# Patient Record
Sex: MALE | Race: BLACK OR AFRICAN AMERICAN | Employment: OTHER | URBAN - METROPOLITAN AREA
[De-identification: names, ages, dates, MRNs, and addresses within clinical notes are randomized per-mention and may not be internally consistent; named-entity substitution may affect disease eponyms.]

---

## 2017-01-10 RX ORDER — LEVOTHYROXINE SODIUM 88 UG/1
TABLET ORAL
Qty: 30 TAB | Refills: 1 | Status: SHIPPED | OUTPATIENT
Start: 2017-01-10 | End: 2017-05-11 | Stop reason: SDUPTHER

## 2017-01-10 NOTE — TELEPHONE ENCOUNTER
1/10/17 Called patient to try and set up appt. , but person who answered the phone, said it is the wrong number.   dw

## 2017-01-10 NOTE — TELEPHONE ENCOUNTER
He is due in for visit with MD for med refill and his annual fasting labs with the next open appt slot. If he can come on a Monday he can see Jayme Dukes for his [address] behind his appt with Dr Alex Guzmán.  Hopefully this can be set up in the next 60 days

## 2017-01-16 ENCOUNTER — OFFICE VISIT (OUTPATIENT)
Dept: FAMILY MEDICINE CLINIC | Age: 82
End: 2017-01-16

## 2017-01-16 VITALS
DIASTOLIC BLOOD PRESSURE: 77 MMHG | OXYGEN SATURATION: 98 % | HEIGHT: 63 IN | RESPIRATION RATE: 20 BRPM | TEMPERATURE: 98 F | WEIGHT: 168 LBS | BODY MASS INDEX: 29.77 KG/M2 | HEART RATE: 62 BPM | SYSTOLIC BLOOD PRESSURE: 153 MMHG

## 2017-01-16 DIAGNOSIS — E78.5 DYSLIPIDEMIA: ICD-10-CM

## 2017-01-16 DIAGNOSIS — C61 PROSTATE CANCER (HCC): ICD-10-CM

## 2017-01-16 DIAGNOSIS — R73.03 PREDIABETES: ICD-10-CM

## 2017-01-16 DIAGNOSIS — E55.9 VITAMIN D DEFICIENCY: ICD-10-CM

## 2017-01-16 DIAGNOSIS — I10 ESSENTIAL HYPERTENSION: ICD-10-CM

## 2017-01-16 DIAGNOSIS — G89.29 OTHER CHRONIC PAIN: ICD-10-CM

## 2017-01-16 DIAGNOSIS — K62.5 BRBPR (BRIGHT RED BLOOD PER RECTUM): ICD-10-CM

## 2017-01-16 DIAGNOSIS — E03.9 HYPOTHYROIDISM, UNSPECIFIED TYPE: ICD-10-CM

## 2017-01-16 DIAGNOSIS — R74.8 ALKALINE PHOSPHATASE ELEVATION: ICD-10-CM

## 2017-01-16 DIAGNOSIS — R07.89 RIGHT-SIDED CHEST WALL PAIN: Primary | ICD-10-CM

## 2017-01-16 RX ORDER — LEVOTHYROXINE SODIUM 88 UG/1
TABLET ORAL
Qty: 30 TAB | Refills: 0 | Status: CANCELLED | OUTPATIENT
Start: 2017-01-16

## 2017-01-16 RX ORDER — TRAMADOL HYDROCHLORIDE 50 MG/1
TABLET ORAL
Qty: 200 TAB | Refills: 2 | Status: ON HOLD | OUTPATIENT
Start: 2017-01-16 | End: 2017-02-13

## 2017-01-16 NOTE — PROGRESS NOTES
States still bleeding from rectum. Sees blood with each BM. Saw Dr. Jake Rosen 3 mos ago and had bleeding then. States has not changed. No GI spec. Nurse history read and confirmed by patient. Visit Vitals    /77 (BP 1 Location: Right arm, BP Patient Position: Sitting)    Pulse 62    Temp 98 °F (36.7 °C)    Resp 20    Ht 5' 3\" (1.6 m)    Wt 168 lb (76.2 kg)    SpO2 98%    BMI 29.76 kg/m2       Patient alert and cooperative. Reviewed above. Assessment:  1. Here for follow up of now right sided chest wall pain, area non tender, no masses appreciated. 2. HTN with elevated reading. 3. Bright red blood per rectum, ongoing since radiation therapy for prostate cancer. 4. Hypothyroid, on replacement. 5. History of CAD. 6. Hyperlipids. 7. Prediabetes. 8. Vitamin D deficiency. 9. Alk phos elevation. Plan:  1. Flu and pneumonia given. 2. Return for fasting labs. 3. 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 4. Follow otherwise here prn.

## 2017-01-16 NOTE — PROGRESS NOTES
He is still having side pain. He is having right sided pain today but some days it's left sided. He lost his wife in the summer and no longer has to lift her. He failed his depression screen -thinks due to the loss of his wife and his own health issues  We have notes from Dr Eulalia Vargas at [facility] from September and this is his most recent visit  Smoker and would like to stop  He is due for Tdap/shingles vaccine-will check with insurance on coverage. He is due for a Prevnar and will do this today and his flu shot.  Due MWV and we set this up

## 2017-02-05 ENCOUNTER — APPOINTMENT (OUTPATIENT)
Dept: CT IMAGING | Age: 82
DRG: 871 | End: 2017-02-05
Attending: EMERGENCY MEDICINE
Payer: MEDICARE

## 2017-02-05 ENCOUNTER — HOSPITAL ENCOUNTER (INPATIENT)
Age: 82
LOS: 8 days | Discharge: NURSING, REHAB OR LONG-TERM CARE | DRG: 871 | End: 2017-02-13
Attending: EMERGENCY MEDICINE | Admitting: INTERNAL MEDICINE
Payer: MEDICARE

## 2017-02-05 ENCOUNTER — APPOINTMENT (OUTPATIENT)
Dept: GENERAL RADIOLOGY | Age: 82
DRG: 871 | End: 2017-02-05
Attending: EMERGENCY MEDICINE
Payer: MEDICARE

## 2017-02-05 DIAGNOSIS — R07.89 RIGHT-SIDED CHEST WALL PAIN: ICD-10-CM

## 2017-02-05 DIAGNOSIS — G89.29 OTHER CHRONIC PAIN: ICD-10-CM

## 2017-02-05 DIAGNOSIS — R55 SYNCOPE AND COLLAPSE: ICD-10-CM

## 2017-02-05 DIAGNOSIS — J18.9 PNEUMONIA OF RIGHT LUNG DUE TO INFECTIOUS ORGANISM, UNSPECIFIED PART OF LUNG: Primary | ICD-10-CM

## 2017-02-05 DIAGNOSIS — R06.02 SHORTNESS OF BREATH: ICD-10-CM

## 2017-02-05 PROBLEM — J15.9 COMMUNITY ACQUIRED BACTERIAL PNEUMONIA: Status: ACTIVE | Noted: 2017-02-05

## 2017-02-05 LAB
ALBUMIN SERPL BCP-MCNC: 2.1 G/DL (ref 3.5–5)
ALBUMIN/GLOB SERPL: 0.4 {RATIO} (ref 1.1–2.2)
ALP SERPL-CCNC: 157 U/L (ref 45–117)
ALT SERPL-CCNC: 39 U/L (ref 12–78)
ANION GAP BLD CALC-SCNC: 14 MMOL/L (ref 5–15)
ANION GAP BLD CALC-SCNC: 20 MMOL/L (ref 5–15)
APPEARANCE UR: ABNORMAL
ARTERIAL PATENCY WRIST A: YES
AST SERPL W P-5'-P-CCNC: 91 U/L (ref 15–37)
BACTERIA URNS QL MICRO: NEGATIVE /HPF
BASE DEFICIT BLDA-SCNC: 5.5 MMOL/L
BASOPHILS # BLD AUTO: 0 K/UL
BASOPHILS # BLD: 0 %
BDY SITE: ABNORMAL
BILIRUB SERPL-MCNC: 1.6 MG/DL (ref 0.2–1)
BILIRUB UR QL: NEGATIVE
BREATHS.SPONTANEOUS ON VENT: 26
BUN BLD-MCNC: 69 MG/DL (ref 9–20)
BUN SERPL-MCNC: 68 MG/DL (ref 6–20)
BUN/CREAT SERPL: 36 (ref 12–20)
CA-I BLD-MCNC: 1.1 MMOL/L (ref 1.12–1.32)
CALCIUM SERPL-MCNC: 8.9 MG/DL (ref 8.5–10.1)
CHLORIDE BLD-SCNC: 100 MMOL/L (ref 98–107)
CHLORIDE SERPL-SCNC: 101 MMOL/L (ref 97–108)
CK MB CFR SERPL CALC: 5.3 % (ref 0–2.5)
CK MB SERPL-MCNC: 4.8 NG/ML (ref 5–25)
CK SERPL-CCNC: 91 U/L (ref 39–308)
CO2 BLD-SCNC: 20 MMOL/L (ref 21–32)
CO2 SERPL-SCNC: 22 MMOL/L (ref 21–32)
COLOR UR: ABNORMAL
CREAT BLD-MCNC: 1.9 MG/DL (ref 0.6–1.3)
CREAT SERPL-MCNC: 1.89 MG/DL (ref 0.7–1.3)
EOSINOPHIL # BLD: 0.3 K/UL
EOSINOPHIL NFR BLD: 1 %
EPITH CASTS URNS QL MICRO: ABNORMAL /LPF
ERYTHROCYTE [DISTWIDTH] IN BLOOD BY AUTOMATED COUNT: 16.1 % (ref 11.5–14.5)
GAS FLOW.O2 O2 DELIVERY SYS: 2 L/MIN
GLOBULIN SER CALC-MCNC: 5.8 G/DL (ref 2–4)
GLUCOSE BLD STRIP.AUTO-MCNC: 96 MG/DL (ref 65–100)
GLUCOSE BLD-MCNC: 101 MG/DL (ref 75–110)
GLUCOSE SERPL-MCNC: 93 MG/DL (ref 65–100)
GLUCOSE UR STRIP.AUTO-MCNC: NEGATIVE MG/DL
HCO3 BLDA-SCNC: 17 MMOL/L (ref 22–26)
HCT VFR BLD AUTO: 34.5 % (ref 36.6–50.3)
HCT VFR BLD CALC: 39 % (ref 36.6–50.3)
HGB BLD-MCNC: 12.5 G/DL (ref 12.1–17)
HGB BLD-MCNC: 13.3 GM/DL (ref 12.1–17)
HGB UR QL STRIP: ABNORMAL
INR PPP: 1.2 (ref 0.9–1.1)
KETONES UR QL STRIP.AUTO: 15 MG/DL
LACTATE SERPL-SCNC: 1.5 MMOL/L (ref 0.4–2)
LEUKOCYTE ESTERASE UR QL STRIP.AUTO: NEGATIVE
LIPASE SERPL-CCNC: 517 U/L (ref 73–393)
LYMPHOCYTES # BLD AUTO: 1 %
LYMPHOCYTES # BLD: 0.3 K/UL
MAGNESIUM SERPL-MCNC: 3 MG/DL (ref 1.6–2.4)
MCH RBC QN AUTO: 27.2 PG (ref 26–34)
MCHC RBC AUTO-ENTMCNC: 36.2 G/DL (ref 30–36.5)
MCV RBC AUTO: 75.2 FL (ref 80–99)
METAMYELOCYTES NFR BLD MANUAL: 3 %
MONOCYTES # BLD: 0.8 K/UL
MONOCYTES NFR BLD AUTO: 3 %
NEUTS BAND NFR BLD MANUAL: 10 %
NEUTS SEG # BLD: 24.7 K/UL
NEUTS SEG NFR BLD AUTO: 82 %
NITRITE UR QL STRIP.AUTO: NEGATIVE
NRBC # BLD: 0.08 K/UL (ref 0–0.01)
NRBC BLD-RTO: 0.3 PER 100 WBC
PCO2 BLDA: 28 MMHG (ref 35–45)
PH BLDA: 7.42 [PH] (ref 7.35–7.45)
PH UR STRIP: 5 [PH] (ref 5–8)
PLATELET # BLD AUTO: 170 K/UL (ref 150–400)
PO2 BLDA: 60 MMHG (ref 80–100)
POTASSIUM BLD-SCNC: 3.7 MMOL/L (ref 3.5–5.1)
POTASSIUM SERPL-SCNC: 3.7 MMOL/L (ref 3.5–5.1)
PROT SERPL-MCNC: 7.9 G/DL (ref 6.4–8.2)
PROT UR STRIP-MCNC: ABNORMAL MG/DL
PROTHROMBIN TIME: 12 SEC (ref 9–11.1)
RBC # BLD AUTO: 4.59 M/UL (ref 4.1–5.7)
RBC #/AREA URNS HPF: ABNORMAL /HPF (ref 0–5)
RBC MORPH BLD: ABNORMAL
SAO2 % BLD: 92 % (ref 92–97)
SAO2% DEVICE SAO2% SENSOR NAME: ABNORMAL
SERVICE CMNT-IMP: ABNORMAL
SERVICE CMNT-IMP: NORMAL
SODIUM BLD-SCNC: 135 MMOL/L (ref 136–145)
SODIUM SERPL-SCNC: 137 MMOL/L (ref 136–145)
SP GR UR REFRACTOMETRY: 1.01 (ref 1–1.03)
SPECIMEN SITE: ABNORMAL
TROPONIN I BLD-MCNC: <0.04 NG/ML (ref 0–0.08)
TROPONIN I SERPL-MCNC: 0.08 NG/ML
UA: UC IF INDICATED,UAUC: ABNORMAL
UROBILINOGEN UR QL STRIP.AUTO: 2 EU/DL (ref 0.2–1)
WBC # BLD AUTO: 26.8 K/UL (ref 4.1–11.1)
WBC MORPH BLD: ABNORMAL
WBC NRBC COR # BLD: ABNORMAL 10*3/UL
WBC URNS QL MICRO: ABNORMAL /HPF (ref 0–4)

## 2017-02-05 PROCEDURE — 74011250636 HC RX REV CODE- 250/636: Performed by: INTERNAL MEDICINE

## 2017-02-05 PROCEDURE — 99285 EMERGENCY DEPT VISIT HI MDM: CPT

## 2017-02-05 PROCEDURE — 94640 AIRWAY INHALATION TREATMENT: CPT

## 2017-02-05 PROCEDURE — 87040 BLOOD CULTURE FOR BACTERIA: CPT | Performed by: EMERGENCY MEDICINE

## 2017-02-05 PROCEDURE — 83605 ASSAY OF LACTIC ACID: CPT | Performed by: EMERGENCY MEDICINE

## 2017-02-05 PROCEDURE — 80053 COMPREHEN METABOLIC PANEL: CPT | Performed by: EMERGENCY MEDICINE

## 2017-02-05 PROCEDURE — 36600 WITHDRAWAL OF ARTERIAL BLOOD: CPT | Performed by: EMERGENCY MEDICINE

## 2017-02-05 PROCEDURE — 96365 THER/PROPH/DIAG IV INF INIT: CPT

## 2017-02-05 PROCEDURE — 77030011943

## 2017-02-05 PROCEDURE — 94664 DEMO&/EVAL PT USE INHALER: CPT

## 2017-02-05 PROCEDURE — 93005 ELECTROCARDIOGRAM TRACING: CPT

## 2017-02-05 PROCEDURE — 65660000000 HC RM CCU STEPDOWN

## 2017-02-05 PROCEDURE — 74176 CT ABD & PELVIS W/O CONTRAST: CPT

## 2017-02-05 PROCEDURE — 36415 COLL VENOUS BLD VENIPUNCTURE: CPT | Performed by: EMERGENCY MEDICINE

## 2017-02-05 PROCEDURE — 71250 CT THORAX DX C-: CPT

## 2017-02-05 PROCEDURE — 83735 ASSAY OF MAGNESIUM: CPT | Performed by: EMERGENCY MEDICINE

## 2017-02-05 PROCEDURE — 74011000250 HC RX REV CODE- 250: Performed by: INTERNAL MEDICINE

## 2017-02-05 PROCEDURE — 74011000258 HC RX REV CODE- 258: Performed by: EMERGENCY MEDICINE

## 2017-02-05 PROCEDURE — 74011250636 HC RX REV CODE- 250/636: Performed by: EMERGENCY MEDICINE

## 2017-02-05 PROCEDURE — 81001 URINALYSIS AUTO W/SCOPE: CPT | Performed by: INTERNAL MEDICINE

## 2017-02-05 PROCEDURE — 77030029684 HC NEB SM VOL KT MONA -A

## 2017-02-05 PROCEDURE — 82803 BLOOD GASES ANY COMBINATION: CPT | Performed by: EMERGENCY MEDICINE

## 2017-02-05 PROCEDURE — 84484 ASSAY OF TROPONIN QUANT: CPT | Performed by: EMERGENCY MEDICINE

## 2017-02-05 PROCEDURE — 83690 ASSAY OF LIPASE: CPT | Performed by: EMERGENCY MEDICINE

## 2017-02-05 PROCEDURE — 77010033678 HC OXYGEN DAILY

## 2017-02-05 PROCEDURE — 51798 US URINE CAPACITY MEASURE: CPT

## 2017-02-05 PROCEDURE — 71010 XR CHEST PORT: CPT

## 2017-02-05 PROCEDURE — 80047 BASIC METABLC PNL IONIZED CA: CPT

## 2017-02-05 PROCEDURE — 82550 ASSAY OF CK (CPK): CPT | Performed by: EMERGENCY MEDICINE

## 2017-02-05 PROCEDURE — 70450 CT HEAD/BRAIN W/O DYE: CPT

## 2017-02-05 PROCEDURE — 85025 COMPLETE CBC W/AUTO DIFF WBC: CPT | Performed by: EMERGENCY MEDICINE

## 2017-02-05 PROCEDURE — 82962 GLUCOSE BLOOD TEST: CPT

## 2017-02-05 PROCEDURE — 85610 PROTHROMBIN TIME: CPT | Performed by: EMERGENCY MEDICINE

## 2017-02-05 PROCEDURE — 74011000250 HC RX REV CODE- 250: Performed by: EMERGENCY MEDICINE

## 2017-02-05 PROCEDURE — 74011250637 HC RX REV CODE- 250/637: Performed by: INTERNAL MEDICINE

## 2017-02-05 RX ORDER — HEPARIN SODIUM 5000 [USP'U]/ML
5000 INJECTION, SOLUTION INTRAVENOUS; SUBCUTANEOUS EVERY 8 HOURS
Status: DISCONTINUED | OUTPATIENT
Start: 2017-02-05 | End: 2017-02-10

## 2017-02-05 RX ORDER — ATORVASTATIN CALCIUM 40 MG/1
40 TABLET, FILM COATED ORAL DAILY
Status: DISCONTINUED | OUTPATIENT
Start: 2017-02-06 | End: 2017-02-13 | Stop reason: HOSPADM

## 2017-02-05 RX ORDER — SODIUM CHLORIDE 9 MG/ML
125 INJECTION, SOLUTION INTRAVENOUS CONTINUOUS
Status: DISCONTINUED | OUTPATIENT
Start: 2017-02-05 | End: 2017-02-06

## 2017-02-05 RX ORDER — ACETAMINOPHEN 325 MG/1
650 TABLET ORAL
Status: DISCONTINUED | OUTPATIENT
Start: 2017-02-05 | End: 2017-02-13 | Stop reason: HOSPADM

## 2017-02-05 RX ORDER — ASPIRIN 81 MG/1
81 TABLET ORAL DAILY
Status: DISCONTINUED | OUTPATIENT
Start: 2017-02-06 | End: 2017-02-13 | Stop reason: HOSPADM

## 2017-02-05 RX ORDER — LEVOFLOXACIN 5 MG/ML
750 INJECTION, SOLUTION INTRAVENOUS
Status: DISCONTINUED | OUTPATIENT
Start: 2017-02-05 | End: 2017-02-07 | Stop reason: DRUGHIGH

## 2017-02-05 RX ORDER — NICOTINE 7MG/24HR
1 PATCH, TRANSDERMAL 24 HOURS TRANSDERMAL EVERY 24 HOURS
Status: DISCONTINUED | OUTPATIENT
Start: 2017-02-05 | End: 2017-02-13 | Stop reason: HOSPADM

## 2017-02-05 RX ORDER — SODIUM CHLORIDE 9 MG/ML
1250 INJECTION, SOLUTION INTRAVENOUS ONCE
Status: COMPLETED | OUTPATIENT
Start: 2017-02-05 | End: 2017-02-05

## 2017-02-05 RX ORDER — SODIUM CHLORIDE 0.9 % (FLUSH) 0.9 %
5-10 SYRINGE (ML) INJECTION AS NEEDED
Status: DISCONTINUED | OUTPATIENT
Start: 2017-02-05 | End: 2017-02-13 | Stop reason: HOSPADM

## 2017-02-05 RX ORDER — TAMSULOSIN HYDROCHLORIDE 0.4 MG/1
0.4 CAPSULE ORAL DAILY
Status: DISCONTINUED | OUTPATIENT
Start: 2017-02-06 | End: 2017-02-13 | Stop reason: HOSPADM

## 2017-02-05 RX ORDER — ALBUTEROL SULFATE 0.83 MG/ML
2.5 SOLUTION RESPIRATORY (INHALATION)
Status: DISCONTINUED | OUTPATIENT
Start: 2017-02-05 | End: 2017-02-13 | Stop reason: HOSPADM

## 2017-02-05 RX ORDER — SODIUM CHLORIDE 9 MG/ML
1000 INJECTION, SOLUTION INTRAVENOUS ONCE
Status: DISPENSED | OUTPATIENT
Start: 2017-02-05 | End: 2017-02-06

## 2017-02-05 RX ORDER — LEVOTHYROXINE SODIUM 88 UG/1
88 TABLET ORAL
Status: DISCONTINUED | OUTPATIENT
Start: 2017-02-06 | End: 2017-02-13 | Stop reason: HOSPADM

## 2017-02-05 RX ORDER — ISOSORBIDE MONONITRATE 30 MG/1
30 TABLET, EXTENDED RELEASE ORAL DAILY
Status: DISCONTINUED | OUTPATIENT
Start: 2017-02-06 | End: 2017-02-13 | Stop reason: HOSPADM

## 2017-02-05 RX ORDER — HYDRALAZINE HYDROCHLORIDE 20 MG/ML
10 INJECTION INTRAMUSCULAR; INTRAVENOUS
Status: DISCONTINUED | OUTPATIENT
Start: 2017-02-05 | End: 2017-02-13 | Stop reason: HOSPADM

## 2017-02-05 RX ORDER — IPRATROPIUM BROMIDE AND ALBUTEROL SULFATE 2.5; .5 MG/3ML; MG/3ML
3 SOLUTION RESPIRATORY (INHALATION)
Status: DISCONTINUED | OUTPATIENT
Start: 2017-02-05 | End: 2017-02-07

## 2017-02-05 RX ORDER — LEVOFLOXACIN 5 MG/ML
750 INJECTION, SOLUTION INTRAVENOUS EVERY 24 HOURS
Status: DISCONTINUED | OUTPATIENT
Start: 2017-02-05 | End: 2017-02-05

## 2017-02-05 RX ORDER — SODIUM CHLORIDE 0.9 % (FLUSH) 0.9 %
5-10 SYRINGE (ML) INJECTION EVERY 8 HOURS
Status: DISCONTINUED | OUTPATIENT
Start: 2017-02-05 | End: 2017-02-09

## 2017-02-05 RX ORDER — AMLODIPINE BESYLATE 5 MG/1
5 TABLET ORAL DAILY
Status: DISCONTINUED | OUTPATIENT
Start: 2017-02-06 | End: 2017-02-13 | Stop reason: HOSPADM

## 2017-02-05 RX ORDER — ONDANSETRON 2 MG/ML
4 INJECTION INTRAMUSCULAR; INTRAVENOUS
Status: DISCONTINUED | OUTPATIENT
Start: 2017-02-05 | End: 2017-02-13 | Stop reason: HOSPADM

## 2017-02-05 RX ORDER — BISACODYL 5 MG
5 TABLET, DELAYED RELEASE (ENTERIC COATED) ORAL DAILY PRN
Status: DISCONTINUED | OUTPATIENT
Start: 2017-02-05 | End: 2017-02-13 | Stop reason: HOSPADM

## 2017-02-05 RX ORDER — METOPROLOL SUCCINATE 25 MG/1
25 TABLET, EXTENDED RELEASE ORAL DAILY
Status: DISCONTINUED | OUTPATIENT
Start: 2017-02-06 | End: 2017-02-13 | Stop reason: HOSPADM

## 2017-02-05 RX ADMIN — DOXYCYCLINE 100 MG: 100 INJECTION, POWDER, LYOPHILIZED, FOR SOLUTION INTRAVENOUS at 17:30

## 2017-02-05 RX ADMIN — IPRATROPIUM BROMIDE AND ALBUTEROL SULFATE 3 ML: .5; 3 SOLUTION RESPIRATORY (INHALATION) at 19:47

## 2017-02-05 RX ADMIN — CEFTRIAXONE 2 G: 2 INJECTION, POWDER, FOR SOLUTION INTRAMUSCULAR; INTRAVENOUS at 16:51

## 2017-02-05 RX ADMIN — SODIUM CHLORIDE 75 ML/HR: 900 INJECTION, SOLUTION INTRAVENOUS at 18:30

## 2017-02-05 RX ADMIN — HEPARIN SODIUM 5000 UNITS: 5000 INJECTION, SOLUTION INTRAVENOUS; SUBCUTANEOUS at 18:01

## 2017-02-05 RX ADMIN — SODIUM CHLORIDE 1250 ML: 900 INJECTION, SOLUTION INTRAVENOUS at 16:52

## 2017-02-05 RX ADMIN — GUAIFENESIN 600 MG: 600 TABLET, EXTENDED RELEASE ORAL at 20:23

## 2017-02-05 RX ADMIN — Medication 10 ML: at 19:35

## 2017-02-05 RX ADMIN — LEVOFLOXACIN 750 MG: 5 INJECTION, SOLUTION INTRAVENOUS at 20:21

## 2017-02-05 NOTE — ROUTINE PROCESS
TRANSFER - OUT REPORT:    Verbal report given to Keysha Ling on [facility].  being transferred to  3263 (Renal) for routine progression of care       Report consisted of patients Situation, Background, Assessment and   Recommendations(SBAR). Information from the following report(s) SBAR, ED Summary, Intake/Output, MAR and Recent Results was reviewed with the receiving nurse. Lines:       Opportunity for questions and clarification was provided.       Patient transported with:   O2 @ 2 liters

## 2017-02-05 NOTE — ED PROVIDER NOTES
HPI Comments:   Vince Bazzi. is a [de-identified] y.o. male with a hx of CAD, hypercholesteremia, HTN, MI, stroke, PVD, cerebral embolism with cerebral infarction, encephalocele, and AAA presenting to the ED via EMS for AMS which started yesterday. Per EMS, pt's neighbors called because they thought he seemed more confused than usual and he is typically oriented to person and place but not date or time. Pt lives alone and states he had a syncopal episode today in his kitchen but is unsure of the cause. He denies any recent illnesses but is c/o generalized weakness today. Pt denies SOB but was mouth-breathing on EMS arrival so they put him on O2 with some relief. Patient denies CP, nausea, vomiting, diarrhea, HA, dysuria, difficulty urinating, speech difficulty, one sided weakness, leg swelling, or any other symptoms or complaints. PCP: Dejon Villagran MD    There are no other complaints, changes or physical findings at this time. Written by Darya Nixon ED Scribe, as dictated by Sara Thomas MD      The history is provided by the patient and the EMS personnel. No  was used.         Past Medical History:   Diagnosis Date    AAA (abdominal aortic aneurysm) ([address]     Acute MI ([address] 12/2010     dr Nataliia Jorgensen    Acute prostatitis      again 9/12/16 f[facility]u note rec'd [facility]    Advance directive discussed with patient 04/20/2016    Aneurysm ([address] 6/2011     AAA    Borderline glaucoma (glaucoma suspect) 2/18/15     notes from [address] [place] [address] rec'd    BPH (benign prostatic hyperplasia)     Bright red blood per rectum 2/4/16     [facility] note Bibiana Hurley- w/u in progress    CAD (coronary artery disease)     Calculus of kidney     Cerebral embolism with cerebral infarction ([address]     Chronic pain      back    Dizzy spells 6/2012    DJD (degenerative joint disease) of lumbar spine     ED (erectile dysfunction)     Elevated PSA 03/20/2014     [facility] rec'd     8/24/16 f/u note    Encephalocele ([address]     Hypercholesterolemia     Hypertension     Pro[facility] cancer ([facility] 5/12/14         crystal henry/ Dr Cheryl Ramirez  6/4/15    PVD (peripheral vascular disease) [facility] claudication [facility]     S/P radiation therapy 15 months out     probable cause of the rectal bleeding    Stroke [facility]) 6/2011    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     neuro assoc notes rec'd    Thyroid disease     Vitamin D deficiency 11/2013     again 5/2015 again 1/2016       Past Surgical History:   Procedure Laterality Date    Endoscopy, colon, diagnostic      Ptca w/ stent, initial  10/16/2010          Ptca each addl vessel  10/16/2010          Hx heent  10/2012     repair right heidy dawkins    Pr cardiac surg procedure unlist  12/2010     dr Ofe Romo stents past MI    Pr left heart cath,percutaneous  10/16/2010          Hx orthopaedic Bilateral      BUNIONECTOMY    Vascular surgery procedure unlist       left leg varicose vein stripping dr Jerilyn Mcclendon    Hx carotid endarterectomy Left     Colonoscopy N/A 8/31/2016     COLONOSCOPY performed by Chato Hsu MD at [facility] ENDOSCOPY         Family History:   Problem Relation Age of Onset    Diabetes Mother     Diabetes Father     Cancer Sister      LUNG    Cancer Brother      COLON    Cancer Sister      LUNG    Anesth Problems Neg Hx        Social History     Social History    Marital status:      Spouse name: N/A    Number of children: N/A    Years of education: N/A     Occupational History    Not on file.      Social History Main Topics    Smoking status: Former Smoker     Packs/day: 0.25     Quit date: 9/10/2014    Smokeless tobacco: Never Used      Comment: SMOKES 1 PACK WEEK    Alcohol use No    Drug use: No    Sexual activity: Not Currently     Other Topics Concern    Not on file     Social History Narrative         ALLERGIES: Norco [hydrocodone-acetaminophen]; Pcn [penicillins]; and Percocet [oxycodone-acetaminophen]    Review of Systems Constitutional: Negative for chills and fever. HENT: Negative for congestion. Eyes: Negative for visual disturbance. Respiratory: Negative for chest tightness and shortness of breath. Cardiovascular: Negative for chest pain and leg swelling. Gastrointestinal: Negative for diarrhea, nausea and vomiting. Endocrine: Negative for polyuria. Genitourinary: Negative for difficulty urinating, dysuria and frequency. Musculoskeletal: Negative for myalgias. Skin: Negative for color change. Allergic/Immunologic: Negative for immunocompromised state. Neurological: Positive for syncope and weakness (generalized). Negative for speech difficulty, numbness and headaches. No one-sided weakness       Vitals:    02/05/17 1505 02/05/17 1527 02/05/17 1552 02/05/17 1615   BP: 171/71   166/81   Pulse: (!) 109  100 (!) 115   Resp: 22  26 (!) 32   Temp: 97.7 °F (36.5 °C) 98.4 °F (36.9 °C)     SpO2: 92%  94% 93%   Weight: 74.8 kg (165 lb)      Height: 5' 3\" (1.6 m)               Physical Exam     Nursing note and vitals reviewed.   General appearance: non-toxic, NAD  Eyes: PERRL, EOMI, conjunctiva normal, anicteric sclera  HEENT: oropharynx is clear and extremely dry, neck is supple  Pulmonary: tachypnea, mouth breathing, coarse breath sounds in the right base,   Cardiac: tachycardia and regular rhythm, no definite murmurs, gallops, or rubs, cardiac auscultation is attenuated by respiratory pattern, 2+ peripheral pulses, no carotid bruits, cap refill < 2 seconds  Abdomen: soft, TTP to right side of abd and RUQ, voluntary guarding, nondistended, bowel sounds present, normal percussion, no pulsatile mass  MSK: no lower extremity edema, no calf tenderness  Neuro: Slightly somnolent but arouses to voice, able to answer questions, oriented to person, place, and year minus 1, counts backwards from 20 by 1 down to 14,  weak but symmetric, able to move bilateral toes, 5/5 strength in all 4 extremities, VFF, finger to nose normal, light touch intact in all four extremities, cranial nerves II-XII intact     MDM  Number of Diagnoses or Management Options  Pneumonia of right lung due to infectious organism, unspecified part of lung:   Shortness of breath:   Syncope and collapse:   Diagnosis management comments: DDx: AAA, cholecystitis, peritonitis, pyelonephritis, ICH, PE, PNA    CXR significant for R sided PNA. Pt in [facility], un-enhanced CT's done. Doubt PE now given CXR findings. Abx & admit to hospitalist.         Amount and/or Complexity of Data Reviewed  Clinical lab tests: ordered and reviewed  Tests in the radiology section of CPT®: reviewed and ordered  Tests in the medicine section of CPT®: ordered and reviewed  Obtain history from someone other than the patient: yes (EMS)  Discuss the patient with other providers: yes (Hospitalist)  Independent visualization of images, tracings, or specimens: yes    Patient Progress  Patient progress: stable    Procedures    EKG interpretation: (Preliminary)  3:00 PM  Rhythm: sinus rhythm with frequent PVC's and fusion complexes; and regular . Rate (approx.): 98 bpm; Axis: LAD (not new); MS interval: 200 ms; QRS interval: 160 ms; ST/T wave: normal; QTc 510 ms; Other findings: RBBB (not new). Written by Alan Blackmon ED Scribe, as dictated by Sherry Amaya MD     PROGRESS NOTE:   4:02 PM  Pt's family has arrived and states he is a heavy smoker. Written by Meera Shane ED Scribe, as dictated by Sherry Amaya MD.     CONSULT NOTE:   4:10 PM  Sherry Amaya MD spoke with Dr. Sher Royal,   Specialty: Hospitalist  Discussed pt's hx, disposition, and available diagnostic and imaging results. Reviewed care plans. Consultant will evaluate pt for admission.   Written by Meera Shane ED Scribe, as dictated by Sherry Amaya MD.    LABORATORY TESTS:  Recent Results (from the past 12 hour(s))   EKG, 12 LEAD, INITIAL    Collection Time: 02/05/17  3:00 PM   Result Value Ref Range Ventricular Rate 98 BPM    Atrial Rate 98 BPM    P-R Interval 200 ms    QRS Duration 160 ms    Q-T Interval 400 ms    QTC Calculation (Bezet) 510 ms    Calculated P Axis 52 degrees    Calculated R Axis -86 degrees    Calculated T Axis 41 degrees    Diagnosis       Sinus rhythm with frequent premature ventricular complexes and fusion   complexes  Left axis deviation  Right bundle branch block  When compared with ECG of 23-APR-2016 16:58,  fusion complexes are now present  premature ventricular complexes are now present  MT interval has decreased  Vent.  rate has increased BY  50 BPM  T wave inversion no longer evident in Inferior leads  QT has lengthened     GLUCOSE, POC    Collection Time: 02/05/17  3:07 PM   Result Value Ref Range    Glucose (POC) 96 65 - 100 mg/dL    Performed by [address] [place] [address], ARTERIAL    Collection Time: 02/05/17  3:43 PM   Result Value Ref Range    pH 7.42 7.35 - 7.45      PCO2 28 (L) 35.0 - 45.0 mmHg    PO2 60 (L) 80 - 100 mmHg    O2 SAT 92 92 - 97 %    BICARBONATE 17 (L) 22 - 26 mmol/L    BASE DEFICIT 5.5 mmol/L    O2 METHOD NASAL O2      O2 FLOW RATE 2.00 L/min    SPONTANEOUS RATE 26.0      Sample source ARTERIAL      SITE RIGHT RADIAL      LAXMI'S TEST YES     POC TROPONIN-I    Collection Time: 02/05/17  3:43 PM   Result Value Ref Range    Troponin-I (POC) <0.04 0.00 - 8.98 ng/mL   METABOLIC PANEL, COMPREHENSIVE    Collection Time: 02/05/17  3:44 PM   Result Value Ref Range    Sodium 137 136 - 145 mmol/L    Potassium 3.7 3.5 - 5.1 mmol/L    Chloride 101 97 - 108 mmol/L    CO2 22 21 - 32 mmol/L    Anion gap 14 5 - 15 mmol/L    Glucose 93 65 - 100 mg/dL    BUN 68 (H) 6 - 20 MG/DL    Creatinine 1.89 (H) 0.70 - 1.30 MG/DL    BUN/Creatinine ratio 36 (H) 12 - 20      GFR est AA 42 (L) >60 ml/min/1.73m2    GFR est non-AA 34 (L) >60 ml/min/1.73m2    Calcium 8.9 8.5 - 10.1 MG/DL    Bilirubin, total 1.6 (H) 0.2 - 1.0 MG/DL    ALT (SGPT) 39 12 - 78 U/L    AST (SGOT) 91 (H) 15 - 37 U/L Alk. phosphatase 157 (H) 45 - 117 U/L    Protein, total 7.9 6.4 - 8.2 g/dL    Albumin 2.1 (L) 3.5 - 5.0 g/dL    Globulin 5.8 (H) 2.0 - 4.0 g/dL    A-G Ratio 0.4 (L) 1.1 - 2.2     CBC WITH AUTOMATED DIFF    Collection Time: 02/05/17  3:44 PM   Result Value Ref Range    WBC 26.8 (H) 4.1 - 11.1 K/uL    RBC 4.59 4. 10 - 5.70 M/uL    HGB 12.5 12.1 - 17.0 g/dL    HCT 34.5 (L) 36.6 - 50.3 %    MCV 75.2 (L) 80.0 - 99.0 FL    MCH 27.2 26.0 - 34.0 PG    MCHC 36.2 30.0 - 36.5 g/dL    RDW 16.1 (H) 11.5 - 14.5 %    PLATELET 954 826 - 339 K/uL    NEUTROPHILS 82 %    BAND NEUTROPHILS 10 %    LYMPHOCYTES 1 %    MONOCYTES 3 %    EOSINOPHILS 1 %    BASOPHILS 0 %    METAMYELOCYTES 3 %    ABS. NEUTROPHILS 24.7 K/UL    ABS. LYMPHOCYTES 0.3 K/UL    ABS. MONOCYTES 0.8 K/UL    ABS. EOSINOPHILS 0.3 K/UL    ABS.  BASOPHILS 0.0 K/UL    RBC COMMENTS TARGET CELLS  1+        WBC COMMENTS TOXIC GRANULATION     TROPONIN I    Collection Time: 02/05/17  3:44 PM   Result Value Ref Range    Troponin-I, Qt. 0.08 (H) <0.05 ng/mL   LACTIC ACID, PLASMA    Collection Time: 02/05/17  3:44 PM   Result Value Ref Range    Lactic acid 1.5 0.4 - 2.0 MMOL/L   CK W/ CKMB & INDEX    Collection Time: 02/05/17  3:44 PM   Result Value Ref Range    CK 91 39 - 308 U/L    CK - MB 4.8 (H) <3.6 NG/ML    CK-MB Index 5.3 (H) 0 - 2.5     LIPASE    Collection Time: 02/05/17  3:44 PM   Result Value Ref Range    Lipase 517 (H) 73 - 393 U/L   PROTHROMBIN TIME + INR    Collection Time: 02/05/17  3:44 PM   Result Value Ref Range    INR 1.2 (H) 0.9 - 1.1      Prothrombin time 12.0 (H) 9.0 - 11.1 sec   POC CHEM8    Collection Time: 02/05/17  3:44 PM   Result Value Ref Range    Calcium, ionized (POC) 1.10 (L) 1.12 - 1.32 MMOL/L    Sodium (POC) 135 (L) 136 - 145 MMOL/L    Potassium (POC) 3.7 3.5 - 5.1 MMOL/L    Chloride (POC) 100 98 - 107 MMOL/L    CO2 (POC) 20 (L) 21 - 32 MMOL/L    Anion gap (POC) 20 (H) 5 - 15 mmol/L    Glucose (POC) 101 75 - 110 MG/DL    BUN (POC) 69 (H) 9 - 20 MG/DL Creatinine (POC) 1.9 (H) 0.6 - 1.3 MG/DL    GFR-AA (POC) 42 (L) >60 ml/min/1.73m2    GFR, non-AA (POC) 34 (L) >60 ml/min/1.73m2    Hemoglobin (POC) 13.3 12.1 - 17.0 GM/DL    Hematocrit (POC) 39 36.6 - 50.3 %    Comment Comment Not Indicated. MAGNESIUM    Collection Time: 02/05/17  3:44 PM   Result Value Ref Range    Magnesium 3.0 (H) 1.6 - 2.4 mg/dL   NUCLEATED RBC    Collection Time: 02/05/17  3:44 PM   Result Value Ref Range    NRBC 0.3 (H) 0  WBC    ABSOLUTE NRBC 0.08 (H) 0.00 - 0.01 K/uL    WBC CORRECTED FOR NR ADJUSTED FOR NUCLEATED RBC'S     URINALYSIS W/ REFLEX CULTURE    Collection Time: 02/05/17  5:01 PM   Result Value Ref Range    Color YELLOW/STRAW      Appearance CLOUDY (A) CLEAR      Specific gravity 1.012 1.003 - 1.030      pH (UA) 5.0 5.0 - 8.0      Protein TRACE (A) NEG mg/dL    Glucose NEGATIVE  NEG mg/dL    Ketone 15 (A) NEG mg/dL    Bilirubin NEGATIVE  NEG      Blood TRACE (A) NEG      Urobilinogen 2.0 (H) 0.2 - 1.0 EU/dL    Nitrites NEGATIVE  NEG      Leukocyte Esterase NEGATIVE  NEG      WBC PENDING /hpf    RBC PENDING /hpf    Epithelial cells PENDING /lpf    Bacteria PENDING /hpf    UA:UC IF INDICATED PENDING        IMAGING RESULTS:  CT CHEST WO CONT   Final Result   INDICATION: Syncope. Tachycardia. Hypoxia. Abdominal pain.     COMPARISON: CTA chest from October 15, 2010. Lumbar spine MRI from June 25, 2014     TECHNIQUE: 5 mm axial images were obtained through the chest, abdomen, and  pelvis. Oral contrast was not administered. CT dose reduction was achieved  through use of a standardized protocol tailored for this examination and  automatic exposure control for dose modulation.     FINDINGS:     THYROID: No nodule. MEDIASTINUM: No mass or lymphadenopathy. JULI: No mass or lymphadenopathy. THORACIC AORTA: No dissection or aneurysm. MAIN PULMONARY ARTERY: Normal in caliber. TRACHEA/BRONCHI: Patent. ESOPHAGUS: No wall thickening or dilatation. HEART: Normal in size. Extensive coronary artery calcifications. PLEURA: No pleural effusion or pneumothorax. LUNGS: There is extensive right upper lobe consolidation, with underlying  moderately severe centrilobular emphysema. Airspace disease is also seen in the  perihilar right middle lobe. There is bilateral lower lobe dependent  atelectasis. LIVER: No mass or biliary dilatation. Subcentimeter cysts in the right hepatic  lobe. GALLBLADDER: Unremarkable. SPLEEN: No mass. PANCREAS: No mass or ductal dilatation. ADRENALS: Unremarkable. KIDNEYS: No mass, calculus, or hydronephrosis. 3.5 cm left renal cyst.  STOMACH: Unremarkable. SMALL BOWEL: No dilatation or wall thickening. COLON: No dilatation or wall thickening. Extensive colonic diverticulosis,  without evidence of acute diverticulitis. APPENDIX: Normal.  PERITONEUM: No ascites or pneumoperitoneum. RETROPERITONEUM: No lymphadenopathy. 3.6 x 3.4 cm distal abdominal aortic  aneurysm, with aortoiliac stent graft in place. REPRODUCTIVE ORGANS: Normal sized prostate gland. URINARY BLADDER: No mass or calculus. BONES: No destructive bone lesion. Chronic deformity at L4 is unchanged. ADDITIONAL COMMENTS: N/A     IMPRESSION  IMPRESSION:  Extensive right upper and middle lobe airspace disease, compatible with  pneumonia. Follow-up to complete resolution is recommended. Centrilobular  emphysema. Colonic diverticulosis, without evidence of acute diverticulitis. 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 Signed by      Signed Date/Time    Phone Pager     Zoe Oliver, [address] 2/05/2017 16:50 [phone]       CT ABD PELV WO CONT   Final Result   INDICATION: Syncope. Tachycardia. Hypoxia. Abdominal pain.     COMPARISON: CTA chest from October 15, 2010. Lumbar spine MRI from June 25, 2014     TECHNIQUE: 5 mm axial images were obtained through the chest, abdomen, and  pelvis. Oral contrast was not administered.  CT dose reduction was achieved  through use of a standardized protocol tailored for this examination and  automatic exposure control for dose modulation.     FINDINGS:     THYROID: No nodule. MEDIASTINUM: No mass or lymphadenopathy. JULI: No mass or lymphadenopathy. THORACIC AORTA: No dissection or aneurysm. MAIN PULMONARY ARTERY: Normal in caliber. TRACHEA/BRONCHI: Patent. ESOPHAGUS: No wall thickening or dilatation. HEART: Normal in size. Extensive coronary artery calcifications. PLEURA: No pleural effusion or pneumothorax. LUNGS: There is extensive right upper lobe consolidation, with underlying  moderately severe centrilobular emphysema. Airspace disease is also seen in the  perihilar right middle lobe. There is bilateral lower lobe dependent  atelectasis. LIVER: No mass or biliary dilatation. Subcentimeter cysts in the right hepatic  lobe. GALLBLADDER: Unremarkable. SPLEEN: No mass. PANCREAS: No mass or ductal dilatation. ADRENALS: Unremarkable. KIDNEYS: No mass, calculus, or hydronephrosis. 3.5 cm left renal cyst.  STOMACH: Unremarkable. SMALL BOWEL: No dilatation or wall thickening. COLON: No dilatation or wall thickening. Extensive colonic diverticulosis,  without evidence of acute diverticulitis. APPENDIX: Normal.  PERITONEUM: No ascites or pneumoperitoneum. RETROPERITONEUM: No lymphadenopathy. 3.6 x 3.4 cm distal abdominal aortic  aneurysm, with aortoiliac stent graft in place. REPRODUCTIVE ORGANS: Normal sized prostate gland. URINARY BLADDER: No mass or calculus. BONES: No destructive bone lesion. Chronic deformity at L4 is unchanged. ADDITIONAL COMMENTS: N/A     IMPRESSION  IMPRESSION:  Extensive right upper and middle lobe airspace disease, compatible with  pneumonia. Follow-up to complete resolution is recommended. Centrilobular  emphysema. Colonic diverticulosis, without evidence of acute diverticulitis. 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   Signed by      Signed Date/Time    Phone Pager     Miracle Pillai, [address] 2/05/2017 16:50 [phone] CT HEAD WO CONT   Final Result   EXAM: CT HEAD WO CONT     INDICATION: Decreased alertness.     COMPARISON: CT head 4/23/2016     TECHNIQUE: Axial noncontrast head CT from foramen magnum to vertex. Coronal and  sagittal reformatted images were obtained. CT dose reduction was achieved  through use of a standardized protocol tailored for this examination and  automatic exposure control for dose modulation.     FINDINGS: There is diffuse age-related parenchymal volume loss. The ventricles  and sulci are age-appropriate without hydrocephalus. There is no mass effect or  midline shift. There is no intracranial hemorrhage or extra-axial fluid  collection. Areas of low attenuation in the periventricular white matter  represent stable chronic microvascular ischemic changes. A chronic infarct in  the left frontal lobe is unchanged. There is no new abnormal parenchymal  attenuation. The gray-white matter differentiation is maintained. The basal  cisterns are patent. There is intracranial atherosclerosis.     The osseous structures are intact. The visualized paranasal sinuses are clear. There is mild opacification of the right mastoid air cells. The left mastoid air  cells are clear.     IMPRESSION  IMPRESSION:   There is no acute intracranial abnormality. Signed by      Signed Date/Time    Phone Pager     Melly Byrd 2/05/2017 16:40 [phone]       XR CHEST PORT   Final Result     INDICATION: Dyspnea.     COMPARISON: April 17, 2014     FINDINGS: PA and lateral views of the chest demonstrate a stable  cardiomediastinal silhouette. There is extensive right upper lobe consolidation. The visualized osseous structures are unremarkable.     IMPRESSION  IMPRESSION: Extensive right upper lobe consolidation, compatible with pneumonia. Follow-up to complete resolution is recommended.   Signed by      Signed Date/Time    Phone Pager     Alessandro Saldana[address] 2/05/2017 15:45 [phone]              MEDICATIONS GIVEN:  Medications   0.9% sodium chloride infusion 1,000 mL ( IntraVENous Canceled Entry 2/5/17 1534)   cefTRIAXone (ROCEPHIN) 2 g in 0.9% sodium chloride (MBP/ADV) 50 mL (0 g IntraVENous IV Completed 2/5/17 1721)   doxycycline (VIBRAMYCIN) 100 mg in 0.9% sodium chloride (MBP/ADV) 100 mL IVPB (100 mg IntraVENous New Bag 2/5/17 1730)   sodium chloride (NS) flush 5-10 mL (not administered)   sodium chloride (NS) flush 5-10 mL (not administered)   0.9% sodium chloride infusion (not administered)   acetaminophen (TYLENOL) tablet 650 mg (not administered)   ondansetron (ZOFRAN) injection 4 mg (not administered)   bisacodyl (DULCOLAX) tablet 5 mg (not administered)   nicotine (NICODERM CQ) 7 mg/24 hr patch 1 Patch (1 Patch TransDERmal Apply Patch 2/5/17 1727)   heparin (porcine) injection 5,000 Units (5,000 Units SubCUTAneous Given 2/5/17 1801)   levoFLOXacin (LEVAQUIN) 750 mg in D5W IVPB (not administered)   albuterol-ipratropium (DUO-NEB) 2.5 MG-0.5 MG/3 ML (not administered)   albuterol (PROVENTIL VENTOLIN) nebulizer solution 2.5 mg (not administered)   guaiFENesin SR (MUCINEX) tablet 600 mg (not administered)   0.9% sodium chloride infusion 1,250 mL (1,250 mL IntraVENous New Bag 2/5/17 1652)       IMPRESSION:  1. Pneumonia of right lung due to infectious organism, unspecified part of lung    2. Syncope and collapse    3. Shortness of breath        PLAN: Admit to Hospitalist    Admit Note:  4:10 PM  Patient is being admitted to the hospital by Dr. Anika Lam. The results of their tests and reasons for their admission have been discussed with the patient and/or available family. They convey their agreement and understanding for the need to be admitted and for their admission diagnosis. Written by Rebeka Miller, ED Scribe, as dictated by Joseluis Pyle MD.     Attestation:   This note is prepared by Rebeka Miller, acting as Scribe for Joseluis Pyle MD.    Joseluis Pyle MD: The scribe's documentation has been prepared under my direction and personally reviewed by me in its entirety. I confirm that the note above accurately reflects all work, treatment, procedures, and medical decision making performed by me.

## 2017-02-05 NOTE — H&P
Hospitalist Admission Note    NAME: Lars Manning. :  1935   MRN:  [id number]     Date/Time:  2017 5:28 PM    Patient PCP: Ness Bañuelos MD  ________________________________________________________________________    My assessment of this patient's clinical condition and my plan of care is as follows. Assessment / Plan:  Acute respiratory failure without hypoxia  Community acquired PNA  Sepsis, POA  -CXR/CT chest/abd pelv showed extensive R upper lobe consolidation  -BC and sputum cultured ordered  -start Levaquin, duonebs, albuterol prn, mucinex  -O2 suppl, wean as tolerated  -will need to repeat cxr in 4-6 weeks to document resolution of infiltration  -repeat lab    Acute kidney Injury  -likely from pre-renal in the setting of poor PO intake  -IVF  -monitor lab    HTN/CAD  -continue amlodipine, imdur, asa, lipitor, hyzaar    BPH  -continue flomax, finasteride    Hypothyroidism  -synthroid    Acute encephalopathy  -head ct neg for acute abnormalities  -likely due to acute illness  -avoid sedating meds    Tobacco use  -pt agreed to nicotine patch  -smoking cessation counseled for 3-10 mins      Code Status: Full  Surrogate Decision Maker: kalpesh Mulligan at [phone]    DVT Prophylaxis: heparin  GI Prophylaxis: not indicated    Baseline: independent, lives alone        Subjective:   CHIEF COMPLAINT: sob, AMS    HISTORY OF PRESENT ILLNESS:     Dennie Seton is a [de-identified] y.o.  male w pmhx significant for CAD, HLD, HTN, MI, CVA, PVD, cerebral embolism, AAA present to ED c/o AMS in the setting os shortness of breath and abd pain. Per hx, pt lives alone, found by his neighbor today to be confused, apparently had a syncopal episode today in the kitchen and was unclear of the cause. States he has had generalized weakness for a few days with association to sob and productive cough yellow in color. Pt denies any recent fevers, chills, n/v/d. No changes in bowel/urinary habits. During my encounter, pt is awake and alert, slow to answer question, but is able to answer questions appropriately. In the ED, vitals stable T 97.7, P 109, /71. Cxr showed R sided consolidation. We were asked to admit for work up and evaluation of the above problems.      Past Medical History   Diagnosis Date    AAA (abdominal aortic aneurysm) ([address]     Acute MI ([address] 12/2010     dr Jamir Hardy    Acute prostatitis      again 9/12/16 f[facility]u note rec'd [facility]    Advance directive discussed with patient 04/20/2016    Aneurysm ([address] 6/2011     AAA    Borderline glaucoma (glaucoma suspect) 2/18/15     notes from [address] [place] [address] rec'd    BPH (benign prostatic hyperplasia)     Bright red blood per rectum 2/4/16     [facility] note Isaac Harris- w/u in progress    CAD (coronary artery disease)     Calculus of kidney     Cerebral embolism with cerebral infarction ([address]     Chronic pain      back    Dizzy spells 6/2012    DJD (degenerative joint disease) of lumbar spine     ED (erectile dysfunction)     Elevated PSA 03/20/2014     [facility] note rec'd     8/24/16 f/u note    Encephalocele ([address]     Hypercholesterolemia     Hypertension     Prostate cancer ([address] 5/12/14         crystal henry/ Dr Rebeka Magaña  6/4/15    PVD (peripheral vascular disease) with claudication ([address]     S/P radiation therapy 15 months out     probable cause of the rectal bleeding    Stroke ([address] 6/2011    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     neuro assoc notes rec'd    Thyroid disease     Vitamin D deficiency 11/2013     again 5/2015 again 1/2016        Past Surgical History   Procedure Laterality Date    Endoscopy, colon, diagnostic      Ptca w/ stent, initial  10/16/2010          Ptca each addl vessel  10/16/2010          Hx heent  10/2012     repair right heidy dawkins    Pr cardiac surg procedure unlist  12/2010     dr Jamir Hardy stents past MI    Pr left heart cath,percutaneous  10/16/2010          Hx orthopaedic Bilateral      BUNIONECTOMY    Vascular surgery procedure unlist       left leg varicose vein stripping dr Ramires Milder    Hx carotid endarterectomy Left     Colonoscopy N/A 8/31/2016     COLONOSCOPY performed by Ricardo Herrera MD at [facility] ENDOSCOPY       Social History   Substance Use Topics    Smoking status: Former Smoker     Packs/day: 0.25     Quit date: 9/10/2014    Smokeless tobacco: Never Used      Comment: SMOKES 1 PACK WEEK    Alcohol use No        Family History   Problem Relation Age of Onset    Diabetes Mother     Diabetes Father     Cancer Sister      LUNG    Cancer Brother      COLON    Cancer Sister      LUNG    Anesth Problems Neg Hx      Allergies   Allergen Reactions    Norco [Hydrocodone-Acetaminophen] Other (comments)     Too sedated and felt like he was in a daze    Pcn [Penicillins] Rash    Percocet [Oxycodone-Acetaminophen] Nausea and Vomiting        Prior to Admission medications    Medication Sig Start Date End Date Taking? Authorizing Provider   traMADol (ULTRAM) 50 mg tablet TAKE 1-2 TABLETS BY MOUTH EVERY 6 HOURS AS NEEDED FOR PAIN 1/16/17   Dimitry Cotto MD   levothyroxine (SYNTHROID) 88 mcg tablet TAKE 1 TABLET BY MOUTH DAILY (BEFORE BREAKFAST). RECHECK LEVEL IN 4-6 WEEKS 1/10/17   Dimitry Cotto MD   tamsulosin (FLOMAX) 0.4 mg capsule TAKE 1 CAPSULE EVERY DAY 9/21/16   Dimitry Cotto MD   losartan-hydrochlorothiazide [facility]) 100-12.5 mg per tablet TAKE 1 TABLET BY MOUTH EVERY DAY 9/12/16   Jose Norton NP   metoprolol succinate (TOPROL-XL) 25 mg XL tablet TAKE 1 TABLET EVERY DAY 9/12/16   Jose Norton NP   CHOLECALCIFEROL, VITAMIN D3, (VITAMIN D3 PO) Take  by mouth. Historical Provider   isosorbide mononitrate ER (IMDUR) 30 mg tablet TAKE 1 TABLET BY MOUTH EVERY DAY 7/13/16   Jose Norton NP   amLODIPine (NORVASC) 5 mg tablet Take 1 Tab by mouth daily.  7/13/16   Jose Norton NP   aspirin delayed-release 81 mg tablet Take 1 Tab by mouth daily for 360 days. 7/13/16 7/8/17  Sree Boland NP   atorvastatin (LIPITOR) 40 mg tablet Take 1 Tab by mouth daily. YRN. Give patient BRAND LIPITOR. D/C Crestor 1/15/16   Sree Boland NP   finasteride (PROSCAR) 5 mg tablet daily. 1/15/15   Historical Provider   omega-3 fatty acids-vitamin e (FISH OIL) 1,000 mg cap Take 1 Cap by mouth daily. Historical Provider   ascorbic acid (VITAMIN C) 1,000 mg tablet Take  by mouth. Historical Provider       REVIEW OF SYSTEMS:     I am not able to complete the review of systems because:    The patient is intubated and sedated    The patient has altered mental status due to his acute medical problems    The patient has baseline aphasia from prior stroke(s)    The patient has baseline dementia and is not reliable historian    The patient is in acute medical distress and unable to provide information           Total of 12 systems reviewed as follows:       POSITIVE= underlined text  Negative = text not underlined  General:  fever, chills, sweats, generalized weakness, weight loss/gain,      loss of appetite   Eyes:    blurred vision, eye pain, loss of vision, double vision  ENT:    rhinorrhea, pharyngitis   Respiratory:   cough, sputum production, SOB, VERONICA, wheezing, pleuritic pain   Cardiology:   chest pain, palpitations, orthopnea, PND, edema, syncope   Gastrointestinal:  abdominal pain , N/V, diarrhea, dysphagia, constipation, bleeding   Genitourinary:  frequency, urgency, dysuria, hematuria, incontinence   Muskuloskeletal :  arthralgia, myalgia, back pain  Hematology:  easy bruising, nose or gum bleeding, lymphadenopathy   Dermatological: rash, ulceration, pruritis, color change / jaundice  Endocrine:   hot flashes or polydipsia   Neurological:  headache, dizziness, confusion, focal weakness, paresthesia,     Speech difficulties, memory loss, gait difficulty  Psychological: Feelings of anxiety, depression, agitation    Objective:   VITALS:    Visit Vitals    /71 (BP 1 Location: Right arm, BP Patient Position: At rest)    Pulse 100    Temp 98.4 °F (36.9 °C)    Resp 26    Ht 5' 3\" (1.6 m)    Wt 74.8 kg (165 lb)    SpO2 94%    BMI 29.23 kg/m2       PHYSICAL EXAM:    General:    Male in bed NAD, appears somnolent  HEENT: Atraumatic, anicteric sclerae, pink conjunctivae     Dry oral mucosa, throat clear, dentition fair  Neck:  Supple, symmetrical,  thyroid: non tender  Lungs:   Clear to auscultation bilaterally. No Wheezing or Rhonchi. No rales. Chest wall:  No tenderness  No Accessory muscle use. Heart:   Tachycardic, no  murmur   No edema  Abdomen:   Soft, non-tender. Not distended. Bowel sounds normal  Extremities: No cyanosis. No clubbing      Capillary refill normal,  Radial pulse 2+  Skin:     Not pale. Not Jaundiced  No rashes   Psych:  Good insight. Not depressed. Not anxious or agitated. Neurologic: Able to answer questions appropriately, appears to be drowsy, able to move all exts    _______________________________________________________________________  Care Plan discussed with:    Comments   Patient x    Family      RN     Care Manager                    Consultant:      _______________________________________________________________________  Expected  Disposition:   Home with Family    HH/PT/OT/RN x   SNF/LTC    ISAAC    ________________________________________________________________________  TOTAL TIME:  61 Minutes    Critical Care Provided     Minutes non procedure based      Comments    x Reviewed previous records   >50% of visit spent in counseling and coordination of care  Discussion with patient and/or family and questions answered       ________________________________________________________________________  Signed: Yusuf Quiroz MD    Procedures: see electronic medical records for all procedures/Xrays and details which were not copied into this note but were reviewed prior to creation of Plan.     LAB DATA REVIEWED:    Recent Results (from the past 24 hour(s))   EKG, 12 LEAD, INITIAL    Collection Time: 02/05/17  3:00 PM   Result Value Ref Range    Ventricular Rate 98 BPM    Atrial Rate 98 BPM    P-R Interval 200 ms    QRS Duration 160 ms    Q-T Interval 400 ms    QTC Calculation (Bezet) 510 ms    Calculated P Axis 52 degrees    Calculated R Axis -86 degrees    Calculated T Axis 41 degrees    Diagnosis       Sinus rhythm with frequent premature ventricular complexes and fusion   complexes  Left axis deviation  Right bundle branch block  When compared with ECG of 23-APR-2016 16:58,  fusion complexes are now present  premature ventricular complexes are now present  MO interval has decreased  Vent.  rate has increased BY  50 BPM  T wave inversion no longer evident in Inferior leads  QT has lengthened     GLUCOSE, POC    Collection Time: 02/05/17  3:07 PM   Result Value Ref Range    Glucose (POC) 96 65 - 100 mg/dL    Performed by [address] [place] [address], ARTERIAL    Collection Time: 02/05/17  3:43 PM   Result Value Ref Range    pH 7.42 7.35 - 7.45      PCO2 28 (L) 35.0 - 45.0 mmHg    PO2 60 (L) 80 - 100 mmHg    O2 SAT 92 92 - 97 %    BICARBONATE 17 (L) 22 - 26 mmol/L    BASE DEFICIT 5.5 mmol/L    O2 METHOD NASAL O2      O2 FLOW RATE 2.00 L/min    SPONTANEOUS RATE 26.0      Sample source ARTERIAL      SITE RIGHT RADIAL      LAXMI'S TEST YES     POC TROPONIN-I    Collection Time: 02/05/17  3:43 PM   Result Value Ref Range    Troponin-I (POC) <0.04 0.00 - 1.62 ng/mL   METABOLIC PANEL, COMPREHENSIVE    Collection Time: 02/05/17  3:44 PM   Result Value Ref Range    Sodium 137 136 - 145 mmol/L    Potassium 3.7 3.5 - 5.1 mmol/L    Chloride 101 97 - 108 mmol/L    CO2 22 21 - 32 mmol/L    Anion gap 14 5 - 15 mmol/L    Glucose 93 65 - 100 mg/dL    BUN 68 (H) 6 - 20 MG/DL    Creatinine 1.89 (H) 0.70 - 1.30 MG/DL    BUN/Creatinine ratio 36 (H) 12 - 20      GFR est AA 42 (L) >60 ml/min/1.73m2    GFR est non-AA 34 (L) >60 ml/min/1.73m2    Calcium 8.9 8.5 - 10.1 MG/DL Bilirubin, total 1.6 (H) 0.2 - 1.0 MG/DL    ALT (SGPT) 39 12 - 78 U/L    AST (SGOT) 91 (H) 15 - 37 U/L    Alk. phosphatase 157 (H) 45 - 117 U/L    Protein, total 7.9 6.4 - 8.2 g/dL    Albumin 2.1 (L) 3.5 - 5.0 g/dL    Globulin 5.8 (H) 2.0 - 4.0 g/dL    A-G Ratio 0.4 (L) 1.1 - 2.2     CBC WITH AUTOMATED DIFF    Collection Time: 02/05/17  3:44 PM   Result Value Ref Range    WBC 26.8 (H) 4.1 - 11.1 K/uL    RBC 4.59 4. 10 - 5.70 M/uL    HGB 12.5 12.1 - 17.0 g/dL    HCT 34.5 (L) 36.6 - 50.3 %    MCV 75.2 (L) 80.0 - 99.0 FL    MCH 27.2 26.0 - 34.0 PG    MCHC 36.2 30.0 - 36.5 g/dL    RDW 16.1 (H) 11.5 - 14.5 %    PLATELET 641 580 - 627 K/uL    NEUTROPHILS 82 %    BAND NEUTROPHILS 10 %    LYMPHOCYTES 1 %    MONOCYTES 3 %    EOSINOPHILS 1 %    BASOPHILS 0 %    METAMYELOCYTES 3 %    ABS. NEUTROPHILS 24.7 K/UL    ABS. LYMPHOCYTES 0.3 K/UL    ABS. MONOCYTES 0.8 K/UL    ABS. EOSINOPHILS 0.3 K/UL    ABS.  BASOPHILS 0.0 K/UL    RBC COMMENTS TARGET CELLS  1+        WBC COMMENTS TOXIC GRANULATION     TROPONIN I    Collection Time: 02/05/17  3:44 PM   Result Value Ref Range    Troponin-I, Qt. 0.08 (H) <0.05 ng/mL   LACTIC ACID, PLASMA    Collection Time: 02/05/17  3:44 PM   Result Value Ref Range    Lactic acid 1.5 0.4 - 2.0 MMOL/L   CK W/ CKMB & INDEX    Collection Time: 02/05/17  3:44 PM   Result Value Ref Range    CK 91 39 - 308 U/L    CK - MB 4.8 (H) <3.6 NG/ML    CK-MB Index 5.3 (H) 0 - 2.5     LIPASE    Collection Time: 02/05/17  3:44 PM   Result Value Ref Range    Lipase 517 (H) 73 - 393 U/L   PROTHROMBIN TIME + INR    Collection Time: 02/05/17  3:44 PM   Result Value Ref Range    INR 1.2 (H) 0.9 - 1.1      Prothrombin time 12.0 (H) 9.0 - 11.1 sec   POC CHEM8    Collection Time: 02/05/17  3:44 PM   Result Value Ref Range    Calcium, ionized (POC) 1.10 (L) 1.12 - 1.32 MMOL/L    Sodium (POC) 135 (L) 136 - 145 MMOL/L    Potassium (POC) 3.7 3.5 - 5.1 MMOL/L    Chloride (POC) 100 98 - 107 MMOL/L    CO2 (POC) 20 (L) 21 - 32 MMOL/L Anion gap (POC) 20 (H) 5 - 15 mmol/L    Glucose (POC) 101 75 - 110 MG/DL    BUN (POC) 69 (H) 9 - 20 MG/DL    Creatinine (POC) 1.9 (H) 0.6 - 1.3 MG/DL    GFR-AA (POC) 42 (L) >60 ml/min/1.73m2    GFR, non-AA (POC) 34 (L) >60 ml/min/1.73m2    Hemoglobin (POC) 13.3 12.1 - 17.0 GM/DL    Hematocrit (POC) 39 36.6 - 50.3 %    Comment Comment Not Indicated.      MAGNESIUM    Collection Time: 02/05/17  3:44 PM   Result Value Ref Range    Magnesium 3.0 (H) 1.6 - 2.4 mg/dL   NUCLEATED RBC    Collection Time: 02/05/17  3:44 PM   Result Value Ref Range    NRBC 0.3 (H) 0  WBC    ABSOLUTE NRBC 0.08 (H) 0.00 - 0.01 K/uL    WBC CORRECTED FOR NR ADJUSTED FOR NUCLEATED RBC'S

## 2017-02-05 NOTE — IP AVS SNAPSHOT
[address] [facility] 83. 
[phone] Patient: Santiago Gotti. MRN: [id number] You are allergic to the following Allergen Reactions Norco (Hydrocodone-Acetaminophen) Other (comments) Too sedated and felt like he was in a daze Pcn (Penicillins) Rash Percocet (Oxycodone-Acetaminophen) Nausea and Vomiting Recent Documentation Height Weight BMI Smoking Status 1.6 m 74.8 kg 29.23 kg/m2 Former Smoker Emergency Contacts Name Discharge Info Relation Home Work Mobile Abhilash Castro [2] [phone] About your hospitalization You were admitted on:  February 5, 2017 You last received care in the:  [facility] 3 RENAL MEDICINE You were discharged on:  February 13, 2017 Unit phone number:  [phone] Why you were hospitalized Your primary diagnosis was:  Community Acquired Bacterial Pneumonia Your diagnoses also included:  Essential Hypertension, Cad (Coronary Artery Disease), Bph (Benign Prostatic Hypertrophy), Dyslipidemia, S/P Ptca (Percutaneous Transluminal Coronary Angioplasty), Chronic Pain, Prostate Cancer (Hcc), Epigastric Abdominal Pain Providers Seen During Your Hospitalizations Provider Role Specialty Primary office phone Thaiine Romaine. Adriel Carter MD Attending Provider Emergency Medicine [phone] Tomás Davidson MD Attending Provider Hospitalist [phone] Bony Palm MD Attending Provider Internal Medicine [phone] Gary Plascencia MD Attending Provider Hospitalist [phone] Your Primary Care Physician (PCP) Primary Care Physician Office Phone Office Fax [facility] [phone] Follow-up Information Follow up With Details Comments Contact Info Sabi Olivares MD In 1 week  14 [facility] 
[address] [employer] [facility] [address] 
[phone] Marisela Puente MD  As needed [address] [facility] 202 [facility] 
[phone] Your Appointments Wednesday February 22, 2017  2:00 PM EST COMPLETE PHYSICAL with Roselia Rollins MD  
[address] 39[address] [facility] [place] [address]  
[phone] Current Discharge Medication List  
  
START taking these medications Dose & Instructions Dispensing Information Comments Morning Noon Evening Bedtime  
 albuterol-ipratropium 2.5 mg-0.5 mg/3 ml Nebu Commonly known as:  Murray Apley Your next dose is: Today, Tomorrow Other:  _________ Dose:  3 mL  
3 mL by Nebulization route every six (6) hours as needed. Quantity:  30 Nebule Refills:  0  
     
   
   
   
  
 guaiFENesin  mg SR tablet Commonly known as:  Benjamin & Benjamin Your next dose is: Today, Tomorrow Other:  _________ Dose:  600 mg Take 1 Tab by mouth every twelve (12) hours for 7 days. Quantity:  14 Tab Refills:  0  
     
   
   
   
  
 nicotine 7 mg/24 hr  
Commonly known as:  Mery Bless Your next dose is: Today, Tomorrow Other:  _________ Dose:  1 Patch 1 Patch by TransDERmal route every twenty-four (24) hours for 20 days. Quantity:  20 Patch Refills:  0  
     
   
   
   
  
 valsartan 160 mg tablet Commonly known as:  DIOVAN Your next dose is: Today, Tomorrow Other:  _________ Dose:  160 mg Take 1 Tab by mouth daily for 30 days. Quantity:  30 Tab Refills:  0 CONTINUE these medications which have CHANGED Dose & Instructions Dispensing Information Comments Morning Noon Evening Bedtime  
 metoprolol succinate 25 mg XL tablet Commonly known as:  TOPROL-XL What changed:  See the new instructions. Your next dose is: Today, Tomorrow Other:  _________ Dose:  25 mg Take 1 Tab by mouth daily for 30 days. Quantity:  30 Tab Refills:  0  
     
   
   
   
  
 traMADol 50 mg tablet Commonly known as:  ULTRAM  
What changed:   
- how much to take 
- how to take this - when to take this 
- reasons to take this Your next dose is: Today, Tomorrow Other:  _________ Dose:  50 mg Take 1 Tab by mouth every eight (8) hours as needed for Pain. Max Daily Amount: 150 mg. TAKE 1-2 TABLETS BY MOUTH EVERY 6 HOURS AS NEEDED FOR PAIN Quantity:  10 Tab Refills:  0 CONTINUE these medications which have NOT CHANGED Dose & Instructions Dispensing Information Comments Morning Noon Evening Bedtime  
 amLODIPine 5 mg tablet Commonly known as:  Gina Colon Your next dose is: Today, Tomorrow Other:  _________ Dose:  5 mg Take 1 Tab by mouth daily. Quantity:  30 Tab Refills:  12 Pt needs to see doctor for further refills  
    
   
   
   
  
 aspirin delayed-release 81 mg tablet Your next dose is: Today, Tomorrow Other:  _________ Dose:  81 mg Take 1 Tab by mouth daily for 360 days. Quantity:  60 Tab Refills:  11  
     
   
   
   
  
 atorvastatin 40 mg tablet Commonly known as:  LIPITOR Your next dose is: Today, Tomorrow Other:  _________ Dose:  40 mg Take 1 Tab by mouth daily. YRN. Give patient BRAND LIPITOR. D/C Crestor Quantity:  90 Tab Refills:  3 Branded Lipitor is no cost for patient. finasteride 5 mg tablet Commonly known as:  PROSCAR Your next dose is: Today, Tomorrow Other:  _________  
   
   
 daily. Refills:  0  
     
   
   
   
  
 FISH OIL 1,000 mg Cap Generic drug:  omega-3 fatty acids-vitamin e Your next dose is: Today, Tomorrow Other:  _________ Dose:  1 Cap Take 1 Cap by mouth daily. Refills:  0 isosorbide mononitrate ER 30 mg tablet Commonly known as:  IMDUR Your next dose is: Today, Tomorrow Other:  _________ TAKE 1 TABLET BY MOUTH EVERY DAY Quantity:  30 Tab Refills:  12  
     
   
   
   
  
 levothyroxine 88 mcg tablet Commonly known as:  SYNTHROID Your next dose is: Today, Tomorrow Other:  _________ TAKE 1 TABLET BY MOUTH DAILY (BEFORE BREAKFAST). RECHECK LEVEL IN 4-6 WEEKS Quantity:  30 Tab Refills:  1  
     
   
   
   
  
 tamsulosin 0.4 mg capsule Commonly known as:  FLOMAX Your next dose is: Today, Tomorrow Other:  _________ TAKE 1 CAPSULE EVERY DAY Quantity:  90 Cap Refills:  1 VITAMIN C 1,000 mg tablet Generic drug:  ascorbic acid (vitamin C) Your next dose is: Today, Tomorrow Other:  _________ Take  by mouth. Refills:  0  
     
   
   
   
  
 VITAMIN D3 PO Your next dose is: Today, Tomorrow Other:  _________ Take  by mouth. Refills:  0 STOP taking these medications   
 losartan-hydroCHLOROthiazide 100-12.5 mg per tablet Commonly known as:  HYZAAR Where to Get Your Medications Information on where to get these meds will be given to you by the nurse or doctor. ! Ask your nurse or doctor about these medications  
  albuterol-ipratropium 2.5 mg-0.5 mg/3 ml Nebu  
 guaiFENesin  mg SR tablet  
 metoprolol succinate 25 mg XL tablet  
 nicotine 7 mg/24 hr  
 traMADol 50 mg tablet  
 valsartan 160 mg tablet Discharge Instructions Patient Discharge Instructions Pt Name  Chrissy Curtis. Date of Birth 1935 Age  [de-identified] y.o. Medical Record Number  [id number] PCP Miguel Ross MD   
Admit date:  2/5/2017 @    [facility] Room Number  5372/93 Date of Discharge 2/13/2017 Admission Diagnoses:     Community acquired bacterial pneumonia Allergies Allergen Reactions  Norco [Hydrocodone-Acetaminophen] Other (comments) Too sedated and felt like he was in a daze  Pcn [Penicillins] Rash  Percocet [Oxycodone-Acetaminophen] Nausea and Vomiting You were admitted to [facility] for  Community acquired bacterial pneumonia YOUR OTHER MEDICAL DIAGNOSES INCLUDE (BUT NOT LIMITED TO ): 
Present on Admission:  Community acquired bacterial pneumonia  Hypothyroidism  Essential hypertension  CAD (Coronary Artery Disease)--s/p PCI--MARY ANN [place] stents x4 5/09;MARY ANN [place] stents x3 8/09  BPH (benign prostatic hypertrophy)  Dyslipidemia  Successful  PTCA (percutaneous transluminal coronary angioplasty)--90-95% instent restenosis RCA 10/16/10  Chronic back pain--DJD  Prostate cancer ([facility] [address] DIET:  Cardiac Diet Recommended activity: Activity as tolerated Follow up : Follow-up Information Follow up With Details Comments Contact Info Sadaf Chinchilla MD In 1 week  [address] 
[address] [facility] [address] [facility] [address] [employer] [place] [address] 
[phone] Vivien Vogel MD  As needed [facility] [address] [facility] 
[phone] Skilled nursing facility MD responsible for above upon discharge. · It is important that you take the medication exactly as they are prescribed. · Keep your medication in the bottles provided by the pharmacist and keep a list of the medication names, dosages, and times to be taken in your wallet. · Do not take other medications without consulting your doctor.   
 
 
ADDITIONAL INFORMATION: If you experience any of the following symptoms or have any health problem not listed below, then please call your primary care physician or return to the emergency room if you cannot get hold of your doctor: Fever, chills, nausea, vomiting, diarrhea, change in mentation, falling, bleeding, shortness of breath. I understand that if any problems occur once I am discharged, I am supposed to call my Primary care physician for further care or seek help in the Emergency Department at the nearest Healthcare facility. I have had an opportunity to discuss my clinical issues with my doctor and nursing staff. I understand and acknowledge receipt of the above instructions. Physician's or R.N.'s Signature                                                            Date/Time Patient or Representative Signature                                                 Date/Time Discharge Orders None [employer] Announcement We are excited to announce that we are making your provider's discharge notes available to you in Pix[employer]. You will see these notes when they are completed and signed by the physician that discharged you from your recent hospital stay. If you have any questions or concerns about any information you see in PixS[employer], please call the Health Information Department where you were seen or reach out to your Primary Care Provider for more information about your plan of care. Introducing [facility]! Milamkhang Brady introduces PixS[employer] patient portal. Now you can access parts of your medical record, email your doctor's office, and request medication refills online. 1. In your internet browser, go to [email or web address] 2. Click on the First Time User? Click Here link in the Sign In box. You will see the New Member Sign Up page. 3. Enter your [employer] Access Code exactly as it appears below.  You will not need to use this code after youve completed the sign-up process. If you do not sign up before the expiration date, you must request a new code. · [employer] Access Code: [email or web address] Expires: 4/16/2017  4:16 PM 
 
4. Enter the last four digits of your Social Security Number (xxxx) and Date of Birth (mm/dd/yyyy) as indicated and click Submit. You will be taken to the next sign-up page. 5. Create a [employer] ID. This will be your [employer] login ID and cannot be changed, so think of one that is secure and easy to remember. 6. Create a [employer] password. You can change your password at any time. 7. Enter your Password Reset Question and Answer. This can be used at a later time if you forget your password. 8. Enter your e-mail address. You will receive e-mail notification when new information is available in [address] 9. Click Sign Up. You can now view and download portions of your medical record. 10. Click the Download Summary menu link to download a portable copy of your medical information. If you have questions, please visit the Frequently Asked Questions section of the [employer] website. Remember, [employer] is NOT to be used for urgent needs. For medical emergencies, dial 911. Now available from your iPhone and Android! General Information Please provide this summary of care documentation to your next provider. Patient Signature:  ____________________________________________________________ Date:  ____________________________________________________________  
  
Althia Keisha Provider Signature:  ____________________________________________________________ Date:  ____________________________________________________________

## 2017-02-05 NOTE — PROGRESS NOTES
TRANSFER - IN REPORT:    Verbal report received from Mynor Wallace RN(name) on [facility].  being received from ER(unit) for routine progression of care      Report consisted of patients Situation, Background, Assessment and   Recommendations(SBAR). Information from the following report(s) SBAR, Kardex, ED Summary, Intake/Output, MAR and Recent Results was reviewed with the receiving nurse. Opportunity for questions and clarification was provided. Assessment completed upon patients arrival to unit and care assumed.

## 2017-02-05 NOTE — PROGRESS NOTES
Pharmacy Automatic Renal Dosing Protocol - Antimicrobials    Indication for Antimicrobials: CAP    Current Regimen of Each Antimicrobial (Start Day & Day of Therapy):  Levofloxacin 750 mg IV q24h (start , Day 1)  Ceftriaxone 2 gm IV q24h (start , Day 1)    Previous Antibiotics:  Doxycycline 100 mg IV x 1 (given on  in ED)    Significant cultures:    Blood cx: Pending    CAPD, Intermittent Hemodialysis or Renal Replacement Therapy: n/a  Paralysis, amputations, malnutrition: n/a  Recent Labs      17   1544   CREA  1.89*   BUN  68*   WBC  26.8*     Temp (24hrs), Av.1 °F (36.7 °C), Min:97.7 °F (36.5 °C), Max:98.4 °F (36.9 °C)    Creatinine Clearance (ml/min):  25    Impression/Plan:  Levofloxacin dose changed to 750 mg IV q48h for renal function        Pharmacy will follow daily and adjust doses of monitored medications as appropriate for renal function and/or serum levels.     Thank you,  Kimberli Kolb, PHARMD

## 2017-02-06 LAB
ANION GAP BLD CALC-SCNC: 16 MMOL/L (ref 5–15)
ATRIAL RATE: 98 BPM
BASOPHILS # BLD AUTO: 0 K/UL (ref 0–0.1)
BASOPHILS # BLD: 0 % (ref 0–1)
BUN SERPL-MCNC: 43 MG/DL (ref 6–20)
BUN/CREAT SERPL: 31 (ref 12–20)
CALCIUM SERPL-MCNC: 8.4 MG/DL (ref 8.5–10.1)
CALCULATED P AXIS, ECG09: 52 DEGREES
CALCULATED R AXIS, ECG10: -86 DEGREES
CALCULATED T AXIS, ECG11: 41 DEGREES
CHLORIDE SERPL-SCNC: 109 MMOL/L (ref 97–108)
CO2 SERPL-SCNC: 15 MMOL/L (ref 21–32)
CREAT SERPL-MCNC: 1.37 MG/DL (ref 0.7–1.3)
DIAGNOSIS, 93000: NORMAL
DIFFERENTIAL METHOD BLD: ABNORMAL
EOSINOPHIL # BLD: 0.3 K/UL (ref 0–0.4)
EOSINOPHIL NFR BLD: 1 % (ref 0–7)
ERYTHROCYTE [DISTWIDTH] IN BLOOD BY AUTOMATED COUNT: 16 % (ref 11.5–14.5)
GLUCOSE SERPL-MCNC: 76 MG/DL (ref 65–100)
HCT VFR BLD AUTO: 30.7 % (ref 36.6–50.3)
HGB BLD-MCNC: 11.2 G/DL (ref 12.1–17)
LIPASE SERPL-CCNC: 836 U/L (ref 73–393)
LYMPHOCYTES # BLD AUTO: 0 % (ref 12–49)
LYMPHOCYTES # BLD: 0 K/UL (ref 0.8–3.5)
MCH RBC QN AUTO: 27.3 PG (ref 26–34)
MCHC RBC AUTO-ENTMCNC: 36.5 G/DL (ref 30–36.5)
MCV RBC AUTO: 74.7 FL (ref 80–99)
MONOCYTES # BLD: 1.3 K/UL (ref 0–1)
MONOCYTES NFR BLD AUTO: 5 % (ref 5–13)
MYELOCYTES NFR BLD MANUAL: 2 %
NEUTS BAND NFR BLD MANUAL: 1 % (ref 0–6)
NEUTS SEG # BLD: 23.2 K/UL (ref 1.8–8)
NEUTS SEG NFR BLD AUTO: 91 % (ref 32–75)
NRBC # BLD: 0 K/UL (ref 0–0.01)
NRBC BLD-RTO: 0 PER 100 WBC
P-R INTERVAL, ECG05: 200 MS
PLATELET # BLD AUTO: 171 K/UL (ref 150–400)
POTASSIUM SERPL-SCNC: 3.2 MMOL/L (ref 3.5–5.1)
Q-T INTERVAL, ECG07: 400 MS
QRS DURATION, ECG06: 160 MS
QTC CALCULATION (BEZET), ECG08: 510 MS
RBC # BLD AUTO: 4.11 M/UL (ref 4.1–5.7)
RBC MORPH BLD: ABNORMAL
SODIUM SERPL-SCNC: 140 MMOL/L (ref 136–145)
TROPONIN I SERPL-MCNC: 0.12 NG/ML
TROPONIN I SERPL-MCNC: 0.13 NG/ML
VENTRICULAR RATE, ECG03: 98 BPM
WBC # BLD AUTO: 25.2 K/UL (ref 4.1–11.1)
WBC MORPH BLD: ABNORMAL

## 2017-02-06 PROCEDURE — 83690 ASSAY OF LIPASE: CPT | Performed by: INTERNAL MEDICINE

## 2017-02-06 PROCEDURE — 65660000000 HC RM CCU STEPDOWN

## 2017-02-06 PROCEDURE — 74011250637 HC RX REV CODE- 250/637: Performed by: INTERNAL MEDICINE

## 2017-02-06 PROCEDURE — G8987 SELF CARE CURRENT STATUS: HCPCS

## 2017-02-06 PROCEDURE — 85025 COMPLETE CBC W/AUTO DIFF WBC: CPT | Performed by: INTERNAL MEDICINE

## 2017-02-06 PROCEDURE — 74011000250 HC RX REV CODE- 250: Performed by: INTERNAL MEDICINE

## 2017-02-06 PROCEDURE — 74011250636 HC RX REV CODE- 250/636: Performed by: INTERNAL MEDICINE

## 2017-02-06 PROCEDURE — 97165 OT EVAL LOW COMPLEX 30 MIN: CPT

## 2017-02-06 PROCEDURE — 94640 AIRWAY INHALATION TREATMENT: CPT

## 2017-02-06 PROCEDURE — G8979 MOBILITY GOAL STATUS: HCPCS | Performed by: PHYSICAL THERAPIST

## 2017-02-06 PROCEDURE — 80048 BASIC METABOLIC PNL TOTAL CA: CPT | Performed by: INTERNAL MEDICINE

## 2017-02-06 PROCEDURE — 74011250636 HC RX REV CODE- 250/636: Performed by: EMERGENCY MEDICINE

## 2017-02-06 PROCEDURE — 36415 COLL VENOUS BLD VENIPUNCTURE: CPT | Performed by: INTERNAL MEDICINE

## 2017-02-06 PROCEDURE — G8978 MOBILITY CURRENT STATUS: HCPCS | Performed by: PHYSICAL THERAPIST

## 2017-02-06 PROCEDURE — 74011000250 HC RX REV CODE- 250: Performed by: EMERGENCY MEDICINE

## 2017-02-06 PROCEDURE — G8988 SELF CARE GOAL STATUS: HCPCS

## 2017-02-06 PROCEDURE — 97162 PT EVAL MOD COMPLEX 30 MIN: CPT | Performed by: PHYSICAL THERAPIST

## 2017-02-06 PROCEDURE — 51798 US URINE CAPACITY MEASURE: CPT

## 2017-02-06 PROCEDURE — 84484 ASSAY OF TROPONIN QUANT: CPT | Performed by: INTERNAL MEDICINE

## 2017-02-06 PROCEDURE — 77010033678 HC OXYGEN DAILY

## 2017-02-06 RX ORDER — METOPROLOL SUCCINATE 25 MG/1
TABLET, EXTENDED RELEASE ORAL
Qty: 30 TAB | Refills: 3 | OUTPATIENT
Start: 2017-02-06 | End: 2017-02-13

## 2017-02-06 RX ORDER — POTASSIUM CHLORIDE 750 MG/1
30 TABLET, FILM COATED, EXTENDED RELEASE ORAL
Status: COMPLETED | OUTPATIENT
Start: 2017-02-06 | End: 2017-02-06

## 2017-02-06 RX ADMIN — METOPROLOL SUCCINATE 25 MG: 25 TABLET, EXTENDED RELEASE ORAL at 09:21

## 2017-02-06 RX ADMIN — ISOSORBIDE MONONITRATE 30 MG: 30 TABLET, EXTENDED RELEASE ORAL at 09:21

## 2017-02-06 RX ADMIN — HEPARIN SODIUM 5000 UNITS: 5000 INJECTION, SOLUTION INTRAVENOUS; SUBCUTANEOUS at 17:25

## 2017-02-06 RX ADMIN — Medication 10 ML: at 06:00

## 2017-02-06 RX ADMIN — IPRATROPIUM BROMIDE AND ALBUTEROL SULFATE 3 ML: .5; 3 SOLUTION RESPIRATORY (INHALATION) at 01:44

## 2017-02-06 RX ADMIN — SODIUM BICARBONATE: 84 INJECTION, SOLUTION INTRAVENOUS at 00:00

## 2017-02-06 RX ADMIN — GUAIFENESIN 600 MG: 600 TABLET, EXTENDED RELEASE ORAL at 09:22

## 2017-02-06 RX ADMIN — SODIUM CHLORIDE 125 ML/HR: 900 INJECTION, SOLUTION INTRAVENOUS at 05:09

## 2017-02-06 RX ADMIN — ASPIRIN 81 MG: 81 TABLET, COATED ORAL at 09:22

## 2017-02-06 RX ADMIN — IPRATROPIUM BROMIDE AND ALBUTEROL SULFATE 3 ML: .5; 3 SOLUTION RESPIRATORY (INHALATION) at 13:43

## 2017-02-06 RX ADMIN — AMLODIPINE BESYLATE 5 MG: 5 TABLET ORAL at 09:22

## 2017-02-06 RX ADMIN — HEPARIN SODIUM 5000 UNITS: 5000 INJECTION, SOLUTION INTRAVENOUS; SUBCUTANEOUS at 09:23

## 2017-02-06 RX ADMIN — POTASSIUM CHLORIDE 30 MEQ: 750 TABLET, FILM COATED, EXTENDED RELEASE ORAL at 14:52

## 2017-02-06 RX ADMIN — IPRATROPIUM BROMIDE AND ALBUTEROL SULFATE 3 ML: .5; 3 SOLUTION RESPIRATORY (INHALATION) at 20:51

## 2017-02-06 RX ADMIN — TAMSULOSIN HYDROCHLORIDE 0.4 MG: 0.4 CAPSULE ORAL at 09:22

## 2017-02-06 RX ADMIN — ATORVASTATIN CALCIUM 40 MG: 40 TABLET, FILM COATED ORAL at 09:22

## 2017-02-06 RX ADMIN — LEVOTHYROXINE SODIUM 88 MCG: 88 TABLET ORAL at 09:22

## 2017-02-06 RX ADMIN — IPRATROPIUM BROMIDE AND ALBUTEROL SULFATE 3 ML: .5; 3 SOLUTION RESPIRATORY (INHALATION) at 07:30

## 2017-02-06 RX ADMIN — HYDROCHLOROTHIAZIDE: 25 TABLET ORAL at 09:34

## 2017-02-06 RX ADMIN — HEPARIN SODIUM 5000 UNITS: 5000 INJECTION, SOLUTION INTRAVENOUS; SUBCUTANEOUS at 01:33

## 2017-02-06 RX ADMIN — Medication 10 ML: at 14:52

## 2017-02-06 RX ADMIN — Medication 10 ML: at 21:12

## 2017-02-06 RX ADMIN — GUAIFENESIN 600 MG: 600 TABLET, EXTENDED RELEASE ORAL at 20:26

## 2017-02-06 NOTE — PROGRESS NOTES
Hospitalist Progress Note    NAME: Olga Becker. :  1935   MRN:  [id number]     Assessment / Plan:  Acute respiratory failure without hypoxia  Community acquired PNA  Sepsis, POA  -CXR/CT chest/abd pelv showed extensive R upper lobe consolidation  -BC done and sputum cultured ordered  - Levaquin, duonebs, albuterol prn, mucinex  -O2 suppl, wean as tolerated  -will need to repeat cxr in 4-6 weeks to document resolution of infiltration     Acute kidney Injury  -likely from pre-renal in the setting of poor PO intake  -IVF, improving    Hyperbilirubinemia and elevated lipase  -will check again today; bili may be elevated due to sepsis  -may need to decrease diet if lipase still high or higher    CT A/P without contrast: IMPRESSION:  Extensive right upper and middle lobe airspace disease, compatible with  pneumonia. Follow-up to complete resolution is recommended. Centrilobular  emphysema. Colonic diverticulosis, without evidence of acute diverticulitis. 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       HTN/CAD  Elevated troponin  -continue amlodipine, imdur, asa, lipitor, hyzaar  -suspect due to sepsis but repeat this am     BPH  -continue flomax, finasteride     Hypothyroidism  -synthroid     Acute encephalopathy  -head ct neg for acute abnormalities  -likely due to acute illness  -avoid sedating meds     Tobacco use  -pt agreed to nicotine patch       Code Status: Full  Surrogate Decision Maker: kalpesh Saucedo at [phone]     DVT Prophylaxis: heparin  Baseline: independent, lives alone      Subjective:  Pt denies pain and needs prompting to give me hx about why he is here - at first he says he does not know and then later says \"I was sick\" at home   CHIEF COMPLAINT: sob, AMS     HISTORY OF PRESENT ILLNESS:   Yumiko Holloway is a [de-identified] y.o.   male w pmhx significant for CAD, HLD, HTN, MI, CVA, PVD, cerebral embolism, AAA present to ED c/o AMS in the setting os shortness of breath and abd pain. Per hx, pt lives alone, found by his neighbor today to be confused, apparently had a syncopal episode today in the kitchen and was unclear of the cause. States he has had generalized weakness for a few days with association to sob and productive cough yellow in color. Pt denies any recent fevers, chills, n/v/d. No changes in bowel/urinary habits. During my encounter, pt is awake and alert, slow to answer question, but is able to answer questions appropriately. In the ED, vitals stable T 97.7, P 109, /71. Cxr showed R sided consolidation. Review of Systems:  Symptom Y/N Comments  Symptom Y/N Comments   Fever/Chills    Chest Pain     Poor Appetite    Edema     Cough    Abdominal Pain     Sputum    Joint Pain     SOB/VERONICA    Pruritis/Rash     Nausea/vomit    Tolerating PT/OT     Diarrhea    Tolerating Diet     Constipation    Other       Could NOT obtain due to: ams     Objective:     VITALS:   Last 24hrs VS reviewed since prior progress note.  Most recent are:  Patient Vitals for the past 24 hrs:   Temp Pulse Resp BP SpO2   02/06/17 1010 - (!) 111 - 159/86 95 %   02/06/17 1003 - (!) 109 - - 96 %   02/06/17 1000 - (!) 111 - 177/87 -   02/06/17 0957 98.3 °F (36.8 °C) (!) 103 - 172/88 95 %   02/06/17 0955 - (!) 109 - - 93 %   02/06/17 0838 98.1 °F (36.7 °C) 99 20 166/71 96 %   02/06/17 0320 97.9 °F (36.6 °C) 98 20 173/87 93 %   02/06/17 0143 - - - - 92 %   02/05/17 2349 98.1 °F (36.7 °C) (!) 106 20 174/85 93 %   02/05/17 1944 - - - - 92 %   02/05/17 1854 98.2 °F (36.8 °C) 97 21 145/71 98 %   02/05/17 1830 - 98 23 134/44 96 %   02/05/17 1615 - (!) 115 (!) 32 166/81 93 %   02/05/17 1552 - 100 26 - 94 %   02/05/17 1527 98.4 °F (36.9 °C) - - - -   02/05/17 1505 97.7 °F (36.5 °C) (!) 109 22 171/71 92 %       Intake/Output Summary (Last 24 hours) at 02/06/17 1013  Last data filed at 02/06/17 0639   Gross per 24 hour   Intake                0 ml   Output             2450 ml   Net            -2450 ml PHYSICAL EXAM:  General: WD, WN. Alert, cooperative, no acute distress    EENT:  EOMI. Anicteric sclerae. MMM  Resp:  CTA bilaterally, no wheezing or rales. No accessory muscle use  CV:  Regular  rhythm,  No edema  GI:  Soft, Non distended, Non tender.  +Bowel sounds  Neurologic:  Alert and oriented X 2, normal speech,   Psych:   fair insight. Not anxious nor agitated  Skin:    No jaundice    Reviewed most current lab test results and cultures  YES  Reviewed most current radiology test results   YES  Review and summation of old records today    NO  Reviewed patient's current orders and MAR    YES  PMH/SH reviewed - no change compared to H&P  ________________________________________________________________________  Care Plan discussed with:    Comments   Patient x    Family      RN x    Care Manager     Consultant                        Multidiciplinary team rounds were held today with , nursing, pharmacist and clinical coordinator. Patient's plan of care was discussed; medications were reviewed and discharge planning was addressed. ________________________________________________________________________  Total NON critical care TIME:      Minutes    Total CRITICAL CARE TIME Spent:   Minutes non procedure based      Comments   >50% of visit spent in counseling and coordination of care     ________________________________________________________________________  Joanne Verduzco MD     Procedures: see electronic medical records for all procedures/Xrays and details which were not copied into this note but were reviewed prior to creation of Plan. LABS:  I reviewed today's most current labs and imaging studies.   Pertinent labs include:  Recent Labs      02/06/17   0334  02/05/17   1544   WBC  25.2*  26.8*   HGB  11.2*  12.5   HCT  30.7*  34.5*   PLT  171  170     Recent Labs      02/06/17   0334  02/05/17   1544   NA  140  137   K  3.2*  3.7   CL  109*  101   CO2  15*  22   GLU  76  93   BUN 43*  68*   CREA  1.37*  1.89*   CA  8.4*  8.9   MG   --   3.0*   ALB   --   2.1*   TBILI   --   1.6*   SGOT   --   91*   ALT   --   39   INR   --   1.2*       Signed: Vaishali Farley MD

## 2017-02-06 NOTE — PROGRESS NOTES
Primary Nurse Park Mac RN and Robert Loomis RN performed a dual skin assessment on this patient No impairment noted  Cristian score is 20

## 2017-02-06 NOTE — PROGRESS NOTES
This is an [de-identified] yr old AAM who presented to the ER via EMS due to AMS. Per review patient's neighbors called EMS when they noted patient was confused when patient is normally alert and oriented to person and place. Patient has been admitted for treatment of acute respiratory failure,community acquired PNA,acute kidney injury, HTN and elevated troponin and a chetan encephalopathy. Upon interview patient was [facility] but able to verify his address and telephone number along with his son's number. Patient stated he is normally independent and does not use a mobility device. Patient stated he does drive and that his son lives in [address] [place] [address]. Explained to patient that therapist will be trying to mobilize and that he may need short term rehab, Patient consented to having this writer call his son for discharge planning.        HX of CAD, hypercholesteremia, HTN, MI, stroke, PVD, cerebral embolism with cerebral infarction, encephalocele, and AAA    Care Management Interventions  Physical Therapy Consult: Yes  Occupational Therapy Consult: Yes  Speech Therapy Consult: No  Current Support Network: Lives Alone  Plan discussed with Pt/Family/Caregiver: Yes     Ex [phone]

## 2017-02-06 NOTE — CARDIO/PULMONARY
C/P REHAB:  Chart reviewed. Admitted with acute respiratory failure, community acquired pneumonia, sepsis with altered mental status. History significant for CAD, HTN, DM, hypothyroidism, BPH, prostate cancer, DJD, stroke and PVD. LVEF 60% on echo 6/12/11. Former smoker. No documented CHF history. Cardiac teaching deferred.

## 2017-02-06 NOTE — PROGRESS NOTES
Pt having urinary frequency with urgency, pt states he keeps feeling like he has to urinate; ; paged tele-hospitalist.

## 2017-02-06 NOTE — PROGRESS NOTES
[id number] Lab called with critical CO2 of 15. Hospitalist on call paged. Dr Tess Sanders returned paged, notified of critical lab result. No new orders at this time,. Bedside shift change report given to Liliya (oncoming nurse) by Cristo (offgoing nurse). Report included the following information SBAR, Kardex, MAR, Accordion and Recent Results.

## 2017-02-06 NOTE — PROGRESS NOTES
Problem: Self Care Deficits Care Plan (Adult)  Goal: *Acute Goals and Plan of Care (Insert Text)  Occupational Therapy Goals  Initiated 2/6/2017  1. Patient will perform standing ADLs at sink with supervision/set-up within 7 day(s). 2. Patient will perform lower body dressing with supervision/set-up within 7 day(s). 3. Patient will perform bathing with supervision/set-up within 7 day(s). 4. Patient will perform toilet transfers with supervision/set-up within 7 day(s). 5. Patient will perform all aspects of toileting with supervision/set-up within 7 day(s). 6. Patient will utilize energy conservation techniques during functional activities with verbal cues within 7 day(s). OCCUPATIONAL THERAPY EVALUATION  Patient: Vince Rasmussen [de-identified] y.o. male)  Date: 2/6/2017  Primary Diagnosis: Community acquired bacterial pneumonia        Precautions:   Fall      ASSESSMENT :  Based on the objective data described below, the patient presents with flat affect, decreased balance, VERONICA/SOB, risk for falls and decreased independence in ADLs s/p admission for CAP. Pt received EOB with PT, CGA for transfers, slow functional mobility with RW. O2 remained stable via 4 L but pt with increased VERONICA with minimal activity. However, O2 <90% during session. Pt is functioning below his ADL baseline and would benefit from continued OT to address energy conservation during ADLs. At baseline, pt lives alone, uses SPC, not on home O2 and mod I with ADLs. Recommend discharge home with HHOT/PT vs short term SNF. Patient will benefit from skilled intervention to address the above impairments.   Patients rehabilitation potential is considered to be Good  Factors which may influence rehabilitation potential include:   [X]             None noted  [ ]             Mental ability/status  [ ]             Medical condition  [ ]             Home/family situation and support systems  [ ]             Safety awareness  [ ]             Pain tolerance/management  [ ]             Other:        PLAN :  Recommendations and Planned Interventions:  [X]               Self Care Training                  [X]        Therapeutic Activities  [X]               Functional Mobility Training    [ ]        Cognitive Retraining  [X]               Therapeutic Exercises           [X]        Endurance Activities  [X]               Balance Training                   [ ]        Neuromuscular Re-Education  [ ]               Visual/Perceptual Training     [ ]   Home Safety Training  [X]               Patient Education                 [X]        Family Training/Education  [ ]               Other (comment):     Frequency/Duration: Patient will be followed by occupational therapy 4 times a week to address goals. Discharge Recommendations: TBD- HHOT/PT vs short term SNF  Further Equipment Recommendations for Discharge: TBD       SUBJECTIVE:   Patient stated I am tired.       OBJECTIVE DATA SUMMARY:   HISTORY:   Past Medical History   Diagnosis Date    [facility]      Acute MI ([facility] 12/2010       dr Josiah Mcfarlane    Acute prostatitis         [facility]    Advance directive discussed with patient 04/20/2016    Aneurysm ([facility] 6/2011       AAA    Borderline glaucoma (glaucoma suspect) 2/18/15       notes from [address] [place] [address] rec'd    BPH (benign prostatic hyperplasia)      Bright red blood per rectum 2/4/16       [facility] K[facility] Cochran- w/u in progress    CAD (coronary artery disease)      Calculus of kidney      Cerebral embolism with cerebral infarction ([address]      Chronic pain         back    Dizzy spells 6/2012    DJD (degenerative joint disease) of lumbar spine      ED (erectile dysfunction)      Elevated PSA 03/20/2014       [facility]     8/24/16 f/u note    Encephalocele ([facility]      Hypercholesterolemia      Hypertension      Prostate cancer ([facility] 5/12/14           crystal henry/ Dr Corona Young  6/4/15    PVD (peripheral vascular disease) with claudication ([address]      S/P radiation therapy 15 months out       probable cause of the rectal bleeding    Stroke [facility]) 6/2011    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       neuro assoc notes rec'd    Thyroid disease      Vitamin D deficiency 11/2013       again 5/2015 again 1/2016     Past Surgical History   Procedure Laterality Date    Endoscopy, colon, diagnostic        Ptca w/ stent, initial   10/16/2010            Ptca each addl vessel   10/16/2010            Hx heent   10/2012       repair right heidy dawkins    Pr cardiac surg procedure unlist   12/2010       dr Musa Cheng stents past MI    Pr left heart cath,percutaneous   10/16/2010            Hx orthopaedic Bilateral         BUNIONECTOMY    Vascular surgery procedure unlist           left leg varicose vein stripping dr Idania Whittington Hx carotid endarterectomy Left      Colonoscopy N/A 8/31/2016       COLONOSCOPY performed by Kota Calderon MD at [facility] ENDOSCOPY        Prior Level of Function/Home Situation: lives alone, ambulates with SPC, no home O2 use. Independent in ADLs.    Expanded or extensive additional review of patient history:      Home Situation  Home Environment: Private residence  # Steps to Enter: 0  One/Two Story Residence: One story  Living Alone: Yes  Support Systems: Friends \ neighbors  Patient Expects to be Discharged to[de-identified] Private residence  Current DME Used/Available at Home: Cane, straight  Tub or Shower Type: Shower  [X]  Right hand dominant             [ ]  Left hand dominant     EXAMINATION OF PERFORMANCE DEFICITS:  Cognitive/Behavioral Status:  Neurologic State: Alert  Orientation Level: Oriented X4  Cognition: Appropriate decision making  Perception: Appears intact  Perseveration: No perseveration noted  Safety/Judgement: Awareness of environment  Skin: intact  Edema: none noted  Vision/Perceptual:    Tracking: Able to track stimulus in all quadrants w/o difficulty Range of Motion:     AROM: Generally decreased, functional                       Strength:     Strength: Generally decreased, functional              Coordination:  Coordination: Generally decreased, functional          Tone & Sensation:     Tone: Normal                          Balance:  Sitting: Intact  Standing: Impaired  Standing - Static: Good; Unsupported  Standing - Dynamic : Fair     Functional Mobility and Transfers for ADLs:  Bed Mobility:  Rolling: Supervision  Supine to Sit: Supervision  Sit to Supine: Supervision  Scooting: Supervision     Transfers:  Sit to Stand: Contact guard assistance  Stand to Sit: Contact guard assistance  Bed to Chair: Contact guard assistance  Toilet Transfer : Minimum assistance (in standing for RW management )     ADL Assessment:  Feeding: Independent     Oral Facial Hygiene/Grooming: Contact guard assistance     Bathing: Moderate assistance     Upper Body Dressing: Minimum assistance     Lower Body Dressing: Moderate assistance, unable to don and doff socks. Unable to demonstrate cross leg technique, VERONICA. Toileting: Minimum assistance                 ADL Intervention and task modifications:           Pt educated on role of OT and required verbal cues for safety and proper hand placement during ADLs/functional transfers. Cognitive Retraining  Safety/Judgement: Awareness of environment        Functional Measure:  Barthel Index:      Bathin  Bladder: 10  Bowels: 10  Groomin  Dressin  Feeding: 10  Mobility: 10  Stairs: 0  Toilet Use: 5  Transfer (Bed to Chair and Back): 10  Total: 65         Barthel and G-code impairment scale:  Percentage of impairment CH  0% CI  1-19% CJ  20-39% CK  40-59% CL  60-79% CM  80-99% CN  100%   Barthel Score 0-100 100 99-80 79-60 59-40 20-39 1-19    0   Barthel Score 0-20 20 17-19 13-16 9-12 5-8 1-4 0      The Barthel ADL Index: Guidelines  1.  The index should be used as a record of what a patient does, not as a record of what a patient could do. 2. The main aim is to establish degree of independence from any help, physical or verbal, however minor and for whatever reason. 3. The need for supervision renders the patient not independent. 4. A patient's performance should be established using the best available evidence. Asking the patient, friends/relatives and nurses are the usual sources, but direct observation and common sense are also important. However direct testing is not needed. 5. Usually the patient's performance over the preceding 24-48 hours is important, but occasionally longer periods will be relevant. 6. Middle categories imply that the patient supplies over 50 per cent of the effort. 7. Use of aids to be independent is allowed. Trudi Summers., Barthel, DOliverW. (1145). Functional evaluation: the Barthel Index. [address] [facility] (14)2. MIKAYLA Mcneil, Jack Mauro., Leonarda Brock., [place], [facility] (1999). Measuring the change indisability after inpatient rehabilitation; comparison of the responsiveness of the Barthel Index and Functional [place] Measure. Journal of Neurology, Neurosurgery, and Psychiatry, 66(4), 231-500. Ana Aleman, N.J.A, MONTY Borja.JOMAR, & Estefania Huang, M.A. (2004.) Assessment of post-stroke quality of life in cost-effectiveness studies: The usefulness of the Barthel Index and the EuroQoL-5D. Quality of Life Research, 13, 210-34            G codes: In compliance with CMSs Claims Based Outcome Reporting, the following G-code set was chosen for this patient based on their primary functional limitation being treated: The outcome measure chosen to determine the severity of the functional limitation was the Barthel INdex with a score of 65/100 which was correlated with the impairment scale.       · Self Care:               - CURRENT STATUS:    CJ - 20%-39% impaired, limited or restricted               - GOAL STATUS: CH - 0% impaired, limited or restricted               - D/C STATUS:                       ---------------To be determined---------------      Occupational Therapy Evaluation Charge Determination   History Examination Decision-Making   LOW Complexity : Brief history review  LOW Complexity : 1-3 performance deficits relating to physical, cognitive , or psychosocial skils that result in activity limitations and / or participation restrictions  LOW Complexity : No comorbidities that affect functional and no verbal or physical assistance needed to complete eval tasks       Based on the above components, the patient evaluation is determined to be of the following complexity level: LOW   Pain:  Pain Scale 1: Numeric (0 - 10)  Pain Intensity 1: 0              Activity Tolerance:   VSS via 4 L  Please refer to the flowsheet for vital signs taken during this treatment. After treatment:   [X] Patient left in no apparent distress sitting up in chair  [ ] Patient left in no apparent distress in bed  [X] Call bell left within reach  [X] Nursing notified  [ ] Caregiver present  [ ] Bed alarm activated      COMMUNICATION/EDUCATION:   The patients plan of care was discussed with: Physical Therapist and Registered Nurse. [ ] Home safety education was provided and the patient/caregiver indicated understanding. [X] Patient/family have participated as able in goal setting and plan of care. [X] Patient/family agree to work toward stated goals and plan of care. [ ] Patient understands intent and goals of therapy, but is neutral about his/her participation. [ ] Patient is unable to participate in goal setting and plan of care. This patients plan of care is appropriate for delegation to [facility].      Thank you for this referral.  Arely Serrato OT  Time Calculation: 18 mins

## 2017-02-06 NOTE — PROGRESS NOTES
Problem: Mobility Impaired (Adult and Pediatric)  Goal: *Acute Goals and Plan of Care (Insert Text)  Physical Therapy Goals  Initiated 2/6/2017  1. Patient will move from supine to sit and sit to supine , scoot up and down and roll side to side in bed with supervision/set-up within 7 day(s). 2. Patient will transfer from bed to chair and chair to bed with supervision/set-up using the least restrictive device within 7 day(s). 3. Patient will perform sit to stand with supervision/set-up within 7 day(s). 4. Patient will ambulate with supervision/set-up for 150 feet with the least restrictive device within 7 day(s). PHYSICAL THERAPY EVALUATION  Patient: Mak Keyes. [de-identified] y.o. male)  Date: 2/6/2017  Primary Diagnosis: Community acquired bacterial pneumonia        Precautions:   Fall      ASSESSMENT :  Based on the objective data described below, the patient presents with generalized weakness and impaired functional mobility, balance and endurance. Patient also noted to be mildly confused throughout tx session. Patient requiring close supervision to [facility] for bed mobility, transfers and ambulation. Gait is unsteady demonstrating short shuffled steps using RW for support. Patient displaying increased SOB and c/o significant fatigue with minimal exertion, however O2 sats remained 95-96% on 2 l/min. Patient only able to tolerate ambulation in room today. Patient reports modified independence at baseline. He lives alone and uses a cane for ambulation. Recommend SNF following discharge. Will likely need 24 hour supervision if he is to return to home following SNF rehab     Patient will benefit from skilled intervention to address the above impairments.   Patients rehabilitation potential is considered to be Fair  Factors which may influence rehabilitation potential include:   [ ]         None noted  [X]         Mental ability/status  [ ]         Medical condition  [X]         Home/family situation and support systems  [X]         Safety awareness  [ ]         Pain tolerance/management  [ ]         Other:        PLAN :  Recommendations and Planned Interventions:  [X]           Bed Mobility Training             [ ]    Neuromuscular Re-Education  [X]           Transfer Training                   [ ]    Orthotic/Prosthetic Training  [X]           Gait Training                         [ ]    Modalities  [ ]           Therapeutic Exercises           [ ]    Edema Management/Control  [ ]           Therapeutic Activities            [X]    Patient and Family Training/Education  [ ]           Other (comment):     Frequency/Duration: Patient will be followed by physical therapy  4 times a week to address goals. Discharge Recommendations: Denys Fontaine  Further Equipment Recommendations for Discharge: TBD by SNF       SUBJECTIVE:   Patient stated I'm really tired.       OBJECTIVE DATA SUMMARY:   HISTORY:    Past Medical History   Diagnosis Date    [facility]      Acute MI ([address] 12/2010       dr Alhaji Lala    Acute prostatitis         again 9[facility]12[facility]16 [facility]    Advance directive discussed with patient 04/20/2016    Aneurysm ([address] 6/2011       AAA    Borderline glaucoma (glaucoma suspect) 2/18/15       notes from [address] [place] [address] rec'd    BPH (benign prostatic hyperplasia)      Bright red blood per rectum 2/4/16       [facility] note Mitchel Blinks- w/u in progress    CAD (coronary artery disease)      Calculus of kidney      Cerebral embolism with cerebral infarction ([address]      Chronic pain         back    Dizzy spells 6/2012    DJD (degenerative joint disease) of lumbar spine      ED (erectile dysfunction)      Elevated PSA 03/20/2014       [facility]     8/24/16 f/u note    Encephalocele ([address]      Hypercholesterolemia      Hypertension      Prostate cancer ([address] 5/12/14           crystal henry/ Dr Guillermo Morales  6/4/15    PVD (peripheral vascular disease) with claudication ([address]      S/P radiation therapy 15 months out       probable cause of the rectal bleeding    Stroke [facility]) 6/2011    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       neuro assoc notes rec'd    Thyroid disease      Vitamin D deficiency 11/2013       again 5/2015 again 1/2016     Past Surgical History   Procedure Laterality Date    Endoscopy, colon, diagnostic        Ptca w/ stent, initial   10/16/2010            Ptca each addl vessel   10/16/2010            Hx heent   10/2012       repair right heidy dawkins    Pr cardiac surg procedure unlist   12/2010       dr Artem Colbert stents past MI    Pr left heart cath,percutaneous   10/16/2010            Hx orthopaedic Bilateral         BUNIONECTOMY    Vascular surgery procedure unlist           left leg varicose vein stripping dr Guanako Centeno    Hx carotid endarterectomy Left      Colonoscopy N/A 8/31/2016       COLONOSCOPY performed by San[facility] Pruett MD at [facility] ENDOSCOPY     Prior Level of Function/Home Situation: patient is a somewhat poor historian but reports that he is independence with mobility; he uses a cane for ambulation and still driving short distances        Home Situation  Home Environment: Private residence  # Steps to Enter: 0  One/Two Story Residence: One story  Living Alone: Yes  Support Systems: Friends \ neighbors  Patient Expects to be Discharged to[de-identified] Private residence  Current DME Used/Available at Home: Cane, straight  Tub or Shower Type: Shower     EXAMINATION/PRESENTATION/DECISION MAKING:   Critical Behavior:  Neurologic State: Alert  Orientation Level: Oriented X4  Cognition: Appropriate decision making  Safety/Judgement: Awareness of environment     Strength:    Strength: Generally decreased, functional                    Tone & Sensation:   Tone: Normal                              Range Of Motion:  AROM: Generally decreased, functional                       Coordination:  Coordination: Generally decreased, functional     Functional Mobility:  Bed Mobility:  Rolling: Supervision  Supine to Sit: Supervision  Sit to Supine: Supervision  Scooting: Supervision  Transfers:  Sit to Stand: Contact guard assistance  Stand to Sit: Contact guard assistance        Bed to Chair: Contact guard assistance              Balance:   Sitting: Intact  Standing: Impaired  Standing - Static: Good; Unsupported  Standing - Dynamic : Fair  Ambulation/Gait Training:  Distance (ft): 25 Feet (ft)  Assistive Device: Walker, rolling  Ambulation - Level of Assistance: Contact guard assistance        Gait Abnormalities: Shuffling gait; Decreased step clearance        Base of Support: Widened     Speed/Bria: Pace decreased (<100 feet/min); Shuffled; Slow  Step Length: Left shortened;Right shortened      Gait is shuffled with widened base of support and shortened stride length bilaterally; no LOB noted; signficantly increased SOB noted however O2 sats remained 95-96% on 2 l/min           Functional Measure:     Elder Mobility Scale      8/20            EMS and G-code impairment scale:  Percentage of impairment CH  0% CI  1-19% CJ  20-39% CK  40-59% CL  60-79% CM  80-99% CN  100%   EMS Score 0-20 20 17-19 13-16 9-12 5-8 1-4 0      Scores under 10  generally these patients are dependent in mobility maneuvers; require help with  basic ADL, such as transfers, toileting and dressing. Scores between 10  13  generally these patients are borderline in terms of safe mobility and  independence in ADL i.e. they require some help with some mobility maneuvers. Scores over 14  Generally these patients are able to perform mobility maneuvers alone and safely  and are independent in basic ADL. G codes: In compliance with CMSs Claims Based Outcome Reporting, the following G-code set was chosen for this patient based on their primary functional limitation being treated:      The outcome measure chosen to determine the severity of the functional limitation was the Elderly Mobility scale with a score of 8/20 which was correlated with the impairment scale. · Mobility - Walking and Moving Around:               - CURRENT STATUS:    CL - 60%-79% impaired, limited or restricted               - GOAL STATUS:           CI - 1%-19% impaired, limited or restricted               - D/C STATUS:                       ---------------To be determined---------------            Pain:  Pain Scale 1: Numeric (0 - 10)  Pain Intensity 1: 0              Activity Tolerance:   Increased SOB during activity but O2 sats remained 95-96% on 2 l/min  Please refer to the flowsheet for vital signs taken during this treatment. After treatment:   [X]         Patient left in no apparent distress sitting up in chair  [ ]         Patient left in no apparent distress in bed  [X]         Call bell left within reach  [X]         Nursing notified  [ ]         Caregiver present  [X]         Bed alarm activated      COMMUNICATION/EDUCATION:   The patients plan of care was discussed with: Occupational Therapist, Registered Nurse and . [X]         Fall prevention education was provided and the patient/caregiver indicated understanding. [X]         Patient/family have participated as able in goal setting and plan of care. [X]         Patient/family agree to work toward stated goals and plan of care. [ ]         Patient understands intent and goals of therapy, but is neutral about his/her participation. [ ]         Patient is unable to participate in goal setting and plan of care.      Thank you for this referral.  Juaquin Huntley, PT   Time Calculation: 28 mins

## 2017-02-06 NOTE — PROGRESS NOTES
Spoke with patient's son, Jaqueline Caballero by phone regarding the recommendation for short term rehab as a bridge between hospital and home. Mr. Rachna Krishnamurthy agreed patient could not go home and was familiar with [facility] as his Mother had been in this facility. Medicals were sent to [address] [place] [address] person's request. Mr. Rachna Krishnamurthy was at work so the conversation was brief.

## 2017-02-06 NOTE — INTERDISCIPLINARY ROUNDS
[facility] Tele[facility] Interdisciplinary rounds were held today to discuss patient plan of care and outcomes. The interdisciplinary team collaborated to facilitate discharge planning needs. The following members were present; Nurse Manager, Renée Valencia [phone], Pharmacist, Primary Nurse, , Physical Therapy,   Physician, and Case Management. PLAN:  Cont antibiotics and neb txs.  PT recommending SNF, CM to speak with family regarding dc planning

## 2017-02-06 NOTE — PROGRESS NOTES
Received return call from Dr. Nora Keene informed her of bladder scan 750; telephone orders received to straight cath one time and to place cage catheter if retention happened a second time.

## 2017-02-07 LAB
ALBUMIN SERPL BCP-MCNC: 1.8 G/DL (ref 3.5–5)
ALBUMIN/GLOB SERPL: 0.4 {RATIO} (ref 1.1–2.2)
ALP SERPL-CCNC: 131 U/L (ref 45–117)
ALT SERPL-CCNC: 35 U/L (ref 12–78)
ANION GAP BLD CALC-SCNC: 9 MMOL/L (ref 5–15)
AST SERPL W P-5'-P-CCNC: 75 U/L (ref 15–37)
BILIRUB DIRECT SERPL-MCNC: 0.4 MG/DL (ref 0–0.2)
BILIRUB SERPL-MCNC: 0.9 MG/DL (ref 0.2–1)
BUN SERPL-MCNC: 26 MG/DL (ref 6–20)
BUN/CREAT SERPL: 24 (ref 12–20)
CALCIUM SERPL-MCNC: 8.9 MG/DL (ref 8.5–10.1)
CHLORIDE SERPL-SCNC: 108 MMOL/L (ref 97–108)
CO2 SERPL-SCNC: 26 MMOL/L (ref 21–32)
CREAT SERPL-MCNC: 1.1 MG/DL (ref 0.7–1.3)
GLOBULIN SER CALC-MCNC: 5 G/DL (ref 2–4)
GLUCOSE SERPL-MCNC: 134 MG/DL (ref 65–100)
LIPASE SERPL-CCNC: 1152 U/L (ref 73–393)
POTASSIUM SERPL-SCNC: 3.3 MMOL/L (ref 3.5–5.1)
PROT SERPL-MCNC: 6.8 G/DL (ref 6.4–8.2)
SODIUM SERPL-SCNC: 143 MMOL/L (ref 136–145)

## 2017-02-07 PROCEDURE — 74011000250 HC RX REV CODE- 250: Performed by: INTERNAL MEDICINE

## 2017-02-07 PROCEDURE — 36415 COLL VENOUS BLD VENIPUNCTURE: CPT | Performed by: INTERNAL MEDICINE

## 2017-02-07 PROCEDURE — 80076 HEPATIC FUNCTION PANEL: CPT | Performed by: INTERNAL MEDICINE

## 2017-02-07 PROCEDURE — 80048 BASIC METABOLIC PNL TOTAL CA: CPT | Performed by: INTERNAL MEDICINE

## 2017-02-07 PROCEDURE — 77010033678 HC OXYGEN DAILY

## 2017-02-07 PROCEDURE — 94640 AIRWAY INHALATION TREATMENT: CPT

## 2017-02-07 PROCEDURE — 74011250636 HC RX REV CODE- 250/636: Performed by: INTERNAL MEDICINE

## 2017-02-07 PROCEDURE — 97116 GAIT TRAINING THERAPY: CPT

## 2017-02-07 PROCEDURE — 74011250637 HC RX REV CODE- 250/637: Performed by: INTERNAL MEDICINE

## 2017-02-07 PROCEDURE — 65660000000 HC RM CCU STEPDOWN

## 2017-02-07 PROCEDURE — 83690 ASSAY OF LIPASE: CPT | Performed by: INTERNAL MEDICINE

## 2017-02-07 RX ORDER — SODIUM CHLORIDE 9 MG/ML
75 INJECTION, SOLUTION INTRAVENOUS CONTINUOUS
Status: DISCONTINUED | OUTPATIENT
Start: 2017-02-07 | End: 2017-02-08

## 2017-02-07 RX ORDER — LEVOFLOXACIN 750 MG/1
750 TABLET ORAL
Status: DISCONTINUED | OUTPATIENT
Start: 2017-02-07 | End: 2017-02-07

## 2017-02-07 RX ORDER — IPRATROPIUM BROMIDE AND ALBUTEROL SULFATE 2.5; .5 MG/3ML; MG/3ML
3 SOLUTION RESPIRATORY (INHALATION)
Status: DISCONTINUED | OUTPATIENT
Start: 2017-02-08 | End: 2017-02-13 | Stop reason: HOSPADM

## 2017-02-07 RX ORDER — POTASSIUM CHLORIDE 750 MG/1
40 TABLET, FILM COATED, EXTENDED RELEASE ORAL
Status: COMPLETED | OUTPATIENT
Start: 2017-02-07 | End: 2017-02-07

## 2017-02-07 RX ORDER — LEVOFLOXACIN 5 MG/ML
750 INJECTION, SOLUTION INTRAVENOUS EVERY 24 HOURS
Status: DISCONTINUED | OUTPATIENT
Start: 2017-02-07 | End: 2017-02-09 | Stop reason: SDUPTHER

## 2017-02-07 RX ADMIN — Medication 10 ML: at 18:04

## 2017-02-07 RX ADMIN — HEPARIN SODIUM 5000 UNITS: 5000 INJECTION, SOLUTION INTRAVENOUS; SUBCUTANEOUS at 01:39

## 2017-02-07 RX ADMIN — IPRATROPIUM BROMIDE AND ALBUTEROL SULFATE 3 ML: .5; 3 SOLUTION RESPIRATORY (INHALATION) at 02:28

## 2017-02-07 RX ADMIN — ATORVASTATIN CALCIUM 40 MG: 40 TABLET, FILM COATED ORAL at 09:50

## 2017-02-07 RX ADMIN — HEPARIN SODIUM 5000 UNITS: 5000 INJECTION, SOLUTION INTRAVENOUS; SUBCUTANEOUS at 18:04

## 2017-02-07 RX ADMIN — LEVOFLOXACIN 750 MG: 5 INJECTION, SOLUTION INTRAVENOUS at 18:03

## 2017-02-07 RX ADMIN — HYDROCHLOROTHIAZIDE: 25 TABLET ORAL at 09:50

## 2017-02-07 RX ADMIN — GUAIFENESIN 600 MG: 600 TABLET, EXTENDED RELEASE ORAL at 09:50

## 2017-02-07 RX ADMIN — ACETAMINOPHEN 650 MG: 325 TABLET, FILM COATED ORAL at 18:04

## 2017-02-07 RX ADMIN — Medication 10 ML: at 21:26

## 2017-02-07 RX ADMIN — METOPROLOL SUCCINATE 25 MG: 25 TABLET, EXTENDED RELEASE ORAL at 09:51

## 2017-02-07 RX ADMIN — SODIUM CHLORIDE 75 ML/HR: 900 INJECTION, SOLUTION INTRAVENOUS at 18:13

## 2017-02-07 RX ADMIN — LEVOTHYROXINE SODIUM 88 MCG: 88 TABLET ORAL at 06:29

## 2017-02-07 RX ADMIN — HEPARIN SODIUM 5000 UNITS: 5000 INJECTION, SOLUTION INTRAVENOUS; SUBCUTANEOUS at 09:51

## 2017-02-07 RX ADMIN — ASPIRIN 81 MG: 81 TABLET, COATED ORAL at 09:51

## 2017-02-07 RX ADMIN — IPRATROPIUM BROMIDE AND ALBUTEROL SULFATE 3 ML: .5; 3 SOLUTION RESPIRATORY (INHALATION) at 21:08

## 2017-02-07 RX ADMIN — TAMSULOSIN HYDROCHLORIDE 0.4 MG: 0.4 CAPSULE ORAL at 09:50

## 2017-02-07 RX ADMIN — POTASSIUM CHLORIDE 40 MEQ: 750 TABLET, FILM COATED, EXTENDED RELEASE ORAL at 09:50

## 2017-02-07 RX ADMIN — Medication 10 ML: at 05:42

## 2017-02-07 RX ADMIN — AMLODIPINE BESYLATE 5 MG: 5 TABLET ORAL at 09:50

## 2017-02-07 RX ADMIN — GUAIFENESIN 600 MG: 600 TABLET, EXTENDED RELEASE ORAL at 20:28

## 2017-02-07 RX ADMIN — IPRATROPIUM BROMIDE AND ALBUTEROL SULFATE 3 ML: .5; 3 SOLUTION RESPIRATORY (INHALATION) at 08:44

## 2017-02-07 RX ADMIN — ISOSORBIDE MONONITRATE 30 MG: 30 TABLET, EXTENDED RELEASE ORAL at 09:50

## 2017-02-07 RX ADMIN — IPRATROPIUM BROMIDE AND ALBUTEROL SULFATE 3 ML: .5; 3 SOLUTION RESPIRATORY (INHALATION) at 15:03

## 2017-02-07 NOTE — ROUTINE PROCESS
Interdisciplinary team rounds were held 2/7/2017 with the following team members:Care Management, Nursing, Pharmacy, Physical Therapy and Physician and the patient. Plan of care discussed. See clinical pathway and/or care plan for interventions and desired outcomes.     Plan:  Change antibiotics to IV from oral   MD will add a second antibiotic to treatment plan   MD will order maintenance IV Fluids

## 2017-02-07 NOTE — PROGRESS NOTES
Bedside shift change report given to Liliya (oncoming nurse) by Azeb Reed (offgoing nurse). Report included the following information SBAR, Kardex, Intake/Output, MAR and Recent Results. Zone Phone for oncoming shift:   [phone]    Shift Summary: Pt rested quietly through night. No c/o pain. LDAs               Peripheral IV 02/05/17 Left Antecubital (Active)   Site Assessment Clean, dry, & intact 2/7/2017  3:09 AM   Phlebitis Assessment 0 2/7/2017  3:09 AM   Infiltration Assessment 0 2/7/2017  3:09 AM   Dressing Status Clean, dry, & intact 2/7/2017  3:09 AM   Dressing Type Transparent;Tape 2/7/2017  3:09 AM   Hub Color/Line Status Flushed 2/7/2017  3:09 AM   Alcohol Cap Used Yes 2/6/2017  3:00 PM                        Intake & Output   Date 02/06/17 0700 - 02/07/17 0659 02/07/17 0700 - 02/08/17 0659   Shift 6621-1482 2397-4224 24 Hour Total 7195-0753 7518-2343 24 Hour Total   I  N  T  A  K  E   P. O. 0 500 500         P. O. 0 500 500       Shift Total  (mL/kg) 0  (0) 500  (6.7) 500  (6.7)      O  U  T  P  U  T   Urine  (mL/kg/hr) 200  (0.2) 600  (0.7) 800  (0.4)         Urine Voided 200 600 800       Stool            Stool Occurrence(s)  1 x 1 x       Shift Total  (mL/kg) 200  (2.7) 600  (8) 800  (10.7)      NET -200 -100 -300      Weight (kg) 74.8 74.8 74.8 74.8 74.8 74.8      Last Bowel Movement Last Bowel Movement Date: 02/05/17   Glucose Checks [x] N/A  [] AC/HS  [] Q6  Concerns:   Nutrition Active Orders   Diet    DIET FULL LIQUID       Consults []PT  []OT  []Speech  [x]Case Management   Cardiac Monitoring []N/A [x]Yes Expires:48 hrs     \3

## 2017-02-07 NOTE — PROGRESS NOTES
Pt has been accepted to [facility] when stable. Of note, pt has no secondary insurance, is a full code, and lives alone. IM//FOC on chart. 12  MD hopes for d/c tomorrow if stable. I discussed with DIL who is in agreement with d/c to [facility] when stable.

## 2017-02-07 NOTE — PROGRESS NOTES
Pharmacy IV to PO Conversion Program    IV to PO conversion of Levofloxacin for this [de-identified] y.o. male being treated for CAP. Recent Labs      17   0204  17   0334  17   1544   CREA  1.10  1.37*  1.89*   BUN  26*  43*  68*   WBC   --   25.2*  26.8*     Temp (24hrs), Av °F (36.7 °C), Min:97.5 °F (36.4 °C), Max:98.5 °F (36.9 °C)      Criteria for IV to PO switch - Antibiotics  Received IV therapy for at least 48 hours and   YES   1. Functioning GI tract  a. Taking scheduled oral medications  b. Tolerating tube feeds at goal rate or a full liquid, soft, or regular diet  c. Patient has no documented malabsorption syndrome  d. Patient has not experienced emesis or severe diarrhea within the past 24 hours     YES   2. Clinically Stable  a. Afebrile (temperature < 100.4°F or 38°C) for at least 24 hours  b. White blood count is trending down towards normal range     YES            3.   Does not have an exclusion criteria (See list below) YES     Thanks,    Melani Lemus

## 2017-02-07 NOTE — PROGRESS NOTES
Hospitalist Progress Note    NAME: Janet Brown. :  1935   MRN:  [id number]     Assessment / Plan:  Acute respiratory failure with hypoxia  Community acquired PNA  Sepsis, POA  -CXR/CT chest/abd pelv showed extensive R upper lobe consolidation  -BC done and sputum cultured ordered  -Levaquin (one dose ), resume IV today as poor PO intake, duonebs, albuterol prn, mucinex  -O2 suppl, wean as tolerated  -will need to repeat cxr in 4-6 weeks to document resolution of infiltration     Acute kidney Injury  -adm creat 1.89, baseline 0.9-1.1. likely from pre-renal in the setting of poor PO intake  -improving, resume IVF with poor PO  -follow BMP UOP    Hyperbilirubinemia and elevated lipase  -T. Bili 1.6 -> trending down  -lipase 517 -> trending up  -clear liquid diet, resume IVF  -Some epigastric tenderness on exam  CT A/P without contrast: IMPRESSION:  Extensive right upper and middle lobe airspace disease, compatible with  pneumonia. Follow-up to complete resolution is recommended. Centrilobular  emphysema. Colonic diverticulosis, without evidence of acute diverticulitis. 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     HTN/CAD  Elevated troponin  -peak 0.13 on   -continue amlodipine, imdur, asa, lipitor, hyzaar  -suspect due to sepsis     BPH  -continue flomax, finasteride     Hypothyroidism  -synthroid     Acute encephalopathy  -head ct neg for acute abnormalities  -likely due to acute illness  -avoid sedating meds  -check ammonia and B12     Tobacco use  -nicotine patch    Hypokalemia  -replete, monitor      Dispo: SNF per PT  Code Status: Full  Surrogate Decision Maker: kalpesh Davis Glenn at [phone]  DVT Prophylaxis: heparin  Baseline: independent, lives alone      Subjective:     Patient is tired this morning. He is oriented x3 when he wakes but quickly falls back asleep. Does not have any complaints while awake.  Brother at bedside.     Review of Systems:  Symptom Y/N Comments  Symptom Y/N Comments   Fever/Chills    Chest Pain     Poor Appetite    Edema     Cough    Abdominal Pain     Sputum    Joint Pain     SOB/VERONICA    Pruritis/Rash     Nausea/vomit    Tolerating PT/OT     Diarrhea    Tolerating Diet     Constipation    Other       Could NOT obtain due to: Somnolent     Objective:     VITALS:   Last 24hrs VS reviewed since prior progress note. Most recent are:  Patient Vitals for the past 24 hrs:   Temp Pulse Resp BP SpO2   02/07/17 1132 98.1 °F (36.7 °C) 98 16 134/67 97 %   02/07/17 0844 - - - - 95 %   02/07/17 0803 97.9 °F (36.6 °C) 78 18 141/63 96 %   02/07/17 0307 97.8 °F (36.6 °C) 74 18 151/88 95 %   02/07/17 0226 - - - - 96 %   02/06/17 2245 98.2 °F (36.8 °C) 78 18 148/62 97 %   02/06/17 2049 - - - - 95 %   02/06/17 1925 98.1 °F (36.7 °C) 88 18 148/73 96 %   02/06/17 1527 97.5 °F (36.4 °C) 87 20 139/69 93 %   02/06/17 1223 98.5 °F (36.9 °C) 99 20 149/76 98 %       Intake/Output Summary (Last 24 hours) at 02/07/17 1152  Last data filed at 02/07/17 0622   Gross per 24 hour   Intake              500 ml   Output              800 ml   Net             -300 ml        PHYSICAL EXAM:  General: WD, WN. Somnolent    EENT:  EOMI. Anicteric sclerae. MMM  Resp:  CTA bilaterally, no wheezing or rales. No accessory muscle use  CV:  Regular  rhythm,  No edema  GI:  Soft, Non distended, epigastric tenderness.   +Bowel sounds  Neurologic:  Alert and oriented X 3, normal speech,   Psych:   Poor insight. Not anxious nor agitated  Skin:   No jaundice    Reviewed most current lab test results and cultures  YES  Reviewed most current radiology test results   YES  Review and summation of old records today    NO  Reviewed patient's current orders and MAR    YES  PMH/SH reviewed - no change compared to H&P  ________________________________________________________________________  Care Plan discussed with:    Comments   Patient x    Family  x brother   RN x    Care Manager     Consultant                       x Multidiciplinary team rounds were held today with , nursing, pharmacist and clinical coordinator. Patient's plan of care was discussed; medications were reviewed and discharge planning was addressed. ________________________________________________________________________  Total NON critical care TIME:   30   Minutes    Total CRITICAL CARE TIME Spent:   Minutes non procedure based      Comments   >50% of visit spent in counseling and coordination of care     ________________________________________________________________________  Sandee Grewal MD     Procedures: see electronic medical records for all procedures/Xrays and details which were not copied into this note but were reviewed prior to creation of Plan. LABS:  I reviewed today's most current labs and imaging studies.   Pertinent labs include:  Recent Labs      02/06/17   0334  02/05/17   1544   WBC  25.2*  26.8*   HGB  11.2*  12.5   HCT  30.7*  34.5*   PLT  171  170     Recent Labs      02/07/17   0204  02/06/17   0334  02/05/17   1544   NA  143  140  137   K  3.3*  3.2*  3.7   CL  108  109*  101   CO2  26  15*  22   GLU  134*  76  93   BUN  26*  43*  68*   CREA  1.10  1.37*  1.89*   CA  8.9  8.4*  8.9   MG   --    --   3.0*   ALB  1.8*   --   2.1*   TBILI  0.9   --   1.6*   SGOT  75*   --   91*   ALT  35   --   39   INR   --    --   1.2*       Signed: Sandee Grewal MD

## 2017-02-07 NOTE — PROGRESS NOTES
Problem: Mobility Impaired (Adult and Pediatric)  Goal: *Acute Goals and Plan of Care (Insert Text)  Physical Therapy Goals  Initiated 2/6/2017  1. Patient will move from supine to sit and sit to supine , scoot up and down and roll side to side in bed with supervision/set-up within 7 day(s). 2. Patient will transfer from bed to chair and chair to bed with supervision/set-up using the least restrictive device within 7 day(s). 3. Patient will perform sit to stand with supervision/set-up within 7 day(s). 4. Patient will ambulate with supervision/set-up for 150 feet with the least restrictive device within 7 day(s). PHYSICAL THERAPY TREATMENT  Patient: Parminder Gonzales [de-identified] y.o. male)  Date: 2/7/2017  Diagnosis: Community acquired bacterial pneumonia <principal problem not specified>       Precautions: Fall  Chart, physical therapy assessment, plan of care and goals were reviewed. ASSESSMENT:  Pt demonstrates poor endurance, strength and tolerance to activity. frequent cues required to wake pt up and participate. Pt required min assist for bed mobility and to stand from elevated bed. Pt ambualted 30 ft with RW slowly needing min assist. SpO2 remained 93% with activity on 4 L O2. Pt remained in chair with safety alarm applied. Recommend SNF upon discharge. Progression toward goals:  [ ]      Improving appropriately and progressing toward goals  [X]      Improving slowly and progressing toward goals  [ ]      Not making progress toward goals and plan of care will be adjusted       PLAN:  Patient continues to benefit from skilled intervention to address the above impairments. Continue treatment per established plan of care. Discharge Recommendations:  [facility] Fontaine  Further Equipment Recommendations for Discharge:  rolling walker       SUBJECTIVE:   Patient stated Solitario Abreu I'm weak.       OBJECTIVE DATA SUMMARY:   Critical Behavior:  Neurologic State: Alert  Orientation Level: Disoriented to situation, Oriented to person, Oriented to place, Oriented to time  Cognition: Follows commands  Safety/Judgement: Awareness of environment  Functional Mobility Training:  Bed Mobility:     Supine to Sit: Minimum assistance;Assist x1;Additional time     Scooting: Contact guard assistance; Additional time                    Transfers:  Sit to Stand: Minimum assistance;Assist x1  Stand to Sit: Contact guard assistance;Assist x1;Additional time              Balance:  Sitting: Intact  Standing: Impaired  Standing - Static: Fair  Standing - Dynamic : Fair  Ambulation/Gait Training:  Distance (ft): 30 Feet (ft)  Assistive Device: Gait belt;Walker, rolling  Ambulation - Level of Assistance: Minimal assistance;Assist x1  Gait Abnormalities: Decreased step clearance; Antalgic; Shuffling gait   Base of Support: Widened     Speed/Bria: Pace decreased (<100 feet/min)  Step Length: Left shortened;Right shortened                 Stairs: Therapeutic Exercises:      Pain:  Pain Scale 1: Numeric (0 - 10)  Pain Intensity 1: 0              Activity Tolerance:   SpO2 91% with activity at rest on 4 L O2 (NC not in his nose properly)  SpO2 93% with activity on 4 L O2     Please refer to the flowsheet for vital signs taken during this treatment.   After treatment:   [X] Patient left in no apparent distress sitting up in chair  [ ] Patient left in no apparent distress in bed  [X] Call bell left within reach  [X] Nursing notified  [ ] Caregiver present  [X] Bed alarm activated      COMMUNICATION/COLLABORATION:   The patients plan of care was discussed with: Registered Nurse     Manda Weaver PTA   Time Calculation: 15 mins

## 2017-02-08 ENCOUNTER — APPOINTMENT (OUTPATIENT)
Dept: ULTRASOUND IMAGING | Age: 82
DRG: 871 | End: 2017-02-08
Attending: INTERNAL MEDICINE
Payer: MEDICARE

## 2017-02-08 LAB
ALBUMIN SERPL BCP-MCNC: 1.7 G/DL (ref 3.5–5)
ALBUMIN/GLOB SERPL: 0.4 {RATIO} (ref 1.1–2.2)
ALP SERPL-CCNC: 115 U/L (ref 45–117)
ALT SERPL-CCNC: 28 U/L (ref 12–78)
AMMONIA PLAS-SCNC: 24 UMOL/L
ANION GAP BLD CALC-SCNC: 9 MMOL/L (ref 5–15)
AST SERPL W P-5'-P-CCNC: 64 U/L (ref 15–37)
BASOPHILS # BLD AUTO: 0 K/UL (ref 0–0.1)
BASOPHILS # BLD: 0 % (ref 0–1)
BILIRUB SERPL-MCNC: 0.6 MG/DL (ref 0.2–1)
BUN SERPL-MCNC: 19 MG/DL (ref 6–20)
BUN/CREAT SERPL: 19 (ref 12–20)
CALCIUM SERPL-MCNC: 8.1 MG/DL (ref 8.5–10.1)
CHLORIDE SERPL-SCNC: 109 MMOL/L (ref 97–108)
CO2 SERPL-SCNC: 26 MMOL/L (ref 21–32)
CREAT SERPL-MCNC: 0.98 MG/DL (ref 0.7–1.3)
DIFFERENTIAL METHOD BLD: ABNORMAL
EOSINOPHIL # BLD: 0 K/UL (ref 0–0.4)
EOSINOPHIL NFR BLD: 0 % (ref 0–7)
ERYTHROCYTE [DISTWIDTH] IN BLOOD BY AUTOMATED COUNT: 16.4 % (ref 11.5–14.5)
GLOBULIN SER CALC-MCNC: 4.8 G/DL (ref 2–4)
GLUCOSE SERPL-MCNC: 105 MG/DL (ref 65–100)
HCT VFR BLD AUTO: 30.2 % (ref 36.6–50.3)
HEMOCCULT STL QL: NEGATIVE
HGB BLD-MCNC: 10.3 G/DL (ref 12.1–17)
LIPASE SERPL-CCNC: 1073 U/L (ref 73–393)
LYMPHOCYTES # BLD AUTO: 10 % (ref 12–49)
LYMPHOCYTES # BLD: 1.4 K/UL (ref 0.8–3.5)
MCH RBC QN AUTO: 26.6 PG (ref 26–34)
MCHC RBC AUTO-ENTMCNC: 34.1 G/DL (ref 30–36.5)
MCV RBC AUTO: 78 FL (ref 80–99)
MONOCYTES # BLD: 0.7 K/UL (ref 0–1)
MONOCYTES NFR BLD AUTO: 5 % (ref 5–13)
MYELOCYTES NFR BLD MANUAL: 1 %
NEUTS BAND NFR BLD MANUAL: 4 % (ref 0–6)
NEUTS SEG # BLD: 11.4 K/UL (ref 1.8–8)
NEUTS SEG NFR BLD AUTO: 80 % (ref 32–75)
NRBC # BLD: 0 K/UL (ref 0–0.01)
NRBC BLD-RTO: 0 PER 100 WBC
PLATELET # BLD AUTO: 161 K/UL (ref 150–400)
POTASSIUM SERPL-SCNC: 3.4 MMOL/L (ref 3.5–5.1)
PROT SERPL-MCNC: 6.5 G/DL (ref 6.4–8.2)
RBC # BLD AUTO: 3.87 M/UL (ref 4.1–5.7)
RBC MORPH BLD: ABNORMAL
SODIUM SERPL-SCNC: 144 MMOL/L (ref 136–145)
VIT B12 SERPL-MCNC: >2000 PG/ML (ref 211–911)
WBC # BLD AUTO: 13.6 K/UL (ref 4.1–11.1)
WBC MORPH BLD: ABNORMAL

## 2017-02-08 PROCEDURE — 74011250637 HC RX REV CODE- 250/637: Performed by: INTERNAL MEDICINE

## 2017-02-08 PROCEDURE — 76705 ECHO EXAM OF ABDOMEN: CPT

## 2017-02-08 PROCEDURE — 82607 VITAMIN B-12: CPT | Performed by: INTERNAL MEDICINE

## 2017-02-08 PROCEDURE — 36415 COLL VENOUS BLD VENIPUNCTURE: CPT | Performed by: INTERNAL MEDICINE

## 2017-02-08 PROCEDURE — 74011250636 HC RX REV CODE- 250/636: Performed by: INTERNAL MEDICINE

## 2017-02-08 PROCEDURE — 77010033678 HC OXYGEN DAILY

## 2017-02-08 PROCEDURE — 97116 GAIT TRAINING THERAPY: CPT | Performed by: PHYSICAL THERAPIST

## 2017-02-08 PROCEDURE — 82140 ASSAY OF AMMONIA: CPT | Performed by: INTERNAL MEDICINE

## 2017-02-08 PROCEDURE — 82272 OCCULT BLD FECES 1-3 TESTS: CPT | Performed by: INTERNAL MEDICINE

## 2017-02-08 PROCEDURE — 74011000250 HC RX REV CODE- 250: Performed by: INTERNAL MEDICINE

## 2017-02-08 PROCEDURE — 65660000000 HC RM CCU STEPDOWN

## 2017-02-08 PROCEDURE — 74011000258 HC RX REV CODE- 258: Performed by: INTERNAL MEDICINE

## 2017-02-08 PROCEDURE — 83690 ASSAY OF LIPASE: CPT | Performed by: INTERNAL MEDICINE

## 2017-02-08 PROCEDURE — 80053 COMPREHEN METABOLIC PANEL: CPT | Performed by: INTERNAL MEDICINE

## 2017-02-08 PROCEDURE — 85025 COMPLETE CBC W/AUTO DIFF WBC: CPT | Performed by: INTERNAL MEDICINE

## 2017-02-08 PROCEDURE — 94640 AIRWAY INHALATION TREATMENT: CPT

## 2017-02-08 RX ORDER — SODIUM CHLORIDE 450 MG/100ML
75 INJECTION, SOLUTION INTRAVENOUS CONTINUOUS
Status: DISCONTINUED | OUTPATIENT
Start: 2017-02-08 | End: 2017-02-09

## 2017-02-08 RX ORDER — VALSARTAN 160 MG/1
160 TABLET ORAL DAILY
Status: DISCONTINUED | OUTPATIENT
Start: 2017-02-09 | End: 2017-02-13 | Stop reason: HOSPADM

## 2017-02-08 RX ADMIN — ASPIRIN 81 MG: 81 TABLET, COATED ORAL at 09:43

## 2017-02-08 RX ADMIN — HYDROCHLOROTHIAZIDE: 25 TABLET ORAL at 09:43

## 2017-02-08 RX ADMIN — ISOSORBIDE MONONITRATE 30 MG: 30 TABLET, EXTENDED RELEASE ORAL at 09:44

## 2017-02-08 RX ADMIN — TAMSULOSIN HYDROCHLORIDE 0.4 MG: 0.4 CAPSULE ORAL at 09:43

## 2017-02-08 RX ADMIN — AMLODIPINE BESYLATE 5 MG: 5 TABLET ORAL at 09:44

## 2017-02-08 RX ADMIN — Medication 10 ML: at 16:21

## 2017-02-08 RX ADMIN — IPRATROPIUM BROMIDE AND ALBUTEROL SULFATE 3 ML: .5; 3 SOLUTION RESPIRATORY (INHALATION) at 20:42

## 2017-02-08 RX ADMIN — METOPROLOL SUCCINATE 25 MG: 25 TABLET, EXTENDED RELEASE ORAL at 09:44

## 2017-02-08 RX ADMIN — GUAIFENESIN 600 MG: 600 TABLET, EXTENDED RELEASE ORAL at 22:09

## 2017-02-08 RX ADMIN — LEVOFLOXACIN 750 MG: 5 INJECTION, SOLUTION INTRAVENOUS at 16:21

## 2017-02-08 RX ADMIN — ATORVASTATIN CALCIUM 40 MG: 40 TABLET, FILM COATED ORAL at 09:44

## 2017-02-08 RX ADMIN — SODIUM CHLORIDE 75 ML/HR: 450 INJECTION, SOLUTION INTRAVENOUS at 20:42

## 2017-02-08 RX ADMIN — IPRATROPIUM BROMIDE AND ALBUTEROL SULFATE 3 ML: .5; 3 SOLUTION RESPIRATORY (INHALATION) at 13:57

## 2017-02-08 RX ADMIN — HEPARIN SODIUM 5000 UNITS: 5000 INJECTION, SOLUTION INTRAVENOUS; SUBCUTANEOUS at 09:48

## 2017-02-08 RX ADMIN — Medication 10 ML: at 22:00

## 2017-02-08 RX ADMIN — LEVOTHYROXINE SODIUM 88 MCG: 88 TABLET ORAL at 07:00

## 2017-02-08 RX ADMIN — HEPARIN SODIUM 5000 UNITS: 5000 INJECTION, SOLUTION INTRAVENOUS; SUBCUTANEOUS at 02:02

## 2017-02-08 RX ADMIN — SODIUM CHLORIDE 75 ML/HR: 900 INJECTION, SOLUTION INTRAVENOUS at 09:42

## 2017-02-08 RX ADMIN — GUAIFENESIN 600 MG: 600 TABLET, EXTENDED RELEASE ORAL at 09:44

## 2017-02-08 RX ADMIN — Medication 10 ML: at 05:29

## 2017-02-08 RX ADMIN — HEPARIN SODIUM 5000 UNITS: 5000 INJECTION, SOLUTION INTRAVENOUS; SUBCUTANEOUS at 18:52

## 2017-02-08 RX ADMIN — IPRATROPIUM BROMIDE AND ALBUTEROL SULFATE 3 ML: .5; 3 SOLUTION RESPIRATORY (INHALATION) at 07:55

## 2017-02-08 NOTE — PROGRESS NOTES
Problem: Mobility Impaired (Adult and Pediatric)  Goal: *Acute Goals and Plan of Care (Insert Text)  Physical Therapy Goals  Initiated 2/6/2017  1. Patient will move from supine to sit and sit to supine , scoot up and down and roll side to side in bed with supervision/set-up within 7 day(s). 2. Patient will transfer from bed to chair and chair to bed with supervision/set-up using the least restrictive device within 7 day(s). 3. Patient will perform sit to stand with supervision/set-up within 7 day(s). 4. Patient will ambulate with supervision/set-up for 150 feet with the least restrictive device within 7 day(s). PHYSICAL THERAPY TREATMENT  Patient: Shade Lamar. [de-identified] y.o. male)  Date: 2/8/2017  Diagnosis: Community acquired bacterial pneumonia <principal problem not specified>       Precautions: Fall      ASSESSMENT:  Patient has been lethargic throughout the day; waited until afternoon for PT treatment so that he had a chance to rest; however, he continues to be lethargic. He becomes more alert as session progresses. He is able to tolerate ambulation 65 feet with CGA x 1 and assist x 1 other for IV/portable O2. He will benefit from SNF Rehab to maximize functional activity tolerance and independence before returning to his home environment. Progression toward goals:  [ ]    Improving appropriately and progressing toward goals  [X]    Improving slowly and progressing toward goals  [ ]    Not making progress toward goals and plan of care will be adjusted       PLAN:  Patient continues to benefit from skilled intervention to address the above impairments. Continue treatment per established plan of care. Discharge Recommendations:  Denys Fontaine  Further Equipment Recommendations for Discharge: To be determined by SNF Rehab       SUBJECTIVE:   Patient stated Uh-huh.   He answers most questions with this.       OBJECTIVE DATA SUMMARY:   Critical Behavior:  Neurologic State: Alert, Appropriate for age  Orientation Level: Disoriented to time  Cognition: Appropriate decision making  Safety/Judgement: Awareness of environment  Functional Mobility Training:  Bed Mobility:     Supine to Sit: Stand-by asssistance              Transfers:  Sit to Stand: Minimum assistance;Assist x1  Stand to Sit: Minimum assistance;Assist x1        Bed to Chair: Minimum assistance;Assist x2 (assist x 1 other for IV/portable O2)                    Balance:  Sitting: Intact  Standing: Impaired  Standing - Static: Constant support;Good  Standing - Dynamic : Fair  Ambulation/Gait Training:  Distance (ft): 65 Feet (ft)  Assistive Device: Gait belt;Walker, rolling  Ambulation - Level of Assistance: Contact guard assistance;Assist x1 (assist x 1 other for IV/O2)        Gait Abnormalities: Decreased step clearance        Base of Support: Narrowed     Speed/Bria: Pace decreased (<100 feet/min)  Step Length: Right shortened;Left shortened                                   Neuro Re-Education:  Patient tolerates static stand with both UE support on the walker for up to 1 minute prior to gait training. Pain:  Pain Scale 1: Numeric (0 - 10)  Pain Intensity 1: 0  Pain Location 1: Abdomen        Pain Intervention(s) 1: Medication (see MAR)  Activity Tolerance:   Low. All mobility on 3L O2, no shortness of breath. Please refer to the flowsheet for vital signs taken during this treatment.   After treatment:   [X]    Patient left in no apparent distress sitting up in chair  [ ]    Patient left in no apparent distress in bed  [X]    Call bell left within reach  [X]    Nursing notified  [ ]    Caregiver present  [X]    Bed alarm activated      COMMUNICATION/COLLABORATION:   The patients plan of care was discussed with: Registered Nurse     Zuly Rudd, PT   Time Calculation: 12 mins

## 2017-02-08 NOTE — PROGRESS NOTES
Bedside shift change report given to Sandoval Fishman (oncoming nurse) by Marcia Sanchez (offgoing nurse). Report included the following information SBAR, Kardex, Intake/Output, MAR and Recent Results. Zone Phone for oncoming shift:   [phone]    Shift Summary: Pt rested quietly through night. Some issues with abd pain late in AM, relieved by medication. LDAs               Peripheral IV 02/05/17 Left Antecubital (Active)   Site Assessment Clean, dry, & intact 2/8/2017  3:08 AM   Phlebitis Assessment 0 2/8/2017  3:08 AM   Infiltration Assessment 0 2/8/2017  3:08 AM   Dressing Status Clean, dry, & intact 2/8/2017  3:08 AM   Dressing Type Transparent;Tape 2/8/2017  3:08 AM   Hub Color/Line Status Flushed;Capped 2/8/2017  3:08 AM   Alcohol Cap Used Yes 2/7/2017  3:00 PM       Peripheral IV 02/07/17 Right (Active)   Site Assessment Clean, dry, & intact 2/8/2017  3:08 AM   Phlebitis Assessment 0 2/8/2017  3:08 AM   Infiltration Assessment 0 2/8/2017  3:08 AM   Dressing Status Clean, dry, & intact 2/8/2017  3:08 AM   Dressing Type Transparent;Tape 2/8/2017  3:08 AM   Hub Color/Line Status Pink; Infusing 2/8/2017  3:08 AM                        Intake & Output   Date 02/07/17 0700 - 02/08/17 0659 02/08/17 0700 - 02/09/17 0659   Shift [id number] 1900-0659 24 Hour Total 0700-1859 1900-0659 24 Hour Total   I  N  T  A  K  E   P. O.          P. O.        I.V.  (mL/kg/hr)  900 900         I.V.  900 900       Shift Total  (mL/kg) 520  (6.9) 1650  (22) 2170  (29)      O  U  T  P  U  T   Urine  (mL/kg/hr) 240  (0.3) 300 540         Urine Voided 240 300 540         Urine Occurrence(s) 600 x  600 x       Stool            Stool Occurrence(s)  1 x 1 x       Shift Total  (mL/kg) 240  (3.2) 300  (4) 540  (7.2)       1350 1630      Weight (kg) 74.8 74.8 74.8 74.8 74.8 74.8      Last Bowel Movement Last Bowel Movement Date: 02/08/17   Glucose Checks [x] N/A  [] AC/HS  [] Q6  Concerns:   Nutrition Active Orders   Diet DIET CLEAR LIQUID       Consults [x]PT  [x]OT  []Speech  [x]Case Management   Cardiac Monitoring []N/A [x]Yes Expires:48 hrs

## 2017-02-09 LAB
ANION GAP BLD CALC-SCNC: 9 MMOL/L (ref 5–15)
BUN SERPL-MCNC: 14 MG/DL (ref 6–20)
BUN/CREAT SERPL: 16 (ref 12–20)
CALCIUM SERPL-MCNC: 8.3 MG/DL (ref 8.5–10.1)
CHLORIDE SERPL-SCNC: 107 MMOL/L (ref 97–108)
CHOLEST SERPL-MCNC: 76 MG/DL
CO2 SERPL-SCNC: 25 MMOL/L (ref 21–32)
CREAT SERPL-MCNC: 0.88 MG/DL (ref 0.7–1.3)
ERYTHROCYTE [DISTWIDTH] IN BLOOD BY AUTOMATED COUNT: 16.7 % (ref 11.5–14.5)
GLUCOSE SERPL-MCNC: 104 MG/DL (ref 65–100)
HCT VFR BLD AUTO: 30 % (ref 36.6–50.3)
HDLC SERPL-MCNC: 15 MG/DL
HDLC SERPL: 5.1 {RATIO} (ref 0–5)
HGB BLD-MCNC: 10.5 G/DL (ref 12.1–17)
LDLC SERPL CALC-MCNC: 41.6 MG/DL (ref 0–100)
LIPID PROFILE,FLP: ABNORMAL
MCH RBC QN AUTO: 27.3 PG (ref 26–34)
MCHC RBC AUTO-ENTMCNC: 35 G/DL (ref 30–36.5)
MCV RBC AUTO: 77.9 FL (ref 80–99)
PLATELET # BLD AUTO: 182 K/UL (ref 150–400)
POTASSIUM SERPL-SCNC: 3.7 MMOL/L (ref 3.5–5.1)
RBC # BLD AUTO: 3.85 M/UL (ref 4.1–5.7)
SODIUM SERPL-SCNC: 141 MMOL/L (ref 136–145)
TRIGL SERPL-MCNC: 97 MG/DL (ref ?–150)
VLDLC SERPL CALC-MCNC: 19.4 MG/DL
WBC # BLD AUTO: 13 K/UL (ref 4.1–11.1)

## 2017-02-09 PROCEDURE — 74011250637 HC RX REV CODE- 250/637: Performed by: INTERNAL MEDICINE

## 2017-02-09 PROCEDURE — 80061 LIPID PANEL: CPT | Performed by: INTERNAL MEDICINE

## 2017-02-09 PROCEDURE — 36415 COLL VENOUS BLD VENIPUNCTURE: CPT | Performed by: INTERNAL MEDICINE

## 2017-02-09 PROCEDURE — 74011250636 HC RX REV CODE- 250/636: Performed by: INTERNAL MEDICINE

## 2017-02-09 PROCEDURE — 80048 BASIC METABOLIC PNL TOTAL CA: CPT | Performed by: INTERNAL MEDICINE

## 2017-02-09 PROCEDURE — 94761 N-INVAS EAR/PLS OXIMETRY MLT: CPT

## 2017-02-09 PROCEDURE — 74011000258 HC RX REV CODE- 258: Performed by: INTERNAL MEDICINE

## 2017-02-09 PROCEDURE — 77010033678 HC OXYGEN DAILY

## 2017-02-09 PROCEDURE — 97116 GAIT TRAINING THERAPY: CPT | Performed by: PHYSICAL THERAPIST

## 2017-02-09 PROCEDURE — 74011000250 HC RX REV CODE- 250: Performed by: INTERNAL MEDICINE

## 2017-02-09 PROCEDURE — 65660000000 HC RM CCU STEPDOWN

## 2017-02-09 PROCEDURE — 94640 AIRWAY INHALATION TREATMENT: CPT

## 2017-02-09 PROCEDURE — 85027 COMPLETE CBC AUTOMATED: CPT | Performed by: INTERNAL MEDICINE

## 2017-02-09 RX ORDER — LEVOFLOXACIN 750 MG/1
750 TABLET ORAL
Status: COMPLETED | OUTPATIENT
Start: 2017-02-09 | End: 2017-02-12

## 2017-02-09 RX ADMIN — ASPIRIN 81 MG: 81 TABLET, COATED ORAL at 08:08

## 2017-02-09 RX ADMIN — SODIUM CHLORIDE 75 ML/HR: 450 INJECTION, SOLUTION INTRAVENOUS at 14:03

## 2017-02-09 RX ADMIN — IPRATROPIUM BROMIDE AND ALBUTEROL SULFATE 3 ML: .5; 3 SOLUTION RESPIRATORY (INHALATION) at 07:44

## 2017-02-09 RX ADMIN — HEPARIN SODIUM 5000 UNITS: 5000 INJECTION, SOLUTION INTRAVENOUS; SUBCUTANEOUS at 08:11

## 2017-02-09 RX ADMIN — IPRATROPIUM BROMIDE AND ALBUTEROL SULFATE 3 ML: .5; 3 SOLUTION RESPIRATORY (INHALATION) at 13:40

## 2017-02-09 RX ADMIN — ISOSORBIDE MONONITRATE 30 MG: 30 TABLET, EXTENDED RELEASE ORAL at 08:09

## 2017-02-09 RX ADMIN — GUAIFENESIN 600 MG: 600 TABLET, EXTENDED RELEASE ORAL at 21:00

## 2017-02-09 RX ADMIN — TAMSULOSIN HYDROCHLORIDE 0.4 MG: 0.4 CAPSULE ORAL at 08:09

## 2017-02-09 RX ADMIN — ATORVASTATIN CALCIUM 40 MG: 40 TABLET, FILM COATED ORAL at 08:08

## 2017-02-09 RX ADMIN — VALSARTAN 160 MG: 160 TABLET ORAL at 08:08

## 2017-02-09 RX ADMIN — METOPROLOL SUCCINATE 25 MG: 25 TABLET, EXTENDED RELEASE ORAL at 08:09

## 2017-02-09 RX ADMIN — HEPARIN SODIUM 5000 UNITS: 5000 INJECTION, SOLUTION INTRAVENOUS; SUBCUTANEOUS at 04:20

## 2017-02-09 RX ADMIN — LEVOTHYROXINE SODIUM 88 MCG: 88 TABLET ORAL at 07:31

## 2017-02-09 RX ADMIN — AMLODIPINE BESYLATE 5 MG: 5 TABLET ORAL at 08:09

## 2017-02-09 RX ADMIN — LEVOFLOXACIN 750 MG: 750 TABLET, FILM COATED ORAL at 16:53

## 2017-02-09 RX ADMIN — HEPARIN SODIUM 5000 UNITS: 5000 INJECTION, SOLUTION INTRAVENOUS; SUBCUTANEOUS at 16:53

## 2017-02-09 RX ADMIN — IPRATROPIUM BROMIDE AND ALBUTEROL SULFATE 3 ML: .5; 3 SOLUTION RESPIRATORY (INHALATION) at 21:22

## 2017-02-09 RX ADMIN — GUAIFENESIN 600 MG: 600 TABLET, EXTENDED RELEASE ORAL at 08:09

## 2017-02-09 NOTE — PROGRESS NOTES
Problem: Mobility Impaired (Adult and Pediatric)  Goal: *Acute Goals and Plan of Care (Insert Text)  Physical Therapy Goals  Initiated 2/6/2017  1. Patient will move from supine to sit and sit to supine , scoot up and down and roll side to side in bed with supervision/set-up within 7 day(s). 2. Patient will transfer from bed to chair and chair to bed with supervision/set-up using the least restrictive device within 7 day(s). 3. Patient will perform sit to stand with supervision/set-up within 7 day(s). 4. Patient will ambulate with supervision/set-up for 150 feet with the least restrictive device within 7 day(s). PHYSICAL THERAPY TREATMENT  Patient: Ghislaine Sanz [de-identified] y.o. male)  Date: 2/9/2017  Diagnosis: Community acquired bacterial pneumonia <principal problem not specified>       Precautions: Fall      ASSESSMENT: Patient sleeping upon arrival but alerts with verbal stimulation and remained alert throughout tx session. Patient minimally conversive but responding appropriately to questions. Patient requiring only supervision for bed mobility and CGA for transfers and ambulation. Gait is slightly unsteady using RW for support but no overt LOB noted. Patient demonstrating slow steady progress in therapy. Continue to recommend SNF following discharge to regain functional independence prior to returning to home. Patient will likely benefit from 24 hour supervision once home. Progression toward goals:  [ ]    Improving appropriately and progressing toward goals  [X]    Improving slowly and progressing toward goals  [ ]    Not making progress toward goals and plan of care will be adjusted       PLAN:  Patient continues to benefit from skilled intervention to address the above impairments. Continue treatment per established plan of care.   Discharge Recommendations:  [facility] Font[facility]  Further Equipment Recommendations for Discharge:  TBD by SNF       SUBJECTIVE:   Patient stated I'm feeling okay.      OBJECTIVE DATA SUMMARY:   Critical Behavior:  Neurologic State: Alert, Other (Comment) (slow to respond)  Orientation Level: Disoriented to situation, Disoriented to time, Oriented to person, Oriented to place  Cognition: Appropriate decision making, Appropriate for age attention/concentration, Appropriate safety awareness, Follows commands  Safety/Judgement: Awareness of environment  Functional Mobility Training:  Bed Mobility:  Rolling: Supervision  Supine to Sit: Supervision     Scooting: Supervision        Transfers:  Sit to Stand: Contact guard assistance  Stand to Sit: Contact guard assistance        Bed to Chair: Contact guard assistance                    Balance:  Sitting: Intact  Standing: Impaired  Standing - Static: Good;Constant support  Standing - Dynamic : Fair (using RW for support)  Ambulation/Gait Training:  Distance (ft): 100 Feet (ft)  Assistive Device: Walker, rolling  Ambulation - Level of Assistance: Contact guard assistance        Gait Abnormalities: Decreased step clearance        Base of Support: Narrowed     Speed/Bria: Pace decreased (<100 feet/min)  Step Length: Left shortened;Right shortened      Gait is slow and slightly unsteady but no overt LOB noted        Pain:  Pain Scale 1: Visual  Pain Intensity 1: 0              Activity Tolerance:   Pulse Oximetry Assessment     96% at rest on 2LPM  92% while ambulating on 2LPM  Please refer to the flowsheet for vital signs taken during this treatment.   After treatment:   [X]    Patient left in no apparent distress sitting up in chair  [ ]    Patient left in no apparent distress in bed  [ ]    Call bell left within reach  [X]    Nursing notified  [ ]    Caregiver present  [X]    Bed alarm activated      COMMUNICATION/COLLABORATION:   The patients plan of care was discussed with: Registered Nurse and Rehabilitation Attendant     Glenda Dixon PT   Time Calculation: 14 mins

## 2017-02-09 NOTE — PROGRESS NOTES
Hospitalist Progress Note    NAME: Arminda Wesley. :  1935   MRN:  [id number]     Assessment / Plan:  Acute respiratory failure with hypoxia  Community acquired PNA  Sepsis, POA  -CXR/CT chest/abd pelv showed extensive R upper lobe consolidation. ? Aspiration in setting of encephalopathy at home. -WBC peak 26.8 on . Trending down. -BC  NGTD,  No sputum cx collected. -Levaquin (one dose ), resumed . Day 4 abx. Change to PO today  -Continue duonebs, albuterol prn, mucinex  -O2 suppl, wean as tolerated  -will need to repeat cxr in 4-6 weeks to document resolution of infiltration     Pancreatitis, epigastic pain/elevated lipase. Normal imaging. Hyperbilirubinemia-resolved. -epigastric tenderness on exam  -Unclear etiology.  -Discontinue HCTZ. TG wnl.  -CT A/P without contrast on admission without evidence of pancreatitis or ductal dilatation.  -abd US  \"The liver is normal. The common bile duct is normal measuring 7 mm in diameter. The gallbladder is normal. The right kidney measures 10.9 cm in length. There is no hydronephrosis or visible ascites. Pancreatic head is normal in appearance. \"  -T. Bili 1.6 -> down wnl  -lipase 517 on adm -> trended to peak 1152 on 17. Coming down.  -Advance to full liquid diet. DC IVF this evening. Acute kidney Injury- resolved  -adm creat 1.89, baseline 0.9-1.1. likely from pre-renal in the setting of poor PO intake  -Creatinine trending down to wnl with IVF. -stop IVF   -follow BMP UOP     HTN/CAD  Elevated troponin  -peak 0.13 on   -continue amlodipine, imdur, asa, lipitor, valsartan.  HCTZ stopped with pancreatitis.     BPH  -continue flomax, finasteride     Hypothyroidism  -synthroid     Acute encephalopathy-improving  -head ct neg for acute abnormalities  -likely due to acute illness  -ammonia and B12 wnl  -avoid sedating meds     Tobacco use  -nicotine patch    Hypokalemia  -replete, monitor    Dispo: SNF per PT  Code Status: Full  Surrogate Decision Maker: son Rick Mulligan at [phone]  DVT Prophylaxis: heparin  Baseline: independent, lives alone      Subjective:     Patient tired but more awake today. Reports blood in stool, however fobt negative. Pain more over right ribs today rather than epigastrum.      Review of Systems:  Symptom Y/N Comments  Symptom Y/N Comments   Fever/Chills n   Chest Pain n    Poor Appetite y   Edema     Cough n   Abdominal Pain y    Sputum n   Joint Pain     SOB/VERONICA y   Pruritis/Rash     Nausea/vomit n   Tolerating PT/OT     Diarrhea n   Tolerating Diet     Constipation    Other       Could NOT obtain due to:      Objective:     VITALS:   Last 24hrs VS reviewed since prior progress note. Most recent are:  Patient Vitals for the past 24 hrs:   Temp Pulse Resp BP SpO2   02/09/17 1506 98.6 °F (37 °C) 75 22 123/54 96 %   02/09/17 1339 - - - - 97 %   02/09/17 1137 97.9 °F (36.6 °C) 80 20 130/62 95 %   02/09/17 0743 98.3 °F (36.8 °C) 82 18 135/72 98 %   02/09/17 0742 - - - - 98 %   02/09/17 0309 98.5 °F (36.9 °C) 82 16 141/68 92 %   02/08/17 2301 98 °F (36.7 °C) 80 18 118/67 95 %   02/08/17 2044 - - - - 95 %   02/08/17 2000 98 °F (36.7 °C) 84 18 125/70 93 %       Intake/Output Summary (Last 24 hours) at 02/09/17 1813  Last data filed at 02/09/17 1600   Gross per 24 hour   Intake           1447.5 ml   Output             1130 ml   Net            317.5 ml        PHYSICAL EXAM:  General: WD, WN. Tired. EENT:  EOMI. Anicteric sclerae. MMM  Resp:  CTA bilaterally, no wheezing or rales. No accessory muscle use  CV:  Regular  rhythm,  No edema  GI:  Soft, Non distended, improving epigastric tenderness.   +Bowel sounds  Neurologic:  Alert and oriented X 3, normal speech,   Psych:   Poor insight. Not anxious nor agitated  Skin:   No jaundice    Reviewed most current lab test results and cultures  YES  Reviewed most current radiology test results   YES  Review and summation of old records today    NO  Reviewed patient's current orders and MAR    YES  PMH/SH reviewed - no change compared to H&P  ________________________________________________________________________  Care Plan discussed with:    Comments   Patient x    Family      RN x    Care Manager     Consultant                       x Multidiciplinary team rounds were held today with , nursing, pharmacist and clinical coordinator. Patient's plan of care was discussed; medications were reviewed and discharge planning was addressed. ________________________________________________________________________  Total NON critical care TIME:   30   Minutes    Total CRITICAL CARE TIME Spent:   Minutes non procedure based      Comments   >50% of visit spent in counseling and coordination of care     ________________________________________________________________________  Earle Major MD     Procedures: see electronic medical records for all procedures/Xrays and details which were not copied into this note but were reviewed prior to creation of Plan. LABS:  I reviewed today's most current labs and imaging studies.   Pertinent labs include:  Recent Labs      02/09/17 0247 02/08/17   0203   WBC  13.0*  13.6*   HGB  10.5*  10.3*   HCT  30.0*  30.2*   PLT  182  161     Recent Labs      02/09/17 0247 02/08/17   0203  02/07/17   0204   NA  141  144  143   K  3.7  3.4*  3.3*   CL  107  109*  108   CO2  25  26  26   GLU  104*  105*  134*   BUN  14  19  26*   CREA  0.88  0.98  1.10   CA  8.3*  8.1*  8.9   ALB   --   1.7*  1.8*   TBILI   --   0.6  0.9   SGOT   --   64*  75*   ALT   --   28  35       Signed: Earle Major MD

## 2017-02-09 NOTE — PROGRESS NOTES
Bedside and Verbal shift change report given to Marci (oncoming nurse) by Aysha Varela (offgoing nurse). Report included the following information SBAR, Kardex, Intake/Output, MAR and Recent Results.

## 2017-02-09 NOTE — ROUTINE PROCESS
Interdisciplinary team rounds were held 2/9/2017 with the following team members:Care Management, Nursing, Pharmacy, Physical Therapy and Physician and the patient. Plan of care discussed. See clinical pathway and/or care plan for interventions and desired outcomes.     Plan:  Continue current treatment plan   Possibly d/c patient IV fluids

## 2017-02-09 NOTE — PROGRESS NOTES
Hospitalist Progress Note    NAME: Pipo Laguerre :  1935   MRN:  [id number]     Assessment / Plan:  Acute respiratory failure with hypoxia  Community acquired PNA  Sepsis, POA  -CXR/CT chest/abd pelv showed extensive R upper lobe consolidation  -WBC peak 26.8 on . Trending down. -BC  NGTD,  No sputum cx collected. -Levaquin (one dose ), resumed . Day 3 abx.  -Continue duonebs, albuterol prn, mucinex  -O2 suppl, wean as tolerated  -will need to repeat cxr in 4-6 weeks to document resolution of infiltration     Acute kidney Injury  -adm creat 1.89, baseline 0.9-1.1. likely from pre-renal in the setting of poor PO intake  -Creatinine trending down to wnl with IVF. -Continue IVF overnight with poor PO.  -follow BMP UOP    Pancreatitis, epigastic pain/elevated lipase. Normal imaging. Hyperbilirubinemia-resolved. -epigastric tenderness on exam  -Unclear etiology.  -Discontinue HCTZ. Check TG.  -CT A/P without contrast on admission without evidence of pancreatitis or ductal dilatation.  -abd US  \"The liver is normal. The common bile duct is normal measuring 7 mm in diameter. The gallbladder is normal. The right kidney measures 10.9 cm in length. There is no hydronephrosis or visible ascites. Pancreatic head is normal in appearance. \"  -T. Bili 1.6 -> down wnl  -lipase 517 on adm -> trended to peak 1152 on 17.  Coming down.  -clear liquid diet, continue IVF.     HTN/CAD  Elevated troponin  -peak 0.13 on   -continue amlodipine, imdur, asa, lipitor, hyzaar  -suspect due to sepsis     BPH  -continue flomax, finasteride     Hypothyroidism  -synthroid     Acute encephalopathy  -head ct neg for acute abnormalities  -likely due to acute illness  -ammonia and B12 wnl  -avoid sedating meds     Tobacco use  -nicotine patch    Hypokalemia  -replete, monitor    Dispo: SNF per PT  Code Status: Full  Surrogate Decision Maker: kalpesh Caro at [phone]  DVT Prophylaxis: heparin  Baseline: independent, lives alone      Subjective:     Patient remains somnolent but easier to wake this morning. Oriented x 3 but quickly falls back asleep. Reports abdominal pain. He is unable to characterize the pain or its associations.     Review of Systems:  Symptom Y/N Comments  Symptom Y/N Comments   Fever/Chills n   Chest Pain n    Poor Appetite y   Edema     Cough n   Abdominal Pain y    Sputum n   Joint Pain     SOB/VERONICA y   Pruritis/Rash     Nausea/vomit n   Tolerating PT/OT     Diarrhea n   Tolerating Diet     Constipation    Other       Could NOT obtain due to:      Objective:     VITALS:   Last 24hrs VS reviewed since prior progress note. Most recent are:  Patient Vitals for the past 24 hrs:   Temp Pulse Resp BP SpO2   02/08/17 1536 97.9 °F (36.6 °C) 86 18 128/62 93 %   02/08/17 1357 - - - - 99 %   02/08/17 1141 97.8 °F (36.6 °C) 84 18 126/57 96 %   02/08/17 0754 - - - - 92 %   02/08/17 0741 98.3 °F (36.8 °C) 81 20 159/76 94 %   02/08/17 0344 97.4 °F (36.3 °C) - 20 132/68 96 %   02/08/17 0100 - - - - 95 %   02/07/17 2358 97.4 °F (36.3 °C) 77 20 142/72 95 %   02/07/17 2108 - - - - 97 %   02/07/17 1945 97 °F (36.1 °C) 81 20 136/61 96 %       Intake/Output Summary (Last 24 hours) at 02/08/17 1933  Last data filed at 02/08/17 1900   Gross per 24 hour   Intake             1650 ml   Output             1300 ml   Net              350 ml        PHYSICAL EXAM:  General: WD, WN. Somnolent    EENT:  EOMI. Anicteric sclerae. MMM  Resp:  CTA bilaterally, no wheezing or rales. No accessory muscle use  CV:  Regular  rhythm,  No edema  GI:  Soft, Non distended, epigastric tenderness.   +Bowel sounds  Neurologic:  Alert and oriented X 3, normal speech,   Psych:   Poor insight. Not anxious nor agitated  Skin:   No jaundice    Reviewed most current lab test results and cultures  YES  Reviewed most current radiology test results   YES  Review and summation of old records today    NO  Reviewed patient's current orders and MAR YES  PMH/SH reviewed - no change compared to H&P  ________________________________________________________________________  Care Plan discussed with:    Comments   Patient x    Family      RN x    Care Manager     Consultant                        Multidiciplinary team rounds were held today with , nursing, pharmacist and clinical coordinator. Patient's plan of care was discussed; medications were reviewed and discharge planning was addressed. ________________________________________________________________________  Total NON critical care TIME:   30   Minutes    Total CRITICAL CARE TIME Spent:   Minutes non procedure based      Comments   >50% of visit spent in counseling and coordination of care     ________________________________________________________________________  Marlena Bernal MD     Procedures: see electronic medical records for all procedures/Xrays and details which were not copied into this note but were reviewed prior to creation of Plan. LABS:  I reviewed today's most current labs and imaging studies.   Pertinent labs include:  Recent Labs      02/08/17 0203 02/06/17 0334   WBC  13.6*  25.2*   HGB  10.3*  11.2*   HCT  30.2*  30.7*   PLT  161  171     Recent Labs      02/08/17 0203 02/07/17 0204 02/06/17 0334   NA  144  143  140   K  3.4*  3.3*  3.2*   CL  109*  108  109*   CO2  26  26  15*   GLU  105*  134*  76   BUN  19  26*  43*   CREA  0.98  1.10  1.37*   CA  8.1*  8.9  8.4*   ALB  1.7*  1.8*   --    TBILI  0.6  0.9   --    SGOT  64*  75*   --    ALT  28  35   --        Signed: Marlena Bernal MD

## 2017-02-09 NOTE — PROGRESS NOTES
Pharmacy IV to PO Conversion Program    IV to PO conversion of Levofloxacin for this [de-identified] y.o. male being treated for CAP. Recent Labs      17   0247  17   0203  17   0204   CREA  0.88  0.98  1.10   BUN  14  19  26*   WBC  13.0*  13.6*   --      Temp (24hrs), Av.2 °F (36.8 °C), Min:97.9 °F (36.6 °C), Max:98.6 °F (37 °C)      Criteria for IV to PO switch - Antibiotics  Received IV therapy for at least 48 hours and   Yes   1. Functioning GI tract  a. Taking scheduled oral medications  b. Tolerating tube feeds at goal rate or a full liquid, soft, or regular diet  c. Patient has no documented malabsorption syndrome  d. Patient has not experienced emesis or severe diarrhea within the past 24 hours     Yes   2. Clinically Stable  a. Afebrile (temperature < 100.4°F or 38°C) for at least 24 hours  b. White blood count is trending down towards normal range     Yes            3.   Does not have an exclusion criteria Yes     Thanks,    [facility]

## 2017-02-09 NOTE — PROGRESS NOTES
[facility] can accept today. [facility] can transport ~4p if stable. IM//FOC on chart    1130  No d/c today per MD.  [place] [facility]//[facility] are aware. Will follow tomorrow.

## 2017-02-09 NOTE — PROGRESS NOTES
Bedside shift change report given to Alida Mantilla (oncoming nurse) by Aspen Jones (offgoing nurse). Report included the following information SBAR.

## 2017-02-10 ENCOUNTER — APPOINTMENT (OUTPATIENT)
Dept: MRI IMAGING | Age: 82
DRG: 871 | End: 2017-02-10
Attending: INTERNAL MEDICINE
Payer: MEDICARE

## 2017-02-10 LAB
ALBUMIN SERPL BCP-MCNC: 1.8 G/DL (ref 3.5–5)
ALBUMIN/GLOB SERPL: 0.4 {RATIO} (ref 1.1–2.2)
ALP SERPL-CCNC: 106 U/L (ref 45–117)
ALT SERPL-CCNC: 21 U/L (ref 12–78)
ANION GAP BLD CALC-SCNC: 8 MMOL/L (ref 5–15)
AST SERPL W P-5'-P-CCNC: 41 U/L (ref 15–37)
BACTERIA SPEC CULT: NORMAL
BILIRUB DIRECT SERPL-MCNC: 0.2 MG/DL (ref 0–0.2)
BILIRUB SERPL-MCNC: 0.6 MG/DL (ref 0.2–1)
BUN SERPL-MCNC: 12 MG/DL (ref 6–20)
BUN/CREAT SERPL: 13 (ref 12–20)
CALCIUM SERPL-MCNC: 8.6 MG/DL (ref 8.5–10.1)
CHLORIDE SERPL-SCNC: 105 MMOL/L (ref 97–108)
CO2 SERPL-SCNC: 25 MMOL/L (ref 21–32)
CREAT SERPL-MCNC: 0.9 MG/DL (ref 0.7–1.3)
ERYTHROCYTE [DISTWIDTH] IN BLOOD BY AUTOMATED COUNT: 16.9 % (ref 11.5–14.5)
GLOBULIN SER CALC-MCNC: 4.7 G/DL (ref 2–4)
GLUCOSE SERPL-MCNC: 106 MG/DL (ref 65–100)
HCT VFR BLD AUTO: 30.1 % (ref 36.6–50.3)
HGB BLD-MCNC: 10.5 G/DL (ref 12.1–17)
LIPASE SERPL-CCNC: 1296 U/L (ref 73–393)
MCH RBC QN AUTO: 27.3 PG (ref 26–34)
MCHC RBC AUTO-ENTMCNC: 34.9 G/DL (ref 30–36.5)
MCV RBC AUTO: 78.4 FL (ref 80–99)
PLATELET # BLD AUTO: 217 K/UL (ref 150–400)
POTASSIUM SERPL-SCNC: 3.8 MMOL/L (ref 3.5–5.1)
PROT SERPL-MCNC: 6.5 G/DL (ref 6.4–8.2)
RBC # BLD AUTO: 3.84 M/UL (ref 4.1–5.7)
SERVICE CMNT-IMP: NORMAL
SODIUM SERPL-SCNC: 138 MMOL/L (ref 136–145)
WBC # BLD AUTO: 10.5 K/UL (ref 4.1–11.1)

## 2017-02-10 PROCEDURE — 74011250637 HC RX REV CODE- 250/637: Performed by: INTERNAL MEDICINE

## 2017-02-10 PROCEDURE — 86038 ANTINUCLEAR ANTIBODIES: CPT | Performed by: INTERNAL MEDICINE

## 2017-02-10 PROCEDURE — 82787 IGG 1 2 3 OR 4 EACH: CPT | Performed by: INTERNAL MEDICINE

## 2017-02-10 PROCEDURE — 65660000000 HC RM CCU STEPDOWN

## 2017-02-10 PROCEDURE — 77010033678 HC OXYGEN DAILY

## 2017-02-10 PROCEDURE — 74011000250 HC RX REV CODE- 250: Performed by: INTERNAL MEDICINE

## 2017-02-10 PROCEDURE — 80076 HEPATIC FUNCTION PANEL: CPT | Performed by: INTERNAL MEDICINE

## 2017-02-10 PROCEDURE — A9585 GADOBUTROL INJECTION: HCPCS | Performed by: INTERNAL MEDICINE

## 2017-02-10 PROCEDURE — 36415 COLL VENOUS BLD VENIPUNCTURE: CPT | Performed by: INTERNAL MEDICINE

## 2017-02-10 PROCEDURE — 74011250636 HC RX REV CODE- 250/636: Performed by: INTERNAL MEDICINE

## 2017-02-10 PROCEDURE — 85027 COMPLETE CBC AUTOMATED: CPT | Performed by: INTERNAL MEDICINE

## 2017-02-10 PROCEDURE — 94640 AIRWAY INHALATION TREATMENT: CPT

## 2017-02-10 PROCEDURE — 83690 ASSAY OF LIPASE: CPT | Performed by: INTERNAL MEDICINE

## 2017-02-10 PROCEDURE — 80048 BASIC METABOLIC PNL TOTAL CA: CPT | Performed by: INTERNAL MEDICINE

## 2017-02-10 PROCEDURE — 74183 MRI ABD W/O CNTR FLWD CNTR: CPT

## 2017-02-10 RX ORDER — SODIUM CHLORIDE, SODIUM LACTATE, POTASSIUM CHLORIDE, CALCIUM CHLORIDE 600; 310; 30; 20 MG/100ML; MG/100ML; MG/100ML; MG/100ML
75 INJECTION, SOLUTION INTRAVENOUS CONTINUOUS
Status: DISCONTINUED | OUTPATIENT
Start: 2017-02-10 | End: 2017-02-12

## 2017-02-10 RX ORDER — ENOXAPARIN SODIUM 100 MG/ML
40 INJECTION SUBCUTANEOUS EVERY 24 HOURS
Status: DISCONTINUED | OUTPATIENT
Start: 2017-02-10 | End: 2017-02-13 | Stop reason: HOSPADM

## 2017-02-10 RX ADMIN — TAMSULOSIN HYDROCHLORIDE 0.4 MG: 0.4 CAPSULE ORAL at 08:01

## 2017-02-10 RX ADMIN — SODIUM CHLORIDE, SODIUM LACTATE, POTASSIUM CHLORIDE, AND CALCIUM CHLORIDE 75 ML/HR: 600; 310; 30; 20 INJECTION, SOLUTION INTRAVENOUS at 12:12

## 2017-02-10 RX ADMIN — HEPARIN SODIUM 5000 UNITS: 5000 INJECTION, SOLUTION INTRAVENOUS; SUBCUTANEOUS at 01:55

## 2017-02-10 RX ADMIN — IPRATROPIUM BROMIDE AND ALBUTEROL SULFATE 3 ML: .5; 3 SOLUTION RESPIRATORY (INHALATION) at 19:56

## 2017-02-10 RX ADMIN — ISOSORBIDE MONONITRATE 30 MG: 30 TABLET, EXTENDED RELEASE ORAL at 08:01

## 2017-02-10 RX ADMIN — IPRATROPIUM BROMIDE AND ALBUTEROL SULFATE 3 ML: .5; 3 SOLUTION RESPIRATORY (INHALATION) at 16:05

## 2017-02-10 RX ADMIN — METOPROLOL SUCCINATE 25 MG: 25 TABLET, EXTENDED RELEASE ORAL at 08:03

## 2017-02-10 RX ADMIN — GUAIFENESIN 600 MG: 600 TABLET, EXTENDED RELEASE ORAL at 20:12

## 2017-02-10 RX ADMIN — ASPIRIN 81 MG: 81 TABLET, COATED ORAL at 08:01

## 2017-02-10 RX ADMIN — LEVOFLOXACIN 750 MG: 750 TABLET, FILM COATED ORAL at 16:59

## 2017-02-10 RX ADMIN — AMLODIPINE BESYLATE 5 MG: 5 TABLET ORAL at 08:01

## 2017-02-10 RX ADMIN — GUAIFENESIN 600 MG: 600 TABLET, EXTENDED RELEASE ORAL at 08:01

## 2017-02-10 RX ADMIN — ATORVASTATIN CALCIUM 40 MG: 40 TABLET, FILM COATED ORAL at 08:01

## 2017-02-10 RX ADMIN — HEPARIN SODIUM 5000 UNITS: 5000 INJECTION, SOLUTION INTRAVENOUS; SUBCUTANEOUS at 10:12

## 2017-02-10 RX ADMIN — IPRATROPIUM BROMIDE AND ALBUTEROL SULFATE 3 ML: .5; 3 SOLUTION RESPIRATORY (INHALATION) at 07:55

## 2017-02-10 RX ADMIN — LEVOTHYROXINE SODIUM 88 MCG: 88 TABLET ORAL at 06:22

## 2017-02-10 RX ADMIN — ENOXAPARIN SODIUM 40 MG: 40 INJECTION SUBCUTANEOUS at 17:01

## 2017-02-10 RX ADMIN — VALSARTAN 160 MG: 160 TABLET ORAL at 08:01

## 2017-02-10 RX ADMIN — GADOBUTROL 7 ML: 604.72 INJECTION INTRAVENOUS at 15:11

## 2017-02-10 NOTE — ROUTINE PROCESS
Interdisciplinary team rounds were held 2/10/2017 with the following team members:Care Management, Nursing, Pharmacy and Physician and the patient. Plan of care discussed. See clinical pathway and/or care plan for interventions and desired outcomes.     Plan:  MRI today-Awaiting results

## 2017-02-10 NOTE — PROGRESS NOTES
OT attempted to see pt and was off the floor for MRI. Will continue to see pt and follow POC, and will defer for today.

## 2017-02-10 NOTE — PROGRESS NOTES
Spiritual Care Assessment/Progress Notes    Dipika Lockhart [id number]  [id number]    1935  [de-identified] y.o.  male    Patient Telephone Number: [phone] (home)   Episcopalian Affiliation: [facility] [place] [facility]   Language: English   Extended Emergency Contact Information  Primary Emergency Contact: Lori Evans Phone: [phone]  Mobile Phone: [phone]  Relation: Child   Patient Active Problem List    Diagnosis Date Noted    Community acquired bacterial pneumonia 02/05/2017    Myocardial infarction ([address] 03/08/2016    Calculus of kidney 03/08/2016    Osteoarthritis 03/08/2016    Alkaline phosphatase elevation 01/14/2016    Angina, class III ([address] 02/12/2015    Elevated BP 02/12/2015    Chest pain 02/10/2015    Prostate cancer ([address] 05/23/2014    Lipoma of back 02/05/2014    Nodule, subcutaneous 11/04/2013    Vitamin D deficiency 11/04/2013    Prediabetes 06/19/2013    Tinea cruris 06/19/2013    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    Cerebral embolism with cerebral infarction ([address] 06/13/2011    Carotid stenosis, left 06/13/2011    PVD (peripheral vascular disease) with claudication ([address] 05/02/2011    BPH (benign prostatic hypertrophy) 10/17/2010    Dyslipidemia 10/17/2010    Successful  PTCA (percutaneous transluminal coronary angioplasty)--90-95% instent restenosis RCA 10/16/10 10/17/2010    Successful PTCA with MARY ANN Xience stent Ramus intermedius 10/16/10 10/17/2010    Chronic back pain--DJD 06/29/2010    ED (erectile dysfunction) 06/21/2010    Essential hypertension 03/19/2010    CAD (Coronary Artery Disease)--s/p PCI--MARY ANN [place] stents x4 5/09;MARY ANN Taxus stents x3 8/09 03/19/2010    Hypothyroidism 11/11/2009        Date: 2/10/2017       Level of Episcopalian/Spiritual Activity:  []         Involved in delaney tradition/spiritual practice    []         Not involved in delaney tradition/spiritual practice  [x]         Spiritually oriented    []         Claims no spiritual orientation    []         seeking spiritual identity  []         Feels alienated from Yazidi practice/tradition  []         Feels angry about Yazidi practice/tradition  [x]         Spirituality/Yazidi tradition could be a resource for coping at this time. []         Not able to assess due to medical condition    Services Provided Today:  []         crisis intervention    []         reading Scriptures  [x]         spiritual assessment    [x]         prayer  [x]         empathic listening/emotional support  []         rites and rituals (cite in comments)  []         life review     []         Yazidi support  []         theological development    []         advocacy  []         ethical dialog     []         blessing  []         bereavement support    []         support to family  []         anticipatory grief support   []         help with AMD  []         spiritual guidance    []         meditation      Spiritual Care Needs  []         Emotional Support  []         Spiritual/Congregational Care  []         Loss/Adjustment  []         Advocacy/Referral                /Ethics  [x]         No needs expressed at               this time  []         Other: (note in               comments)  [address]  []         Follow up visits with               pt/family  []         Provide materials  []         Schedule sacraments  []         Contact Community               Clergy  [x]         Follow up as needed  []         Other: (note in               comments)     Comments:  Initial visit on Renal unit for spiritual assessment. Patient was out of the room for a test.  A close family friend was in patient's room waiting for his return. Offered listening presence as she shared about the years of friendship she shared with patient's wife while attending the same [employer] and living in the same neighborhood. When patient's wife became ill, he cared for her at home until she .   Friend shared patient has never gotten over loss of his wife. She shared her deep admiration she has for patient. She stays in touch with patient and she seems to be a spiritual support to him. Left Pastoral Care card with her to give patient. Chaplains available as needed/requested.   ASHVIN West, [facility], [address] [place] [address] [address]     Paging Service  [phone])

## 2017-02-10 NOTE — PROGRESS NOTES
Hospitalist Progress Note    NAME: Santiago Gotti. :  1935   MRN:  [id number]     Assessment / Plan:  Acute respiratory failure with hypoxia  Community acquired PNA  Sepsis, POA  -CXR/CT chest/abd pelv showed extensive R upper lobe consolidation. ? Aspiration in setting of encephalopathy at home. -WBC peak 26.8 on . Trending down wnl today. -BC  NGTD,  No sputum cx collected. -Levaquin (one dose ), resumed . Day 5 abx. Now on PO.  -Continue duonebs, albuterol prn, mucinex  -O2 suppl, wean as tolerated, on 2L NC  -will need to repeat cxr in 4-6 weeks to document resolution of infiltration     Pancreatitis, epigastic pain/elevated lipase. Normal imaging. Hyperbilirubinemia-resolved. -epigastric tenderness on exam  -Unclear etiology.  -Discontinue HCTZ. TG wnl.  -CT A/P without contrast on admission without evidence of pancreatitis or ductal dilatation.  -abd US  \"The liver is normal. The common bile duct is normal measuring 7 mm in diameter. The gallbladder is normal. The right kidney measures 10.9 cm in length. There is no hydronephrosis or visible ascites. Pancreatic head is normal in appearance. \"  -T. Bili 1.6 -> down wnl  -lipase 517 on adm -> trended to peak 1152 on 17. Was improving with IVF which was DC on , but now lipase back up to 1296.  -Tolerating full liquid diet. -GI has been consulted for further recs. Acute kidney Injury- resolved  -adm creat 1.89, baseline 0.9-1.1. likely from pre-renal in the setting of poor PO intake  -Creatinine trending down to wnl with IVF. -stop IVF   -follow BMP UOP     HTN/CAD  Elevated troponin  -peak 0.13 on   -continue amlodipine, imdur, asa, lipitor, valsartan.  HCTZ stopped with pancreatitis.     BPH  -continue flomax, finasteride     Hypothyroidism  -synthroid     Acute encephalopathy-improving  -head ct neg for acute abnormalities  -likely due to acute illness  -ammonia and B12 wnl  -avoid sedating meds     Tobacco use  -nicotine patch    Hypokalemia  -replete, monitor    Dispo: SNF per PT  Code Status: Full  Surrogate Decision Maker: kalpesh Olivares at [phone]  DVT Prophylaxis: heparin  Baseline: independent, lives alone      Subjective:     Patient more awake today. Sitting in chair. Still having SOB on 2L. Epigastric pain improving now seems more along right ribs. No BM today but reports blood in stool yesterday. FOBT was negative.      Review of Systems:  Symptom Y/N Comments  Symptom Y/N Comments   Fever/Chills n   Chest Pain n    Poor Appetite y   Edema     Cough n   Abdominal Pain y    Sputum n   Joint Pain     SOB/VERONICA y   Pruritis/Rash     Nausea/vomit n   Tolerating PT/OT     Diarrhea n   Tolerating Diet     Constipation    Other       Could NOT obtain due to:      Objective:     VITALS:   Last 24hrs VS reviewed since prior progress note. Most recent are:  Patient Vitals for the past 24 hrs:   Temp Pulse Resp BP SpO2   02/10/17 0755 - - - - 95 %   02/10/17 0751 97.9 °F (36.6 °C) 80 22 140/56 96 %   02/10/17 0401 98.5 °F (36.9 °C) 80 24 138/76 99 %   02/10/17 0027 98.4 °F (36.9 °C) 78 24 146/57 99 %   02/09/17 2123 - - - - 95 %   02/09/17 1900 98.5 °F (36.9 °C) 76 18 134/57 100 %   02/09/17 1506 98.6 °F (37 °C) 75 22 123/54 96 %   02/09/17 1339 - - - - 97 %   02/09/17 1137 97.9 °F (36.6 °C) 80 20 130/62 95 %       Intake/Output Summary (Last 24 hours) at 02/10/17 1047  Last data filed at 02/10/17 0815   Gross per 24 hour   Intake           2947.5 ml   Output             1505 ml   Net           1442.5 ml        PHYSICAL EXAM:  General: WD, WN. Tired. Sitting up on 2L NC.  EENT:  EOMI. Anicteric sclerae. MMM  Resp:  CTA bilaterally, no wheezing or rales. No accessory muscle use  CV:  Regular  rhythm,  No edema  GI:  Soft, Non distended, improving epigastric tenderness.   +Bowel sounds  Neurologic:  Alert and oriented X 3, easily confused, normal speech   Psych:   Poor insight. Not anxious nor agitated  Skin:   No jaundice    Reviewed most current lab test results and cultures  YES  Reviewed most current radiology test results   YES  Review and summation of old records today    NO  Reviewed patient's current orders and MAR    YES  PMH/SH reviewed - no change compared to H&P  ________________________________________________________________________  Care Plan discussed with:    Comments   Patient x    Family      RN x    Care Manager     Consultant                        Multidiciplinary team rounds were held today with , nursing, pharmacist and clinical coordinator. Patient's plan of care was discussed; medications were reviewed and discharge planning was addressed. ________________________________________________________________________  Total NON critical care TIME:   30   Minutes    Total CRITICAL CARE TIME Spent:   Minutes non procedure based      Comments   >50% of visit spent in counseling and coordination of care     ________________________________________________________________________  Marko Patricia MD     Procedures: see electronic medical records for all procedures/Xrays and details which were not copied into this note but were reviewed prior to creation of Plan. LABS:  I reviewed today's most current labs and imaging studies.   Pertinent labs include:  Recent Labs      02/10/17   0157  02/09/17   0247  02/08/17   0203   WBC  10.5  13.0*  13.6*   HGB  10.5*  10.5*  10.3*   HCT  30.1*  30.0*  30.2*   PLT  217  182  161     Recent Labs      02/10/17   0157  02/09/17   0247  02/08/17   0203   NA  138  141  144   K  3.8  3.7  3.4*   CL  105  107  109*   CO2  25  25  26   GLU  106*  104*  105*   BUN  12  14  19   CREA  0.90  0.88  0.98   CA  8.6  8.3*  8.1*   ALB   --    --   1.7*   TBILI   --    --   0.6   SGOT   --    --   64*   ALT   --    --   28       Signed: Marko Patricia MD

## 2017-02-10 NOTE — PROGRESS NOTES
Physical Therapy  Attempting to see patient for PT this pm. Patient currently off floor for MRI. Will continue to follow.   Jayson Renteria, PT

## 2017-02-10 NOTE — PROGRESS NOTES
Bedside shift change report given to Charlee Murrell (oncoming nurse) by Mary Johnson (offgoing nurse). Report included the following information SBAR, Kardex, Intake/Output, MAR and Recent Results. Zone Phone for oncoming shift:   1938    Shift Summary: Pt rested quietly through night. No c/o pain. LDAs               Peripheral IV 02/10/17 Left Forearm (Active)   Site Assessment Clean, dry, & intact 2/10/2017  2:26 AM   Phlebitis Assessment 0 2/10/2017  2:26 AM   Infiltration Assessment 0 2/10/2017  2:26 AM   Dressing Status New 2/10/2017  2:26 AM   Dressing Type Transparent;Tape 2/10/2017  2:26 AM   Hub Color/Line Status Blue; Infusing 2/10/2017  2:26 AM   Action Taken Blood drawn 2/10/2017  2:26 AM                        Intake & Output   Date 02/09/17 0700 - 02/10/17 0659 02/10/17 0700 - 02/11/17 0659   Shift 2839-1028 5120-5817 24 Hour Total 6070-9627 5333-2754 24 Hour Total   I  N  T  A  K  E   P. O.  750 750         P. O.  750 750       I.V.  (mL/kg/hr) 1447.5  (1.6) 750 2197.5         I.V.  750 750         Volume (0.45% sodium chloride infusion) 1447.5  1447.5       Shift Total  (mL/kg) 1447.5  (19.3) 1500  (20) 2947.5  (39.4)      O  U  T  P  U  T   Urine  (mL/kg/hr) 480  (0.5) 1025 1505         Urine Voided 480 1025 1505       Shift Total  (mL/kg) 480  (6.4) 1025  (13.7) 1505  (20.1)      .5 475 1442.5      Weight (kg) 74.8 74.8 74.8 74.8 74.8 74.8      Last Bowel Movement Last Bowel Movement Date: 02/09/17   Glucose Checks [x] N/A  [] AC/HS  [] Q6  Concerns:   Nutrition Active Orders   Diet    DIET FULL LIQUID       Consults [x]PT  [x]OT  []Speech  [x]Case Management   Cardiac Monitoring []N/A [x]Yes Expires:48 hrs

## 2017-02-10 NOTE — CONSULTS
Gastroenterology Consult Note  NAME: Ghislaine Nick. : 1935 MRN: [id number]   ATTG: [unfilled] PCP: Giancarlo Wellington MD  Date/Time:  2/10/2017 10:34 AM  Subjective:   REASON FOR CONSULT:      Knvg Petersen is a [de-identified] y.o.  male who I was asked to see for possible pancreatitis and pain in the abdomen. He was admitted on 17 with shortness of breath and abd pain and was found to have a PNA. CT abdomen with no   Contrast showed a normal pancreas. US showed a normal CBD. His lipase has been trending upwards with now  A peak of 1200. There was a mild bump in his T bili but this has normalized. AST is minimally high. Rest of LFTs are   Normal.    I saw him last as an OP for a colonoscopy done last year: Moderate rectal radiation telangieactasias are noted. Using a front firing APC  at rectal setting the telangiectasia were ablated. Fernandez colonic severe diverticulosis is noted  Colonic prep is poor with poor views(stto/prep mix). Large internal hemorrhoids noted. 7 mm ascending colon polyp removed with a cold snare.             Past Medical History   Diagnosis Date    AAA (abdominal aortic aneurysm) ([address]     Acute MI ([address] 2010     dr Alhaji Lala    Acute prostatitis      again 16 f/u note rec'd [facility]    Advance directive discussed with patient 2016    Aneurysm ([address] 2011     AAA    Borderline glaucoma (glaucoma suspect) 2/18/15     notes from [address] [place] [address] rec'd    BPH (benign prostatic hyperplasia)     Bright red blood per rectum 16     [facility] note Mitchel Blinks- w/u in progress    CAD (coronary artery disease)     Calculus of kidney     Cerebral embolism with cerebral infarction ([address]     Chronic pain      back    Dizzy spells 2012    DJD (degenerative joint disease) of lumbar spine     ED (erectile dysfunction)     Elevated PSA 2014     [facility] rec'd     16 f/u note    Encephalocele ([address]     Hypercholesterolemia     Hypertension     Prostate cancer ([facility] 5/12/14         crystal henry/ Dr Joanne Elkins  6/4/15    PVD (peripheral vascular disease) with claudication (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     neuro assoc notes rec'd    Thyroid disease     Vitamin D deficiency 11/2013     again 5/2015 again 1/2016      Past Surgical History   Procedure Laterality Date    Endoscopy, colon, diagnostic      Ptca w/ stent, initial  10/16/2010          Ptca each addl vessel  10/16/2010          Hx heent  10/2012     repair right drum cindy dawkins    Pr cardiac surg procedure unlist  12/2010     dr Jackie Cedeño stents past MI    Pr left heart cath,percutaneous  10/16/2010          Hx orthopaedic Bilateral      BUNIONECTOMY    Vascular surgery procedure unlist       left leg varicose vein stripping dr Heron Blackmon    Hx carotid endarterectomy Left     Colonoscopy N/A 8/31/2016     COLONOSCOPY performed by Yvonne Saldana MD at [facility] ENDOSCOPY     Social History   Substance Use Topics    Smoking status: Former Smoker     Packs/day: 0.25     Quit date: 9/10/2014    Smokeless tobacco: Never Used      Comment: SMOKES 1 PACK WEEK    Alcohol use No      Family History   Problem Relation Age of Onset    Diabetes Mother     Diabetes Father     Cancer Sister      LUNG    Cancer Brother      COLON    Cancer Sister      LUNG    Anesth Problems Neg Hx       Allergies   Allergen Reactions    Norco [Hydrocodone-Acetaminophen] Other (comments)     Too sedated and felt like he was in a daze    Pcn [Penicillins] Rash    Percocet [Oxycodone-Acetaminophen] Nausea and Vomiting      Home Medications:  Prior to Admission Medications   Prescriptions Last Dose Informant Patient Reported? Taking? CHOLECALCIFEROL, VITAMIN D3, (VITAMIN D3 PO) Unknown at Unknown time  Yes No   Sig: Take  by mouth.    amLODIPine (NORVASC) 5 mg tablet Unknown at Unknown time  No No   Sig: Take 1 Tab by mouth daily. ascorbic acid (VITAMIN C) 1,000 mg tablet Unknown at Unknown time  Yes No   Sig: Take  by mouth. aspirin delayed-release 81 mg tablet Unknown at Unknown time  No No   Sig: Take 1 Tab by mouth daily for 360 days. atorvastatin (LIPITOR) 40 mg tablet Unknown at Unknown time  No No   Sig: Take 1 Tab by mouth daily. YRN. Give patient BRAND LIPITOR. D/C Crestor   finasteride (PROSCAR) 5 mg tablet Unknown at Unknown time  Yes No   Sig: daily. isosorbide mononitrate ER (IMDUR) 30 mg tablet Unknown at Unknown time  No No   Sig: TAKE 1 TABLET BY MOUTH EVERY DAY   levothyroxine (SYNTHROID) 88 mcg tablet Unknown at Unknown time  No No   Sig: TAKE 1 TABLET BY MOUTH DAILY (BEFORE BREAKFAST). RECHECK LEVEL IN 4-6 WEEKS   losartan-hydrochlorothiazide (HYZAAR) 100-12.5 mg per tablet Unknown at Unknown time  No No   Sig: TAKE 1 TABLET BY MOUTH EVERY DAY   metoprolol succinate (TOPROL-XL) 25 mg XL tablet   No No   Sig: TAKE 1 TABLET EVERY DAY   omega-3 fatty acids-vitamin e (FISH OIL) 1,000 mg cap Unknown at Unknown time  Yes No   Sig: Take 1 Cap by mouth daily.    tamsulosin (FLOMAX) 0.4 mg capsule Unknown at Unknown time  No No   Sig: TAKE 1 CAPSULE EVERY DAY   traMADol (ULTRAM) 50 mg tablet Unknown at Unknown time  No No   Sig: TAKE 1-2 TABLETS BY MOUTH EVERY 6 HOURS AS NEEDED FOR PAIN      Facility-Administered Medications: None     Hospital medications:  Current Facility-Administered Medications   Medication Dose Route Frequency    levoFLOXacin (LEVAQUIN) tablet 750 mg  750 mg Oral ACD    valsartan (DIOVAN) tablet 160 mg  160 mg Oral DAILY    albuterol-ipratropium (DUO-NEB) 2.5 MG-0.5 MG/3 ML  3 mL Nebulization Q6HWA RT    sodium chloride (NS) flush 5-10 mL  5-10 mL IntraVENous PRN    acetaminophen (TYLENOL) tablet 650 mg  650 mg Oral Q4H PRN    ondansetron (ZOFRAN) injection 4 mg  4 mg IntraVENous Q4H PRN    bisacodyl (DULCOLAX) tablet 5 mg  5 mg Oral DAILY PRN    nicotine (NICODERM CQ) 7 mg/24 hr patch 1 Patch  1 Patch TransDERmal Q24H    heparin (porcine) injection 5,000 Units  5,000 Units SubCUTAneous Q8H    amLODIPine (NORVASC) tablet 5 mg  5 mg Oral DAILY    aspirin delayed-release tablet 81 mg  81 mg Oral DAILY    isosorbide mononitrate ER (IMDUR) tablet 30 mg  30 mg Oral DAILY    levothyroxine (SYNTHROID) tablet 88 mcg  88 mcg Oral 7am    metoprolol succinate (TOPROL-XL) XL tablet 25 mg  25 mg Oral DAILY    tamsulosin (FLOMAX) capsule 0.4 mg  0.4 mg Oral DAILY    atorvastatin (LIPITOR) tablet 40 mg  40 mg Oral DAILY    albuterol (PROVENTIL VENTOLIN) nebulizer solution 2.5 mg  2.5 mg Nebulization Q4H PRN    guaiFENesin SR (MUCINEX) tablet 600 mg  600 mg Oral Q12H    hydrALAZINE (APRESOLINE) 20 mg/mL injection 10 mg  10 mg IntraVENous Q6H PRN     REVIEW OF SYSTEMS:     []     Unable to obtain  ROS due to  []    mental status change  []    sedated   []    intubated   [x]    Total of 11 systems reviewed as follows:  Const:  negative fever, negative chills, negative weight loss  Eyes:   negative diplopia or visual changes, negative eye pain  ENT:   negative coryza, negative sore throat  Resp:   +cough, -hemoptysis,  + dyspnea  Cards:  negative for chest pain, palpitations, lower extremity edema  :  negative for frequency, dysuria and hematuria  Skin:   negative for rash and pruritus  Heme:  negative for easy bruising and gum/nose bleeding  MS:  negative for myalgias, arthralgias, back pain and muscle weakness  Neurolo:  negative for headaches, dizziness, vertigo, memory problems   Psych:  negative for feelings of anxiety, depression     Pertinent Positives include :    Objective:   VITALS:    Visit Vitals    /56 (BP 1 Location: Left arm, BP Patient Position: At rest)    Pulse 80    Temp 97.9 °F (36.6 °C)    Resp 22    Ht 5' 3\" (1.6 m)    Wt 74.8 kg (165 lb)    SpO2 95%    BMI 29.23 kg/m2     Temp (24hrs), Av.3 °F (36.8 °C), Min:97.9 °F (36.6 °C), Max:98.6 °F (37 °C)    PHYSICAL EXAM:     General: No distress   Eyes: No icterus; extraocular movements intact,   ENMT: Lips, teeth, gums, oropharynx unremarkable. Chest:  breath sounds are normal   Heart: Heart sounds normal, S1,S2  Abdomen: epihgastric-tender; bowel sounds present.  No hepatosplenomegaly   Lymphatic: No anterior cervical, or supraclavicular lymphadenopathy   Neurologic: Alert and oriented   Psyc: Affect is appropriate   Extremities: No edema       LAB DATA REVIEWED:    Recent Results (from the past 48 hour(s))   METABOLIC PANEL, BASIC    Collection Time: 02/09/17  2:47 AM   Result Value Ref Range    Sodium 141 136 - 145 mmol/L    Potassium 3.7 3.5 - 5.1 mmol/L    Chloride 107 97 - 108 mmol/L    CO2 25 21 - 32 mmol/L    Anion gap 9 5 - 15 mmol/L    Glucose 104 (H) 65 - 100 mg/dL    BUN 14 6 - 20 MG/DL    Creatinine 0.88 0.70 - 1.30 MG/DL    BUN/Creatinine ratio 16 12 - 20      GFR est AA >60 >60 ml/min/1.73m2    GFR est non-AA >60 >60 ml/min/1.73m2    Calcium 8.3 (L) 8.5 - 10.1 MG/DL   CBC W/O DIFF    Collection Time: 02/09/17  2:47 AM   Result Value Ref Range    WBC 13.0 (H) 4.1 - 11.1 K/uL    RBC 3.85 (L) 4.10 - 5.70 M/uL    HGB 10.5 (L) 12.1 - 17.0 g/dL    HCT 30.0 (L) 36.6 - 50.3 %    MCV 77.9 (L) 80.0 - 99.0 FL    MCH 27.3 26.0 - 34.0 PG    MCHC 35.0 30.0 - 36.5 g/dL    RDW 16.7 (H) 11.5 - 14.5 %    PLATELET 080 543 - 082 K/uL   LIPID PANEL    Collection Time: 02/09/17  2:47 AM   Result Value Ref Range    LIPID PROFILE          Cholesterol, total 76 <200 MG/DL    Triglyceride 97 <150 MG/DL    HDL Cholesterol 15 MG/DL    LDL, calculated 41.6 0 - 100 MG/DL    VLDL, calculated 19.4 MG/DL    CHOL/HDL Ratio 5.1 (H) 0 - 5.0     CBC W/O DIFF    Collection Time: 02/10/17  1:57 AM   Result Value Ref Range    WBC 10.5 4.1 - 11.1 K/uL    RBC 3.84 (L) 4.10 - 5.70 M/uL    HGB 10.5 (L) 12.1 - 17.0 g/dL    HCT 30.1 (L) 36.6 - 50.3 %    MCV 78.4 (L) 80.0 - 99.0 FL    MCH 27.3 26.0 - 34.0 PG    MCHC 34.9 30.0 - 36.5 g/dL    RDW 16.9 (H) 11.5 - 14.5 %    PLATELET 506 405 - 480 K/uL   METABOLIC PANEL, BASIC    Collection Time: 02/10/17  1:57 AM   Result Value Ref Range    Sodium 138 136 - 145 mmol/L    Potassium 3.8 3.5 - 5.1 mmol/L    Chloride 105 97 - 108 mmol/L    CO2 25 21 - 32 mmol/L    Anion gap 8 5 - 15 mmol/L    Glucose 106 (H) 65 - 100 mg/dL    BUN 12 6 - 20 MG/DL    Creatinine 0.90 0.70 - 1.30 MG/DL    BUN/Creatinine ratio 13 12 - 20      GFR est AA >60 >60 ml/min/1.73m2    GFR est non-AA >60 >60 ml/min/1.73m2    Calcium 8.6 8.5 - 10.1 MG/DL   LIPASE    Collection Time: 02/10/17  1:57 AM   Result Value Ref Range    Lipase 1296 (H) 73 - 393 U/L     IMAGING RESULTS:   []      I have personally reviewed the actual   []    CXR  [x]    CT  [x]     US    Recommendations/Plan:    Possible pancreatitis  Pain in abdomen   Active Problems:    Community acquired bacterial pneumonia (2/5/2017)       ___________________________________________________  RECOMMENDATIONS:      I agree that this is serological pancreatitis but radiological testing was normal(CT with no contrast). I have discussed the serious nature of pancreatitis with the pt. The discussion included potential infection, respiratory failure, renal failure, ICU stay and multi-organ failure.  Causes are multiple and varied as noted below    (In bold are the more common causes):    Gallstones, biliary sludge, ascariasis, periampullary diverticulum,  ampullary stenosis, duodenal stricture or obstruction   Ethanol, methanol,   Metabolic Hyperlipidemia (types I, IV, V),   Hypercalcemia     Drugs:   Didanosine, pentamidine, metronidazole, stibogluconate, tetracycline furosemide, thiazides, sulphasalazine, 5-ASA, L-asparaginase, azathioprine, valproic acid, sulindac, salicylates, calcium, estrogen   Viruses-mumps, coxsackie, hepatitis B, CMV, varicella-zoster, HSV, HIV Bacteria-mycoplasma, Legionella, Leptospira, salmonella Fungi-aspergillus Parasites-toxoplasma, cryptosporidium, Ascaris   Trauma Blunt or penetrating abdominal injury, iatrogenic injury during surgery or ERCP (sphincterotomy) Congenital Cholodochocele type V,   ? pancreas divisum   Vascular Ischemia, atheroembolism, vasculitis (polyarteritis nodosa, SLE)      alpha-1-antitrypsin deficiency,  Genetic CFTR and other genetic mutations. >20 % of cases are idiopathic    I will order an MRCP, repeat LFTs, get IgG 4 and TRISTIAN  checked. Agree with holding HCTZ containing meds. NPO for 24 hours or so and go slow with advancement of diet. Says he does not drink alcohol. Thank you for entrusting me with this patient's care. Please do not hesitate to contact me with any questions or if I can be of assistance with this patient or any of your other patients' GI needs. Discussed Code Status:    [x]    Full Code      []    DNR    ___________________________________________________  Care Plan discussed with:    [x]    Patient   []    Family   [x]    Nursing   []    Attending  Total Time :    minutes   ___________________________________________________  GI: JIMMY Rae MD

## 2017-02-11 LAB
ANION GAP BLD CALC-SCNC: 6 MMOL/L (ref 5–15)
BUN SERPL-MCNC: 13 MG/DL (ref 6–20)
BUN/CREAT SERPL: 14 (ref 12–20)
CALCIUM SERPL-MCNC: 8.4 MG/DL (ref 8.5–10.1)
CHLORIDE SERPL-SCNC: 107 MMOL/L (ref 97–108)
CO2 SERPL-SCNC: 25 MMOL/L (ref 21–32)
CREAT SERPL-MCNC: 0.9 MG/DL (ref 0.7–1.3)
ERYTHROCYTE [DISTWIDTH] IN BLOOD BY AUTOMATED COUNT: 17.2 % (ref 11.5–14.5)
GLUCOSE SERPL-MCNC: 100 MG/DL (ref 65–100)
HCT VFR BLD AUTO: 29 % (ref 36.6–50.3)
HGB BLD-MCNC: 10 G/DL (ref 12.1–17)
LIPASE SERPL-CCNC: 1148 U/L (ref 73–393)
MCH RBC QN AUTO: 27.2 PG (ref 26–34)
MCHC RBC AUTO-ENTMCNC: 34.5 G/DL (ref 30–36.5)
MCV RBC AUTO: 79 FL (ref 80–99)
PLATELET # BLD AUTO: 243 K/UL (ref 150–400)
POTASSIUM SERPL-SCNC: 4.2 MMOL/L (ref 3.5–5.1)
RBC # BLD AUTO: 3.67 M/UL (ref 4.1–5.7)
SODIUM SERPL-SCNC: 138 MMOL/L (ref 136–145)
WBC # BLD AUTO: 9.5 K/UL (ref 4.1–11.1)

## 2017-02-11 PROCEDURE — 74011250636 HC RX REV CODE- 250/636: Performed by: INTERNAL MEDICINE

## 2017-02-11 PROCEDURE — 85027 COMPLETE CBC AUTOMATED: CPT | Performed by: INTERNAL MEDICINE

## 2017-02-11 PROCEDURE — 65660000000 HC RM CCU STEPDOWN

## 2017-02-11 PROCEDURE — 74011000250 HC RX REV CODE- 250: Performed by: INTERNAL MEDICINE

## 2017-02-11 PROCEDURE — 94761 N-INVAS EAR/PLS OXIMETRY MLT: CPT

## 2017-02-11 PROCEDURE — 80048 BASIC METABOLIC PNL TOTAL CA: CPT | Performed by: INTERNAL MEDICINE

## 2017-02-11 PROCEDURE — 77010033678 HC OXYGEN DAILY

## 2017-02-11 PROCEDURE — 74011250637 HC RX REV CODE- 250/637: Performed by: INTERNAL MEDICINE

## 2017-02-11 PROCEDURE — 94640 AIRWAY INHALATION TREATMENT: CPT

## 2017-02-11 PROCEDURE — 36415 COLL VENOUS BLD VENIPUNCTURE: CPT | Performed by: INTERNAL MEDICINE

## 2017-02-11 PROCEDURE — 83690 ASSAY OF LIPASE: CPT | Performed by: INTERNAL MEDICINE

## 2017-02-11 RX ADMIN — ISOSORBIDE MONONITRATE 30 MG: 30 TABLET, EXTENDED RELEASE ORAL at 08:30

## 2017-02-11 RX ADMIN — SODIUM CHLORIDE, SODIUM LACTATE, POTASSIUM CHLORIDE, AND CALCIUM CHLORIDE 75 ML/HR: 600; 310; 30; 20 INJECTION, SOLUTION INTRAVENOUS at 03:09

## 2017-02-11 RX ADMIN — SODIUM CHLORIDE, SODIUM LACTATE, POTASSIUM CHLORIDE, AND CALCIUM CHLORIDE 75 ML/HR: 600; 310; 30; 20 INJECTION, SOLUTION INTRAVENOUS at 16:59

## 2017-02-11 RX ADMIN — GUAIFENESIN 600 MG: 600 TABLET, EXTENDED RELEASE ORAL at 20:43

## 2017-02-11 RX ADMIN — LEVOTHYROXINE SODIUM 88 MCG: 88 TABLET ORAL at 07:14

## 2017-02-11 RX ADMIN — IPRATROPIUM BROMIDE AND ALBUTEROL SULFATE 3 ML: .5; 3 SOLUTION RESPIRATORY (INHALATION) at 14:07

## 2017-02-11 RX ADMIN — METOPROLOL SUCCINATE 25 MG: 25 TABLET, EXTENDED RELEASE ORAL at 08:30

## 2017-02-11 RX ADMIN — AMLODIPINE BESYLATE 5 MG: 5 TABLET ORAL at 08:30

## 2017-02-11 RX ADMIN — Medication 10 ML: at 20:42

## 2017-02-11 RX ADMIN — ASPIRIN 81 MG: 81 TABLET, COATED ORAL at 08:30

## 2017-02-11 RX ADMIN — IPRATROPIUM BROMIDE AND ALBUTEROL SULFATE 3 ML: .5; 3 SOLUTION RESPIRATORY (INHALATION) at 20:06

## 2017-02-11 RX ADMIN — TAMSULOSIN HYDROCHLORIDE 0.4 MG: 0.4 CAPSULE ORAL at 08:30

## 2017-02-11 RX ADMIN — IPRATROPIUM BROMIDE AND ALBUTEROL SULFATE 3 ML: .5; 3 SOLUTION RESPIRATORY (INHALATION) at 07:59

## 2017-02-11 RX ADMIN — GUAIFENESIN 600 MG: 600 TABLET, EXTENDED RELEASE ORAL at 09:00

## 2017-02-11 RX ADMIN — ATORVASTATIN CALCIUM 40 MG: 40 TABLET, FILM COATED ORAL at 08:30

## 2017-02-11 RX ADMIN — VALSARTAN 160 MG: 160 TABLET ORAL at 08:30

## 2017-02-11 RX ADMIN — ENOXAPARIN SODIUM 40 MG: 40 INJECTION SUBCUTANEOUS at 17:00

## 2017-02-11 RX ADMIN — LEVOFLOXACIN 750 MG: 750 TABLET, FILM COATED ORAL at 15:51

## 2017-02-11 NOTE — PROGRESS NOTES
Gastroenterology Daily Progress Note (Dr. Sulema Gray for Dr. Sharifa Long)    Admit Date: 2/5/2017       Subjective:       Patient asking for food this am.   Claims pain in epigastrim is better. No n/v.  Current Facility-Administered Medications   Medication Dose Route Frequency    lactated ringers infusion  75 mL/hr IntraVENous CONTINUOUS    enoxaparin (LOVENOX) injection 40 mg  40 mg SubCUTAneous Q24H    levoFLOXacin (LEVAQUIN) tablet 750 mg  750 mg Oral ACD    valsartan (DIOVAN) tablet 160 mg  160 mg Oral DAILY    albuterol-ipratropium (DUO-NEB) 2.5 MG-0.5 MG/3 ML  3 mL Nebulization Q6HWA RT    sodium chloride (NS) flush 5-10 mL  5-10 mL IntraVENous PRN    acetaminophen (TYLENOL) tablet 650 mg  650 mg Oral Q4H PRN    ondansetron (ZOFRAN) injection 4 mg  4 mg IntraVENous Q4H PRN    bisacodyl (DULCOLAX) tablet 5 mg  5 mg Oral DAILY PRN    nicotine (NICODERM CQ) 7 mg/24 hr patch 1 Patch  1 Patch TransDERmal Q24H    amLODIPine (NORVASC) tablet 5 mg  5 mg Oral DAILY    aspirin delayed-release tablet 81 mg  81 mg Oral DAILY    isosorbide mononitrate ER (IMDUR) tablet 30 mg  30 mg Oral DAILY    levothyroxine (SYNTHROID) tablet 88 mcg  88 mcg Oral 7am    metoprolol succinate (TOPROL-XL) XL tablet 25 mg  25 mg Oral DAILY    tamsulosin (FLOMAX) capsule 0.4 mg  0.4 mg Oral DAILY    atorvastatin (LIPITOR) tablet 40 mg  40 mg Oral DAILY    albuterol (PROVENTIL VENTOLIN) nebulizer solution 2.5 mg  2.5 mg Nebulization Q4H PRN    guaiFENesin SR (MUCINEX) tablet 600 mg  600 mg Oral Q12H    hydrALAZINE (APRESOLINE) 20 mg/mL injection 10 mg  10 mg IntraVENous Q6H PRN        Objective:     Visit Vitals    /68    Pulse 84    Temp 97.8 °F (36.6 °C)    Resp 16    Ht 5' 3\" (1.6 m)    Wt 74.8 kg (165 lb)    SpO2 94%    BMI 29.23 kg/m2   Blood pressure 148/68, pulse 84, temperature 97.8 °F (36.6 °C), resp.  rate 16, height 5' 3\" (1.6 m), weight 74.8 kg (165 lb), SpO2 94 %.    02/11 0701 - 02/11 1900  In: 1423.8 [I.V.:1423.8]  Out: -     02/09 1901 - 02/11 0700  In: 1500 [P.O.:750; I.V.:750]  Out: 1625 [Urine:1625]      Intake/Output Summary (Last 24 hours) at 02/11/17 0837  Last data filed at 02/11/17 0711   Gross per 24 hour   Intake          1423.75 ml   Output              400 ml   Net          1023.75 ml     Physical Exam:     General: awake and alert BM in NAD  Chest:  CTA, No rhonchi, rales or rubs. Heart: S1, S2, RRR  GI: Soft, nontender to palpation, ND, + BS    Labs:      Ref. Range 2/8/2017 02:03  2/10/2017 01:57  2/11/2017 03:12   Lipase Latest Ref Range: 73 - 393 U/L 1073 (H)  1296 (H)  1148 (H)     Lab Results   Component Value Date/Time    Cholesterol, total 76 02/09/2017 02:47 AM    HDL Cholesterol 15 02/09/2017 02:47 AM    LDL, calculated 41.6 02/09/2017 02:47 AM    VLDL, calculated 19.4 02/09/2017 02:47 AM    Triglyceride 97 02/09/2017 02:47 AM    CHOL/HDL Ratio 5.1 02/09/2017 02:47 AM       Recent Results (from the past 24 hour(s))   HEPATIC FUNCTION PANEL    Collection Time: 02/10/17 12:33 PM   Result Value Ref Range    Protein, total 6.5 6.4 - 8.2 g/dL    Albumin 1.8 (L) 3.5 - 5.0 g/dL    Globulin 4.7 (H) 2.0 - 4.0 g/dL    A-G Ratio 0.4 (L) 1.1 - 2.2      Bilirubin, total 0.6 0.2 - 1.0 MG/DL    Bilirubin, direct 0.2 0.0 - 0.2 MG/DL    Alk.  phosphatase 106 45 - 117 U/L    AST (SGOT) 41 (H) 15 - 37 U/L    ALT (SGPT) 21 12 - 78 U/L   CBC W/O DIFF    Collection Time: 02/11/17  3:12 AM   Result Value Ref Range    WBC 9.5 4.1 - 11.1 K/uL    RBC 3.67 (L) 4.10 - 5.70 M/uL    HGB 10.0 (L) 12.1 - 17.0 g/dL    HCT 29.0 (L) 36.6 - 50.3 %    MCV 79.0 (L) 80.0 - 99.0 FL    MCH 27.2 26.0 - 34.0 PG    MCHC 34.5 30.0 - 36.5 g/dL    RDW 17.2 (H) 11.5 - 14.5 %    PLATELET 931 877 - 056 K/uL   METABOLIC PANEL, BASIC    Collection Time: 02/11/17  3:12 AM   Result Value Ref Range    Sodium 138 136 - 145 mmol/L    Potassium 4.2 3.5 - 5.1 mmol/L    Chloride 107 97 - 108 mmol/L    CO2 25 21 - 32 mmol/L    Anion gap 6 5 - 15 mmol/L    Glucose 100 65 - 100 mg/dL    BUN 13 6 - 20 MG/DL    Creatinine 0.90 0.70 - 1.30 MG/DL    BUN/Creatinine ratio 14 12 - 20      GFR est AA >60 >60 ml/min/1.73m2    GFR est non-AA >60 >60 ml/min/1.73m2    Calcium 8.4 (L) 8.5 - 10.1 MG/DL   LIPASE    Collection Time: 02/11/17  3:12 AM   Result Value Ref Range    Lipase 1148 (H) 73 - 393 U/L     Recent Labs      02/11/17   0312  02/10/17   0157  02/09/17   0247   NA  138  138  141   K  4.2  3.8  3.7   CL  107  105  107   CO2  25  25  25   BUN  13  12  14   CREA  0.90  0.90  0.88   GLU  100  106*  104*   CA  8.4*  8.6  8.3*     MRI/MRCP WITH CONTRAST:    FINDINGS: Motion artifact obscures fine detail and limits sensitivity for  detecting pathology.     Liver: Subcapsular cyst in segment 5 measures 1 cm. There is adjacent triangular  subcapsular perfusion variant. Cyst in segment 6 measures 0.8 cm and is  subcapsular. No pathologic enhancing liver mass. Normal liver size. Liver  surface is smooth.     Biliary tree: Gallbladder is within normal limits. No biliary dilatation. No  filling defect in the common bile duct, which measures 3 mm in diameter.     Pancreas: Limited evaluation. No evidence of mass or surrounding inflammation. Pancreatic duct is not dilated. No definite pancreatic divisum.     Spleen: Within normal limits.     Adrenal glands: Mild hypertrophy. No mass.     Kidneys: 3.3 cm nonenhancing simple left renal cyst. No solid renal mass or  hydronephrosis.     Vasculature: Aorta is atherosclerotic and has been treated with stent graft.     Bowel: Colonic fecal stasis is partially imaged.     Lymph nodes: No lymphadenopathy.     Miscellaneous: No ascites. Bones are within normal limits.     IMPRESSION  IMPRESSION:   1. Limited by motion artifact. 2. No MRI evidence of pancreatitis. 3. Numerous incidental findings.  No acute process on MRI    Impression:    Community acquired pna  Elevated lipase c/w chemical pancreatitis         Plan:  Patient is improved from standpoint of his upper abd pain which seems clinically to fit with acute pancreatitis but no significant inflammation on CT or MRI/MRCP.   -advance to clear liquids today  -repeat AM labs  -continue IVF and treatment for pna  -f/u IgG4       Marisela Puente MD    2/11/2017 [address] [facility], [address] [place] [address]  [phone] [id number]  [address] [place] [address] [place]: [phone]

## 2017-02-11 NOTE — PROGRESS NOTES
Nutrition Services      Nutrition Screen:  Wt Readings from Last 10 Encounters:   02/05/17 74.8 kg (165 lb)   01/16/17 76.2 kg (168 lb)   09/21/16 71.7 kg (158 lb 0.2 oz)   08/31/16 72.3 kg (159 lb 8 oz)   07/27/16 72.2 kg (159 lb 4 oz)   07/13/16 72.6 kg (160 lb)   04/23/16 70.9 kg (156 lb 4.9 oz)   04/20/16 71.2 kg (157 lb 0.5 oz)   01/14/16 72.1 kg (159 lb 0.1 oz)   10/05/15 73.9 kg (163 lb)     Body mass index is 29.23 kg/(m^2). Supplements:                        _____ ordered ______  declined. __ __  Pt is nutritionally stable at this time, will rescreen in 7 days. _x __    Pt is at nutritional risk and will be rescreened in 2-5 days. __ __  Pt is at moderate or high nutritional risk, will refer to RD for assessment.        Karma Copeland  Dietetic Technician, Registered

## 2017-02-11 NOTE — PROGRESS NOTES
Bedside and Verbal shift change report given to Olivia (oncoming nurse) by Karol Mitchell RN   (offgoing nurse). Report included the following information SBAR, Kardex and Recent Results. Zone Phone for oncoming shift:   [phone]    Shift Summary: NPO went for MRI    LDAs               Peripheral IV 02/10/17 Left Forearm (Active)   Site Assessment Clean, dry, & intact 2/10/2017  3:57 PM   Phlebitis Assessment 0 2/10/2017  3:57 PM   Infiltration Assessment 0 2/10/2017  3:57 PM   Dressing Status Clean, dry, & intact 2/10/2017  3:57 PM   Dressing Type Tape;Transparent 2/10/2017  3:57 PM   Hub Color/Line Status Blue;Capped 2/10/2017  3:57 PM   Action Taken Blood drawn 2/10/2017  2:26 AM                        Intake & Output   Date 02/09/17 1900 - 02/10/17 0659 02/10/17 0700 - 02/11/17 0659   Shift 7051-2284 24 Hour Total 7955-6101 0367-9268 24 Hour Total   I  N  T  A  K  E   P. O. 750 750         P. O. 750 750       I.V.  (mL/kg/hr) 750 2197.5         I.V. 750 750         Volume (0.45% sodium chloride infusion)  1447.5       Shift Total  (mL/kg) 1500  (20) 2947.5  (39.4)      O  U  T  P  U  T   Urine  (mL/kg/hr) 1025 1505 200  (0.2)  200      Urine Voided 1025 1505 200  200    Shift Total  (mL/kg) 1025  (13.7) 1505  (20.1) 200  (2.7)  200  (2.7)    1442.5 -200  -200   Weight (kg) 74.8 74.8 74.8 74.8 74.8      Last Bowel Movement Last Bowel Movement Date: 02/09/17   Glucose Checks [] N/A  [] AC/HS  [] Q6  Concerns:   Nutrition Active Orders   Diet    DIET NPO With Ice Chips       Consults [x]PT  [x]OT  []Speech  [x]Case Management   Cardiac Monitoring []N/A [x]Yes Expires:

## 2017-02-11 NOTE — PROGRESS NOTES
Hospitalist Progress Note    NAME: Logan Carlos. :  1935   MRN:  [id number]     Assessment / Plan:  Acute respiratory failure with hypoxia  Community acquired PNA  Sepsis, POA  -CXR/CT chest/abd pelv showed extensive R upper lobe consolidation. ? Aspiration in setting of encephalopathy at home. -WBC peak 26.8 on . Trended down, wnl  -BC  NGTD,  No sputum cx collected. -Levaquin (one dose ), resumed . Day  abx. Now on PO.  -Continue duonebs, albuterol prn, mucinex  -O2 suppl, wean as tolerated, on 2L NC currently.  -will need to repeat cxr in 4-6 weeks to document resolution of infiltration     Pancreatitis, epigastic pain/elevated lipase. Normal imaging. Hyperbilirubinemia-resolved. -epigastric tenderness on exam- Now resolved  -Unclear etiology.  -Discontinue HCTZ. TG wnl.  -CT A/P without contrast on admission without evidence of pancreatitis or ductal dilatation.  -abd US  \"The liver is normal. The common bile duct is normal measuring 7 mm in diameter. The gallbladder is normal. The right kidney measures 10.9 cm in length. There is no hydronephrosis or visible ascites. Pancreatic head is normal in appearance. \"  -T. Bili 1.6 -> down wnl  -lipase 517 on adm -> trended to peak 1296 on 2/10/17. -GI consulted, recs appreciated. -MRCP without evidence of pancreatitis  -NPO yesterday, starting back clear liquid today. Continue IVF with LR. Acute kidney Injury- resolved  -adm creat 1.89, baseline 0.9-1.1. likely from pre-renal in the setting of poor PO intake  -Creatinine trended down to wnl with IVF. -follow BMP and UOP     HTN/CAD  Elevated troponin  -peak 0.13 on   -continue amlodipine, imdur, asa, lipitor, valsartan.  HCTZ stopped with pancreatitis.     BPH  -continue flomax, finasteride     Hypothyroidism  -synthroid     Acute encephalopathy-improving  -head ct neg for acute abnormalities  -likely due to acute illness  -ammonia and B12 wnl  -avoid sedating meds     Tobacco use  -nicotine patch    Hypokalemia  -replete, monitor    Dispo: SNF per PT  Code Status: Full  Surrogate Decision Maker: son Catherine Geiger at [phone]  DVT Prophylaxis: heparin  Baseline: independent, lives alone      Subjective:     Patient sitting up today. On 2L NC. Denies abdominal pain today. Hungry. No BM in last day. More alert and oriented x 3.     Review of Systems:  Symptom Y/N Comments  Symptom Y/N Comments   Fever/Chills n   Chest Pain n    Poor Appetite n   Edema     Cough n   Abdominal Pain n    Sputum n   Joint Pain     SOB/VERONICA n   Pruritis/Rash     Nausea/vomit n   Tolerating PT/OT     Diarrhea n   Tolerating Diet     Constipation    Other       Could NOT obtain due to:      Objective:     VITALS:   Last 24hrs VS reviewed since prior progress note. Most recent are:  Patient Vitals for the past 24 hrs:   Temp Pulse Resp BP SpO2   02/11/17 1140 98.4 °F (36.9 °C) 81 18 120/69 100 %   02/11/17 0827 97.8 °F (36.6 °C) 84 16 148/68 94 %   02/11/17 0759 - - - - 96 %   02/11/17 0330 98 °F (36.7 °C) 78 18 147/75 97 %   02/10/17 2248 98.2 °F (36.8 °C) 86 18 138/60 95 %   02/10/17 1955 - - - - 96 %   02/10/17 1940 97.3 °F (36.3 °C) 78 20 136/72 96 %   02/10/17 1606 - - - - 95 %   02/10/17 1555 97.7 °F (36.5 °C) 78 20 134/75 97 %       Intake/Output Summary (Last 24 hours) at 02/11/17 1210  Last data filed at 02/11/17 0945   Gross per 24 hour   Intake          1616.25 ml   Output             1000 ml   Net           616.25 ml        PHYSICAL EXAM:  General: WD, WN. Tired. Sitting up on 2L NC.  EENT:  EOMI. Anicteric sclerae. MMM  Resp:  CTA bilaterally, no wheezing or rales.   No accessory muscle use  CV:  Regular  rhythm,  No edema  GI:  Soft, Non distended, nontender.  +Bowel sounds  Neurologic:  Alert and oriented X 3,  normal speech   Psych:   Poor insight. Not anxious nor agitated  Skin:   No jaundice    Reviewed most current lab test results and cultures  YES  Reviewed most current radiology test results   YES  Review and summation of old records today    NO  Reviewed patient's current orders and MAR    YES  PMH/SH reviewed - no change compared to H&P  ________________________________________________________________________  Care Plan discussed with:    Comments   Patient x    Family      RN x    Care Manager     Consultant                        Multidiciplinary team rounds were held today with , nursing, pharmacist and clinical coordinator. Patient's plan of care was discussed; medications were reviewed and discharge planning was addressed. ________________________________________________________________________  Total NON critical care TIME:   30   Minutes    Total CRITICAL CARE TIME Spent:   Minutes non procedure based      Comments   >50% of visit spent in counseling and coordination of care     ________________________________________________________________________  Marlena Bernal MD     Procedures: see electronic medical records for all procedures/Xrays and details which were not copied into this note but were reviewed prior to creation of Plan. LABS:  I reviewed today's most current labs and imaging studies.   Pertinent labs include:  Recent Labs      02/11/17   0312  02/10/17   0157  02/09/17   0247   WBC  9.5  10.5  13.0*   HGB  10.0*  10.5*  10.5*   HCT  29.0*  30.1*  30.0*   PLT  243  217  182     Recent Labs      02/11/17 0312  02/10/17   1233  02/10/17   0157  02/09/17   0247   NA  138   --   138  141   K  4.2   --   3.8  3.7   CL  107   --   105  107   CO2  25   --   25  25   GLU  100   --   106*  104*   BUN  13   --   12  14   CREA  0.90   --   0.90  0.88   CA  8.4*   --   8.6  8.3*   ALB   --   1.8*   --    --    TBILI   --   0.6   --    --    SGOT   --   41*   --    --    ALT   --   21   --    --        Signed: Marlena Bernal MD

## 2017-02-12 LAB
ANA SER QL: NEGATIVE
ANION GAP BLD CALC-SCNC: 11 MMOL/L (ref 5–15)
BUN SERPL-MCNC: 9 MG/DL (ref 6–20)
BUN/CREAT SERPL: 10 (ref 12–20)
CALCIUM SERPL-MCNC: 8.2 MG/DL (ref 8.5–10.1)
CHLORIDE SERPL-SCNC: 108 MMOL/L (ref 97–108)
CO2 SERPL-SCNC: 23 MMOL/L (ref 21–32)
CREAT SERPL-MCNC: 0.86 MG/DL (ref 0.7–1.3)
ERYTHROCYTE [DISTWIDTH] IN BLOOD BY AUTOMATED COUNT: 17.2 % (ref 11.5–14.5)
GLUCOSE BLD STRIP.AUTO-MCNC: 120 MG/DL (ref 65–100)
GLUCOSE SERPL-MCNC: 101 MG/DL (ref 65–100)
HCT VFR BLD AUTO: 30 % (ref 36.6–50.3)
HGB BLD-MCNC: 10.2 G/DL (ref 12.1–17)
MCH RBC QN AUTO: 26.8 PG (ref 26–34)
MCHC RBC AUTO-ENTMCNC: 34 G/DL (ref 30–36.5)
MCV RBC AUTO: 78.7 FL (ref 80–99)
PLATELET # BLD AUTO: 318 K/UL (ref 150–400)
POTASSIUM SERPL-SCNC: 4.1 MMOL/L (ref 3.5–5.1)
RBC # BLD AUTO: 3.81 M/UL (ref 4.1–5.7)
SEE BELOW:, 164879: NORMAL
SERVICE CMNT-IMP: ABNORMAL
SODIUM SERPL-SCNC: 142 MMOL/L (ref 136–145)
WBC # BLD AUTO: 8 K/UL (ref 4.1–11.1)

## 2017-02-12 PROCEDURE — 82962 GLUCOSE BLOOD TEST: CPT

## 2017-02-12 PROCEDURE — 74011250637 HC RX REV CODE- 250/637: Performed by: INTERNAL MEDICINE

## 2017-02-12 PROCEDURE — 94640 AIRWAY INHALATION TREATMENT: CPT

## 2017-02-12 PROCEDURE — 74011250636 HC RX REV CODE- 250/636: Performed by: INTERNAL MEDICINE

## 2017-02-12 PROCEDURE — 94761 N-INVAS EAR/PLS OXIMETRY MLT: CPT

## 2017-02-12 PROCEDURE — 80048 BASIC METABOLIC PNL TOTAL CA: CPT | Performed by: INTERNAL MEDICINE

## 2017-02-12 PROCEDURE — 74011000250 HC RX REV CODE- 250: Performed by: INTERNAL MEDICINE

## 2017-02-12 PROCEDURE — 36415 COLL VENOUS BLD VENIPUNCTURE: CPT | Performed by: INTERNAL MEDICINE

## 2017-02-12 PROCEDURE — 85027 COMPLETE CBC AUTOMATED: CPT | Performed by: INTERNAL MEDICINE

## 2017-02-12 PROCEDURE — 65660000000 HC RM CCU STEPDOWN

## 2017-02-12 RX ADMIN — ENOXAPARIN SODIUM 40 MG: 40 INJECTION SUBCUTANEOUS at 18:18

## 2017-02-12 RX ADMIN — AMLODIPINE BESYLATE 5 MG: 5 TABLET ORAL at 08:54

## 2017-02-12 RX ADMIN — ASPIRIN 81 MG: 81 TABLET, COATED ORAL at 08:54

## 2017-02-12 RX ADMIN — LEVOFLOXACIN 750 MG: 750 TABLET, FILM COATED ORAL at 16:43

## 2017-02-12 RX ADMIN — ATORVASTATIN CALCIUM 40 MG: 40 TABLET, FILM COATED ORAL at 08:53

## 2017-02-12 RX ADMIN — LEVOTHYROXINE SODIUM 88 MCG: 88 TABLET ORAL at 06:41

## 2017-02-12 RX ADMIN — GUAIFENESIN 600 MG: 600 TABLET, EXTENDED RELEASE ORAL at 09:00

## 2017-02-12 RX ADMIN — ISOSORBIDE MONONITRATE 30 MG: 30 TABLET, EXTENDED RELEASE ORAL at 08:54

## 2017-02-12 RX ADMIN — METOPROLOL SUCCINATE 25 MG: 25 TABLET, EXTENDED RELEASE ORAL at 08:54

## 2017-02-12 RX ADMIN — IPRATROPIUM BROMIDE AND ALBUTEROL SULFATE 3 ML: .5; 3 SOLUTION RESPIRATORY (INHALATION) at 07:54

## 2017-02-12 RX ADMIN — SODIUM CHLORIDE, SODIUM LACTATE, POTASSIUM CHLORIDE, AND CALCIUM CHLORIDE 75 ML/HR: 600; 310; 30; 20 INJECTION, SOLUTION INTRAVENOUS at 06:42

## 2017-02-12 RX ADMIN — Medication 10 ML: at 03:09

## 2017-02-12 RX ADMIN — IPRATROPIUM BROMIDE AND ALBUTEROL SULFATE 3 ML: .5; 3 SOLUTION RESPIRATORY (INHALATION) at 14:24

## 2017-02-12 RX ADMIN — Medication 10 ML: at 21:09

## 2017-02-12 RX ADMIN — IPRATROPIUM BROMIDE AND ALBUTEROL SULFATE 3 ML: .5; 3 SOLUTION RESPIRATORY (INHALATION) at 19:36

## 2017-02-12 RX ADMIN — VALSARTAN 160 MG: 160 TABLET ORAL at 08:54

## 2017-02-12 RX ADMIN — TAMSULOSIN HYDROCHLORIDE 0.4 MG: 0.4 CAPSULE ORAL at 08:54

## 2017-02-12 RX ADMIN — GUAIFENESIN 600 MG: 600 TABLET, EXTENDED RELEASE ORAL at 21:09

## 2017-02-12 NOTE — PROGRESS NOTES
Bedside and Verbal shift change report given to Juan David Fuchs (oncoming nurse) by Pablo Barthel (offgoing nurse). Report included the following information SBAR, Kardex, Intake/Output, MAR, Recent Results and Med Rec Status. Zone Phone for oncoming shift:   [phone]    Shift Summary: VS stable per baseline. No complaints overnight. LDAs               Peripheral IV 02/10/17 Left Forearm (Active)   Site Assessment Clean, dry, & intact 2/12/2017  3:10 AM   Phlebitis Assessment 0 2/12/2017  3:10 AM   Infiltration Assessment 0 2/12/2017  3:10 AM   Dressing Status Clean, dry, & intact 2/12/2017  3:10 AM   Dressing Type Transparent 2/12/2017  3:10 AM   Hub Color/Line Status Blue;Flushed 2/12/2017  3:10 AM   Action Taken Blood drawn 2/10/2017  2:26 AM       Peripheral IV 02/12/17 Left; Inferior Forearm (Active)   Site Assessment Clean, dry, & intact 2/12/2017  3:10 AM   Phlebitis Assessment 0 2/12/2017  3:10 AM   Infiltration Assessment 0 2/12/2017  3:10 AM   Dressing Status Clean, dry, & intact 2/12/2017  3:10 AM   Dressing Type Transparent 2/12/2017  3:10 AM   Hub Color/Line Status Blue;Flushed 2/12/2017  3:10 AM   Action Taken Blood drawn 2/12/2017  3:10 AM                        Intake & Output   Date 02/11/17 0700 - 02/12/17 0659 02/12/17 0700 - 02/13/17 0659   Shift 1681-3925 2348-5831 24 Hour Total 0880-2018 2347-6901 24 Hour Total   I  N  T  A  K  E   P.O.  360 360         P. O.  360 360       I.V.  (mL/kg/hr) 2021.3  (2.3)  2021. 3         Volume (lactated ringers infusion) 2021.3  2021. 3       Shift Total  (mL/kg) 2021.3  (27) 360  (4.8) 2381.3  (31.8)      O  U  T  P  U  T   Urine  (mL/kg/hr) 600  (0.7) 1150 1750         Urine Voided 600 1150 1750       Shift Total  (mL/kg) 600  (8) 1150  (15.4) 1750  (23.4)      NET 1421.3 -790 631.3      Weight (kg) 74.8 74.8 74.8 74.8 74.8 74.8      Last Bowel Movement Last Bowel Movement Date: 02/11/17   Glucose Checks [x] N/A  [] AC/HS  [] Q6  Concerns:   Nutrition Active Orders   Diet DIET CLEAR LIQUID       Consults [x]PT  [x]OT  []Speech  [x]Case Management   Cardiac Monitoring [x]N/A []Yes Expires:

## 2017-02-12 NOTE — PROGRESS NOTES
Hospitalist Progress Note    NAME: Ghislaine Nick. :  1935   MRN:  [id number]     Assessment / Plan:  Acute respiratory failure with hypoxia  Community acquired PNA  Sepsis, POA  -CXR/CT chest/abd pelv showed extensive R upper lobe consolidation. -WBC peak 26.8 on . Trended down, wnl  -BC  NGTD,  No sputum cx collected. -Levaquin (one dose ), resumed . Day  abx today.  -Continue duonebs, albuterol prn, mucinex  -Weaned off supplemental O2 today. Will need to check with ambulation. -will need to repeat cxr in 4-6 weeks to document resolution of infiltration     Chemical Pancreatitis, epigastic pain/elevated lipase. Normal imaging. Hyperbilirubinemia-resolved. -epigastric tenderness on exam- Now resolved  -Unclear etiology.  -Discontinued HCTZ. TG wnl.  -CT A/P without contrast on admission without evidence of pancreatitis or ductal dilatation.  -abd US  \"The liver is normal. The common bile duct is normal measuring 7 mm in diameter. The gallbladder is normal. The right kidney measures 10.9 cm in length. There is no hydronephrosis or visible ascites. Pancreatic head is normal in appearance. \"  -MRCP without evidence of pancreatitis  -T. Bili 1.6 -> down wnl  -lipase 517 on adm -> trended to peak 1296 on 2/10/17. -GI have seen, appreciate recs  -Advance to GI lite diet today, DC IVF. Acute kidney Injury- resolved  -adm creat 1.89, baseline 0.9-1.1. likely from pre-renal in the setting of poor PO intake  -Creatinine trended down to wnl with IVF. -follow BMP and UOP     HTN/CAD  Elevated troponin  -peak 0.13 on   -continue amlodipine, imdur, asa, lipitor, valsartan.  HCTZ stopped with pancreatitis.     BPH  -continue flomax, finasteride     Hypothyroidism  -synthroid     Acute encephalopathy-improved  -head ct neg for acute abnormalities  -likely due to acute illness  -ammonia and B12 wnl  -avoid sedating meds     Tobacco use  -nicotine patch    Hypokalemia  -replete, monitor    Dispo: SNF per PT  Code Status: Full  Surrogate Decision Maker: son Caitlin Ross at [phone]  DVT Prophylaxis: heparin  Baseline: independent, lives alone      Subjective:     Patient sitting up today off of oxygen. Looking much more alert. He says his pain as resolved. Tolerating diet. Agreeable to SNF.     Review of Systems:  Symptom Y/N Comments  Symptom Y/N Comments   Fever/Chills n   Chest Pain n    Poor Appetite n   Edema     Cough n   Abdominal Pain n    Sputum n   Joint Pain     SOB/VERONICA n   Pruritis/Rash     Nausea/vomit n   Tolerating PT/OT     Diarrhea n   Tolerating Diet     Constipation    Other       Could NOT obtain due to:      Objective:     VITALS:   Last 24hrs VS reviewed since prior progress note. Most recent are:  Patient Vitals for the past 24 hrs:   Temp Pulse Resp BP SpO2   02/12/17 1502 98.3 °F (36.8 °C) 84 16 119/58 98 %   02/12/17 1424 - - - - 92 %   02/12/17 0754 - - - - 92 %   02/12/17 0725 98.3 °F (36.8 °C) 73 16 154/71 94 %   02/12/17 0640 - 74 - - 95 %   02/11/17 2332 98.4 °F (36.9 °C) 85 18 131/59 93 %   02/11/17 2005 - - - - 94 %       Intake/Output Summary (Last 24 hours) at 02/12/17 1849  Last data filed at 02/12/17 0640   Gross per 24 hour   Intake              360 ml   Output             1150 ml   Net             -790 ml        PHYSICAL EXAM:  General: WD, WN. Alert and oriented. EENT:  EOMI. Anicteric sclerae. MMM  Resp:  CTA bilaterally, no wheezing or rales.   No accessory muscle use  CV:  Regular  rhythm,  No edema  GI:  Soft, Non distended, nontender.  +Bowel sounds  Neurologic:  Alert and oriented X 3,  normal speech   Psych:   Poor insight. Not anxious nor agitated  Skin:   No jaundice    Reviewed most current lab test results and cultures  YES  Reviewed most current radiology test results   YES  Review and summation of old records today    NO  Reviewed patient's current orders and MAR    YES  PMH/SH reviewed - no change compared to H&P  ________________________________________________________________________  Care Plan discussed with:    Comments   Patient x    Family      RN x    Care Manager     Consultant                        Multidiciplinary team rounds were held today with , nursing, pharmacist and clinical coordinator. Patient's plan of care was discussed; medications were reviewed and discharge planning was addressed. ________________________________________________________________________  Total NON critical care TIME:   25   Minutes    Total CRITICAL CARE TIME Spent:   Minutes non procedure based      Comments   >50% of visit spent in counseling and coordination of care     ________________________________________________________________________  Gigi Rust MD     Procedures: see electronic medical records for all procedures/Xrays and details which were not copied into this note but were reviewed prior to creation of Plan. LABS:  I reviewed today's most current labs and imaging studies.   Pertinent labs include:  Recent Labs      02/12/17   0303  02/11/17   0312  02/10/17   0157   WBC  8.0  9.5  10.5   HGB  10.2*  10.0*  10.5*   HCT  30.0*  29.0*  30.1*   PLT  318  243  217     Recent Labs      02/12/17 0303 02/11/17 0312  02/10/17   1233  02/10/17   0157   NA  142  138   --   138   K  4.1  4.2   --   3.8   CL  108  107   --   105   CO2  23  25   --   25   GLU  101*  100   --   106*   BUN  9  13   --   12   CREA  0.86  0.90   --   0.90   CA  8.2*  8.4*   --   8.6   ALB   --    --   1.8*   --    TBILI   --    --   0.6   --    SGOT   --    --   41*   --    ALT   --    --   21   --        Signed: Gigi Rust MD

## 2017-02-12 NOTE — PROGRESS NOTES
Gastroenterology Daily Progress Note (Dr. Jean Claude Oneill for Dr. Laila Shields)    Admit Date: 2/5/2017       Subjective:       Clear liquids going well. Denies abdominal pain. No n/v    Current Facility-Administered Medications   Medication Dose Route Frequency    lactated ringers infusion  75 mL/hr IntraVENous CONTINUOUS    enoxaparin (LOVENOX) injection 40 mg  40 mg SubCUTAneous Q24H    levoFLOXacin (LEVAQUIN) tablet 750 mg  750 mg Oral ACD    valsartan (DIOVAN) tablet 160 mg  160 mg Oral DAILY    albuterol-ipratropium (DUO-NEB) 2.5 MG-0.5 MG/3 ML  3 mL Nebulization Q6HWA RT    sodium chloride (NS) flush 5-10 mL  5-10 mL IntraVENous PRN    acetaminophen (TYLENOL) tablet 650 mg  650 mg Oral Q4H PRN    ondansetron (ZOFRAN) injection 4 mg  4 mg IntraVENous Q4H PRN    bisacodyl (DULCOLAX) tablet 5 mg  5 mg Oral DAILY PRN    nicotine (NICODERM CQ) 7 mg/24 hr patch 1 Patch  1 Patch TransDERmal Q24H    amLODIPine (NORVASC) tablet 5 mg  5 mg Oral DAILY    aspirin delayed-release tablet 81 mg  81 mg Oral DAILY    isosorbide mononitrate ER (IMDUR) tablet 30 mg  30 mg Oral DAILY    levothyroxine (SYNTHROID) tablet 88 mcg  88 mcg Oral 7am    metoprolol succinate (TOPROL-XL) XL tablet 25 mg  25 mg Oral DAILY    tamsulosin (FLOMAX) capsule 0.4 mg  0.4 mg Oral DAILY    atorvastatin (LIPITOR) tablet 40 mg  40 mg Oral DAILY    albuterol (PROVENTIL VENTOLIN) nebulizer solution 2.5 mg  2.5 mg Nebulization Q4H PRN    guaiFENesin SR (MUCINEX) tablet 600 mg  600 mg Oral Q12H    hydrALAZINE (APRESOLINE) 20 mg/mL injection 10 mg  10 mg IntraVENous Q6H PRN        Objective:     Visit Vitals    /71 (BP 1 Location: Right arm, BP Patient Position: Supine)    Pulse 73    Temp 98.3 °F (36.8 °C)    Resp 16    Ht 5' 3\" (1.6 m)    Wt 74.8 kg (165 lb)    SpO2 92%    BMI 29.23 kg/m2   Blood pressure 154/71, pulse 73, temperature 98.3 °F (36.8 °C), resp.  rate 16, height 5' 3\" (1.6 m), weight 74.8 kg (165 lb), SpO2 92 %.         02/10 1901 - 02/12 0700  In: 2381.3 [P.O.:360; I.V.:2021.3]  Out: 2150 [Urine:2150]      Intake/Output Summary (Last 24 hours) at 02/12/17 1023  Last data filed at 02/12/17 0640   Gross per 24 hour   Intake              765 ml   Output             1150 ml   Net             -385 ml     Physical Exam:     General: awake and alert BM in NAD  Chest:  CTA, No rhonchi, rales or rubs. Heart: S1, S2, RRR  GI: Soft, nontender to palpation, ND, + BS    Labs:      Ref.  Range 2/8/2017 02:03  2/10/2017 01:57  2/11/2017 03:12   Lipase Latest Ref Range: 73 - 393 U/L 1073 (H)  1296 (H)  1148 (H)     Lab Results   Component Value Date/Time    Cholesterol, total 76 02/09/2017 02:47 AM    HDL Cholesterol 15 02/09/2017 02:47 AM    LDL, calculated 41.6 02/09/2017 02:47 AM    VLDL, calculated 19.4 02/09/2017 02:47 AM    Triglyceride 97 02/09/2017 02:47 AM    CHOL/HDL Ratio 5.1 02/09/2017 02:47 AM       Recent Results (from the past 24 hour(s))   CBC W/O DIFF    Collection Time: 02/12/17  3:03 AM   Result Value Ref Range    WBC 8.0 4.1 - 11.1 K/uL    RBC 3.81 (L) 4.10 - 5.70 M/uL    HGB 10.2 (L) 12.1 - 17.0 g/dL    HCT 30.0 (L) 36.6 - 50.3 %    MCV 78.7 (L) 80.0 - 99.0 FL    MCH 26.8 26.0 - 34.0 PG    MCHC 34.0 30.0 - 36.5 g/dL    RDW 17.2 (H) 11.5 - 14.5 %    PLATELET 496 739 - 921 K/uL   METABOLIC PANEL, BASIC    Collection Time: 02/12/17  3:03 AM   Result Value Ref Range    Sodium 142 136 - 145 mmol/L    Potassium 4.1 3.5 - 5.1 mmol/L    Chloride 108 97 - 108 mmol/L    CO2 23 21 - 32 mmol/L    Anion gap 11 5 - 15 mmol/L    Glucose 101 (H) 65 - 100 mg/dL    BUN 9 6 - 20 MG/DL    Creatinine 0.86 0.70 - 1.30 MG/DL    BUN/Creatinine ratio 10 (L) 12 - 20      GFR est AA >60 >60 ml/min/1.73m2    GFR est non-AA >60 >60 ml/min/1.73m2    Calcium 8.2 (L) 8.5 - 10.1 MG/DL     Recent Labs      02/12/17   0303  02/11/17   0312  02/10/17   0157   NA  142  138  138   K  4.1  4.2  3.8   CL  108  107  105   CO2  23  25  25   BUN  9  13  12 CREA  0.86  0.90  0.90   GLU  101*  100  106*   CA  8.2*  8.4*  8.6     MRI/MRCP WITH CONTRAST:    FINDINGS: Motion artifact obscures fine detail and limits sensitivity for  detecting pathology.     Liver: Subcapsular cyst in segment 5 measures 1 cm. There is adjacent triangular  subcapsular perfusion variant. Cyst in segment 6 measures 0.8 cm and is  subcapsular. No pathologic enhancing liver mass. Normal liver size. Liver  surface is smooth.     Biliary tree: Gallbladder is within normal limits. No biliary dilatation. No  filling defect in the common bile duct, which measures 3 mm in diameter.     Pancreas: Limited evaluation. No evidence of mass or surrounding inflammation. Pancreatic duct is not dilated. No definite pancreatic divisum.     Spleen: Within normal limits.     Adrenal glands: Mild hypertrophy. No mass.     Kidneys: 3.3 cm nonenhancing simple left renal cyst. No solid renal mass or  hydronephrosis.     Vasculature: Aorta is atherosclerotic and has been treated with stent graft.     Bowel: Colonic fecal stasis is partially imaged.     Lymph nodes: No lymphadenopathy.     Miscellaneous: No ascites. Bones are within normal limits.     IMPRESSION  IMPRESSION:   1. Limited by motion artifact. 2. No MRI evidence of pancreatitis. 3. Numerous incidental findings. No acute process on MRI    Impression:    Community acquired pna  Elevated lipase c/w chemical pancreatitis         Plan:  Patient's abdominal pain has resolved and given that CT and MRI negative for acute pancreatitis changes. Would advance diet to GI lite diet and then will sign off for now. Please reconsult as needed.        Teto Barrera MD    2/12/2017

## 2017-02-13 VITALS
OXYGEN SATURATION: 93 % | WEIGHT: 165 LBS | RESPIRATION RATE: 20 BRPM | BODY MASS INDEX: 29.23 KG/M2 | DIASTOLIC BLOOD PRESSURE: 53 MMHG | HEART RATE: 83 BPM | SYSTOLIC BLOOD PRESSURE: 115 MMHG | TEMPERATURE: 98.8 F | HEIGHT: 63 IN

## 2017-02-13 PROBLEM — R10.13 EPIGASTRIC ABDOMINAL PAIN: Status: ACTIVE | Noted: 2017-02-13

## 2017-02-13 LAB
ANION GAP BLD CALC-SCNC: 11 MMOL/L (ref 5–15)
BUN SERPL-MCNC: 9 MG/DL (ref 6–20)
BUN/CREAT SERPL: 9 (ref 12–20)
CALCIUM SERPL-MCNC: 8.5 MG/DL (ref 8.5–10.1)
CHLORIDE SERPL-SCNC: 106 MMOL/L (ref 97–108)
CO2 SERPL-SCNC: 24 MMOL/L (ref 21–32)
CREAT SERPL-MCNC: 0.99 MG/DL (ref 0.7–1.3)
GLUCOSE SERPL-MCNC: 112 MG/DL (ref 65–100)
IGG4 SER-MCNC: 47 MG/DL (ref 1–291)
POTASSIUM SERPL-SCNC: 3.9 MMOL/L (ref 3.5–5.1)
SODIUM SERPL-SCNC: 141 MMOL/L (ref 136–145)

## 2017-02-13 PROCEDURE — 94640 AIRWAY INHALATION TREATMENT: CPT

## 2017-02-13 PROCEDURE — 36415 COLL VENOUS BLD VENIPUNCTURE: CPT | Performed by: INTERNAL MEDICINE

## 2017-02-13 PROCEDURE — 80048 BASIC METABOLIC PNL TOTAL CA: CPT | Performed by: INTERNAL MEDICINE

## 2017-02-13 PROCEDURE — 74011250637 HC RX REV CODE- 250/637: Performed by: INTERNAL MEDICINE

## 2017-02-13 PROCEDURE — 74011000250 HC RX REV CODE- 250: Performed by: INTERNAL MEDICINE

## 2017-02-13 PROCEDURE — 74011250636 HC RX REV CODE- 250/636: Performed by: INTERNAL MEDICINE

## 2017-02-13 RX ORDER — TRAMADOL HYDROCHLORIDE 50 MG/1
50 TABLET ORAL
Qty: 10 TAB | Refills: 0 | Status: SHIPPED | OUTPATIENT
Start: 2017-02-13 | End: 2018-01-09

## 2017-02-13 RX ORDER — METOPROLOL SUCCINATE 25 MG/1
25 TABLET, EXTENDED RELEASE ORAL DAILY
Qty: 30 TAB | Refills: 0 | Status: SHIPPED
Start: 2017-02-13 | End: 2017-03-15

## 2017-02-13 RX ORDER — IPRATROPIUM BROMIDE AND ALBUTEROL SULFATE 2.5; .5 MG/3ML; MG/3ML
3 SOLUTION RESPIRATORY (INHALATION)
Qty: 30 NEBULE | Refills: 0 | Status: SHIPPED
Start: 2017-02-13 | End: 2018-05-20

## 2017-02-13 RX ORDER — VALSARTAN 160 MG/1
160 TABLET ORAL DAILY
Qty: 30 TAB | Refills: 0 | Status: SHIPPED
Start: 2017-02-13 | End: 2017-03-15 | Stop reason: ALTCHOICE

## 2017-02-13 RX ORDER — NICOTINE 7MG/24HR
1 PATCH, TRANSDERMAL 24 HOURS TRANSDERMAL EVERY 24 HOURS
Qty: 20 PATCH | Refills: 0 | Status: SHIPPED
Start: 2017-02-13 | End: 2017-03-05

## 2017-02-13 RX ADMIN — IPRATROPIUM BROMIDE AND ALBUTEROL SULFATE 3 ML: .5; 3 SOLUTION RESPIRATORY (INHALATION) at 07:22

## 2017-02-13 RX ADMIN — ISOSORBIDE MONONITRATE 30 MG: 30 TABLET, EXTENDED RELEASE ORAL at 08:48

## 2017-02-13 RX ADMIN — METOPROLOL SUCCINATE 25 MG: 25 TABLET, EXTENDED RELEASE ORAL at 08:48

## 2017-02-13 RX ADMIN — LEVOTHYROXINE SODIUM 88 MCG: 88 TABLET ORAL at 08:48

## 2017-02-13 RX ADMIN — AMLODIPINE BESYLATE 5 MG: 5 TABLET ORAL at 08:48

## 2017-02-13 RX ADMIN — Medication 10 ML: at 02:03

## 2017-02-13 RX ADMIN — GUAIFENESIN 600 MG: 600 TABLET, EXTENDED RELEASE ORAL at 08:48

## 2017-02-13 RX ADMIN — TAMSULOSIN HYDROCHLORIDE 0.4 MG: 0.4 CAPSULE ORAL at 08:48

## 2017-02-13 RX ADMIN — VALSARTAN 160 MG: 160 TABLET ORAL at 08:48

## 2017-02-13 RX ADMIN — ENOXAPARIN SODIUM 40 MG: 40 INJECTION SUBCUTANEOUS at 18:13

## 2017-02-13 RX ADMIN — ASPIRIN 81 MG: 81 TABLET, COATED ORAL at 08:48

## 2017-02-13 RX ADMIN — Medication 10 ML: at 02:04

## 2017-02-13 RX ADMIN — IPRATROPIUM BROMIDE AND ALBUTEROL SULFATE 3 ML: .5; 3 SOLUTION RESPIRATORY (INHALATION) at 13:40

## 2017-02-13 RX ADMIN — ATORVASTATIN CALCIUM 40 MG: 40 TABLET, FILM COATED ORAL at 08:48

## 2017-02-13 NOTE — PROGRESS NOTES
Bedside and Verbal shift change report given to Marthena Nyhan (oncoming nurse) by Shae Renae (offgoing nurse). Report included the following information SBAR, Kardex, Intake/Output, MAR, Recent Results and Med Rec Status. Zone Phone for oncoming shift:   [phone]    Shift Summary: Uneventful shift. D/C planning. LDAs               Peripheral IV 02/10/17 Left Forearm (Active)   Site Assessment Clean, dry, & intact 2/13/2017  3:25 AM   Phlebitis Assessment 0 2/13/2017  3:25 AM   Infiltration Assessment 0 2/13/2017  3:25 AM   Dressing Status Clean, dry, & intact 2/13/2017  3:25 AM   Dressing Type Transparent 2/13/2017  3:25 AM   Hub Color/Line Status Blue;Flushed 2/13/2017  3:25 AM   Action Taken Blood drawn 2/10/2017  2:26 AM       Peripheral IV 02/12/17 Left Forearm (Active)   Site Assessment Clean, dry, & intact 2/13/2017  3:25 AM   Phlebitis Assessment 0 2/13/2017  3:25 AM   Infiltration Assessment 0 2/13/2017  3:25 AM   Dressing Status Clean, dry, & intact 2/13/2017  3:25 AM   Dressing Type Transparent 2/13/2017  3:25 AM   Hub Color/Line Status Blue;Flushed 2/13/2017  3:25 AM   Action Taken Blood drawn 2/12/2017  3:10 AM                        Intake & Output   Date 02/12/17 0700 - 02/13/17 0659 02/13/17 0700 - 02/14/17 0659   Shift 5064-7349 7775-8529 24 Hour Total 4803-3066 4824-7828 24 Hour Total   I  N  T  A  K  E   P.O.  240 240         P. O.  240 240       Shift Total  (mL/kg)  240  (3.2) 240  (3.2)      O  U  T  P  U  T   Urine  (mL/kg/hr)  150 150         Urine Voided  150 150       Shift Total  (mL/kg)  150  (2) 150  (2)      NET  90 90      Weight (kg) 74.8 74.8 74.8 74.8 74.8 74.8      Last Bowel Movement Last Bowel Movement Date: 02/11/17   Glucose Checks [] N/A  [] AC/HS  [] Q6  Concerns:   Nutrition Active Orders   Diet    DIET GI LITE (POST SURGICAL)       Consults [x]PT  [x]OT  []Speech  [x]Case Management   Cardiac Monitoring [x]N/A []Yes Expires:

## 2017-02-13 NOTE — DISCHARGE SUMMARY
Hospitalist Discharge Summary     Patient ID:  Kelly Pandey  [id number]  [de-identified] y.o.  1935    PCP on record: Mick Nicholas MD    Admit date: 2/5/2017  Discharge date and time: 2/13/2017      DISCHARGE DIAGNOSIS:  Acute respiratory failure with hypoxia  Community acquired PNA  Sepsis, POA  Chemical Pancreatitis, epigastic pain/elevated lipase. Normal imaging. Hyperbilirubinemia  Acute kidney Injury  HTN/CAD  Elevated troponin   BPH  Hypothyroidism  Acute encephalopathy  Tobacco use  Hypokalemia    CONSULTATIONS:  IP CONSULT TO GASTROENTEROLOGY    Excerpted HPI from H&P of Damaris Shelby MD:  Yumiko Holloway is a [de-identified] y.o.  male w pmhx significant for CAD, HLD, HTN, MI, CVA, PVD, cerebral embolism, AAA present to ED c/o AMS in the setting os shortness of breath and abd pain. Per hx, pt lives alone, found by his neighbor today to be confused, apparently had a syncopal episode today in the kitchen and was unclear of the cause. States he has had generalized weakness for a few days with association to sob and productive cough yellow in color. Pt denies any recent fevers, chills, n/v/d. No changes in bowel/urinary habits. During my encounter, pt is awake and alert, slow to answer question, but is able to answer questions appropriately. ______________________________________________________________________  DISCHARGE SUMMARY/HOSPITAL COURSE:  for full details see H&P, daily progress notes, labs, consult notes. Acute respiratory failure with hypoxia  Community acquired PNA  Sepsis, POA  -CXR/CT chest/abd pelv showed extensive R upper lobe consolidation. -WBC peak 26.8 on 2/5. Trended down, WBC 8 on discharge date  -BC 2/5 NGTD, No sputum cx collected.   -Levaquin; Completed 7 day course of ABT  -Continue duonebs, albuterol prn, mucinex  -Room air O2 Sat is greater than 92%  -will need to repeat cxr in 4-6 weeks to document resolution of infiltration      Chemical Pancreatitis, epigastic pain/elevated lipase. Normal imaging. Hyperbilirubinemia-resolved. -epigastric tenderness on exam- Now resolved  -Unclear etiology.  -Discontinued HCTZ. TG wnl.  -CT A/P without contrast on admission without evidence of pancreatitis or ductal dilatation.  -abd US 2/8 \"The liver is normal. The common bile duct is normal measuring 7 mm in diameter. The gallbladder is normal. The right kidney measures 10.9 cm in length. There is no hydronephrosis or visible ascites. Pancreatic head is normal in appearance. \"  -MRCP without evidence of pancreatitis  -T. Bili 1.6 -> down wnl  -lipase 517 on adm -> trended to peak 1296 on 2/10/17 then down to 1148 on 2/11. No further epigastric abdominal pain.  -Tolerating GI lite diet without difficulty. Acute kidney Injury- resolved  -adm creat 1.89, baseline 0.9-1.1. likely from pre-renal in the setting of poor PO intake  -Creatinine trended down to wnl with IVF. -follow BMP and UOP      HTN/CAD  Elevated troponin  -peak 0.13 on 2/6  -continue amlodipine, imdur, asa, lipitor, valsartan. HCTZ stopped with pancreatitis.      BPH  -continue flomax, finasteride      Hypothyroidism  -synthroid      Acute encephalopathy-improved  -head ct neg for acute abnormalities  -likely due to acute illness  -ammonia and B12 wnl  -avoid sedating meds      Tobacco use  -nicotine patch     Hypokalemia  -resolved. K3.9      _______________________________________________________________________  Patient seen and examined by me on discharge day. Pertinent Findings:  Gen:    Not in distress  Chest: Clear lungs  CVS:   Regular rhythm.   No edema  Abd:  Soft, not distended, not tender  Neuro:  Alert, orient x 3  _______________________________________________________________________  DISCHARGE MEDICATIONS:   Current Discharge Medication List      START taking these medications    Details   albuterol-ipratropium (DUO-NEB) 2.5 mg-0.5 mg/3 ml nebu 3 mL by Nebulization route every six (6) hours as needed. Qty: 30 Nebule, Refills: 0      guaiFENesin SR (MUCINEX) 600 mg SR tablet Take 1 Tab by mouth every twelve (12) hours for 7 days. Qty: 14 Tab, Refills: 0      nicotine (NICODERM CQ) 7 mg/24 hr 1 Patch by TransDERmal route every twenty-four (24) hours for 20 days. Qty: 20 Patch, Refills: 0      valsartan (DIOVAN) 160 mg tablet Take 1 Tab by mouth daily for 30 days. Qty: 30 Tab, Refills: 0         CONTINUE these medications which have CHANGED    Details   metoprolol succinate (TOPROL-XL) 25 mg XL tablet Take 1 Tab by mouth daily for 30 days. Qty: 30 Tab, Refills: 0      traMADol (ULTRAM) 50 mg tablet Take 1 Tab by mouth every eight (8) hours as needed for Pain. Max Daily Amount: 150 mg. TAKE 1-2 TABLETS BY MOUTH EVERY 6 HOURS AS NEEDED FOR PAIN  Qty: 10 Tab, Refills: 0    Associated Diagnoses: Other chronic pain; Right-sided chest wall pain         CONTINUE these medications which have NOT CHANGED    Details   levothyroxine (SYNTHROID) 88 mcg tablet TAKE 1 TABLET BY MOUTH DAILY (BEFORE BREAKFAST). RECHECK LEVEL IN 4-6 WEEKS  Qty: 30 Tab, Refills: 1      tamsulosin (FLOMAX) 0.4 mg capsule TAKE 1 CAPSULE EVERY DAY  Qty: 90 Cap, Refills: 1    Associated Diagnoses: Benign prostatic hyperplasia, presence of lower urinary tract symptoms unspecified, unspecified morphology      CHOLECALCIFEROL, VITAMIN D3, (VITAMIN D3 PO) Take  by mouth.      isosorbide mononitrate ER (IMDUR) 30 mg tablet TAKE 1 TABLET BY MOUTH EVERY DAY  Qty: 30 Tab, Refills: 12    Associated Diagnoses: PVD (peripheral vascular disease) with claudication ([address] Coronary artery disease due to lipid rich plaque      amLODIPine (NORVASC) 5 mg tablet Take 1 Tab by mouth daily. Qty: 30 Tab, Refills: 12    Comments: Pt needs to see doctor for further refills      aspirin delayed-release 81 mg tablet Take 1 Tab by mouth daily for 360 days. Qty: 60 Tab, Refills: 11      atorvastatin (LIPITOR) 40 mg tablet Take 1 Tab by mouth daily. YRN.  Give patient BRAND LIPITOR. D/C Crestor  Qty: 90 Tab, Refills: 3    Comments: Branded Lipitor is no cost for patient. finasteride (PROSCAR) 5 mg tablet daily. Associated Diagnoses: BPH (benign prostatic hypertrophy)      omega-3 fatty acids-vitamin e (FISH OIL) 1,000 mg cap Take 1 Cap by mouth daily. ascorbic acid (VITAMIN C) 1,000 mg tablet Take  by mouth. STOP taking these medications       losartan-hydrochlorothiazide (HYZAAR) 100-12.5 mg per tablet Comments:   Reason for Stopping:               My Recommended Diet, Activity, Wound Care, and follow-up labs are listed in the patient's Discharge Insturctions which I have personally completed and reviewed.     _______________________________________________________________________  DISPOSITION:    Home with Family:    Home with HH/PT/OT/RN:    SNF/LTC: y   ISAAC:    OTHER:        Condition at Discharge:  Stable  _______________________________________________________________________  Follow up with:   PCP : Giancarlo Wellington MD  Follow-up Information     Follow up With Details Comments [address], MD In 1 week  [address]  [address]40 [facility] 1990 [facility]      Isreal Spurling, MD  As needed [address]  [phone]                Total time in minutes spent coordinating this discharge (includes going over instructions, follow-up, prescriptions, and preparing report for sign off to her PCP) :  35 inutes    Signed:  Nate Aquino NP

## 2017-02-13 NOTE — DISCHARGE INSTRUCTIONS
Patient Discharge Instructions     Pt Name  Chrissy Curtis. Date of Birth 1935   Age  [de-identified] y.o. Medical Record Number  [id number]   PCP Miguel Ross MD    Admit date:  2/5/2017 @    [place] [address]    Room Number  7035/44   Date of Discharge 2/13/2017     Admission Diagnoses:     Community acquired bacterial pneumonia          Allergies   Allergen Reactions    Norco [Hydrocodone-Acetaminophen] Other (comments)     Too sedated and felt like he was in a daze    Pcn [Penicillins] Rash    Percocet [Oxycodone-Acetaminophen] Nausea and Vomiting        You were admitted to [address] for  Community acquired bacterial pneumonia    YOUR [place] (BUT NOT LIMITED TO ):  Present on Admission:  Yoel [address] acquired bacterial pneumonia   Hypothyroidism   Essential hypertension   CAD (Coronary Artery Disease)--s/p PCI--MARY ANN Paradox stents x4 5/09;MARY ANN Taxus stents x3 8/09   BPH (benign prostatic hypertrophy)   Dyslipidemia   Successful  PTCA (percutaneous transluminal coronary angioplasty)--90-95% instent restenosis RCA 10/16/10   Chronic back pain--DJD   Prostate cancer (HCC)      DIET:  Cardiac Diet   Recommended activity: Activity as tolerated  Follow up : Follow-up Information     Follow up With Details Comments [address], MD In 1 week  [address] [facility]  [address]      Jose Alberto Oneal MD  As needed [address] [place] [address]  [phone]             Skilled nursing facility MD responsible for above upon discharge. · It is important that you take the medication exactly as they are prescribed. · Keep your medication in the bottles provided by the pharmacist and keep a list of the medication names, dosages, and times to be taken in your wallet. · Do not take other medications without consulting your doctor. ADDITIONAL INFORMATION: If you experience any of the following symptoms or have any health problem not listed below, then please call your primary care physician or return to the emergency room if you cannot get hold of your doctor: Fever, chills, nausea, vomiting, diarrhea, change in mentation, falling, bleeding, shortness of breath. I understand that if any problems occur once I am discharged, I am supposed to call my Primary care physician for further care or seek help in the Emergency Department at the nearest Healthcare facility. I have had an opportunity to discuss my clinical issues with my doctor and nursing staff. I understand and acknowledge receipt of the above instructions.                                                                                                                                            Physician's or R.N.'s Signature                                                            Date/Time                                                                                                                                              Patient or Representative Signature                                                 Date/Time

## 2017-02-13 NOTE — PROGRESS NOTES
Bedside shift change report given to Adrian W rKisten Zambrano (oncoming nurse) by Cassandra Garcias (offgoing nurse). Report included the following information SBAR.

## 2017-02-13 NOTE — PROGRESS NOTES
I spoke with Mrs Castro(USHA) who anticipates d/c today to SNF if stable. She would transport if off oxygen. Staff please document exercise PO RA. Thanks    11a  Pt is on RA. I left  for dtr regarding transportation. Anticipate d/c to [facility] [address] ~1p. Please call report to [phone], send emar, d/c instructions, and Rx for narcotics if any    1400  USHA said she will be in to transport him after 2p. I spoke with the pt who wants to go home vs the SNF. DIL and son will speak with him. They feel strongly that he needs SNF for a couple of weeks before d/cing to the granddaughter's home. 1600  DIL//son will be here after work to speak with pt. They feel he needs SNF. I notified SNF of possible late admission.   Discussed with staff

## 2017-02-14 NOTE — PROGRESS NOTES
Pt discharged to [facility], report given to [place] with SBAR, opportunity for questions. Pt's IV's removed, discharge instructions given to pt and family with opportunity for questions. Pt transported to [facility] by family.

## 2017-02-17 ENCOUNTER — PATIENT OUTREACH (OUTPATIENT)
Dept: FAMILY MEDICINE CLINIC | Age: 82
End: 2017-02-17

## 2017-02-17 NOTE — PROGRESS NOTES
[address] Discharge [facility]I [facility] Follow-Up    [facility] on 17. Patient hospitalized @ [facility] 17-17. RRAT score: 27 High    Diagnoses:  Acute respiratory failure with hypoxia, Community acquired PNA, Sepsis, POA, Chemical Pancreatitis, epigastic pain/elevated lipase. Normal imaging, Hyperbilirubinemia, Acute kidney Injury, HTN/CAD, Elevated troponin,  BPH, Hypothyroidism, Acute encephalopathy  Tobacco use, Hypokalemia     Consult;  GI      Discharge Instructions/Plans:  1. Disposition- [facility] MD responsible upon discharge. 2. DIET:  Cardiac Diet   3. Recommended activity: Activity as tolerated  4. will need to repeat cxr in 4-6 weeks to document resolution of infiltration  5. follow BMP and UOP  6. Avoid sedating meds  7. Follow-up   Mannie Castellanos MD In 1 week   [address] [place], [place] [address]y  [address]  [phone]    Angelica Damon MD As needed   [address]  [address]  [phone]            2:29 PM-2:45 PM- outreach  (call) To [facility] [phone]. Transferred to [facility] unit. Spoke with nurse Johnathan Verma. Pt ID verified with 2 identifiers, name and . NN introduction. Reason for call stated.     SNF nurse reported \"pt doing pretty good. He wants to go home. Still weak. Doing PT. Getting around with a w/c. Med reconciliation done with nurse Johnathan Verma. Vit D3 not listed in SNF med list.      Support System:  Son, daughter-in-law    ACP - not on file. Not discussed during this call.      Barriers- none identified @ this time     Discharge Instructions- reviewed with SNF nurse. As per SNF nurse SNF provider Dr Keke Gross managing pt's care while in SNF. LOS unknown. Discharge Planner Bambi Gitelman (ext [phone]).      PCP f/u - within 1 week of SNF discharge    Call transferred to Discharge Planner's voice mail. Message left with NN contact # & return call request.    2:46-2:48 PM- Call to pt's son Manpreet Snowden (EC). Given contact # for this NN. Requested call when SNF discharge date/plan known. Per son pt's SNF LOS is \"2 weeks\".

## 2017-02-27 RX ORDER — LOSARTAN POTASSIUM AND HYDROCHLOROTHIAZIDE 12.5; 1 MG/1; MG/1
TABLET ORAL
Qty: 30 TAB | Refills: 3 | Status: SHIPPED | OUTPATIENT
Start: 2017-02-27 | End: 2017-07-06 | Stop reason: SDUPTHER

## 2017-03-15 ENCOUNTER — PATIENT OUTREACH (OUTPATIENT)
Dept: FAMILY MEDICINE CLINIC | Age: 82
End: 2017-03-15

## 2017-03-15 ENCOUNTER — OFFICE VISIT (OUTPATIENT)
Dept: FAMILY MEDICINE CLINIC | Age: 82
End: 2017-03-15

## 2017-03-15 VITALS
WEIGHT: 160.5 LBS | HEIGHT: 63 IN | HEART RATE: 61 BPM | DIASTOLIC BLOOD PRESSURE: 100 MMHG | OXYGEN SATURATION: 97 % | SYSTOLIC BLOOD PRESSURE: 180 MMHG | TEMPERATURE: 97.4 F | BODY MASS INDEX: 28.44 KG/M2 | RESPIRATION RATE: 18 BRPM

## 2017-03-15 DIAGNOSIS — H91.93 DEAFNESS, BILATERAL: ICD-10-CM
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
## 2017-03-15 DIAGNOSIS — J15.9 COMMUNITY ACQUIRED BACTERIAL PNEUMONIA: Primary | ICD-10-CM

## 2017-03-15 DIAGNOSIS — Z23 ENCOUNTER FOR IMMUNIZATION: ICD-10-CM

## 2017-03-15 DIAGNOSIS — E78.5 DYSLIPIDEMIA: ICD-10-CM

## 2017-03-15 DIAGNOSIS — I10 ESSENTIAL HYPERTENSION: ICD-10-CM
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
## 2017-03-15 RX ORDER — ATORVASTATIN CALCIUM 40 MG/1
40 TABLET, FILM COATED ORAL DAILY
Qty: 90 TAB | Refills: 3 | Status: SHIPPED | OUTPATIENT
Start: 2017-03-15 | End: 2018-05-02 | Stop reason: SDUPTHER

## 2017-03-15 RX ORDER — VALSARTAN 160 MG/1
160 TABLET ORAL DAILY
Qty: 30 TAB | Refills: 0 | Status: CANCELLED | OUTPATIENT
Start: 2017-03-15 | End: 2017-04-14

## 2017-03-15 NOTE — PROGRESS NOTES
Chief Complaint   Patient presents with    Follow-up     Pneumonia. Patient sometimes forgets to take his medicine or eat.  Leg Pain     Left started with the pneumonia. 1. Have you been to the ER, urgent care clinic since your last visit? Hospitalized since your last visit? Yes When: 2/5/17 Where: [facility] Reason for visit: Pneumonia    2. Have you seen or consulted any other health care providers outside of the [address] [place] [address] since your last visit? Include any pap smears or colon screening. No       Daughter in law with patient today. Patient needs a hearing aids.

## 2017-03-15 NOTE — MR AVS SNAPSHOT
Visit Information Date & Time Provider Department Dept. Phone Encounter #  
 3/15/2017 11:00 AM Rachele Matos MD [facility] [phone] [id number] Follow-up Instructions Return in about 2 weeks (around 3/29/2017) for  NV for BP check. Routing History Follow-up and Disposition History Upcoming Health Maintenance Date Due Pneumococcal 65+ High/Highest Risk (2 of 2 - PCV13) 1/12/2012 MEDICARE YEARLY EXAM 5/18/2016 GLAUCOMA SCREENING Q2Y 3/2/2017 ZOSTER VACCINE AGE 60> 4/16/2017* DTaP/Tdap/Td series (1 - Tdap) 4/16/2017* COLONOSCOPY 8/31/2018 *Topic was postponed. The date shown is not the original due date. Allergies as of 3/15/2017  Review Complete On: 3/15/2017 By: Rachele Matos MD  
  
 Severity Noted Reaction Type Reactions Norco [Hydrocodone-acetaminophen]  01/18/2013    Other (comments) Too sedated and felt like he was in a daze Pcn [Penicillins]  10/09/2009    Rash Percocet [Oxycodone-acetaminophen]  05/02/2011   Side Effect Nausea and Vomiting Current Immunizations  Reviewed on 3/15/2017 Name Date Influenza High Dose Vaccine PF 10/5/2015 12:38 PM, 2/12/2015, 11/4/2013 Influenza Vaccine 10/1/2016 Influenza Vaccine Split 10/15/2012, 12/1/2010 Pneumococcal Conjugate (PCV-13) 3/15/2017 Pneumococcal Polysaccharide (PPSV-23) 1/12/2011 Reviewed by James Zepeda LPN on 1/53/0768 at 54:30 PM  
 Reviewed by James Zepeda LPN on 0/89/3903 at 92:79 PM  
You Were Diagnosed With   
  
 Codes Comments Community acquired bacterial pneumonia    -  Primary ICD-10-CM: J15.9 ICD-9-CM: 482.9 Fall at home, initial encounter     ICD-10-CM: W19. Nathaniel Torres, N63.115 
ICD-9-CM: E888.9, [id number] Deafness, bilateral     ICD-10-CM: H91.93 
ICD-9-CM: 866. 9 Dyslipidemia     ICD-10-CM: E78.5 ICD-9-CM: 272.4 Essential hypertension     ICD-10-CM: I10 
ICD-9-CM: 401.9  Elevated BP     ICD-10-CM: I10 
 ICD-9-CM: 401.9 Encounter for immunization     ICD-10-CM: T06 ICD-9-CM: V03.89 Vitals BP Pulse Temp Resp Height(growth percentile) Weight(growth percentile) (!) 180/100 (BP 1 Location: Left arm, BP Patient Position: Sitting) 61 97.4 °F (36.3 °C) (Oral) 18 5' 3\" (1.6 m) 160 lb 8 oz (72.8 kg) SpO2 BMI Smoking Status 97% 28.43 kg/m2 Former Smoker Vitals History BMI and BSA Data Body Mass Index Body Surface Area  
 28.43 kg/m 2 1.8 m 2 Preferred Pharmacy Pharmacy Name Phone [id number] [address] [phone] Your Updated Medication List  
  
   
This list is accurate as of: 3/15/17 12:09 PM.  Always use your most recent med list.  
  
  
  
  
 albuterol-ipratropium 2.5 mg-0.5 mg/3 ml Nebu Commonly known as:  DUO-NEB  
3 mL by Nebulization route every six (6) hours as needed. amLODIPine 5 mg tablet Commonly known as:  Quiles Cater Take 1 Tab by mouth daily. aspirin delayed-release 81 mg tablet Take 1 Tab by mouth daily for 360 days. atorvastatin 40 mg tablet Commonly known as:  LIPITOR Take 1 Tab by mouth daily. YRN. Give patient BRAND LIPITOR. D/C Crestor  
  
 finasteride 5 mg tablet Commonly known as:  PROSCAR  
daily. FISH OIL 1,000 mg Cap Generic drug:  omega-3 fatty acids-vitamin e Take 1 Cap by mouth daily. isosorbide mononitrate ER 30 mg tablet Commonly known as:  IMDUR  
TAKE 1 TABLET BY MOUTH EVERY DAY  
  
 levothyroxine 88 mcg tablet Commonly known as:  SYNTHROID  
TAKE 1 TABLET BY MOUTH DAILY (BEFORE BREAKFAST). RECHECK LEVEL IN 4-6 WEEKS  
  
 losartan-hydroCHLOROthiazide 100-12.5 mg per tablet Commonly known as:  HYZAAR  
TAKE 1 TABLET BY MOUTH EVERY DAY  
  
 metoprolol succinate 25 mg XL tablet Commonly known as:  TOPROL-XL Take 1 Tab by mouth daily for 30 days. tamsulosin 0.4 mg capsule Commonly known as:  FLOMAX TAKE 1 CAPSULE EVERY DAY  
  
 traMADol 50 mg tablet Commonly known as:  ULTRAM  
Take 1 Tab by mouth every eight (8) hours as needed for Pain. Max Daily Amount: 150 mg. TAKE 1-2 TABLETS BY MOUTH EVERY 6 HOURS AS NEEDED FOR PAIN  
  
 VITAMIN C 1,000 mg tablet Generic drug:  ascorbic acid (vitamin C) Take  by mouth. VITAMIN D3 PO Take  by mouth. Prescriptions Sent to Pharmacy Refills  
 atorvastatin (LIPITOR) 40 mg tablet 3 Sig: Take 1 Tab by mouth daily. YRN. Give patient BRAND LIPITOR. D/C Crestor Class: Normal  
 [facility] #: [phone] Route: Oral  
  
We Performed the Following ADMIN PNEUMOCOCCAL VACCINE [ [facility]] PNEUMOCOCCAL CONJ VACCINE 13 VALENT IM [id number] CPT(R)] REFERRAL TO AUDIOLOGY [REF7 Custom] Comments:  
 Please evaluate patient for deafness. Follow-up Instructions Return in about 2 weeks (around 3/29/2017) for  NV for BP check. Referral Information Referral ID Referred By Referred To  
  
 [id number] Mary Mail Not Available Visits Status Start Date End Date 1 New Request 3/15/17 3/15/18 If your referral has a status of pending review or denied, additional information will be sent to support the outcome of this decision. Introducing \[facility] [place] [facility]\"" & HEALTH SERVICES! Idania Storey introduces [employer] patient portal. Now you can access parts of your medical record, email your doctor's office, and request medication refills online. 1. In your internet browser, go to [email or web address] 2. Click on the First Time User? Click Here link in the Sign In box. You will see the New Member Sign Up page. 3. Enter your [employer] Access Code exactly as it appears below. You will not need to use this code after youve completed the sign-up process.  If you do not sign up before the expiration date, you must request a new code. · [employer] Access Code: [email or web address] Expires: 4/16/2017  5:16 PM 
 
4. Enter the last four digits of your Social Security Number (xxxx) and Date of Birth (mm/dd/yyyy) as indicated and click Submit. You will be taken to the next sign-up page. 5. Create a PerfectPost ID. This will be your [employer] login ID and cannot be changed, so think of one that is secure and easy to remember. 6. Create a [employer] password. You can change your password at any time. 7. Enter your Password Reset Question and Answer. This can be used at a later time if you forget your password. 8. Enter your e-mail address. You will receive e-mail notification when new information is available in [address] 9. Click Sign Up. You can now view and download portions of your medical record. 10. Click the Download Summary menu link to download a portable copy of your medical information. If you have questions, please visit the Frequently Asked Questions section of the [employer] website. Remember, [employer] is NOT to be used for urgent needs. For medical emergencies, dial 911. Now available from your iPhone and Android! Please provide this summary of care documentation to your next provider. Your primary care clinician is listed as [id number] If you have any questions after today's visit, please call [phone].

## 2017-03-15 NOTE — PROGRESS NOTES
NN Note    12:15 PM-12:45 PM- met with pt & daughter-in-law Christophersabiha Moeller in NN office after provider encounter.    [facility] Transition of Care complete. -F/U appointment(s) attended as scheduled. [facility] FRANCK Episode 2/5/17-2/13/17.     Will open Complex CM Episode

## 2017-03-15 NOTE — PROGRESS NOTES
Here with daughter in law. States is using OTC ED meds. Staying with her at present. Has home nurse and PT tomorrow. PT seeing for leg pain. On losartan/HCTZ 100/12.5 for BP. Readings very high today in office. Nurse history read and confirmed by patient. Visit Vitals    BP (!) 180/100 (BP 1 Location: Left arm, BP Patient Position: Sitting)  Comment: Manual    Pulse 61    Temp 97.4 °F (36.3 °C) (Oral)    Resp 18    Ht 5' 3\" (1.6 m)    Wt 160 lb 8 oz (72.8 kg)    SpO2 97%    BMI 28.43 kg/m2       Patient alert and cooperative. Lungs clear. Assessment:  1. Recent hospitalization and post discharge referral to skilled nursing facility for community acquired pneumonia. 2. Elevated blood pressure today. 3. Some issues with patient taking his meds. 4. Had a fall at home post discharge secondary to left leg symptoms, which have been present since hospitalization. 5. Bilateral deafness. 6. Hyperlipids, on statin. Plan:  1. Prevnar given today. 2. Set up audiologist referral.  3. Return in two weeks for nurse visit, BP check. If persistent elevated readings will need to adjust BP meds. 4. Follow otherwise here prn.

## 2017-03-17 NOTE — PROGRESS NOTES
NN Complex CM Note    Summary of Chronic Conditions:  - 81 yr old AA male. Hx of CAD, HTN, Prostate CA, Dyslipidemia.   - Recent hospitalization for CAP, Sepsis. D/C'd to SNF. SNF disposition- son /daughter-in-law's home. ED visit 4/2016  - CAD- last Cardio f/u 7/27/16 with Dr Susan Mclaughlin. Stable from cardiac standpoint. Next f/u in 6 months (Jan 2017). No appt as of yet. - HTN-  BP elevated 180/110 during 3/15/17 PCP ov. Recommendation for 2 week nurse f/u for BP check. If still elevated consider BP meds adjustment. No appt scheduled as of yet. - Prostate CA- last Uro f/u with Dr Tre Haro 9/13/16. PSA 0.247. Recommendation for 6 month f/u for PSA recheck    Challenges for Self-Management:  - Cognition- questionable disorientation episodes as per daughter-in-law  - Hard of Hearing    - Medication Adherence, self management of meds  - Safety- questionable if independent living appropriate    Self Management Plan:  - Audiology evaluation/assessment  - Currently meds prepared by daughter-in-law in pill box. Daughter-in law to assess pt's ability to   self manage meds  - Pt currently staying with son & daughter-in-law. Looking [facility]. Consider transition to [facility]. ACP:  Not on file. Plan to discuss Honoring Cho[employer] ACP program.    Goals      Coordination of Care            3/15/17- facilitate with scheduling specialty appts-ID       Facilitate transitions of care (ie. palliative care, assisted living, nursing home, changes in living arrangements). 3/15/17- independent living facility vs [facility]. Safety concerns due to hearing difficulty, fall risk -ID       Medication Adherence            3/15/17- consider utilization of pharmacy with home delivery bubble package program-ID          Next NN outreach ~ 14-21 days. F/u with daughter-in-law for update on management plan/goals.

## 2017-03-22 ENCOUNTER — DOCUMENTATION ONLY (OUTPATIENT)
Dept: FAMILY MEDICINE CLINIC | Age: 82
End: 2017-03-22

## 2017-03-22 NOTE — PROGRESS NOTES
Info to the [facility] cancer registry that patient was last seen here 3/15/17  Info faxed to [phone]

## 2017-04-16 DIAGNOSIS — N40.0 BENIGN PROSTATIC HYPERPLASIA, PRESENCE OF LOWER URINARY TRACT SYMPTOMS UNSPECIFIED, UNSPECIFIED MORPHOLOGY: ICD-10-CM

## 2017-04-17 RX ORDER — TAMSULOSIN HYDROCHLORIDE 0.4 MG/1
CAPSULE ORAL
Qty: 90 CAP | Refills: 1 | Status: SHIPPED | OUTPATIENT
Start: 2017-04-17 | End: 2017-10-12 | Stop reason: SDUPTHER

## 2017-04-17 NOTE — TELEPHONE ENCOUNTER
He is due for his [address]- he is also due for a blood pressure recheck. Labs are current so only needs 30 minute appt.  He had appt on 4/13/17 he did not keep

## 2017-05-11 RX ORDER — LEVOTHYROXINE SODIUM 88 UG/1
TABLET ORAL
Qty: 30 TAB | Refills: 0 | Status: SHIPPED | OUTPATIENT
Start: 2017-05-11 | End: 2017-06-09 | Stop reason: SDUPTHER

## 2017-05-11 NOTE — TELEPHONE ENCOUNTER
He has had a lot of labs done over the last 12 months but not his thyroid recheck. Needs to schedule recheck appt with Dr Serge Ferrara and he can get his lab done on the same day. He was on for recheck in April but did not keep this appt.  He does not need to fast for this lab

## 2017-05-15 ENCOUNTER — OFFICE VISIT (OUTPATIENT)
Dept: FAMILY MEDICINE CLINIC | Age: 82
End: 2017-05-15

## 2017-05-15 VITALS
DIASTOLIC BLOOD PRESSURE: 85 MMHG | HEART RATE: 70 BPM | BODY MASS INDEX: 28.74 KG/M2 | RESPIRATION RATE: 16 BRPM | WEIGHT: 162.2 LBS | SYSTOLIC BLOOD PRESSURE: 145 MMHG | TEMPERATURE: 97.6 F | HEIGHT: 63 IN | OXYGEN SATURATION: 97 %

## 2017-05-15 DIAGNOSIS — M25.562 CHRONIC PAIN OF LEFT KNEE: ICD-10-CM

## 2017-05-15 DIAGNOSIS — I10 ESSENTIAL HYPERTENSION: ICD-10-CM

## 2017-05-15 DIAGNOSIS — R73.09 INCREASED GLUCOSE LEVEL: ICD-10-CM

## 2017-05-15 DIAGNOSIS — E55.9 VITAMIN D DEFICIENCY: ICD-10-CM

## 2017-05-15 DIAGNOSIS — G89.29 CHRONIC PAIN OF LEFT KNEE: ICD-10-CM

## 2017-05-15 DIAGNOSIS — E03.9 HYPOTHYROIDISM, UNSPECIFIED TYPE: Primary | ICD-10-CM

## 2017-05-15 DIAGNOSIS — R73.03 PREDIABETES: ICD-10-CM

## 2017-05-15 RX ORDER — METOPROLOL SUCCINATE 25 MG/1
TABLET, EXTENDED RELEASE ORAL
Refills: 2 | COMMUNITY
Start: 2017-04-09 | End: 2017-08-08 | Stop reason: SDUPTHER

## 2017-05-15 NOTE — PROGRESS NOTES
Chief Complaint   Patient presents with    Blood Pressure Check    Labs     Thyroid check    Leg Pain     Left leg gives out at times started 2 months ago. 1. Have you been to the ER, urgent care clinic since your last visit? Hospitalized since your last visit? No    2. Have you seen or consulted any other health care providers outside of the [address] [place] [address] since your last visit? Include any pap smears or colon screening. No     I have reviewed Health Maintenance with the patient and updated. Patient says he has a Living Will.

## 2017-05-15 NOTE — MR AVS SNAPSHOT
Visit Information Date & Time Provider Department Dept. Phone Encounter #  
 5/15/2017  3:00 PM Johan Cornelius MD [facility] [phone] [id number] Follow-up Instructions Return in about 4 weeks (around 6/12/2017) for MWE with [facility]. Upcoming Health Maintenance Date Due  
 MEDICARE YEARLY EXAM 5/18/2016 GLAUCOMA SCREENING Q2Y 6/14/2017* ZOSTER VACCINE AGE 60> 8/13/2017* DTaP/Tdap/Td series (1 - Tdap) 8/13/2017* INFLUENZA AGE 9 TO ADULT 8/1/2017 COLONOSCOPY 8/31/2018 *Topic was postponed. The date shown is not the original due date. Allergies as of 5/15/2017  Review Complete On: 5/15/2017 By: Meet Calles RN Severity Noted Reaction Type Reactions Norco [Hydrocodone-acetaminophen]  01/18/2013    Other (comments) Too sedated and felt like he was in a daze Pcn [Penicillins]  10/09/2009    Rash Percocet [Oxycodone-acetaminophen]  05/02/2011   Side Effect Nausea and Vomiting Current Immunizations  Reviewed on 3/15/2017 Name Date Influenza High Dose Vaccine PF 10/5/2015 12:38 PM, 2/12/2015, 11/4/2013 Influenza Vaccine 10/1/2016 Influenza Vaccine Split 10/15/2012, 12/1/2010 Pneumococcal Conjugate (PCV-13) 3/15/2017 Pneumococcal Polysaccharide (PPSV-23) 1/12/2011 Not reviewed this visit You Were Diagnosed With   
  
 Codes Comments Hypothyroidism, unspecified type    -  Primary ICD-10-CM: E03.9 ICD-9-CM: 244.9 Essential hypertension     ICD-10-CM: I10 
ICD-9-CM: 401.9 Vitamin D deficiency     ICD-10-CM: E55.9 ICD-9-CM: 268.9 Prediabetes     ICD-10-CM: R73.03 
ICD-9-CM: 790.29 Increased glucose level     ICD-10-CM: R73.09 
ICD-9-CM: 790.29 Chronic pain of left knee     ICD-10-CM: M25.562, G89.29 ICD-9-CM: 719.46, 338.29 Vitals BP Pulse Temp Resp Height(growth percentile) Weight(growth percentile) 145/85 (BP 1 Location: Right arm, BP Patient Position: Sitting) 70 97.6 °F (36.4 °C) (Oral) 16 5' 3\" (1.6 m) 162 lb 3.2 oz (73.6 kg) SpO2 BMI Smoking Status 97% 28.73 kg/m2 Former Smoker Vitals History BMI and BSA Data Body Mass Index Body Surface Area 28.73 kg/m 2 1.81 m 2 Preferred Pharmacy Pharmacy Name Phone [facility][id number] Hernandez, [address] [place] [address] [phone] Your Updated Medication List  
  
   
This list is accurate as of: 5/15/17  3:18 PM.  Always use your most recent med list.  
  
  
  
  
 albuterol-ipratropium 2.5 mg-0.5 mg/3 ml Nebu Commonly known as:  DUO-NEB  
3 mL by Nebulization route every six (6) hours as needed. amLODIPine 5 mg tablet Commonly known as:  [place] Take 1 Tab by mouth daily. aspirin delayed-release 81 mg tablet Take 1 Tab by mouth daily for 360 days. atorvastatin 40 mg tablet Commonly known as:  LIPITOR Take 1 Tab by mouth daily. YNR. Give patient BRAND LIPITOR. D/C Crestor  
  
 finasteride 5 mg tablet Commonly known as:  PROSCAR  
daily. FISH OIL 1,000 mg Cap Generic drug:  omega-3 fatty acids-vitamin e Take 1 Cap by mouth daily. isosorbide mononitrate ER 30 mg tablet Commonly known as:  IMDUR  
TAKE 1 TABLET BY MOUTH EVERY DAY  
  
 levothyroxine 88 mcg tablet Commonly known as:  SYNTHROID  
TAKE 1 TABLET BY MOUTH EVERY DAY BEFORE BREAKFAST AND RECHECK LEVEL IN 4 TO 6 WEEKS  
  
 losartan-hydroCHLOROthiazide 100-12.5 mg per tablet Commonly known as:  HYZAAR  
TAKE 1 TABLET BY MOUTH EVERY DAY  
  
 tamsulosin 0.4 mg capsule Commonly known as:  FLOMAX TAKE 1 CAPSULE BY MOUTH EVERY DAY  
  
 traMADol 50 mg tablet Commonly known as:  ULTRAM  
Take 1 Tab by mouth every eight (8) hours as needed for Pain. Max Daily Amount: 150 mg. TAKE 1-2 TABLETS BY MOUTH EVERY 6 HOURS AS NEEDED FOR PAIN  
  
 VITAMIN C 1,000 mg tablet Generic drug:  ascorbic acid (vitamin C) Take  by mouth. VITAMIN D3 PO Take  by mouth. We Performed the Following HEMOGLOBIN A1C WITH EAG [69542 CPT(R)] REFERRAL TO ORTHOPEDICS [LFL493 Custom] T4, FREE [id number] CPT(R)] TSH 3RD GENERATION [60950 CPT(R)] UA/M W/RFLX CULTURE, ROUTINE [[id number] Custom] Follow-up Instructions Return in about 4 weeks (around 6/12/2017) for MWE with Janeth Kody. Referral Information Referral ID Referred By Referred To  
  
 [id number] ИРИНА, [address] Phone: [phone] Visits Status Start Date End Date 1 New Request 5/15/17 5/15/18 If your referral has a status of pending review or denied, additional information will be sent to support the outcome of this decision. Introducing [facility] & HEALTH SERVICES! Matias Collins introduces [employer] patient portal. Now you can access parts of your medical record, email your doctor's office, and request medication refills online. 1. In your internet browser, go to [email or web address] 2. Click on the First Time User? Click Here link in the Sign In box. You will see the New Member Sign Up page. 3. Enter your [employer] Access Code exactly as it appears below. You will not need to use this code after youve completed the sign-up process. If you do not sign up before the expiration date, you must request a new code. · [employer] Access Code: [email or web address] Expires: 7/11/2017  9:29 PM 
 
4. Enter the last four digits of your Social Security Number (xxxx) and Date of Birth (mm/dd/yyyy) as indicated and click Submit. You will be taken to the next sign-up page. 5. Create a Toperat ID. This will be your [employer] login ID and cannot be changed, so think of one that is secure and easy to remember. 6. Create a [employer]at password. You can change your password at any time. 7. Enter your Password Reset Question and Answer. This can be used at a later time if you forget your password. 8. Enter your e-mail address. You will receive e-mail notification when new information is available in [address] 9. Click Sign Up. You can now view and download portions of your medical record. 10. Click the Download Summary menu link to download a portable copy of your medical information. If you have questions, please visit the Frequently Asked Questions section of the [employer] website. Remember, [employer] is NOT to be used for urgent needs. For medical emergencies, dial 911. Now available from your iPhone and Android! Please provide this summary of care documentation to your next provider. Your primary care clinician is listed as [phone] If you have any questions after today's visit, please call [phone].

## 2017-05-15 NOTE — LETTER
5/16/2017 4:43 PM 
 
Mr[address] [phone] Dear Asher Scheuermann.: 
 
Please find your most recent results below. Resulted Orders T4, FREE Result Value Ref Range T4, Free 0.99 0.82 - 1.77 ng/dL Narrative Performed at:  [address]  [id number] : Clarice Beavers MD, Phone:  [phone] TSH 3RD GENERATION Result Value Ref Range TSH 12.090 (H) 0.450 - 4.500 uIU/mL Narrative Performed at:  [address]  [id number] : Clarice Beavers MD, Phone:  [phone] UA/M W/RFLX CULTURE, ROUTINE Result Value Ref Range Specific Gravity 1.014 1.005 - 1.030  
 pH (UA) 6.0 5.0 - 7.5 Color Yellow Yellow Appearance Clear Clear Leukocyte Esterase Negative Negative Protein Trace Negative/Trace Glucose Negative Negative Ketone Negative Negative Blood Trace (A) Negative Bilirubin Negative Negative Urobilinogen 0.2 0.2 - 1.0 mg/dL Nitrites Negative Negative Microscopic Examination See additional order Comment:  
   Microscopic was indicated and was performed. URINALYSIS REFLEX Comment Comment: This specimen will not reflex to a Urine Culture. Narrative Performed at:  [address]  [id number] : Clarice Beavers MD, Phone:  [phone] HEMOGLOBIN A1C WITH EAG Result Value Ref Range Hemoglobin A1c 5.9 (H) 4.8 - 5.6 % Comment:  
            Pre-diabetes: 5.7 - 6.4 Diabetes: >6.4 Glycemic control for adults with diabetes: <7.0 Estimated average glucose 123 mg/dL Narrative Performed at:  [address]  [id number] : Clarice Beavers MD, Phone:  [phone] MICROSCOPIC EXAMINATION Result Value Ref Range  WBC 0-5 0 - 5 /hpf  
 RBC 0-2 0 - 2 /hpf Epithelial cells 0-10 0 - 10 /hpf Casts None seen None seen /lpf Mucus Present Not Estab. Bacteria None seen None seen/Few Narrative Performed at:  [address]  [id number] : Fercho Ricardo MD, Phone:  [phone] Please call me if you have any questions: [phone] Sincerely, Marcie Anderson MD

## 2017-05-15 NOTE — PROGRESS NOTES
Knee gives out daily. Has fallen to ground. Pain at night if tries to turn over. Needs routine labs checked not done elsewhere. Visit Vitals    /85 (BP 1 Location: Right arm, BP Patient Position: Sitting)    Pulse 70    Temp 97.6 °F (36.4 °C) (Oral)    Resp 16    Ht 5' 3\" (1.6 m)    Wt 162 lb 3.2 oz (73.6 kg)    SpO2 97%    BMI 28.73 kg/m2       Patient alert and cooperative. Reviewed above. Assessment:  1. Here for lab check. 2. Left leg pain, knee giving out nearly daily, has had falls. Plan:  1. Set up ortho referral for further evaluation of left leg. 2. Check labs per orders. 3. Set up wellness with nurse practitioner. 4. Follow otherwise here prn.

## 2017-05-16 LAB
APPEARANCE UR: CLEAR
BACTERIA #/AREA URNS HPF: NORMAL /[HPF]
BILIRUB UR QL STRIP: NEGATIVE
CASTS URNS QL MICRO: NORMAL /LPF
COLOR UR: YELLOW
EPI CELLS #/AREA URNS HPF: NORMAL /HPF
EST. AVERAGE GLUCOSE BLD GHB EST-MCNC: 123 MG/DL
GLUCOSE UR QL: NEGATIVE
HBA1C MFR BLD: 5.9 % (ref 4.8–5.6)
HGB UR QL STRIP: ABNORMAL
KETONES UR QL STRIP: NEGATIVE
LEUKOCYTE ESTERASE UR QL STRIP: NEGATIVE
MICRO URNS: ABNORMAL
MUCOUS THREADS URNS QL MICRO: PRESENT
NITRITE UR QL STRIP: NEGATIVE
PH UR STRIP: 6 [PH] (ref 5–7.5)
PROT UR QL STRIP: ABNORMAL
RBC #/AREA URNS HPF: NORMAL /HPF
SP GR UR: 1.01 (ref 1–1.03)
T4 FREE SERPL-MCNC: 0.99 NG/DL (ref 0.82–1.77)
TSH SERPL DL<=0.005 MIU/L-ACNC: 12.09 UIU/ML (ref 0.45–4.5)
URINALYSIS REFLEX, 377202: ABNORMAL
UROBILINOGEN UR STRIP-MCNC: 0.2 MG/DL (ref 0.2–1)
WBC #/AREA URNS HPF: NORMAL /HPF

## 2017-05-16 NOTE — PROGRESS NOTES
Copy of labs mailed to the patient. Telephone numbers in his chart do not work. [facility] to ask him to call the office and labs will be mailed to him.

## 2017-05-16 NOTE — PROGRESS NOTES
Very low thyroid. If no missed doses increase to next increment and recheck 4-6 weeks. Average BS remains in mid prediabetic range.

## 2017-06-13 RX ORDER — LEVOTHYROXINE SODIUM 88 UG/1
TABLET ORAL
Qty: 30 TAB | Refills: 0 | Status: SHIPPED | OUTPATIENT
Start: 2017-06-13 | End: 2017-08-22 | Stop reason: SDUPTHER

## 2017-06-15 ENCOUNTER — OFFICE VISIT (OUTPATIENT)
Dept: FAMILY MEDICINE CLINIC | Age: 82
End: 2017-06-15

## 2017-06-15 VITALS
OXYGEN SATURATION: 95 % | HEIGHT: 63 IN | TEMPERATURE: 98 F | BODY MASS INDEX: 29.22 KG/M2 | RESPIRATION RATE: 16 BRPM | DIASTOLIC BLOOD PRESSURE: 64 MMHG | WEIGHT: 164.9 LBS | SYSTOLIC BLOOD PRESSURE: 143 MMHG | HEART RATE: 67 BPM

## 2017-06-15 DIAGNOSIS — N52.9 ERECTILE DYSFUNCTION, UNSPECIFIED ERECTILE DYSFUNCTION TYPE: ICD-10-CM

## 2017-06-15 DIAGNOSIS — R07.89 INTERMITTENT LEFT-SIDED CHEST PAIN: Primary | ICD-10-CM

## 2017-06-15 DIAGNOSIS — E03.9 HYPOTHYROIDISM, UNSPECIFIED TYPE: ICD-10-CM

## 2017-06-15 NOTE — MR AVS SNAPSHOT
Visit Information Date & Time Provider Department Dept. Phone Encounter #  
 6/15/2017  2:45 PM Angela Ram MD [facility] [phone] [id number] Follow-up Instructions Return in about 4 weeks (around 7/13/2017) for Becki MWV with NP. Follow-up and Disposition History Upcoming Health Maintenance Date Due  
 MEDICARE YEARLY EXAM 5/18/2016 ZOSTER VACCINE AGE 60> 8/13/2017* DTaP/Tdap/Td series (1 - Tdap) 8/13/2017* GLAUCOMA SCREENING Q2Y 9/13/2017* INFLUENZA AGE 9 TO ADULT 8/1/2017 COLONOSCOPY 8/31/2018 *Topic was postponed. The date shown is not the original due date. Allergies as of 6/15/2017  Review Complete On: 6/15/2017 By: Angela Ram MD  
  
 Severity Noted Reaction Type Reactions Norco [Hydrocodone-acetaminophen]  01/18/2013    Other (comments) Too sedated and felt like he was in a daze Pcn [Penicillins]  10/09/2009    Rash Percocet [Oxycodone-acetaminophen]  05/02/2011   Side Effect Nausea and Vomiting Current Immunizations  Reviewed on 3/15/2017 Name Date Influenza High Dose Vaccine PF 10/5/2015 12:38 PM, 2/12/2015, 11/4/2013 Influenza Vaccine 10/1/2016 Influenza Vaccine Split 10/15/2012, 12/1/2010 Pneumococcal Conjugate (PCV-13) 3/15/2017 Pneumococcal Polysaccharide (PPSV-23) 1/12/2011 Not reviewed this visit You Were Diagnosed With   
  
 Codes Comments Intermittent left-sided chest pain    -  Primary ICD-10-CM: R07.89 ICD-9-CM: 786.59 Erectile dysfunction, unspecified erectile dysfunction type     ICD-10-CM: N52.9 ICD-9-CM: 607.84 Hypothyroidism, unspecified type     ICD-10-CM: E03.9 ICD-9-CM: 545. 9 Vitals BP Pulse Temp Resp Height(growth percentile) Weight(growth percentile) 143/64 (BP 1 Location: Right arm, BP Patient Position: Sitting) 67 98 °F (36.7 °C) 16 5' 3\" (1.6 m) 164 lb 14.4 oz (74.8 kg) SpO2 BMI Smoking Status 95% 29.21 kg/m2 Former Smoker Vitals History BMI and BSA Data Body Mass Index Body Surface Area  
 29.21 kg/m 2 1.82 m 2 Preferred Pharmacy Pharmacy Name Phone [facility] #[id number] Ricardo [address] [place] [address] [phone] Your Updated Medication List  
  
   
This list is accurate as of: 6/15/17  3:17 PM.  Always use your most recent med list.  
  
  
  
  
 albuterol-ipratropium 2.5 mg-0.5 mg/3 ml Nebu Commonly known as:  DUO-NEB  
3 mL by Nebulization route every six (6) hours as needed. amLODIPine 5 mg tablet Commonly known as:  [place] Take 1 Tab by mouth daily. aspirin delayed-release 81 mg tablet Take 1 Tab by mouth daily for 360 days. atorvastatin 40 mg tablet Commonly known as:  LIPITOR Take 1 Tab by mouth daily. YRN. Give patient BRAND LIPITOR. D/C Crestor  
  
 finasteride 5 mg tablet Commonly known as:  PROSCAR  
daily. FISH OIL 1,000 mg Cap Generic drug:  omega-3 fatty acids-vitamin e Take 1 Cap by mouth daily. isosorbide mononitrate ER 30 mg tablet Commonly known as:  IMDUR  
TAKE 1 TABLET BY MOUTH EVERY DAY  
  
 levothyroxine 88 mcg tablet Commonly known as:  SYNTHROID  
TAKE 1 TABLET BY MOUTH EVERY DAY BEFORE BREAKFAST AND RECHECK LEVEL IN 4 TO 6 WEEKS  
  
 losartan-hydroCHLOROthiazide 100-12.5 mg per tablet Commonly known as:  HYZAAR  
TAKE 1 TABLET BY MOUTH EVERY DAY  
  
 metoprolol succinate 25 mg XL tablet Commonly known as:  TOPROL-XL  
TAKE 1 TABLET EVERY DAY  
  
 tamsulosin 0.4 mg capsule Commonly known as:  FLOMAX TAKE 1 CAPSULE BY MOUTH EVERY DAY  
  
 traMADol 50 mg tablet Commonly known as:  ULTRAM  
Take 1 Tab by mouth every eight (8) hours as needed for Pain. Max Daily Amount: 150 mg. TAKE 1-2 TABLETS BY MOUTH EVERY 6 HOURS AS NEEDED FOR PAIN  
  
 VITAMIN C 1,000 mg tablet Generic drug:  ascorbic acid (vitamin C) Take  by mouth. VITAMIN D3 PO Take  by mouth. We Performed the Following REFERRAL TO UROLOGY [CLH034 Custom] Comments:  
 Please evaluate patient for ED txs. On nitrates. T4, FREE [id number] CPT(R)] TSH 3RD GENERATION [69925 CPT(R)] Follow-up Instructions Return in about 4 weeks (around 7/13/2017) for Becki MWV with NP. To-Do List   
 06/15/2017 Imaging:  XR RIBS LT W PA CXR MIN 3 V Referral Information Referral ID Referred By Referred To  
  
 [id number] Pam Hoyt Not Available Visits Status Start Date End Date 1 New Request 6/15/17 6/15/18 If your referral has a status of pending review or denied, additional information will be sent to support the outcome of this decision. Introducing [facility] & HEALTH SERVICES! Karely Santiago introduces [employer] patient portal. Now you can access parts of your medical record, email your doctor's office, and request medication refills online. 1. In your internet browser, go to [email or web address] 2. Click on the First Time User? Click Here link in the Sign In box. You will see the New Member Sign Up page. 3. Enter your [employer] Access Code exactly as it appears below. You will not need to use this code after youve completed the sign-up process. If you do not sign up before the expiration date, you must request a new code. · [employer] Access Code: [email or web address] Expires: 7/11/2017  9:29 PM 
 
4. Enter the last four digits of your Social Security Number (xxxx) and Date of Birth (mm/dd/yyyy) as indicated and click Submit. You will be taken to the next sign-up page. 5. Create a [employer] ID. This will be your [employer] login ID and cannot be changed, so think of one that is secure and easy to remember. 6. Create a [employer] password. You can change your password at any time. 7. Enter your Password Reset Question and Answer. This can be used at a later time if you forget your password. 8. Enter your e-mail address. You will receive e-mail notification when new information is available in [address] 9. Click Sign Up. You can now view and download portions of your medical record. 10. Click the Download Summary menu link to download a portable copy of your medical information. If you have questions, please visit the Frequently Asked Questions section of the [employer] website. Remember, [employer] is NOT to be used for urgent needs. For medical emergencies, dial 911. Now available from your iPhone and Android! Please provide this summary of care documentation to your next provider. Your primary care clinician is listed as [phone] If you have any questions after today's visit, please call [phone].

## 2017-06-15 NOTE — LETTER
6/20/2017 9:19 AM 
 
Mr. [address] [phone] Dear Asher Scheuermann.: 
 
Please find your most recent results below. Resulted Orders TSH 3RD GENERATION Result Value Ref Range TSH 2.920 0.450 - 4.500 uIU/mL Narrative Performed at:  [address]  [id number] : Clarice Beavers MD, Phone:  [phone] T4, FREE Result Value Ref Range T4, Free 1.10 0.82 - 1.77 ng/dL Narrative Performed at:  [address]  [id number] : Clarice Beavers MD, Phone:  [phone] Sincerely, Jose Segundo MD

## 2017-06-15 NOTE — PROGRESS NOTES
Chief Complaint   Patient presents with    Side Pain     Left toward the back started 3 weeks ago. No injury. Worse in the mornings usually feels better by 1 PM.     1. Have you been to the ER, urgent care clinic since your last visit? Hospitalized since your last visit? No    2. Have you seen or consulted any other health care providers outside of the [facility] since your last visit? Include any pap smears or colon screening. No  I have reviewed Health Maintenance with the patient and updated. Patient has a Living Will. The patient was counseled on the dangers of tobacco use, andPatient is a non smoker. Reviewed strategies to maximize success, including Continue not to smoke.

## 2017-06-15 NOTE — PROGRESS NOTES
Pain occurs every morning. Takes tylenol for the pain. Occas awakens from sleep. Always left side around to the back. Out of thyroid med this AM.  Needs handicap sticker for left leg bothers with walking long distance. Requests Viagra script but on chronic nitrate tx so will ref to urol for alternate tx options. Visit Vitals    /64 (BP 1 Location: Right arm, BP Patient Position: Sitting)    Pulse 67    Temp 98 °F (36.7 °C)    Resp 16    Ht 5' 3\" (1.6 m)    Wt 164 lb 14.4 oz (74.8 kg)    SpO2 95%    BMI 29.21 kg/m2       Patient alert and cooperative. Left sided and posterior ribs nontender to percussion, palpation. No symptoms with lateral bending nor rotation. Assessment:  1. Left sided intermittent chest wall pain. Plan:  1. Check left sided rib films. 2. Set up urologist referral for options for ED treatment, on chronic nitrate therapy. 3. Check T4, TSH today. 4. Will need refill of his thyroid med once labs back. 5. Given handicap sticker for inability to walk long distances because of leg symptoms. 6. Follow otherwise here prn.

## 2017-06-16 LAB
T4 FREE SERPL-MCNC: 1.1 NG/DL (ref 0.82–1.77)
TSH SERPL DL<=0.005 MIU/L-ACNC: 2.92 UIU/ML (ref 0.45–4.5)

## 2017-06-20 ENCOUNTER — HOSPITAL ENCOUNTER (OUTPATIENT)
Dept: GENERAL RADIOLOGY | Age: 82
Discharge: HOME | End: 2017-06-20
Payer: MEDICARE

## 2017-06-20 ENCOUNTER — HOSPITAL ENCOUNTER (OUTPATIENT)
Dept: GENERAL RADIOLOGY | Age: 82
Discharge: HOME | End: 2017-06-20
Attending: FAMILY MEDICINE
Payer: MEDICARE

## 2017-06-20 DIAGNOSIS — R07.89 INTERMITTENT LEFT-SIDED CHEST PAIN: ICD-10-CM

## 2017-06-20 DIAGNOSIS — J15.9 COMMUNITY ACQUIRED BACTERIAL PNEUMONIA: ICD-10-CM

## 2017-06-20 PROCEDURE — 71020 XR CHEST PA LAT: CPT

## 2017-06-20 PROCEDURE — 71101 X-RAY EXAM UNILAT RIBS/CHEST: CPT

## 2017-06-20 PROCEDURE — 71100 X-RAY EXAM RIBS UNI 2 VIEWS: CPT

## 2017-06-29 NOTE — PROGRESS NOTES
Spoke to patient's daughter in law-none of his numbers are accurate in his deidre.  She is his -he does not hear well on the phone

## 2017-06-29 NOTE — PROGRESS NOTES
Discussed with family contact for him-his phone numbers in his chart are wrong and he can't hear on the phone.

## 2017-07-06 RX ORDER — LOSARTAN POTASSIUM AND HYDROCHLOROTHIAZIDE 12.5; 1 MG/1; MG/1
TABLET ORAL
Qty: 30 TAB | Refills: 2 | Status: SHIPPED | OUTPATIENT
Start: 2017-07-06 | End: 2017-10-12 | Stop reason: SDUPTHER

## 2017-08-08 RX ORDER — METOPROLOL SUCCINATE 25 MG/1
TABLET, EXTENDED RELEASE ORAL
Qty: 30 TAB | Refills: 2 | Status: SHIPPED | OUTPATIENT
Start: 2017-08-08 | End: 2017-11-08 | Stop reason: SDUPTHER

## 2017-08-12 DIAGNOSIS — I73.9 PVD (PERIPHERAL VASCULAR DISEASE) WITH CLAUDICATION (HCC): ICD-10-CM

## 2017-08-12 DIAGNOSIS — I25.83 CORONARY ARTERY DISEASE DUE TO LIPID RICH PLAQUE: ICD-10-CM

## 2017-08-12 DIAGNOSIS — I25.10 CORONARY ARTERY DISEASE DUE TO LIPID RICH PLAQUE: ICD-10-CM

## 2017-08-14 RX ORDER — ISOSORBIDE MONONITRATE 30 MG/1
TABLET, EXTENDED RELEASE ORAL
Qty: 30 TAB | Refills: 2 | Status: SHIPPED | OUTPATIENT
Start: 2017-08-14 | End: 2017-11-08 | Stop reason: SDUPTHER

## 2017-08-14 RX ORDER — AMLODIPINE BESYLATE 5 MG/1
TABLET ORAL
Qty: 30 TAB | Refills: 2 | Status: SHIPPED | OUTPATIENT
Start: 2017-08-14 | End: 2017-11-08 | Stop reason: SDUPTHER

## 2017-08-23 ENCOUNTER — OFFICE VISIT (OUTPATIENT)
Dept: FAMILY MEDICINE CLINIC | Age: 82
End: 2017-08-23

## 2017-08-23 VITALS
SYSTOLIC BLOOD PRESSURE: 134 MMHG | TEMPERATURE: 96.7 F | WEIGHT: 171.7 LBS | HEART RATE: 83 BPM | HEIGHT: 63 IN | RESPIRATION RATE: 18 BRPM | BODY MASS INDEX: 30.42 KG/M2 | DIASTOLIC BLOOD PRESSURE: 79 MMHG | OXYGEN SATURATION: 96 %

## 2017-08-23 DIAGNOSIS — I63.40 CEREBRAL INFARCTION DUE TO EMBOLISM OF CEREBRAL ARTERY (HCC): ICD-10-CM

## 2017-08-23 DIAGNOSIS — L25.9 CONTACT DERMATITIS, UNSPECIFIED CONTACT DERMATITIS TYPE, UNSPECIFIED TRIGGER: Primary | ICD-10-CM

## 2017-08-23 DIAGNOSIS — I21.3 ST ELEVATION MYOCARDIAL INFARCTION (STEMI), UNSPECIFIED ARTERY (HCC): ICD-10-CM

## 2017-08-23 DIAGNOSIS — R06.00 DYSPNEA, UNSPECIFIED TYPE: ICD-10-CM

## 2017-08-23 DIAGNOSIS — I73.9 PVD (PERIPHERAL VASCULAR DISEASE) WITH CLAUDICATION (HCC): ICD-10-CM

## 2017-08-23 DIAGNOSIS — I20.9 ANGINA, CLASS III (HCC): ICD-10-CM

## 2017-08-23 DIAGNOSIS — C61 PROSTATE CANCER (HCC): ICD-10-CM

## 2017-08-23 PROBLEM — R10.13 EPIGASTRIC ABDOMINAL PAIN: Status: RESOLVED | Noted: 2017-02-13 | Resolved: 2017-08-23

## 2017-08-23 PROBLEM — J15.9 COMMUNITY ACQUIRED BACTERIAL PNEUMONIA: Status: RESOLVED | Noted: 2017-02-05 | Resolved: 2017-08-23

## 2017-08-23 RX ORDER — ASPIRIN 325 MG
TABLET, DELAYED RELEASE (ENTERIC COATED) ORAL
COMMUNITY
End: 2017-10-17 | Stop reason: SDUPTHER

## 2017-08-23 RX ORDER — CLOTRIMAZOLE AND BETAMETHASONE DIPROPIONATE 10; .64 MG/G; MG/G
CREAM TOPICAL
Qty: 60 G | Refills: 0 | Status: SHIPPED | OUTPATIENT
Start: 2017-08-23 | End: 2018-05-20

## 2017-08-23 RX ORDER — LEVOTHYROXINE SODIUM 88 UG/1
TABLET ORAL
Qty: 30 TAB | Refills: 0 | Status: SHIPPED | OUTPATIENT
Start: 2017-08-23 | End: 2017-10-10 | Stop reason: SDUPTHER

## 2017-08-23 RX ORDER — ASPIRIN 325 MG
325 TABLET ORAL DAILY
COMMUNITY
End: 2017-11-07

## 2017-08-23 NOTE — ASSESSMENT & PLAN NOTE
This condition is managed by Specialist.  Key Peripheral Vascular Disease Meds             aspirin (ASPIRIN) 325 mg tablet  (Taking) Take 325 mg by mouth daily. atorvastatin (LIPITOR) 40 mg tablet  (Taking) Take 1 Tab by mouth daily. YRN. Give patient BRAND LIPITOR. D/C Crestor    omega-3 fatty acids-vitamin e (FISH OIL) 1,000 mg cap  (Taking) Take 1 Cap by mouth daily. aspirin delayed-release 81 mg tablet Take 1 Tab by mouth daily for 360 days.         Lab Results   Component Value Date/Time    WBC 8.0 02/12/2017 03:03 AM    HGB 10.2 02/12/2017 03:03 AM    Hemoglobin (POC) 13.3 02/05/2017 03:44 PM    HCT 30.0 02/12/2017 03:03 AM    Hematocrit (POC) 39 02/05/2017 03:44 PM    PLATELET 590 00/69/5960 03:03 AM    Creatinine 0.99 02/13/2017 02:02 AM    Creatinine (POC) 1.9 02/05/2017 03:44 PM    BUN 9 02/13/2017 02:02 AM    BUN (POC) 69 02/05/2017 03:44 PM    INR 1.2 02/05/2017 03:44 PM    Prothrombin time 12.0 02/05/2017 03:44 PM    Cholesterol, total 76 02/09/2017 02:47 AM    HDL Cholesterol 15 02/09/2017 02:47 AM    LDL, calculated 41.6 02/09/2017 02:47 AM    Triglyceride 97 02/09/2017 02:47 AM

## 2017-08-23 NOTE — Clinical Note
Sees card first of next month. States getting SOB past \"couple\" weeks. Rash present for few weeks. Very itchy. States also itching mid chest but no rash present. Visit Vitals  /79 (BP 1 Location: Right arm, BP Patient Position: Sitting)  Pulse 83  Temp 96.7 °F (35.9 °C) (Oral)  Resp 18  Ht 5' 3\" (1.6 m)  Wt 171 lb 11.2 oz (77.9 kg)  SpO2 96%  BMI 30.42 kg/m2 Patient alert and cooperative. Erythematous, scaly areas on bilateral feet, ankles. No visible rash on chest. 
 
Assessment: 
1. Questionable contact versus fungal type rash. Plan: 1. Script for Lotrisone to use twice daily to affected area till clear. 2. Follow up in 7-10 days if not improving, sooner if worse for possible derm referral. 
3. Recheck here otherwise prn. Diagnoses and all orders for this visit: 1. Contact dermatitis, unspecified contact dermatitis type, unspecified trigger 
-     clotrimazole-betamethasone (LOTRISONE) topical cream; Apply twice daily to affected area till resolved. 2. Dyspnea, unspecified type 3. Angina, class III ([address] Assessment & Plan: This condition is managed by Specialist. 
Key CAD CHF Meds   
    
  
 aspirin (ASPIRIN) 325 mg tablet  (Taking) Take 325 mg by mouth daily. isosorbide mononitrate ER (IMDUR) 30 mg tablet  (Taking) TAKE 1 TABLET BY MOUTH EVERY DAY  
 amLODIPine (NORVASC) 5 mg tablet  (Taking) TAKE 1 TAB BY MOUTH DAILY. metoprolol succinate (TOPROL-XL) 25 mg XL tablet  (Taking) TAKE 1 TABLET EVERY DAY  
 losartan-hydroCHLOROthiazide (HYZAAR) 100-12.5 mg per tablet  (Taking) TAKE 1 TABLET BY MOUTH EVERY DAY  
 aspirin delayed-release 81 mg tablet Take 1 Tab by mouth daily for 360 days. Key Antihyperlipidemia Meds   
    
  
 atorvastatin (LIPITOR) 40 mg tablet  (Taking) Take 1 Tab by mouth daily. YRN. Give patient BRAND LIPITOR. D/C Crestor  
 omega-3 fatty acids-vitamin e (FISH OIL) 1,000 mg cap  (Taking) Take 1 Cap by mouth daily. Lab Results Component Value Date/Time Sodium 141 02/13/2017 02:02 AM  
 Sodium (POC) 135 02/05/2017 03:44 PM  
 Potassium 3.9 02/13/2017 02:02 AM  
 Potassium (POC) 3.7 02/05/2017 03:44 PM  
 Cholesterol, total 76 02/09/2017 02:47 AM  
 HDL Cholesterol 15 02/09/2017 02:47 AM  
 LDL, calculated 41.6 02/09/2017 02:47 AM  
 Triglyceride 97 02/09/2017 02:47 AM  
 INR 1.2 02/05/2017 03:44 PM  
 Prothrombin time 12.0 02/05/2017 03:44 PM  
 
 
 
4. 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 
Stable, based on history, physical exam and review of pertinent labs, studies and medications; meds reconciled; continue current treatment plan. Key Neurological Meds   
    
  
 aspirin (ASPIRIN) 325 mg tablet  (Taking) Take 325 mg by mouth daily. aspirin delayed-release 81 mg tablet Take 1 Tab by mouth daily for 360 days. Lab Results Component Value Date/Time WBC 8.0 02/12/2017 03:03 AM  
 HGB 10.2 02/12/2017 03:03 AM  
 Hemoglobin (POC) 13.3 02/05/2017 03:44 PM  
 HCT 30.0 02/12/2017 03:03 AM  
 Hematocrit (POC) 39 02/05/2017 03:44 PM  
 PLATELET 167 40/46/5350 03:03 AM  
 INR 1.2 02/05/2017 03:44 PM  
 Prothrombin time 12.0 02/05/2017 03:44 PM  
 Creatinine 0.99 02/13/2017 02:02 AM  
 Creatinine (POC) 1.9 02/05/2017 03:44 PM  
 BUN 9 02/13/2017 02:02 AM  
 BUN (POC) 69 02/05/2017 03:44 PM  
 
 
 
5. ST elevation myocardial infarction (STEMI), unspecified artery ([facility] [address] Assessment & Plan: This condition is managed by Specialist. 
Key CAD CHF Meds   
    
  
 aspirin (ASPIRIN) 325 mg tablet  (Taking) Take 325 mg by mouth daily. isosorbide mononitrate ER (IMDUR) 30 mg tablet  (Taking) TAKE 1 TABLET BY MOUTH EVERY DAY  
 amLODIPine (NORVASC) 5 mg tablet  (Taking) TAKE 1 TAB BY MOUTH DAILY.   
 metoprolol succinate (TOPROL-XL) 25 mg XL tablet  (Taking) TAKE 1 TABLET EVERY DAY  
 losartan-hydroCHLOROthiazide (HYZAAR) 100-12.5 mg per tablet  (Taking) TAKE 1 TABLET BY MOUTH EVERY DAY  
 aspirin delayed-release 81 mg tablet Take 1 Tab by mouth daily for 360 days. Key Antihyperlipidemia Meds   
    
  
 atorvastatin (LIPITOR) 40 mg tablet  (Taking) Take 1 Tab by mouth daily. YRN. Give patient BRAND LIPITOR. D/C Crestor  
 omega-3 fatty acids-vitamin e (FISH OIL) 1,000 mg cap  (Taking) Take 1 Cap by mouth daily. Lab Results Component Value Date/Time Sodium 141 02/13/2017 02:02 AM  
 Sodium (POC) 135 02/05/2017 03:44 PM  
 Potassium 3.9 02/13/2017 02:02 AM  
 Potassium (POC) 3.7 02/05/2017 03:44 PM  
 Cholesterol, total 76 02/09/2017 02:47 AM  
 HDL Cholesterol 15 02/09/2017 02:47 AM  
 LDL, calculated 41.6 02/09/2017 02:47 AM  
 Triglyceride 97 02/09/2017 02:47 AM  
 INR 1.2 02/05/2017 03:44 PM  
 Prothrombin time 12.0 02/05/2017 03:44 PM  
 
 
 
6. Prostate cancer ([facility] [address] Assessment & Plan: 
 
Key Oncology Meds Patient is on no Oncologic meds. Key Pain Meds   
    
  
 aspirin (ASPIRIN) 325 mg tablet  (Taking) Take 325 mg by mouth daily. traMADol (ULTRAM) 50 mg tablet  (Taking) Take 1 Tab by mouth every eight (8) hours as needed for Pain. Max Daily Amount: 150 mg. TAKE 1-2 TABLETS BY MOUTH EVERY 6 HOURS AS NEEDED FOR PAIN Lab Results Component Value Date/Time WBC 8.0 02/12/2017 03:03 AM  
 ABS. NEUTROPHILS 11.4 02/08/2017 02:03 AM  
 HGB 10.2 02/12/2017 03:03 AM  
 Hemoglobin (POC) 13.3 02/05/2017 03:44 PM  
 HCT 30.0 02/12/2017 03:03 AM  
 Hematocrit (POC) 39 02/05/2017 03:44 PM  
 PLATELET 058 96/55/2132 03:03 AM  
 Creatinine 0.99 02/13/2017 02:02 AM  
 Creatinine (POC) 1.9 02/05/2017 03:44 PM  
 BUN 9 02/13/2017 02:02 AM  
 BUN (POC) 69 02/05/2017 03:44 PM  
 ALT (SGPT) 21 02/10/2017 12:33 PM  
 AST (SGOT) 41 02/10/2017 12:33 PM  
 Albumin 1.8 02/10/2017 12:33 PM  
 
 
 
7. PVD (peripheral vascular disease) with claudication ([facility] [place] [address] Assessment & Plan: This condition is managed by Specialist. 
 Key Peripheral Vascular Disease Meds   
    
  
 aspirin (ASPIRIN) 325 mg tablet  (Taking) Take 325 mg by mouth daily. atorvastatin (LIPITOR) 40 mg tablet  (Taking) Take 1 Tab by mouth daily. YRN. Give patient BRAND LIPITOR. D/C Crestor  
 omega-3 fatty acids-vitamin e (FISH OIL) 1,000 mg cap  (Taking) Take 1 Cap by mouth daily. aspirin delayed-release 81 mg tablet Take 1 Tab by mouth daily for 360 days. Lab Results Component Value Date/Time  WBC 8.0 02/12/2017 03:03 AM  
 HGB 10.2 02/12/2017 03:03 AM  
 Hemoglobin (POC) 13.3 02/05/2017 03:44 PM  
 HCT 30.0 02/12/2017 03:03 AM  
 Hematocrit (POC) 39 02/05/2017 03:44 PM  
 PLATELET 103 49/11/9098 03:03 AM  
 Creatinine 0.99 02/13/2017 02:02 AM  
 Creatinine (POC) 1.9 02/05/2017 03:44 PM  
 BUN 9 02/13/2017 02:02 AM  
 BUN (POC) 69 02/05/2017 03:44 PM  
 INR 1.2 02/05/2017 03:44 PM  
 Prothrombin time 12.0 02/05/2017 03:44 PM  
 Cholesterol, total 76 02/09/2017 02:47 AM  
 HDL Cholesterol 15 02/09/2017 02:47 AM  
 LDL, calculated 41.6 02/09/2017 02:47 AM  
 Triglyceride 97 02/09/2017 02:47 AM

## 2017-08-23 NOTE — PROGRESS NOTES
Patient alert and cooperative. Erythematous, scaly areas on bilateral feet, ankles. No visible rash on chest.    Assessment:  1. Questionable contact versus fungal type rash. Plan:  1. Script for Lotrisone to use twice daily to affected area till clear. 2. Follow up in 7-10 days if not improving, sooner if worse for possible derm referral.  3. Recheck here otherwise prn.

## 2017-08-23 NOTE — ASSESSMENT & PLAN NOTE
Stable, based on history, physical exam and review of pertinent labs, studies and medications; meds reconciled; continue current treatment plan. Key Neurological Meds             aspirin (ASPIRIN) 325 mg tablet  (Taking) Take 325 mg by mouth daily. aspirin delayed-release 81 mg tablet Take 1 Tab by mouth daily for 360 days.         Lab Results   Component Value Date/Time    WBC 8.0 02/12/2017 03:03 AM    HGB 10.2 02/12/2017 03:03 AM    Hemoglobin (POC) 13.3 02/05/2017 03:44 PM    HCT 30.0 02/12/2017 03:03 AM    Hematocrit (POC) 39 02/05/2017 03:44 PM    PLATELET 425 75/02/3748 03:03 AM    INR 1.2 02/05/2017 03:44 PM    Prothrombin time 12.0 02/05/2017 03:44 PM    Creatinine 0.99 02/13/2017 02:02 AM    Creatinine (POC) 1.9 02/05/2017 03:44 PM    BUN 9 02/13/2017 02:02 AM    BUN (POC) 69 02/05/2017 03:44 PM

## 2017-08-23 NOTE — PROGRESS NOTES
Sees card first of next month. States getting SOB past \"couple\" weeks. Rash present for few weeks. Very itchy. States also itching mid chest but no rash present. Visit Vitals    /79 (BP 1 Location: Right arm, BP Patient Position: Sitting)    Pulse 83    Temp 96.7 °F (35.9 °C) (Oral)    Resp 18    Ht 5' 3\" (1.6 m)    Wt 171 lb 11.2 oz (77.9 kg)    SpO2 96%    BMI 30.42 kg/m2        Dictation on: 08/23/2017  2:55 PM by: Romario Bailey [[id number]]     Diagnoses and all orders for this visit:    1. Contact dermatitis, unspecified contact dermatitis type, unspecified trigger  -     clotrimazole-betamethasone (LOTRISONE) topical cream; Apply twice daily to affected area till resolved. 2. Dyspnea, unspecified type    3. Angina, class III ([address]  Assessment & Plan: This condition is managed by Specialist.  Key CAD CHF Meds             aspirin (ASPIRIN) 325 mg tablet  (Taking) Take 325 mg by mouth daily. isosorbide mononitrate ER (IMDUR) 30 mg tablet  (Taking) TAKE 1 TABLET BY MOUTH EVERY DAY    amLODIPine (NORVASC) 5 mg tablet  (Taking) TAKE 1 TAB BY MOUTH DAILY. metoprolol succinate (TOPROL-XL) 25 mg XL tablet  (Taking) TAKE 1 TABLET EVERY DAY    losartan-hydroCHLOROthiazide (HYZAAR) 100-12.5 mg per tablet  (Taking) TAKE 1 TABLET BY MOUTH EVERY DAY    aspirin delayed-release 81 mg tablet Take 1 Tab by mouth daily for 360 days. Key Antihyperlipidemia Meds             atorvastatin (LIPITOR) 40 mg tablet  (Taking) Take 1 Tab by mouth daily. YRN. Give patient BRAND LIPITOR. D/C Crestor    omega-3 fatty acids-vitamin e (FISH OIL) 1,000 mg cap  (Taking) Take 1 Cap by mouth daily.         Lab Results   Component Value Date/Time    Sodium 141 02/13/2017 02:02 AM    Sodium (POC) 135 02/05/2017 03:44 PM    Potassium 3.9 02/13/2017 02:02 AM    Potassium (POC) 3.7 02/05/2017 03:44 PM    Cholesterol, total 76 02/09/2017 02:47 AM    HDL Cholesterol 15 02/09/2017 02:47 AM    LDL, calculated 41.6 02/09/2017 02:47 AM    Triglyceride 97 02/09/2017 02:47 AM    INR 1.2 02/05/2017 03:44 PM    Prothrombin time 12.0 02/05/2017 03:44 PM         4. 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 Key Neurological Meds             aspirin (ASPIRIN) 325 mg tablet  (Taking) Take 325 mg by mouth daily. aspirin delayed-release 81 mg tablet Take 1 Tab by mouth daily for 360 days. Lab Results   Component Value Date/Time    WBC 8.0 02/12/2017 03:03 AM    HGB 10.2 02/12/2017 03:03 AM    Hemoglobin (POC) 13.3 02/05/2017 03:44 PM    HCT 30.0 02/12/2017 03:03 AM    Hematocrit (POC) 39 02/05/2017 03:44 PM    PLATELET 698 97/83/6939 03:03 AM    INR 1.2 02/05/2017 03:44 PM    Prothrombin time 12.0 02/05/2017 03:44 PM    Creatinine 0.99 02/13/2017 02:02 AM    Creatinine (POC) 1.9 02/05/2017 03:44 PM    BUN 9 02/13/2017 02:02 AM    BUN (POC) 69 02/05/2017 03:44 PM         5. ST elevation myocardial infarction (STEMI), unspecified artery [facility])  Assessment & Plan: This condition is managed by Specialist.  Key CAD CHF Meds             aspirin (ASPIRIN) 325 mg tablet  (Taking) Take 325 mg by mouth daily. isosorbide mononitrate ER (IMDUR) 30 mg tablet  (Taking) TAKE 1 TABLET BY MOUTH EVERY DAY    amLODIPine (NORVASC) 5 mg tablet  (Taking) TAKE 1 TAB BY MOUTH DAILY. metoprolol succinate (TOPROL-XL) 25 mg XL tablet  (Taking) TAKE 1 TABLET EVERY DAY    losartan-hydroCHLOROthiazide (HYZAAR) 100-12.5 mg per tablet  (Taking) TAKE 1 TABLET BY MOUTH EVERY DAY    aspirin delayed-release 81 mg tablet Take 1 Tab by mouth daily for 360 days. Key Antihyperlipidemia Meds             atorvastatin (LIPITOR) 40 mg tablet  (Taking) Take 1 Tab by mouth daily. YRN. Give patient BRAND LIPITOR.  D/C Crestor    omega-3 fatty acids-vitamin e (FISH OIL) 1,000 mg cap (Taking) Take 1 Cap by mouth daily. Lab Results   Component Value Date/Time    Sodium 141 02/13/2017 02:02 AM    Sodium (POC) 135 02/05/2017 03:44 PM    Potassium 3.9 02/13/2017 02:02 AM    Potassium (POC) 3.7 02/05/2017 03:44 PM    Cholesterol, total 76 02/09/2017 02:47 AM    HDL Cholesterol 15 02/09/2017 02:47 AM    LDL, calculated 41.6 02/09/2017 02:47 AM    Triglyceride 97 02/09/2017 02:47 AM    INR 1.2 02/05/2017 03:44 PM    Prothrombin time 12.0 02/05/2017 03:44 PM         6. Prostate cancer [facility])  Assessment & Plan:    Key Oncology Meds     Patient is on no Oncologic meds. Key Pain Meds             aspirin (ASPIRIN) 325 mg tablet  (Taking) Take 325 mg by mouth daily. traMADol (ULTRAM) 50 mg tablet  (Taking) Take 1 Tab by mouth every eight (8) hours as needed for Pain. Max Daily Amount: 150 mg. TAKE 1-2 TABLETS BY MOUTH EVERY 6 HOURS AS NEEDED FOR PAIN        Lab Results   Component Value Date/Time    WBC 8.0 02/12/2017 03:03 AM    ABS. NEUTROPHILS 11.4 02/08/2017 02:03 AM    HGB 10.2 02/12/2017 03:03 AM    Hemoglobin (POC) 13.3 02/05/2017 03:44 PM    HCT 30.0 02/12/2017 03:03 AM    Hematocrit (POC) 39 02/05/2017 03:44 PM    PLATELET 542 37/53/9267 03:03 AM    Creatinine 0.99 02/13/2017 02:02 AM    Creatinine (POC) 1.9 02/05/2017 03:44 PM    BUN 9 02/13/2017 02:02 AM    BUN (POC) 69 02/05/2017 03:44 PM    ALT (SGPT) 21 02/10/2017 12:33 PM    AST (SGOT) 41 02/10/2017 12:33 PM    Albumin 1.8 02/10/2017 12:33 PM         7. PVD (peripheral vascular disease) with claudication [facility])  Assessment & Plan: This condition is managed by Specialist.  Key Peripheral Vascular Disease Meds             aspirin (ASPIRIN) 325 mg tablet  (Taking) Take 325 mg by mouth daily. atorvastatin (LIPITOR) 40 mg tablet  (Taking) Take 1 Tab by mouth daily. YRN. Give patient BRAND LIPITOR. D/C Crestor    omega-3 fatty acids-vitamin e (FISH OIL) 1,000 mg cap  (Taking) Take 1 Cap by mouth daily.     aspirin delayed-release 81 mg tablet Take 1 Tab by mouth daily for 360 days.         Lab Results   Component Value Date/Time    WBC 8.0 02/12/2017 03:03 AM    HGB 10.2 02/12/2017 03:03 AM    Hemoglobin (POC) 13.3 02/05/2017 03:44 PM    HCT 30.0 02/12/2017 03:03 AM    Hematocrit (POC) 39 02/05/2017 03:44 PM    PLATELET 202 95/29/2119 03:03 AM    Creatinine 0.99 02/13/2017 02:02 AM    Creatinine (POC) 1.9 02/05/2017 03:44 PM    BUN 9 02/13/2017 02:02 AM    BUN (POC) 69 02/05/2017 03:44 PM    INR 1.2 02/05/2017 03:44 PM    Prothrombin time 12.0 02/05/2017 03:44 PM    Cholesterol, total 76 02/09/2017 02:47 AM    HDL Cholesterol 15 02/09/2017 02:47 AM    LDL, calculated 41.6 02/09/2017 02:47 AM    Triglyceride 97 02/09/2017 02:47 AM

## 2017-08-23 NOTE — ASSESSMENT & PLAN NOTE
This condition is managed by Specialist.  Key CAD CHF Meds             aspirin (ASPIRIN) 325 mg tablet  (Taking) Take 325 mg by mouth daily. isosorbide mononitrate ER (IMDUR) 30 mg tablet  (Taking) TAKE 1 TABLET BY MOUTH EVERY DAY    amLODIPine (NORVASC) 5 mg tablet  (Taking) TAKE 1 TAB BY MOUTH DAILY. metoprolol succinate (TOPROL-XL) 25 mg XL tablet  (Taking) TAKE 1 TABLET EVERY DAY    losartan-hydroCHLOROthiazide (HYZAAR) 100-12.5 mg per tablet  (Taking) TAKE 1 TABLET BY MOUTH EVERY DAY    aspirin delayed-release 81 mg tablet Take 1 Tab by mouth daily for 360 days. Key Antihyperlipidemia Meds             atorvastatin (LIPITOR) 40 mg tablet  (Taking) Take 1 Tab by mouth daily. YRN. Give patient BRAND LIPITOR. D/C Crestor    omega-3 fatty acids-vitamin e (FISH OIL) 1,000 mg cap  (Taking) Take 1 Cap by mouth daily.         Lab Results   Component Value Date/Time    Sodium 141 02/13/2017 02:02 AM    Sodium (POC) 135 02/05/2017 03:44 PM    Potassium 3.9 02/13/2017 02:02 AM    Potassium (POC) 3.7 02/05/2017 03:44 PM    Cholesterol, total 76 02/09/2017 02:47 AM    HDL Cholesterol 15 02/09/2017 02:47 AM    LDL, calculated 41.6 02/09/2017 02:47 AM    Triglyceride 97 02/09/2017 02:47 AM    INR 1.2 02/05/2017 03:44 PM    Prothrombin time 12.0 02/05/2017 03:44 PM

## 2017-08-23 NOTE — MR AVS SNAPSHOT
Visit Information Date & Time Provider Department Dept. Phone Encounter #  
 8/23/2017  2:40 PM Gómez Rdz MD [facility] [phone] [id number] Follow-up Instructions Return if symptoms worsen or fail to improve. Upcoming Health Maintenance Date Due  
 MEDICARE YEARLY EXAM 5/18/2016 GLAUCOMA SCREENING Q2Y 9/13/2017* ZOSTER VACCINE AGE 60> 11/21/2017* DTaP/Tdap/Td series (1 - Tdap) 11/21/2017* INFLUENZA AGE 9 TO ADULT 11/21/2017* COLONOSCOPY 8/31/2018 *Topic was postponed. The date shown is not the original due date. Allergies as of 8/23/2017  Review Complete On: 8/23/2017 By: Severo Aris, RN Severity Noted Reaction Type Reactions Norco [Hydrocodone-acetaminophen]  01/18/2013    Other (comments) Too sedated and felt like he was in a daze Pcn [Penicillins]  10/09/2009    Rash Percocet [Oxycodone-acetaminophen]  05/02/2011   Side Effect Nausea and Vomiting Current Immunizations  Reviewed on 3/15/2017 Name Date Influenza High Dose Vaccine PF 10/5/2015 12:38 PM, 2/12/2015, 11/4/2013 Influenza Vaccine 10/1/2016 Influenza Vaccine Split 10/15/2012, 12/1/2010 Pneumococcal Conjugate (PCV-13) 3/15/2017 Pneumococcal Polysaccharide (PPSV-23) 1/12/2011 Not reviewed this visit You Were Diagnosed With   
  
 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  
 30.42 kg/m 2 1.86 m 2 Preferred Pharmacy Pharmacy Name Phone [facility]/PHARMACY #[id number] Barahona [id number] [place] [address] [phone] Your Updated Medication List  
  
   
This list is accurate as of: 8/23/17  2:53 PM.  Always use your most recent med list.  
  
  
  
  
 albuterol-ipratropium 2.5 mg-0.5 mg/3 ml Nebu Commonly known as:  DUO-NEB  
3 mL by Nebulization route every six (6) hours as needed. amLODIPine 5 mg tablet Commonly known as:  Beth Xie TAKE 1 TAB BY MOUTH DAILY. * aspirin 325 mg tablet Commonly known as:  ASPIRIN Take 325 mg by mouth daily. * aspirin delayed-release 81 mg tablet Take 1 Tab by mouth daily for 360 days. atorvastatin 40 mg tablet Commonly known as:  LIPITOR Take 1 Tab by mouth daily. YRN. Give patient BRAND LIPITOR. D/C Crestor  
  
 clotrimazole-betamethasone topical cream  
Commonly known as:  Robet Yelena Apply twice daily to affected area till resolved. finasteride 5 mg tablet Commonly known as:  PROSCAR  
daily. FISH OIL 1,000 mg Cap Generic drug:  omega-3 fatty acids-vitamin e Take 1 Cap by mouth daily. isosorbide mononitrate ER 30 mg tablet Commonly known as:  IMDUR  
TAKE 1 TABLET BY MOUTH EVERY DAY  
  
 levothyroxine 88 mcg tablet Commonly known as:  SYNTHROID  
TAKE 1 TABLET BY MOUTH EVERY DAY BEFORE BREAKFAST AND RECHECK LEVEL IN 4 TO 6 WEEKS  
  
 losartan-hydroCHLOROthiazide 100-12.5 mg per tablet Commonly known as:  HYZAAR  
TAKE 1 TABLET BY MOUTH EVERY DAY  
  
 metoprolol succinate 25 mg XL tablet Commonly known as:  TOPROL-XL  
TAKE 1 TABLET EVERY DAY  
  
 tamsulosin 0.4 mg capsule Commonly known as:  FLOMAX TAKE 1 CAPSULE BY MOUTH EVERY DAY  
  
 traMADol 50 mg tablet Commonly known as:  Britt Boyce  
 Take 1 Tab by mouth every eight (8) hours as needed for Pain. Max Daily Amount: 150 mg. TAKE 1-2 TABLETS BY MOUTH EVERY 6 HOURS AS NEEDED FOR PAIN  
  
 VITAMIN C 1,000 mg tablet Generic drug:  ascorbic acid (vitamin C) Take  by mouth. VITAMIN D3 PO Take  by mouth. * Notice: This list has 2 medication(s) that are the same as other medications prescribed for you. Read the directions carefully, and ask your doctor or other care provider to review them with you. Prescriptions Sent to Pharmacy Refills  
 clotrimazole-betamethasone (LOTRISONE) topical cream 0 Sig: Apply twice daily to affected area till resolved. Class: Normal  
 Pharmacy: [address] [place] [address] , [address] [place] [address] #: [phone] Follow-up Instructions Return if symptoms worsen or fail to improve. Introducing [facility]! [facility] introduces [employer] patient portal. Now you can access parts of your medical record, email your doctor's office, and request medication refills online. 1. In your internet browser, go to [email or web address] 2. Click on the First Time User? Click Here link in the Sign In box. You will see the New Member Sign Up page. 3. Enter your [employer] Access Code exactly as it appears below. You will not need to use this code after youve completed the sign-up process. If you do not sign up before the expiration date, you must request a new code. · [employer] Access Code: [email or web address] Expires: 11/4/2017  4:43 PM 
 
4. Enter the last four digits of your Social Security Number (xxxx) and Date of Birth (mm/dd/yyyy) as indicated and click Submit. You will be taken to the next sign-up page. 5. Create a [employer] ID. This will be your [employer] login ID and cannot be changed, so think of one that is secure and easy to remember. 6. Create a [employer] password. You can change your password at any time. 7. Enter your Password Reset Question and Answer. This can be used at a later time if you forget your password. 8. Enter your e-mail address. You will receive e-mail notification when new information is available in [address] 9. Click Sign Up. You can now view and download portions of your medical record. 10. Click the Download Summary menu link to download a portable copy of your medical information. If you have questions, please visit the Frequently Asked Questions section of the "[employer] website. Remember, "[employer] is NOT to be used for urgent needs. For medical emergencies, dial 911. Now available from your iPhone and Android! Please provide this summary of care documentation to your next provider. Your primary care clinician is listed as [phone] If you have any questions after today's visit, please call [phone].

## 2017-08-23 NOTE — ASSESSMENT & PLAN NOTE
Key Oncology Meds     Patient is on no Oncologic meds. Key Pain Meds             aspirin (ASPIRIN) 325 mg tablet  (Taking) Take 325 mg by mouth daily. traMADol (ULTRAM) 50 mg tablet  (Taking) Take 1 Tab by mouth every eight (8) hours as needed for Pain. Max Daily Amount: 150 mg. TAKE 1-2 TABLETS BY MOUTH EVERY 6 HOURS AS NEEDED FOR PAIN        Lab Results   Component Value Date/Time    WBC 8.0 02/12/2017 03:03 AM    ABS.  NEUTROPHILS 11.4 02/08/2017 02:03 AM    HGB 10.2 02/12/2017 03:03 AM    Hemoglobin (POC) 13.3 02/05/2017 03:44 PM    HCT 30.0 02/12/2017 03:03 AM    Hematocrit (POC) 39 02/05/2017 03:44 PM    PLATELET 465 13/58/5646 03:03 AM    Creatinine 0.99 02/13/2017 02:02 AM    Creatinine (POC) 1.9 02/05/2017 03:44 PM    BUN 9 02/13/2017 02:02 AM    BUN (POC) 69 02/05/2017 03:44 PM    ALT (SGPT) 21 02/10/2017 12:33 PM    AST (SGOT) 41 02/10/2017 12:33 PM    Albumin 1.8 02/10/2017 12:33 PM

## 2017-08-23 NOTE — PROGRESS NOTES
Chief Complaint   Patient presents with    Rash     Both ankles and now on chest.  Itches and has spread. Used Alveno lotion. 1. Have you been to the ER, urgent care clinic since your last visit? Hospitalized since your last visit? No    2. Have you seen or consulted any other health care providers outside of the [address] [place] [address] since your last visit? Include any pap smears or colon screening. No       I have reviewed Health Maintenance with the patient and updated. Advance Care Planning information reviewed and given to the patient.

## 2017-08-24 ENCOUNTER — APPOINTMENT (OUTPATIENT)
Dept: GENERAL RADIOLOGY | Age: 82
End: 2017-08-24
Attending: EMERGENCY MEDICINE
Payer: MEDICARE

## 2017-08-24 ENCOUNTER — HOSPITAL ENCOUNTER (EMERGENCY)
Age: 82
Discharge: HOME | End: 2017-08-24
Attending: EMERGENCY MEDICINE
Payer: MEDICARE

## 2017-08-24 VITALS
BODY MASS INDEX: 30.59 KG/M2 | WEIGHT: 172.62 LBS | HEIGHT: 63 IN | SYSTOLIC BLOOD PRESSURE: 150 MMHG | DIASTOLIC BLOOD PRESSURE: 102 MMHG | RESPIRATION RATE: 22 BRPM | HEART RATE: 88 BPM | OXYGEN SATURATION: 98 % | TEMPERATURE: 98.3 F

## 2017-08-24 DIAGNOSIS — F17.200 SMOKER: ICD-10-CM

## 2017-08-24 DIAGNOSIS — R06.2 WHEEZING: Primary | ICD-10-CM

## 2017-08-24 LAB
ALBUMIN SERPL-MCNC: 3.4 G/DL (ref 3.5–5)
ALBUMIN/GLOB SERPL: 0.8 {RATIO} (ref 1.1–2.2)
ALP SERPL-CCNC: 132 U/L (ref 45–117)
ALT SERPL-CCNC: 23 U/L (ref 12–78)
ANION GAP SERPL CALC-SCNC: 3 MMOL/L (ref 5–15)
AST SERPL-CCNC: 27 U/L (ref 15–37)
BASOPHILS # BLD: 0 K/UL (ref 0–0.1)
BASOPHILS NFR BLD: 0 % (ref 0–1)
BILIRUB SERPL-MCNC: 0.6 MG/DL (ref 0.2–1)
BNP SERPL-MCNC: 50 PG/ML (ref 0–450)
BUN SERPL-MCNC: 21 MG/DL (ref 6–20)
BUN/CREAT SERPL: 16 (ref 12–20)
CALCIUM SERPL-MCNC: 9 MG/DL (ref 8.5–10.1)
CHLORIDE SERPL-SCNC: 108 MMOL/L (ref 97–108)
CO2 SERPL-SCNC: 28 MMOL/L (ref 21–32)
CREAT SERPL-MCNC: 1.32 MG/DL (ref 0.7–1.3)
EOSINOPHIL # BLD: 0.1 K/UL (ref 0–0.4)
EOSINOPHIL NFR BLD: 2 % (ref 0–7)
ERYTHROCYTE [DISTWIDTH] IN BLOOD BY AUTOMATED COUNT: 18.9 % (ref 11.5–14.5)
GLOBULIN SER CALC-MCNC: 4.4 G/DL (ref 2–4)
GLUCOSE SERPL-MCNC: 163 MG/DL (ref 65–100)
HCT VFR BLD AUTO: 38.9 % (ref 36.6–50.3)
HGB BLD-MCNC: 13.1 G/DL (ref 12.1–17)
LYMPHOCYTES # BLD: 0.8 K/UL (ref 0.8–3.5)
LYMPHOCYTES NFR BLD: 18 % (ref 12–49)
MCH RBC QN AUTO: 27.5 PG (ref 26–34)
MCHC RBC AUTO-ENTMCNC: 33.7 G/DL (ref 30–36.5)
MCV RBC AUTO: 81.6 FL (ref 80–99)
MONOCYTES # BLD: 0.4 K/UL (ref 0–1)
MONOCYTES NFR BLD: 9 % (ref 5–13)
NEUTS SEG # BLD: 2.9 K/UL (ref 1.8–8)
NEUTS SEG NFR BLD: 71 % (ref 32–75)
PLATELET # BLD AUTO: 169 K/UL (ref 150–400)
POTASSIUM SERPL-SCNC: 3.9 MMOL/L (ref 3.5–5.1)
PROT SERPL-MCNC: 7.8 G/DL (ref 6.4–8.2)
RBC # BLD AUTO: 4.77 M/UL (ref 4.1–5.7)
RBC MORPH BLD: ABNORMAL
SODIUM SERPL-SCNC: 139 MMOL/L (ref 136–145)
TROPONIN I SERPL-MCNC: 0.14 NG/ML
WBC # BLD AUTO: 4.2 K/UL (ref 4.1–11.1)

## 2017-08-24 PROCEDURE — 83880 ASSAY OF NATRIURETIC PEPTIDE: CPT | Performed by: EMERGENCY MEDICINE

## 2017-08-24 PROCEDURE — 74011636637 HC RX REV CODE- 636/637: Performed by: EMERGENCY MEDICINE

## 2017-08-24 PROCEDURE — 99285 EMERGENCY DEPT VISIT HI MDM: CPT

## 2017-08-24 PROCEDURE — 74011000250 HC RX REV CODE- 250: Performed by: EMERGENCY MEDICINE

## 2017-08-24 PROCEDURE — 77030029684 HC NEB SM VOL KT MONA -A

## 2017-08-24 PROCEDURE — 85025 COMPLETE CBC W/AUTO DIFF WBC: CPT | Performed by: EMERGENCY MEDICINE

## 2017-08-24 PROCEDURE — 71020 XR CHEST PA LAT: CPT

## 2017-08-24 PROCEDURE — 74011250636 HC RX REV CODE- 250/636: Performed by: EMERGENCY MEDICINE

## 2017-08-24 PROCEDURE — 94640 AIRWAY INHALATION TREATMENT: CPT

## 2017-08-24 PROCEDURE — 96360 HYDRATION IV INFUSION INIT: CPT

## 2017-08-24 PROCEDURE — 74011250637 HC RX REV CODE- 250/637: Performed by: EMERGENCY MEDICINE

## 2017-08-24 PROCEDURE — 93005 ELECTROCARDIOGRAM TRACING: CPT

## 2017-08-24 PROCEDURE — 84484 ASSAY OF TROPONIN QUANT: CPT | Performed by: EMERGENCY MEDICINE

## 2017-08-24 PROCEDURE — 80053 COMPREHEN METABOLIC PANEL: CPT | Performed by: EMERGENCY MEDICINE

## 2017-08-24 PROCEDURE — 36415 COLL VENOUS BLD VENIPUNCTURE: CPT | Performed by: EMERGENCY MEDICINE

## 2017-08-24 RX ORDER — IPRATROPIUM BROMIDE AND ALBUTEROL SULFATE 2.5; .5 MG/3ML; MG/3ML
3 SOLUTION RESPIRATORY (INHALATION)
Status: COMPLETED | OUTPATIENT
Start: 2017-08-24 | End: 2017-08-24

## 2017-08-24 RX ORDER — CYCLOBENZAPRINE HCL 10 MG
10 TABLET ORAL
Status: COMPLETED | OUTPATIENT
Start: 2017-08-24 | End: 2017-08-24

## 2017-08-24 RX ORDER — PREDNISONE 50 MG/1
50 TABLET ORAL DAILY
Qty: 3 TAB | Refills: 0 | Status: SHIPPED | OUTPATIENT
Start: 2017-08-24 | End: 2017-08-27

## 2017-08-24 RX ORDER — ALBUTEROL SULFATE 90 UG/1
2 AEROSOL, METERED RESPIRATORY (INHALATION)
Qty: 1 INHALER | Refills: 1 | Status: SHIPPED | OUTPATIENT
Start: 2017-08-24 | End: 2018-05-20

## 2017-08-24 RX ORDER — PREDNISONE 10 MG/1
40 TABLET ORAL
Status: COMPLETED | OUTPATIENT
Start: 2017-08-24 | End: 2017-08-24

## 2017-08-24 RX ADMIN — IPRATROPIUM BROMIDE AND ALBUTEROL SULFATE 3 ML: .5; 3 SOLUTION RESPIRATORY (INHALATION) at 13:13

## 2017-08-24 RX ADMIN — SODIUM CHLORIDE 500 ML: 900 INJECTION, SOLUTION INTRAVENOUS at 14:22

## 2017-08-24 RX ADMIN — CYCLOBENZAPRINE HYDROCHLORIDE 10 MG: 10 TABLET, FILM COATED ORAL at 13:13

## 2017-08-24 RX ADMIN — PREDNISONE 40 MG: 10 TABLET ORAL at 14:35

## 2017-08-24 RX ADMIN — IPRATROPIUM BROMIDE AND ALBUTEROL SULFATE 3 ML: .5; 3 SOLUTION RESPIRATORY (INHALATION) at 14:35

## 2017-08-24 NOTE — ED NOTES
Pt states he is feeling better and wonders Minus Sensor they almost done with me yet? \"  Plan of care explained to pt.   Pt transported to CT

## 2017-08-24 NOTE — ED NOTES
Patient given discharge information and all information explained. Patient verbalizes understanding. No additional needs or concerns at this time. Patient ambulatory out.

## 2017-08-24 NOTE — ED PROVIDER NOTES
HPI Comments: Nathaniel Rees., 81 y.o. male, with PMHx of CAD, HLD, HTN, thyroid disease, BPH, MI, stroke, DJD, PVD, AAA, cerebral embolism, prostate cancer, PNA presents ambulatory to [id number] [place], [place] [address]as [facility] ED with cc of SOB and right upper back pain, both present x 3 days. Pt also c/o persistent non-productive cough present x 2-3 weeks. Per chart review, patient's cardiologist is Dr Donald Qureshi. His old EKG shows an old RBBB. His ejection fraction was 60% in 2011. Pt does admit to smoking \"occasionally\". He denies any hx of asthma or COPD. He denies recent sick contact. Pt denies chest pain, N/V/D, chills, urinary sx. PCP: Wesley Odom MD    Social history significant for: + Tobacco, - EtOH, - Illicit Drug Use    There are no other complaints, changes, or physical findings at this time. The history is provided by the patient. No  was used.         Past Medical History:   Diagnosis Date    AAA (abdominal aortic aneurysm) ([address]     Acute MI ([address] 12/2010    dr Kailyn Fuller    Acute prostatitis     again 9/12/16 [facility]'d [facility]    Advance directive discussed with patient 04/20/2016    Aneurysm ([address] 6/2011    AAA    Borderline glaucoma (glaucoma suspect) 02/19/2015    notes from [address] [place] [address] rec'd    BPH (benign prostatic hyperplasia)     Bright red blood per rectum 02/04/2016    [facility] note Gus Riff- w/u in progress   8/22/17 [facility] f[facility] note    CAD (coronary artery disease)     Calculus of kidney     Cerebral embolism with cerebral infarction ([address]     Chronic pain     back    Dizzy spells 6/2012    DJD (degenerative joint disease) of lumbar spine     ED (erectile dysfunction)     Elevated PSA 03/20/2014    [facility] rec'd     8/24/16 f/u note    Encephalocele ([address]     Hypercholesterolemia     Hypertension     Pneumonia 02/05/2017    Prostate cancer ([address] 05/12/2014    crystal henry/ Dr Emma Lopez 8/22/17 f/u note    PVD (peripheral vascular disease) with claudication ([address]  S/P radiation therapy 15 months out    probable cause of the rectal bleeding    Stroke [facility]) 6/2011    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    neuro assoc notes rec'd    Thyroid disease     Vitamin D deficiency 11/2013    again 5/2015 again 1/2016       Past Surgical History:   Procedure Laterality Date    CARDIAC SURG PROCEDURE UNLIST  12/2010    dr Yu Verduzco stents past MI    COLONOSCOPY N/A 8/31/2016    COL[facility]COPY [facility]    ENDOSCOPY, COLON, DIAGNOSTIC      HX CAROTID ENDARTERECTOMY Left     HX HEENT  10/2012    repair right drum cindy shaia    HX ORTHOPAEDIC Bilateral     BUNIONECTOMY    [place] LEFT HEART CATH,PERCUTANEOUS  10/16/2010         PTCA EACH ADDL VESSEL  10/16/2010         PTCA W/ STENT, INITIAL  10/16/2010         VASCULAR SURGERY PROCEDURE UNLIST      left leg varicose vein stripping dr Tracie Negro         Family History:   Problem Relation Age of Onset    Diabetes Mother     Diabetes Father     Cancer Sister      LUNG    Cancer Brother      COLON    Cancer Sister      LUNG    Anesth Problems Neg Hx        Social History     Social History    Marital status:      Spouse name: N/A    Number of children: N/A    Years of education: N/A     Occupational History    Not on file. Social History Main Topics    Smoking status: Former Smoker     Packs/day: 0.25     Quit date: 9/10/2014    Smokeless tobacco: Never Used      Comment: SMOKES 1 PACK WEEK    Alcohol use No    Drug use: No    Sexual activity: Not Currently     Other Topics Concern    Not on file     Social History Narrative         ALLERGIES: Norco [hydrocodone-acetaminophen]; Pcn [penicillins]; and Percocet [oxycodone-acetaminophen]    Review of Systems   Constitutional: Negative for chills and fever. Respiratory: Positive for cough and shortness of breath. Cardiovascular: Negative for chest pain.    Gastrointestinal: Negative for abdominal pain, constipation, diarrhea, nausea and vomiting. Musculoskeletal: Positive for back pain. Neurological: Negative for weakness and numbness. All other systems reviewed and are negative. Patient Vitals for the past 12 hrs:   Temp Pulse Resp BP SpO2   08/24/17 1430 - 88 22 (!) 150/102 98 %   08/24/17 1415 - 89 24 124/63 96 %   08/24/17 1407 - 85 24 - 96 %   08/24/17 1405 - - - 124/68 -   08/24/17 1345 - 83 28 124/58 93 %   08/24/17 1330 - 80 18 118/69 97 %   08/24/17 1315 - 82 25 114/72 96 %   08/24/17 1300 - 85 26 126/59 97 %   08/24/17 1245 - 83 27 121/77 97 %   08/24/17 1242 - 89 22 143/73 96 %   08/24/17 1237 98.3 °F (36.8 °C) 85 26 143/73 97 %     Physical Exam   Constitutional: He is oriented to person, place, and time. He appears well-developed and well-nourished. HENT:   Head: Normocephalic and atraumatic. Eyes: Conjunctivae and EOM are normal.   Neck: Normal range of motion. Neck supple. Cardiovascular: Normal rate and regular rhythm. Pulmonary/Chest: Effort normal. No respiratory distress. He has wheezes. He exhibits no tenderness. Abdominal: Soft. He exhibits no distension. There is no tenderness. Musculoskeletal: Normal range of motion.   + Tenderness over T5/right upper back. Neurological: He is alert and oriented to person, place, and time. Skin: Skin is warm and dry. Psychiatric: He has a normal mood and affect. Nursing note and vitals reviewed. MDM  Number of Diagnoses or Management Options  Smoker:   Wheezing:   Diagnosis management comments: Patient presents with SOB. DDx: COPD/Asthma exacerbation, CHF exacerbation, ACS, PE, PNA, PTX, Anxiety. Will get labs and cxr and ekg. Most likely emphysema vs bronchitis. Given coughing, most likely muscle spasms in his back given tenderness.         Amount and/or Complexity of Data Reviewed  Clinical lab tests: ordered and reviewed  Tests in the radiology section of CPT®: ordered and reviewed  Tests in the medicine section of CPT®: ordered and reviewed  Review and summarize past medical records: yes  Independent visualization of images, tracings, or specimens: yes    Patient Progress  Patient progress: stable    ED Course       Procedures  EKG interpretation: (Preliminary) 12:36 PM  Rhythm: normal sinus rhythm with PAC's; and regular . Rate (approx.): 92; Axis: normal; MT interval: normal; QRS interval: normal ; ST/T wave: normal; Other findings: RBBB. Progress Note:  2:46 PM  Pt informed with results. Troponin 0.14. However, always around that area. Pt states he feels better with DuoNeb and can ambulate w/o difficulty. He has never had albuterol before or been diagnosed with emphysema, will give a dose of prednisone and another DuoNeb. He will f/u with his PCP. Advised if he has any CP then to return to the ED and f/u with Dr. Andrey Berman.         LABORATORY TESTS:  Recent Results (from the past 12 hour(s))   EKG, 12 LEAD, INITIAL    Collection Time: 08/24/17 12:36 PM   Result Value Ref Range    Ventricular Rate 92 BPM    Atrial Rate 92 BPM    P-R Interval 188 ms    QRS Duration 148 ms    Q-T Interval 400 ms    QTC Calculation (Bezet) 494 ms    Calculated P Axis 58 degrees    Calculated R Axis -91 degrees    Calculated T Axis 26 degrees    Diagnosis       Sinus rhythm with premature atrial complexes  Right bundle branch block  Septal infarct , age undetermined  Abnormal ECG  When compared with ECG of 05-FEB-2017 15:00,  fusion complexes are no longer present  premature ventricular complexes are no longer present  premature atrial complexes are now present  Septal infarct is now present     CBC WITH AUTOMATED DIFF    Collection Time: 08/24/17  1:14 PM   Result Value Ref Range    WBC 4.2 4.1 - 11.1 K/uL    RBC 4.77 4.10 - 5.70 M/uL    HGB 13.1 12.1 - 17.0 g/dL    HCT 38.9 36.6 - 50.3 %    MCV 81.6 80.0 - 99.0 FL    MCH 27.5 26.0 - 34.0 PG    MCHC 33.7 30.0 - 36.5 g/dL    RDW 18.9 (H) 11.5 - 14.5 %    PLATELET 075 955 - 296 K/uL NEUTROPHILS 71 32 - 75 %    LYMPHOCYTES 18 12 - 49 %    MONOCYTES 9 5 - 13 %    EOSINOPHILS 2 0 - 7 %    BASOPHILS 0 0 - 1 %    ABS. NEUTROPHILS 2.9 1.8 - 8.0 K/UL    ABS. LYMPHOCYTES 0.8 0.8 - 3.5 K/UL    ABS. MONOCYTES 0.4 0.0 - 1.0 K/UL    ABS. EOSINOPHILS 0.1 0.0 - 0.4 K/UL    ABS. BASOPHILS 0.0 0.0 - 0.1 K/UL    RBC COMMENTS ANISOCYTOSIS  1+       METABOLIC PANEL, COMPREHENSIVE    Collection Time: 08/24/17  1:14 PM   Result Value Ref Range    Sodium 139 136 - 145 mmol/L    Potassium 3.9 3.5 - 5.1 mmol/L    Chloride 108 97 - 108 mmol/L    CO2 28 21 - 32 mmol/L    Anion gap 3 (L) 5 - 15 mmol/L    Glucose 163 (H) 65 - 100 mg/dL    BUN 21 (H) 6 - 20 MG/DL    Creatinine 1.32 (H) 0.70 - 1.30 MG/DL    BUN/Creatinine ratio 16 12 - 20      GFR est AA >60 >60 ml/min/1.73m2    GFR est non-AA 52 (L) >60 ml/min/1.73m2    Calcium 9.0 8.5 - 10.1 MG/DL    Bilirubin, total 0.6 0.2 - 1.0 MG/DL    ALT (SGPT) 23 12 - 78 U/L    AST (SGOT) 27 15 - 37 U/L    Alk. phosphatase 132 (H) 45 - 117 U/L    Protein, total 7.8 6.4 - 8.2 g/dL    Albumin 3.4 (L) 3.5 - 5.0 g/dL    Globulin 4.4 (H) 2.0 - 4.0 g/dL    A-G Ratio 0.8 (L) 1.1 - 2.2     TROPONIN I    Collection Time: 08/24/17  1:14 PM   Result Value Ref Range    Troponin-I, Qt. 0.14 (H) <0.05 ng/mL   NT-PRO BNP    Collection Time: 08/24/17  1:14 PM   Result Value Ref Range    NT pro-BNP 50 0 - 450 PG/ML       IMAGING RESULTS:  XR CHEST PA LAT   Final Result   EXAM:  XR CHEST PA LAT. INDICATION: Cough with right back pain and shortness of breath. COMPARISON: 6/20/2017.     FINDINGS:   PA and lateral radiographs of the chest were obtained. The patient is on a  cardiac monitor. Lungs: Left hemidiaphragm is elevated and there is atelectasis at the left base. Pleura: There is no pleural effusion or pneumothorax. Mediastinum: The cardiac and mediastinal contours and pulmonary vascularity are  normal.  There is a coronary stent. Bones and soft tissues:  There are degenerative changes and DISH of the spine.     IMPRESSION  IMPRESSION: Left basilar atelectasis.          MEDICATIONS GIVEN:  Medications   cyclobenzaprine (FLEXERIL) tablet 10 mg (10 mg Oral Given 8/24/17 1313)   albuterol-ipratropium (DUO-NEB) 2.5 MG-0.5 MG/3 ML (3 mL Nebulization Given 8/24/17 1313)   albuterol-ipratropium (DUO-NEB) 2.5 MG-0.5 MG/3 ML (3 mL Nebulization Given 8/24/17 1313)   sodium chloride 0.9 % bolus infusion 500 mL (500 mL IntraVENous New Bag 8/24/17 1422)   albuterol-ipratropium (DUO-NEB) 2.5 MG-0.5 MG/3 ML (3 mL Nebulization Given 8/24/17 1435)   predniSONE (DELTASONE) tablet 40 mg (40 mg Oral Given 8/24/17 1435)       IMPRESSION:  1. Wheezing    2. Smoker        PLAN:  1. Current Discharge Medication List      START taking these medications    Details   predniSONE (DELTASONE) 50 mg tablet Take 1 Tab by mouth daily for 3 days. Qty: 3 Tab, Refills: 0      albuterol (PROVENTIL HFA, VENTOLIN HFA, PROAIR HFA) 90 mcg/actuation inhaler Take 2 Puffs by inhalation every four (4) hours as needed for Wheezing. Qty: 1 Inhaler, Refills: 1           2. Follow-up Information     Follow up With Details Comments [address], [place] Schedule an appointment as soon as possible for a visit About his wheezing and smoking use [address]      Terrence Chu MD Schedule an appointment as soon as possible for a visit  [address] [facility]  [address]           Return to ED if worse     Discharge Note:  2:54 PM  The pt is ready for discharge. The pt's signs, symptoms, diagnosis, and discharge instructions have been discussed and pt has conveyed their understanding. The pt is to follow up as recommended or return to ER should their symptoms worsen. Plan has been discussed and pt is in agreement.     This note is prepared by Mignon Michaels, acting as a Scribe for Carlota Urias MD.    Carlota Urias MD: The iwona's documentation has been prepared under my direction and personally reviewed by me in its entirety. I confirm that the notes above accurately reflects all work, treatment, procedures, and medical decision making performed by me.

## 2017-08-24 NOTE — ED NOTES
Patient arrives ambulatory to triage with SOB and right sided chest pain that radiates to the back. Patient reports the symptoms started yesterday.

## 2017-08-24 NOTE — DISCHARGE INSTRUCTIONS
Wheezing or Bronchoconstriction: Care Instructions  Your Care Instructions  Wheezing is a whistling noise made during breathing. It occurs when the small airways, or bronchial tubes, that lead to your lungs swell or contract (spasm) and become narrow. This narrowing is called bronchoconstriction. When your airways constrict, it is hard for air to pass through and this makes it hard for you to breathe. Wheezing and bronchoconstriction can be caused by many problems, including:  · An infection such as the flu or a cold. · Allergies such as hay fever. · Diseases such as asthma or chronic obstructive pulmonary disease. · Smoking. Treatment for your wheezing depends on what is causing the problem. Your wheezing may get better without treatment. But you may need to pay attention to things that cause your wheezing and avoid them. Or you may need medicine to help treat the wheezing and to reduce the swelling or to relieve spasms in your lungs. Follow-up care is a key part of your treatment and safety. Be sure to make and go to all appointments, and call your doctor if you are having problems. It is also a good idea to know your test results and keep a list of the medicines you take. How can you care for yourself at home? · Take your medicine exactly as prescribed. Call your doctor if you think you are having a problem with your medicine. You will get more details on the specific medicine your doctor prescribes. · If your doctor prescribed antibiotics, take them as directed. Do not stop taking them just because you feel better. You need to take the full course of antibiotics. · Breathe moist air from a humidifier, hot shower, or sink filled with hot water. This may help ease your symptoms and make it easier for you to breathe. · If you have congestion in your nose and throat, drinking plenty of fluids, especially hot fluids, may help relieve your symptoms.  If you have kidney, heart, or liver disease and have to limit fluids, talk with your doctor before you increase the amount of fluids you drink. · If you have mucus in your airways, it may help to breathe deeply and cough. · Do not smoke or allow others to smoke around you. Smoking can make your wheezing worse. If you need help quitting, talk to your doctor about stop-smoking programs and medicines. These can increase your chances of quitting for good. · Avoid things that may cause your wheezing. These may include colds, smoke, air pollution, dust, pollen, pets, cockroaches, stress, and cold air. When should you call for help? Call 911 anytime you think you may need emergency care. For example, call if:  · You have severe trouble breathing. · You passed out (lost consciousness). Call your doctor now or seek immediate medical care if:  · You cough up yellow, dark brown, or bloody mucus (sputum). · You have new or worse shortness of breath. · Your wheezing is not getting better or it gets worse after you start taking your medicine. Watch closely for changes in your health, and be sure to contact your doctor if:  · You do not get better as expected. Where can you learn more? Go to http://trish-jah.info/. Enter 454 [id number] in the search box to learn more about \"Wheezing or Bronchoconstriction: Care Instructions. \"  Current as of: March 25, 2017  Content Version: 11.3  © 3442-4603 [employer]. Care instructions adapted under license by [employer] (which disclaims liability or warranty for this information). If you have questions about a medical condition or this instruction, always ask your healthcare professional. [place] [address] any warranty or liability for your use of this information.

## 2017-08-25 LAB
ATRIAL RATE: 92 BPM
CALCULATED P AXIS, ECG09: 58 DEGREES
CALCULATED R AXIS, ECG10: -91 DEGREES
CALCULATED T AXIS, ECG11: 26 DEGREES
DIAGNOSIS, 93000: NORMAL
P-R INTERVAL, ECG05: 188 MS
Q-T INTERVAL, ECG07: 400 MS
QRS DURATION, ECG06: 148 MS
QTC CALCULATION (BEZET), ECG08: 494 MS
VENTRICULAR RATE, ECG03: 92 BPM

## 2017-10-11 ENCOUNTER — OFFICE VISIT (OUTPATIENT)
Dept: CARDIOLOGY CLINIC | Age: 82
End: 2017-10-11

## 2017-10-11 VITALS
WEIGHT: 180.1 LBS | DIASTOLIC BLOOD PRESSURE: 90 MMHG | HEART RATE: 87 BPM | OXYGEN SATURATION: 93 % | SYSTOLIC BLOOD PRESSURE: 174 MMHG | BODY MASS INDEX: 31.91 KG/M2 | HEIGHT: 63 IN | RESPIRATION RATE: 18 BRPM

## 2017-10-11 DIAGNOSIS — I10 ESSENTIAL HYPERTENSION: ICD-10-CM

## 2017-10-11 DIAGNOSIS — E78.5 DYSLIPIDEMIA: ICD-10-CM

## 2017-10-11 DIAGNOSIS — R06.02 SOBOE (SHORTNESS OF BREATH ON EXERTION): Primary | ICD-10-CM

## 2017-10-11 DIAGNOSIS — I25.83 CORONARY ARTERY DISEASE DUE TO LIPID RICH PLAQUE: ICD-10-CM

## 2017-10-11 DIAGNOSIS — I25.10 CORONARY ARTERY DISEASE DUE TO LIPID RICH PLAQUE: ICD-10-CM

## 2017-10-11 DIAGNOSIS — Z98.61 S/P PTCA (PERCUTANEOUS TRANSLUMINAL CORONARY ANGIOPLASTY): ICD-10-CM

## 2017-10-11 RX ORDER — LEVOTHYROXINE SODIUM 88 UG/1
TABLET ORAL
Qty: 30 TAB | Refills: 0 | Status: SHIPPED | OUTPATIENT
Start: 2017-10-11 | End: 2017-11-08 | Stop reason: SDUPTHER

## 2017-10-11 RX ORDER — FUROSEMIDE 20 MG/1
20 TABLET ORAL DAILY
Qty: 30 TAB | Refills: 0 | Status: SHIPPED | OUTPATIENT
Start: 2017-10-11 | End: 2017-11-08 | Stop reason: SDUPTHER

## 2017-10-11 NOTE — PROGRESS NOTES
Elizabeth Alves DNP, ANP-BC  Subjective/HPI:     Abdon Cruz is a 81 y.o. male is here for symptom based appointment secondary to progressive dyspnea on exertion. He states symptoms have been present for at least 2 months. In the last month Mr. Reid Daniels feels his dyspnea has progressed and at times feels shortness of breath at rest.  Has history of atherosclerotic heart disease with previous ISR. Has been taking all medications as directed. In reviewing flowsheet discussed with patient 10 pound weight gain and reports this too has occurred within the last month without any increase of caloric intake or significant decrease of activity. Chest x-ray August 2017    EXAM:  XR CHEST PA LAT. INDICATION: Cough with right back pain and shortness of breath. COMPARISON: 6/20/2017.     FINDINGS:   PA and lateral radiographs of the chest were obtained. The patient is on a  cardiac monitor. Lungs: Left hemidiaphragm is elevated and there is atelectasis at the left base. Pleura: There is no pleural effusion or pneumothorax. Mediastinum: The cardiac and mediastinal contours and pulmonary vascularity are  normal.  There is a coronary stent. Bones and soft tissues: There are degenerative changes and DISH of the spine.     IMPRESSION  IMPRESSION: Left basilar atelectasis. CBC stable and normal proBNP from ER workup August 2017. Patient has been using an inhaler twice a week when he feels more dyspneic with relief of his symptoms. Denies any formal history of emphysema or COPD, he is a former smoker.     PCP Provider  Lei Hicks MD  Past Medical History:   Diagnosis Date    AAA (abdominal aortic aneurysm) ([address]     Acute MI 12/2010    dr Nir Torres    Acute prostatitis     again 9/12/16 [facility]'d [facility]    Advance directive discussed with patient 04/20/2016    Aneurysm ([address] 6/2011    AAA    Borderline glaucoma (glaucoma suspect) 02/19/2015    notes from [address] [place] [address] rec'd    BPH (benign prostatic hyperplasia)     Bright red blood per rectum 02/04/2016    [facility] note Maris Vitalricia- w/u in progress   8/22/17 [facility] f/u note    CAD (coronary artery disease)     Calculus of kidney     Cerebral embolism with cerebral infarction ([address]     Chronic pain     back    Dizzy spells 6/2012    DJD (degenerative joint disease) of lumbar spine     ED (erectile dysfunction)     8/30/17 [facility]u note [facility]    Elevated PSA 03/20/2014    [facility]     8/24/16 f/u note    Encephalocele ([address]     Hypercholesterolemia     Hypertension     Pneumonia 02/05/2017    Prostate cancer ([address] 05/12/2014    crystal henry/ Dr Devaughn Ozuna 8/22/17 f/u note    PVD (peripheral vascular disease) with claudication ([address]     S/P radiation therapy 15 months out    probable cause of the rectal bleeding    Stroke ([address] 6/2011    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    neuro assoc notes rec'd    Thyroid disease     Vitamin D deficiency 11/2013    again 5/2015 again 1/2016      Past Surgical History:   Procedure Laterality Date    CARDIAC SURG PROCEDURE UNLIST  12/2010    dr Heri Howard stents past MI    COLONOSCOPY N/A 8/31/2016    COLONOSCOPY performed [facility]useppe Lawson [facility] at [facility] ENDOSCOPY    ENDOSCOPY, COLON, DIAGNOSTIC      HX CAROTID ENDARTERECTOMY Left     HX HEENT  10/2012    repair right drum cindyarianna dawkins    HX ORTHOPAEDIC Bilateral     BUNIONECTOMY    MO LEFT HEART CATH,PERCUTANEOUS  10/16/2010         PTCA EACH ADDL VESSEL  10/16/2010         PTCA W/ STENT, INITIAL  10/16/2010         VASCULAR SURGERY PROCEDURE UNLIST      left leg varicose vein stripping dr Joesph Jones     Allergies   Allergen Reactions    Norco [Hydrocodone-Acetaminophen] Other (comments)     Too sedated and felt like he was in a daze    Pcn [Penicillins] Rash    Percocet [Oxycodone-Acetaminophen] Nausea and Vomiting      Family History   Problem Relation Age of Onset    Diabetes Mother     Diabetes Father     Cancer Sister      LUNG    Cancer Brother      COLON    Cancer Sister      LUNG    Anesth Problems Neg Hx       Current Outpatient Prescriptions   Medication Sig    levothyroxine (SYNTHROID) 88 mcg tablet TAKE 1 TABLET BY MOUTH EVERY DAY BEFORE BREAKFAST AND RECHECK LEVEL IN 4 TO 6 WEEKS    furosemide (LASIX) 20 mg tablet Take 1 Tab by mouth daily.  albuterol (PROVENTIL HFA, VENTOLIN HFA, PROAIR HFA) 90 mcg/actuation inhaler Take 2 Puffs by inhalation every four (4) hours as needed for Wheezing.  aspirin (ASPIRIN) 325 mg tablet Take 325 mg by mouth daily.  clotrimazole-betamethasone (LOTRISONE) topical cream Apply twice daily to affected area till resolved.  isosorbide mononitrate ER (IMDUR) 30 mg tablet TAKE 1 TABLET BY MOUTH EVERY DAY    amLODIPine (NORVASC) 5 mg tablet TAKE 1 TAB BY MOUTH DAILY.  metoprolol succinate (TOPROL-XL) 25 mg XL tablet TAKE 1 TABLET EVERY DAY    losartan-hydroCHLOROthiazide (HYZAAR) 100-12.5 mg per tablet TAKE 1 TABLET BY MOUTH EVERY DAY    tamsulosin (FLOMAX) 0.4 mg capsule TAKE 1 CAPSULE BY MOUTH EVERY DAY    atorvastatin (LIPITOR) 40 mg tablet Take 1 Tab by mouth daily. YRN. Give patient BRAND LIPITOR. D/C Crestor    traMADol (ULTRAM) 50 mg tablet Take 1 Tab by mouth every eight (8) hours as needed for Pain. Max Daily Amount: 150 mg. TAKE 1-2 TABLETS BY MOUTH EVERY 6 HOURS AS NEEDED FOR PAIN    albuterol-ipratropium (DUO-NEB) 2.5 mg-0.5 mg/3 ml nebu 3 mL by Nebulization route every six (6) hours as needed.  CHOLECALCIFEROL, VITAMIN D3, (VITAMIN D3 PO) Take  by mouth.  finasteride (PROSCAR) 5 mg tablet daily.  omega-3 fatty acids-vitamin e (FISH OIL) 1,000 mg cap Take 1 Cap by mouth daily.  ascorbic acid (VITAMIN C) 1,000 mg tablet Take  by mouth.  aspirin delayed-release 325 mg tablet Take  by mouth every six (6) hours as needed for Pain.  aspirin delayed-release 81 mg tablet Take 1 Tab by mouth daily for 360 days.      No current facility-administered medications for this visit. Vitals:    10/11/17 1508 10/11/17 1519   BP: 174/84 174/90   Pulse: 87    Resp: 18    SpO2: 93%    Weight: 180 lb 1.6 oz (81.7 kg)    Height: 5' 3\" (1.6 m)      Social History     Social History    Marital status:      Spouse name: N/A    Number of children: N/A    Years of education: N/A     Occupational History    Not on file. Social History Main Topics    Smoking status: Former Smoker     Packs/day: 0.25     Quit date: 9/10/2014    Smokeless tobacco: Never Used      Comment: has not had cigarette in over a month    Alcohol use No    Drug use: No    Sexual activity: Not Currently     Other Topics Concern    Not on file     Social History Narrative       I have reviewed the nurses notes, vitals, problem list, allergy list, medical history, family, social history and medications. Review of Symptoms:    General: There is a 10 pound weight gain in 1 month unexpected. Pt is able to conduct ADL's however with some limitations due to new dyspnea on exertion  HEENT: Denies blurred vision, headaches, epistaxis and difficulty swallowing. Respiratory: + Shortness of breath, + VERONICA, + occasional wheezing denies stridor. Cardiovascular: Denies precordial pain, palpitations, edema + PND  Gastrointestinal: Denies poor appetite, indigestion, abdominal pain or blood in stool. He does report his pants are fitting tighter  Musculoskeletal: Denies pain or swelling from muscles or joints  Neurologic: Denies tremor, paresthesias, or sensory motor disturbance  Skin: Denies rash, itching or texture change. Physical Exam:      General: Well developed, in no acute distress, cooperative and alert  HEENT: No carotid bruits, no JVD, trach is midline. Neck Supple, PEERL, EOM intact. Heart:  Normal S1/S2 negative S3 or S4.  Regular, no murmur, gallop or rub.   Respiratory: Diminished in the right lower field, no wheezes rales or rhonchi clear remainder  Abdomen:   Soft, rounded non-tender, no masses, bowel sounds are active.   Extremities:  No edema, normal cap refill, no cyanosis, atraumatic. Noted bilateral rounded fingers c/w clubbing. Neuro: A&Ox3, speech clear, gait stable. Skin: Skin color is normal. No rashes or lesions. Non diaphoretic  Vascular: 2+ pulses symmetric in all extremities        Resting oxygen level room air 96% after 100 feet of ambulation decreased 93% with mild tachypnea, no increase in heart rate. Cardiographics    ECG: Normal sinus rhythm  Results for orders placed or performed during the hospital encounter of 17   EKG, 12 LEAD, INITIAL   Result Value Ref Range    Ventricular Rate 92 BPM    Atrial Rate 92 BPM    P-R Interval 188 ms    QRS Duration 148 ms    Q-T Interval 400 ms    QTC Calculation (Bezet) 494 ms    Calculated P Axis 58 degrees    Calculated R Axis -91 degrees    Calculated T Axis 26 degrees    Diagnosis       Sinus rhythm with premature atrial complexes  Right bundle branch block    Confirmed by Haydee Diaz ([id number]) on 2017 7:47:28 AM     Results for orders placed or performed during the hospital encounter of 10/14/10   ECHOCARDIOGRAM    Narrative    Name:      Candelaria Dennis. Admitted:   10/14/2010  MR #:      [id number]                     :        1935  Account #: [de-identified]                  Age         77  Ref MD:                                  Location:   [id number]                               CARDIAC CATHETERIZATION REPORT    STUDY DATE:  10/16/2010      STUDY NUMBER:      AGE.:  77. GENDER:  Male. RACE:  -American. HEIGHT:  5 feet 3 inches. WEIGHT:  150 pounds. BODY SURFACE AREA:  1.71 meter squared. PROCEDURES  1. Left heart catheterization. 2. Left ventriculography. 3. Coronary angiography. 4. Percutaneous transluminal coronary angioplasty of in-stent   restenosis in  the distal right coronary artery.   5. Percutaneous transluminal coronary angioplasty and stenting of  intermedius ramus. 6. Angiomax bolus and maintenance infusion. INDICATION:  Unstable angina pectoris with history remote stent   placement  including May 2010 and August 2010 with a total of 6   intracoronary stents  placed in the right coronary artery. TECHNIQUE:  Jaun (right groin). CATHETERS:  Please see hemodynamic sheet. MEDICATIONS:  Please see hemodynamic sheet. COMPLICATIONS:  None. METHOD:  After informed consent, under local anesthesia and using   sterile  technique, left heart catheterization, left ventriculography,   coronary  angiography were performed in the usual fashion. Following   cardiac  catheterization, the patient was prepared for percutaneous   coronary  transluminal angioplasty of in-stent restenosis in the right   coronary  artery to be followed by her percutaneous coronary intervention   of the  Ramus intermedius. The 5-Marshallese sheath was replaced with a 6-Marshallese sheath through   which a  6-Marshallese right 4 guiding catheter was advanced to the right   coronary  ostium, followed by advancement of a 0.014 inch long exchange   extra support  Prowater guidewire to and across the lesion into the distal   vessel. Subsequently, a 2.5 x 12 mm non-compliant Voyager RX balloon was   advanced to  within the in-stent restenosis. Two inflations up to a maximum of   17  atmospheres over a maximal duration of 35 seconds were performed. Repeat  cineangiograms revealed a suboptimal result. Therefore, the 2.5 x   12 mm  balloon was replaced with a 3 x 8 mm noncompliant Voyager RX   balloon which  was advanced to within the in-stent restenosis. Three inflations   up to a  maximum of 17 atmospheres over a maximal duration of 30 seconds   were  performed. Repeat cineangiograms revealed a satisfactory result.    The  balloon and guidewire were subsequently withdrawn and removed,   followed by  removal of the guiding catheter. Subsequently, attention was turned toward the ramus intermedius   coronary  artery. A 6-Icelandic Voda left 4 guiding catheter was advanced to   the left  coronary ostium, followed by advancement of a 0.014 inch long   exchange  extra support Prowater guidewire to and across the lesion into   the distal  vessel. Subsequently, a 2.5 x 6 mm sprinter compliant balloon was   advanced to within  the lesion. One inflation to 9 atmospheres over 16 seconds was   performed as  predilatation. Subsequently, a 2.5 x 12 mm Xience over the wire  drug-eluting stent was advanced to within the lesion and deployed   at 12  atmospheres over 20 seconds and postdilated to 14 atmospheres   over 20  seconds. Repeat cineangiograms revealed an excellent result. The   balloon  and guidewire were subsequently withdrawn and removed, followed   by removal  of the guiding catheter. The sheath was sutured in place in the   right  groin. The patient was subsequently transported to the   Interventional  Coronary Care Unit in satisfactory condition. Angiomax   maintenance infusion  was discontinued. HEMODYNAMIC DATA:    1. Mild elevation of left ventricular end-diastolic pressure to 6   mmHg  prior to and following contrast ventriculography. 2. Normal phasic and mean systemic arterial pressures at 134/74   mmHg with a  mean of 86 mmHg. 3. Absent transvalvular aortic pressure gradient. OXIMETRY DATA:  Normal systemic arterial oxygen saturation at 97%   on 2  liters of nasal oxygen. CONCLUSION:  As above. ANGIOGRAPHIC DATA:  Fluoroscopy: There is mild calcification   noted in the  right and left coronary artery systems. Intracoronary stents are  visualized throughout the right coronary artery from proximal to   distal  third. LEFT VENTRICULOGRAPHY:  Left ventriculography (30 degrees SIMON   projection): Contrast injection into the left ventricle reveals that the left   ventricle  is normal in size and configuration.  There is prominence of the   papillary  muscles and trabecular pattern suggestive of left ventricle   hypertrophy. Contractility is excellent in all wall segments except for focal   akinesis  involving the posterobasal wall segment with mild hypokinesis   involving the  diaphragmatic wall region. The estimated left ventricular   ejection fraction  is 60% to 65% (normal 65% +/-8%). There is no evidence of mitral  regurgitation or mitral valve prolapse. The mitral and aortic   valves appear  to be normal. There is mild to moderate dilatation of the   ascending aorta. CORONARY ARTERIOGRAPHY:    1. Left main stem: This medium sized vessel is normal.    2. Left anterior descending artery: This medium-sized vessel is   slightly  ectatic in its proximal third. It terminates in the distal third   of the  posterior interventricular groove. It gives rise to normal   complement of  septal perforators and diagonal arteries. The left anterior   descending  artery has mild luminal irregularities in its proximal and middle   thirds  maximal of 20% to 30%, difficult to quantitate tubular 30% to 40%   stenosis  at the junction of its proximal and middle thirds followed by   focal 20% to  30% stenosis at the junction of its middle and distal thirds. The   septal  perforators appear to be normal. The first proximal third small   diagonal  artery has mild luminal irregularities in its proximal third   maximal to  20% to 30%. The remaining diagonal arteries appear to be normal.    3. Intermediate artery: This medium-sized vessel has a focal 80%   to 85%  eccentric proximal third stenosis, followed by mild luminal   irregularities  at the junction of its proximal and middle thirds, maximal to 20%   to 30%. It gives rise to 2 distal small branches.  The superior branch is   normal.  The inferior moderately tortuous branch has a 90% to 95% focal   proximal  third stenosis followed by a 70% to 75% focal concentric stenosis   in its  distal proximal third, followed by a 95% to 99% severely   angulated middle  third stenosis. 4. Left circumflex artery: This medium-sized vessel exit as a   principal  obtuse marginal artery. It essentially trifurcates in its middle   third into  3 smaller branches. The more superior branch has a difficult to   quantitate  50% to 60% eccentric proximal third segmental stenosis. The   second small branch has a difficult to quantitate 80% to 85%   focal origin  stenosis, followed by a tubular 70% to 80% proximal third short   segmental  stenosis. The more inferior branch has a moderate segmental   stenosis in its  proximal third near its origin maximal to 60% to 65%. 5. Right coronary artery: This medium-sized dominant vessel gives   rise to a  large conus artery, the sinoatrial lucia artery, several very   small right  ventricular marginal branches, and terminates at small posterior   descending  and posterolateral arteries. The right coronary artery has   intracoronary  stents extending from its origin into its distal third   approximately 1 cm  prior to its bifurcation. There appears to be a one (1) cm skip   area at the  junction of its middle and distal thirds. The intracoronary   stents  extending from its origin into its distal middle third have mild   luminal  irregularities throughout, maximal to 20% to 30%. The \"skip\" area   between  the proximal and distal stents has 30% to 40% segmental eccentric   stenosis. The distal stents in the distal third of the right coronary   artery have  mild luminal irregularities throughout maximal to 10% to 20%. There is what  appears to be a segmental eccentric 80% to 85% focal stenosis in   the  proximal third of the distal third stent. The posterolateral   artery has  mild luminal irregularities throughout maximal to 20% to 30%. The   posterior  descending artery has a small vessel. It fills slowly distally.    It has mild  luminal irregularities maximal 10% to 20%.    The Voyager noncompliant balloons were advanced successively into   the  proximal distal third in-stent restenosis and fully expanded   during  inflations. Repeat cineangiograms revealed that the original 80%   to 85%  in-stent restenosis was reduced down to less than 10% residual   without  obvious dissection, distal vessel compromise. Blood flow remained   CESAR  grade 3 blood flow. Following successful PTCA of the in-stent restenosis in the   distal third  stent of the right coronary artery, attention was turned toward   the  intermedius ramus. The Sprinter balloon was advanced to within   the lesion  that slowly expanded during its single inflation. Following   predilatation  with the Sprinter balloon, the Xience drug-eluting stent was   advanced to  within the lesion, deployed and postdilated. Repeat   cineangiograms revealed  that the original 80% to 85% focal proximal third stenosis was   reduced down  to a less than 10% residual without obvious dissection, branch   nor distal  vessel compromise. Blood flow remained CESAR grade 3 blood flow. CONCLUSIONS:    1. Hemodynamically significant three-vessel atheromatous coronary   artery  disease involving the ramus intermedius, distal third of the left  circumflex, principal obtuse marginal artery, and distal third   stent in the  right coronary artery. 2. Successful percutaneous transluminal coronary angioplasty of   in-stent  restenosis in the distal third of the right coronary artery with   reduction  of 80% to 85% in-stent restenosis down to less than 10% residual   without  obvious dissection or distal vessel compromise. 3. Successful percutaneous transluminal coronary angioplasty and   stenting  of the ramus intermedius with reduction of the 80% to 85% focal   proximal  third stenosis down to a less than 10% residual without obvious   dissection,  branch nor distal vessel compromise.   4. Hypertrophied left ventricle with normal systolic function and   akinesis  involving a focal segment of the posterobasal wall region. 5. Mild to moderate dilatation of the ascending aorta. Preliminary    Adler LAN Toussaint M.D.    cc:   Eva Espinoza. Leigh Toussaint M.D.        ALB/wmx; D: 11/19/2010  1:02 P; T: 11/20/2010  5:20 A; DOC#   [id number]; Job#  [id number]           Cardiology Labs:  Lab Results   Component Value Date/Time    Cholesterol, total 76 02/09/2017 02:47 AM    HDL Cholesterol 15 02/09/2017 02:47 AM    LDL, calculated 41.6 02/09/2017 02:47 AM    Triglyceride 97 02/09/2017 02:47 AM    CHOL/HDL Ratio 5.1 02/09/2017 02:47 AM       Lab Results   Component Value Date/Time    Sodium 139 08/24/2017 01:14 PM    Potassium 3.9 08/24/2017 01:14 PM    Chloride 108 08/24/2017 01:14 PM    CO2 28 08/24/2017 01:14 PM    Anion gap 3 08/24/2017 01:14 PM    Glucose 163 08/24/2017 01:14 PM    BUN 21 08/24/2017 01:14 PM    Creatinine 1.32 08/24/2017 01:14 PM    BUN/Creatinine ratio 16 08/24/2017 01:14 PM    GFR est AA >60 08/24/2017 01:14 PM    GFR est non-AA 52 08/24/2017 01:14 PM    Calcium 9.0 08/24/2017 01:14 PM    Bilirubin, total 0.6 08/24/2017 01:14 PM    AST (SGOT) 27 08/24/2017 01:14 PM    Alk. phosphatase 132 08/24/2017 01:14 PM    Protein, total 7.8 08/24/2017 01:14 PM    Albumin 3.4 08/24/2017 01:14 PM    Globulin 4.4 08/24/2017 01:14 PM    A-G Ratio 0.8 08/24/2017 01:14 PM    ALT (SGPT) 23 08/24/2017 01:14 PM           Assessment:     Assessment:     Diagnoses and all orders for this visit:    1. SOBOE (shortness of breath on exertion)  -     AMB POC EKG ROUTINE W/ 12 LEADS, INTER & REP  -     CBC WITH AUTOMATED DIFF  -     METABOLIC PANEL, COMPREHENSIVE  -     NT-PRO BNP  -     2D ECHO COMPLETE ADULT (TTE) W OR WO CONTR; Future  -     LEXISCAN/CARDIOLITE, Clinic Performed; Future    2.  Coronary artery disease due to lipid rich plaque  -     CBC WITH AUTOMATED DIFF  -     METABOLIC PANEL, COMPREHENSIVE  -     NT-PRO BNP  -     2D ECHO COMPLETE ADULT (TTE) W OR WO CONTR; Future  -     LEXISCAN/CARDIOLITE, Clinic Performed; Future    3. Essential hypertension  -     CBC WITH AUTOMATED DIFF  -     METABOLIC PANEL, COMPREHENSIVE  -     NT-PRO BNP  -     2D ECHO COMPLETE ADULT (TTE) W OR WO CONTR; Future  -     LEXISCAN/CARDIOLITE, Clinic Performed; Future    4. Dyslipidemia    5. Successful  PTCA (percutaneous transluminal coronary angioplasty)--90-95% instent restenosis RCA 10/16/10    Other orders  -     furosemide (LASIX) 20 mg tablet; Take 1 Tab by mouth daily. ICD-10-CM ICD-9-CM    1. SOBOE (shortness of breath on exertion) R06.02 786.05 AMB POC EKG ROUTINE W/ 12 LEADS, INTER & REP      CBC WITH AUTOMATED DIFF      METABOLIC PANEL, COMPREHENSIVE      NT-PRO BNP      2D ECHO COMPLETE ADULT (TTE) W OR WO CONTR      STRESS TEST LEXISCAN/CARDIOLITE   2. Coronary artery disease due to lipid rich plaque I25.10 414.00 CBC WITH AUTOMATED DIFF    P75.17 201.0 METABOLIC PANEL, COMPREHENSIVE      NT-PRO BNP      2D ECHO COMPLETE ADULT (TTE) W OR WO CONTR      STRESS TEST LEXISCAN/CARDIOLITE   3. Essential hypertension I10 401.9 CBC WITH AUTOMATED DIFF      METABOLIC PANEL, COMPREHENSIVE      NT-PRO BNP      2D ECHO COMPLETE ADULT (TTE) W OR WO CONTR      STRESS TEST LEXISCAN/CARDIOLITE   4. Dyslipidemia E78.5 272.4    5.  Successful  PTCA (percutaneous transluminal coronary angioplasty)--90-95% instent restenosis RCA 10/16/10 Z98.61 V45.82      Orders Placed This Encounter    CBC WITH AUTOMATED DIFF    METABOLIC PANEL, COMPREHENSIVE    NT-PRO BNP    AMB POC EKG ROUTINE W/ 12 LEADS, INTER & REP     Order Specific Question:   Reason for Exam:     Answer:   routine    LEXISCAN/CARDIOLITE, Clinic Performed     Standing Status:   Future     Standing Expiration Date:   4/11/2018     Order Specific Question:   Reason for Exam:     Answer:   Richard[place]    2D ECHO COMPLETE ADULT (TTE) W OR WO CONTR     Standing Status:   Future     Standing Expiration Date:   4/11/2018     Order Specific Question:   Reason for Exam:     Answer:   VERONICA     Order Specific Question:   Contrast Enhancement (Bubble Study, Definity, Optison) may be used if criteria listed in established evidence-based protocol has been identified. Answer: Yes    furosemide (LASIX) 20 mg tablet     Sig: Take 1 Tab by mouth daily. Dispense:  30 Tab     Refill:  0        Plan:     Patient is a 67-year-old male with a history of multivessel CAD presenting with progressive dyspnea on exertion. From a cardiology standpoint will evaluate for ischemia with Pope Ursula as well as for heart failure with echocardiogram.  Given 10 pound weight gain with some orthopnea and paroxysmal nocturnal dyspnea congestive heart failure is differential diagnosis and will begin treatment with low-dose diuretic, sent from office to lab for renal panel and proBNP. Presently on triple antianginal therapy. Noted elevated blood pressure today, hopefully diuresing will reduce, will reevaluate at follow-up. Additional differential diagnosis would be underlying pulmonary pathology. If cardiology workup is negative patient would benefit from pulmonary function testing. Follow-up with Dr. Anup Vera when testing complete.     Elizabeth Alves NP

## 2017-10-11 NOTE — MR AVS SNAPSHOT
Visit Information Date & Time Provider Department Dept. Phone Encounter #  
 10/11/2017  3:15 PM Anthony Crandall NP [facility] [phone] [id number] Your Appointments 11/2/2017  8:00 AM  
ECHO CARDIOGRAMS 2D with ECHO, [facility] [address] [place] [address]) Appt Note: Duval Halter STRESS TEST LEXISCAN/CARDIOLITE [[id number]] (Order [id number]) 5'3\" 180  2D ECHO COMPLETE ADULT (TTE) W OR WO CONTR [[id number]] (Order [id number]) ,[address] [place] [address]  
[phone] [address] [place] [address]  
  
    
 11/2/2017  9:00 AM  
STRESS TEST with NUCLEAR, [facility] [address] [place] [address]) Appt Note: Duval Halter STRESS TEST LEXISCAN/CARDIOLITE [[id number]] (Order [id number]) 5'3\" 180  2D ECHO COMPLETE ADULT (TTE) W OR WO CONTR [[id number]] (Order [id number]) ,[address] [place] [address]  
[phone] [address] [place] [address]  
  
    
 11/8/2017 11:15 AM  
RESULTS/REVIEW with Ana Luisa Christopher MD  
[facility] [address] [place] [address]) Appt Note: Dr. Sam No ,[address] [place] [address]  
[phone] [address] [place] [address] Upcoming Health Maintenance Date Due  
 MEDICARE YEARLY EXAM 5/18/2016 GLAUCOMA SCREENING Q2Y 3/2/2017 ZOSTER VACCINE AGE 60> 11/21/2017* DTaP/Tdap/Td series (1 - Tdap) 11/21/2017* INFLUENZA AGE 9 TO ADULT 11/21/2017* COLONOSCOPY 8/31/2018 *Topic was postponed. The date shown is not the original due date. Allergies as of 10/11/2017  Review Complete On: 10/11/2017 By: Anthony Crandall NP Severity Noted Reaction Type Reactions Norco [Hydrocodone-acetaminophen]  01/18/2013    Other (comments) Too sedated and felt like he was in a daze Pcn [Penicillins]  10/09/2009    Rash Percocet [Oxycodone-acetaminophen]  05/02/2011   Side Effect Nausea and Vomiting Current Immunizations  Reviewed on 8/24/2017 Name Date Influenza High Dose Vaccine PF 10/5/2015 12:38 PM, 2/12/2015, 11/4/2013 Influenza Vaccine 10/1/2016 Influenza Vaccine Split 10/15/2012, 12/1/2010 Pneumococcal Conjugate (PCV-13) 3/15/2017 Pneumococcal Polysaccharide (PPSV-23) 1/12/2011 Not reviewed this visit You Were Diagnosed With   
  
 Codes Comments SOBOE (shortness of breath on exertion)    -  Primary ICD-10-CM: R06.02 
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 
ICD-9-CM: 401.9 Vitals BP Pulse Resp Height(growth percentile) Weight(growth percentile) SpO2  
 174/90 (BP 1 Location: Right arm, BP Patient Position: Sitting) 87 18 5' 3\" (1.6 m) 180 lb 1.6 oz (81.7 kg) 93% BMI Smoking Status 31.9 kg/m2 Former Smoker Vitals History BMI and BSA Data Body Mass Index Body Surface Area 31.9 kg/m 2 1.91 m 2 Preferred Pharmacy Pharmacy Name Phone [id number], [address] [place] [address] [phone] Your Updated Medication List  
  
   
This list is accurate as of: 10/11/17  4:13 PM.  Always use your most recent med list.  
  
  
  
  
 albuterol 90 mcg/actuation inhaler Commonly known as:  PROVENTIL HFA, VENTOLIN HFA, PROAIR HFA Take 2 Puffs by inhalation every four (4) hours as needed for Wheezing. albuterol-ipratropium 2.5 mg-0.5 mg/3 ml Nebu Commonly known as:  DUO-NEB  
3 mL by Nebulization route every six (6) hours as needed. amLODIPine 5 mg tablet Commonly known as:  Ramires Mullet TAKE 1 TAB BY MOUTH DAILY. * aspirin 325 mg tablet Commonly known as:  ASPIRIN Take 325 mg by mouth daily. * aspirin delayed-release 325 mg tablet Take  by mouth every six (6) hours as needed for Pain. * aspirin delayed-release 81 mg tablet Take 1 Tab by mouth daily for 360 days. atorvastatin 40 mg tablet Commonly known as:  LIPITOR Take 1 Tab by mouth daily. YRN. Give patient BRAND LIPITOR. D/C Crestor  
  
 clotrimazole-betamethasone topical cream  
Commonly known as:  Denisa Hu Apply twice daily to affected area till resolved. finasteride 5 mg tablet Commonly known as:  PROSCAR  
daily. FISH OIL 1,000 mg Cap Generic drug:  omega-3 fatty acids-vitamin e Take 1 Cap by mouth daily. furosemide 20 mg tablet Commonly known as:  LASIX Take 1 Tab by mouth daily. isosorbide mononitrate ER 30 mg tablet Commonly known as:  IMDUR  
TAKE 1 TABLET BY MOUTH EVERY DAY  
  
 levothyroxine 88 mcg tablet Commonly known as:  SYNTHROID  
TAKE 1 TABLET BY MOUTH EVERY DAY BEFORE BREAKFAST AND RECHECK LEVEL IN 4 TO 6 WEEKS  
  
 losartan-hydroCHLOROthiazide 100-12.5 mg per tablet Commonly known as:  HYZAAR  
TAKE 1 TABLET BY MOUTH EVERY DAY  
  
 metoprolol succinate 25 mg XL tablet Commonly known as:  TOPROL-XL  
TAKE 1 TABLET EVERY DAY  
  
 tamsulosin 0.4 mg capsule Commonly known as:  FLOMAX TAKE 1 CAPSULE BY MOUTH EVERY DAY  
  
 traMADol 50 mg tablet Commonly known as:  ULTRAM  
Take 1 Tab by mouth every eight (8) hours as needed for Pain. Max Daily Amount: 150 mg. TAKE 1-2 TABLETS BY MOUTH EVERY 6 HOURS AS NEEDED FOR PAIN  
  
 VITAMIN C 1,000 mg tablet Generic drug:  ascorbic acid (vitamin C) Take  by mouth. VITAMIN D3 PO Take  by mouth. * Notice: This list has 3 medication(s) that are the same as other medications prescribed for you. Read the directions carefully, and ask your doctor or other care provider to review them with you. Prescriptions Sent to Pharmacy Refills  
 furosemide (LASIX) 20 mg tablet 0 Sig: Take 1 Tab by mouth daily.   
 Class: Normal  
 [facility] [address] [place] [address] #: [phone] Route: Oral  
  
We Performed the Following AMB POC EKG ROUTINE W/ 12 LEADS, INTER & REP [53548 CPT(R)] CBC WITH AUTOMATED DIFF [07723 CPT(R)] METABOLIC PANEL, COMPREHENSIVE [61868 CPT(R)] NT-PRO BNP [id number] CPT(R)] To-Do List   
 10/12/2017 ECHO:  2D ECHO COMPLETE ADULT (TTE) W OR WO CONTR   
  
 10/12/2017 ECG:  STRESS TEST LEXISCAN/CARDIOLITE Introducing [facility]! Thea Raeann introduces [employer] patient portal. Now you can access parts of your medical record, email your doctor's office, and request medication refills online. 1. In your internet browser, go to [email or web address] 2. Click on the First Time User? Click Here link in the Sign In box. You will see the New Member Sign Up page. 3. Enter your [employer]ello Access Code exactly as it appears below. You will not need to use this code after youve completed the sign-up process. If you do not sign up before the expiration date, you must request a new code. · Qello Access Code: [email or web address] Expires: 11/4/2017  4:43 PM 
 
4. Enter the last four digits of your Social Security Number (xxxx) and Date of Birth (mm/dd/yyyy) as indicated and click Submit. You will be taken to the next sign-up page. 5. Create a Qello ID. This will be your Qello login ID and cannot be changed, so think of one that is secure and easy to remember. 6. Create a Qello password. You can change your password at any time. 7. Enter your Password Reset Question and Answer. This can be used at a later time if you forget your password. 8. Enter your e-mail address. You will receive e-mail notification when new information is available in [address] 9. Click Sign Up. You can now view and download portions of your medical record.  
10. Click the Download Summary menu link to download a portable copy of your medical information. If you have questions, please visit the Frequently Asked Questions section of the [employer] website. Remember, [employer] is NOT to be used for urgent needs. For medical emergencies, dial 911. Now available from your iPhone and Android! Please provide this summary of care documentation to your next provider. Your primary care clinician is listed as Cecelia Solorzano Dr. If you have any questions after today's visit, please call [phone].

## 2017-10-12 DIAGNOSIS — N40.0 BENIGN PROSTATIC HYPERPLASIA: ICD-10-CM

## 2017-10-12 LAB
ALBUMIN SERPL-MCNC: 4.5 G/DL (ref 3.5–4.7)
ALBUMIN/GLOB SERPL: 1.2 {RATIO} (ref 1.2–2.2)
ALP SERPL-CCNC: 148 IU/L (ref 39–117)
ALT SERPL-CCNC: 34 IU/L (ref 0–44)
AST SERPL-CCNC: 61 IU/L (ref 0–40)
BASOPHILS # BLD AUTO: 0 X10E3/UL (ref 0–0.2)
BASOPHILS NFR BLD AUTO: 1 %
BILIRUB SERPL-MCNC: 0.7 MG/DL (ref 0–1.2)
BUN SERPL-MCNC: 16 MG/DL (ref 8–27)
BUN/CREAT SERPL: 17 (ref 10–24)
CALCIUM SERPL-MCNC: 10.2 MG/DL (ref 8.6–10.2)
CHLORIDE SERPL-SCNC: 101 MMOL/L (ref 96–106)
CO2 SERPL-SCNC: 20 MMOL/L (ref 18–29)
CREAT SERPL-MCNC: 0.96 MG/DL (ref 0.76–1.27)
EOSINOPHIL # BLD AUTO: 0.2 X10E3/UL (ref 0–0.4)
EOSINOPHIL NFR BLD AUTO: 4 %
ERYTHROCYTE [DISTWIDTH] IN BLOOD BY AUTOMATED COUNT: 18.1 % (ref 12.3–15.4)
GLOBULIN SER CALC-MCNC: 3.9 G/DL (ref 1.5–4.5)
GLUCOSE SERPL-MCNC: 91 MG/DL (ref 65–99)
HCT VFR BLD AUTO: 42.3 % (ref 37.5–51)
HGB BLD-MCNC: 14.6 G/DL (ref 12.6–17.7)
IMM GRANULOCYTES # BLD: 0 X10E3/UL (ref 0–0.1)
IMM GRANULOCYTES NFR BLD: 0 %
LYMPHOCYTES # BLD AUTO: 1.7 X10E3/UL (ref 0.7–3.1)
LYMPHOCYTES NFR BLD AUTO: 26 %
MCH RBC QN AUTO: 29.1 PG (ref 26.6–33)
MCHC RBC AUTO-ENTMCNC: 34.5 G/DL (ref 31.5–35.7)
MCV RBC AUTO: 84 FL (ref 79–97)
MONOCYTES # BLD AUTO: 0.5 X10E3/UL (ref 0.1–0.9)
MONOCYTES NFR BLD AUTO: 8 %
NEUTROPHILS # BLD AUTO: 4 X10E3/UL (ref 1.4–7)
NEUTROPHILS NFR BLD AUTO: 61 %
NT-PROBNP SERPL-MCNC: 164 PG/ML (ref 0–486)
PLATELET # BLD AUTO: 192 X10E3/UL (ref 150–379)
POTASSIUM SERPL-SCNC: 4.6 MMOL/L (ref 3.5–5.2)
PROT SERPL-MCNC: 8.4 G/DL (ref 6–8.5)
RBC # BLD AUTO: 5.02 X10E6/UL (ref 4.14–5.8)
SODIUM SERPL-SCNC: 142 MMOL/L (ref 134–144)
WBC # BLD AUTO: 6.5 X10E3/UL (ref 3.4–10.8)

## 2017-10-12 RX ORDER — TAMSULOSIN HYDROCHLORIDE 0.4 MG/1
CAPSULE ORAL
Qty: 90 CAP | Refills: 0 | Status: SHIPPED | OUTPATIENT
Start: 2017-10-12 | End: 2018-06-06 | Stop reason: SDUPTHER

## 2017-10-12 RX ORDER — LOSARTAN POTASSIUM AND HYDROCHLOROTHIAZIDE 12.5; 1 MG/1; MG/1
TABLET ORAL
Qty: 30 TAB | Refills: 2 | Status: SHIPPED | OUTPATIENT
Start: 2017-10-12 | End: 2018-01-18 | Stop reason: SDUPTHER

## 2017-10-12 NOTE — PROGRESS NOTES
Spoke to patient using 2 patient identifiers. Per Shyam Felix, MAKAYLA, labs OK, continue low dose lasix to reduce edema, proceed with work up as planned. Pt verbalized understanding.

## 2017-10-12 NOTE — PROGRESS NOTES
Please call patient, labs OK, continue low dose lasix to reduce edema, proceed with work up as planned

## 2017-10-17 ENCOUNTER — OFFICE VISIT (OUTPATIENT)
Dept: FAMILY MEDICINE CLINIC | Age: 82
End: 2017-10-17

## 2017-10-17 VITALS
OXYGEN SATURATION: 93 % | HEIGHT: 63 IN | WEIGHT: 180 LBS | SYSTOLIC BLOOD PRESSURE: 148 MMHG | BODY MASS INDEX: 31.89 KG/M2 | HEART RATE: 68 BPM | RESPIRATION RATE: 18 BRPM | DIASTOLIC BLOOD PRESSURE: 90 MMHG | TEMPERATURE: 97 F

## 2017-10-17 DIAGNOSIS — Z00.00 MEDICARE ANNUAL WELLNESS VISIT, SUBSEQUENT: Primary | ICD-10-CM

## 2017-10-17 DIAGNOSIS — Z23 ENCOUNTER FOR IMMUNIZATION: ICD-10-CM

## 2017-10-17 DIAGNOSIS — H91.92 HEARING DEFICIT, LEFT: ICD-10-CM

## 2017-10-17 NOTE — PATIENT INSTRUCTIONS
Schedule of Personalized Health Plan    The best way to stay healthy is to live a healthy lifestyle. A healthy lifestyle includes regular exercise, eating a well-balanced diet, keeping a healthy weight and not smoking. Regular physical exams and screening tests are another important way to take care of yourself. Preventive exams provided by health care providers can find health problems early when treatment works best and can keep you from getting certain diseases or illnesses. Preventive services include exams, lab tests, screening tests, shots, and learning information to help you take care of your own health. The CDC recommends pneumonia vaccines for anyone 72 years and older. These vaccines are usually only needed once in a lifetime unless your healthcare provider decides differently. The 2 pneumonia shots available presently are PCV 13 (Prevnar 13) and PPSV23 (Pneumovax 23). Adults 72 years or older who have not previously received PCV13, should receive a dose of PCV13 first, followed 1 year later by a dose of PPSV23. All people over 65 should have a yearly flu vaccine. People over 65 are at medium to high risk for Hepatitis B. Hepatitis B is transmitted through body fluids with a common source being sexual activity or IV drug use. Three shots are needed for complete protection. The CDC recommends the herpes zoster (shingles) vaccine for all adults 61 and older, regardless if a prior episode of zoster has been reported. In addition to your physical exam, some screening tests are recommended:    Osteoporosis screening -There are no signs or symptoms of osteoporosis (weakening of bones). You might not know you have the disease until you break a bone. Thats why its so important to get a bone density test to measure your bone strength.  Bone mass measurement is taken with a Dexa scan and is recommended every two years after 72years old or if you have certain risk factors, such as personal history of vertebral fracture or chronic steroid medication use. Diabetes Mellitus screening is recommended every year. This is a blood test, called a hemoglobin A1c, which measures the average blood sugar over a 3 month period. Glaucoma is an eye disease caused by high pressure in the eye. An eye exam is recommended every year. Cardiovascular screening tests that check your cholesterol and other blood fat (lipid) levels are recommended every five years or yearly if you are on medications for cardiovascular disease. Colorectal Cancer screening tests help to find pre-cancerous polyps (growths in the colon) so they can be removed before they turn into cancer. Screening tests are recommended starting at age 48 or earlier if you have a certain risk factors, such as a family history of colon cancer. Prostate Cancer screening is currently not recommended by the [facility] Task Force unless there is a history or symptoms of prostate cancer. (Symptoms include urinary problems such as weak stream, problem starting or stopping urination, blood in urine, erectile dysfunction)    Here is a list of your current Health Maintenance items including a date when each one is due next:  Health Maintenance   Topic Date Due    GLAUCOMA SCREENING Q2Y  03/02/2017    ZOSTER VACCINE AGE 60>  11/21/2017 (Originally 1/1/1996)    DTaP/Tdap/Td series (1 - Tdap) 11/21/2017 (Originally 3/1/1956)    INFLUENZA AGE 9 TO ADULT  11/21/2017 (Originally 8/1/2017)    COLONOSCOPY  08/31/2018    MEDICARE YEARLY EXAM  10/18/2018    Pneumococcal 65+ High/Highest Risk  Completed       You DO NOT have an [address] [place] [address]. [address] on file. Please read the information given and call Stephanie Foley at [phone] to answer your questions. A referral to Dr. Nena Ramírez, an ear specialist, was ordered for you to assess your worsening hearing loss. Shingles vaccine is highly recommended.   Please go to a pharmacy with the prescription and get one.

## 2017-10-17 NOTE — MR AVS SNAPSHOT
Visit Information Date & Time Provider Department Dept. Phone Encounter #  
 10/17/2017  8:40 AM Arash Og NP [facility] [phone] [id number] Follow-up Instructions Return in about 1 year (around 10/17/2018). Your Appointments 11/2/2017  8:00 AM  
ECHO CARDIOGRAMS 2D with ECHO, [facility]) Appt Note: Cassidy Schwab STRESS TEST LEXISCAN/CARDIOLITE [[id number]] (Order [id number]) 5'3\" 180  2D ECHO COMPLETE ADULT (TTE) W OR WO CONTR [[id number]] (Order [id number]) ,[address] [place] [address]  
[phone] [address] [place] [address]  
  
    
 11/2/2017  9:00 AM  
STRESS TEST with NUCLEAR, [facility]) Appt Note: Cassidy Schwab STRESS TEST LEXISCAN/CARDIOLITE [[id number]] (Order [id number]) 5'3\" 180  2D ECHO COMPLETE ADULT (TTE) W OR WO CONTR [[id number]] (Order [id number]) ,[address] [place] [address]  
[phone] [address] [place] [address]  
  
    
 11/8/2017 11:15 AM  
RESULTS/REVIEW with Shania Beasley MD  
[facility]) Appt Note: Dr. Joelene Essex ,[address] [place] [address]  
[phone] [address] [place] [address] Upcoming Health Maintenance Date Due  
 GLAUCOMA SCREENING Q2Y 3/2/2017 ZOSTER VACCINE AGE 60> 11/21/2017* DTaP/Tdap/Td series (1 - Tdap) 11/21/2017* INFLUENZA AGE 9 TO ADULT 11/21/2017* COLONOSCOPY 8/31/2018 MEDICARE YEARLY EXAM 10/18/2018 *Topic was postponed. The date shown is not the original due date. Allergies as of 10/17/2017  Review Complete On: 10/17/2017 By: Florencio Irving LPN Severity Noted Reaction Type Reactions Norco [Hydrocodone-acetaminophen]  01/18/2013    Other (comments) Too sedated and felt like he was in a daze Pcn [Penicillins]  10/09/2009    Rash Percocet [Oxycodone-acetaminophen]  05/02/2011   Side Effect Nausea and Vomiting Current Immunizations  Reviewed on 8/24/2017 Name Date Influenza High Dose Vaccine PF  Incomplete, 10/5/2015 12:38 PM, 2/12/2015, 11/4/2013 Influenza Vaccine 10/1/2016 Influenza Vaccine Split 10/15/2012, 12/1/2010 Pneumococcal Conjugate (PCV-13) 3/15/2017 Pneumococcal Polysaccharide (PPSV-23) 1/12/2011 Not reviewed this visit You Were Diagnosed With   
  
 Codes Comments Medicare annual wellness visit, subsequent    -  Primary ICD-10-CM: Z00.00 ICD-9-CM: V70.0 Hearing deficit, left     ICD-10-CM: H91.92 
ICD-9-CM: V41.2 Encounter for immunization     ICD-10-CM: W47 ICD-9-CM: V03.89 Vitals BP Pulse Temp Resp Height(growth percentile) Weight(growth percentile) 148/90 68 97 °F (36.1 °C) (Oral) 18 5' 3\" (1.6 m) 180 lb (81.6 kg) SpO2 BMI Smoking Status 93% 31.89 kg/m2 Former Smoker Vitals History BMI and BSA Data Body Mass Index Body Surface Area  
 31.89 kg/m 2 1.9 m 2 Preferred Pharmacy Pharmacy Name Phone [id number], [address] [place] [address] [phone] Your Updated Medication List  
  
   
This list is accurate as of: 10/17/17  9:43 AM.  Always use your most recent med list.  
  
  
  
  
 albuterol 90 mcg/actuation inhaler Commonly known as:  PROVENTIL HFA, VENTOLIN HFA, PROAIR HFA Take 2 Puffs by inhalation every four (4) hours as needed for Wheezing. albuterol-ipratropium 2.5 mg-0.5 mg/3 ml Nebu Commonly known as:  DUO-NEB  
3 mL by Nebulization route every six (6) hours as needed. amLODIPine 5 mg tablet Commonly known as:  Roberto Haven TAKE 1 TAB BY MOUTH DAILY. * aspirin 325 mg tablet Commonly known as:  ASPIRIN Take 325 mg by mouth daily. * aspirin delayed-release 81 mg tablet Take 1 Tab by mouth daily for 360 days. atorvastatin 40 mg tablet Commonly known as:  LIPITOR Take 1 Tab by mouth daily. YRN. Give patient BRAND LIPITOR. D/C Crestor  
  
 clotrimazole-betamethasone topical cream  
Commonly known as:  Sofy Herndon Apply twice daily to affected area till resolved. finasteride 5 mg tablet Commonly known as:  PROSCAR  
daily. FISH OIL 1,000 mg Cap Generic drug:  omega-3 fatty acids-vitamin e Take 1 Cap by mouth daily. furosemide 20 mg tablet Commonly known as:  LASIX Take 1 Tab by mouth daily. isosorbide mononitrate ER 30 mg tablet Commonly known as:  IMDUR  
TAKE 1 TABLET BY MOUTH EVERY DAY  
  
 levothyroxine 88 mcg tablet Commonly known as:  SYNTHROID  
TAKE 1 TABLET BY MOUTH EVERY DAY BEFORE BREAKFAST AND RECHECK LEVEL IN 4 TO 6 WEEKS  
  
 losartan-hydroCHLOROthiazide 100-12.5 mg per tablet Commonly known as:  HYZAAR  
TAKE 1 TABLET BY MOUTH EVERY DAY  
  
 metoprolol succinate 25 mg XL tablet Commonly known as:  TOPROL-XL  
TAKE 1 TABLET EVERY DAY  
  
 tamsulosin 0.4 mg capsule Commonly known as:  FLOMAX TAKE 1 CAPSULE EVERY DAY  
  
 traMADol 50 mg tablet Commonly known as:  ULTRAM  
Take 1 Tab by mouth every eight (8) hours as needed for Pain. Max Daily Amount: 150 mg. TAKE 1-2 TABLETS BY MOUTH EVERY 6 HOURS AS NEEDED FOR PAIN  
  
 varicella zoster vacine live 19,400 unit/0.65 mL Susr injection Commonly known as:  ZOSTAVAX  
1 Vial by SubCUTAneous route once for 1 dose. VITAMIN C 1,000 mg tablet Generic drug:  ascorbic acid (vitamin C) Take  by mouth. VITAMIN D3 PO Take  by mouth. * Notice: This list has 2 medication(s) that are the same as other medications prescribed for you. Read the directions carefully, and ask your doctor or other care provider to review them with you. Prescriptions Printed  Refills  
 varicella zoster vacine live (ZOSTAVAX) 19,400 unit/0.65 mL susr injection 0 Si Vial by SubCUTAneous route once for 1 dose. Class: Print Route: SubCUTAneous We Performed the Following INFLUENZA VIRUS VACCINE, HIGH DOSE SEASONAL, PRESERVATIVE FREE [74804 CPT(R)] REFERRAL TO ENT-OTOLARYNGOLOGY [ETO40 Custom] Comments:  
 Please evaluate and treat for worsening hearing loss. Follow-up Instructions Return in about 1 year (around 10/17/2018). Referral Information Referral ID Referred By Referred To  
  
 [id number] Adrien Bustos MD   
   [address] [place] [address] [facility]   
   [address] [place] [address] Phone: [phone] Fax: [phone] Visits Status Start Date End Date 1 New Request 10/17/17 10/17/18 If your referral has a status of pending review or denied, additional information will be sent to support the outcome of this decision. Patient Instructions Schedule of Personalized Health Plan The best way to stay healthy is to live a healthy lifestyle. A healthy lifestyle includes regular exercise, eating a well-balanced diet, keeping a healthy weight and not smoking. Regular physical exams and screening tests are another important way to take care of yourself. Preventive exams provided by health care providers can find health problems early when treatment works best and can keep you from getting certain diseases or illnesses. Preventive services include exams, lab tests, screening tests, shots, and learning information to help you take care of your own health. The CDC recommends pneumonia vaccines for anyone 72 years and older. These vaccines are usually only needed once in a lifetime unless your healthcare provider decides differently. The 2 pneumonia shots available presently are PCV 13 (Prevnar 13) and PPSV23 (Pneumovax 23).    Adults 65 years or older who have not previously received PCV13, should receive a dose of PCV13 first, followed 1 year later by a dose of PPSV23. All people over 65 should have a yearly flu vaccine. People over 65 are at medium to high risk for Hepatitis B. Hepatitis B is transmitted through body fluids with a common source being sexual activity or IV drug use. Three shots are needed for complete protection. The CDC recommends the herpes zoster (shingles) vaccine for all adults 61 and older, regardless if a prior episode of zoster has been reported. In addition to your physical exam, some screening tests are recommended: 
 
Osteoporosis screening -There are no signs or symptoms of osteoporosis (weakening of bones). You might not know you have the disease until you break a bone. Thats why its so important to get a bone density test to measure your bone strength. Bone mass measurement is taken with a Dexa scan and is recommended every two years after 72years old or if you have certain risk factors, such as personal history of vertebral fracture or chronic steroid medication use. Diabetes Mellitus screening is recommended every year. This is a blood test, called a hemoglobin A1c, which measures the average blood sugar over a 3 month period. Glaucoma is an eye disease caused by high pressure in the eye. An eye exam is recommended every year. Cardiovascular screening tests that check your cholesterol and other blood fat (lipid) levels are recommended every five years or yearly if you are on medications for cardiovascular disease. Colorectal Cancer screening tests help to find pre-cancerous polyps (growths in the colon) so they can be removed before they turn into cancer. Screening tests are recommended starting at age 48 or earlier if you have a certain risk factors, such as a family history of colon cancer.    
 
Prostate Cancer screening is currently not recommended by the [facility] Task Force unless there is a history or symptoms of prostate cancer. (Symptoms include urinary problems such as weak stream, problem starting or stopping urination, blood in urine, erectile dysfunction) Here is a list of your current Health Maintenance items including a date when each one is due next: 
Health Maintenance Topic Date Due  GLAUCOMA SCREENING Q2Y  03/02/2017  ZOSTER VACCINE AGE 60>  11/21/2017 (Originally 1/1/1996)  DTaP/Tdap/Td series (1 - Tdap) 11/21/2017 (Originally 3/1/1956)  INFLUENZA AGE 9 TO ADULT  11/21/2017 (Originally 8/1/2017)  COLONOSCOPY  08/31/2018  MEDICARE YEARLY EXAM  10/18/2018  Pneumococcal 65+ High/Highest Risk  Completed You DO NOT have an [address] on file. Please read the information given and call Jolanta Imam at [phone] to answer your questions. A referral to Dr. Amna Cuello, an ear specialist, was ordered for you to assess your worsening hearing loss. Shingles vaccine is highly recommended. Please go to a pharmacy with the prescription and get one. Introducing \[facility]\"" & HEALTH SERVICES! Romayne Duster introduces [employer] patient portal. Now you can access parts of your medical record, email your doctor's office, and request medication refills online. 1. In your internet browser, go to [email or web address] 2. Click on the First Time User? Click Here link in the Sign In box. You will see the New Member Sign Up page. 3. Enter your [employer] Access Code exactly as it appears below. You will not need to use this code after youve completed the sign-up process. If you do not sign up before the expiration date, you must request a new code. · [employer] Access Code: [email or web address] Expires: 11/4/2017  4:43 PM 
 
4. Enter the last four digits of your Social Security Number (xxxx) and Date of Birth (mm/dd/yyyy) as indicated and click Submit. You will be taken to the next sign-up page. 5. Create a [employer] ID.  This will be your [employer] login ID and cannot be changed, so think of one that is secure and easy to remember. 6. Create a [employer] password. You can change your password at any time. 7. Enter your Password Reset Question and Answer. This can be used at a later time if you forget your password. 8. Enter your e-mail address. You will receive e-mail notification when new information is available in [address] 9. Click Sign Up. You can now view and download portions of your medical record. 10. Click the Download Summary menu link to download a portable copy of your medical information. If you have questions, please visit the Frequently Asked Questions section of the [employer] website. Remember, [employer] is NOT to be used for urgent needs. For medical emergencies, dial 911. Now available from your iPhone and Android! Please provide this summary of care documentation to your next provider. Your primary care clinician is listed as [phone] If you have any questions after today's visit, please call [phone].

## 2017-10-17 NOTE — PROGRESS NOTES
Date of visit: 10/17/2017       This is an Subsequent Medicare Annual Wellness Visit (AWV), (Performed more than 12 months after effective date of Medicare Part B enrollment and 12 months after last preventive visit)    I have reviewed the patient's medical history in detail and updated the computerized patient record. iCara Douglas is a 81 y.o. male   History obtained from: the patient.   Patient lives: independent house    History     Patient Active Problem List   Diagnosis Code    Hypothyroidism E03.9    Essential hypertension I10    CAD (Coronary Artery Disease)--s/p PCI--MARY ANN [place] stents x4 5/09;MARY ANN [place] stents x3 8/09 I25.10    ED (erectile dysfunction) N52.9    Chronic back pain--DJD G89.29    BPH (benign prostatic hypertrophy) N40.0    Dyslipidemia E78.5    Successful  PTCA (percutaneous transluminal coronary angioplasty)--90-95% instent restenosis RCA 10/16/10 Z98.61    Successful PTCA with MARY ANN Xience stent Ramus intermedius 10/16/10 Z95.9    PVD (peripheral vascular disease) with claudication ([facility]) I73.9    Cerebral embolism with cerebral infarction ([address] I63.40    Carotid stenosis, left I65.22    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    Prediabetes R73.03    Tinea cruris B35.6    Nodule, subcutaneous R22.9    Vitamin D deficiency E55.9    Lipoma of back D17.1    Prostate cancer ([address] C61    Angina, class III ([address] I20.9    Alkaline phosphatase elevation R74.8    Myocardial infarction I21.9    Calculus of kidney N20.0    Osteoarthritis M19.90    SOBOE (shortness of breath on exertion) R06.02     Past Medical History:   Diagnosis Date    AAA (abdominal aortic aneurysm) ([address]     Acute MI 12/2010    dr Musa Cheng    Acute prostatitis     again 9/12/16 f[facility] rec'd [facility]    Advance directive discussed with patient 04/20/2016    Aneurysm ([address] 6/2011    AAA    Borderline glaucoma (glaucoma suspect) 02/19/2015    notes from [address] [place] [address] rec'd    BPH (benign prostatic hyperplasia)     Bright red blood per rectum 02/04/2016    [facility] note Lorena Sprain- w/u in progress   8/22/17 [facility] f/u note    CAD (coronary artery disease)     Calculus of kidney     Cerebral embolism with cerebral infarction ([address]     Chronic pain     back    Dizzy spells 6/2012    DJD (degenerative joint disease) of lumbar spine     ED (erectile dysfunction)     8/30/17 [facility]u note [facility]    Elevated PSA 03/20/2014    [facility] rec[facility]     8/24/16 f/u note    Encephalocele ([address]     Hypercholesterolemia     Hypertension     Pneumonia 02/05/2017    Prostate cancer ([address] 05/12/2014    crystal henry/ Dr Cynthia Vyas 8/22/17 f/u note    PVD (peripheral vascular disease) with claudication ([address]     S/P radiation therapy 15 months out    probable cause of the rectal bleeding    Stroke ([address] 6/2011    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    neuro assoc notes rec'd    Thyroid disease     Vitamin D deficiency 11/2013    again 5/2015 again 1/2016      Past Surgical History:   Procedure Laterality Date    CARDIAC SURG PROCEDURE UNLIST  12/2010    dr Isabelle Bautista stents past MI    COLONOSCOPY N/A 8/31/2016    COLONOSCOPY performed [facility] at [facility] ENDOSCOPY    ENDOSCOPY, COLON, DIAGNOSTIC      HX CAROTID ENDARTERECTOMY Left     HX HEENT  10/2012    repair right drlisa dawkins    HX ORTHOPAEDIC Bilateral     BUNIONECTOMY    NE LEFT HEART CATH,PERCUTANEOUS  10/16/2010         PTCA EACH ADDL VESSEL  10/16/2010         PTCA W/ STENT, INITIAL  10/16/2010         VASCULAR SURGERY PROCEDURE UNLIST      left leg varicose vein stripping dr Scarlet Salcido     Allergies   Allergen Reactions    Norco [Hydrocodone-Acetaminophen] Other (comments)     Too sedated and felt like he was in a daze    Pcn [Penicillins] Rash    Percocet [Oxycodone-Acetaminophen] Nausea and Vomiting     Current Outpatient Prescriptions   Medication Sig Dispense Refill    tamsulosin (FLOMAX) 0.4 mg capsule TAKE 1 CAPSULE EVERY DAY 90 Cap 0    losartan-hydroCHLOROthiazide (HYZAAR) 100-12.5 mg per tablet TAKE 1 TABLET BY MOUTH EVERY DAY 30 Tab 2    levothyroxine (SYNTHROID) 88 mcg tablet TAKE 1 TABLET BY MOUTH EVERY DAY BEFORE BREAKFAST AND RECHECK LEVEL IN 4 TO 6 WEEKS 30 Tab 0    furosemide (LASIX) 20 mg tablet Take 1 Tab by mouth daily. 30 Tab 0    aspirin (ASPIRIN) 325 mg tablet Take 325 mg by mouth daily.  isosorbide mononitrate ER (IMDUR) 30 mg tablet TAKE 1 TABLET BY MOUTH EVERY DAY 30 Tab 2    amLODIPine (NORVASC) 5 mg tablet TAKE 1 TAB BY MOUTH DAILY. 30 Tab 2    metoprolol succinate (TOPROL-XL) 25 mg XL tablet TAKE 1 TABLET EVERY DAY 30 Tab 2    atorvastatin (LIPITOR) 40 mg tablet Take 1 Tab by mouth daily. YRN. Give patient BRAND LIPITOR. D/C Crestor 90 Tab 3    CHOLECALCIFEROL, VITAMIN D3, (VITAMIN D3 PO) Take  by mouth.  finasteride (PROSCAR) 5 mg tablet daily.  omega-3 fatty acids-vitamin e (FISH OIL) 1,000 mg cap Take 1 Cap by mouth daily.  ascorbic acid (VITAMIN C) 1,000 mg tablet Take  by mouth.  albuterol (PROVENTIL HFA, VENTOLIN HFA, PROAIR HFA) 90 mcg/actuation inhaler Take 2 Puffs by inhalation every four (4) hours as needed for Wheezing. 1 Inhaler 1    clotrimazole-betamethasone (LOTRISONE) topical cream Apply twice daily to affected area till resolved. 60 g 0    traMADol (ULTRAM) 50 mg tablet Take 1 Tab by mouth every eight (8) hours as needed for Pain. Max Daily Amount: 150 mg. TAKE 1-2 TABLETS BY MOUTH EVERY 6 HOURS AS NEEDED FOR PAIN 10 Tab 0    albuterol-ipratropium (DUO-NEB) 2.5 mg-0.5 mg/3 ml nebu 3 mL by Nebulization route every six (6) hours as needed. 30 Nebule 0    aspirin delayed-release 81 mg tablet Take 1 Tab by mouth daily for 360 days.  61 Tab 11     Family History   Problem Relation Age of Onset    Diabetes Mother     Diabetes Father     Cancer Sister      LUNG    Cancer Brother      COLON    Cancer Sister      LUNG    Anesth Problems Neg Hx      Social History   Substance Use Topics    Smoking status: Former Smoker     Packs/day: 0.25     Quit date: 9/10/2014    Smokeless tobacco: Never Used      Comment: has not had cigarette in over a month    Alcohol use No       Specialists/Care Team   Jose A Nina has established care with the following healthcare providers:  Patient Care Team:  Rodney Rodrigues MD as PCP - General  Rajendra Marin MD as Consulting Provider (General Surgery)  Osmin Martinez MD (Urology)  Panchito Sharif MD (Radiation Oncology)  Deanne Stringer MD (Cardiology)  Tony Milton MD as Physician (Cardiology)  Sohail Moses MD (Ophthalmology)  Ricky Gomez MD (Radiation Oncology)  Paramjit Sanchez RN as Ambulatory Care Navigator (Family Practice)  Liset Pearce NP (Nurse Practitioner)  Referral given today to Dr. Mabel Villafana for worsening hearing loss    Health Risk Assessment     Demographics   male  81 y.o. General Health Questions   -During the past 4 weeks: How would you rate your health in general? Fair  How often have you been bothered by feeling dizzy when standing up? never  How much have you been bothered by bodily pain? Mildly - left leg bothers him  Has your physical and emotional health limited your social activities with family or friends? no    Emotional Health Questions   -Do you have a history of depression, anxiety, or emotional problems? no  -Over the past 2 weeks, have you felt down, depressed or hopeless? no  -Over the past 2 weeks, have you felt little interest or pleasure in doing things? no  PHQ over the last two weeks 10/17/2017   Little interest or pleasure in doing things Not at all   Feeling down, depressed or hopeless Not at all   Total Score PHQ 2 0     Health Habits   Please describe your diet habits: overall healthy  Do you get 5 servings of fruits or vegetables daily? yes  Do you exercise regularly?  Yes - rides bike every day on bike path, does wear helmet    Do you smoke? no    Alcohol Risk Factor Screening:   Do you drink alcohol? no    Activities of Daily Living and Functional Status   Do you need help with eating, walking, dressing, bathing, toileting, the phone, transportation, shopping, preparing meals, housework, laundry, or medications:  no  -Do you need help managing money? no  -In the past four weeks, was someone available to help you if you needed and wanted help with anything? yes  -Are you confident are you that you can control and manage most of your health problems? yes  -Have you been given information to help you keep track of your medications? yes  -How often do you have trouble taking your medications as prescribed? never    Hearing Loss   Have you noticed any hearing difficulties? Yes - hearing is getting worse; requesting a referral    Fall Risk and Home Safety   Have you fallen 2 or more times in the past year? no  Does your home have rugs in the hallway, lack grab bars in the bathroom, lack handrails on the stairs or have poor lighting? no  Do you have smoke detectors and check them regularly? yes  Do you have difficulties driving a car? no  Do you always fasten your seat belt when you are in a car? yes  Fall Risk Assessment:    Fall Risk Assessment, last 12 mths 10/17/2017   Able to walk? Yes   Fall in past 12 months? No       Abuse Screen   Patient is not abused    Evaluation of Cognitive Function   Mood/affect:  happy  Orientation: Person, Place, Time and Situation  Appearance: age appropriate  Family member/caregiver input: none    Physical Examination     Vitals:    10/17/17 0843 10/17/17 0848   BP: 158/81 148/90   Pulse: 68    Resp: 18    Temp: 97 °F (36.1 °C)    TempSrc: Oral    SpO2: 93%    Weight: 180 lb (81.6 kg)    Height: 5' 3\" (1.6 m)      Body mass index is 31.89 kg/(m^2). Was the patient's timed Up & Go test unsteady or longer than 30 seconds?  no    Advice/Referrals/Counseling (as indicated)   Education and counseling provided for any problems identified above: ACP    Preventive Services       (Preventive care checklist to be included in patient instructions)  Discussed today Done Previously Not Needed    X 3/2/2015  Glaucoma screening    8/31/2016  Colorectal cancer screening (colonoscopy)     X Osteoporosis Screening (DEXA scan)     X Breast cancer Screening (Mammogram)     X Cervical cancer Screening (Pap smear)      Prostate cancer Screening    2/9/17  Cardiovascular Screening (Lipid panel)    5/15/17 (5.9%)  Diabetes screening test (Hgb A1c)     x Abdominal ultrasound for AAA     x Low-dose CT for lung cancer screening   X today  Flu vaccine     x Hepatitis B vaccine (if at risk)    1/12/11  3/15/17  Pneumococcal 23  Prevnar 13   X   Tdap vaccine   X script given  Shingles vaccine     X Screening for Hepatitis C (b [id number])     Discussion of Advance Directive     Patient was offered the opportunity to discuss advance care planning:  yes     Does patient have an Advance Directive:  no     If no, did you provide information on [facility]ctions?   yes         Soraya Chakraborty NP

## 2017-10-17 NOTE — PROGRESS NOTES
Identified pt with two pt identifiers(name and ). Reviewed record in preparation for visit and have obtained necessary documentation. Chief Complaint   Patient presents with   [address] Maintenance Due   Topic    MEDICARE YEARLY EXAM     GLAUCOMA SCREENING Q2Y    - -pt states he has eye exam 2mo ago @ '[facility] Questionnaire:  :   1) Have you been to an emergency room, urgent care clinic since your last visit? no   Hospitalized since your last visit? no             2. Have seen or consulted any other health care provider since your last visit? YES  If yes, where when, and reason for visit? Dr. Mignon Carter (cardiac)    3) Do you have an Advanced Directive/ Living Will in place? NO  If yes, do we have a copy on file NO  If no, would you like information YES  - paperwork given to pt to complete and return to office to have scanned into EMR    Patient is accompanied by self I have received verbal consent from Janet Brown to discuss any/all medical information while they are present in the room. Janet Brown is a 81 y.o. male who presents for routine immunizations. He denies any symptoms , reactions or allergies that would exclude them from being immunized today. Risks and adverse reactions were discussed and the VIS was given to them. All questions were addressed. He was observed for 15 min post injection. There were no reactions observed.     Elbert Oviedo LPN

## 2017-11-02 ENCOUNTER — CLINICAL SUPPORT (OUTPATIENT)
Dept: CARDIOLOGY CLINIC | Age: 82
End: 2017-11-02

## 2017-11-02 ENCOUNTER — TELEPHONE (OUTPATIENT)
Dept: CARDIOLOGY CLINIC | Age: 82
End: 2017-11-02

## 2017-11-02 DIAGNOSIS — I25.10 CORONARY ARTERY DISEASE DUE TO LIPID RICH PLAQUE: ICD-10-CM

## 2017-11-02 DIAGNOSIS — I10 ESSENTIAL HYPERTENSION: ICD-10-CM

## 2017-11-02 DIAGNOSIS — R06.02 SOBOE (SHORTNESS OF BREATH ON EXERTION): ICD-10-CM

## 2017-11-02 DIAGNOSIS — I25.83 CORONARY ARTERY DISEASE DUE TO LIPID RICH PLAQUE: ICD-10-CM

## 2017-11-02 DIAGNOSIS — R93.1 ABNORMAL NUCLEAR CARDIAC IMAGING TEST: Primary | ICD-10-CM

## 2017-11-02 DIAGNOSIS — I20.9 ANGINA, CLASS III (HCC): Primary | ICD-10-CM

## 2017-11-02 NOTE — PROGRESS NOTES
Spoke with patient. Verified patient with two patient identifiers. Advised abnormal EST, needs cath. Pt agrees. See other charted note.

## 2017-11-02 NOTE — PROGRESS NOTES
Please call patient Stress test is abnormal which explains his shortness of breath. Would like Dr Tom Hurtado to perform cardiac cath this Monday or Tuesday, is patient available for either day?  Let me know and Ill place orders  Thanks M

## 2017-11-02 NOTE — PROGRESS NOTES
Reviewed markedly abnormal nuclear stress test, so patient while having echocardiogram today also reporting continued dyspnea on exertion from my last encounter. We will arrange for cardiac catheterization Monday with Dr. Kaushik Alvarado.

## 2017-11-02 NOTE — TELEPHONE ENCOUNTER
Spoke with patient. Verified patient with two patient identifiers. Per Sergio Alex NP, advised EST abnormal, needs cardiac cath. Pt agrees to proceed, would prefer Mon 11-6-17 if possible. Advised arrangements will be made and he will be notified with all details. Patient verbalized understanding.

## 2017-11-02 NOTE — TELEPHONE ENCOUNTER
----- Message from Jamila Lu NP sent at 11/2/2017  2:40 PM EDT -----  Please call patient Stress test is abnormal which explains his shortness of breath. Would like Dr Nicci Garza to perform cardiac cath this Monday or Tuesday, is patient available for either day?  Let me know and Ill place orders  Thanks M

## 2017-11-04 LAB
ALBUMIN SERPL-MCNC: 4 G/DL (ref 3.5–4.7)
ALBUMIN/GLOB SERPL: 1.2 {RATIO} (ref 1.2–2.2)
ALP SERPL-CCNC: 136 IU/L (ref 39–117)
ALT SERPL-CCNC: 32 IU/L (ref 0–44)
AST SERPL-CCNC: 44 IU/L (ref 0–40)
BASOPHILS # BLD AUTO: 0 X10E3/UL (ref 0–0.2)
BASOPHILS NFR BLD AUTO: 0 %
BILIRUB SERPL-MCNC: 0.5 MG/DL (ref 0–1.2)
BUN SERPL-MCNC: 12 MG/DL (ref 8–27)
BUN/CREAT SERPL: 11 (ref 10–24)
CALCIUM SERPL-MCNC: 9.8 MG/DL (ref 8.6–10.2)
CHLORIDE SERPL-SCNC: 101 MMOL/L (ref 96–106)
CHOLEST SERPL-MCNC: 191 MG/DL (ref 100–199)
CO2 SERPL-SCNC: 23 MMOL/L (ref 18–29)
CREAT SERPL-MCNC: 1.14 MG/DL (ref 0.76–1.27)
EOSINOPHIL # BLD AUTO: 0.2 X10E3/UL (ref 0–0.4)
EOSINOPHIL NFR BLD AUTO: 4 %
ERYTHROCYTE [DISTWIDTH] IN BLOOD BY AUTOMATED COUNT: 18.1 % (ref 12.3–15.4)
GFR SERPLBLD CREATININE-BSD FMLA CKD-EPI: 60 ML/MIN/1.73
GFR SERPLBLD CREATININE-BSD FMLA CKD-EPI: 69 ML/MIN/1.73
GLOBULIN SER CALC-MCNC: 3.3 G/DL (ref 1.5–4.5)
GLUCOSE SERPL-MCNC: 174 MG/DL (ref 65–99)
HCT VFR BLD AUTO: 40.3 % (ref 37.5–51)
HDLC SERPL-MCNC: 80 MG/DL
HGB BLD-MCNC: 13.9 G/DL (ref 12.6–17.7)
IMM GRANULOCYTES # BLD: 0 X10E3/UL (ref 0–0.1)
IMM GRANULOCYTES NFR BLD: 0 %
INR PPP: 0.9 (ref 0.8–1.2)
INTERPRETATION, 910389: NORMAL
LDLC SERPL CALC-MCNC: 100 MG/DL (ref 0–99)
LYMPHOCYTES # BLD AUTO: 1.4 X10E3/UL (ref 0.7–3.1)
LYMPHOCYTES NFR BLD AUTO: 23 %
MCH RBC QN AUTO: 29.6 PG (ref 26.6–33)
MCHC RBC AUTO-ENTMCNC: 34.5 G/DL (ref 31.5–35.7)
MCV RBC AUTO: 86 FL (ref 79–97)
MONOCYTES # BLD AUTO: 0.5 X10E3/UL (ref 0.1–0.9)
MONOCYTES NFR BLD AUTO: 9 %
NEUTROPHILS # BLD AUTO: 3.8 X10E3/UL (ref 1.4–7)
NEUTROPHILS NFR BLD AUTO: 64 %
PLATELET # BLD AUTO: 183 X10E3/UL (ref 150–379)
POTASSIUM SERPL-SCNC: 4.2 MMOL/L (ref 3.5–5.2)
PROT SERPL-MCNC: 7.3 G/DL (ref 6–8.5)
PROTHROMBIN TIME: 9.9 SEC (ref 9.1–12)
RBC # BLD AUTO: 4.7 X10E6/UL (ref 4.14–5.8)
SODIUM SERPL-SCNC: 141 MMOL/L (ref 134–144)
TRIGL SERPL-MCNC: 56 MG/DL (ref 0–149)
VLDLC SERPL CALC-MCNC: 11 MG/DL (ref 5–40)
WBC # BLD AUTO: 5.9 X10E3/UL (ref 3.4–10.8)

## 2017-11-06 ENCOUNTER — HOSPITAL ENCOUNTER (OUTPATIENT)
Dept: CARDIAC CATH/INVASIVE PROCEDURES | Age: 82
Discharge: HOME | End: 2017-11-07
Attending: INTERNAL MEDICINE | Admitting: INTERNAL MEDICINE
Payer: MEDICARE

## 2017-11-06 DIAGNOSIS — I20.9 ANGINA, CLASS III (HCC): ICD-10-CM

## 2017-11-06 DIAGNOSIS — I25.10 CORONARY ARTERY DISEASE DUE TO LIPID RICH PLAQUE: ICD-10-CM

## 2017-11-06 DIAGNOSIS — I25.83 CORONARY ARTERY DISEASE DUE TO LIPID RICH PLAQUE: ICD-10-CM

## 2017-11-06 LAB
ACT BLD: 164 SECS (ref 79–138)
ACT BLD: 213 SECS (ref 79–138)
ATRIAL RATE: 86 BPM
CALCULATED P AXIS, ECG09: 70 DEGREES
CALCULATED R AXIS, ECG10: -82 DEGREES
CALCULATED T AXIS, ECG11: 7 DEGREES
DIAGNOSIS, 93000: NORMAL
P-R INTERVAL, ECG05: 222 MS
Q-T INTERVAL, ECG07: 420 MS
QRS DURATION, ECG06: 150 MS
QTC CALCULATION (BEZET), ECG08: 502 MS
VENTRICULAR RATE, ECG03: 86 BPM

## 2017-11-06 PROCEDURE — 74011250637 HC RX REV CODE- 250/637

## 2017-11-06 PROCEDURE — C1887 CATHETER, GUIDING: HCPCS

## 2017-11-06 PROCEDURE — 93458 L HRT ARTERY/VENTRICLE ANGIO: CPT

## 2017-11-06 PROCEDURE — 74011250636 HC RX REV CODE- 250/636: Performed by: INTERNAL MEDICINE

## 2017-11-06 PROCEDURE — C1894 INTRO/SHEATH, NON-LASER: HCPCS

## 2017-11-06 PROCEDURE — 77030019697 HC SYR ANGI INFL MRTM -B

## 2017-11-06 PROCEDURE — 77030030195 HC CATH ANGI DX PRF4 MRTM -A

## 2017-11-06 PROCEDURE — 77030022017 HC DRSG HEMO QCLOT ZMED -A

## 2017-11-06 PROCEDURE — 74011000250 HC RX REV CODE- 250

## 2017-11-06 PROCEDURE — 77030004549 HC CATH ANGI DX PRF MRTM -A

## 2017-11-06 PROCEDURE — 93308 TTE F-UP OR LMTD: CPT

## 2017-11-06 PROCEDURE — C1769 GUIDE WIRE: HCPCS

## 2017-11-06 PROCEDURE — 74011250637 HC RX REV CODE- 250/637: Performed by: NURSE PRACTITIONER

## 2017-11-06 PROCEDURE — 74011250636 HC RX REV CODE- 250/636: Performed by: NURSE PRACTITIONER

## 2017-11-06 PROCEDURE — 93005 ELECTROCARDIOGRAM TRACING: CPT

## 2017-11-06 PROCEDURE — 77030010221 HC SPLNT WR POS TELE -B

## 2017-11-06 PROCEDURE — 77030019569 HC BND COMPR RAD TERU -B

## 2017-11-06 PROCEDURE — C1725 CATH, TRANSLUMIN NON-LASER: HCPCS

## 2017-11-06 PROCEDURE — 77030015766

## 2017-11-06 PROCEDURE — 74011000258 HC RX REV CODE- 258: Performed by: INTERNAL MEDICINE

## 2017-11-06 PROCEDURE — 77030002996 HC SUT SLK J&J -A

## 2017-11-06 PROCEDURE — 77030028837 HC SYR ANGI PWR INJ COEU -A

## 2017-11-06 PROCEDURE — C1874 STENT, COATED/COV W/DEL SYS: HCPCS

## 2017-11-06 PROCEDURE — 77030013797 HC KT TRNSDUC PRSSR EDWD -A

## 2017-11-06 PROCEDURE — 74011636320 HC RX REV CODE- 636/320

## 2017-11-06 PROCEDURE — 74011250636 HC RX REV CODE- 250/636

## 2017-11-06 PROCEDURE — 85347 COAGULATION TIME ACTIVATED: CPT

## 2017-11-06 PROCEDURE — 77030008543 HC TBNG MON PRSS MRTM -A

## 2017-11-06 PROCEDURE — 77030012468 HC VLV BLEEDBK CNTRL ABBT -B

## 2017-11-06 PROCEDURE — 77030019698 HC SYR ANGI MDLON MRTM -A

## 2017-11-06 RX ORDER — LIDOCAINE HYDROCHLORIDE 10 MG/ML
INJECTION INFILTRATION; PERINEURAL
Status: COMPLETED
Start: 2017-11-06 | End: 2017-11-06

## 2017-11-06 RX ORDER — HEPARIN SODIUM 1000 [USP'U]/ML
INJECTION, SOLUTION INTRAVENOUS; SUBCUTANEOUS
Status: COMPLETED
Start: 2017-11-06 | End: 2017-11-06

## 2017-11-06 RX ORDER — CLOPIDOGREL 300 MG/1
600 TABLET, FILM COATED ORAL ONCE
Status: COMPLETED | OUTPATIENT
Start: 2017-11-06 | End: 2017-11-06

## 2017-11-06 RX ORDER — HEPARIN SODIUM 200 [USP'U]/100ML
INJECTION, SOLUTION INTRAVENOUS
Status: COMPLETED
Start: 2017-11-06 | End: 2017-11-06

## 2017-11-06 RX ORDER — FENTANYL CITRATE 50 UG/ML
INJECTION, SOLUTION INTRAMUSCULAR; INTRAVENOUS
Status: COMPLETED
Start: 2017-11-06 | End: 2017-11-06

## 2017-11-06 RX ORDER — MIDAZOLAM HYDROCHLORIDE 1 MG/ML
INJECTION, SOLUTION INTRAMUSCULAR; INTRAVENOUS
Status: COMPLETED
Start: 2017-11-06 | End: 2017-11-06

## 2017-11-06 RX ORDER — VERAPAMIL HYDROCHLORIDE 2.5 MG/ML
2.5 INJECTION, SOLUTION INTRAVENOUS ONCE
Status: COMPLETED | OUTPATIENT
Start: 2017-11-06 | End: 2017-11-06

## 2017-11-06 RX ORDER — ATORVASTATIN CALCIUM 40 MG/1
40 TABLET, FILM COATED ORAL DAILY
Status: DISCONTINUED | OUTPATIENT
Start: 2017-11-07 | End: 2017-11-07 | Stop reason: HOSPADM

## 2017-11-06 RX ORDER — MIDAZOLAM HYDROCHLORIDE 1 MG/ML
INJECTION, SOLUTION INTRAMUSCULAR; INTRAVENOUS
Status: DISCONTINUED
Start: 2017-11-06 | End: 2017-11-06

## 2017-11-06 RX ORDER — HEPARIN SODIUM 200 [USP'U]/100ML
500 INJECTION, SOLUTION INTRAVENOUS ONCE
Status: COMPLETED | OUTPATIENT
Start: 2017-11-06 | End: 2017-11-06

## 2017-11-06 RX ORDER — TAMSULOSIN HYDROCHLORIDE 0.4 MG/1
0.4 CAPSULE ORAL DAILY
Status: DISCONTINUED | OUTPATIENT
Start: 2017-11-07 | End: 2017-11-07 | Stop reason: HOSPADM

## 2017-11-06 RX ORDER — LIDOCAINE HYDROCHLORIDE 10 MG/ML
1-30 INJECTION, SOLUTION EPIDURAL; INFILTRATION; INTRACAUDAL; PERINEURAL
Status: DISCONTINUED | OUTPATIENT
Start: 2017-11-06 | End: 2017-11-06

## 2017-11-06 RX ORDER — METOPROLOL SUCCINATE 25 MG/1
25 TABLET, EXTENDED RELEASE ORAL DAILY
Status: DISCONTINUED | OUTPATIENT
Start: 2017-11-07 | End: 2017-11-07 | Stop reason: HOSPADM

## 2017-11-06 RX ORDER — SODIUM CHLORIDE 0.9 % (FLUSH) 0.9 %
5-10 SYRINGE (ML) INJECTION EVERY 8 HOURS
Status: DISCONTINUED | OUTPATIENT
Start: 2017-11-06 | End: 2017-11-07 | Stop reason: HOSPADM

## 2017-11-06 RX ORDER — FENTANYL CITRATE 50 UG/ML
25-50 INJECTION, SOLUTION INTRAMUSCULAR; INTRAVENOUS
Status: DISCONTINUED | OUTPATIENT
Start: 2017-11-06 | End: 2017-11-06

## 2017-11-06 RX ORDER — FINASTERIDE 5 MG/1
5 TABLET, FILM COATED ORAL DAILY
Status: DISCONTINUED | OUTPATIENT
Start: 2017-11-07 | End: 2017-11-07 | Stop reason: HOSPADM

## 2017-11-06 RX ORDER — LEVOTHYROXINE SODIUM 88 UG/1
88 TABLET ORAL
Status: DISCONTINUED | OUTPATIENT
Start: 2017-11-07 | End: 2017-11-07 | Stop reason: HOSPADM

## 2017-11-06 RX ORDER — ADENOSINE 3 MG/ML
INJECTION, SOLUTION INTRAVENOUS
Status: DISCONTINUED
Start: 2017-11-06 | End: 2017-11-06

## 2017-11-06 RX ORDER — LIDOCAINE HYDROCHLORIDE 10 MG/ML
INJECTION, SOLUTION EPIDURAL; INFILTRATION; INTRACAUDAL; PERINEURAL
Status: COMPLETED
Start: 2017-11-06 | End: 2017-11-06

## 2017-11-06 RX ORDER — AMLODIPINE BESYLATE 5 MG/1
5 TABLET ORAL DAILY
Status: DISCONTINUED | OUTPATIENT
Start: 2017-11-07 | End: 2017-11-07 | Stop reason: HOSPADM

## 2017-11-06 RX ORDER — ASPIRIN 81 MG/1
81 TABLET ORAL DAILY
Status: DISCONTINUED | OUTPATIENT
Start: 2017-11-07 | End: 2017-11-07 | Stop reason: HOSPADM

## 2017-11-06 RX ORDER — ATROPINE SULFATE 0.1 MG/ML
INJECTION INTRAVENOUS
Status: DISPENSED
Start: 2017-11-06 | End: 2017-11-07

## 2017-11-06 RX ORDER — HYDRALAZINE HYDROCHLORIDE 20 MG/ML
20 INJECTION INTRAMUSCULAR; INTRAVENOUS
Status: DISCONTINUED | OUTPATIENT
Start: 2017-11-06 | End: 2017-11-07 | Stop reason: HOSPADM

## 2017-11-06 RX ORDER — SODIUM CHLORIDE 900 MG/100ML
INJECTION INTRAVENOUS
Status: DISCONTINUED
Start: 2017-11-06 | End: 2017-11-07 | Stop reason: HOSPADM

## 2017-11-06 RX ORDER — CLOPIDOGREL 300 MG/1
TABLET, FILM COATED ORAL
Status: COMPLETED
Start: 2017-11-06 | End: 2017-11-06

## 2017-11-06 RX ORDER — SODIUM CHLORIDE 0.9 % (FLUSH) 0.9 %
5-10 SYRINGE (ML) INJECTION AS NEEDED
Status: DISCONTINUED | OUTPATIENT
Start: 2017-11-06 | End: 2017-11-07 | Stop reason: HOSPADM

## 2017-11-06 RX ORDER — VERAPAMIL HYDROCHLORIDE 2.5 MG/ML
INJECTION, SOLUTION INTRAVENOUS
Status: COMPLETED
Start: 2017-11-06 | End: 2017-11-06

## 2017-11-06 RX ORDER — BIVALIRUDIN 250 MG/5ML
INJECTION, POWDER, LYOPHILIZED, FOR SOLUTION INTRAVENOUS
Status: DISCONTINUED
Start: 2017-11-06 | End: 2017-11-06

## 2017-11-06 RX ORDER — TRAMADOL HYDROCHLORIDE 50 MG/1
50 TABLET ORAL
Status: DISCONTINUED | OUTPATIENT
Start: 2017-11-06 | End: 2017-11-07 | Stop reason: HOSPADM

## 2017-11-06 RX ORDER — FUROSEMIDE 20 MG/1
20 TABLET ORAL DAILY
Status: DISCONTINUED | OUTPATIENT
Start: 2017-11-07 | End: 2017-11-07 | Stop reason: HOSPADM

## 2017-11-06 RX ORDER — MIDAZOLAM HYDROCHLORIDE 1 MG/ML
.5-2 INJECTION, SOLUTION INTRAMUSCULAR; INTRAVENOUS
Status: DISCONTINUED | OUTPATIENT
Start: 2017-11-06 | End: 2017-11-06

## 2017-11-06 RX ORDER — IPRATROPIUM BROMIDE AND ALBUTEROL SULFATE 2.5; .5 MG/3ML; MG/3ML
3 SOLUTION RESPIRATORY (INHALATION)
Status: DISCONTINUED | OUTPATIENT
Start: 2017-11-06 | End: 2017-11-07 | Stop reason: HOSPADM

## 2017-11-06 RX ORDER — SODIUM CHLORIDE 9 MG/ML
INJECTION, SOLUTION INTRAVENOUS
Status: DISCONTINUED
Start: 2017-11-06 | End: 2017-11-06

## 2017-11-06 RX ORDER — ISOSORBIDE MONONITRATE 30 MG/1
30 TABLET, EXTENDED RELEASE ORAL DAILY
Status: DISCONTINUED | OUTPATIENT
Start: 2017-11-07 | End: 2017-11-07 | Stop reason: HOSPADM

## 2017-11-06 RX ORDER — ACETAMINOPHEN 325 MG/1
650 TABLET ORAL
Status: DISCONTINUED | OUTPATIENT
Start: 2017-11-06 | End: 2017-11-07 | Stop reason: HOSPADM

## 2017-11-06 RX ORDER — HEPARIN SODIUM 1000 [USP'U]/ML
1000-10000 INJECTION, SOLUTION INTRAVENOUS; SUBCUTANEOUS
Status: DISCONTINUED | OUTPATIENT
Start: 2017-11-06 | End: 2017-11-06

## 2017-11-06 RX ORDER — CLOPIDOGREL BISULFATE 75 MG/1
75 TABLET ORAL DAILY
Status: DISCONTINUED | OUTPATIENT
Start: 2017-11-07 | End: 2017-11-07 | Stop reason: HOSPADM

## 2017-11-06 RX ADMIN — NITROGLYCERIN 200 MCG: 5 INJECTION, SOLUTION INTRAVENOUS at 09:32

## 2017-11-06 RX ADMIN — HEPARIN SODIUM 1000 UNITS: 200 INJECTION, SOLUTION INTRAVENOUS at 09:34

## 2017-11-06 RX ADMIN — HEPARIN SODIUM 3000 UNITS: 1000 INJECTION, SOLUTION INTRAVENOUS; SUBCUTANEOUS at 10:21

## 2017-11-06 RX ADMIN — HEPARIN SODIUM 2500 UNITS: 1000 INJECTION, SOLUTION INTRAVENOUS; SUBCUTANEOUS at 09:32

## 2017-11-06 RX ADMIN — FENTANYL CITRATE 25 MCG: 50 INJECTION, SOLUTION INTRAMUSCULAR; INTRAVENOUS at 09:01

## 2017-11-06 RX ADMIN — MIDAZOLAM HYDROCHLORIDE 1 MG: 1 INJECTION, SOLUTION INTRAMUSCULAR; INTRAVENOUS at 10:57

## 2017-11-06 RX ADMIN — IOPAMIDOL 130 ML: 755 INJECTION, SOLUTION INTRAVENOUS at 11:32

## 2017-11-06 RX ADMIN — HYDRALAZINE HYDROCHLORIDE 20 MG: 20 INJECTION INTRAMUSCULAR; INTRAVENOUS at 13:25

## 2017-11-06 RX ADMIN — Medication 10 ML: at 13:25

## 2017-11-06 RX ADMIN — HEPARIN SODIUM 5000 UNITS: 1000 INJECTION, SOLUTION INTRAVENOUS; SUBCUTANEOUS at 10:52

## 2017-11-06 RX ADMIN — FENTANYL CITRATE 25 MCG: 50 INJECTION, SOLUTION INTRAMUSCULAR; INTRAVENOUS at 12:06

## 2017-11-06 RX ADMIN — IOPAMIDOL 30 ML: 755 INJECTION, SOLUTION INTRAVENOUS at 12:15

## 2017-11-06 RX ADMIN — MIDAZOLAM HYDROCHLORIDE 1 MG: 1 INJECTION, SOLUTION INTRAMUSCULAR; INTRAVENOUS at 09:01

## 2017-11-06 RX ADMIN — VERAPAMIL HYDROCHLORIDE 2.5 MG: 2.5 INJECTION INTRAVENOUS at 09:33

## 2017-11-06 RX ADMIN — FENTANYL CITRATE 25 MCG: 50 INJECTION, SOLUTION INTRAMUSCULAR; INTRAVENOUS at 11:13

## 2017-11-06 RX ADMIN — LIDOCAINE HYDROCHLORIDE 1 ML: 10 INJECTION, SOLUTION EPIDURAL; INFILTRATION; INTRACAUDAL; PERINEURAL at 09:33

## 2017-11-06 RX ADMIN — FENTANYL CITRATE 25 MCG: 50 INJECTION, SOLUTION INTRAMUSCULAR; INTRAVENOUS at 09:25

## 2017-11-06 RX ADMIN — MIDAZOLAM HYDROCHLORIDE 1 MG: 1 INJECTION, SOLUTION INTRAMUSCULAR; INTRAVENOUS at 10:35

## 2017-11-06 RX ADMIN — HEPARIN SODIUM 7500 UNITS: 1000 INJECTION, SOLUTION INTRAVENOUS; SUBCUTANEOUS at 10:03

## 2017-11-06 RX ADMIN — FENTANYL CITRATE 25 MCG: 50 INJECTION, SOLUTION INTRAMUSCULAR; INTRAVENOUS at 10:49

## 2017-11-06 RX ADMIN — ADENOSINE 140 MCG/KG/MIN: 3 INJECTION, SOLUTION INTRAVENOUS at 10:10

## 2017-11-06 RX ADMIN — IOPAMIDOL 30 ML: 755 INJECTION, SOLUTION INTRAVENOUS at 10:17

## 2017-11-06 RX ADMIN — ADENOSINE 140.06 MCG/KG/MIN: 3 INJECTION, SOLUTION INTRAVENOUS at 10:13

## 2017-11-06 RX ADMIN — VERAPAMIL HYDROCHLORIDE 2.5 MG: 2.5 INJECTION, SOLUTION INTRAVENOUS at 09:33

## 2017-11-06 RX ADMIN — MIDAZOLAM HYDROCHLORIDE 1 MG: 1 INJECTION, SOLUTION INTRAMUSCULAR; INTRAVENOUS at 11:15

## 2017-11-06 RX ADMIN — FENTANYL CITRATE 25 MCG: 50 INJECTION, SOLUTION INTRAMUSCULAR; INTRAVENOUS at 11:02

## 2017-11-06 RX ADMIN — CLOPIDOGREL 600 MG: 300 TABLET, FILM COATED ORAL at 12:06

## 2017-11-06 RX ADMIN — MIDAZOLAM HYDROCHLORIDE 1 MG: 1 INJECTION, SOLUTION INTRAMUSCULAR; INTRAVENOUS at 09:25

## 2017-11-06 RX ADMIN — CLOPIDOGREL BISULFATE 600 MG: 300 TABLET, FILM COATED ORAL at 12:06

## 2017-11-06 RX ADMIN — ACETAMINOPHEN 650 MG: 325 TABLET ORAL at 20:31

## 2017-11-06 RX ADMIN — MIDAZOLAM HYDROCHLORIDE 1 MG: 1 INJECTION, SOLUTION INTRAMUSCULAR; INTRAVENOUS at 09:45

## 2017-11-06 RX ADMIN — IOPAMIDOL 125 ML: 755 INJECTION, SOLUTION INTRAVENOUS at 10:17

## 2017-11-06 RX ADMIN — LIDOCAINE HYDROCHLORIDE 4 ML: 10 INJECTION, SOLUTION INFILTRATION; PERINEURAL at 09:44

## 2017-11-06 NOTE — IP AVS SNAPSHOT
[address] [facility] 
[phone] Patient: Leandro Escudero 
MRN: [id number] JRT:[id number] My Medications TAKE these medications as instructed Instructions Each Dose to Equal  
 Morning Noon Evening Bedtime  
 albuterol 90 mcg/actuation inhaler Commonly known as:  PROVENTIL HFA, VENTOLIN HFA, PROAIR HFA Your last dose was: Your next dose is: Take 2 Puffs by inhalation every four (4) hours as needed for Wheezing. 2 Puff  
    
   
   
   
  
 albuterol-ipratropium 2.5 mg-0.5 mg/3 ml Nebu Commonly known as:  Murray Apley Your last dose was: Your next dose is:    
   
   
 3 mL by Nebulization route every six (6) hours as needed. 3 mL  
    
   
   
   
  
 amLODIPine 5 mg tablet Commonly known as:  Faheem Presser Your last dose was: Your next dose is: TAKE 1 TAB BY MOUTH DAILY. aspirin delayed-release 81 mg tablet Your last dose was: Your next dose is: Take 1 Tab by mouth daily for 360 days. 81 mg  
    
   
   
   
  
 atorvastatin 40 mg tablet Commonly known as:  LIPITOR Your last dose was: Your next dose is: Take 1 Tab by mouth daily. YRN. Give patient BRAND LIPITOR. D/C Crestor 40 mg  
    
   
   
   
  
 clopidogrel 75 mg Tab Commonly known as:  PLAVIX Start taking on:  11/8/2017 Your last dose was: Your next dose is: Take 1 Tab by mouth daily. Indications: Thrombosis Prevention after PCI 75 mg  
    
   
   
   
  
 clotrimazole-betamethasone topical cream  
Commonly known as:  Giulia Pick Your last dose was: Your next dose is:    
   
   
 Apply twice daily to affected area till resolved. finasteride 5 mg tablet Commonly known as:  PROSCAR Your last dose was: Your next dose is: daily.  
     
   
   
   
  
 FISH OIL 1,000 mg Cap Generic drug:  omega-3 fatty acids-vitamin e Your last dose was: Your next dose is: Take 1 Cap by mouth daily. 1 Cap  
    
   
   
   
  
 furosemide 20 mg tablet Commonly known as:  LASIX Your last dose was: Your next dose is: Take 1 Tab by mouth daily. 20 mg  
    
   
   
   
  
 isosorbide mononitrate ER 30 mg tablet Commonly known as:  IMDUR Your last dose was: Your next dose is: TAKE 1 TABLET BY MOUTH EVERY DAY  
     
   
   
   
  
 levothyroxine 88 mcg tablet Commonly known as:  SYNTHROID Your last dose was: Your next dose is: TAKE 1 TABLET BY MOUTH EVERY DAY BEFORE BREAKFAST AND RECHECK LEVEL IN 4 TO 6 WEEKS  
     
   
   
   
  
 losartan-hydroCHLOROthiazide 100-12.5 mg per tablet Commonly known as:  HYZAAR Your last dose was: Your next dose is: TAKE 1 TABLET BY MOUTH EVERY DAY  
     
   
   
   
  
 metoprolol succinate 25 mg XL tablet Commonly known as:  TOPROL-XL Your last dose was: Your next dose is: TAKE 1 TABLET EVERY DAY  
     
   
   
   
  
 tamsulosin 0.4 mg capsule Commonly known as:  FLOMAX Your last dose was: Your next dose is: TAKE 1 CAPSULE EVERY DAY  
     
   
   
   
  
 traMADol 50 mg tablet Commonly known as:  ULTRAM  
   
Your last dose was: Your next dose is: Take 1 Tab by mouth every eight (8) hours as needed for Pain. Max Daily Amount: 150 mg. TAKE 1-2 TABLETS BY MOUTH EVERY 6 HOURS AS NEEDED FOR PAIN  
 50 mg  
    
   
   
   
  
 VITAMIN C 1,000 mg tablet Generic drug:  ascorbic acid (vitamin C) Your last dose was: Your next dose is: Take  by mouth. VITAMIN D3 PO Your last dose was: Your next dose is: Take  by mouth. Where to Get Your Medications These medications were sent to [id number] [place], [address] AT [address] [place] [address] [address] [facility], [address] [place] [address] Phone:  [phone]  
  aspirin delayed-release 81 mg tablet  
 clopidogrel 75 mg Tab

## 2017-11-06 NOTE — PROGRESS NOTES
Physical Therapy Screening:    [facility] based on results obtained during the nursing admission assessment. The patients chart was reviewed and the patient is appropriate for a skilled therapy evaluation if there is a decline in functional mobility from baseline. Please order a consult for physical therapy if you are in agreement and would like an evaluation to be completed. Thank you.     Georgia Dahl, PT

## 2017-11-06 NOTE — PROGRESS NOTES
Pt arrived to room, sheath in place in right groin, TR band patent @ 13, BP elevated, no complaints of pain/SOB, Dr. Nicci Garza aware of BP, PRN hydralazine ordered, no bleeding/hematoma, pt is unable to pass urine, verbal orders given to put a cage in if pt continues to not pass urine, ACT check @ 3689=131    1400-cage inserted, 800ml out  1508- cath lab aware sheath can be pulled, TR band @ 3, echo tech in there now. 1600-TR band off, VS stable, no bleeding/hematoma, sheath removal started. /  1603-sheath out, hand held pressure applied  1611-hand held pressure still applied, BP and HR dropped some. 1640-no bleeding/hematoma, VS stable.

## 2017-11-06 NOTE — H&P (VIEW-ONLY)
Titus Scheuermann DNP, ANP-BC  Subjective/HPI:     Leandro Escudero is a 81 y.o. male is here for symptom based appointment secondary to progressive dyspnea on exertion. He states symptoms have been present for at least 2 months. In the last month Mr. Agnes Pate feels his dyspnea has progressed and at times feels shortness of breath at rest.  Has history of atherosclerotic heart disease with previous ISR. Has been taking all medications as directed. In reviewing flowsheet discussed with patient 10 pound weight gain and reports this too has occurred within the last month without any increase of caloric intake or significant decrease of activity. Chest x-ray August 2017    EXAM:  XR CHEST PA LAT. INDICATION: Cough with right back pain and shortness of breath. COMPARISON: 6/20/2017.     FINDINGS:   PA and lateral radiographs of the chest were obtained. The patient is on a  cardiac monitor. Lungs: Left hemidiaphragm is elevated and there is atelectasis at the left base. Pleura: There is no pleural effusion or pneumothorax. Mediastinum: The cardiac and mediastinal contours and pulmonary vascularity are  normal.  There is a coronary stent. Bones and soft tissues: There are degenerative changes and DISH of the spine.     IMPRESSION  IMPRESSION: Left basilar atelectasis. CBC stable and normal proBNP from ER workup August 2017. Patient has been using an inhaler twice a week when he feels more dyspneic with relief of his symptoms. Denies any formal history of emphysema or COPD, he is a former smoker.     PCP Provider  Roselia Rollins MD  Past Medical History:   Diagnosis Date    AAA [facility]abdominal aortic [facility]) [facility]     Acute MI 12/2010    dr Artem Colbert    Acute prostatitis     again 9/12/16 [facility]'d [facility]    Advance directive discussed with patient 04/20/2016    Aneurysm ([facility] 6/2011    AAA    Borderline glaucoma (glaucoma suspect) 02/19/2015    notes from [address] [place] [address] rec'd    BPH (benign prostatic hyperplasia)     Bright red blood per rectum 02/04/2016    [facility] note Niesha Martinez- w/u in progress   8/22/17 [facility] f/u note    CAD (coronary artery disease)     Calculus of kidney     Cerebral embolism with cerebral infarction ([address]     Chronic pain     back    Dizzy spells 6/2012    DJD (degenerative joint disease) of lumbar spine     ED (erectile dysfunction)     8/30/17 [facility]u note [facility]    Elevated PSA 03/20/2014    [facility]     8/24/16 f/u note    Encephalocele ([address]     Hypercholesterolemia     Hypertension     Pneumonia 02/05/2017    Prostate cancer ([address] 05/12/2014    crystal henry/ Dr Marita Contreras 8/22/17 f/u note    PVD (peripheral vascular disease) with claudication ([address]     S/P radiation therapy 15 months out    probable cause of the rectal bleeding    Stroke ([address] 6/2011    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    neuro assoc notes rec'd    Thyroid disease     Vitamin D deficiency 11/2013    again 5/2015 again 1/2016      Past Surgical History:   Procedure Laterality Date    CARDIAC SURG PROCEDURE UNLIST  12/2010    dr Bush Sensing stents past MI    COLONOSCOPY N/A 8/31/2016    COLONOSCOPY performed [facility] at [facility] ENDOSCOPY    ENDOSCOPY, COLON, DIAGNOSTIC      HX CAROTID ENDARTERECTOMY Left     HX HEENT  10/2012    repair right drum cindyarianna dawkins    HX ORTHOPAEDIC Bilateral     BUNIONECTOMY    MD LEFT HEART CATH,PERCUTANEOUS  10/16/2010         PTCA EACH ADDL VESSEL  10/16/2010         PTCA W/ STENT, INITIAL  10/16/2010         VASCULAR SURGERY PROCEDURE UNLIST      left leg varicose vein stripping dr Buckley Loud     Allergies   Allergen Reactions    Norco [Hydrocodone-Acetaminophen] Other (comments)     Too sedated and felt like he was in a daze    Pcn [Penicillins] Rash    Percocet [Oxycodone-Acetaminophen] Nausea and Vomiting      Family History   Problem Relation Age of Onset    Diabetes Mother     Diabetes Father     Cancer Sister      LUNG    Cancer Brother      COLON    Cancer Sister      LUNG    Anesth Problems Neg Hx       Current Outpatient Prescriptions   Medication Sig    levothyroxine (SYNTHROID) 88 mcg tablet TAKE 1 TABLET BY MOUTH EVERY DAY BEFORE BREAKFAST AND RECHECK LEVEL IN 4 TO 6 WEEKS    furosemide (LASIX) 20 mg tablet Take 1 Tab by mouth daily.  albuterol (PROVENTIL HFA, VENTOLIN HFA, PROAIR HFA) 90 mcg/actuation inhaler Take 2 Puffs by inhalation every four (4) hours as needed for Wheezing.  aspirin (ASPIRIN) 325 mg tablet Take 325 mg by mouth daily.  clotrimazole-betamethasone (LOTRISONE) topical cream Apply twice daily to affected area till resolved.  isosorbide mononitrate ER (IMDUR) 30 mg tablet TAKE 1 TABLET BY MOUTH EVERY DAY    amLODIPine (NORVASC) 5 mg tablet TAKE 1 TAB BY MOUTH DAILY.  metoprolol succinate (TOPROL-XL) 25 mg XL tablet TAKE 1 TABLET EVERY DAY    losartan-hydroCHLOROthiazide (HYZAAR) 100-12.5 mg per tablet TAKE 1 TABLET BY MOUTH EVERY DAY    tamsulosin (FLOMAX) 0.4 mg capsule TAKE 1 CAPSULE BY MOUTH EVERY DAY    atorvastatin (LIPITOR) 40 mg tablet Take 1 Tab by mouth daily. YRN. Give patient BRAND LIPITOR. D/C Crestor    traMADol (ULTRAM) 50 mg tablet Take 1 Tab by mouth every eight (8) hours as needed for Pain. Max Daily Amount: 150 mg. TAKE 1-2 TABLETS BY MOUTH EVERY 6 HOURS AS NEEDED FOR PAIN    albuterol-ipratropium (DUO-NEB) 2.5 mg-0.5 mg/3 ml nebu 3 mL by Nebulization route every six (6) hours as needed.  CHOLECALCIFEROL, VITAMIN D3, (VITAMIN D3 PO) Take  by mouth.  finasteride (PROSCAR) 5 mg tablet daily.  omega-3 fatty acids-vitamin e (FISH OIL) 1,000 mg cap Take 1 Cap by mouth daily.  ascorbic acid (VITAMIN C) 1,000 mg tablet Take  by mouth.  aspirin delayed-release 325 mg tablet Take  by mouth every six (6) hours as needed for Pain.  aspirin delayed-release 81 mg tablet Take 1 Tab by mouth daily for 360 days.      No current facility-administered medications for this visit. Vitals:    10/11/17 1508 10/11/17 1519   BP: 174/84 174/90   Pulse: 87    Resp: 18    SpO2: 93%    Weight: 180 lb 1.6 oz (81.7 kg)    Height: 5' 3\" (1.6 m)      Social History     Social History    Marital status:      Spouse name: N/A    Number of children: N/A    Years of education: N/A     Occupational History    Not on file. Social History Main Topics    Smoking status: Former Smoker     Packs/day: 0.25     Quit date: 9/10/2014    Smokeless tobacco: Never Used      Comment: has not had cigarette in over a month    Alcohol use No    Drug use: No    Sexual activity: Not Currently     Other Topics Concern    Not on file     Social History Narrative       I have reviewed the nurses notes, vitals, problem list, allergy list, medical history, family, social history and medications. Review of Symptoms:    General: There is a 10 pound weight gain in 1 month unexpected. Pt is able to conduct ADL's however with some limitations due to new dyspnea on exertion  HEENT: Denies blurred vision, headaches, epistaxis and difficulty swallowing. Respiratory: + Shortness of breath, + VERONICA, + occasional wheezing denies stridor. Cardiovascular: Denies precordial pain, palpitations, edema + PND  Gastrointestinal: Denies poor appetite, indigestion, abdominal pain or blood in stool. He does report his pants are fitting tighter  Musculoskeletal: Denies pain or swelling from muscles or joints  Neurologic: Denies tremor, paresthesias, or sensory motor disturbance  Skin: Denies rash, itching or texture change. Physical Exam:      General: Well developed, in no acute distress, cooperative and alert  HEENT: No carotid bruits, no JVD, trach is midline. Neck Supple, PEERL, EOM intact. Heart:  Normal S1/S2 negative S3 or S4.  Regular, no murmur, gallop or rub.   Respiratory: Diminished in the right lower field, no wheezes rales or rhonchi clear remainder  Abdomen:   Soft, rounded non-tender, no masses, bowel sounds are active.   Extremities:  No edema, normal cap refill, no cyanosis, atraumatic. Noted bilateral rounded fingers c/w clubbing. Neuro: A&Ox3, speech clear, gait stable. Skin: Skin color is normal. No rashes or lesions. Non diaphoretic  Vascular: 2+ pulses symmetric in all extremities        Resting oxygen level room air 96% after 100 feet of ambulation decreased 93% with mild tachypnea, no increase in heart rate. Cardiographics    ECG: Normal sinus rhythm  Results for orders placed or performed during the hospital encounter of 17   EKG, 12 LEAD, INITIAL   Result Value Ref Range    Ventricular Rate 92 BPM    Atrial Rate 92 BPM    P-R Interval 188 ms    QRS Duration 148 ms    Q-T Interval 400 ms    QTC Calculation (Bezet) 494 ms    Calculated P Axis 58 degrees    Calculated R Axis -91 degrees    Calculated T Axis 26 degrees    Diagnosis       Sinus rhythm with premature atrial complexes  Right bundle branch block    Confirmed by Syl Mcghee ([id number]) on 2017 7:47:28 AM     Results for orders placed or performed during the hospital encounter of 10/14/10   ECHOCARDIOGRAM    Narrative    Name:      Kathia Hairston. Admitted:   10/14/2010  MR #:      [id number]                     :        1935  Account #: [de-identified]                  Age         77  Ref MD:                                  Location:   [id number]                               CARDIAC CATHETERIZATION REPORT    STUDY DATE:  10/16/2010      STUDY NUMBER:      AGE.:  77. GENDER:  Male. RACE:  -American. HEIGHT:  5 feet 3 inches. WEIGHT:  150 pounds. BODY SURFACE AREA:  1.71 meter squared. PROCEDURES  1. Left heart catheterization. 2. Left ventriculography. 3. Coronary angiography. 4. Percutaneous transluminal coronary angioplasty of in-stent   restenosis in  the distal right coronary artery.   5. Percutaneous transluminal coronary angioplasty and stenting of  intermedius ramus. 6. Angiomax bolus and maintenance infusion. INDICATION:  Unstable angina pectoris with history remote stent   placement  including May 2010 and August 2010 with a total of 6   intracoronary stents  placed in the right coronary artery. TECHNIQUE:  Jaun (right groin). CATHETERS:  Please see hemodynamic sheet. MEDICATIONS:  Please see hemodynamic sheet. COMPLICATIONS:  None. METHOD:  After informed consent, under local anesthesia and using   sterile  technique, left heart catheterization, left ventriculography,   coronary  angiography were performed in the usual fashion. Following   cardiac  catheterization, the patient was prepared for percutaneous   coronary  transluminal angioplasty of in-stent restenosis in the right   coronary  artery to be followed by her percutaneous coronary intervention   of the  Ramus intermedius. The 5-Polish sheath was replaced with a 6-Polish sheath through   which a  6-Polish right 4 guiding catheter was advanced to the right   coronary  ostium, followed by advancement of a 0.014 inch long exchange   extra support  Prowater guidewire to and across the lesion into the distal   vessel. Subsequently, a 2.5 x 12 mm non-compliant Voyager RX balloon was   advanced to  within the in-stent restenosis. Two inflations up to a maximum of   17  atmospheres over a maximal duration of 35 seconds were performed. Repeat  cineangiograms revealed a suboptimal result. Therefore, the 2.5 x   12 mm  balloon was replaced with a 3 x 8 mm noncompliant Voyager RX   balloon which  was advanced to within the in-stent restenosis. Three inflations   up to a  maximum of 17 atmospheres over a maximal duration of 30 seconds   were  performed. Repeat cineangiograms revealed a satisfactory result.    The  balloon and guidewire were subsequently withdrawn and removed,   followed by  removal of the guiding catheter. Subsequently, attention was turned toward the ramus intermedius   coronary  artery. A 6-Bulgarian Voda left 4 guiding catheter was advanced to   the left  coronary ostium, followed by advancement of a 0.014 inch long   exchange  extra support Prowater guidewire to and across the lesion into   the distal  vessel. Subsequently, a 2.5 x 6 mm sprinter compliant balloon was   advanced to within  the lesion. One inflation to 9 atmospheres over 16 seconds was   performed as  predilatation. Subsequently, a 2.5 x 12 mm Xience over the wire  drug-eluting stent was advanced to within the lesion and deployed   at 12  atmospheres over 20 seconds and postdilated to 14 atmospheres   over 20  seconds. Repeat cineangiograms revealed an excellent result. The   balloon  and guidewire were subsequently withdrawn and removed, followed   by removal  of the guiding catheter. The sheath was sutured in place in the   right  groin. The patient was subsequently transported to the   Interventional  Coronary Care Unit in satisfactory condition. Angiomax   maintenance infusion  was discontinued. HEMODYNAMIC DATA:    1. Mild elevation of left ventricular end-diastolic pressure to 6   mmHg  prior to and following contrast ventriculography. 2. Normal phasic and mean systemic arterial pressures at 134/74   mmHg with a  mean of 86 mmHg. 3. Absent transvalvular aortic pressure gradient. OXIMETRY DATA:  Normal systemic arterial oxygen saturation at 97%   on 2  liters of nasal oxygen. CONCLUSION:  As above. ANGIOGRAPHIC DATA:  Fluoroscopy: There is mild calcification   noted in the  right and left coronary artery systems. Intracoronary stents are  visualized throughout the right coronary artery from proximal to   distal  third. LEFT VENTRICULOGRAPHY:  Left ventriculography (30 degrees ISMON   projection): Contrast injection into the left ventricle reveals that the left   ventricle  is normal in size and configuration.  There is prominence of the   papillary  muscles and trabecular pattern suggestive of left ventricle   hypertrophy. Contractility is excellent in all wall segments except for focal   akinesis  involving the posterobasal wall segment with mild hypokinesis   involving the  diaphragmatic wall region. The estimated left ventricular   ejection fraction  is 60% to 65% (normal 65% +/-8%). There is no evidence of mitral  regurgitation or mitral valve prolapse. The mitral and aortic   valves appear  to be normal. There is mild to moderate dilatation of the   ascending aorta. CORONARY ARTERIOGRAPHY:    1. Left main stem: This medium sized vessel is normal.    2. Left anterior descending artery: This medium-sized vessel is   slightly  ectatic in its proximal third. It terminates in the distal third   of the  posterior interventricular groove. It gives rise to normal   complement of  septal perforators and diagonal arteries. The left anterior   descending  artery has mild luminal irregularities in its proximal and middle   thirds  maximal of 20% to 30%, difficult to quantitate tubular 30% to 40%   stenosis  at the junction of its proximal and middle thirds followed by   focal 20% to  30% stenosis at the junction of its middle and distal thirds. The   septal  perforators appear to be normal. The first proximal third small   diagonal  artery has mild luminal irregularities in its proximal third   maximal to  20% to 30%. The remaining diagonal arteries appear to be normal.    3. Intermediate artery: This medium-sized vessel has a focal 80%   to 85%  eccentric proximal third stenosis, followed by mild luminal   irregularities  at the junction of its proximal and middle thirds, maximal to 20%   to 30%. It gives rise to 2 distal small branches.  The superior branch is   normal.  The inferior moderately tortuous branch has a 90% to 95% focal   proximal  third stenosis followed by a 70% to 75% focal concentric stenosis   in its  distal proximal third, followed by a 95% to 99% severely   angulated middle  third stenosis. 4. Left circumflex artery: This medium-sized vessel exit as a   principal  obtuse marginal artery. It essentially trifurcates in its middle   third into  3 smaller branches. The more superior branch has a difficult to   quantitate  50% to 60% eccentric proximal third segmental stenosis. The   second small branch has a difficult to quantitate 80% to 85%   focal origin  stenosis, followed by a tubular 70% to 80% proximal third short   segmental  stenosis. The more inferior branch has a moderate segmental   stenosis in its  proximal third near its origin maximal to 60% to 65%. 5. Right coronary artery: This medium-sized dominant vessel gives   rise to a  large conus artery, the sinoatrial lucia artery, several very   small right  ventricular marginal branches, and terminates at small posterior   descending  and posterolateral arteries. The right coronary artery has   intracoronary  stents extending from its origin into its distal third   approximately 1 cm  prior to its bifurcation. There appears to be a one (1) cm skip   area at the  junction of its middle and distal thirds. The intracoronary   stents  extending from its origin into its distal middle third have mild   luminal  irregularities throughout, maximal to 20% to 30%. The \"skip\" area   between  the proximal and distal stents has 30% to 40% segmental eccentric   stenosis. The distal stents in the distal third of the right coronary   artery have  mild luminal irregularities throughout maximal to 10% to 20%. There is what  appears to be a segmental eccentric 80% to 85% focal stenosis in   the  proximal third of the distal third stent. The posterolateral   artery has  mild luminal irregularities throughout maximal to 20% to 30%. The   posterior  descending artery has a small vessel. It fills slowly distally.    It has mild  luminal irregularities maximal 10% to 20%.    The Voyager noncompliant balloons were advanced successively into   the  proximal distal third in-stent restenosis and fully expanded   during  inflations. Repeat cineangiograms revealed that the original 80%   to 85%  in-stent restenosis was reduced down to less than 10% residual   without  obvious dissection, distal vessel compromise. Blood flow remained   CESAR  grade 3 blood flow. Following successful PTCA of the in-stent restenosis in the   distal third  stent of the right coronary artery, attention was turned toward   the  intermedius ramus. The Sprinter balloon was advanced to within   the lesion  that slowly expanded during its single inflation. Following   predilatation  with the Sprinter balloon, the Xience drug-eluting stent was   advanced to  within the lesion, deployed and postdilated. Repeat   cineangiograms revealed  that the original 80% to 85% focal proximal third stenosis was   reduced down  to a less than 10% residual without obvious dissection, branch   nor distal  vessel compromise. Blood flow remained CESAR grade 3 blood flow. CONCLUSIONS:    1. Hemodynamically significant three-vessel atheromatous coronary   artery  disease involving the ramus intermedius, distal third of the left  circumflex, principal obtuse marginal artery, and distal third   stent in the  right coronary artery. 2. Successful percutaneous transluminal coronary angioplasty of   in-stent  restenosis in the distal third of the right coronary artery with   reduction  of 80% to 85% in-stent restenosis down to less than 10% residual   without  obvious dissection or distal vessel compromise. 3. Successful percutaneous transluminal coronary angioplasty and   stenting  of the ramus intermedius with reduction of the 80% to 85% focal   proximal  third stenosis down to a less than 10% residual without obvious   dissection,  branch nor distal vessel compromise.   4. Hypertrophied left ventricle with normal systolic function and   akinesis  involving a focal segment of the posterobasal wall region. 5. Mild to moderate dilatation of the ascending aorta. Preliminary    Adler L. Caresse Severin, M.D.    cc:   Ardyce Ester. Caresse Severin, M.D.        ALB/wmx; D: 11/19/2010  1:02 P; T: 11/20/2010  5:20 A; DOC#   [id number]; Job#  [id number]           Cardiology Labs:  Lab Results   Component Value Date/Time    Cholesterol, total 76 02/09/2017 02:47 AM    HDL Cholesterol 15 02/09/2017 02:47 AM    LDL, calculated 41.6 02/09/2017 02:47 AM    Triglyceride 97 02/09/2017 02:47 AM    CHOL/HDL Ratio 5.1 02/09/2017 02:47 AM       Lab Results   Component Value Date/Time    Sodium 139 08/24/2017 01:14 PM    Potassium 3.9 08/24/2017 01:14 PM    Chloride 108 08/24/2017 01:14 PM    CO2 28 08/24/2017 01:14 PM    Anion gap 3 08/24/2017 01:14 PM    Glucose 163 08/24/2017 01:14 PM    BUN 21 08/24/2017 01:14 PM    Creatinine 1.32 08/24/2017 01:14 PM    BUN/Creatinine ratio 16 08/24/2017 01:14 PM    GFR est AA >60 08/24/2017 01:14 PM    GFR est non-AA 52 08/24/2017 01:14 PM    Calcium 9.0 08/24/2017 01:14 PM    Bilirubin, total 0.6 08/24/2017 01:14 PM    AST (SGOT) 27 08/24/2017 01:14 PM    Alk. phosphatase 132 08/24/2017 01:14 PM    Protein, total 7.8 08/24/2017 01:14 PM    Albumin 3.4 08/24/2017 01:14 PM    Globulin 4.4 08/24/2017 01:14 PM    A-G Ratio 0.8 08/24/2017 01:14 PM    ALT (SGPT) 23 08/24/2017 01:14 PM           Assessment:     Assessment:     Diagnoses and all orders for this visit:    1. SOBOE (shortness of breath on exertion)  -     AMB POC EKG ROUTINE W/ 12 LEADS, INTER & REP  -     CBC WITH AUTOMATED DIFF  -     METABOLIC PANEL, COMPREHENSIVE  -     NT-PRO BNP  -     2D ECHO COMPLETE ADULT (TTE) W OR WO CONTR; Future  -     LEXISCAN/CARDIOLITE, Clinic Performed; Future    2.  Coronary artery disease due to lipid rich plaque  -     CBC WITH AUTOMATED DIFF  -     METABOLIC PANEL, COMPREHENSIVE  -     NT-PRO BNP  -     2D ECHO COMPLETE ADULT (TTE) W OR WO CONTR; Future  -     LEXISCAN/CARDIOLITE, Clinic Performed; Future    3. Essential hypertension  -     CBC WITH AUTOMATED DIFF  -     METABOLIC PANEL, COMPREHENSIVE  -     NT-PRO BNP  -     2D ECHO COMPLETE ADULT (TTE) W OR WO CONTR; Future  -     LEXISCAN/CARDIOLITE, Clinic Performed; Future    4. Dyslipidemia    5. Successful  PTCA (percutaneous transluminal coronary angioplasty)--90-95% instent restenosis RCA 10/16/10    Other orders  -     furosemide (LASIX) 20 mg tablet; Take 1 Tab by mouth daily. ICD-10-CM ICD-9-CM    1. SOBOE (shortness of breath on exertion) R06.02 786.05 AMB POC EKG ROUTINE W/ 12 LEADS, INTER & REP      CBC WITH AUTOMATED DIFF      METABOLIC PANEL, COMPREHENSIVE      NT-PRO BNP      2D ECHO COMPLETE ADULT (TTE) W OR WO CONTR      STRESS TEST LEXISCAN/CARDIOLITE   2. Coronary artery disease due to lipid rich plaque I25.10 414.00 CBC WITH AUTOMATED DIFF    T05.18 183.8 METABOLIC PANEL, COMPREHENSIVE      NT-PRO BNP      2D ECHO COMPLETE ADULT (TTE) W OR WO CONTR      STRESS TEST LEXISCAN/CARDIOLITE   3. Essential hypertension I10 401.9 CBC WITH AUTOMATED DIFF      METABOLIC PANEL, COMPREHENSIVE      NT-PRO BNP      2D ECHO COMPLETE ADULT (TTE) W OR WO CONTR      STRESS TEST LEXISCAN/CARDIOLITE   4. Dyslipidemia E78.5 272.4    5.  Successful  PTCA (percutaneous transluminal coronary angioplasty)--90-95% instent restenosis RCA 10/16/10 Z98.61 V45.82      Orders Placed This Encounter    CBC WITH AUTOMATED DIFF    METABOLIC PANEL, COMPREHENSIVE    NT-PRO BNP    AMB POC EKG ROUTINE W/ 12 LEADS, INTER & REP     Order Specific Question:   Reason for Exam:     Answer:   routine    LEXISCAN/CARDIOLITE, Clinic Performed     Standing Status:   Future     Standing Expiration Date:   4/11/2018     Order Specific Question:   Reason for Exam:     Answer:   [place]    2D ECHO COMPLETE ADULT (TTE) W OR WO CONTR     Standing Status:   Future     Standing Expiration Date:   4/11/2018     Order Specific Question:   Reason for Exam:     Answer:   VERONICA     Order Specific Question:   Contrast Enhancement (Bubble Study, Definity, Optison) may be used if criteria listed in established evidence-based protocol has been identified. Answer: Yes    furosemide (LASIX) 20 mg tablet     Sig: Take 1 Tab by mouth daily. Dispense:  30 Tab     Refill:  0        Plan:     Patient is a 27-year-old male with a history of multivessel CAD presenting with progressive dyspnea on exertion. From a cardiology standpoint will evaluate for ischemia with Tiajuana Boga as well as for heart failure with echocardiogram.  Given 10 pound weight gain with some orthopnea and paroxysmal nocturnal dyspnea congestive heart failure is differential diagnosis and will begin treatment with low-dose diuretic, sent from office to lab for renal panel and proBNP. Presently on triple antianginal therapy. Noted elevated blood pressure today, hopefully diuresing will reduce, will reevaluate at follow-up. Additional differential diagnosis would be underlying pulmonary pathology. If cardiology workup is negative patient would benefit from pulmonary function testing. Follow-up with Dr. Darylene Richard when testing complete.     Romy Titus NP

## 2017-11-06 NOTE — INTERVAL H&P NOTE
H&P Update:  Janet Brown was seen and examined. History and physical has been reviewed. The patient has been examined. There have been no significant clinical changes since the completion of the originally dated History and Physical.  Myoview shows new inferior ischemia. Plan cath. High risk scan.     Signed By: Sky Urena MD     November 6, 2017 9:07 AM

## 2017-11-06 NOTE — IP AVS SNAPSHOT
[address] [facility] 
[phone] Patient: Janet Brown 
MRN: [id number] PDP:8/4/[id number] About your hospitalization You were admitted on:  November 6, 2017 You last received care in the:  [facility] [place] [facility] You were discharged on:  November 7, 2017 Why you were hospitalized Your primary diagnosis was:  Not on File Your diagnoses also included:  Angina, Class Iii (Hcc), S/P Cardiac Cath Things You Need To Do (next 8 weeks) Follow up with Delmar Crane MD  
  
Phone:  [phone] Where:  [address] [facility], [address], [address] [place] [address], [facility] [id number] Follow up with   
please disregard the prior test results appointment you originally had scheduled for tomorrow (11/8). Wednesday Nov 08, 2017 RESULTS/REVIEW with Sky Urena MD at 11:15 AM  
Where:  [facility] Monday Nov 20, 2017 Go to Sky Urena MD  
3pm.  Please arrive at 2:45pm.    
  
Phone:  [phone] Where:  [address] [facility] [address] [id number] Discharge Orders None A check racquel indicates which time of day the medication should be taken. My Medications TAKE these medications as instructed Instructions Each Dose to Equal  
 Morning Noon Evening Bedtime  
 albuterol 90 mcg/actuation inhaler Commonly known as:  PROVENTIL HFA, VENTOLIN HFA, PROAIR HFA Your last dose was: Your next dose is: Take 2 Puffs by inhalation every four (4) hours as needed for Wheezing. 2 Puff  
    
   
   
   
  
 albuterol-ipratropium 2.5 mg-0.5 mg/3 ml Nebu Commonly known as:  Tara Cruz Your last dose was: Your next dose is:    
   
   
 3 mL by Nebulization route every six (6) hours as needed. 3 mL  
    
   
   
   
  
 amLODIPine 5 mg tablet Commonly known as:  Madhu Turk Your last dose was: Your next dose is: TAKE 1 TAB BY MOUTH DAILY. aspirin delayed-release 81 mg tablet Your last dose was: Your next dose is: Take 1 Tab by mouth daily for 360 days. 81 mg  
    
   
   
   
  
 atorvastatin 40 mg tablet Commonly known as:  LIPITOR Your last dose was: Your next dose is: Take 1 Tab by mouth daily. YRN. Give patient BRAND LIPITOR. D/C Crestor 40 mg  
    
   
   
   
  
 clopidogrel 75 mg Tab Commonly known as:  PLAVIX Start taking on:  11/8/2017 Your last dose was: Your next dose is: Take 1 Tab by mouth daily. Indications: Thrombosis Prevention after PCI 75 mg  
    
   
   
   
  
 clotrimazole-betamethasone topical cream  
Commonly known as:  Penne Mole Your last dose was: Your next dose is:    
   
   
 Apply twice daily to affected area till resolved. finasteride 5 mg tablet Commonly known as:  PROSCAR Your last dose was: Your next dose is:    
   
   
 daily. FISH OIL 1,000 mg Cap Generic drug:  omega-3 fatty acids-vitamin e Your last dose was: Your next dose is: Take 1 Cap by mouth daily. 1 Cap  
    
   
   
   
  
 furosemide 20 mg tablet Commonly known as:  LASIX Your last dose was: Your next dose is: Take 1 Tab by mouth daily. 20 mg  
    
   
   
   
  
 isosorbide mononitrate ER 30 mg tablet Commonly known as:  IMDUR Your last dose was: Your next dose is: TAKE 1 TABLET BY MOUTH EVERY DAY  
     
   
   
   
  
 levothyroxine 88 mcg tablet Commonly known as:  SYNTHROID Your last dose was: Your next dose is:  TAKE 1 TABLET BY MOUTH EVERY DAY BEFORE BREAKFAST AND RECHECK LEVEL IN 4 TO 6 WEEKS  
     
   
   
 losartan-hydroCHLOROthiazide 100-12.5 mg per tablet Commonly known as:  HYZAAR Your last dose was: Your next dose is: TAKE 1 TABLET BY MOUTH EVERY DAY  
     
   
   
   
  
 metoprolol succinate 25 mg XL tablet Commonly known as:  TOPROL-XL Your last dose was: Your next dose is: TAKE 1 TABLET EVERY DAY  
     
   
   
   
  
 tamsulosin 0.4 mg capsule Commonly known as:  FLOMAX Your last dose was: Your next dose is: TAKE 1 CAPSULE EVERY DAY  
     
   
   
   
  
 traMADol 50 mg tablet Commonly known as:  ULTRAM  
   
Your last dose was: Your next dose is: Take 1 Tab by mouth every eight (8) hours as needed for Pain. Max Daily Amount: 150 mg. TAKE 1-2 TABLETS BY MOUTH EVERY 6 HOURS AS NEEDED FOR PAIN  
 50 mg  
    
   
   
   
  
 VITAMIN C 1,000 mg tablet Generic drug:  ascorbic acid (vitamin C) Your last dose was: Your next dose is: Take  by mouth. VITAMIN D3 PO Your last dose was: Your next dose is: Take  by mouth. Where to Get Your Medications These medications were sent to [facility]/pharmacy #[id number], [address] [place] [address] [address] AT [place] [address] [address] [place] [address] Phone:  [phone]  
  aspirin delayed-release 81 mg tablet  
 clopidogrel 75 mg Tab Discharge Instructions [address] [place], [place] [id number] Patient ID: 
Logan Carlos 
[id number] 
78 y.o. 
1935 Admit Date: 11/6/2017 Discharge Date: 11/7/2017 Admitting Physician: Aly Abbott MD  
 
Discharge Physician: Jacinto Thompson NP/Dr. Danny Wei Admission Diagnoses:  
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 
  Angina, class III ([address] (2/12/2015) S/P cardiac cath (11/7/2017) Overview: 11/6/17: PT[facility], MARY ANN Cx, MARY ANN  dRCA Discharge Condition: Good Cardiology Procedures this Admission:  Left heart catheterization with PCI Disposition: home Reference discharge instructions provided by nursing for diet and activity. Signed: 
Dipak Barnes NP 
11/7/2017 
10:33 AM 
 
 
Radial Cardiac Catheterization/Angiography Discharge Instructions It is normal to feel tired the first couple days. Take it easy and follow the physicians instructions. CHECK THE CATHETER INSERTION SITE DAILY: 
 
Remove the wrist dressing 24 hours after the procedure. You may shower 24 hours after the procedure. Wash with soap and water and pat dry. Gentle cleaning of the site with soap and water is sufficient, cover with a dry clean dressing or bandage. Do not apply creams or powders to the area. No soaking the wrist for 3 days. Leave the puncture site open to air after 24 hours post-procedure. CALL THE PHYSICIANS:  
 
If the site becomes red, swollen or feels warm to the touch If there is bleeding or drainage or if there is unusual pain at the radial site. If there is any minor oozing, you may apply a band-aid and remove after 12 hours. If the bleeding continues, hold pressure with the middle finger against the puncture site and the thumb against the back of the wrist,call 911 to be transported to the hospital. 
DO NOT DRIVE YOURSELF, [address] [address]. ACTIVITY:  
For the first 24 hours do not manipulate the wrist. 
No lifting, pushing or pulling over 3-5 pounds with the affected wrist for 7 daysand no straining the insertion site. Do not life grocery bags or the garbage can, do not run the vacuum  or  for 7 days.  
Start with short walks as in the hospital and gradually increase as tolerated each day. It is recommended to walk 30 minutes 5-7 days per week. Follow your physicians instructions on activity. Avoid walking outside in extremes of heat or cold. Walk inside when it is cold and windy or hot and humid. Things to keep in mind: 
No driving for at least 24 hours, or as designated by your physician. Limit the number of times you go up and down the stairs Take rests and pace yourself with activity. Be careful and do not strain with bowel movements. MEDICATIONS: 
 
Take all medications as prescribed Call your physician if you have any questions Keep an updated list of your medications with you at all times and give a list to your physician and pharmacist 
 
SIGNS AND SYMPTOMS:  
Be cautious of symptoms of angina or recurrent symptoms such as chest discomfort, unusual shortness of breath or fatigue. These could be symptoms of restenosis, a new blockage or a heart attack. If your symptoms are relieved with rest it is still recommended that you notify your physician of recurrent chest pain or discomfort. For CHEST PAIN or symptoms of angina not relieved with rest:  If the discomfort is not relieved with rest, and you have been prescribed Nitroglycerin, take as directed (taken under the tongue, one at a time 5 minutes apart for a total of 3 doses). If the discomfort is not relieved after the 3rd nitroglycerin, call 911. If you have not been prescribed Nitroglycerin  and your chest discomfort is not relieved with rest, call 911. AFTER CARE:  
Follow up with your physician as instructed. Follow a heart healthy diet with proper portion control, daily stress management, daily exercise, blood pressure and cholesterol control , and smoking cessation. CARDIAC CATHETERIZATION/PCI DISCHARGE INSTRUCTIONS It is normal to feel tired the first couple days. Take it easy and follow the physicians instructions.    
 
CHECK THE CATHETER INSERTION SITE DAILY: 
 
 You may shower 24 hours after the procedure, remove the bandage during showering. Wash with soap and water and pat dry. Gentle cleaning of the site with soap and water is sufficient, cover with a dry clean dressing or bandage. Do not apply creams or powders to the area. Do not sit in a bathtub or pool of water for 7 days or until wound has completely healed. Temporary bruising and discomfort is normal and may last a few weeks. You may have a  formation of a small lump at the site which may last up to 6 weeks. CALL THE PHYSICIANS: 
 
If the site becomes red, swollen or feels warm to the touch If there is bleeding or drainage or if there is unusual pain at the groin or down the leg. If there is any bleeding, lie down, apply pressure or have someone apply pressure with a clean cloth until the bleeding stops. If the bleeding continues, call 911 to be transported to the hospital. 
DO  [place] [address] ACTIVITY: 
 
For the first 24-48 hours or as instructed by the physician: No lifting, pushing or pulling over 10 pounds and no straining the insertion site. Do not life grocery bags or the garbage can, do not run the vacuum  or  for 7 days. Start with short walks as in the hospital and gradually increase as tolerated each day. It is recommended to walk 30 minutes 5-7 days per week. Follow your physicians instructions on activity. Avoid walking outside in extremes of heat or cold. Walk inside when it is cold and windy or hot and humid. Things to keep in mind: 
No driving for at least 24 hours, or as designated by your physician. Limit the number of times you go up and down the stairs Take rests and pace yourself with activity. Be careful and do not strain with bowel movements. MEDICATIONS: 
 
Take all medications as prescribed Call your physician if you have any questions Keep an updated list of your medications with you at all times and give a list to your physician and pharmacist 
 
 
 
SIGNS AND SYMPTOMS: 
 
Be cautious of symptoms of angina or recurrent symptoms such as chest discomfort, unusual shortness of breath or fatigue. These could be symptoms of restenosis, a new blockage or a heart attack. If your symptoms are relieved with rest it is still recommended that you notify your physician of recurrent chest pain or discomfort. FOR CHEST PAIN or symptoms of angina not relieved with rest:  If the discomfort is not relieved with rest, and you have been prescribed Nitroglycerin, take as directed (taken under the tongue, one at a time 5 minutes apart for a total of 3 doses). If the discomfort is not relieved after the 3rd nitroglycerin, call 911. If you have not been prescribed Nitroglycerin  and your chest discomfort is not relieved with rest, call 911. AFTER CARE: 
 
Follow up with your physician as instructed. Follow a heart healthy diet with proper portion control, daily stress management, daily exercise, blood pressure and cholesterol control , and smoking cessation. Introducing [facility]! [employer] introduces [employer]ious patient portal. Now you can access parts of your medical record, email your doctor's office, and request medication refills online. 1. In your internet browser, go to [email or web address] 2. Click on the First Time User? Click Here link in the Sign In box. You will see the New Member Sign Up page. 3. Enter your Videolicious Access Code exactly as it appears below. You will not need to use this code after youve completed the sign-up process. If you do not sign up before the expiration date, you must request a new code. · Videolicious Access Code: [email or web address] Expires: 2/4/2018  6:17 AM 
 
4.  Enter the last four digits of your Social Security Number (xxxx) and Date of Birth (mm/dd/yyyy) as indicated and click Submit. You will be taken to the next sign-up page. 5. Create a [place] ID. This will be your [employer] [place] login ID and cannot be changed, so think of one that is secure and easy to remember. 6. Create a [place] password. You can change your password at any time. 7. Enter your Password Reset Question and Answer. This can be used at a later time if you forget your password. 8. Enter your e-mail address. You will receive e-mail notification when new information is available in [address] 9. Click Sign Up. You can now view and download portions of your medical record. 10. Click the Download Summary menu link to download a portable copy of your medical information. If you have questions, please visit the Frequently Asked Questions section of the [employer] [place] website. Remember, [employer] [place] is NOT to be used for urgent needs. For medical emergencies, dial 911. Now available from your iPhone and Android! Providers Seen During Your Hospitalization Provider Specialty Primary office phone Aly Abbott MD Cardiology [phone] Your Primary Care Physician (PCP) Primary Care Physician Office Phone Office Fax Katie Flores [phone] You are allergic to the following Allergen Reactions Norco (Hydrocodone-Acetaminophen) Other (comments) Too sedated and felt like he was in a daze Pcn (Penicillins) Rash Percocet (Oxycodone-Acetaminophen) Nausea and Vomiting Recent Documentation Height Weight BMI Smoking Status 1.626 m 75.8 kg 28.67 kg/m2 Former Smoker Emergency Contacts Name Discharge Info Relation Home Work Mobile LouisaTeddy  Other Relative [6] [phone] Abhilash Castro DISCHARGE CAREGIVER [3] Child [2] 11) [phone] [address]  Spouse [3] [phone] Patient Belongings The following personal items are in your possession at time of discharge: 
  Dental Appliances: Lowers, Uppers         Home Medications: None   Jewelry: None  Clothing: At bedside    Other Valuables: Lizzie Del Castillo Please provide this summary of care documentation to your next provider. Signatures-by signing, you are acknowledging that this After Visit Summary has been reviewed with you and you have received a copy. Patient Signature:  ____________________________________________________________ Date:  ____________________________________________________________  
  
[facility] Provider Signature:  ____________________________________________________________ Date:  ____________________________________________________________

## 2017-11-07 VITALS
OXYGEN SATURATION: 97 % | HEIGHT: 64 IN | SYSTOLIC BLOOD PRESSURE: 156 MMHG | HEART RATE: 95 BPM | DIASTOLIC BLOOD PRESSURE: 83 MMHG | RESPIRATION RATE: 12 BRPM | BODY MASS INDEX: 28.51 KG/M2 | TEMPERATURE: 98.2 F | WEIGHT: 167 LBS

## 2017-11-07 PROBLEM — Z98.890 S/P CARDIAC CATH: Status: ACTIVE | Noted: 2017-11-07

## 2017-11-07 LAB
ANION GAP SERPL CALC-SCNC: 6 MMOL/L (ref 5–15)
ATRIAL RATE: 82 BPM
BUN SERPL-MCNC: 15 MG/DL (ref 6–20)
BUN/CREAT SERPL: 14 (ref 12–20)
CALCIUM SERPL-MCNC: 9.1 MG/DL (ref 8.5–10.1)
CALCULATED P AXIS, ECG09: 87 DEGREES
CALCULATED R AXIS, ECG10: -99 DEGREES
CALCULATED T AXIS, ECG11: 88 DEGREES
CHLORIDE SERPL-SCNC: 110 MMOL/L (ref 97–108)
CHOLEST SERPL-MCNC: 147 MG/DL
CO2 SERPL-SCNC: 25 MMOL/L (ref 21–32)
CREAT SERPL-MCNC: 1.05 MG/DL (ref 0.7–1.3)
DIAGNOSIS, 93000: NORMAL
ERYTHROCYTE [DISTWIDTH] IN BLOOD BY AUTOMATED COUNT: 16.6 % (ref 11.5–14.5)
GLUCOSE SERPL-MCNC: 94 MG/DL (ref 65–100)
HCT VFR BLD AUTO: 37.3 % (ref 36.6–50.3)
HDLC SERPL-MCNC: 63 MG/DL
HDLC SERPL: 2.3 {RATIO} (ref 0–5)
HGB BLD-MCNC: 12.3 G/DL (ref 12.1–17)
LDLC SERPL CALC-MCNC: 70.2 MG/DL (ref 0–100)
LIPID PROFILE,FLP: NORMAL
MCH RBC QN AUTO: 28.2 PG (ref 26–34)
MCHC RBC AUTO-ENTMCNC: 33 G/DL (ref 30–36.5)
MCV RBC AUTO: 85.6 FL (ref 80–99)
P-R INTERVAL, ECG05: 212 MS
PLATELET # BLD AUTO: 207 K/UL (ref 150–400)
POTASSIUM SERPL-SCNC: 3.5 MMOL/L (ref 3.5–5.1)
Q-T INTERVAL, ECG07: 434 MS
QRS DURATION, ECG06: 158 MS
QTC CALCULATION (BEZET), ECG08: 507 MS
RBC # BLD AUTO: 4.36 M/UL (ref 4.1–5.7)
SODIUM SERPL-SCNC: 141 MMOL/L (ref 136–145)
TRIGL SERPL-MCNC: 69 MG/DL (ref ?–150)
VENTRICULAR RATE, ECG03: 82 BPM
VLDLC SERPL CALC-MCNC: 13.8 MG/DL
WBC # BLD AUTO: 7.1 K/UL (ref 4.1–11.1)

## 2017-11-07 PROCEDURE — 36415 COLL VENOUS BLD VENIPUNCTURE: CPT | Performed by: NURSE PRACTITIONER

## 2017-11-07 PROCEDURE — 74011250637 HC RX REV CODE- 250/637: Performed by: NURSE PRACTITIONER

## 2017-11-07 PROCEDURE — 74011250637 HC RX REV CODE- 250/637: Performed by: INTERNAL MEDICINE

## 2017-11-07 PROCEDURE — 80048 BASIC METABOLIC PNL TOTAL CA: CPT | Performed by: NURSE PRACTITIONER

## 2017-11-07 PROCEDURE — 93005 ELECTROCARDIOGRAM TRACING: CPT

## 2017-11-07 PROCEDURE — 85027 COMPLETE CBC AUTOMATED: CPT | Performed by: NURSE PRACTITIONER

## 2017-11-07 PROCEDURE — 80061 LIPID PANEL: CPT | Performed by: NURSE PRACTITIONER

## 2017-11-07 RX ORDER — ASPIRIN 81 MG/1
81 TABLET ORAL DAILY
Qty: 30 TAB | Refills: 11 | Status: SHIPPED | OUTPATIENT
Start: 2017-11-07 | End: 2018-05-20

## 2017-11-07 RX ORDER — CLOPIDOGREL BISULFATE 75 MG/1
75 TABLET ORAL DAILY
Qty: 30 TAB | Refills: 11 | Status: SHIPPED | OUTPATIENT
Start: 2017-11-08 | End: 2018-01-09

## 2017-11-07 RX ADMIN — CLOPIDOGREL BISULFATE 75 MG: 75 TABLET ORAL at 08:53

## 2017-11-07 RX ADMIN — TAMSULOSIN HYDROCHLORIDE 0.4 MG: 0.4 CAPSULE ORAL at 08:53

## 2017-11-07 RX ADMIN — VALSARTAN: 160 TABLET, FILM COATED ORAL at 08:53

## 2017-11-07 RX ADMIN — METOPROLOL SUCCINATE 25 MG: 25 TABLET, EXTENDED RELEASE ORAL at 08:53

## 2017-11-07 RX ADMIN — ASPIRIN 81 MG: 81 TABLET, COATED ORAL at 08:53

## 2017-11-07 RX ADMIN — FUROSEMIDE 20 MG: 20 TABLET ORAL at 08:53

## 2017-11-07 RX ADMIN — LEVOTHYROXINE SODIUM 88 MCG: 88 TABLET ORAL at 08:53

## 2017-11-07 RX ADMIN — ATORVASTATIN CALCIUM 40 MG: 40 TABLET, FILM COATED ORAL at 08:53

## 2017-11-07 RX ADMIN — ISOSORBIDE MONONITRATE 30 MG: 30 TABLET, EXTENDED RELEASE ORAL at 08:54

## 2017-11-07 RX ADMIN — FINASTERIDE 5 MG: 5 TABLET, FILM COATED ORAL at 08:53

## 2017-11-07 RX ADMIN — AMLODIPINE BESYLATE 5 MG: 5 TABLET ORAL at 08:53

## 2017-11-07 NOTE — PROGRESS NOTES
11/07/17 1038   Vitals   Temp 98.2 °F (36.8 °C)   Temp Source Oral   Pulse (Heart Rate) 95   Heart Rate Source Monitor   Resp Rate 12   O2 Sat (%) 97 %   Level of Consciousness Alert   /83   MAP (Monitor) 101   BP 1 Location Right arm   BP 1 Method Automatic   BP Patient Position Post activity   Cardiac Rhythm NSR     VS stable post walk, no complaints of CP, pt is dyspneic but 02 sats are okay, sites remain same.

## 2017-11-07 NOTE — CARDIO/PULMONARY
Cardiopulmonary Rehab Nursing Entry:    Chart reviewed and pt visited for cardiac teaching. PT 80 yo admitted with angina, s/p PCI/MARY ANN, recent smoking cessation. Printed material given and discussed re: heart healthy habits, the cardiac diet, medication management, what to expect following coronary angioplasty, and post cardiac catheterization instructions. Discussed post catheterization restrictions, including:no manipulating the wrist for 24 hours, no lifting for 7 days, no soaking the wrist for 3 days . Also discussed what to do if bleeding or bruising at the cath insertion site is observed. Reviewed the cardiac diet (low NA/fat/CHOL), the importance of medication compliance, monitoring for any unusual signs & symptoms and when to call the doctor. Pt plans to do a home walking routine. Smoking history was assessed. Smoking cessation counseling provided with encouragement to remain tobacco free. The pt verbalized understanding.

## 2017-11-07 NOTE — DISCHARGE INSTRUCTIONS
[address] [place] [address], [address] [place] [address] Ne, [address] [facility]  [phone]        Patient ID:  Olga Becker  [id number]  14 y.o.  1935    Admit Date: 11/6/2017    Discharge Date: 11/7/2017     Admitting Physician: Pacheco Johnson MD     Discharge Physician: Gracia Oneill NP/Dr. Bryant Sebastian     Admission Diagnoses:   Angina, class III [facility]) [I20.9]  Coronary artery disease due to lipid rich plaque [I25.10, I25.83]    Discharge Diagnoses: Active Problems:    Angina, class III ([address] (2/12/2015)      S/P cardiac cath (11/7/2017)      Overview: 11/6/17: PTCA [facility], MARY ANN Cx, MARY ANN  dRCA              Discharge Condition: Good    Cardiology Procedures this Admission:  Left heart catheterization with PCI    Disposition: home    Reference discharge instructions provided by nursing for diet and activity. Signed:  Gracia Oneill NP  11/7/2017  10:33 AM      Radial Cardiac Catheterization/Angiography Discharge Instructions    It is normal to feel tired the first couple days. Take it easy and follow the physicians instructions. CHECK THE CATHETER INSERTION SITE DAILY:    Remove the wrist dressing 24 hours after the procedure. You may shower 24 hours after the procedure. Wash with soap and water and pat dry. Gentle cleaning of the site with soap and water is sufficient, cover with a dry clean dressing or bandage. Do not apply creams or powders to the area. No soaking the wrist for 3 days. Leave the puncture site open to air after 24 hours post-procedure. CALL THE PHYSICIANS:     If the site becomes red, swollen or feels warm to the touch  If there is bleeding or drainage or if there is unusual pain at the radial site. If there is any minor oozing, you may apply a band-aid and remove after 12 hours.    If the bleeding continues, hold pressure with the middle finger against the puncture site and the thumb against the back of the wrist,call 911 to be transported to the hospital.  DO NOT DRIVE YOURSELF, OR HAVE ANYONE ELSE DRIVE YOU - CALL 103. ACTIVITY:   For the first 24 hours do not manipulate the wrist.  No lifting, pushing or pulling over 3-5 pounds with the affected wrist for 7 daysand no straining the insertion site. Do not life grocery bags or the garbage can, do not run the vacuum  or  for 7 days. Start with short walks as in the hospital and gradually increase as tolerated each day. It is recommended to walk 30 minutes 5-7 days per week. Follow your physicians instructions on activity. Avoid walking outside in extremes of heat or cold. Walk inside when it is cold and windy or hot and humid. Things to keep in mind:  No driving for at least 24 hours, or as designated by your physician. Limit the number of times you go up and down the stairs  Take rests and pace yourself with activity. Be careful and do not strain with bowel movements. MEDICATIONS:    Take all medications as prescribed  Call your physician if you have any questions  Keep an updated list of your medications with you at all times and give a list to your physician and pharmacist    SIGNS AND SYMPTOMS:   Be cautious of symptoms of angina or recurrent symptoms such as chest discomfort, unusual shortness of breath or fatigue. These could be symptoms of restenosis, a new blockage or a heart attack. If your symptoms are relieved with rest it is still recommended that you notify your physician of recurrent chest pain or discomfort. For CHEST PAIN or symptoms of angina not relieved with rest:  If the discomfort is not relieved with rest, and you have been prescribed Nitroglycerin, take as directed (taken under the tongue, one at a time 5 minutes apart for a total of 3 doses). If the discomfort is not relieved after the 3rd nitroglycerin, call 911. If you have not been prescribed Nitroglycerin  and your chest discomfort is not relieved with rest, call 911.      AFTER CARE: Follow up with your physician as instructed. Follow a heart healthy diet with proper portion control, daily stress management, daily exercise, blood pressure and cholesterol control , and smoking cessation. CARDIAC CATHETERIZATION/PCI DISCHARGE INSTRUCTIONS    It is normal to feel tired the first couple days. Take it easy and follow the physicians instructions. CHECK THE CATHETER INSERTION SITE DAILY:    You may shower 24 hours after the procedure, remove the bandage during showering. Wash with soap and water and pat dry. Gentle cleaning of the site with soap and water is sufficient, cover with a dry clean dressing or bandage. Do not apply creams or powders to the area. Do not sit in a bathtub or pool of water for 7 days or until wound has completely healed. Temporary bruising and discomfort is normal and may last a few weeks. You may have a  formation of a small lump at the site which may last up to 6 weeks. CALL THE PHYSICIANS:    If the site becomes red, swollen or feels warm to the touch  If there is bleeding or drainage or if there is unusual pain at the groin or down the leg. If there is any bleeding, lie down, apply pressure or have someone apply pressure with a clean cloth until the bleeding stops. If the bleeding continues, call 911 to be transported to the hospital.  DO NOT DRIVE YOURSELF, [facility] ACTIVITY:    For the first 24-48 hours or as instructed by the physician:  No lifting, pushing or pulling over 10 pounds and no straining the insertion site. Do not life grocery bags or the garbage can, do not run the vacuum  or  for 7 days. Start with short walks as in the hospital and gradually increase as tolerated each day. It is recommended to walk 30 minutes 5-7 days per week. Follow your physicians instructions on activity. Avoid walking outside in extremes of heat or cold.   Walk inside when it is cold and windy or hot and humid. Things to keep in mind:  No driving for at least 24 hours, or as designated by your physician. Limit the number of times you go up and down the stairs  Take rests and pace yourself with activity. Be careful and do not strain with bowel movements. MEDICATIONS:    Take all medications as prescribed  Call your physician if you have any questions  Keep an updated list of your medications with you at all times and give a list to your physician and pharmacist        SIGNS AND SYMPTOMS:    Be cautious of symptoms of angina or recurrent symptoms such as chest discomfort, unusual shortness of breath or fatigue. These could be symptoms of restenosis, a new blockage or a heart attack. If your symptoms are relieved with rest it is still recommended that you notify your physician of recurrent chest pain or discomfort. FOR CHEST PAIN or symptoms of angina not relieved with rest:  If the discomfort is not relieved with rest, and you have been prescribed Nitroglycerin, take as directed (taken under the tongue, one at a time 5 minutes apart for a total of 3 doses). If the discomfort is not relieved after the 3rd nitroglycerin, call 911. If you have not been prescribed Nitroglycerin  and your chest discomfort is not relieved with rest, call 911. AFTER CARE:    Follow up with your physician as instructed. Follow a heart healthy diet with proper portion control, daily stress management, daily exercise, blood pressure and cholesterol control , and smoking cessation.

## 2017-11-07 NOTE — PROGRESS NOTES
Bedside shift change report given to Mynor Roth (oncoming nurse) by Ana Paula Lorenzana (offgoing nurse). Report included the following information SBAR, Kardex, Procedure Summary, Intake/Output, MAR and Recent Results. 1050-dicussed discharge instructions with patient and family member, education was provided on new medications, handouts were given, all questions answered.

## 2017-11-07 NOTE — PROGRESS NOTES
Problem: Falls - Risk of  Goal: *Absence of Falls  Document Hakan Fall Risk and appropriate interventions in the flowsheet.    Outcome: Progressing Towards Goal  Fall Risk Interventions:  Mobility Interventions: Patient to call before getting OOB         Medication Interventions: Patient to call before getting OOB

## 2017-11-07 NOTE — PROGRESS NOTES
[address]  [phone]      Cardiology Progress Note      11/7/2017 10:37 AM    Admit Date: 11/6/2017    Admit Diagnosis:   Angina, class III (HCC) [I20.9]  Coronary artery disease due to lipid rich plaque [I25.10, I25.83]    Subjective:     Cristóbal Galvez is a 81 y.o. male who was admitted for elective cardiac cath. Overnight events:  -BP slightly elevated  -labs stable  -Mr. Magalys Pettit states he's feeling very well today. He denies chest pain, SOB, palpitations.      Visit Vitals    /82    Pulse 94    Temp 98.7 °F (37.1 °C)    Resp 16    Ht 5' 4\" (1.626 m)    Wt 75.8 kg (167 lb)    SpO2 97%    BMI 28.67 kg/m2       Current Facility-Administered Medications   Medication Dose Route Frequency    0.9% sodium chloride (MBP/ADV) 0.9 % infusion        albuterol-ipratropium (DUO-NEB) 2.5 MG-0.5 MG/3 ML  3 mL Nebulization Q6H PRN    amLODIPine (NORVASC) tablet 5 mg  5 mg Oral DAILY    aspirin delayed-release tablet 81 mg  81 mg Oral DAILY    atorvastatin (LIPITOR) tablet 40 mg  40 mg Oral DAILY    finasteride (PROSCAR) tablet 5 mg  5 mg Oral DAILY    furosemide (LASIX) tablet 20 mg  20 mg Oral DAILY    isosorbide mononitrate ER (IMDUR) tablet 30 mg  30 mg Oral DAILY    levothyroxine (SYNTHROID) tablet 88 mcg  88 mcg Oral ACB    metoprolol succinate (TOPROL-XL) XL tablet 25 mg  25 mg Oral DAILY    tamsulosin (FLOMAX) capsule 0.4 mg  0.4 mg Oral DAILY    traMADol (ULTRAM) tablet 50 mg  50 mg Oral Q6H PRN    sodium chloride (NS) flush 5-10 mL  5-10 mL IntraVENous Q8H    sodium chloride (NS) flush 5-10 mL  5-10 mL IntraVENous PRN    acetaminophen (TYLENOL) tablet 650 mg  650 mg Oral Q4H PRN    clopidogrel (PLAVIX) tablet 75 mg  75 mg Oral DAILY    hydrALAZINE (APRESOLINE) 20 mg/mL injection 20 mg  20 mg IntraVENous Q6H PRN    valsartan/hydroCHLOROthiazide (DIOVAN HCT) 160/12.5 mg   Oral DAILY       Objective:      Physical Exam:  General Appearance:  pleasant, AAM resting in bed in NAD  Chest:   Clear  Cardiovascular:  Regular rate and rhythm. Distant heart sounds. Abdomen:   Soft, non-tender, bowel sounds are active.   Extremities: palpable distal pulses; no edema   Skin:  Warm and dry. R radial site CDI. R groin site CDI without swelling, bleeding, or bruit. Data Review:   Recent Labs      11/07/17   0324   WBC  7.1   HGB  12.3   HCT  37.3   PLT  207     Recent Labs      11/07/17   0324   NA  141   K  3.5   CL  110*   CO2  25   GLU  94   BUN  15   CREA  1.05   CA  9.1       No results for input(s): TROIQ, CPK, CKMB in the last 72 hours. Intake/Output Summary (Last 24 hours) at 11/07/17 1037  Last data filed at 11/06/17 2200   Gross per 24 hour   Intake                0 ml   Output             2225 ml   Net            -2225 ml        Telemetry: SR with PACs  EKG: SR with 1st degree AVB; RBBB      Assessment:     Active Problems:    Angina, class III ([address] (2/12/2015)      S/P cardiac cath (11/7/2017)      Overview: 11/6/17: PTCA [facility], MARY ANN Cx, MARY ANN  dRCA              Plan:     Meet Duncan is recovering post-cath procedure. R radial and R groin site dressings are CDI without swelling or bleeding or bruit. VSS. Rhythm sinus. Luke Radford denies complaints at this time. If recovery continues to progress without complication, discharge is planned for later today. Patient's new plavix prescription sent to his preferred pharmacy. Continued on ASA, BB, statin.             Nadeen Falk, MAKAYLA  DNP, RN, AGACNP-BC

## 2017-11-08 DIAGNOSIS — I25.10 CORONARY ARTERY DISEASE DUE TO LIPID RICH PLAQUE: ICD-10-CM

## 2017-11-08 DIAGNOSIS — I73.9 PVD (PERIPHERAL VASCULAR DISEASE) WITH CLAUDICATION (HCC): ICD-10-CM

## 2017-11-08 DIAGNOSIS — I25.83 CORONARY ARTERY DISEASE DUE TO LIPID RICH PLAQUE: ICD-10-CM

## 2017-11-08 RX ORDER — METOPROLOL SUCCINATE 25 MG/1
TABLET, EXTENDED RELEASE ORAL
Qty: 30 TAB | Refills: 2 | Status: SHIPPED | OUTPATIENT
Start: 2017-11-08 | End: 2018-02-05 | Stop reason: SDUPTHER

## 2017-11-08 RX ORDER — FUROSEMIDE 20 MG/1
TABLET ORAL
Qty: 30 TAB | Refills: 0 | Status: SHIPPED | OUTPATIENT
Start: 2017-11-08 | End: 2017-12-18 | Stop reason: SDUPTHER

## 2017-11-08 RX ORDER — ISOSORBIDE MONONITRATE 30 MG/1
TABLET, EXTENDED RELEASE ORAL
Qty: 30 TAB | Refills: 2 | Status: SHIPPED | OUTPATIENT
Start: 2017-11-08 | End: 2018-02-16 | Stop reason: SDUPTHER

## 2017-11-08 RX ORDER — AMLODIPINE BESYLATE 5 MG/1
TABLET ORAL
Qty: 30 TAB | Refills: 2 | Status: SHIPPED | OUTPATIENT
Start: 2017-11-08 | End: 2018-02-16 | Stop reason: SDUPTHER

## 2017-11-21 ENCOUNTER — APPOINTMENT (OUTPATIENT)
Dept: CT IMAGING | Age: 82
DRG: 378 | End: 2017-11-21
Attending: EMERGENCY MEDICINE
Payer: MEDICARE

## 2017-11-21 ENCOUNTER — HOSPITAL ENCOUNTER (INPATIENT)
Age: 82
LOS: 2 days | Discharge: HOME | DRG: 378 | End: 2017-11-23
Attending: EMERGENCY MEDICINE | Admitting: INTERNAL MEDICINE
Payer: MEDICARE

## 2017-11-21 DIAGNOSIS — K92.1 GASTROINTESTINAL HEMORRHAGE WITH MELENA: Primary | ICD-10-CM

## 2017-11-21 PROBLEM — K62.5 RECTAL BLEEDING: Status: ACTIVE | Noted: 2017-11-21

## 2017-11-21 LAB
ALBUMIN SERPL-MCNC: 3.3 G/DL (ref 3.5–5)
ALBUMIN/GLOB SERPL: 0.7 {RATIO} (ref 1.1–2.2)
ALP SERPL-CCNC: 126 U/L (ref 45–117)
ALT SERPL-CCNC: 38 U/L (ref 12–78)
ANION GAP SERPL CALC-SCNC: 8 MMOL/L (ref 5–15)
APTT PPP: 28.2 SEC (ref 22.1–32.5)
AST SERPL-CCNC: 54 U/L (ref 15–37)
BASOPHILS # BLD: 0 K/UL (ref 0–0.1)
BASOPHILS NFR BLD: 1 % (ref 0–1)
BILIRUB SERPL-MCNC: 0.5 MG/DL (ref 0.2–1)
BUN SERPL-MCNC: 13 MG/DL (ref 6–20)
BUN/CREAT SERPL: 9 (ref 12–20)
CALCIUM SERPL-MCNC: 9.2 MG/DL (ref 8.5–10.1)
CHLORIDE SERPL-SCNC: 109 MMOL/L (ref 97–108)
CO2 SERPL-SCNC: 27 MMOL/L (ref 21–32)
CREAT SERPL-MCNC: 1.4 MG/DL (ref 0.7–1.3)
DIFFERENTIAL METHOD BLD: ABNORMAL
EOSINOPHIL # BLD: 0.2 K/UL (ref 0–0.4)
EOSINOPHIL NFR BLD: 5 % (ref 0–7)
ERYTHROCYTE [DISTWIDTH] IN BLOOD BY AUTOMATED COUNT: 16.6 % (ref 11.5–14.5)
GLOBULIN SER CALC-MCNC: 4.5 G/DL (ref 2–4)
GLUCOSE SERPL-MCNC: 98 MG/DL (ref 65–100)
HCT VFR BLD AUTO: 35.2 % (ref 36.6–50.3)
HEMOCCULT STL QL: POSITIVE
HGB BLD-MCNC: 11.8 G/DL (ref 12.1–17)
INR PPP: 1 (ref 0.9–1.1)
LYMPHOCYTES # BLD: 0.8 K/UL (ref 0.8–3.5)
LYMPHOCYTES NFR BLD: 17 % (ref 12–49)
MCH RBC QN AUTO: 28.4 PG (ref 26–34)
MCHC RBC AUTO-ENTMCNC: 33.5 G/DL (ref 30–36.5)
MCV RBC AUTO: 84.6 FL (ref 80–99)
MONOCYTES # BLD: 0.4 K/UL (ref 0–1)
MONOCYTES NFR BLD: 9 % (ref 5–13)
NEUTS SEG # BLD: 3.3 K/UL (ref 1.8–8)
NEUTS SEG NFR BLD: 68 % (ref 32–75)
PLATELET # BLD AUTO: 242 K/UL (ref 150–400)
POTASSIUM SERPL-SCNC: 4.3 MMOL/L (ref 3.5–5.1)
PROT SERPL-MCNC: 7.8 G/DL (ref 6.4–8.2)
PROTHROMBIN TIME: 10.4 SEC (ref 9–11.1)
RBC # BLD AUTO: 4.16 M/UL (ref 4.1–5.7)
RBC MORPH BLD: ABNORMAL
SODIUM SERPL-SCNC: 144 MMOL/L (ref 136–145)
THERAPEUTIC RANGE,PTTT: NORMAL SECS (ref 58–77)
WBC # BLD AUTO: 4.7 K/UL (ref 4.1–11.1)

## 2017-11-21 PROCEDURE — C9113 INJ PANTOPRAZOLE SODIUM, VIA: HCPCS | Performed by: INTERNAL MEDICINE

## 2017-11-21 PROCEDURE — 99285 EMERGENCY DEPT VISIT HI MDM: CPT

## 2017-11-21 PROCEDURE — 80053 COMPREHEN METABOLIC PANEL: CPT | Performed by: EMERGENCY MEDICINE

## 2017-11-21 PROCEDURE — 74011000250 HC RX REV CODE- 250: Performed by: INTERNAL MEDICINE

## 2017-11-21 PROCEDURE — 74011250636 HC RX REV CODE- 250/636: Performed by: INTERNAL MEDICINE

## 2017-11-21 PROCEDURE — 74011636320 HC RX REV CODE- 636/320: Performed by: EMERGENCY MEDICINE

## 2017-11-21 PROCEDURE — 82272 OCCULT BLD FECES 1-3 TESTS: CPT | Performed by: EMERGENCY MEDICINE

## 2017-11-21 PROCEDURE — 94761 N-INVAS EAR/PLS OXIMETRY MLT: CPT

## 2017-11-21 PROCEDURE — 85730 THROMBOPLASTIN TIME PARTIAL: CPT | Performed by: EMERGENCY MEDICINE

## 2017-11-21 PROCEDURE — 85025 COMPLETE CBC W/AUTO DIFF WBC: CPT | Performed by: EMERGENCY MEDICINE

## 2017-11-21 PROCEDURE — 36415 COLL VENOUS BLD VENIPUNCTURE: CPT | Performed by: EMERGENCY MEDICINE

## 2017-11-21 PROCEDURE — 85610 PROTHROMBIN TIME: CPT | Performed by: EMERGENCY MEDICINE

## 2017-11-21 PROCEDURE — 77010033678 HC OXYGEN DAILY

## 2017-11-21 PROCEDURE — 74011250636 HC RX REV CODE- 250/636: Performed by: EMERGENCY MEDICINE

## 2017-11-21 PROCEDURE — 74177 CT ABD & PELVIS W/CONTRAST: CPT

## 2017-11-21 PROCEDURE — 65660000000 HC RM CCU STEPDOWN

## 2017-11-21 RX ORDER — SODIUM CHLORIDE 9 MG/ML
50 INJECTION, SOLUTION INTRAVENOUS CONTINUOUS
Status: DISCONTINUED | OUTPATIENT
Start: 2017-11-21 | End: 2017-11-23

## 2017-11-21 RX ORDER — NALOXONE HYDROCHLORIDE 0.4 MG/ML
0.4 INJECTION, SOLUTION INTRAMUSCULAR; INTRAVENOUS; SUBCUTANEOUS AS NEEDED
Status: DISCONTINUED | OUTPATIENT
Start: 2017-11-21 | End: 2017-11-23 | Stop reason: HOSPADM

## 2017-11-21 RX ORDER — SODIUM CHLORIDE 9 MG/ML
50 INJECTION, SOLUTION INTRAVENOUS
Status: COMPLETED | OUTPATIENT
Start: 2017-11-21 | End: 2017-11-21

## 2017-11-21 RX ORDER — ONDANSETRON 2 MG/ML
4 INJECTION INTRAMUSCULAR; INTRAVENOUS
Status: DISCONTINUED | OUTPATIENT
Start: 2017-11-21 | End: 2017-11-23 | Stop reason: HOSPADM

## 2017-11-21 RX ORDER — ACETAMINOPHEN 325 MG/1
650 TABLET ORAL
Status: DISCONTINUED | OUTPATIENT
Start: 2017-11-21 | End: 2017-11-23 | Stop reason: HOSPADM

## 2017-11-21 RX ORDER — NICOTINE 7MG/24HR
1 PATCH, TRANSDERMAL 24 HOURS TRANSDERMAL EVERY 24 HOURS
Status: DISCONTINUED | OUTPATIENT
Start: 2017-11-21 | End: 2017-11-23 | Stop reason: HOSPADM

## 2017-11-21 RX ORDER — SODIUM CHLORIDE 0.9 % (FLUSH) 0.9 %
10 SYRINGE (ML) INJECTION
Status: COMPLETED | OUTPATIENT
Start: 2017-11-21 | End: 2017-11-21

## 2017-11-21 RX ADMIN — SODIUM CHLORIDE 40 MG: 9 INJECTION INTRAMUSCULAR; INTRAVENOUS; SUBCUTANEOUS at 23:09

## 2017-11-21 RX ADMIN — Medication 10 ML: at 19:11

## 2017-11-21 RX ADMIN — SODIUM CHLORIDE 50 ML/HR: 900 INJECTION, SOLUTION INTRAVENOUS at 19:11

## 2017-11-21 RX ADMIN — IOPAMIDOL 100 ML: 755 INJECTION, SOLUTION INTRAVENOUS at 19:11

## 2017-11-21 NOTE — IP AVS SNAPSHOT
[address] [facility] 
[phone] Patient: Isak Choe 
MRN: [id number] VAT:9/9/0806 My Medications TAKE these medications as instructed Instructions Each Dose to Equal  
 Morning Noon Evening Bedtime  
 albuterol 90 mcg/actuation inhaler Commonly known as:  PROVENTIL HFA, VENTOLIN HFA, PROAIR HFA Your last dose was: Your next dose is: Take 2 Puffs by inhalation every four (4) hours as needed for Wheezing. 2 Puff  
    
   
   
   
  
 albuterol-ipratropium 2.5 mg-0.5 mg/3 ml Nebu Commonly known as:  Gary Tyler Your last dose was: Your next dose is:    
   
   
 3 mL by Nebulization route every six (6) hours as needed. 3 mL  
    
   
   
   
  
 amLODIPine 5 mg tablet Commonly known as:  Enrique Daya Your last dose was: Your next dose is: TAKE 1 TAB BY MOUTH DAILY. aspirin delayed-release 81 mg tablet Your last dose was: Your next dose is: Take 1 Tab by mouth daily for 360 days. 81 mg  
    
   
   
   
  
 atorvastatin 40 mg tablet Commonly known as:  LIPITOR Your last dose was: Your next dose is: Take 1 Tab by mouth daily. YRN. Give patient BRAND LIPITOR. D/C Crestor 40 mg  
    
   
   
   
  
 clopidogrel 75 mg Tab Commonly known as:  PLAVIX Your last dose was: Your next dose is: Take 1 Tab by mouth daily. Indications: Thrombosis Prevention after PCI 75 mg  
    
   
   
   
  
 clotrimazole-betamethasone topical cream  
Commonly known as:  Harley Tigre Your last dose was: Your next dose is:    
   
   
 Apply twice daily to affected area till resolved. finasteride 5 mg tablet Commonly known as:  PROSCAR Your last dose was: Your next dose is:    
   
   
 daily. FISH OIL 1,000 mg Cap Generic drug:  omega-3 fatty acids-vitamin e Your last dose was: Your next dose is: Take 1 Cap by mouth daily. 1 Cap  
    
   
   
   
  
 furosemide 20 mg tablet Commonly known as:  LASIX Your last dose was: Your next dose is: TAKE 1 TAB BY MOUTH DAILY. isosorbide mononitrate ER 30 mg tablet Commonly known as:  IMDUR Your last dose was: Your next dose is: TAKE 1 TABLET BY MOUTH EVERY DAY  
     
   
   
   
  
 levothyroxine 88 mcg tablet Commonly known as:  SYNTHROID Your last dose was: Your next dose is: Take 1 Tab by mouth Daily (before breakfast). 88 mcg  
    
   
   
   
  
 losartan-hydroCHLOROthiazide 100-12.5 mg per tablet Commonly known as:  HYZAAR Your last dose was: Your next dose is: TAKE 1 TABLET BY MOUTH EVERY DAY  
     
   
   
   
  
 metoprolol succinate 25 mg XL tablet Commonly known as:  TOPROL-XL Your last dose was: Your next dose is: TAKE 1 TABLET EVERY DAY  
     
   
   
   
  
 nicotine 7 mg/24 hr  
Commonly known as:  Jayne Martins Your last dose was: Your next dose is:    
   
   
 1 Patch by TransDERmal route every twenty-four (24) hours for 30 days. 1 Patch  
    
   
   
   
  
 omeprazole 40 mg capsule Commonly known as:  PRILOSEC Your last dose was: Your next dose is: Take 1 Cap by mouth two (2) times a day. 40 mg  
    
   
   
   
  
 tamsulosin 0.4 mg capsule Commonly known as:  FLOMAX Your last dose was: Your next dose is: TAKE 1 CAPSULE EVERY DAY  
     
   
   
   
  
 traMADol 50 mg tablet Commonly known as:  ULTRAM  
   
Your last dose was: Your next dose is: Take 1 Tab by mouth every eight (8) hours as needed for Pain.  Max Daily Amount: 150 mg. TAKE 1-2 TABLETS BY MOUTH EVERY 6 HOURS AS NEEDED FOR PAIN  
 50 mg  
    
   
   
   
  
 VITAMIN C 1,000 mg tablet Generic drug:  ascorbic acid (vitamin C) Your last dose was: Your next dose is: Take  by mouth. VITAMIN D3 PO Your last dose was: Your next dose is: Take  by mouth. Where to Get Your Medications Information on where to get these meds will be given to you by the nurse or doctor. ! Ask your nurse or doctor about these medications  
  nicotine 7 mg/24 hr  
 omeprazole 40 mg capsule

## 2017-11-21 NOTE — IP AVS SNAPSHOT
[address] [facility]. 
[phone] Patient: Celi Paz 
MRN: [id number] About your hospitalization You were admitted on:  November 21, 2017 You last received care in the:  [facility] 2 GENERAL SURGERY You were discharged on:  November 23, 2017 Why you were hospitalized Your primary diagnosis was:  Not on File Your diagnoses also included:  Rectal Bleeding, Cad (Coronary Artery Disease) Things You Need To Do (next 8 weeks) Follow up with Sarah Cristobal MD  
  
Phone:  [phone] Where:  [address], [address], [address] [place] [address], [facility] [address] Monday Dec 18, 2017 RESULTS/REVIEW with Merline Hitch, MD at  1:45 PM  
Where:  [facility] Discharge Orders None A check racquel indicates which time of day the medication should be taken. My Medications TAKE these medications as instructed Instructions Each Dose to Equal  
 Morning Noon Evening Bedtime  
 albuterol 90 mcg/actuation inhaler Commonly known as:  PROVENTIL HFA, VENTOLIN HFA, PROAIR HFA Your last dose was: Your next dose is: Take 2 Puffs by inhalation every four (4) hours as needed for Wheezing. 2 Puff  
    
   
   
   
  
 albuterol-ipratropium 2.5 mg-0.5 mg/3 ml Nebu Commonly known as:  Leigh Ann Bib Your last dose was: Your next dose is:    
   
   
 3 mL by Nebulization route every six (6) hours as needed. 3 mL  
    
   
   
   
  
 amLODIPine 5 mg tablet Commonly known as:  Tara Melissa Your last dose was: Your next dose is: TAKE 1 TAB BY MOUTH DAILY. aspirin delayed-release 81 mg tablet Your last dose was: Your next dose is: Take 1 Tab by mouth daily for 360 days.   
 81 mg  
    
   
   
   
  
 atorvastatin 40 mg tablet Commonly known as:  LIPITOR Your last dose was: Your next dose is: Take 1 Tab by mouth daily. YRN. Give patient BRAND LIPITOR. D/C Crestor 40 mg  
    
   
   
   
  
 clopidogrel 75 mg Tab Commonly known as:  PLAVIX Your last dose was: Your next dose is: Take 1 Tab by mouth daily. Indications: Thrombosis Prevention after PCI 75 mg  
    
   
   
   
  
 clotrimazole-betamethasone topical cream  
Commonly known as:  Cara Done Your last dose was: Your next dose is:    
   
   
 Apply twice daily to affected area till resolved. finasteride 5 mg tablet Commonly known as:  PROSCAR Your last dose was: Your next dose is:    
   
   
 daily. FISH OIL 1,000 mg Cap Generic drug:  omega-3 fatty acids-vitamin e Your last dose was: Your next dose is: Take 1 Cap by mouth daily. 1 Cap  
    
   
   
   
  
 furosemide 20 mg tablet Commonly known as:  LASIX Your last dose was: Your next dose is: TAKE 1 TAB BY MOUTH DAILY. isosorbide mononitrate ER 30 mg tablet Commonly known as:  IMDUR Your last dose was: Your next dose is: TAKE 1 TABLET BY MOUTH EVERY DAY  
     
   
   
   
  
 levothyroxine 88 mcg tablet Commonly known as:  SYNTHROID Your last dose was: Your next dose is: Take 1 Tab by mouth Daily (before breakfast). 88 mcg  
    
   
   
   
  
 losartan-hydroCHLOROthiazide 100-12.5 mg per tablet Commonly known as:  HYZAAR Your last dose was: Your next dose is: TAKE 1 TABLET BY MOUTH EVERY DAY  
     
   
   
   
  
 metoprolol succinate 25 mg XL tablet Commonly known as:  TOPROL-XL Your last dose was: Your next dose is:  TAKE 1 TABLET EVERY DAY  
     
   
   
 nicotine 7 mg/24 hr  
Commonly known as:  Jayne Martins Your last dose was: Your next dose is:    
   
   
 1 Patch by TransDERmal route every twenty-four (24) hours for 30 days. 1 Patch  
    
   
   
   
  
 omeprazole 40 mg capsule Commonly known as:  PRILOSEC Your last dose was: Your next dose is: Take 1 Cap by mouth two (2) times a day. 40 mg  
    
   
   
   
  
 tamsulosin 0.4 mg capsule Commonly known as:  FLOMAX Your last dose was: Your next dose is: TAKE 1 CAPSULE EVERY DAY  
     
   
   
   
  
 traMADol 50 mg tablet Commonly known as:  ULTRAM  
   
Your last dose was: Your next dose is: Take 1 Tab by mouth every eight (8) hours as needed for Pain. Max Daily Amount: 150 mg. TAKE 1-2 TABLETS BY MOUTH EVERY 6 HOURS AS NEEDED FOR PAIN  
 50 mg  
    
   
   
   
  
 VITAMIN C 1,000 mg tablet Generic drug:  ascorbic acid (vitamin C) Your last dose was: Your next dose is: Take  by mouth. VITAMIN D3 PO Your last dose was: Your next dose is: Take  by mouth. Where to Get Your Medications Information on where to get these meds will be given to you by the nurse or doctor. ! Ask your nurse or doctor about these medications  
  nicotine 7 mg/24 hr  
 omeprazole 40 mg capsule Discharge Instructions HOSPITALIST DISCHARGE INSTRUCTIONS 
NAME: Celi Paz :  1935 MRN:  [id number] Date/Time:  2017 8:59 AM 
 
ADMIT DATE: 2017 DISCHARGE DATE: 2017 DIAGNOSIS:  GI Bleeding, CAD with recent MARY ANN, HTN, AAA, Hypothyroidism, Atelectasis, Smoker MEDICATIONS: 
  
 
         As per medication reconciliation · It is important that you take the medication exactly as they are prescribed. · Keep your medication in the bottles provided by the pharmacist and keep a list of the medication names, dosages, and times to be taken in your wallet. · Do not take other medications without consulting your doctor. Pain Management: per above medications What to do at [facility] Recommended diet:  Cardiac Diet Recommended activity: Activity as tolerated If you experience any of the following symptoms then please call your primary care physician or return to the emergency room if you cannot get hold of your doctor: 
Fever, chills, nausea, vomiting, diarrhea, change in mentation, falling, bleeding, shortness of breath. Follow Up: 
 PCP you are to call and set up an appointment to see them in 2 week. F/U GI 
F/U Cardiology Information obtained by : 
I understand that if any problems occur once I am at home I am to contact my physician. I understand and acknowledge receipt of the instructions indicated above. Physician's or R.N.'s Signature                                                                  Date/Time Patient or Representative Signature                                                          Date/Time [employer] Announcement We are excited to announce that we are making your provider's discharge notes available to you in [employer]. You will see these notes when they are completed and signed by the physician that discharged you from your recent hospital stay. If you have any questions or concerns about any information you see in [employer], please call the Health Information Department where you were seen or reach out to your Primary Care Provider for more information about your plan of care. Introducing [facility]! Royer Giles introduces [employer] patient portal. Now you can access parts of your medical record, email your doctor's office, and request medication refills online. 1. In your internet browser, go to [email or web address] 2. Click on the First Time User? Click Here link in the Sign In box. You will see the New Member Sign Up page. 3. Enter your [employer] Access Code exactly as it appears below. You will not need to use this code after youve completed the sign-up process. If you do not sign up before the expiration date, you must request a new code. · [employer] Access Code: [email or web address] Expires: 2/4/2018  6:17 AM 
 
4. Enter the last four digits of your Social Security Number (xxxx) and Date of Birth (mm/dd/yyyy) as indicated and click Submit. You will be taken to the next sign-up page. 5. Create a [employer] ID. This will be your [employer] login ID and cannot be changed, so think of one that is secure and easy to remember. 6. Create a [employer] password. You can change your password at any time. 7. Enter your Password Reset Question and Answer. This can be used at a later time if you forget your password. 8. Enter your e-mail address. You will receive e-mail notification when new information is available in [address] 9. Click Sign Up. You can now view and download portions of your medical record. 10. Click the Download Summary menu link to download a portable copy of your medical information. If you have questions, please visit the Frequently Asked Questions section of the [employer] website. Remember, [employer] is NOT to be used for urgent needs. For medical emergencies, dial 911. Now available from your iPhone and Android! Unresulted Labs-Please follow up with your PCP about these lab tests Order Current Status H PYLORI ABS, IGA AND IGG In process Providers Seen During Your Hospitalization Provider Specialty Primary office phone Larry Nolasco DO Emergency Medicine [phone] Germaine Mclain MD Hospitalist [phone] Your Primary Care Physician (PCP) Primary Care Physician Office Phone Office Fax Radha Gandhi [phone] You are allergic to the following Allergen Reactions Norco (Hydrocodone-Acetaminophen) Other (comments) Too sedated and felt like he was in a daze Pcn (Penicillins) Rash Percocet (Oxycodone-Acetaminophen) Nausea and Vomiting Recent Documentation Height Weight BMI Smoking Status 1.6 m 81.9 kg 31.98 kg/m2 Former Smoker Emergency Contacts Name Discharge Info Relation Home Work Mobile Teddy Jasso  Other Relative [6] [phone] Abhilash Castro DISCHARGE CAREGIVER [3] Child [2] [phone] Nolan Santos [5] [phone] Patient Belongings The following personal items are in your possession at time of discharge: 
  Dental Appliances: None  Visual Aid: None      Home Medications: None      Clothing: At bedside, Footwear, Pants, Shirt, Socks, Undergarments, Jacket/Coat       Personal Items Sent to Safe: none Please provide this summary of care documentation to your next provider. Signatures-by signing, you are acknowledging that this After Visit Summary has been reviewed with you and you have received a copy. Patient Signature:  ____________________________________________________________ Date:  ____________________________________________________________  
  
Carlos Muro Provider Signature:  ____________________________________________________________ Date:  ____________________________________________________________

## 2017-11-21 NOTE — ED PROVIDER NOTES
[facility]  EMERGENCY DEPARTMENT HISTORY AND PHYSICAL EXAM       Date of Service: 11/21/2017   Patient Name: Shaka Dueñas   YOB: 1935  Medical Record Number: [id number]    History of Presenting Illness     Chief Complaint   Patient presents with    Rectal Bleeding     Ambulatory with steady gait to triage. Reporting has had rectal bleeding for the past 2 weeks. Was at the drug store today, and reported a lot just \"fell onto the floor. \" Denies any dizziness or lightheadedness. Has had some fatigue      History Provided By:  patient    Additional History:   Shaka Dueñas is a 81 y.o. male with PMhx significant for HTN, AAA, CAD, MI, and  who presents ambulatory to the ED with cc of rectal bleeding x three weeks. Pt informs that the rectal bleeding has been progressively worsening over the course of the last two weeks. Pt states the bleeding initially presented with stool but has progressively worsened to present without. Pt reports he was at the drug store earlier today when \"blood and clots just fell onto the floor\". Pt reports abdominal distension and fullness as well. Pt states in addition to the aforementioned, he has been increasingly tired and fatigued throughout. Pt denies any modifying factors. Pt informs of no abnormal colonoscopies in the past. Of note, pt informs he is on ASA and Plavix. Pt specifically denies any nausea, vomiting, fevers, chills, abdominal pain, dizziness, headache, chest pain, or SOB. Social Hx: Former Tobacco, - EtOH, - Illicit Drugs    There are no other complaints, changes or physical findings at this time.     Primary Care Provider: Bowen Husain MD     Past History     Past Medical History:   Past Medical History:   Diagnosis Date    AAA [facility]abdominal aortic aneurysm) ([facility]     Acute MI 12/2010    dr Laila Dobbs    Acute prostatitis     again 9/12/16 [facility]'d [facility]    Advance directive discussed with patient 04/20/2016    Aneurysm ([address] 6/2011    AAA    Borderline glaucoma (glaucoma suspect) 02/19/2015    notes from [address] [place] [address] rec'd    BPH (benign prostatic hyperplasia)     Bright red blood per rectum 02/04/2016    [facility] note Quin Sanders- w/u in progress   8/22/17 [facility] f[facility] note    CAD (coronary artery disease)     Calculus of kidney     Cerebral embolism with cerebral infarction ([address]     Chronic pain     back    Dizzy spells 6/2012    DJD (degenerative joint disease) of lumbar spine     ED (erectile dysfunction)     8/30/17 f[facility]u note [facility]    Elevated PSA 03/20/2014    [facility] rec[facility]     8/24/16 f/u note    Encephalocele ([address]     Hypercholesterolemia     Hypertension     Pneumonia 02/05/2017    Prostate cancer ([address] 05/12/2014    crystal henry/ Dr Zurdo Clayton 8/22/17 f/u note    PVD (peripheral vascular disease) with claudication ([address]     S/P radiation therapy 15 months out    probable cause of the rectal bleeding    Stroke ([address] 6/2011    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    neuro assoc notes rec'd    Thyroid disease     Vitamin D deficiency 11/2013    again 5/2015 again 1/2016      Past Surgical History:   Past Surgical History:   Procedure Laterality Date    CARDIAC SURG PROCEDURE UNLIST  12/2010    dr You Gould stents past MI    COLONOSCOPY N/A 8/31/2016    COLONOSCOPY performed by Judge Go MD at \[facility]\"" ENDOSCOPY    ENDOSCOPY, COLON, DIAGNOSTIC      HX CAROTID ENDARTERECTOMY Left     HX HEENT  10/2012    repair right heidy dawkins    HX ORTHOPAEDIC Bilateral     BUNIONECTOMY    NE LEFT HEART CATH,PERCUTANEOUS  10/16/2010         PTCA EACH ADDL VESSEL  10/16/2010         PTCA W/ STENT, INITIAL  10/16/2010         VASCULAR SURGERY PROCEDURE UNLIST      left leg varicose vein stripping dr Adan Ryder      Family History:   Family History   Problem Relation Age of Onset    Diabetes Mother     Diabetes Father     Cancer Sister      LUNG    Cancer Brother COLON    Cancer Sister      LUNG    Anesth Problems Neg Hx       Social History:   Social History   Substance Use Topics    Smoking status: Former Smoker     Packs/day: 0.25     Quit date: 9/10/2014    Smokeless tobacco: Never Used      Comment: has not had cigarette in over a month    Alcohol use No      Allergies: Allergies   Allergen Reactions    Norco [Hydrocodone-Acetaminophen] Other (comments)     Too sedated and felt like he was in a daze    Pcn [Penicillins] Rash    Percocet [Oxycodone-Acetaminophen] Nausea and Vomiting       Review of Systems   Review of Systems   Constitutional: Positive for fatigue. Negative for chills and fever. HENT: Negative for congestion and rhinorrhea. Eyes: Negative for visual disturbance. Respiratory: Negative for cough, shortness of breath and wheezing. Cardiovascular: Negative for chest pain and palpitations. Gastrointestinal: Positive for abdominal distention. Negative for abdominal pain, constipation, diarrhea, nausea and vomiting.        + rectal bleeding   Endocrine: Negative. Genitourinary: Negative for difficulty urinating, dysuria, frequency, hematuria and urgency. Musculoskeletal: Negative. Skin: Negative for rash. Neurological: Negative for dizziness, weakness, light-headedness and headaches. Psychiatric/Behavioral: Negative for suicidal ideas. Physical Exam  Physical Exam   Constitutional: He is oriented to person, place, and time. He appears well-developed and well-nourished. No distress. HENT:   Head: Normocephalic and atraumatic. Mouth/Throat: Oropharynx is clear and moist.   Eyes: Conjunctivae and EOM are normal.   Neck: Neck supple. No JVD present. No tracheal deviation present. Cardiovascular: Normal rate, regular rhythm and intact distal pulses. Exam reveals no gallop and no friction rub. No murmur heard. Pulmonary/Chest: Effort normal and breath sounds normal. No stridor. No respiratory distress.  He has no wheezes. Abdominal: Soft. Bowel sounds are normal. He exhibits no distension and no mass. There is tenderness (generalized, worse LUQ). There is no rebound and no guarding. Musculoskeletal: Normal range of motion. He exhibits no edema or tenderness. No deformity   Neurological: He is alert and oriented to person, place, and time. He has normal strength. No focal deficits   Skin: Skin is warm, dry and intact. No rash noted. Psychiatric: He has a normal mood and affect. His behavior is normal. Judgment and thought content normal.   Nursing note and vitals reviewed. Medical Decision Making   I am the first provider for this patient. I reviewed the vital signs, available nursing notes, past medical history, past surgical history, family history and social history. Old Medical Records: Old medical records. Provider Notes:   Pt presenting with 3 weeks of rectal bleeding. Pt is hemodynamically stable at this time. Will check labs, coags, type and screen, rectal exam. DDx includes diverticulosis, anemia, gastritis, PUD, malignancy, diverticulitis. ED Course:  4:34 PM   Initial assessment performed. The patients presenting problems have been discussed, and they are in agreement with the care plan formulated and outlined with them. I have encouraged them to ask questions as they arise throughout their visit. Progress Notes:     Progress Note:   5:38 PM  Per chart review, pt recently had a cardiac catheterization completed on 11/04/2017. Written by Rebecca Smith ED Scribe, as dictated by Michael Faith DO.     CONSULT NOTE:   5:41 PM  Michael Faith DO spoke with Dr. Jerome Silver,  Specialty: GI  Discussed pt's hx, disposition, and available diagnostic and imaging results. Reviewed care plans. Consultant agrees with plans as outlined. Advises to get the pt admitted to the hospitalist for GI evaluation in the morning.   Written by Rebecca Smith ED Scribe, as dictated by Michael Faith .    Progress Note:   6:03 PM  Updated pt on the further progression of care plan alongside returned results. Written by Cande Dinero, ED Scribe, as dictated by Waldemar Lugo DO.     CONSULT NOTE:   7:40 PM  Waldemar Lugo DO spoke with Dr. Lionel Guadarrama,  Specialty: Hospitalist   Discussed pt's hx, disposition, and available diagnostic and imaging results. Reviewed care plans. Consultant agrees with plans as outlined. Admission accepted. Written by Cande Dinero, ED Scribe, as dictated by Waldemar Lugo DO. Procedures:     Procedure Note - Rectal Exam:   5:24 PM  Performed by: .Terrence Fabry  Chaperoned by: Tamara Barbosa (F)  Rectal exam performed. Brown stool was collected. Stool was collected and sent to the lab for Hemoccult testing. The procedure took 1-15 minutes, and pt tolerated well. Diagnostic Study Results   Labs -  Recent Results (from the past 12 hour(s))   CBC WITH AUTOMATED DIFF    Collection Time: 11/21/17  4:39 PM   Result Value Ref Range    WBC 4.7 4.1 - 11.1 K/uL    RBC 4.16 4.10 - 5.70 M/uL    HGB 11.8 (L) 12.1 - 17.0 g/dL    HCT 35.2 (L) 36.6 - 50.3 %    MCV 84.6 80.0 - 99.0 FL    MCH 28.4 26.0 - 34.0 PG    MCHC 33.5 30.0 - 36.5 g/dL    RDW 16.6 (H) 11.5 - 14.5 %    PLATELET 603 065 - 059 K/uL    NEUTROPHILS 68 32 - 75 %    LYMPHOCYTES 17 12 - 49 %    MONOCYTES 9 5 - 13 %    EOSINOPHILS 5 0 - 7 %    BASOPHILS 1 0 - 1 %    ABS. NEUTROPHILS 3.3 1.8 - 8.0 K/UL    ABS. LYMPHOCYTES 0.8 0.8 - 3.5 K/UL    ABS. MONOCYTES 0.4 0.0 - 1.0 K/UL    ABS. EOSINOPHILS 0.2 0.0 - 0.4 K/UL    ABS.  BASOPHILS 0.0 0.0 - 0.1 K/UL    DF SMEAR SCANNED      RBC COMMENTS ANISOCYTOSIS  1+       METABOLIC PANEL, COMPREHENSIVE    Collection Time: 11/21/17  4:39 PM   Result Value Ref Range    Sodium 144 136 - 145 mmol/L    Potassium 4.3 3.5 - 5.1 mmol/L    Chloride 109 (H) 97 - 108 mmol/L    CO2 27 21 - 32 mmol/L    Anion gap 8 5 - 15 mmol/L    Glucose 98 65 - 100 mg/dL    BUN 13 6 - 20 MG/DL    Creatinine 1.40 (H) 0.70 - 1.30 MG/DL    BUN/Creatinine ratio 9 (L) 12 - 20      GFR est AA 59 (L) >60 ml/min/1.73m2    GFR est non-AA 49 (L) >60 ml/min/1.73m2    Calcium 9.2 8.5 - 10.1 MG/DL    Bilirubin, total 0.5 0.2 - 1.0 MG/DL    ALT (SGPT) 38 12 - 78 U/L    AST (SGOT) 54 (H) 15 - 37 U/L    Alk. phosphatase 126 (H) 45 - 117 U/L    Protein, total 7.8 6.4 - 8.2 g/dL    Albumin 3.3 (L) 3.5 - 5.0 g/dL    Globulin 4.5 (H) 2.0 - 4.0 g/dL    A-G Ratio 0.7 (L) 1.1 - 2.2     OCCULT BLOOD, STOOL    Collection Time: 11/21/17  4:39 PM   Result Value Ref Range    Occult blood, stool POSITIVE (A) NEG     PTT    Collection Time: 11/21/17  4:39 PM   Result Value Ref Range    aPTT 28.2 22.1 - 32.5 sec    aPTT, therapeutic range     58.0 - 77.0 SECS   PROTHROMBIN TIME + INR    Collection Time: 11/21/17  4:39 PM   Result Value Ref Range    INR 1.0 0.9 - 1.1      Prothrombin time 10.4 9.0 - 11.1 sec     Radiologic Studies -  The following have been ordered and reviewed:  CT ABD PELV W CONT   Final Result        CT Results  (Last 48 hours)               11/21/17 1912  CT ABD PELV W CONT Final result    Impression:  IMPRESSION:   Diffuse colonic diverticulosis without definite evidence for diverticulitis. Infrarenal abdominal aortic aneurysm with aortoiliac stenting appearing   unchanged. Patchy consolidation versus atelectasis in left lower lobe. Narrative:  EXAM:  CT ABD PELV W CONT       INDICATION: LLQ pain, melena       COMPARISON: CT to 517       CONTRAST:  100 mL of Isovue-370. TECHNIQUE:    Following the uneventful intravenous administration of contrast, thin axial   images were obtained through the abdomen and pelvis. Coronal and sagittal   reconstructions were generated. Oral contrast was not, per order of the ordering   physician, administered. CT dose reduction was achieved through use of a   standardized protocol tailored for this examination and automatic exposure   control for dose modulation.        The left of oral contrast material decreases the capacity of CT to evaluate   abnormalities of bowel. FINDINGS:    LUNG BASES: Patchy consolidation versus atelectasis in the visualized left lower   lobe. INCIDENTALLY IMAGED HEART AND MEDIASTINUM: Normal size. Coronary artery   calcifications again shown, especially of right coronary artery   LIVER: Diffuse hepatic steatosis. Two less than 1 cm sized cysts versus   hemangiomas in the inferior right hepatic lobe. GALLBLADDER: Unremarkable. SPLEEN: No mass. PANCREAS: No mass or ductal dilatation. ADRENALS: Normal.   KIDNEYS: Mid pole left renal cyst again shown measuring 3.1 cm in size. STOMACH: Unremarkable. SMALL BOWEL: No dilatation or wall thickening. COLON: Diffuse colonic diverticulosis. APPENDIX: Unremarkable. PERITONEUM: No ascites or pneumoperitoneum. RETROPERITONEUM: Infrarenal abdominal aortic aneurysm with aortoiliac stenting   again shown, appearing unchanged. No evidence for endoleak or increased size of   aneurysm. No retroperitoneal mass or adenopathy   URINARY BLADDER: No mass or calculus. BONES: No destructive bone lesion. ADDITIONAL COMMENTS: Sclerotic L4 vertebral body with moderate central loss of   vertebral body height again shown. Severe left hip osteoarthrosis again   demonstrated. Vital Signs-Reviewed the patient's vital signs.    Patient Vitals for the past 12 hrs:   Temp Pulse Resp BP SpO2   11/21/17 1930 - 73 25 140/86 97 %   11/21/17 1923 - 74 - 139/80 97 %   11/21/17 1830 - 76 (!) 7 124/75 95 %   11/21/17 1815 - 79 26 128/79 95 %   11/21/17 1800 - 77 25 133/84 96 %   11/21/17 1745 - 79 29 143/81 97 %   11/21/17 1731 - 91 25 116/54 93 %   11/21/17 1715 - 86 23 116/78 96 %   11/21/17 1700 - 90 26 120/67 94 %   11/21/17 1655 - 100 (!) 35 120/81 92 %   11/21/17 1653 - 95 23 110/74 94 %   11/21/17 1648 - 91 20 106/62 96 %   11/21/17 1645 - 84 - 130/69 91 %   11/21/17 1630 - 85 - 120/78 95 %   11/21/17 1600 98.1 °F (36.7 °C) 92 18 (!) 146/96 99 %     Medications Given in the ED:  Medications   0.9% sodium chloride infusion (0 mL/hr IntraVENous IV Completed 11/21/17 1933)   iopamidol (ISOVUE-370) 76 % injection 100 mL (100 mL IntraVENous Given 11/21/17 1911)   sodium chloride (NS) flush 10 mL (10 mL IntraVENous Given 11/21/17 1911)     Pulse Oximetry Analysis - Normal 99% on RA     Cardiac Monitor:   Rate: 92  Rhythm: Normal Sinus Rhythm     Diagnosis   Clinical Impression:   1. Gastrointestinal hemorrhage with melena       Plan:  1: Admit to Hospitalist, Dr. Joni Alvarez    Disposition Note:  ADMIT NOTE:    7:42 PM  Patient is being admitted to the hospital by Dr. Joni Alvarez. The results of their tests and reasons for their admission have been discussed with the patient and/or available family. They convey agreement and understanding for the need to be admitted and for the admission diagnosis. _______________________________   Attestations: This note is prepared by Giles Bronson, acting as Scribe for Marian Tam DO. Marian Tam, DO: The scribe's documentation has been prepared under my direction and personally reviewed by me in its entirety.  I confirm that the note above accurately reflects all work, treatment, procedures, and medical decision making performed by me.  __________________________

## 2017-11-22 ENCOUNTER — ANESTHESIA (OUTPATIENT)
Dept: ENDOSCOPY | Age: 82
DRG: 378 | End: 2017-11-22
Payer: MEDICARE

## 2017-11-22 ENCOUNTER — ANESTHESIA EVENT (OUTPATIENT)
Dept: ENDOSCOPY | Age: 82
DRG: 378 | End: 2017-11-22
Payer: MEDICARE

## 2017-11-22 ENCOUNTER — PATIENT OUTREACH (OUTPATIENT)
Dept: FAMILY MEDICINE CLINIC | Age: 82
End: 2017-11-22

## 2017-11-22 LAB
ANION GAP SERPL CALC-SCNC: 7 MMOL/L (ref 5–15)
BUN SERPL-MCNC: 12 MG/DL (ref 6–20)
BUN/CREAT SERPL: 11 (ref 12–20)
CALCIUM SERPL-MCNC: 8.8 MG/DL (ref 8.5–10.1)
CHLORIDE SERPL-SCNC: 109 MMOL/L (ref 97–108)
CO2 SERPL-SCNC: 26 MMOL/L (ref 21–32)
CREAT SERPL-MCNC: 1.11 MG/DL (ref 0.7–1.3)
GLUCOSE SERPL-MCNC: 103 MG/DL (ref 65–100)
HCT VFR BLD AUTO: 34.3 % (ref 36.6–50.3)
HGB BLD-MCNC: 11.2 G/DL (ref 12.1–17)
MAGNESIUM SERPL-MCNC: 2 MG/DL (ref 1.6–2.4)
POTASSIUM SERPL-SCNC: 3.5 MMOL/L (ref 3.5–5.1)
SODIUM SERPL-SCNC: 142 MMOL/L (ref 136–145)

## 2017-11-22 PROCEDURE — 85018 HEMOGLOBIN: CPT | Performed by: INTERNAL MEDICINE

## 2017-11-22 PROCEDURE — 83735 ASSAY OF MAGNESIUM: CPT | Performed by: INTERNAL MEDICINE

## 2017-11-22 PROCEDURE — 36415 COLL VENOUS BLD VENIPUNCTURE: CPT | Performed by: INTERNAL MEDICINE

## 2017-11-22 PROCEDURE — 74011000250 HC RX REV CODE- 250

## 2017-11-22 PROCEDURE — 0DJ08ZZ INSPECTION OF UPPER INTESTINAL TRACT, VIA NATURAL OR ARTIFICIAL OPENING ENDOSCOPIC: ICD-10-PCS | Performed by: SPECIALIST

## 2017-11-22 PROCEDURE — 65270000029 HC RM PRIVATE

## 2017-11-22 PROCEDURE — 74011250636 HC RX REV CODE- 250/636: Performed by: INTERNAL MEDICINE

## 2017-11-22 PROCEDURE — 74011250637 HC RX REV CODE- 250/637: Performed by: INTERNAL MEDICINE

## 2017-11-22 PROCEDURE — 80048 BASIC METABOLIC PNL TOTAL CA: CPT | Performed by: INTERNAL MEDICINE

## 2017-11-22 PROCEDURE — 74011250636 HC RX REV CODE- 250/636

## 2017-11-22 PROCEDURE — 76040000019: Performed by: SPECIALIST

## 2017-11-22 PROCEDURE — 74011250636 HC RX REV CODE- 250/636: Performed by: SPECIALIST

## 2017-11-22 PROCEDURE — 76060000031 HC ANESTHESIA FIRST 0.5 HR: Performed by: SPECIALIST

## 2017-11-22 RX ORDER — CLOPIDOGREL BISULFATE 75 MG/1
75 TABLET ORAL DAILY
Status: DISCONTINUED | OUTPATIENT
Start: 2017-11-22 | End: 2017-11-23 | Stop reason: HOSPADM

## 2017-11-22 RX ORDER — DEXTROMETHORPHAN/PSEUDOEPHED 2.5-7.5/.8
1.2 DROPS ORAL
Status: DISCONTINUED | OUTPATIENT
Start: 2017-11-22 | End: 2017-11-22 | Stop reason: HOSPADM

## 2017-11-22 RX ORDER — LEVOTHYROXINE SODIUM 88 UG/1
88 TABLET ORAL
Status: DISCONTINUED | OUTPATIENT
Start: 2017-11-22 | End: 2017-11-23 | Stop reason: HOSPADM

## 2017-11-22 RX ORDER — SODIUM CHLORIDE 0.9 % (FLUSH) 0.9 %
5-10 SYRINGE (ML) INJECTION AS NEEDED
Status: ACTIVE | OUTPATIENT
Start: 2017-11-22 | End: 2017-11-22

## 2017-11-22 RX ORDER — PROPOFOL 10 MG/ML
INJECTION, EMULSION INTRAVENOUS AS NEEDED
Status: DISCONTINUED | OUTPATIENT
Start: 2017-11-22 | End: 2017-11-22 | Stop reason: HOSPADM

## 2017-11-22 RX ORDER — MIDAZOLAM HYDROCHLORIDE 1 MG/ML
.25-5 INJECTION, SOLUTION INTRAMUSCULAR; INTRAVENOUS
Status: DISCONTINUED | OUTPATIENT
Start: 2017-11-22 | End: 2017-11-22 | Stop reason: HOSPADM

## 2017-11-22 RX ORDER — ASPIRIN 81 MG/1
81 TABLET ORAL DAILY
Status: DISCONTINUED | OUTPATIENT
Start: 2017-11-22 | End: 2017-11-23 | Stop reason: HOSPADM

## 2017-11-22 RX ORDER — NALOXONE HYDROCHLORIDE 0.4 MG/ML
0.4 INJECTION, SOLUTION INTRAMUSCULAR; INTRAVENOUS; SUBCUTANEOUS
Status: DISCONTINUED | OUTPATIENT
Start: 2017-11-22 | End: 2017-11-22 | Stop reason: HOSPADM

## 2017-11-22 RX ORDER — MIDAZOLAM HYDROCHLORIDE 1 MG/ML
1-3 INJECTION, SOLUTION INTRAMUSCULAR; INTRAVENOUS
Status: DISCONTINUED | OUTPATIENT
Start: 2017-11-22 | End: 2017-11-22 | Stop reason: HOSPADM

## 2017-11-22 RX ORDER — METOPROLOL SUCCINATE 25 MG/1
25 TABLET, EXTENDED RELEASE ORAL DAILY
Status: DISCONTINUED | OUTPATIENT
Start: 2017-11-22 | End: 2017-11-23 | Stop reason: HOSPADM

## 2017-11-22 RX ORDER — SODIUM CHLORIDE 9 MG/ML
125 INJECTION, SOLUTION INTRAVENOUS CONTINUOUS
Status: DISPENSED | OUTPATIENT
Start: 2017-11-22 | End: 2017-11-22

## 2017-11-22 RX ORDER — FINASTERIDE 5 MG/1
5 TABLET, FILM COATED ORAL DAILY
Status: DISCONTINUED | OUTPATIENT
Start: 2017-11-22 | End: 2017-11-23 | Stop reason: HOSPADM

## 2017-11-22 RX ORDER — SODIUM CHLORIDE 0.9 % (FLUSH) 0.9 %
5-10 SYRINGE (ML) INJECTION EVERY 8 HOURS
Status: ACTIVE | OUTPATIENT
Start: 2017-11-22 | End: 2017-11-22

## 2017-11-22 RX ORDER — ATORVASTATIN CALCIUM 40 MG/1
40 TABLET, FILM COATED ORAL DAILY
Status: DISCONTINUED | OUTPATIENT
Start: 2017-11-22 | End: 2017-11-23 | Stop reason: HOSPADM

## 2017-11-22 RX ORDER — ATROPINE SULFATE 0.1 MG/ML
0.5 INJECTION INTRAVENOUS
Status: DISCONTINUED | OUTPATIENT
Start: 2017-11-22 | End: 2017-11-22 | Stop reason: HOSPADM

## 2017-11-22 RX ORDER — AMLODIPINE BESYLATE 5 MG/1
5 TABLET ORAL DAILY
Status: DISCONTINUED | OUTPATIENT
Start: 2017-11-22 | End: 2017-11-23 | Stop reason: HOSPADM

## 2017-11-22 RX ORDER — EPINEPHRINE 0.1 MG/ML
1 INJECTION INTRACARDIAC; INTRAVENOUS
Status: DISCONTINUED | OUTPATIENT
Start: 2017-11-22 | End: 2017-11-22 | Stop reason: HOSPADM

## 2017-11-22 RX ORDER — LIDOCAINE HYDROCHLORIDE 20 MG/ML
INJECTION, SOLUTION EPIDURAL; INFILTRATION; INTRACAUDAL; PERINEURAL AS NEEDED
Status: DISCONTINUED | OUTPATIENT
Start: 2017-11-22 | End: 2017-11-22 | Stop reason: HOSPADM

## 2017-11-22 RX ORDER — IPRATROPIUM BROMIDE AND ALBUTEROL SULFATE 2.5; .5 MG/3ML; MG/3ML
3 SOLUTION RESPIRATORY (INHALATION)
Status: DISCONTINUED | OUTPATIENT
Start: 2017-11-22 | End: 2017-11-23 | Stop reason: HOSPADM

## 2017-11-22 RX ORDER — FLUMAZENIL 0.1 MG/ML
0.2 INJECTION INTRAVENOUS
Status: DISCONTINUED | OUTPATIENT
Start: 2017-11-22 | End: 2017-11-22 | Stop reason: HOSPADM

## 2017-11-22 RX ORDER — TAMSULOSIN HYDROCHLORIDE 0.4 MG/1
0.4 CAPSULE ORAL DAILY
Status: DISCONTINUED | OUTPATIENT
Start: 2017-11-22 | End: 2017-11-23 | Stop reason: HOSPADM

## 2017-11-22 RX ORDER — ISOSORBIDE MONONITRATE 30 MG/1
30 TABLET, EXTENDED RELEASE ORAL DAILY
Status: DISCONTINUED | OUTPATIENT
Start: 2017-11-22 | End: 2017-11-23 | Stop reason: HOSPADM

## 2017-11-22 RX ADMIN — SODIUM CHLORIDE 50 ML/HR: 900 INJECTION, SOLUTION INTRAVENOUS at 01:37

## 2017-11-22 RX ADMIN — METOPROLOL SUCCINATE 25 MG: 25 TABLET, EXTENDED RELEASE ORAL at 08:32

## 2017-11-22 RX ADMIN — AMLODIPINE BESYLATE 5 MG: 5 TABLET ORAL at 08:32

## 2017-11-22 RX ADMIN — LIDOCAINE HYDROCHLORIDE 100 MG: 20 INJECTION, SOLUTION EPIDURAL; INFILTRATION; INTRACAUDAL; PERINEURAL at 11:28

## 2017-11-22 RX ADMIN — FINASTERIDE 5 MG: 5 TABLET, FILM COATED ORAL at 08:32

## 2017-11-22 RX ADMIN — PROPOFOL 20 MG: 10 INJECTION, EMULSION INTRAVENOUS at 11:30

## 2017-11-22 RX ADMIN — PROPOFOL 20 MG: 10 INJECTION, EMULSION INTRAVENOUS at 11:29

## 2017-11-22 RX ADMIN — PROPOFOL 50 MG: 10 INJECTION, EMULSION INTRAVENOUS at 11:28

## 2017-11-22 RX ADMIN — TAMSULOSIN HYDROCHLORIDE 0.4 MG: 0.4 CAPSULE ORAL at 08:32

## 2017-11-22 RX ADMIN — SODIUM CHLORIDE 125 ML/HR: 900 INJECTION, SOLUTION INTRAVENOUS at 10:35

## 2017-11-22 RX ADMIN — ISOSORBIDE MONONITRATE 30 MG: 30 TABLET, EXTENDED RELEASE ORAL at 08:32

## 2017-11-22 RX ADMIN — ATORVASTATIN CALCIUM 40 MG: 40 TABLET, FILM COATED ORAL at 08:32

## 2017-11-22 RX ADMIN — LEVOTHYROXINE SODIUM 88 MCG: 88 TABLET ORAL at 08:32

## 2017-11-22 NOTE — PROGRESS NOTES
N Note    - Received notification via in-basket Hosp ADT of pt's ED to [facility] Admission on 11/21/17  - Admission Dx GI Bleeding  - Will monitor EMR for D/C plan/disposition for NN post hosp [facility] f/u

## 2017-11-22 NOTE — PROGRESS NOTES
See op note  Some findings, but not likely explanation for blood loss anemia    Clear liquid diet    Sebastian Murillo MD  11:35 AM  11/22/2017

## 2017-11-22 NOTE — PERIOP NOTES
Anesthesia reports 90mg Propofol, 100mg Lidocaine and 100mL NS given during procedure. Received report from anesthesia staff on vital signs and status of patient.

## 2017-11-22 NOTE — PROCEDURES
Esophagogastroduodenoscopy    Indications:  Acute blood loss anemia  hematochezia    Medications:  See anesthesia form    Post procedure diagnosis:  Hiatal Hernia, erosion in stomach, duodenitis, schitzski\"s ring,    Description of Procedure:    Prior to the procedure its objectives, risks, consequences and alternatives were discussed with the patient who then elected to proceed. The Olympus video endoscope was inserted under direct vision into the mouth and then into the esophagus. The esophagus looked normal.    The z-line was located at 40 cm. There was a non obstructing Schatzki ring at the zline. A 3 cm hiatal hernia was seen with the diaphragm pinch at 43 cm. There were no diagnostic abnormalities of the body, fundus, and cardia of the stomach. This included direct and retroflexion examination. In the antrum there were numerous patches of erythema. At first these looked like possible GAVE. Watermelon stomach, but further inspection showed punctate exudate in the center of some--likely erosions. The first portion of the duodenum showed erythema consistent with duodenitis. The second portion of the duodenum appeared normal.        Complications: There were no apparent complications and the patient tolerated the procedure well.         Estimated Blood Loss:  none  Specimens Removed:  None--(patient on plavix)    Impressions:  Duodenitis  Erosions antrum  Hiatal herinia  Schatzki ring (acquired)      Signed By: Nohemy Pereira MD                        November 22, 2017     11:37 AM

## 2017-11-22 NOTE — ROUTINE PROCESS
General Surgery End of Shift Nursing Note    Bedside shift change report given to [address] [place] [address] 107 (oncoming nurse) by William Renae RN (offgoing nurse). Report included the following information SBAR. Shift worked:   7a-7p   Summary of shift:    EGD completed today. Issues for physician to address:   none     Number times ambulated in hallway past shift: 2    Number of times OOB to chair past shift: 2    Pain Management:  Current medication:   Patient states pain is manageable on current pain medication:     GI:    Current diet:  DIET CLEAR LIQUID    Tolerating current diet: YES  Passing flatus: yes  Last Bowel Movement: today   Appearance:       Respiratory:    Incentive Spirometer at bedside: YES  Patient instructed on use: YES    Patient Safety:    Falls Score: 1  Bed Alarm On? No  Sitter?  No    Angel Luis Chappell RN

## 2017-11-22 NOTE — ED NOTES
TRANSFER - OUT REPORT:    Verbal report given to RN on [address]  [facility]       Report consisted of patients Situation, Background, Assessment and   Recommendations(SBAR). Information from the following report(s) SBAR, Kardex, ED Summary, Procedure Summary, MAR and Recent Results was reviewed with the receiving nurse. Lines:   Peripheral IV 11/21/17 Left; Outer Antecubital (Active)   Site Assessment Clean, dry, & intact 11/21/2017  4:34 PM   Phlebitis Assessment 0 11/21/2017  4:34 PM   Infiltration Assessment 0 11/21/2017  4:34 PM   Dressing Status Clean, dry, & intact 11/21/2017  4:34 PM   Dressing Type Tape;Transparent 11/21/2017  4:34 PM   Hub Color/Line Status Pink;Flushed;Positional 11/21/2017  4:34 PM        Opportunity for questions and clarification was provided.       Patient transported with:   O2 @ 2 liters  Tech

## 2017-11-22 NOTE — PROGRESS NOTES
Gi    Patient seen and examined  Full consult to follow    On plavix and needs to stay on it    Plan:  Diagnostic egd today. I discussed with him the objectives, risks, consequences and alternatives to the procedure.          If negative will consider colonoscopy    Severe bleeding would prompl cta or tagged rbc scan

## 2017-11-22 NOTE — ROUTINE PROCESS
Primary Nurse Audra Pedro, RN and Ameena Mock  RN performed a dual skin assessment on this patient No impairment noted  Cristian score is 21

## 2017-11-22 NOTE — H&P
Pre-endoscopy H and P    The patient was seen and examined in the endoscopy suite. The airway was assessed and docuemented. The problem list, past medical history, and medications were reviewed. Patient Active Problem List   Diagnosis Code    Hypothyroidism E03.9    Essential hypertension I10    CAD (Coronary Artery Disease)--s/p PCI--MARY ANN Murray stents x4 5/09;MARY ANN Taxus stents x3 8/09 I25.10    ED (erectile dysfunction) N52.9    Chronic back pain--DJD G89.29    BPH (benign prostatic hypertrophy) N40.0    Dyslipidemia E78.5    Successful  PTCA (percutaneous transluminal coronary angioplasty)--90-95% instent restenosis RCA 10/16/10 Z98.61    Successful PTCA with MARY ANN Xience stent Ramus intermedius 10/16/10 Z95.9    PVD (peripheral vascular disease) with claudication ([facility]) I73.9    Cerebral embolism with cerebral infarction ([address] I63.40    Carotid stenosis, left I65.22    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    Prediabetes R73.03    Tinea cruris B35.6    Nodule, subcutaneous R22.9    Vitamin D deficiency E55.9    Lipoma of back D17.1    Prostate cancer ([address] C61    Angina, class III ([address] I20.9    Alkaline phosphatase elevation R74.8    Myocardial infarction I21.9    Calculus of kidney N20.0    Osteoarthritis M19.90    SOBOE (shortness of breath on exertion) R06.02    S/P cardiac cath Z98.890    Rectal bleeding K62.5     Social History     Social History    Marital status: LEGALLY      Spouse name: N/A    Number of children: N/A    Years of education: N/A     Occupational History    Not on file.      Social History Main Topics    Smoking status: Former Smoker     Packs/day: 0.25     Quit date: 9/10/2014    Smokeless tobacco: Never Used      Comment: has not had cigarette in over a month    Alcohol use No    Drug use: No    Sexual activity: Not Currently     Other Topics Concern    Not on file     Social History Narrative     Past Medical History:   Diagnosis Date    AAA (abdominal aortic aneurysm) ([address]     Acute MI 12/2010    dr Aneta Comer    Acute prostatitis     again 9/12/16 [facility] rec'd [facility]    Advance directive discussed with patient 04/20/2016    Aneurysm ([address] 6/2011    AAA    Borderline glaucoma (glaucoma suspect) 02/19/2015    notes from [address] [place] [address] rec'd    BPH (benign prostatic hyperplasia)     Bright red blood per rectum 02/04/2016    [facility] note Shanon Grider- w/u in progress   8/22/17 [facility] f/u note    CAD (coronary artery disease)     Calculus of kidney     Cerebral embolism with cerebral infarction ([address]     Chronic pain     back    Dizzy spells 6/2012    DJD (degenerative joint disease) of lumbar spine     ED (erectile dysfunction)     8/30/17 [facility]u note [facility]    Elevated PSA 03/20/2014    [facility] rec[facility]     8/24/16 f/u note    Encephalocele ([address]     Hypercholesterolemia     Hypertension     Pneumonia 02/05/2017    Prostate cancer ([address] 05/12/2014    crystal henry/ Dr Danika Reilly 8/22/17 f/u note    PVD (peripheral vascular disease) with claudication ([address]     S/P radiation therapy 15 months out    probable cause of the rectal bleeding    Stroke ([address] 6/2011    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 Taking? CHOLECALCIFEROL, VITAMIN D3, (VITAMIN D3 PO)   Yes No   Sig: Take  by mouth. albuterol (PROVENTIL HFA, VENTOLIN HFA, PROAIR HFA) 90 mcg/actuation inhaler   No No   Sig: Take 2 Puffs by inhalation every four (4) hours as needed for Wheezing. albuterol-ipratropium (DUO-NEB) 2.5 mg-0.5 mg/3 ml nebu   No No   Sig: 3 mL by Nebulization route every six (6) hours as needed. amLODIPine (NORVASC) 5 mg tablet   No No   Sig: TAKE 1 TAB BY MOUTH DAILY.    ascorbic acid (VITAMIN C) 1,000 mg tablet   Yes No   Sig: Take  by mouth. aspirin delayed-release 81 mg tablet   No No   Sig: Take 1 Tab by mouth daily for 360 days. atorvastatin (LIPITOR) 40 mg tablet   No No   Sig: Take 1 Tab by mouth daily. YRN. Give patient BRAND LIPITOR. D/C Crestor   clopidogrel (PLAVIX) 75 mg tab   No No   Sig: Take 1 Tab by mouth daily. Indications: Thrombosis Prevention after PCI   clotrimazole-betamethasone (LOTRISONE) topical cream   No No   Sig: Apply twice daily to affected area till resolved. finasteride (PROSCAR) 5 mg tablet   Yes No   Sig: daily. furosemide (LASIX) 20 mg tablet   No No   Sig: TAKE 1 TAB BY MOUTH DAILY. isosorbide mononitrate ER (IMDUR) 30 mg tablet   No No   Sig: TAKE 1 TABLET BY MOUTH EVERY DAY   levothyroxine (SYNTHROID) 88 mcg tablet   No No   Sig: Take 1 Tab by mouth Daily (before breakfast). losartan-hydroCHLOROthiazide (HYZAAR) 100-12.5 mg per tablet   No No   Sig: TAKE 1 TABLET BY MOUTH EVERY DAY   metoprolol succinate (TOPROL-XL) 25 mg XL tablet   No No   Sig: TAKE 1 TABLET EVERY DAY   omega-3 fatty acids-vitamin e (FISH OIL) 1,000 mg cap   Yes No   Sig: Take 1 Cap by mouth daily. tamsulosin (FLOMAX) 0.4 mg capsule   No No   Sig: TAKE 1 CAPSULE EVERY DAY   traMADol (ULTRAM) 50 mg tablet   No No   Sig: Take 1 Tab by mouth every eight (8) hours as needed for Pain. Max Daily Amount: 150 mg. TAKE 1-2 TABLETS BY MOUTH EVERY 6 HOURS AS NEEDED FOR PAIN      Facility-Administered Medications: None           The review of systems is:  negative for shortness of breath or chest pain      The heart, lungs, and mental status were satisfactory for the administration of anesthesia sedation and for the procedure. I discussed with the patient the objectives, risks, consequences and alternatives to the procedure.       Gonzalo Fernandes MD  11/22/2017  11:24 AM

## 2017-11-22 NOTE — PROGRESS NOTES
Hospitalist Progress Note    NAME: Steva Landau   :  1935   MRN:  [id number]       Assessment / Plan:  GI bleeding: so far HH stable, no need for transfusion, EGD shows duodenitis and antral erosions, c/w PPI, GI help appreciated. CAD, s/p recent PCI / MARY ANN 17 c/w Plavix, unfortunately cardiac stent is so recent that would be very dangerous to stop, will monitor. HTN c/w Home regimen and monitor  S/P AAA stenting  Hypothyroidism continue synthroid  Atelectasis, LLL denies cough or SOB. Ordered incentive spirometry  Smoker patch  Surrogate Decision Maker:  Daughter is NONATALEE  Baseline: . Ambulates independently  Body mass index is 31.98 kg/(m^2). Code status: Full  Prophylaxis: SCD's  Recommended Disposition:  PT, OT, RN     If patient is stable may D/c tomorrow     Subjective:     Chief Complaint / Reason for Physician Visit  \"I feel better\". Discussed with RN events overnight. Review of Systems:  Symptom Y/N Comments  Symptom Y/N Comments   Fever/Chills    Chest Pain     Poor Appetite    Edema     Cough    Abdominal Pain     Sputum    Joint Pain     SOB/VERONICA    Pruritis/Rash     Nausea/vomit    Tolerating PT/OT     Diarrhea    Tolerating Diet y    Constipation    Other       Could NOT obtain due to:      Objective:     VITALS:   Last 24hrs VS reviewed since prior progress note.  Most recent are:  Patient Vitals for the past 24 hrs:   Temp Pulse Resp BP SpO2   17 1212 - 81 19 148/73 93 %   17 1211 - 78 20 128/75 92 %   17 1206 - 83 27 137/79 95 %   17 1201 - 83 15 111/68 93 %   17 1156 97.9 °F (36.6 °C) 84 22 129/68 93 %   17 1148 - 92 20 122/69 99 %   17 1144 - 81 20 101/81 93 %   17 1139 - (!) 103 25 91/61 95 %   17 1025 98.3 °F (36.8 °C) 80 17 135/81 94 %   17 0829 98 °F (36.7 °C) 71 18 (!) 174/101 95 %   17 0039 98.2 °F (36.8 °C) 74 18 147/89 100 %   17 0001 - (!) 53 15 114/63 92 %   17 3702 - (!) 54 17 - 94 %   11/21/17 2345 - 64 23 152/68 92 %   11/21/17 2330 - 69 22 138/84 94 %   11/21/17 2300 - 75 20 (!) 179/109 96 %   11/21/17 2245 - 68 16 (!) 146/92 96 %   11/21/17 2230 - 68 19 160/85 96 %   11/21/17 2215 - 69 21 158/80 96 %   11/21/17 2200 - 74 19 (!) 173/99 98 %   11/21/17 2145 - 72 19 159/89 97 %   11/21/17 2100 - 75 16 (!) 146/93 96 %   11/21/17 2045 - 68 21 159/86 97 %   11/21/17 2030 - 73 19 (!) 176/95 97 %   11/21/17 2015 - (!) 58 16 147/80 95 %   11/21/17 2000 - 67 19 145/78 95 %   11/21/17 1945 - 73 23 153/85 97 %   11/21/17 1930 - 73 25 140/86 97 %   11/21/17 1923 - 74 - 139/80 97 %   11/21/17 1830 - 76 (!) 7 124/75 95 %   11/21/17 1815 - 79 26 128/79 95 %   11/21/17 1800 - 77 25 133/84 96 %   11/21/17 1745 - 79 29 143/81 97 %   11/21/17 1731 - 91 25 116/54 93 %   11/21/17 1715 - 86 23 116/78 96 %   11/21/17 1700 - 90 26 120/67 94 %   11/21/17 1655 - 100 (!) 35 120/81 92 %   11/21/17 1653 - 95 23 110/74 94 %   11/21/17 1648 - 91 20 106/62 96 %   11/21/17 1645 - 84 - 130/69 91 %   11/21/17 1630 - 85 - 120/78 95 %   11/21/17 1600 98.1 °F (36.7 °C) 92 18 (!) 146/96 99 %       Intake/Output Summary (Last 24 hours) at 11/22/17 1440  Last data filed at 11/22/17 1304   Gross per 24 hour   Intake            767.5 ml   Output              250 ml   Net            517.5 ml        PHYSICAL EXAM:  General: WD, WN. Alert, cooperative, no acute distress    EENT:  EOMI. Anicteric sclerae. MMM  Resp:  Coarse BS  CV:  Regular  rhythm,  No edema  GI:  Soft, Non distended, Non tender.  +Bowel sounds  Neurologic:  Alert and oriented X 3, normal speech,   Psych:   Good insight. Not anxious nor agitated  Skin:  No rashes.   No jaundice    Reviewed most current lab test results and cultures  YES  Reviewed most current radiology test results   YES  Review and summation of old records today    NO  Reviewed patient's current orders and MAR    YES  PMH/SH reviewed - no change compared to H&P  ________________________________________________________________________  Care Plan discussed with:    Comments   Patient y    Family      RN y    Care Manager     Consultant                        Multidiciplinary team rounds were held today with , nursing, pharmacist and clinical coordinator. Patient's plan of care was discussed; medications were reviewed and discharge planning was addressed. ________________________________________________________________________  Total NON critical care TIME:  35   Minutes    Total CRITICAL CARE TIME Spent:   Minutes non procedure based      Comments   >50% of visit spent in counseling and coordination of care y    ________________________________________________________________________  Jermaine Albrecht MD     Procedures: see electronic medical records for all procedures/Xrays and details which were not copied into this note but were reviewed prior to creation of Plan. LABS:  I reviewed today's most current labs and imaging studies.   Pertinent labs include:  Recent Labs      11/22/17   0402  11/21/17   1639   WBC   --   4.7   HGB  11.2*  11.8*   HCT  34.3*  35.2*   PLT   --   242     Recent Labs      11/22/17   0402  11/21/17   1639   NA  142  144   K  3.5  4.3   CL  109*  109*   CO2  26  27   GLU  103*  98   BUN  12  13   CREA  1.11  1.40*   CA  8.8  9.2   MG  2.0   --    ALB   --   3.3*   TBILI   --   0.5   SGOT   --   54*   ALT   --   38   INR   --   1.0       Signed: Jermaine Albrecht MD

## 2017-11-22 NOTE — PROGRESS NOTES
.General Surgery End of Shift Nursing Note    Bedside shift change report given to [facility] RN (oncoming nurse) by Adrián Pagan (offgoing nurse). Report included the following information SBAR, Kardex, ED Summary, Intake/Output, Accordion, Recent Results and Cardiac Rhythm SA with PAC's. Shift worked:   7p-7a   Summary of shift:    None    Issues for physician to address:   none     Number times ambulated in hallway past shift: 0    Number of times OOB to chair past shift: 3    Pain Management:  Current medication: tylenol  Patient states pain is manageable on current pain medication: YES    GI:    Current diet:  DIET NPO    Tolerating current diet: YES  Passing flatus: YES  Last Bowel Movement: yesterday   Appearance: unknown    Respiratory:    Incentive Spirometer at bedside:   Patient instructed on use: YES    Patient Safety:    Falls Score: 1  Bed Alarm On? No  Sitter?  No    Susana Bowling RN

## 2017-11-22 NOTE — PERIOP NOTES
TRANSFER - OUT REPORT:    Verbal report given to [facility] RN(name) on [address] Nash Vanegas  being transferred to [place] [address](unit) for procedure       Report consisted of patients Situation, Background, Assessment and   Recommendations(SBAR). Information from the following report(s) SBAR was reviewed with the receiving nurse. Lines:   Peripheral IV 11/21/17 Left; Outer Antecubital (Active)   Site Assessment Clean, dry, & intact 11/22/2017 12:01 PM   Phlebitis Assessment 0 11/22/2017 12:01 PM   Infiltration Assessment 0 11/22/2017 12:01 PM   Dressing Status Clean, dry, & intact 11/22/2017 12:01 PM   Dressing Type Tape;Transparent 11/22/2017 12:01 PM   Hub Color/Line Status Pink 11/22/2017 12:01 PM        Opportunity for questions and clarification was provided.       Patient transported with:

## 2017-11-22 NOTE — CONSULTS
Gastrointeerology  Consult  PCP:  Travis Pereira MD  Gastroenterologist:  Marylee Lisle, MS    Subjective:     Patient is a 81 y.o.  male who is being seen with bloody stools. Onset of symptoms was abrupt with unchanged course since that time. Patient also notes no abdominal pain. He was at [facility] when blood started pouring out of rectum. Dark red with clots. No burning, indgiestion, history of pud. Symptoms improve with none. Symptoms worsen with none. There is history of plavix and asa. There is no history of colon polyps. Past history includes no gi studies. Previous studies include colonoscopy Dr Marylee Lisle:  Findings:      · Moderate rectal radiation telangieactasias are noted. Using a front firing APC at rectal setting the telangiectasia were ablated. · Fernandez colonic severe diverticulosis is noted  · Colonic prep is poor with poor views(stto/prep mix). · Large internal hemorrhoids noted. 7 mm ascending colon polyp removed with a cold snare. He did not have his hearing aids available and the interview was limited. Dr. Isiah Fay describes bleeding intermittently over several weeks. No ulcer no ulcer symptoms no recent prior colonosocpy --last one in [place]      There is a history of prostate cancer and radiation rx.     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    Cerebral embolism with cerebral infarction ([address] 06/13/2011    Carotid stenosis, left 06/13/2011    PVD (peripheral vascular disease) with claudication ([address] 05/02/2011    BPH (benign prostatic hypertrophy) 10/17/2010    Dyslipidemia 10/17/2010    Successful  PTCA (percutaneous transluminal coronary angioplasty)--90-95% instent restenosis RCA 10/16/10 10/17/2010    Successful PTCA with MARY ANN Xience stent Ramus intermedius 10/16/10 10/17/2010    Chronic back pain--DJD 06/29/2010    ED (erectile dysfunction) 06/21/2010    Essential hypertension 03/19/2010    CAD (Coronary Artery Disease)--s/p PCI--MARY ANN Ralston stents x4 5/09;MARY ANN Taxus stents x3 8/09 03/19/2010    Hypothyroidism 11/11/2009     Past Medical History:   Diagnosis Date    AAA (abdominal aortic aneurysm) ([address]     Acute MI 12/2010    dr Shruti King    Acute prostatitis     again 9/12/16 [facility] rec'd [facility]    Advance directive discussed with patient 04/20/2016    Aneurysm ([address] 6/2011    AAA    Borderline glaucoma (glaucoma suspect) 02/19/2015    notes from [address] [place] [address] rec'd    BPH (benign prostatic hyperplasia)     Bright red blood per rectum 02/04/2016    [facility] note Em Pearson- w/u in progress   8/22/17 [facility] f[facility] note    CAD (coronary artery disease)     Calculus of kidney     Cerebral embolism with cerebral infarction ([address]     Chronic pain     back    Dizzy spells 6/2012    DJD (degenerative joint disease) of lumbar spine     ED (erectile dysfunction)     8/30/17 [facility]u note [facility]    Elevated PSA 03/20/2014    [facility] rec[facility]     8/24/16 f/u note    Encephalocele ([address]     Hypercholesterolemia     Hypertension     Pneumonia 02/05/2017    Prostate cancer ([address] 05/12/2014    crystal henry/ Dr Carola Hodgkins 8/22/17 f/u note    PVD (peripheral vascular disease) with claudication ([address]     S/P radiation therapy 15 months out    probable cause of the rectal bleeding    Stroke [facility]) 6/2011    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    neuro assoc notes rec'd    Thyroid disease     Vitamin D deficiency 11/2013    again 5/2015 again 1/2016      Past Surgical History:   Procedure Laterality Date    CARDIAC SURG PROCEDURE UNLIST  12/2010    dr Tracy Dennis stents past MI    COLONOSCOPY N/A 8/31/2016    COLONOSCOPY performed by Kelli Zuniga MD at [facility] ENDOSCOPY    ENDOSCOPY, COLON, DIAGNOSTIC      HX CAROTID ENDARTERECTOMY Left     HX HEENT  10/2012    repair right heidy dawkins    HX ORTHOPAEDIC Bilateral     BUNIONECTOMY    UT LEFT HEART CATH,PERCUTANEOUS  10/16/2010         PTCA EACH ADDL VESSEL  10/16/2010         PTCA W/ STENT, INITIAL  10/16/2010         VASCULAR SURGERY PROCEDURE UNLIST      left leg varicose vein stripping dr Lisette Herrera     Family History   Problem Relation Age of Onset    Diabetes Mother     Diabetes Father     Cancer Sister      LUNG    Cancer Brother      COLON    Cancer Sister      LUNG    Anesth Problems Neg Hx       Social History     Social History    Marital status: LEGALLY      Spouse name: N/A    Number of children: N/A    Years of education: N/A     Social History Main Topics    Smoking status: Former Smoker     Packs/day: 0.25     Quit date: 9/10/2014    Smokeless tobacco: Never Used      Comment: has not had cigarette in over a month    Alcohol use No    Drug use: No    Sexual activity: Not Currently     Other Topics Concern    Not on file     Social History Narrative       Current Facility-Administered Medications   Medication Dose Route Frequency Provider Last Rate Last Dose    albuterol-ipratropium (DUO-NEB) 2.5 MG-0.5 MG/3 ML  3 mL Nebulization Q6H PRN Liv Serrano MD        amLODIPine (NORVASC) tablet 5 mg  5 mg Oral DAILY Liv Serrano MD        aspirin delayed-release tablet 81 mg  81 mg Oral DAILY Liv Serrano MD        atorvastatin (LIPITOR) tablet 40 mg  40 mg Oral DAILY Liv Serrano MD        clopidogrel (PLAVIX) tablet 75 mg  75 mg Oral DAILY Liv Serrano MD        finasteride (PROSCAR) tablet 5 mg  5 mg Oral DAILY Janeth Zhu Arianna Resendez MD        isosorbide mononitrate ER (IMDUR) tablet 30 mg  30 mg Oral DAILY Tara Bailey MD        levothyroxine (SYNTHROID) tablet 88 mcg  88 mcg Oral ACB Tara Bailey MD        metoprolol succinate (TOPROL-XL) XL tablet 25 mg  25 mg Oral DAILY Tara Bailey MD        tamsulosin [facility]) capsule 0.4 mg  0.4 mg Oral DAILY Tara Bailey MD        0.9% sodium chloride infusion  50 mL/hr IntraVENous CONTINUOUS Tara Bailey MD 50 mL/hr at 11/22/17 0137 50 mL/hr at 11/22/17 0137    acetaminophen (TYLENOL) tablet 650 mg  650 mg Oral Q4H PRN Tara Bailey MD        naloxone [facility]) injection 0.4 mg  0.4 mg IntraVENous PRN Tara Bailey MD        ondansetron [facility]) injection 4 mg  4 mg IntraVENous Q4H PRN Tara Bailey MD        nicotine (NICODERM CQ) 7 mg/24 hr patch 1 Patch  1 Patch TransDERmal Q24H Tara Bailey MD        pantoprazole (PROTONIX) 40 mg in sodium chloride 0.9 % 10 mL injection  40 mg IntraVENous DAILY Tara Bailey MD   40 mg at 11/21/17 2309         Allergies   Allergen Reactions    Norco [Hydrocodone-Acetaminophen] Other (comments)     Too sedated and felt like he was in a daze    Pcn [Penicillins] Rash    Percocet [Oxycodone-Acetaminophen] Nausea and Vomiting        Review of Systems:  I cannot get a complete ros today due to hearing. Objective:     Patient Vitals for the past 8 hrs:   BP Temp Pulse Resp SpO2   11/22/17 0039 147/89 98.2 °F (36.8 °C) 74 18 100 %   11/22/17 0001 114/63 - (!) 53 15 92 %   11/21/17 2352 - - (!) 54 17 94 %   11/21/17 2345 152/68 - 64 23 92 %   11/21/17 2330 138/84 - 69 22 94 %   11/21/17 2300 (!) 179/109 - 75 20 96 %   11/21/17 2245 (!) 146/92 - 68 16 96 %   11/21/17 2230 160/85 - 68 19 96 %   11/21/17 2215 158/80 - 69 21 96 %   11/21/17 2200 (!) 173/99 - 74 19 98 %       Physical Exam:   Vital Signs:  See above  General:   No distress  Eyes:  No icterus; extraocular movements intact  ENMT:  Lips, teeth, gums, oropharynx unremarkable.     Chest: Clear to auscultation; no use of accessory muscles  Heart:  Regular rate and rhythm. Normal S1 and S2. No abnormal sounds  Abdomen:  Non-tender; bowel sounds present. No hepatosplenomegaly  Lymphatic:  No anterior/ posterior cervical, supraclavicular or inquinal lymphadenopathy  Neurologic:  Alert and oriented  Psyc:  Affect is appropriate  Extremities:  No edema   Lab/Data Review:  Recent Results (from the past 24 hour(s))   CBC WITH AUTOMATED DIFF    Collection Time: 11/21/17  4:39 PM   Result Value Ref Range    WBC 4.7 4.1 - 11.1 K/uL    RBC 4.16 4.10 - 5.70 M/uL    HGB 11.8 (L) 12.1 - 17.0 g/dL    HCT 35.2 (L) 36.6 - 50.3 %    MCV 84.6 80.0 - 99.0 FL    MCH 28.4 26.0 - 34.0 PG    MCHC 33.5 30.0 - 36.5 g/dL    RDW 16.6 (H) 11.5 - 14.5 %    PLATELET 577 228 - 315 K/uL    NEUTROPHILS 68 32 - 75 %    LYMPHOCYTES 17 12 - 49 %    MONOCYTES 9 5 - 13 %    EOSINOPHILS 5 0 - 7 %    BASOPHILS 1 0 - 1 %    ABS. NEUTROPHILS 3.3 1.8 - 8.0 K/UL    ABS. LYMPHOCYTES 0.8 0.8 - 3.5 K/UL    ABS. MONOCYTES 0.4 0.0 - 1.0 K/UL    ABS. EOSINOPHILS 0.2 0.0 - 0.4 K/UL    ABS. BASOPHILS 0.0 0.0 - 0.1 K/UL    DF SMEAR SCANNED      RBC COMMENTS ANISOCYTOSIS  1+       METABOLIC PANEL, COMPREHENSIVE    Collection Time: 11/21/17  4:39 PM   Result Value Ref Range    Sodium 144 136 - 145 mmol/L    Potassium 4.3 3.5 - 5.1 mmol/L    Chloride 109 (H) 97 - 108 mmol/L    CO2 27 21 - 32 mmol/L    Anion gap 8 5 - 15 mmol/L    Glucose 98 65 - 100 mg/dL    BUN 13 6 - 20 MG/DL    Creatinine 1.40 (H) 0.70 - 1.30 MG/DL    BUN/Creatinine ratio 9 (L) 12 - 20      GFR est AA 59 (L) >60 ml/min/1.73m2    GFR est non-AA 49 (L) >60 ml/min/1.73m2    Calcium 9.2 8.5 - 10.1 MG/DL    Bilirubin, total 0.5 0.2 - 1.0 MG/DL    ALT (SGPT) 38 12 - 78 U/L    AST (SGOT) 54 (H) 15 - 37 U/L    Alk.  phosphatase 126 (H) 45 - 117 U/L    Protein, total 7.8 6.4 - 8.2 g/dL    Albumin 3.3 (L) 3.5 - 5.0 g/dL    Globulin 4.5 (H) 2.0 - 4.0 g/dL    A-G Ratio 0.7 (L) 1.1 - 2.2 OCCULT BLOOD, STOOL    Collection Time: 11/21/17  4:39 PM   Result Value Ref Range    Occult blood, stool POSITIVE (A) NEG     PTT    Collection Time: 11/21/17  4:39 PM   Result Value Ref Range    aPTT 28.2 22.1 - 32.5 sec    aPTT, therapeutic range     58.0 - 77.0 SECS   PROTHROMBIN TIME + INR    Collection Time: 11/21/17  4:39 PM   Result Value Ref Range    INR 1.0 0.9 - 1.1      Prothrombin time 10.4 9.0 - 11.1 sec   HGB & HCT    Collection Time: 11/22/17  4:02 AM   Result Value Ref Range    HGB 11.2 (L) 12.1 - 17.0 g/dL    HCT 34.3 (L) 36.6 - 40.1 %   METABOLIC PANEL, BASIC    Collection Time: 11/22/17  4:02 AM   Result Value Ref Range    Sodium 142 136 - 145 mmol/L    Potassium 3.5 3.5 - 5.1 mmol/L    Chloride 109 (H) 97 - 108 mmol/L    CO2 26 21 - 32 mmol/L    Anion gap 7 5 - 15 mmol/L    Glucose 103 (H) 65 - 100 mg/dL    BUN 12 6 - 20 MG/DL    Creatinine 1.11 0.70 - 1.30 MG/DL    BUN/Creatinine ratio 11 (L) 12 - 20      GFR est AA >60 >60 ml/min/1.73m2    GFR est non-AA >60 >60 ml/min/1.73m2    Calcium 8.8 8.5 - 10.1 MG/DL   MAGNESIUM    Collection Time: 11/22/17  4:02 AM   Result Value Ref Range    Magnesium 2.0 1.6 - 2.4 mg/dL         Assessment:     Active Problems:    Rectal bleeding (11/21/2017)    I suspect this is radiation change plus plavix. Diverticular hemorrhage ispossible  Rule out upper source. The proximal colon showed a poor prep at the last colonoscopoy    Plan:   He needs his plavix  Diagnostic egd today   I discussed with him the objectives, risks, consequences and alternatives to the procedure. Consider full colonosocpy with two days of clear liquids depending on ongoing bleeing  For massive hemorrhage he could get a cta or a tagged rbc scan  Call partners will see daily.       Signed By: Madie Santiago MD     November 22, 2017

## 2017-11-22 NOTE — PERIOP NOTES
Endoscope was pre-cleaned at the bedside immediately following procedure by [facility].

## 2017-11-22 NOTE — ANESTHESIA PREPROCEDURE EVALUATION
Anesthetic History   No history of anesthetic complications            Review of Systems / Medical History  Patient summary reviewed, nursing notes reviewed and pertinent labs reviewed    Pulmonary                   Neuro/Psych       CVA  TIA    Comments: Right Sinonasal Encephalocele Cardiovascular    Hypertension: poorly controlled          CAD, cardiac stents and hyperlipidemia    Exercise tolerance: <4 METS  Comments: TTE (11/2/17):   Trivial MR and AI, mild PI, moderate pulmonary HTN, EF=55%    Chronic anticoagulation    S/P Left CEA   GI/Hepatic/Renal         Renal disease: stones      Comments: GI Bleed on plavix Endo/Other      Hypothyroidism: well controlled  Obesity and arthritis    Comments: H/O Prostate Cancer Other Findings   Comments: BPH  Erectile Dysfunction  Vitamin D Deficiency         Physical Exam    Airway  Mallampati: III  TM Distance: > 6 cm  Neck ROM: normal range of motion   Mouth opening: Normal     Cardiovascular  Regular rate and rhythm,  S1 and S2 normal,  no murmur, click, rub, or gallop             Dental    Dentition: Full upper dentures and Lower partial plate     Pulmonary  Breath sounds clear to auscultation               Abdominal  GI exam deferred       Other Findings            Anesthetic Plan    ASA: 3  Anesthesia type: general and total IV anesthesia          Induction: Intravenous  Anesthetic plan and risks discussed with: Patient

## 2017-11-22 NOTE — H&P
Hospitalist Admission Note    NAME: Celi Paz   :  1935   MRN:  [id number]     Date/Time:  2017 8:56 PM    Patient PCP: Sarah Cristobal MD  ________________________________________________________________________    My assessment of this patient's clinical condition and my plan of care is as follows. Assessment / Plan:    GI bleeding  -CT abdomen Diffuse colonic diverticulosis without definite evidence for diverticulitis. -probably diverticular bleeding as he describes clots and BUN is normal.  UGIB from asa related ulcer is possible. -start IV protonix  -NPO. IVFs. -GI consulted for endoscopy. -cannot stop DAPT due to recent MARY ANN deployment    CAD, s/p recent PCI / MARY ANN 17  HTN  -continue DAPT  -continue BB, imdur and norvac.    -holding losartan HCT due to slight elevation in Cr and risk of low BP if he continues to bleed    S/P AAA stenting    Hypothyroidism  -continue synthroid    Atelectasis, LLL  -denies cough or SOB. Ordered incentive spirometry    Smoker  -patch      Code Status:   Full  Surrogate Decision Maker:  Daughter is NOK    DVT Prophylaxis:  SCD  GI Prophylaxis: not indicated    Baseline: . Ambulates independently          Subjective:   CHIEF COMPLAINT:  Rectal bleeding     HISTORY OF PRESENT ILLNESS:     Zuly Cotter is a 81 y.o.  male with a history of CAD, HTN, CVA and hypothyroidism who presents with rectal bleeding. Patient recently underwent Cath / PCI with deployment of MARY ANN on . For the last 2 weeks, he has had intermittent rectal bleeding. This has gotten more frequent and this last few days he has been passing more blood and clots and less of stool. He denies experiencing any CP, SOB, dizziness or abdominal pain. Today he was shopping at a [employer] when he passed clots which fell onto the floor. He was evaluated in the ER. CT as above and Hg 11.8, down from 12.3 couple of weeks ago.   GI was consulted and we were asked to admit for work up and evaluation of the above problems.      Past Medical History:   Diagnosis Date    AAA (abdominal aortic aneurysm) ([address]     Acute MI 12/2010    dr Arsalan Nguyen    Acute prostatitis     again 9/12/16 [facility] note rec'd [facility]    Advance directive discussed with patient 04/20/2016    Aneurysm ([address] 6/2011    AAA    Borderline glaucoma (glaucoma suspect) 02/19/2015    notes from [address] [place] [address] rec'd    BPH (benign prostatic hyperplasia)     Bright red blood per rectum 02/04/2016    [facility]- w/u in progress   8/22/17 [facility] f/u note    CAD (coronary artery disease)     Calculus of kidney     Cerebral embolism with cerebral infarction ([address]     Chronic pain     back    Dizzy spells 6/2012    DJD (degenerative joint disease) of lumbar spine     ED (erectile dysfunction)     8/30/17 [facility]u note [facility]    Elevated PSA 03/20/2014    [facility] rec[facility]     8/24/16 f/u note    Encephalocele ([address]     Hypercholesterolemia     Hypertension     Pneumonia 02/05/2017    Prostate cancer ([address] 05/12/2014    crystal henry/ Dr Kenneth Norris 8/22/17 f/u note    PVD (peripheral vascular disease) with claudication ([address]     S/P radiation therapy 15 months out    probable cause of the rectal bleeding    Stroke ([address] 6/2011    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    neuro assoc notes rec'd    Thyroid disease     Vitamin D deficiency 11/2013    again 5/2015 again 1/2016        Past Surgical History:   Procedure Laterality Date    CARDIAC SURG PROCEDURE UNLIST  12/2010    dr Arsalan Nguyen stents past MI    COLONOSCOPY N/A 8/31/2016    COLONOSCOPY performed by Chad Millard MD at [facility] ENDOSCOPY    ENDOSCOPY, COLON, DIAGNOSTIC      HX CAROTID ENDARTERECTOMY Left     HX HEENT  10/2012    repair right heidy dawkins    HX ORTHOPAEDIC Bilateral     BUNIONECTOMY    [place] LEFT HEART CATH,PERCUTANEOUS  10/16/2010         PTCA [address]  10/16/2010  PTCA W/ STENT, INITIAL  10/16/2010         VASCULAR SURGERY PROCEDURE UNLIST      left leg varicose vein stripping dr Antonio Arteaga       Social History   Substance Use Topics    Smoking status: Former Smoker     Packs/day: 0.25     Quit date: 9/10/2014    Smokeless tobacco: Never Used      Comment: has not had cigarette in over a month    Alcohol use No        Family History   Problem Relation Age of Onset    Diabetes Mother     Diabetes Father     Cancer Sister      LUNG    Cancer Brother      COLON    Cancer Sister      LUNG    Anesth Problems Neg Hx      Allergies   Allergen Reactions    Norco [Hydrocodone-Acetaminophen] Other (comments)     Too sedated and felt like he was in a daze    Pcn [Penicillins] Rash    Percocet [Oxycodone-Acetaminophen] Nausea and Vomiting        Prior to Admission medications    Medication Sig Start Date End Date Taking? Authorizing Provider   furosemide (LASIX) 20 mg tablet TAKE 1 TAB BY MOUTH DAILY. 11/8/17   Sheryl Avelar NP   levothyroxine (SYNTHROID) 88 mcg tablet Take 1 Tab by mouth Daily (before breakfast). 11/8/17   Libia Cotto MD   isosorbide mononitrate ER (IMDUR) 30 mg tablet TAKE 1 TABLET BY MOUTH EVERY DAY 11/8/17   Sheryl Avelar NP   metoprolol succinate (TOPROL-XL) 25 mg XL tablet TAKE 1 TABLET EVERY DAY 11/8/17   Sheryl Avelar NP   amLODIPine (NORVASC) 5 mg tablet TAKE 1 TAB BY MOUTH DAILY. 11/8/17   Sheryl Avelar NP   aspirin delayed-release 81 mg tablet Take 1 Tab by mouth daily for 360 days. 11/7/17 11/2/18  Melly Jarrell NP   clopidogrel (PLAVIX) 75 mg tab Take 1 Tab by mouth daily. Indications:  Thrombosis Prevention after PCI 11/8/17   Melly Jarrell NP   tamsulosin (FLOMAX) 0.4 mg capsule TAKE 1 CAPSULE EVERY DAY 10/12/17   Libia Cotto MD   losartan-hydroCHLOROthiazide [facility]) 100-12.5 mg per tablet TAKE 1 TABLET BY MOUTH EVERY DAY 10/12/17   Sheryl Avelar NP   albuterol (PROVENTIL HFA, VENTOLIN HFA, PROAIR HFA) 90 mcg/actuation inhaler Take 2 Puffs by inhalation every four (4) hours as needed for Wheezing. 8/24/17   Bisi Garnica MD   clotrimazole-betamethasone (LOTRISONE) topical cream Apply twice daily to affected area till resolved. 8/23/17   Bowen Husain MD   atorvastatin (LIPITOR) 40 mg tablet Take 1 Tab by mouth daily. YRN. Give patient BRAND LIPITOR. D/C Crestor 3/15/17   Humphrey Cotto MD   traMADol Lisbet Kind) 50 mg tablet Take 1 Tab by mouth every eight (8) hours as needed for Pain. Max Daily Amount: 150 mg. TAKE 1-2 TABLETS BY MOUTH EVERY 6 HOURS AS NEEDED FOR PAIN 2/13/17   Nate Chen NP   albuterol-ipratropium (DUO-NEB) 2.5 mg-0.5 mg/3 ml nebu 3 mL by Nebulization route every six (6) hours as needed. 2/13/17   Nate Chen NP   CHOLECALCIFEROL, VITAMIN D3, (VITAMIN D3 PO) Take  by mouth. Historical Provider   finasteride (PROSCAR) 5 mg tablet daily. 1/15/15   Historical Provider   omega-3 fatty acids-vitamin e (FISH OIL) 1,000 mg cap Take 1 Cap by mouth daily. Historical Provider   ascorbic acid (VITAMIN C) 1,000 mg tablet Take  by mouth.     Historical Provider       REVIEW OF SYSTEMS:       Total of 12 systems reviewed as follows:       POSITIVE= underlined text  Negative = text not underlined  General:  fever, chills, sweats, generalized weakness, weight loss/gain,      loss of appetite   Eyes:    blurred vision, eye pain, loss of vision, double vision  ENT:    rhinorrhea, pharyngitis   Respiratory:   cough, sputum production, SOB, VERONICA, wheezing, pleuritic pain   Cardiology:   chest pain, palpitations, orthopnea, PND, edema, syncope   Gastrointestinal:  abdominal pain , N/V, diarrhea, dysphagia, constipation, bleeding   Genitourinary:  frequency, urgency, dysuria, hematuria, incontinence   Muskuloskeletal :  arthralgia, myalgia, back pain  Hematology:  easy bruising, nose or gum bleeding, lymphadenopathy   Dermatological: rash, ulceration, pruritis, color change / jaundice  Endocrine:   hot flashes or polydipsia Neurological:  headache, dizziness, confusion, focal weakness, paresthesia,     Speech difficulties, memory loss, gait difficulty  Psychological: Feelings of anxiety, depression, agitation    Objective:   VITALS:    Visit Vitals    /85    Pulse 73    Temp 98.1 °F (36.7 °C)    Resp 23    Ht 5' 3\" (1.6 m)    Wt 81.9 kg (180 lb 8.9 oz)    SpO2 97%    BMI 31.98 kg/m2       PHYSICAL EXAM:    General:    Alert, cooperative, no distress, appears stated age. HEENT: Atraumatic, anicteric sclerae, pink conjunctivae     No oral ulcers, mucosa moist, throat clear, dentition fair  Neck:  Supple, symmetrical,  thyroid: non tender  Lungs:   Clear to auscultation bilaterally. No Wheezing or Rhonchi. No rales. Chest wall:  No tenderness  No Accessory muscle use. Heart:   Regular  rhythm,  No  murmur   No edema  Abdomen:   Soft, non-tender. Not distended. Bowel sounds normal  Extremities: No cyanosis. No clubbing, Skin turgor normal, Capillary refill normal, Radial dial pulse 2+  Skin:     Not pale. Not Jaundiced  No rashes   Psych:  Good insight. Not depressed. Not anxious or agitated. Neurologic: EOMs intact. No facial asymmetry. No aphasia or slurred speech. Symmetrical strength, Sensation grossly intact.  Alert and oriented X 4.     _______________________________________________________________________  Care Plan discussed with:    Comments   Patient x    Family      RN     Care Manager                    Consultant:      _______________________________________________________________________  Expected  Disposition:   Home with Family x   HH/PT/OT/RN    SNF/LTC    ISAAC    ________________________________________________________________________  TOTAL TIME:  54   Minutes    Critical Care Provided     Minutes non procedure based      Comments    x Reviewed previous records   >50% of visit spent in counseling and coordination of care  Discussion with patient and/or family and questions answered Given the patient's current clinical presentation, I have a high level of concern for decompensation if discharged from the ED. Complex decision making was performed which includes reviewing the patient's available past medical records, laboratory results, and Xray films. I have also directly communicated my plan and discussed this case with the involved ED physician.     ____________________________________________________________________  Armani Cisneros MD    Procedures: see electronic medical records for all procedures/Xrays and details which were not copied into this note but were reviewed prior to creation of Plan. LAB DATA REVIEWED:    Recent Results (from the past 24 hour(s))   CBC WITH AUTOMATED DIFF    Collection Time: 11/21/17  4:39 PM   Result Value Ref Range    WBC 4.7 4.1 - 11.1 K/uL    RBC 4.16 4.10 - 5.70 M/uL    HGB 11.8 (L) 12.1 - 17.0 g/dL    HCT 35.2 (L) 36.6 - 50.3 %    MCV 84.6 80.0 - 99.0 FL    MCH 28.4 26.0 - 34.0 PG    MCHC 33.5 30.0 - 36.5 g/dL    RDW 16.6 (H) 11.5 - 14.5 %    PLATELET 015 423 - 399 K/uL    NEUTROPHILS 68 32 - 75 %    LYMPHOCYTES 17 12 - 49 %    MONOCYTES 9 5 - 13 %    EOSINOPHILS 5 0 - 7 %    BASOPHILS 1 0 - 1 %    ABS. NEUTROPHILS 3.3 1.8 - 8.0 K/UL    ABS. LYMPHOCYTES 0.8 0.8 - 3.5 K/UL    ABS. MONOCYTES 0.4 0.0 - 1.0 K/UL    ABS. EOSINOPHILS 0.2 0.0 - 0.4 K/UL    ABS.  BASOPHILS 0.0 0.0 - 0.1 K/UL    DF SMEAR SCANNED      RBC COMMENTS ANISOCYTOSIS  1+       METABOLIC PANEL, COMPREHENSIVE    Collection Time: 11/21/17  4:39 PM   Result Value Ref Range    Sodium 144 136 - 145 mmol/L    Potassium 4.3 3.5 - 5.1 mmol/L    Chloride 109 (H) 97 - 108 mmol/L    CO2 27 21 - 32 mmol/L    Anion gap 8 5 - 15 mmol/L    Glucose 98 65 - 100 mg/dL    BUN 13 6 - 20 MG/DL    Creatinine 1.40 (H) 0.70 - 1.30 MG/DL    BUN/Creatinine ratio 9 (L) 12 - 20      GFR est AA 59 (L) >60 ml/min/1.73m2    GFR est non-AA 49 (L) >60 ml/min/1.73m2    Calcium 9.2 8.5 - 10.1 MG/DL    Bilirubin, total 0.5 0.2 - 1.0 MG/DL    ALT (SGPT) 38 12 - 78 U/L    AST (SGOT) 54 (H) 15 - 37 U/L    Alk.  phosphatase 126 (H) 45 - 117 U/L    Protein, total 7.8 6.4 - 8.2 g/dL    Albumin 3.3 (L) 3.5 - 5.0 g/dL    Globulin 4.5 (H) 2.0 - 4.0 g/dL    A-G Ratio 0.7 (L) 1.1 - 2.2     OCCULT BLOOD, STOOL    Collection Time: 11/21/17  4:39 PM   Result Value Ref Range    Occult blood, stool POSITIVE (A) NEG     PTT    Collection Time: 11/21/17  4:39 PM   Result Value Ref Range    aPTT 28.2 22.1 - 32.5 sec    aPTT, therapeutic range     58.0 - 77.0 SECS   PROTHROMBIN TIME + INR    Collection Time: 11/21/17  4:39 PM   Result Value Ref Range    INR 1.0 0.9 - 1.1      Prothrombin time 10.4 9.0 - 11.1 sec

## 2017-11-22 NOTE — ROUTINE PROCESS
TRANSFER - IN REPORT:    Verbal report received from [facility] on [address]  being received from 2121 for ordered procedure      Report consisted of patients Situation, Background, Assessment and   Recommendations(SBAR). Information from the following report(s) SBAR was reviewed with the receiving nurse. Opportunity for questions and clarification was provided. Assessment completed upon patients arrival to unit and care assumed.

## 2017-11-23 VITALS
TEMPERATURE: 98.1 F | WEIGHT: 180.56 LBS | DIASTOLIC BLOOD PRESSURE: 119 MMHG | HEIGHT: 63 IN | RESPIRATION RATE: 16 BRPM | BODY MASS INDEX: 31.99 KG/M2 | OXYGEN SATURATION: 93 % | HEART RATE: 80 BPM | SYSTOLIC BLOOD PRESSURE: 214 MMHG

## 2017-11-23 LAB
ALBUMIN SERPL-MCNC: 3.4 G/DL (ref 3.5–5)
ALBUMIN/GLOB SERPL: 0.8 {RATIO} (ref 1.1–2.2)
ALP SERPL-CCNC: 122 U/L (ref 45–117)
ALT SERPL-CCNC: 38 U/L (ref 12–78)
ANION GAP SERPL CALC-SCNC: 8 MMOL/L (ref 5–15)
AST SERPL-CCNC: 48 U/L (ref 15–37)
BASOPHILS # BLD: 0 K/UL (ref 0–0.1)
BASOPHILS NFR BLD: 1 % (ref 0–1)
BILIRUB SERPL-MCNC: 0.6 MG/DL (ref 0.2–1)
BUN SERPL-MCNC: 9 MG/DL (ref 6–20)
BUN/CREAT SERPL: 8 (ref 12–20)
CALCIUM SERPL-MCNC: 8.6 MG/DL (ref 8.5–10.1)
CHLORIDE SERPL-SCNC: 110 MMOL/L (ref 97–108)
CO2 SERPL-SCNC: 24 MMOL/L (ref 21–32)
CREAT SERPL-MCNC: 1.2 MG/DL (ref 0.7–1.3)
EOSINOPHIL # BLD: 0.3 K/UL (ref 0–0.4)
EOSINOPHIL NFR BLD: 5 % (ref 0–7)
ERYTHROCYTE [DISTWIDTH] IN BLOOD BY AUTOMATED COUNT: 16.4 % (ref 11.5–14.5)
GLOBULIN SER CALC-MCNC: 4.4 G/DL (ref 2–4)
GLUCOSE SERPL-MCNC: 102 MG/DL (ref 65–100)
HCT VFR BLD AUTO: 36.7 % (ref 36.6–50.3)
HGB BLD-MCNC: 12.5 G/DL (ref 12.1–17)
LYMPHOCYTES # BLD: 0.9 K/UL (ref 0.8–3.5)
LYMPHOCYTES NFR BLD: 18 % (ref 12–49)
MAGNESIUM SERPL-MCNC: 1.8 MG/DL (ref 1.6–2.4)
MCH RBC QN AUTO: 29.6 PG (ref 26–34)
MCHC RBC AUTO-ENTMCNC: 34.1 G/DL (ref 30–36.5)
MCV RBC AUTO: 87 FL (ref 80–99)
MONOCYTES # BLD: 0.5 K/UL (ref 0–1)
MONOCYTES NFR BLD: 10 % (ref 5–13)
NEUTS SEG # BLD: 3.5 K/UL (ref 1.8–8)
NEUTS SEG NFR BLD: 66 % (ref 32–75)
PLATELET # BLD AUTO: 200 K/UL (ref 150–400)
POTASSIUM SERPL-SCNC: 4 MMOL/L (ref 3.5–5.1)
PROT SERPL-MCNC: 7.8 G/DL (ref 6.4–8.2)
RBC # BLD AUTO: 4.22 M/UL (ref 4.1–5.7)
SODIUM SERPL-SCNC: 142 MMOL/L (ref 136–145)
WBC # BLD AUTO: 5.2 K/UL (ref 4.1–11.1)

## 2017-11-23 PROCEDURE — 36415 COLL VENOUS BLD VENIPUNCTURE: CPT | Performed by: SPECIALIST

## 2017-11-23 PROCEDURE — 80053 COMPREHEN METABOLIC PANEL: CPT | Performed by: INTERNAL MEDICINE

## 2017-11-23 PROCEDURE — 74011000250 HC RX REV CODE- 250: Performed by: INTERNAL MEDICINE

## 2017-11-23 PROCEDURE — C9113 INJ PANTOPRAZOLE SODIUM, VIA: HCPCS | Performed by: INTERNAL MEDICINE

## 2017-11-23 PROCEDURE — 74011250636 HC RX REV CODE- 250/636: Performed by: INTERNAL MEDICINE

## 2017-11-23 PROCEDURE — 74011250637 HC RX REV CODE- 250/637: Performed by: INTERNAL MEDICINE

## 2017-11-23 PROCEDURE — 85025 COMPLETE CBC W/AUTO DIFF WBC: CPT | Performed by: INTERNAL MEDICINE

## 2017-11-23 PROCEDURE — 83735 ASSAY OF MAGNESIUM: CPT | Performed by: INTERNAL MEDICINE

## 2017-11-23 PROCEDURE — 86677 HELICOBACTER PYLORI ANTIBODY: CPT | Performed by: SPECIALIST

## 2017-11-23 RX ORDER — OMEPRAZOLE 40 MG/1
40 CAPSULE, DELAYED RELEASE ORAL 2 TIMES DAILY
Qty: 60 CAP | Refills: 1 | Status: SHIPPED | OUTPATIENT
Start: 2017-11-23

## 2017-11-23 RX ORDER — NICOTINE 7MG/24HR
1 PATCH, TRANSDERMAL 24 HOURS TRANSDERMAL EVERY 24 HOURS
Qty: 30 PATCH | Refills: 0 | Status: SHIPPED | OUTPATIENT
Start: 2017-11-23 | End: 2017-12-23

## 2017-11-23 RX ORDER — FUROSEMIDE 20 MG/1
20 TABLET ORAL DAILY
Status: DISCONTINUED | OUTPATIENT
Start: 2017-11-23 | End: 2017-11-23 | Stop reason: HOSPADM

## 2017-11-23 RX ADMIN — CLOPIDOGREL BISULFATE 75 MG: 75 TABLET ORAL at 08:02

## 2017-11-23 RX ADMIN — ATORVASTATIN CALCIUM 40 MG: 40 TABLET, FILM COATED ORAL at 08:02

## 2017-11-23 RX ADMIN — ASPIRIN 81 MG: 81 TABLET, COATED ORAL at 08:02

## 2017-11-23 RX ADMIN — FINASTERIDE 5 MG: 5 TABLET, FILM COATED ORAL at 08:02

## 2017-11-23 RX ADMIN — FUROSEMIDE 20 MG: 20 TABLET ORAL at 08:02

## 2017-11-23 RX ADMIN — TAMSULOSIN HYDROCHLORIDE 0.4 MG: 0.4 CAPSULE ORAL at 08:02

## 2017-11-23 RX ADMIN — SODIUM CHLORIDE 40 MG: 9 INJECTION INTRAMUSCULAR; INTRAVENOUS; SUBCUTANEOUS at 08:02

## 2017-11-23 RX ADMIN — HYDROCHLOROTHIAZIDE: 12.5 CAPSULE ORAL at 12:08

## 2017-11-23 RX ADMIN — AMLODIPINE BESYLATE 5 MG: 5 TABLET ORAL at 08:02

## 2017-11-23 RX ADMIN — METOPROLOL SUCCINATE 25 MG: 25 TABLET, EXTENDED RELEASE ORAL at 08:02

## 2017-11-23 RX ADMIN — LEVOTHYROXINE SODIUM 88 MCG: 88 TABLET ORAL at 08:02

## 2017-11-23 RX ADMIN — ISOSORBIDE MONONITRATE 30 MG: 30 TABLET, EXTENDED RELEASE ORAL at 08:02

## 2017-11-23 NOTE — DISCHARGE SUMMARY
Hospitalist Discharge Summary     Patient ID:  Sharda Gates  [id number]  81 y.o.  1935    PCP on record: Unique Feliz MD    Admit date: 11/21/2017  Discharge date and time: 11/23/2017      DISCHARGE DIAGNOSIS:    GI Bleeding, CAD with recent MARY ANN, HTN, AAA, Hypothyroidism, Atelectasis, Smoker      CONSULTATIONS:  IP CONSULT TO GASTROENTEROLOGY  IP CONSULT TO HOSPITALIST    Excerpted HPI from H&P of Taco Paulino MD:  Fernando Arauz is a 81 y.o.  male with a history of CAD, HTN, CVA and hypothyroidism who presents with rectal bleeding. Patient recently underwent Cath / PCI with deployment of MARY ANN on 11/06. For the last 2 weeks, he has had intermittent rectal bleeding. This has gotten more frequent and this last few days he has been passing more blood and clots and less of stool. He denies experiencing any CP, SOB, dizziness or abdominal pain. Today he was shopping at a [employer] when he passed clots which fell onto the floor. He was evaluated in the ER. CT as above and Hg 11.8, down from 12.3 couple of weeks ago. GI was consulted and we were asked to admit for work up and evaluation of the above problems. ______________________________________________________________________  DISCHARGE SUMMARY/HOSPITAL COURSE:  for full details see H&P, daily progress notes, labs, consult notes. GI bleeding: so far HH stable, no need for transfusion, EGD shows duodenitis and antral erosions, c/w PPI, GI help appreciated. No further bleeding, HH is stable, will D/c Home and F/U with GI and Cardiology  CAD, s/p recent PCI / MARY ANN 11/06/17 c/w Plavix, unfortunately cardiac stent is so recent that would be very dangerous to stop, will monitor. HTN c/w Home regimen and monitor  S/P AAA stenting  Hypothyroidism continue synthroid  Atelectasis, LLL denies cough or SOB.  Ordered incentive spirometry  Smoker patch  Surrogate Decision Maker:  Daughter is NOK  Baseline:  .  Ambulates independently  Body mass index is 31.98 kg/(m^2). Code status: Full  Prophylaxis: SCD's  Recommended Disposition: HH PT, OT, RN      D/c home today and F/U with PCP, GI and Cardiology as outpatient    _______________________________________________________________________  Patient seen and examined by me on discharge day. Pertinent Findings:  Gen:    Not in distress  Chest: Coarse BS  CVS:   Regular rhythm. No edema  Abd:  Soft, not distended, not tender  Neuro:  Alert, [facility] [facility] Discharge Planning: [facility]  Face to Face Encounter      NAME: Julia Cruz   :  1935   MRN:  [id number]     Primary Diagnosis: GI Bleeding, CAD with recent MARY ANN, HTN, AAA, Hypothyroidism, Atelectasis, Smoker    Date of Face to Face:  2017 8:57 AM                                  Face to Face Encounter findings are related to primary reason for home care:   YES    1. I certify that the patient needs intermittent skilled nursing care, physical therapy and/or speech therapy. I will not be following this patient in the Community and Dr. Chuy Schmitz MD will be responsible for signing the [address] of Care. 2. Initial Orders for Care: Physical Therapy and Occupational Therapy    3. I certify that this patient is homebound because of illness or injury, need the aid of supportive devices such as crutches, canes, wheelchairs, and walkers; the use of special transportation; or the assistance of another person in order to leave their place of residence. There exists a normal inability to leave home and leaving home requires a considerable and taxing effort. 4. I certify that this patient is under my care and that I had a Face-to-Face Encounter that meets the physician Face-to-Face Encounter requirements.   Document the physical findings from the Face-to-Face Encounter that support the need for skilled services: Has new diagnosis that requires skilled nursing teaching and intervention , Has new medications that requires skilled nursing teaching and monitoring for understanding and compliance , Needs skilled safety assessment and interventions  and Has new finding of weakness and altered mobility that requires skilled physical/occupational and/or speech therapy services for evaluation and interventions. Patient is weak and frail, poor balance and endurance, high risk for falls    Brook Vargas MD  Discharging Physician  Office: [phone]  Fax:   [phone]    _______________________________________________________________________  DISCHARGE MEDICATIONS:   Current Discharge Medication List      START taking these medications    Details   nicotine (NICODERM CQ) 7 mg/24 hr 1 Patch by TransDERmal route every twenty-four (24) hours for 30 days. Qty: 30 Patch, Refills: 0      omeprazole (PRILOSEC) 40 mg capsule Take 1 Cap by mouth two (2) times a day. Qty: 60 Cap, Refills: 1         CONTINUE these medications which have NOT CHANGED    Details   furosemide (LASIX) 20 mg tablet TAKE 1 TAB BY MOUTH DAILY. Qty: 30 Tab, Refills: 0      levothyroxine (SYNTHROID) 88 mcg tablet Take 1 Tab by mouth Daily (before breakfast). Qty: 30 Tab, Refills: 0      isosorbide mononitrate ER (IMDUR) 30 mg tablet TAKE 1 TABLET BY MOUTH EVERY DAY  Qty: 30 Tab, Refills: 2    Associated Diagnoses: PVD (peripheral vascular disease) with claudication ([facility] Coronary artery disease due to lipid rich plaque      metoprolol succinate (TOPROL-XL) 25 mg XL tablet TAKE 1 TABLET EVERY DAY  Qty: 30 Tab, Refills: 2      amLODIPine (NORVASC) 5 mg tablet TAKE 1 TAB BY MOUTH DAILY. Qty: 30 Tab, Refills: 2      aspirin delayed-release 81 mg tablet Take 1 Tab by mouth daily for 360 days. Qty: 30 Tab, Refills: 11      clopidogrel (PLAVIX) 75 mg tab Take 1 Tab by mouth daily. Indications:  Thrombosis Prevention after PCI  Qty: 30 Tab, Refills: 11      tamsulosin (FLOMAX) 0.4 mg capsule TAKE 1 CAPSULE EVERY DAY  Qty: 90 Cap, Refills: 0      losartan-hydroCHLOROthiazide (HYZAAR) 100-12.5 mg per tablet TAKE 1 TABLET BY MOUTH EVERY DAY  Qty: 30 Tab, Refills: 2      albuterol (PROVENTIL HFA, VENTOLIN HFA, PROAIR HFA) 90 mcg/actuation inhaler Take 2 Puffs by inhalation every four (4) hours as needed for Wheezing. Qty: 1 Inhaler, Refills: 1      clotrimazole-betamethasone (LOTRISONE) topical cream Apply twice daily to affected area till resolved. Qty: 60 g, Refills: 0    Associated Diagnoses: Contact dermatitis, unspecified contact dermatitis type, unspecified trigger      atorvastatin (LIPITOR) 40 mg tablet Take 1 Tab by mouth daily. YRN. Give patient BRAND LIPITOR. D/C Crestor  Qty: 90 Tab, Refills: 3    Comments: Branded Lipitor is no cost for patient. Associated Diagnoses: Dyslipidemia      traMADol (ULTRAM) 50 mg tablet Take 1 Tab by mouth every eight (8) hours as needed for Pain. Max Daily Amount: 150 mg. TAKE 1-2 TABLETS BY MOUTH EVERY 6 HOURS AS NEEDED FOR PAIN  Qty: 10 Tab, Refills: 0    Associated Diagnoses: Other chronic pain; Right-sided chest wall pain      albuterol-ipratropium (DUO-NEB) 2.5 mg-0.5 mg/3 ml nebu 3 mL by Nebulization route every six (6) hours as needed. Qty: 30 Nebule, Refills: 0      CHOLECALCIFEROL, VITAMIN D3, (VITAMIN D3 PO) Take  by mouth. finasteride (PROSCAR) 5 mg tablet daily. Associated Diagnoses: BPH (benign prostatic hypertrophy)      omega-3 fatty acids-vitamin e (FISH OIL) 1,000 mg cap Take 1 Cap by mouth daily. ascorbic acid (VITAMIN C) 1,000 mg tablet Take  by mouth. My Recommended Diet, Activity, Wound Care, and follow-up labs are listed in the patient's Discharge Insturctions which I have personally completed and reviewed.     _______________________________________________________________________  DISPOSITION:    Home with Family: y   Home with HH/PT/OT/RN:    SNF/LTC:    ISAAC:    OTHER:        Condition at Discharge: Stable  _______________________________________________________________________  Follow up with:   PCP : Marisol Siegel MD  Follow-up Information     Follow up With Details Comments [address] [facility] [place], [place]   [address]  [address] [facility]  [phone]        F/U PCP  F/U GI  F/U Cardiology        Total time in minutes spent coordinating this discharge (includes going over instructions, follow-up, prescriptions, and preparing report for sign off to her PCP) :  35 minutes    Signed:  Zaina Garza MD

## 2017-11-23 NOTE — FACE TO FACE
Home Health Care Discharge Planning: [facility]  Face to Face Encounter      NAME: Julia Cruz   :  1935   MRN:  [id number]     Primary Diagnosis: GI Bleeding, CAD with recent MARY ANN, HTN, AAA, Hypothyroidism, Atelectasis, Smoker    Date of Face to Face:  2017 8:57 AM                                  Face to Face Encounter findings are related to primary reason for home care:   YES    1. I certify that the patient needs intermittent skilled nursing care, physical therapy and/or speech therapy. I will not be following this patient in the Community and Dr. Chuy Schmitz MD will be responsible for signing the [address] of Care. 2. Initial Orders for Care: Physical Therapy and Occupational Therapy    3. I certify that this patient is homebound because of illness or injury, need the aid of supportive devices such as crutches, canes, wheelchairs, and walkers; the use of special transportation; or the assistance of another person in order to leave their place of residence. There exists a normal inability to leave home and leaving home requires a considerable and taxing effort. 4. I certify that this patient is under my care and that I had a Face-to-Face Encounter that meets the physician Face-to-Face Encounter requirements. Document the physical findings from the Face-to-Face Encounter that support the need for skilled services: Has new diagnosis that requires skilled nursing teaching and intervention , Has new medications that requires skilled nursing teaching and monitoring for understanding and compliance , Needs skilled safety assessment and interventions  and Has new finding of weakness and altered mobility that requires skilled physical/occupational and/or speech therapy services for evaluation and interventions.    Patient is weak and frail, poor balance and endurance, high risk for falls    Carter Stone MD  Discharging Physician  Office: [phone]  Fax:   [phone]

## 2017-11-23 NOTE — PROGRESS NOTES
.General Surgery End of Shift Nursing Note    Bedside shift change report given to Emmanuel (oncoming nurse) by Dieudonne Huynh (offgoing nurse). Report included the following information SBAR, Kardex, Intake/Output, Recent Results, Med Rec Status and Cardiac Rhythm NSR. Shift worked:   7p-7a   Summary of shift:  No rectal bleeding noted during this shift. Issues for physician to address:  none     Number times ambulated in hallway past shift: 2    Number of times OOB to chair past shift: 4    Pain Management:  Current medication  Patient states pain is manageable on current pain medication:    GI:    Current diet:  DIET GI LITE (POST SURGICAL)    Tolerating current diet: YES  Passing flatus: NO  Last Bowel Movement: yesterday   Appearance: unknown    Respiratory:    Incentive Spirometer at bedside: YES  Patient instructed on use: YES    Patient Safety:    Falls Score: 1  Bed Alarm On? No  Sitter?  No    Raúl Bae RN

## 2017-11-23 NOTE — DISCHARGE INSTRUCTIONS
HOSPITALIST DISCHARGE INSTRUCTIONS  NAME: Romain Landin   :  1935   MRN:  [id number]     Date/Time:  2017 8:59 AM    ADMIT DATE: 2017     DISCHARGE DATE: 2017     DIAGNOSIS:  GI Bleeding, CAD with recent MARY ANN, HTN, AAA, Hypothyroidism, Atelectasis, Smoker    MEDICATIONS:                As per medication reconciliation    · It is important that you take the medication exactly as they are prescribed. · Keep your medication in the bottles provided by the pharmacist and keep a list of the medication names, dosages, and times to be taken in your wallet. · Do not take other medications without consulting your doctor. Pain Management: per above medications    What to do at Home    Recommended diet:  Cardiac Diet    Recommended activity: Activity as tolerated    If you experience any of the following symptoms then please call your primary care physician or return to the emergency room if you cannot get hold of your doctor:  Fever, chills, nausea, vomiting, diarrhea, change in mentation, falling, bleeding, shortness of breath. Follow Up:   PCP you are to call and set up an appointment to see them in 2 week. F/U GI  F/U Cardiology      Information obtained by :  I understand that if any problems occur once I am at home I am to contact my physician. I understand and acknowledge receipt of the instructions indicated above.                                                                                                                                            Physician's or R.N.'s Signature                                                                  Date/Time                                                                                                                                              Patient or Representative Signature                                                          Date/Time

## 2017-11-27 LAB
H PYLORI IGA SER-ACNC: <9 UNITS (ref 0–8.9)
H PYLORI IGG SER IA-ACNC: <0.9 U/ML (ref 0–0.8)

## 2017-11-27 NOTE — ANESTHESIA POSTPROCEDURE EVALUATION
Post-Anesthesia Evaluation and Assessment    Patient: Zeeshan Devries MRN: [id number]  SSN: [id number]    YOB: 1935  Age: 81 y.o. Sex: male       Cardiovascular Function/Vital Signs  Visit Vitals    BP (!) 214/119    Pulse 80    Temp 36.7 °C (98.1 °F)    Resp 16    Ht 5' 3\" (1.6 m)    Wt 81.9 kg (180 lb 8.9 oz)    SpO2 93%    BMI 31.98 kg/m2       Patient is status post general, total IV anesthesia anesthesia for Procedure(s):  ESOPHAGOGASTRODUODENOSCOPY (EGD). Nausea/Vomiting: None    Postoperative hydration reviewed and adequate. Pain:  Pain Scale 1: Numeric (0 - 10) (11/22/17 1946)  Pain Intensity 1: 0 (11/22/17 1946)   Managed    Neurological Status: At baseline    Mental Status and Level of Consciousness: Arousable    Pulmonary Status:   O2 Device: Room air (11/23/17 0800)   Adequate oxygenation and airway patent    Complications related to anesthesia: None    Post-anesthesia assessment completed.  No concerns    Signed By: Brooks Ramsey MD     November 27, 2017

## 2017-11-28 ENCOUNTER — TELEPHONE (OUTPATIENT)
Dept: CARDIOLOGY CLINIC | Age: 82
End: 2017-11-28

## 2017-11-28 NOTE — TELEPHONE ENCOUNTER
Spoke to patient using 2 identifiers. Per Chuy Weber, NP, pt was made aware to stop taking omeprazole while taking clopidogrel since it will reduce effectiveness of clopidogrel. Per NP, ok to take OTC ranitidine 150 mg once a day if needed. Pt verbalized understanding.

## 2017-12-18 ENCOUNTER — OFFICE VISIT (OUTPATIENT)
Dept: CARDIOLOGY CLINIC | Age: 82
End: 2017-12-18

## 2017-12-18 VITALS
OXYGEN SATURATION: 93 % | HEIGHT: 63 IN | SYSTOLIC BLOOD PRESSURE: 130 MMHG | HEART RATE: 98 BPM | WEIGHT: 179.7 LBS | BODY MASS INDEX: 31.84 KG/M2 | DIASTOLIC BLOOD PRESSURE: 82 MMHG | RESPIRATION RATE: 18 BRPM

## 2017-12-18 DIAGNOSIS — Z98.890 S/P CARDIAC CATH: Primary | ICD-10-CM

## 2017-12-18 NOTE — PROGRESS NOTES
1. Have you been to the ER, urgent care clinic since your last visit? Hospitalized since your last visit? Yes for cardiac cath and then for rectal bleeding. 2. Have you seen or consulted any other health care providers outside of the [address] [place] [address] since your last visit? Include any pap smears or colon screening.        No.      Chief Complaint   Patient presents with   [facility] Follow Up     f/u from cardiac cath-pt denies any cardiac symptoms-rt leg cramping

## 2017-12-18 NOTE — PROGRESS NOTES
NAME:  Cristóbal Galvez   :   1935   MRN:   [id number]   PCP:  Chava Hauser MD           Subjective: The patient is a 81y.o. year old male  who returns for a routine follow-up from PCI. Since the last visit, patient reports no change in exercise tolerance, chest pain, edema, medication intolerance, palpitations, shortness of breath, PND/orthopnea wheezing, sputum, syncope, dizziness or light headedness. His breathing is better. He is pleased.      Past Medical History:   Diagnosis Date    AAA (abdominal aortic aneurysm) ([address]     Acute MI 2010    dr Josiah Mcfarlane    Acute prostatitis     again 16 [facility] rec'd [facility]    Advance directive discussed with patient 2016    Aneurysm ([address] 2011    AAA    Borderline glaucoma (glaucoma suspect) 2015    notes from [address] [place] [address] rec'd    BPH (benign prostatic hyperplasia)     Bright red blood per rectum 2016    [facility] note Kyler Cochran- w/u in progress   17 [facility] f[facility] note    CAD (coronary artery disease)     Calculus of kidney     Cerebral embolism with cerebral infarction ([address]     Chronic pain     back    Dizzy spells 2012    DJD (degenerative joint disease) of lumbar spine     ED (erectile dysfunction)     17 f[facility]u note [facility]    Elevated PSA 2014    [facility] rec[facility]     16 f/u note    Encephalocele ([address]     Hypercholesterolemia     Hypertension     Pneumonia 2017    Prostate cancer ([address] 2014    crystal henry/ Dr Corona Young 17 f/u note    PVD (peripheral vascular disease) with claudication ([address]     S/P radiation therapy 15 months out    probable cause of the rectal bleeding    Stroke ([address] 2011    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    neuro assoc notes rec'd    Thyroid disease     Vitamin D deficiency 2013    again 2015 again 2016       Social History   Substance Use Topics    Smoking status: Former Smoker     Packs/day: 0.25     Quit date: 9/10/2014    Smokeless tobacco: Never Used      Comment: has not had cigarette in over 3 month    Alcohol use No      Family History   Problem Relation Age of Onset    Diabetes Mother     Diabetes Father     Cancer Sister      LUNG    Cancer Brother      COLON    Cancer Sister      LUNG    Anesth Problems Neg Hx         Review of Systems  Constitutional: Negative for fever, chills, and diaphoresis. Respiratory: Negative for cough, hemoptysis, sputum production, shortness of breath and wheezing. Cardiovascular: Negative for chest pain, palpitations, orthopnea, claudication, leg swelling and PND. Gastrointestinal: Negative for heartburn, nausea, vomiting, blood in stool and melena. Genitourinary: Negative for dysuria and flank pain. Musculoskeletal: Negative for joint pain and back pain. Skin: Negative for rash. Neurological: Negative for focal weakness, seizures, loss of consciousness, weakness and headaches. Endo/Heme/Allergies: Does not bruise/bleed easily. Psychiatric/Behavioral: Negative for memory loss. The patient does not have insomnia. Objective:       Vitals:    12/18/17 1333 12/18/17 1352   BP: 140/82 130/82   Pulse: 98    Resp: 18    SpO2: 93%    Weight: 179 lb 11.2 oz (81.5 kg)    Height: 5' 3\" (1.6 m)     Body mass index is 31.83 kg/(m^2). General PE    Gen: NAD     Mental Status - Alert. General Appearance - Not in acute distress. Neck - no JVD     Chest and Lung Exam     Inspection: Accessory muscles - No use of accessory muscles in breathing. Auscultation:   Breath sounds: - Normal.     Cardiovascular   Inspection: Jugular vein - Bilateral - Inspection Normal.   Palpation/Percussion:   Apical Impulse: - Normal.   Auscultation: Rhythm - Regular. Heart Sounds - S1 WNL and S2 WNL. No S3 or S4. Murmurs & Other Heart Sounds: Auscultation of the heart reveals - No Murmurs.      Peripheral Vascular   Upper Extremity: Inspection - Bilateral - No Cyanotic nailbeds or Digital clubbing. Lower Extremity:   Palpation: Edema - Bilateral - No edema. Abdomen: Soft, non-tender, bowel sounds are active. Neuro: A&O times 3, CN and motor grossly WNL      Data Review:     EKG -  Sinus  Rhythm   -Right bundle branch block with left axis -bifascicular block. Cardiac Cath --  EF calculated by contrast ventriculography was 50 %. --  CORONARY CIRCULATION:  --  Mid LAD: There was a discrete 70 % stenosis. The lesion was hazy. There was CESAR grade 3 flow through the vessel (brisk flow). --  Mid  circumflex: There was a 90 % stenosis. There was CESAR grade 3 flow through  the vessel (brisk flow). --  Distal circumflex: There was a discrete 90 % stenosis. --  Ramus intermedius: There was a 90 % stenosis. There was CESAR grade 3  flow through the vessel (brisk flow). --  Proximal ramus intermedius: There was a tubular 60 % stenosis. --  Distal ramus intermedius: There was a tubular 90 % stenosis. --  Distal RCA: There was a 100 % stenosis. There was CESAR grade 0 flow  through the vessel (no flow). This lesion is a chronic total occlusion. -- [facility] VESSELS:  --  Right subclavian: highly tortuous with a loop. had to switch to  femoral access for the LCA. Would avoid R radial in the future. --  1ST LESION INTERVENTIONS:  --  A with coronary flow reserve measurement was performed on the 70 %  lesion in the mid LAD. Following intervention there was a 70 % residual  stenosis. --  Myocardial Fractional Flow Reserve (FFR) measurement was  performed using a 0.014\" pressure-monitoring Pressure Wire 175cm  guide-wire. Steady baseline values were obtained. Mean arterial pressure  and mean distal coronary pressures were then obtained at maximum  hyperemia. FFR was calculated to be 0.83. Based on the results of this  study, the lesion was judged to be non-significant and no intervention was  performed.     --  2ND LESION INTERVENTIONS:  --  A with coronary flow reserve measurement was performed on the 70 %  lesion in the ramus intermedius. Following intervention there was a 70 %  residual stenosis. --  Myocardial Fractional Flow Reserve (FFR)  measurement was performed using a 0.014\" pressure-monitoring Pressure Wire  175cm guide-wire. Steady baseline values were obtained. Mean arterial  pressure and mean distal coronary pressures were then obtained at maximum  hyperemia. FFR was calculated to be 0.86. Based on the results of this  study, the lesion was judged to be non-significant and no intervention was  performed. --  3RD LESION INTERVENTIONS:  --  A balloon angioplasty was performed on the 90 % lesion in the ramus  intermedius. Following intervention there was a 40 % residual stenosis. Stent would not cross angulation in prox vessel. --  4TH LESION INTERVENTIONS:  --  A drug-eluting stent was performed on the 90 % lesion in the mid  circumflex. Following intervention there was a 0 % residual stenosis. --   A Synergy RX [id number] everolimus-eluting stent was placed across the  lesion and successfully deployed at a maximum inflation pressure of 14  brooke. During the procedure, a new Bentley Warren 180cm wire was advanced across  the lesion. --  5TH LESION INTERVENTIONS:  --  A successful drug-eluting stent was performed on the 100 % lesion in  the distal RCA. Following intervention there was an excellent angiographic  appearance with a 0 % residual stenosis. --  A Synergy RX [id number]  everolimus-eluting stent was placed across the lesion and successfully  deployed at a maximum inflation pressure of 18 brooke. During the procedure,  a new Bentley Warren 180cm wire was advanced across the lesion. -- Juli Amabile was a  perforation distal to the stent which sealed after prolonged balloon  inflation other than late phase filling of what appeared to be a cardiac  structure, either a cardiac vein or a portion of the ventricle. Echo at  the end of the procedure showed no effusion.     Allergies reviewed  Allergies   Allergen Reactions    Norco [Hydrocodone-Acetaminophen] Other (comments)     Too sedated and felt like he was in a daze    Pcn [Penicillins] Rash    Percocet [Oxycodone-Acetaminophen] Nausea and Vomiting       Medications reviewed  Current Outpatient Prescriptions   Medication Sig    furosemide (LASIX) 20 mg tablet TAKE 1 TAB BY MOUTH DAILY.  nicotine (NICODERM CQ) 7 mg/24 hr 1 Patch by TransDERmal route every twenty-four (24) hours for 30 days.  levothyroxine (SYNTHROID) 88 mcg tablet Take 1 Tab by mouth Daily (before breakfast).  isosorbide mononitrate ER (IMDUR) 30 mg tablet TAKE 1 TABLET BY MOUTH EVERY DAY    metoprolol succinate (TOPROL-XL) 25 mg XL tablet TAKE 1 TABLET EVERY DAY    amLODIPine (NORVASC) 5 mg tablet TAKE 1 TAB BY MOUTH DAILY.  aspirin delayed-release 81 mg tablet Take 1 Tab by mouth daily for 360 days.  clopidogrel (PLAVIX) 75 mg tab Take 1 Tab by mouth daily. Indications: Thrombosis Prevention after PCI    tamsulosin (FLOMAX) 0.4 mg capsule TAKE 1 CAPSULE EVERY DAY    losartan-hydroCHLOROthiazide (HYZAAR) 100-12.5 mg per tablet TAKE 1 TABLET BY MOUTH EVERY DAY    albuterol (PROVENTIL HFA, VENTOLIN HFA, PROAIR HFA) 90 mcg/actuation inhaler Take 2 Puffs by inhalation every four (4) hours as needed for Wheezing.  clotrimazole-betamethasone (LOTRISONE) topical cream Apply twice daily to affected area till resolved.  atorvastatin (LIPITOR) 40 mg tablet Take 1 Tab by mouth daily. YRN. Give patient BRAND LIPITOR. D/C Crestor    traMADol (ULTRAM) 50 mg tablet Take 1 Tab by mouth every eight (8) hours as needed for Pain. Max Daily Amount: 150 mg. TAKE 1-2 TABLETS BY MOUTH EVERY 6 HOURS AS NEEDED FOR PAIN    albuterol-ipratropium (DUO-NEB) 2.5 mg-0.5 mg/3 ml nebu 3 mL by Nebulization route every six (6) hours as needed.  finasteride (PROSCAR) 5 mg tablet daily.  omega-3 fatty acids-vitamin e (FISH OIL) 1,000 mg cap Take 1 Cap by mouth daily.     ascorbic acid (VITAMIN C) 1,000 mg tablet Take  by mouth.  omeprazole (PRILOSEC) 40 mg capsule Take 1 Cap by mouth two (2) times a day.  CHOLECALCIFEROL, VITAMIN D3, (VITAMIN D3 PO) Take  by mouth. No current facility-administered medications for this visit. Assessment:       ICD-10-CM ICD-9-CM    1. S/P cardiac cath Z98.890 V45.89 AMB POC EKG ROUTINE W/ 12 LEADS, INTER & REP        Orders Placed This Encounter    AMB POC EKG ROUTINE W/ 12 LEADS, INTER & REP     Order Specific Question:   Reason for Exam:     Answer:   routine       Patient Active Problem List   Diagnosis Code    Hypothyroidism E03.9    Essential hypertension I10    CAD (Coronary Artery Disease)--s/p PCI--MARY ANN [place] stents x4 5/09;MARY ANN [place] stents x3 8/09 I25.10    ED (erectile dysfunction) N52.9    Chronic back pain--DJD G89.29    BPH (benign prostatic hypertrophy) N40.0    Dyslipidemia E78.5    Successful  PTCA (percutaneous transluminal coronary angioplasty)--90-95% instent restenosis RCA 10/16/10 Z98.61    Successful PTCA with MARY ANN Xience stent Ramus intermedius 10/16/10 Z95.9    PVD (peripheral vascular disease) with claudication ([facility]) I73.9    Cerebral embolism with cerebral infarction ([facility] I63.40    Carotid stenosis, left I65.22    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    Prediabetes R73.03    Tinea cruris B35.6    Nodule, subcutaneous R22.9    Vitamin D deficiency E55.9    Lipoma of back D17.1    Prostate cancer ([address] C61    Angina, class III ([facility]) I20.9    Alkaline phosphatase elevation R74.8    Myocardial infarction I21.9    Calculus of kidney N20.0    Osteoarthritis M19.90    SOBOE (shortness of breath on exertion) R06.02    S/P cardiac cath Z98.890    Rectal bleeding K62.5       Plan:     Patient presents for follow up, feeling well and stable from cardiac standpoint, S/P MARY ANN to distal RCA and mid CrX. Normal right radial pulse. Symptoms resolved with PCI.   Will continue current medications and follow up in 6 mo or sooner if symptoms develop.      Shania Beasley MD

## 2018-01-09 ENCOUNTER — OFFICE VISIT (OUTPATIENT)
Dept: FAMILY MEDICINE CLINIC | Age: 83
End: 2018-01-09

## 2018-01-09 VITALS
HEIGHT: 63 IN | DIASTOLIC BLOOD PRESSURE: 80 MMHG | BODY MASS INDEX: 31.66 KG/M2 | RESPIRATION RATE: 18 BRPM | WEIGHT: 178.7 LBS | HEART RATE: 86 BPM | TEMPERATURE: 97 F | OXYGEN SATURATION: 95 % | SYSTOLIC BLOOD PRESSURE: 150 MMHG

## 2018-01-09 DIAGNOSIS — E55.9 VITAMIN D DEFICIENCY: ICD-10-CM

## 2018-01-09 DIAGNOSIS — R73.03 PREDIABETES: ICD-10-CM

## 2018-01-09 DIAGNOSIS — I73.9 PVD (PERIPHERAL VASCULAR DISEASE) WITH CLAUDICATION (HCC): ICD-10-CM

## 2018-01-09 DIAGNOSIS — E03.9 HYPOTHYROIDISM, UNSPECIFIED TYPE: Primary | ICD-10-CM

## 2018-01-09 DIAGNOSIS — I10 ESSENTIAL HYPERTENSION: ICD-10-CM

## 2018-01-09 DIAGNOSIS — E78.5 DYSLIPIDEMIA: ICD-10-CM

## 2018-01-09 DIAGNOSIS — I20.9 ANGINA, CLASS III (HCC): ICD-10-CM

## 2018-01-09 DIAGNOSIS — R73.09 INCREASED GLUCOSE LEVEL: ICD-10-CM

## 2018-01-09 DIAGNOSIS — I63.40 CEREBRAL INFARCTION DUE TO EMBOLISM OF CEREBRAL ARTERY (HCC): ICD-10-CM

## 2018-01-09 DIAGNOSIS — C61 PROSTATE CANCER (HCC): ICD-10-CM

## 2018-01-09 PROBLEM — Q01.8: Status: RESOLVED | Noted: 2018-01-09 | Resolved: 2018-01-09

## 2018-01-09 PROBLEM — H91.93 BILATERAL DEAFNESS: Status: ACTIVE | Noted: 2018-01-09

## 2018-01-09 PROBLEM — Q01.8: Status: ACTIVE | Noted: 2018-01-09

## 2018-01-09 RX ORDER — CLOPIDOGREL BISULFATE 75 MG/1
TABLET ORAL
Refills: 11 | COMMUNITY
Start: 2017-11-07 | End: 2018-05-20

## 2018-01-09 NOTE — ASSESSMENT & PLAN NOTE
This condition is managed by Specialist.  Key Oncology Meds     Patient is on no Oncologic meds. Key Pain Meds             traMADol (ULTRAM) 50 mg tablet Take 1 Tab by mouth every eight (8) hours as needed for Pain. Max Daily Amount: 150 mg. TAKE 1-2 TABLETS BY MOUTH EVERY 6 HOURS AS NEEDED FOR PAIN        Lab Results   Component Value Date/Time    WBC 5.2 11/23/2017 04:23 AM    ABS.  NEUTROPHILS 3.5 11/23/2017 04:23 AM    HGB 12.5 11/23/2017 04:23 AM    Hemoglobin (POC) 13.3 02/05/2017 03:44 PM    HCT 36.7 11/23/2017 04:23 AM    Hematocrit (POC) 39 02/05/2017 03:44 PM    PLATELET 684 05/34/8260 04:23 AM    Creatinine 1.20 11/23/2017 04:23 AM    Creatinine (POC) 1.9 02/05/2017 03:44 PM    BUN 9 11/23/2017 04:23 AM    BUN (POC) 69 02/05/2017 03:44 PM    ALT (SGPT) 38 11/23/2017 04:23 AM    AST (SGOT) 48 11/23/2017 04:23 AM    Albumin 3.4 11/23/2017 04:23 AM

## 2018-01-09 NOTE — PROGRESS NOTES
Had cardiac cath and 2 stents placed mid December. States can only walk about 10 feet before having to rest.  Getting worse. No refills needed. Pt requests writtin scripts for walker and power wheelchair. Visit Vitals    /80 (BP 1 Location: Right arm, BP Patient Position: Sitting)    Pulse 86    Temp 97 °F (36.1 °C) (Oral)    Resp 18    Ht 5' 3\" (1.6 m)    Wt 178 lb 11.2 oz (81.1 kg)    SpO2 95%    BMI 31.66 kg/m2     Patient alert and cooperative. Difficulty even getting off the exam table. Assessment:  1. Hypothyroid, on replacement. Needs repeat lab today. 2. Angina, class 3, with recent stents x two placed last month. 3. History of CVA. 4. Prostate cancer, followed by urologist.  5. PVD with claudication, apparently worse. Needs referral to vascular surgeon. 6. HTN, borderline high readings today. 7. Hyperlipids. 8. Prediabetes. 9. Vitamin D deficiency. Plan:  1. Check labs. 2. Continue follow up with multiple specialists. 3. Recheck here in six months, sooner prn.

## 2018-01-09 NOTE — LETTER
1/10/2018 6:38 PM 
 
. [facility] [address] [phone] Dear Celi Paz: 
 
Please find your most recent results below. Resulted Orders VITAMIN D, 25 HYDROXY Result Value Ref Range VITAMIN D, 25-HYDROXY 36.3 30.0 - 100.0 ng/mL Comment:  
   Vitamin D deficiency has been defined by the [facility] practice guideline as a 
level of serum 25-OH vitamin D less than 20 ng/mL (1,2). The Endocrine Society went on to further define vitamin D 
insufficiency as a level between 21 and 29 ng/mL (2). 1. IOM ([place] [employer]). 2010. Dietary reference 
   intakes for calcium and D. [facility]: The 
   [employer]. 2. Sebas MF, Vladimir BHAT, Lupis LEDBETTER, et al. 
   Evaluation, treatment, and prevention of vitamin D 
   deficiency: an Endocrine Society clinical practice 
   guideline. JCEM. 2011 Jul; 96(7):1911-30. Narrative Performed at:  [address]  [id number] : Uzma Sykes MD, Phone:  [phone] HEMOGLOBIN A1C WITH EAG Result Value Ref Range Hemoglobin A1c 5.4 4.8 - 5.6 % Comment:  
            Pre-diabetes: 5.7 - 6.4 Diabetes: >6.4 Glycemic control for adults with diabetes: <7.0 Estimated average glucose 108 mg/dL Narrative Performed at:  [address]  [id number] : Uzma Sykes MD, Phone:  [phone] URINALYSIS W/ RFLX MICROSCOPIC Result Value Ref Range Specific Gravity 1.012 1.005 - 1.030  
 pH (UA) 5.5 5.0 - 7.5 Color Yellow Yellow Appearance Clear Clear Leukocyte Esterase Negative Negative Protein Negative Negative/Trace Glucose Negative Negative Ketone Negative Negative Blood Negative Negative Bilirubin Negative Negative Urobilinogen 1.0 0.2 - 1.0 mg/dL Nitrites Negative Negative Microscopic Examination Comment Comment:  
   Microscopic not indicated and not performed. Narrative Performed at:  [address]  [id number] : Arlice Meckel MD, Phone:  [phone] TSH 3RD GENERATION Result Value Ref Range TSH 5.280 (H) 0.450 - 4.500 uIU/mL Narrative Performed at:  [address]  [id number] : Arlice Meckel MD, Phone:  [phone] T4, FREE Result Value Ref Range T4, Free 1.43 0.82 - 1.77 ng/dL Narrative Performed at:  [address]  [id number] : Arlice Meckel MD, Phone:  [phone] Normal average Blood Sugar.  Thyroid low again. Grace Canales you missed any doses of your thyroid medication? Please call me if you have any questions: [phone] Sincerely, Faith Pierson MD

## 2018-01-09 NOTE — MR AVS SNAPSHOT
Visit Information Date & Time Provider Department Dept. Phone Encounter #  
 1/9/2018  9:00 AM Lakia Titus MD [facility] [phone] [id number] Follow-up Instructions Return in about 6 months (around 7/9/2018) for Recheck, 40 min appt. Routing History Upcoming Health Maintenance Date Due  
 GLAUCOMA SCREENING Q2Y 3/2/2017 ZOSTER VACCINE AGE 60> 4/9/2018* DTaP/Tdap/Td series (1 - Tdap) 4/9/2018* COLONOSCOPY 8/31/2018 MEDICARE YEARLY EXAM 10/18/2018 *Topic was postponed. The date shown is not the original due date. Allergies as of 1/9/2018  Review Complete On: 1/9/2018 By: Lakia Titus MD  
  
 Severity Noted Reaction Type Reactions Norco [Hydrocodone-acetaminophen]  01/18/2013    Other (comments) Too sedated and felt like he was in a daze Pcn [Penicillins]  10/09/2009    Rash Percocet [Oxycodone-acetaminophen]  05/02/2011   Side Effect Nausea and Vomiting Current Immunizations  Reviewed on 8/24/2017 Name Date Influenza High Dose Vaccine PF 10/17/2017, 10/5/2015 12:38 PM, 2/12/2015, 11/4/2013 Influenza Vaccine 10/1/2016 Influenza Vaccine Split 10/15/2012, 12/1/2010 Pneumococcal Conjugate (PCV-13) 3/15/2017 Pneumococcal Polysaccharide (PPSV-23) 1/12/2011 Not reviewed this visit You Were Diagnosed With   
  
 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 
ICD-9-CM: 401.9 Dyslipidemia     ICD-10-CM: E78.5 ICD-9-CM: 272.4  Prediabetes     ICD-10-CM: R73.03 
 ICD-9-CM: 790.29 Vitamin D deficiency     ICD-10-CM: E55.9 ICD-9-CM: 268.9 Increased glucose level     ICD-10-CM: R73.09 
ICD-9-CM: 790.29 Vitals BP Pulse Temp Resp Height(growth percentile) Weight(growth percentile) 150/80 (BP 1 Location: Right arm, BP Patient Position: Sitting) 86 97 °F (36.1 °C) (Oral) 18 5' 3\" (1.6 m) 178 lb 11.2 oz (81.1 kg) SpO2 BMI Smoking Status 95% 31.66 kg/m2 Former Smoker Vitals History BMI and BSA Data Body Mass Index Body Surface Area  
 31.66 kg/m 2 1.9 m 2 Preferred Pharmacy Pharmacy Name Phone [id number], [address] [place] [address] [phone] Your Updated Medication List  
  
   
This list is accurate as of: 1/9/18  9:53 AM.  Always use your most recent med list.  
  
  
  
  
 albuterol 90 mcg/actuation inhaler Commonly known as:  PROVENTIL HFA, VENTOLIN HFA, PROAIR HFA Take 2 Puffs by inhalation every four (4) hours as needed for Wheezing. albuterol-ipratropium 2.5 mg-0.5 mg/3 ml Nebu Commonly known as:  DUO-NEB  
3 mL by Nebulization route every six (6) hours as needed. amLODIPine 5 mg tablet Commonly known as:  Deborha Cords TAKE 1 TAB BY MOUTH DAILY. aspirin delayed-release 81 mg tablet Take 1 Tab by mouth daily for 360 days. atorvastatin 40 mg tablet Commonly known as:  LIPITOR Take 1 Tab by mouth daily. YRN. Give patient BRAND LIPITOR. D/C Crestor  
  
 clopidogrel 75 mg Tab Commonly known as:  PLAVIX TAKE 1 TAB BY MOUTH DAILY. INDICATIONS: THROMBOSIS PREVENTION AFTER PCI  
  
 clotrimazole-betamethasone topical cream  
Commonly known as:  Zohra Tipton Apply twice daily to affected area till resolved. finasteride 5 mg tablet Commonly known as:  PROSCAR  
daily. FISH OIL 1,000 mg Cap Generic drug:  omega-3 fatty acids-vitamin e Take 1 Cap by mouth daily. furosemide 20 mg tablet Commonly known as:  LASIX TAKE 1 TAB BY MOUTH DAILY. isosorbide mononitrate ER 30 mg tablet Commonly known as:  IMDUR  
TAKE 1 TABLET BY MOUTH EVERY DAY  
  
 levothyroxine 88 mcg tablet Commonly known as:  SYNTHROID  
TAKE 1 TABLET BY MOUTH DAILY (BEFORE BREAKFAST). losartan-hydroCHLOROthiazide 100-12.5 mg per tablet Commonly known as:  HYZAAR  
TAKE 1 TABLET BY MOUTH EVERY DAY  
  
 metoprolol succinate 25 mg XL tablet Commonly known as:  TOPROL-XL  
TAKE 1 TABLET EVERY DAY  
  
 omeprazole 40 mg capsule Commonly known as:  PRILOSEC Take 1 Cap by mouth two (2) times a day. tamsulosin 0.4 mg capsule Commonly known as:  FLOMAX TAKE 1 CAPSULE EVERY DAY  
  
 VITAMIN C 1,000 mg tablet Generic drug:  ascorbic acid (vitamin C) Take  by mouth. VITAMIN D3 PO Take  by mouth. We Performed the Following HEMOGLOBIN A1C WITH EAG [04433 CPT(R)] REFERRAL TO VASCULAR SURGERY [HFZ145 Custom] T4, FREE F5699993 CPT(R)] TSH 3RD GENERATION [61696 CPT(R)] URINALYSIS W/ RFLX MICROSCOPIC [42895 CPT(R)] VITAMIN D, 25 HYDROXY Q2194817 CPT(R)] Follow-up Instructions Return in about 6 months (around 7/9/2018) for Recheck, 40 min appt. Referral Information Referral ID Referred By Referred To  
  
 [id number] Sandrine GIFFORD MD   
   [address] [place] [address] Phone: [phone] Fax: [phone] Visits Status Start Date End Date 1 New Request 1/9/18 1/9/19 If your referral has a status of pending review or denied, additional information will be sent to support the outcome of this decision. Introducing [facility]! Roro Pineda introduces [employer] patient portal. Now you can access parts of your medical record, email your doctor's office, and request medication refills online.    
 
1. In your internet browser, go to [email or web address] 2. Click on the First Time User? Click Here link in the Sign In box. You will see the New Member Sign Up page. 3. Enter your [employer] Access Code exactly as it appears below. You will not need to use this code after youve completed the sign-up process. If you do not sign up before the expiration date, you must request a new code. · [employer] Access Code: [email or web address] Expires: 2/4/2018  6:17 AM 
 
4. Enter the last four digits of your Social Security Number (xxxx) and Date of Birth (mm/dd/yyyy) as indicated and click Submit. You will be taken to the next sign-up page. 5. Create a Demandforcet ID. This will be your [employer] login ID and cannot be changed, so think of one that is secure and easy to remember. 6. Create a [employer] password. You can change your password at any time. 7. Enter your Password Reset Question and Answer. This can be used at a later time if you forget your password. 8. Enter your e-mail address. You will receive e-mail notification when new information is available in [address] 9. Click Sign Up. You can now view and download portions of your medical record. 10. Click the Download Summary menu link to download a portable copy of your medical information. If you have questions, please visit the Frequently Asked Questions section of the [employer] website. Remember, [employer] is NOT to be used for urgent needs. For medical emergencies, dial 911. Now available from your iPhone and Android! Please provide this summary of care documentation to your next provider. Your primary care clinician is listed as [id number] If you have any questions after today's visit, please call [phone].

## 2018-01-09 NOTE — PROGRESS NOTES
Chief Complaint   Patient presents with    Labs     Follow up on Thyroid    Leg Problem     Difficult to walk and need order for a wlaker and power chair. 1. Have you been to the ER, urgent care clinic since your last visit? Hospitalized since your last visit? Yes When: 12/18/17 Where: Dr. Billie Hodge Reason for visit: Cardiac cath with 2 stents. 2. Have you seen or consulted any other health care providers outside of the [address] [place] [address] since your last visit? Include any pap smears or colon screening. No       I have reviewed Health Maintenance with the patient and updated. Has an Advance Directive.

## 2018-01-09 NOTE — ASSESSMENT & PLAN NOTE
This condition is managed by Specialist.  Key CAD CHF Meds             furosemide (LASIX) 20 mg tablet TAKE 1 TAB BY MOUTH DAILY. isosorbide mononitrate ER (IMDUR) 30 mg tablet TAKE 1 TABLET BY MOUTH EVERY DAY    metoprolol succinate (TOPROL-XL) 25 mg XL tablet TAKE 1 TABLET EVERY DAY    amLODIPine (NORVASC) 5 mg tablet TAKE 1 TAB BY MOUTH DAILY. aspirin delayed-release 81 mg tablet Take 1 Tab by mouth daily for 360 days. clopidogrel (PLAVIX) 75 mg tab Take 1 Tab by mouth daily. Indications: Thrombosis Prevention after PCI    losartan-hydroCHLOROthiazide (HYZAAR) 100-12.5 mg per tablet TAKE 1 TABLET BY MOUTH EVERY DAY        Key Antihyperlipidemia Meds             atorvastatin (LIPITOR) 40 mg tablet Take 1 Tab by mouth daily. YRN. Give patient BRAND LIPITOR. D/C Crestor    omega-3 fatty acids-vitamin e (FISH OIL) 1,000 mg cap Take 1 Cap by mouth daily.         Lab Results   Component Value Date/Time    Sodium 142 11/23/2017 04:23 AM    Sodium (POC) 135 02/05/2017 03:44 PM    Potassium 4.0 11/23/2017 04:23 AM    Potassium (POC) 3.7 02/05/2017 03:44 PM    Cholesterol, total 147 11/07/2017 03:24 AM    HDL Cholesterol 63 11/07/2017 03:24 AM    LDL, calculated 70.2 11/07/2017 03:24 AM    Triglyceride 69 11/07/2017 03:24 AM    INR 1.0 11/21/2017 04:39 PM    Prothrombin time 10.4 11/21/2017 04:39 PM

## 2018-01-09 NOTE — ASSESSMENT & PLAN NOTE
Stable, based on history, physical exam and review of pertinent labs, studies and medications; meds reconciled; continue current treatment plan. Key Peripheral Vascular Disease Meds             aspirin delayed-release 81 mg tablet Take 1 Tab by mouth daily for 360 days. clopidogrel (PLAVIX) 75 mg tab Take 1 Tab by mouth daily. Indications: Thrombosis Prevention after PCI    atorvastatin (LIPITOR) 40 mg tablet Take 1 Tab by mouth daily. YRN. Give patient BRAND LIPITOR. D/C Crestor    omega-3 fatty acids-vitamin e (FISH OIL) 1,000 mg cap Take 1 Cap by mouth daily.         Lab Results   Component Value Date/Time    WBC 5.2 11/23/2017 04:23 AM    HGB 12.5 11/23/2017 04:23 AM    Hemoglobin (POC) 13.3 02/05/2017 03:44 PM    HCT 36.7 11/23/2017 04:23 AM    Hematocrit (POC) 39 02/05/2017 03:44 PM    PLATELET 031 16/72/1892 04:23 AM    Creatinine 1.20 11/23/2017 04:23 AM    Creatinine (POC) 1.9 02/05/2017 03:44 PM    BUN 9 11/23/2017 04:23 AM    BUN (POC) 69 02/05/2017 03:44 PM    INR 1.0 11/21/2017 04:39 PM    Prothrombin time 10.4 11/21/2017 04:39 PM    Cholesterol, total 147 11/07/2017 03:24 AM    HDL Cholesterol 63 11/07/2017 03:24 AM    LDL, calculated 70.2 11/07/2017 03:24 AM    Triglyceride 69 11/07/2017 03:24 AM

## 2018-01-09 NOTE — ASSESSMENT & PLAN NOTE
This condition is managed by Specialist.   Key Neurological Meds             aspirin delayed-release 81 mg tablet Take 1 Tab by mouth daily for 360 days. clopidogrel (PLAVIX) 75 mg tab Take 1 Tab by mouth daily. Indications:  Thrombosis Prevention after PCI        Lab Results   Component Value Date/Time    WBC 5.2 11/23/2017 04:23 AM    HGB 12.5 11/23/2017 04:23 AM    Hemoglobin (POC) 13.3 02/05/2017 03:44 PM    HCT 36.7 11/23/2017 04:23 AM    Hematocrit (POC) 39 02/05/2017 03:44 PM    PLATELET 430 94/48/5446 04:23 AM    INR 1.0 11/21/2017 04:39 PM    Prothrombin time 10.4 11/21/2017 04:39 PM    Creatinine 1.20 11/23/2017 04:23 AM    Creatinine (POC) 1.9 02/05/2017 03:44 PM    BUN 9 11/23/2017 04:23 AM    BUN (POC) 69 02/05/2017 03:44 PM

## 2018-01-10 LAB
25(OH)D3+25(OH)D2 SERPL-MCNC: 36.3 NG/ML (ref 30–100)
APPEARANCE UR: CLEAR
BILIRUB UR QL STRIP: NEGATIVE
COLOR UR: YELLOW
EST. AVERAGE GLUCOSE BLD GHB EST-MCNC: 108 MG/DL
GLUCOSE UR QL: NEGATIVE
HBA1C MFR BLD: 5.4 % (ref 4.8–5.6)
HGB UR QL STRIP: NEGATIVE
KETONES UR QL STRIP: NEGATIVE
LEUKOCYTE ESTERASE UR QL STRIP: NEGATIVE
MICRO URNS: NORMAL
NITRITE UR QL STRIP: NEGATIVE
PH UR STRIP: 5.5 [PH] (ref 5–7.5)
PROT UR QL STRIP: NEGATIVE
SP GR UR: 1.01 (ref 1–1.03)
T4 FREE SERPL-MCNC: 1.43 NG/DL (ref 0.82–1.77)
TSH SERPL DL<=0.005 MIU/L-ACNC: 5.28 UIU/ML (ref 0.45–4.5)
UROBILINOGEN UR STRIP-MCNC: 1 MG/DL (ref 0.2–1)

## 2018-01-18 RX ORDER — LOSARTAN POTASSIUM AND HYDROCHLOROTHIAZIDE 12.5; 1 MG/1; MG/1
TABLET ORAL
Qty: 30 TAB | Refills: 2 | Status: SHIPPED | OUTPATIENT
Start: 2018-01-18 | End: 2018-05-25 | Stop reason: SDUPTHER

## 2018-01-19 NOTE — TELEPHONE ENCOUNTER
PCP: Tala Verduzco MD    Last appt: 1/9/2018  Future Appointments  Date Time Provider Batsheva Torres   7/10/2018 8:20 AM Tala Verduzco MD [facility]       Requested Prescriptions     Pending Prescriptions Disp Refills    levothyroxine (SYNTHROID) 88 mcg tablet [Pharmacy Med Name: LEVOTHYROXINE 88 MCG TABLET] 30 Tab 0     Sig: TAKE 1 TABLET BY MOUTH DAILY (BEFORE BREAKFAST).      Lab Results   Component Value Date/Time    Sodium 142 11/23/2017 04:23 AM    Potassium 4.0 11/23/2017 04:23 AM    Chloride 110 11/23/2017 04:23 AM    CO2 24 11/23/2017 04:23 AM    Anion gap 8 11/23/2017 04:23 AM    Glucose 102 11/23/2017 04:23 AM    BUN 9 11/23/2017 04:23 AM    Creatinine 1.20 11/23/2017 04:23 AM    BUN/Creatinine ratio 8 11/23/2017 04:23 AM    GFR est AA >60 11/23/2017 04:23 AM    GFR est non-AA 58 11/23/2017 04:23 AM    Calcium 8.6 11/23/2017 04:23 AM     Lab Results   Component Value Date/Time    Hemoglobin A1c 5.4 01/09/2018 10:10 AM    Hemoglobin A1c (POC) 5.7 01/14/2016 01:52 PM     Lab Results   Component Value Date/Time    Cholesterol, total 147 11/07/2017 03:24 AM    HDL Cholesterol 63 11/07/2017 03:24 AM    LDL, calculated 70.2 11/07/2017 03:24 AM    VLDL, calculated 13.8 11/07/2017 03:24 AM    Triglyceride 69 11/07/2017 03:24 AM    CHOL/HDL Ratio 2.3 11/07/2017 03:24 AM     Lab Results   Component Value Date/Time    TSH 5.280 01/09/2018 10:10 AM

## 2018-01-20 RX ORDER — LEVOTHYROXINE SODIUM 88 UG/1
TABLET ORAL
Qty: 30 TAB | Refills: 0 | Status: SHIPPED | OUTPATIENT
Start: 2018-01-20 | End: 2018-02-19 | Stop reason: SDUPTHER

## 2018-02-05 RX ORDER — METOPROLOL SUCCINATE 25 MG/1
TABLET, EXTENDED RELEASE ORAL
Qty: 30 TAB | Refills: 2 | Status: SHIPPED | OUTPATIENT
Start: 2018-02-05 | End: 2018-10-07 | Stop reason: SDUPTHER

## 2018-02-06 ENCOUNTER — OFFICE VISIT (OUTPATIENT)
Dept: FAMILY MEDICINE CLINIC | Age: 83
End: 2018-02-06

## 2018-02-06 VITALS
HEIGHT: 63 IN | HEART RATE: 75 BPM | OXYGEN SATURATION: 92 % | DIASTOLIC BLOOD PRESSURE: 81 MMHG | RESPIRATION RATE: 18 BRPM | SYSTOLIC BLOOD PRESSURE: 148 MMHG | BODY MASS INDEX: 32 KG/M2 | WEIGHT: 180.6 LBS | TEMPERATURE: 97.2 F

## 2018-02-06 DIAGNOSIS — M79.604 PAIN IN BOTH LOWER EXTREMITIES: Primary | ICD-10-CM

## 2018-02-06 DIAGNOSIS — M79.605 PAIN IN BOTH LOWER EXTREMITIES: Primary | ICD-10-CM

## 2018-02-06 NOTE — PROGRESS NOTES
Script written for power wheelchair. Given appt to f/u with vascular surgeon for presumed claudication causing leg pain.

## 2018-02-06 NOTE — PROGRESS NOTES
Sherman Mathursiomara Jackson  Identified pt with two pt identifiers(name and ). Chief Complaint   Patient presents with    Leg Pain     Has gotten worse since his last OV here. 1. Have you been to the ER, urgent care clinic since your last visit? Hospitalized since your last visit? NO    2. Have you seen or consulted any other health care providers outside of the [address] [place] [address] since your last visit? Include any pap smears or colon screening. NO    Today's provider has been notified of reason for visit, vitals and flowsheets obtained on patients.      Patient received paperwork for advance directive during previous visit but has not completed at this time     Reviewed record In preparation for visit, huddled with provider and have obtained necessary documentation      Health Maintenance Due   Topic    GLAUCOMA SCREENING Q2Y        Wt Readings from Last 3 Encounters:   18 180 lb 9.6 oz (81.9 kg)   18 178 lb 11.2 oz (81.1 kg)   17 179 lb 11.2 oz (81.5 kg)     Temp Readings from Last 3 Encounters:   18 97 °F (36.1 °C) (Oral)   17 98.1 °F (36.7 °C)   17 98.2 °F (36.8 °C)     BP Readings from Last 3 Encounters:   18 150/80   17 130/82   17 (!) 214/119     Pulse Readings from Last 3 Encounters:   18 86   17 98   17 80     Vitals:    18 1205   Weight: 180 lb 9.6 oz (81.9 kg)   Height: 5' 3\" (1.6 m)   PainSc:   8   PainLoc: Leg         Learning Assessment:  :     Learning Assessment 2016   PRIMARY LEARNER Patient Patient   BARRIERS PRIMARY LEARNER HEARING -   PRIMARY LANGUAGE ENGLISH ENGLISH   LEARNER PREFERENCE PRIMARY READING VIDEOS   ANSWERED BY PATIENT Shy Li   RELATIONSHIP SELF SELF       Depression Screening:  :     PHQ over the last two weeks 2017   Little interest or pleasure in doing things Not at all   Feeling down, depressed or hopeless Not at all   Total Score PHQ 2 0       Fall Risk Assessment:  :     Fall Risk Assessment, last 12 mths 12/18/2017   Able to walk? Yes   Fall in past 12 months? No       Abuse Screening:  :     Abuse Screening Questionnaire 10/17/2017   Do you ever feel afraid of your partner? N   Are you in a relationship with someone who physically or mentally threatens you? N   Is it safe for you to go home? Y       ADL Screening:  :     ADL Assessment 10/17/2017   Feeding yourself No Help Needed   Getting from bed to chair No Help Needed   Getting dressed No Help Needed   Bathing or showering No Help Needed   Walk across the room (includes cane/walker) No Help Needed   Using the telphone No Help Needed   Taking your medications No Help Needed   Preparing meals No Help Needed   Managing money (expenses/bills) No Help Needed   Moderately strenuous housework (laundry) No Help Needed   Shopping for personal items (toiletries/medicines) No Help Needed   Shopping for groceries No Help Needed   Driving No Help Needed   Climbing a flight of stairs No Help Needed   Getting to places beyond walking distances No Help Needed                 Medication reconciliation up to date and corrected with patient at this time.

## 2018-02-12 ENCOUNTER — TELEPHONE (OUTPATIENT)
Dept: FAMILY MEDICINE CLINIC | Age: 83
End: 2018-02-12

## 2018-02-12 DIAGNOSIS — M79.604 PAIN IN BOTH LOWER EXTREMITIES: Primary | ICD-10-CM

## 2018-02-12 DIAGNOSIS — M79.605 PAIN IN BOTH LOWER EXTREMITIES: Primary | ICD-10-CM

## 2018-02-12 DIAGNOSIS — I73.9 CLAUDICATION OF BOTH LOWER EXTREMITIES (HCC): ICD-10-CM

## 2018-02-12 NOTE — TELEPHONE ENCOUNTER
----- Message from Jared Trotter sent at 2/12/2018 10:04 AM EST -----  Regarding: Dr. Kirstin Ramos Telephone  Patient would like a call back to [phone]. He needs to have another recommendation for a doctor for his legs. [id number] [facility] called and cancelled his appt today because they told him they did not do legs.

## 2018-02-16 DIAGNOSIS — I25.83 CORONARY ARTERY DISEASE DUE TO LIPID RICH PLAQUE: ICD-10-CM

## 2018-02-16 DIAGNOSIS — I25.10 CORONARY ARTERY DISEASE DUE TO LIPID RICH PLAQUE: ICD-10-CM

## 2018-02-16 DIAGNOSIS — I73.9 PVD (PERIPHERAL VASCULAR DISEASE) WITH CLAUDICATION (HCC): ICD-10-CM

## 2018-02-16 RX ORDER — AMLODIPINE BESYLATE 5 MG/1
TABLET ORAL
Qty: 30 TAB | Refills: 2 | Status: SHIPPED | OUTPATIENT
Start: 2018-02-16 | End: 2018-05-20

## 2018-02-16 RX ORDER — ISOSORBIDE MONONITRATE 30 MG/1
TABLET, EXTENDED RELEASE ORAL
Qty: 30 TAB | Refills: 2 | Status: SHIPPED | OUTPATIENT
Start: 2018-02-16 | End: 2018-06-29 | Stop reason: SDUPTHER

## 2018-03-16 ENCOUNTER — APPOINTMENT (OUTPATIENT)
Dept: CT IMAGING | Age: 83
DRG: 166 | End: 2018-03-16
Attending: NURSE PRACTITIONER
Payer: MEDICARE

## 2018-03-16 ENCOUNTER — HOSPITAL ENCOUNTER (INPATIENT)
Age: 83
LOS: 7 days | Discharge: NURSING, REHAB OR LONG-TERM CARE | DRG: 166 | End: 2018-03-23
Attending: EMERGENCY MEDICINE | Admitting: HOSPITALIST
Payer: MEDICARE

## 2018-03-16 ENCOUNTER — APPOINTMENT (OUTPATIENT)
Dept: NUCLEAR MEDICINE | Age: 83
DRG: 166 | End: 2018-03-16
Attending: EMERGENCY MEDICINE
Payer: MEDICARE

## 2018-03-16 ENCOUNTER — APPOINTMENT (OUTPATIENT)
Dept: GENERAL RADIOLOGY | Age: 83
DRG: 166 | End: 2018-03-16
Attending: EMERGENCY MEDICINE
Payer: MEDICARE

## 2018-03-16 ENCOUNTER — APPOINTMENT (OUTPATIENT)
Dept: CT IMAGING | Age: 83
DRG: 166 | End: 2018-03-16
Attending: EMERGENCY MEDICINE
Payer: MEDICARE

## 2018-03-16 DIAGNOSIS — I26.99 OTHER ACUTE PULMONARY EMBOLISM WITHOUT ACUTE COR PULMONALE (HCC): ICD-10-CM

## 2018-03-16 DIAGNOSIS — I82.491 ACUTE DEEP VEIN THROMBOSIS (DVT) OF OTHER SPECIFIED VEIN OF RIGHT LOWER EXTREMITY (HCC): Primary | ICD-10-CM

## 2018-03-16 PROBLEM — I82.409 ACUTE DVT (DEEP VENOUS THROMBOSIS) (HCC): Status: ACTIVE | Noted: 2018-03-16

## 2018-03-16 LAB
ALBUMIN SERPL-MCNC: 3.2 G/DL (ref 3.5–5)
ALBUMIN/GLOB SERPL: 0.7 {RATIO} (ref 1.1–2.2)
ALP SERPL-CCNC: 135 U/L (ref 45–117)
ALT SERPL-CCNC: 26 U/L (ref 12–78)
ANION GAP SERPL CALC-SCNC: 8 MMOL/L (ref 5–15)
APPEARANCE UR: CLEAR
ARTERIAL PATENCY WRIST A: YES
AST SERPL-CCNC: 43 U/L (ref 15–37)
BACTERIA URNS QL MICRO: ABNORMAL /HPF
BASE EXCESS BLD CALC-SCNC: 0 MMOL/L
BASOPHILS # BLD: 0 K/UL (ref 0–0.1)
BASOPHILS NFR BLD: 1 % (ref 0–1)
BDY SITE: ABNORMAL
BILIRUB SERPL-MCNC: 0.7 MG/DL (ref 0.2–1)
BILIRUB UR QL: NEGATIVE
BUN SERPL-MCNC: 34 MG/DL (ref 6–20)
BUN/CREAT SERPL: 12 (ref 12–20)
CALCIUM SERPL-MCNC: 9 MG/DL (ref 8.5–10.1)
CHLORIDE SERPL-SCNC: 102 MMOL/L (ref 97–108)
CO2 SERPL-SCNC: 27 MMOL/L (ref 21–32)
COLOR UR: ABNORMAL
CREAT SERPL-MCNC: 2.91 MG/DL (ref 0.7–1.3)
DIFFERENTIAL METHOD BLD: ABNORMAL
EOSINOPHIL # BLD: 0.1 K/UL (ref 0–0.4)
EOSINOPHIL NFR BLD: 3 % (ref 0–7)
EPITH CASTS URNS QL MICRO: ABNORMAL /LPF
ERYTHROCYTE [DISTWIDTH] IN BLOOD BY AUTOMATED COUNT: 17.7 % (ref 11.5–14.5)
GAS FLOW.O2 O2 DELIVERY SYS: ABNORMAL L/MIN
GAS FLOW.O2 SETTING OXYMISER: 3 L/M
GLOBULIN SER CALC-MCNC: 4.7 G/DL (ref 2–4)
GLUCOSE BLD STRIP.AUTO-MCNC: 109 MG/DL (ref 65–100)
GLUCOSE SERPL-MCNC: 97 MG/DL (ref 65–100)
GLUCOSE UR STRIP.AUTO-MCNC: NEGATIVE MG/DL
HCO3 BLD-SCNC: 24.5 MMOL/L (ref 22–26)
HCT VFR BLD AUTO: 40.6 % (ref 36.6–50.3)
HGB BLD-MCNC: 13.4 G/DL (ref 12.1–17)
HGB UR QL STRIP: NEGATIVE
IMM GRANULOCYTES # BLD: 0 K/UL (ref 0–0.04)
IMM GRANULOCYTES NFR BLD AUTO: 0 % (ref 0–0.5)
INR PPP: 1.1 (ref 0.9–1.1)
KETONES UR QL STRIP.AUTO: NEGATIVE MG/DL
LEUKOCYTE ESTERASE UR QL STRIP.AUTO: NEGATIVE
LYMPHOCYTES # BLD: 0.7 K/UL (ref 0.8–3.5)
LYMPHOCYTES NFR BLD: 16 % (ref 12–49)
MCH RBC QN AUTO: 29.8 PG (ref 26–34)
MCHC RBC AUTO-ENTMCNC: 33 G/DL (ref 30–36.5)
MCV RBC AUTO: 90.2 FL (ref 80–99)
MONOCYTES # BLD: 0.5 K/UL (ref 0–1)
MONOCYTES NFR BLD: 12 % (ref 5–13)
NEUTS SEG # BLD: 3.2 K/UL (ref 1.8–8)
NEUTS SEG NFR BLD: 68 % (ref 32–75)
NITRITE UR QL STRIP.AUTO: NEGATIVE
NRBC # BLD: 0 K/UL (ref 0–0.01)
NRBC BLD-RTO: 0 PER 100 WBC
PCO2 BLD: 36.6 MMHG (ref 35–45)
PH BLD: 7.43 [PH] (ref 7.35–7.45)
PH UR STRIP: 5.5 [PH] (ref 5–8)
PLATELET # BLD AUTO: 214 K/UL (ref 150–400)
PMV BLD AUTO: 10 FL (ref 8.9–12.9)
PO2 BLD: 71 MMHG (ref 80–100)
POTASSIUM SERPL-SCNC: 3.5 MMOL/L (ref 3.5–5.1)
PROT SERPL-MCNC: 7.9 G/DL (ref 6.4–8.2)
PROT UR STRIP-MCNC: NEGATIVE MG/DL
PROTHROMBIN TIME: 11.1 SEC (ref 9–11.1)
RBC # BLD AUTO: 4.5 M/UL (ref 4.1–5.7)
RBC #/AREA URNS HPF: ABNORMAL /HPF (ref 0–5)
RBC MORPH BLD: ABNORMAL
SAO2 % BLD: 95 % (ref 92–97)
SERVICE CMNT-IMP: ABNORMAL
SODIUM SERPL-SCNC: 137 MMOL/L (ref 136–145)
SP GR UR REFRACTOMETRY: 1.01 (ref 1–1.03)
SPECIMEN TYPE: ABNORMAL
TROPONIN I SERPL-MCNC: 0.1 NG/ML
TROPONIN I SERPL-MCNC: 0.13 NG/ML
UR CULT HOLD, URHOLD: NORMAL
UROBILINOGEN UR QL STRIP.AUTO: 1 EU/DL (ref 0.2–1)
WBC # BLD AUTO: 4.5 K/UL (ref 4.1–11.1)
WBC URNS QL MICRO: ABNORMAL /HPF (ref 0–4)

## 2018-03-16 PROCEDURE — 93005 ELECTROCARDIOGRAM TRACING: CPT

## 2018-03-16 PROCEDURE — 94640 AIRWAY INHALATION TREATMENT: CPT

## 2018-03-16 PROCEDURE — 82962 GLUCOSE BLOOD TEST: CPT

## 2018-03-16 PROCEDURE — 81001 URINALYSIS AUTO W/SCOPE: CPT | Performed by: EMERGENCY MEDICINE

## 2018-03-16 PROCEDURE — 74011250636 HC RX REV CODE- 250/636: Performed by: HOSPITALIST

## 2018-03-16 PROCEDURE — 87086 URINE CULTURE/COLONY COUNT: CPT | Performed by: HOSPITALIST

## 2018-03-16 PROCEDURE — 87186 SC STD MICRODIL/AGAR DIL: CPT | Performed by: HOSPITALIST

## 2018-03-16 PROCEDURE — 65660000001 HC RM ICU INTERMED STEPDOWN

## 2018-03-16 PROCEDURE — 94762 N-INVAS EAR/PLS OXIMTRY CONT: CPT

## 2018-03-16 PROCEDURE — 85610 PROTHROMBIN TIME: CPT | Performed by: NURSE PRACTITIONER

## 2018-03-16 PROCEDURE — 36415 COLL VENOUS BLD VENIPUNCTURE: CPT | Performed by: HOSPITALIST

## 2018-03-16 PROCEDURE — 84484 ASSAY OF TROPONIN QUANT: CPT | Performed by: EMERGENCY MEDICINE

## 2018-03-16 PROCEDURE — 82803 BLOOD GASES ANY COMBINATION: CPT

## 2018-03-16 PROCEDURE — 93041 RHYTHM ECG TRACING: CPT

## 2018-03-16 PROCEDURE — 70450 CT HEAD/BRAIN W/O DYE: CPT

## 2018-03-16 PROCEDURE — 87077 CULTURE AEROBIC IDENTIFY: CPT | Performed by: HOSPITALIST

## 2018-03-16 PROCEDURE — 71045 X-RAY EXAM CHEST 1 VIEW: CPT

## 2018-03-16 PROCEDURE — 99285 EMERGENCY DEPT VISIT HI MDM: CPT

## 2018-03-16 PROCEDURE — A9540 TC99M MAA: HCPCS

## 2018-03-16 PROCEDURE — 80053 COMPREHEN METABOLIC PANEL: CPT | Performed by: EMERGENCY MEDICINE

## 2018-03-16 PROCEDURE — 74011250636 HC RX REV CODE- 250/636: Performed by: NURSE PRACTITIONER

## 2018-03-16 PROCEDURE — 85025 COMPLETE CBC W/AUTO DIFF WBC: CPT | Performed by: NURSE PRACTITIONER

## 2018-03-16 PROCEDURE — 96372 THER/PROPH/DIAG INJ SC/IM: CPT

## 2018-03-16 PROCEDURE — 36600 WITHDRAWAL OF ARTERIAL BLOOD: CPT

## 2018-03-16 PROCEDURE — 74011250637 HC RX REV CODE- 250/637: Performed by: HOSPITALIST

## 2018-03-16 PROCEDURE — 74011000250 HC RX REV CODE- 250: Performed by: HOSPITALIST

## 2018-03-16 PROCEDURE — 77030029684 HC NEB SM VOL KT MONA -A

## 2018-03-16 RX ORDER — SODIUM CHLORIDE 0.9 % (FLUSH) 0.9 %
10 SYRINGE (ML) INJECTION
Status: CANCELLED | OUTPATIENT
Start: 2018-03-16 | End: 2018-03-16

## 2018-03-16 RX ORDER — GUAIFENESIN/DEXTROMETHORPHAN 100-10MG/5
10 SYRUP ORAL
Status: DISCONTINUED | OUTPATIENT
Start: 2018-03-16 | End: 2018-03-23 | Stop reason: HOSPADM

## 2018-03-16 RX ORDER — IPRATROPIUM BROMIDE AND ALBUTEROL SULFATE 2.5; .5 MG/3ML; MG/3ML
3 SOLUTION RESPIRATORY (INHALATION)
Status: DISCONTINUED | OUTPATIENT
Start: 2018-03-16 | End: 2018-03-18

## 2018-03-16 RX ORDER — ISOSORBIDE MONONITRATE 30 MG/1
30 TABLET, EXTENDED RELEASE ORAL DAILY
Status: DISCONTINUED | OUTPATIENT
Start: 2018-03-17 | End: 2018-03-23 | Stop reason: HOSPADM

## 2018-03-16 RX ORDER — FINASTERIDE 5 MG/1
5 TABLET, FILM COATED ORAL DAILY
Status: DISCONTINUED | OUTPATIENT
Start: 2018-03-17 | End: 2018-03-23 | Stop reason: HOSPADM

## 2018-03-16 RX ORDER — DOCUSATE SODIUM 100 MG/1
100 CAPSULE, LIQUID FILLED ORAL 2 TIMES DAILY
Status: DISCONTINUED | OUTPATIENT
Start: 2018-03-16 | End: 2018-03-23 | Stop reason: HOSPADM

## 2018-03-16 RX ORDER — SODIUM CHLORIDE 0.9 % (FLUSH) 0.9 %
5-10 SYRINGE (ML) INJECTION EVERY 8 HOURS
Status: DISCONTINUED | OUTPATIENT
Start: 2018-03-16 | End: 2018-03-23 | Stop reason: HOSPADM

## 2018-03-16 RX ORDER — CLOPIDOGREL BISULFATE 75 MG/1
75 TABLET ORAL DAILY
Status: DISCONTINUED | OUTPATIENT
Start: 2018-03-17 | End: 2018-03-23 | Stop reason: HOSPADM

## 2018-03-16 RX ORDER — AMLODIPINE BESYLATE 5 MG/1
5 TABLET ORAL DAILY
Status: DISCONTINUED | OUTPATIENT
Start: 2018-03-17 | End: 2018-03-20

## 2018-03-16 RX ORDER — HEPARIN SODIUM 10000 [USP'U]/100ML
16-36 INJECTION, SOLUTION INTRAVENOUS
Status: DISCONTINUED | OUTPATIENT
Start: 2018-03-17 | End: 2018-03-19

## 2018-03-16 RX ORDER — TAMSULOSIN HYDROCHLORIDE 0.4 MG/1
0.4 CAPSULE ORAL DAILY
Status: DISCONTINUED | OUTPATIENT
Start: 2018-03-17 | End: 2018-03-23 | Stop reason: HOSPADM

## 2018-03-16 RX ORDER — SODIUM CHLORIDE 0.9 % (FLUSH) 0.9 %
10 SYRINGE (ML) INJECTION
Status: DISCONTINUED | OUTPATIENT
Start: 2018-03-16 | End: 2018-03-16

## 2018-03-16 RX ORDER — HEPARIN SODIUM 10000 [USP'U]/100ML
12-25 INJECTION, SOLUTION INTRAVENOUS
Status: DISCONTINUED | OUTPATIENT
Start: 2018-03-16 | End: 2018-03-16 | Stop reason: SDUPTHER

## 2018-03-16 RX ORDER — ASPIRIN 81 MG/1
81 TABLET ORAL DAILY
Status: DISCONTINUED | OUTPATIENT
Start: 2018-03-17 | End: 2018-03-18

## 2018-03-16 RX ORDER — SODIUM CHLORIDE 0.9 % (FLUSH) 0.9 %
5-10 SYRINGE (ML) INJECTION AS NEEDED
Status: DISCONTINUED | OUTPATIENT
Start: 2018-03-16 | End: 2018-03-23 | Stop reason: HOSPADM

## 2018-03-16 RX ORDER — ONDANSETRON 2 MG/ML
4 INJECTION INTRAMUSCULAR; INTRAVENOUS
Status: DISCONTINUED | OUTPATIENT
Start: 2018-03-16 | End: 2018-03-23 | Stop reason: HOSPADM

## 2018-03-16 RX ORDER — LEVOTHYROXINE SODIUM 88 UG/1
88 TABLET ORAL
Status: DISCONTINUED | OUTPATIENT
Start: 2018-03-17 | End: 2018-03-23 | Stop reason: HOSPADM

## 2018-03-16 RX ORDER — ENOXAPARIN SODIUM 100 MG/ML
1 INJECTION SUBCUTANEOUS
Status: COMPLETED | OUTPATIENT
Start: 2018-03-16 | End: 2018-03-16

## 2018-03-16 RX ORDER — ATORVASTATIN CALCIUM 40 MG/1
40 TABLET, FILM COATED ORAL DAILY
Status: DISCONTINUED | OUTPATIENT
Start: 2018-03-17 | End: 2018-03-23 | Stop reason: HOSPADM

## 2018-03-16 RX ORDER — METOPROLOL SUCCINATE 25 MG/1
25 TABLET, EXTENDED RELEASE ORAL DAILY
Status: DISCONTINUED | OUTPATIENT
Start: 2018-03-17 | End: 2018-03-19

## 2018-03-16 RX ADMIN — IPRATROPIUM BROMIDE AND ALBUTEROL SULFATE 3 ML: .5; 3 SOLUTION RESPIRATORY (INHALATION) at 19:51

## 2018-03-16 RX ADMIN — DOCUSATE SODIUM 100 MG: 100 CAPSULE, LIQUID FILLED ORAL at 19:23

## 2018-03-16 RX ADMIN — AZITHROMYCIN MONOHYDRATE 500 MG: 500 INJECTION, POWDER, LYOPHILIZED, FOR SOLUTION INTRAVENOUS at 20:39

## 2018-03-16 RX ADMIN — ENOXAPARIN SODIUM 90 MG: 100 INJECTION SUBCUTANEOUS at 14:06

## 2018-03-16 NOTE — ED PROVIDER NOTES
HPI Comments: 81 y.o. male with past medical history significant for CAD, hypercholesterolemia, HTN, thyroid disease, chronic back pain, BPH, acute prostatitis, acute MI, stroke, DJD, PVD, cerebral embolism with cerebral infarction, encephalocele, ED, calculus of the kidney, ED, AAA, Vitamin D deficiency, prostate cancer, borderline glaucoma, radiation therapy, and pneumonia who presents to the ED with chief complaint of leg swelling. Patient reports bilateral leg swelling and SOB with onset ~2 weeks ago. Patient reports being seen by Dr. Rj Reilly. Tommie Duff MD, Vascular Surgery, for his symptoms \"earlier today. \" Patient reports being referred to the ED for further evaluation for possible blood clots. Patient reports a history of HTN and heart disease. Patient denies a history of blood clots, emphysema, and brain bleeds. Patient denies chest pain. There are no other acute medical concerns at this time. PCP: Chuy Schmitz MD    Note written by Savi Sibley, as dictated by Leticia Cruz MD 1:53 PM     The history is provided by the patient.         Past Medical History:   Diagnosis Date    AAA (abdominal aortic aneurysm) ([address]     Acute MI 12/2010    dr Aneta Comer    Acute prostatitis     again 9/12/16 [facility]'d [facility]    Advance directive discussed with patient 04/20/2016    Aneurysm ([address] 6/2011    AAA    Borderline glaucoma (glaucoma suspect) 02/19/2015    notes from [address] [place] [address] rec'd    BPH (benign prostatic hyperplasia)     Bright red blood per rectum 02/04/2016    [facility] note Shanon Grider- w/u in progress   8/22/17 [facility] f/u note    CAD (coronary artery disease)     Calculus of kidney     Cerebral embolism with cerebral infarction ([address]     Chronic pain     back    Dizzy spells 6/2012    DJD (degenerative joint disease) of lumbar spine     ED (erectile dysfunction)     8/30/17 f[facility]u note [facility]    Elevated PSA 03/20/2014    [facility] rec[facility]     8/24/16 f/u note    Encephalocele ([facility]     Hypercholesterolemia     Hypertension     Pneumonia 02/05/2017    Prostate cancer ([facility] 05/12/2014    crystal henry/ Dr Hair Blackwood 8/22/17 f/u note    PVD (peripheral vascular disease) with claudication ([facility]     S/P radiation therapy 15 months out    probable cause of the rectal bleeding    Stroke [facility] 6/2011    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  COLONOSCOPY N/A 8/31/2016    COLONOSCOPY performed by Rosita Becerra MD at [facility] ENDOSCOPY    ENDOSCOPY, COLON, DIAGNOSTIC      HX CAROTID ENDARTERECTOMY Left     HX HEENT  10/2012    repair right drum cindy dawkins    HX ORTHOPAEDIC Bilateral     BUNIONECTOMY    MI LEFT HEART CATH,PERCUTANEOUS  10/16/2010         PTCA EACH ADDL VESSEL  10/16/2010         PTCA W/ STENT, INITIAL  10/16/2010         UPPER GI ENDOSCOPY,DIAGNOSIS  11/22/2017         VASCULAR SURGERY PROCEDURE UNLIST      left leg varicose vein stripping dr Ryan Weber         Family History:   Problem Relation Age of Onset    Diabetes Mother     Diabetes Father     Cancer Sister      LUNG    Cancer Brother      COLON    Cancer Sister      LUNG    Anesth Problems Neg Hx        Social History     Social History    Marital status: LEGALLY      Spouse name: N/A    Number of children: N/A    Years of education: N/A     Occupational History    Not on file.      Social History Main Topics    Smoking status: Former Smoker     Packs/day: 0.25     Quit date: 9/10/2014    Smokeless tobacco: Never Used      Comment: has not had cigarette in over 3 month    Alcohol use No    Drug use: No    Sexual activity: Not Currently     Other Topics Concern    Not on file     Social History Narrative         ALLERGIES: Norco [hydrocodone-acetaminophen]; Pcn [penicillins]; and Percocet [oxycodone-acetaminophen]    Review of Systems   Respiratory: Positive for shortness of breath. Cardiovascular: Positive for leg swelling. Negative for chest pain. All other systems reviewed and are negative. Vitals:    03/16/18 1200 03/16/18 1205 03/16/18 1407 03/16/18 1413   BP: (!) 187/105 (!) 149/116 142/80 142/73   Pulse: 91 91  77   Resp: 20 22  24   Temp: 98.1 °F (36.7 °C) 98.1 °F (36.7 °C)  97.9 °F (36.6 °C)   SpO2:  96% 93% 94%   Weight:  89.4 kg (197 lb)     Height:  5' 3\" (1.6 m)              Physical Exam   Constitutional: He is oriented to person, place, and time. He appears well-developed and well-nourished. No distress. HENT:   Head: Normocephalic and atraumatic. Eyes: Conjunctivae are normal.   Neck: Normal range of motion. Cardiovascular: Normal rate, regular rhythm, normal heart sounds and intact distal pulses. Exam reveals no friction rub. No murmur heard. dopplerable left dp and pt; right leg dopplerable pt   Pulmonary/Chest: Effort normal and breath sounds normal. No respiratory distress. He has no wheezes. He has no rales. He exhibits no tenderness. Abdominal: Soft. Bowel sounds are normal. He exhibits no distension. There is no tenderness. There is no rebound and no guarding. Musculoskeletal: Normal range of motion. He exhibits edema. He exhibits no tenderness. 3+ woody edema right leg   Neurological: He is alert and oriented to person, place, and time. Coordination normal.   Skin: Skin is warm and dry. He is not diaphoretic. No pallor. Psychiatric: He has a normal mood and affect. His behavior is normal.   Nursing note and vitals reviewed.        MDM  Number of Diagnoses or Management Options  Acute deep vein thrombosis (DVT) of other specified vein of right lower extremity [facility]):   Diagnosis management comments: Sent for entire extremity dvt- lovenox ordered on arrival. cta to eval for PE as is sob. Will discuss with dr Luis Alberto Abdi       Amount and/or Complexity of Data Reviewed  Clinical lab tests: ordered and reviewed  Tests in the radiology section of CPT®: ordered and reviewed  Discuss the patient with other providers: yes (Hospitalist abnd vascular)  Independent visualization of images, tracings, or specimens: yes (ekg)    Patient Progress  Patient progress: stable        ED Course       Procedures     ED EKG interpretation:  Rhythm: sinus rhythm with RBBB; and regular . Rate (approx.): 88 bpm; Axis: normal; ST/T wave: non-specific changes; normal DE interval; QRS is widened; RBBB is unchanged from 12/18/17. Note written by Savi Grace, as dictated by Herlinda Kumar MD 2:04 PM    CONSULT NOTE:  2:32 PM Herlinda Kumar MD spoke with Dr. Cesia Irizarry. Eva Caldwell MD, Consult for Vascular Surgery. Discussed available diagnostic tests and clinical findings. Provider is in agreement with care plans as outlined. Dr. Cesia Irizarry. Eva Caldwell MD states he sees the patient for vascular issues; states he wanted to rule out a PE; states he does not plan on performing any interventions at this time. CONSULT NOTE:  3:01 PM Herlinda Kumar MD spoke with Dr. Ignacio Hebert for Hospitalist. Discussed available diagnostic tests and clinical findings. Provider is in agreement with care plans as outlined. Dr. Jayson Lindsey will see and admit the patient. PROGRESS NOTE:  9:00 PM  Called by RN to evaluate patient. Patient seems more confused than early. Patient reports having a headache for the past ~3 weeks. Will get a CT scan of the head and RN to contact hospitalist as this pt is an admission hold.

## 2018-03-16 NOTE — IP AVS SNAPSHOT
2700 [facility] [address] [place] [address] 
[phone] Patient: Samina Livingston 
MRN: [id number] KQC:[id number] About your hospitalization You were admitted on:  March 16, 2018 You last received care in the:  [facility] SURG/BARIATRICS You were discharged on:  March 23, 2018 Why you were hospitalized Your primary diagnosis was:  Acute Dvt (Deep Venous Thrombosis) (Hcc) Follow-up Information Follow up With Details Comments Contact Info [address]   210 [facility] [address] [employer] 23 Cassie JosephJuliet johnson 
[address] [facility] [address] [employer] [place] [address] 
[phone] Your Scheduled Appointments Friday April 13, 2018  1:15 PM EDT  
ESTABLISHED PATIENT with Marcela Pandya MD  
1400 [facility] 3651 [facility]) [address] [facility]  
[phone] Discharge Orders None A check racquel indicates which time of day the medication should be taken. My Medications START taking these medications Instructions Each Dose to Equal  
 Morning Noon Evening Bedtime  
 guaiFENesin-dextromethorphan 100-10 mg/5 mL syrup Commonly known as:  ROBITUSSIN DM Your last dose was: Your next dose is: Take 10 mL by mouth every six (6) hours as needed for up to 10 days. 10 mL CONTINUE taking these medications Instructions Each Dose to Equal  
 Morning Noon Evening Bedtime  
 albuterol 90 mcg/actuation inhaler Commonly known as:  PROVENTIL HFA, VENTOLIN HFA, PROAIR HFA Your last dose was: Your next dose is: Take 2 Puffs by inhalation every four (4) hours as needed for Wheezing. 2 Puff  
    
   
   
   
  
 albuterol-ipratropium 2.5 mg-0.5 mg/3 ml Nebu Commonly known as:  Cristobal Vigil  
   
 Your last dose was: Your next dose is:    
   
   
 3 mL by Nebulization route every six (6) hours as needed. 3 mL  
    
   
   
   
  
 amLODIPine 5 mg tablet Commonly known as:  Jemima Lucas Your last dose was: Your next dose is: TAKE 1 TAB BY MOUTH DAILY. atorvastatin 40 mg tablet Commonly known as:  LIPITOR Your last dose was: Your next dose is: Take 1 Tab by mouth daily. YRN. Give patient BRAND LIPITOR. D/C Crestor 40 mg  
    
   
   
   
  
 clopidogrel 75 mg Tab Commonly known as:  PLAVIX Your last dose was: Your next dose is: TAKE 1 TAB BY MOUTH DAILY. INDICATIONS: THROMBOSIS PREVENTION AFTER PCI  
     
   
   
   
  
 clotrimazole-betamethasone topical cream  
Commonly known as:  Jun Kennel Your last dose was: Your next dose is:    
   
   
 Apply twice daily to affected area till resolved. finasteride 5 mg tablet Commonly known as:  PROSCAR Your last dose was: Your next dose is:    
   
   
 daily. isosorbide mononitrate ER 30 mg tablet Commonly known as:  IMDUR Your last dose was: Your next dose is: TAKE 1 TABLET BY MOUTH EVERY DAY  
     
   
   
   
  
 levothyroxine 88 mcg tablet Commonly known as:  SYNTHROID Your last dose was: Your next dose is: TAKE 1 TABLET BY MOUTH DAILY (BEFORE BREAKFAST). losartan-hydroCHLOROthiazide 100-12.5 mg per tablet Commonly known as:  HYZAAR Your last dose was: Your next dose is: TAKE 1 TABLET BY MOUTH EVERY DAY  
     
   
   
   
  
 metoprolol succinate 25 mg XL tablet Commonly known as:  TOPROL-XL Your last dose was: Your next dose is: TAKE 1 TABLET EVERY DAY  
     
   
   
   
  
 omeprazole 40 mg capsule Commonly known as:  PRILOSEC Your last dose was: Your next dose is: Take 1 Cap by mouth two (2) times a day. 40 mg  
    
   
   
   
  
 tamsulosin 0.4 mg capsule Commonly known as:  FLOMAX Your last dose was: Your next dose is: TAKE 1 CAPSULE EVERY DAY  
     
   
   
   
  
 VITAMIN C 1,000 mg tablet Generic drug:  ascorbic acid (vitamin C) Your last dose was: Your next dose is: Take  by mouth. VITAMIN D3 PO Your last dose was: Your next dose is: Take  by mouth. STOP taking these medications FISH OIL 1,000 mg Cap Generic drug:  omega-3 fatty acids-vitamin e  
   
  
 furosemide 20 mg tablet Commonly known as:  LASIX ASK your doctor about these medications Instructions Each Dose to Equal  
 Morning Noon Evening Bedtime  
 aspirin delayed-release 81 mg tablet Your last dose was: Your next dose is: Take 1 Tab by mouth daily for 360 days. 81 mg Where to Get Your Medications Information on where to get these meds will be given to you by the nurse or doctor. ! Ask your nurse or doctor about these medications  
  guaiFENesin-dextromethorphan 100-10 mg/5 mL syrup Discharge Instructions Discharge SNF/Rehab Instructions/LTAC  
 
 
PATIENT ID: Samina Livingston 
MRN: [id number] YOB: 1935 DATE OF ADMISSION: 3/16/2018  1:38 PM   
DATE OF DISCHARGE: 3/23/2018 PRIMARY CARE PROVIDER: Cassie Oneal MD  
 
 
ATTENDING PHYSICIAN: Sylvia Ayala MD 
DISCHARGING PROVIDER: Syvlia Ayala MD    
To contact this individual call [phone] and ask the  to page. If unavailable ask to be transferred the Adult Hospitalist Department. CONSULTATIONS: IP CONSULT TO VASCULAR SURGERY 
IP CONSULT TO GASTROENTEROLOGY IP CONSULT TO INFECTIOUS DISEASES 
IP CONSULT TO HEMATOLOGY 
IP CONSULT TO CARDIOLOGY PROCEDURES/SURGERIES: * No surgery found * [address] COURSE:  
80 yo man with PAD s/p LLE angioplasty, CAD s/p stent, HTN, h/o stroke, h/o rectal bleed, carotid stenosis s/p CEA, AAA s/p stent was sent by Dr Tommie Duff for b/l DVT on 3/16/18. Patient was Vascular Surgery clinic for routine f/u s/p AAA stent and due to RLE swelling. He was admitted with acute b/l LE DVTs. Elevation of troponin was noted with VQ indeterminate, echo with mild-mod PHTN, leading to thought he also has PE, started  Enoxaparin. But he developed bloody stools 3/17 and 3/18. 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 Also on plavix which must be continued for drug eluting stent placed 11/6/17. Had IVC filter placed 3/20/18. Hem/onc recommended discontinuation of anticoagulation due to ongoing mild rectal bleeding. Enoxaparin discontinued on 3/22/18. Cardiology suggested continuation of Plavix and   Follow up with Dr. Kiarra Rico post hospital discharge in 2 months. DISCHARGE DIAGNOSES / PLAN:   
 
DVT/PE with evidence of acute cor pulmonale (POA)  
- likely provoked due to recent surgery 
- likely causing indeterminate troponin elevation - V/Q scan 3/16 with intermediate probability of PE Doppler lE On the right, there is evidence of common femoral, deep 
femoral, popliteal, posterior tibial and great saphenous vein 
thrombosis, as described above.  On the left there is evidence of 
popliteal vein thrombosis, 
- TTE 3/16 limited visualization, EF 60%, mild-mod PHTN 
- hematology/oncology following, due to on going rectal bleeding, anticoagualtion discontinued. Had IVC filter placed 3/20/18. 
   
GI bleed - likely related to his radiation proctitis, exacerbated by anticoagulation, cannot r/o neoplasia or diverticular bleeding. Hgb stable. Monitor CBC. -Seen by soheila GALDAMEZ to discharge from GI standpoint to follow-up outpatient Dr. Jaison Morin for any recurrent bleeding.  
   
CAD s/p stent - troponin 0.1 is likely strain from PE rather than ACS 
- MARY ANN in the mid-circ, MARY ANN in the distal RVA 11/6/17 
- ECG with sinus, 1st degree AV block, old RBBB 
- Continue atorvastatin, imdur, metoprolol - Stop aspirin. Continue plavix given that he is only 4 months out from MARY ANN placement - Cardiology follow up as out pt in 2 months 
  
HTN: Resumed home medications 
   
MARIAMA on CKD3 (POA) - UA unremarkable 
- renal US 3/17 with medical renal disease but no hydronephrosis 
-monitor renal function 
   
Acute encephalopathy 
 - overnight 3/16 to 3/17. Hard of hearing at baseline but oriented and appropriate now - CT head 3/16 without acute process, slight interval increase in bilateral hygromas - ABG compensated 
- resolved to baseline 
   
COPD with acute exacerbation - may have been exacerbated by PE, improving - s/p azithromycin 3/16 
- nebs, s/p reBronchodilators - s/p prednisone x5 days for wheezing 
- wean off O2 NC 
   
Bacteriuria- (Polymicrobial -Enterococcus, Klebsiella) (POA), Asymptomatic 
- UCx 3/16 Enterococcus faecalis group D, Klebsiella 
-ID evaluation appreciated, likely contamination, patient asymptomatic. No abx at this time. PVD s/p left leg angioplasty  - continue Plavix, atorvastatin 
   
AAA s/p stenting - followed by Dr Pat Olivares, Vascular 
   
Carotid stenosis s/p CEA 
   
Hypothyroid (chronic) - continue home synthroid 
   
BPH (chronic) - continue home finasteride, flomax 
  
Obesity, Body mass index is 34.56 kg/(m^2). PENDING TEST RESULTS:  
At the time of discharge the following test results are still pending: none FOLLOW UP APPOINTMENTS:   
Follow-up Information Follow up With Details Comments Contact Info [employer]   [address] [place] [address] [address] [employer] Juliet Whitman 
[address] [facility] [address] [employer] [place] [address] 
[phone] ADDITIONAL CARE RECOMMENDATIONS: in 1-2 weeks check CBC and BMP in view of recent rectal bleed and MARIAMA on CKD DIET: Cardiac Diet ACTIVITY: Activity as tolerated DISCHARGE MEDICATIONS: 
 See Medication Reconciliation Form NOTIFY YOUR PHYSICIAN FOR ANY OF THE FOLLOWING:  
Fever over 101 degrees for 24 hours. Chest pain, shortness of breath, fever, chills, nausea, vomiting, diarrhea, change in mentation, falling, weakness, bleeding. Severe pain or pain not relieved by medications. Or, any other signs or symptoms that you may have questions about. DISPOSITION: 
  Home With: 
 OT  PT  HH  RN  
  
x SNF/Inpatient Rehab/LTAC Independent/assisted living Hospice Other:  
 
 
PATIENT CONDITION AT DISCHARGE:  
 
Functional status  
x Poor Deconditioned Independent Cognition Lucid   
x Forgetful Dementia Catheters/lines (plus indication) Trejo PICC   
 PEG   
x None Code status Full code   
x DNR PHYSICAL EXAMINATION AT DISCHARGE: 
  
Constitutional:  awake, no acute distress, cooperative, pleasant, [place], obese, NC in place ENT:  oral mucosa moist, oropharynx benign Neck supple, no masses Resp:  + wheezing CV:  regular rhythm, normal rate, no m/r/g appreciated, RLE edema GI:  +BS, soft, non distended, non tender Musculoskeletal:  moves all extremities Neurologic:  awake, alert, [place] but responds appropriately to questions and commands Eyes:  PERRL 
 
 
 
CHRONIC MEDICAL DIAGNOSES: 
Problem List as of 3/23/2018  Date Reviewed: 3/16/2018 Codes Class Noted - Resolved * (Principal)Acute DVT (deep venous thrombosis) (HCC) ICD-10-CM: I82.409 ICD-9-CM: 453.40  3/16/2018 - Present Claudication of both lower extremities ([facility] [address] ICD-10-CM: I73.9 ICD-9-CM: 443.9  2/12/2018 - Present Bilateral deafness ICD-10-CM: H91.93 
ICD-9-CM: 389.9  1/9/2018 - Present Rectal bleeding ICD-10-CM: K62.5 ICD-9-CM: 569.3  11/21/2017 - Present S/P cardiac cath ICD-10-CM: Z98.890 ICD-9-CM: V45.89  11/7/2017 - Present Overview Signed 11/7/2017 10:36 AM by Luciano Yarbrough NP  
  11/6/17: PTCA Ramus Intermediate, MARY ANN Cx, MARY ANN  dRCA SOBOE (shortness of breath on exertion) ICD-10-CM: R06.02 
ICD-9-CM: 786.05  10/11/2017 - Present Myocardial infarction ICD-10-CM: I21.9 ICD-9-CM: 410.90  3/8/2016 - Present Calculus of kidney ICD-10-CM: N20.0 ICD-9-CM: 592.0  3/8/2016 - Present Osteoarthritis ICD-10-CM: M19.90 ICD-9-CM: 715.90  3/8/2016 - Present Alkaline phosphatase elevation ICD-10-CM: R74.8 ICD-9-CM: 790.5  1/14/2016 - Present Angina, class III ([address] ICD-10-CM: I20.9 ICD-9-CM: 413.9  2/12/2015 - Present Prostate cancer [facility]) ICD-10-CM: T73 ICD-9-CM: 185  5/23/2014 - Present Lipoma of back ICD-10-CM: D17.1 ICD-9-CM: 214.8  2/5/2014 - Present Nodule, subcutaneous ICD-10-CM: R22.9 ICD-9-CM: 782.2  11/4/2013 - Present Vitamin D deficiency ICD-10-CM: E55.9 ICD-9-CM: 268.9  11/4/2013 - Present Prediabetes ICD-10-CM: R73.03 
ICD-9-CM: 790.29  6/19/2013 - Present Tinea cruris ICD-10-CM: B35.6 ICD-9-CM: 110.3  6/19/2013 - Present Cerebral embolism with cerebral infarction [facility]) ICD-10-CM: I63.40 ICD-9-CM: 434.11  6/13/2011 - Present Overview Signed 6/13/2011  8:21 AM by Daniel Ordaz. Left frontal cortical/subcortical infarcts Carotid stenosis, left ICD-10-CM: T54.56 
ICD-9-CM: 433.10  6/13/2011 - Present Overview Signed 6/13/2011  8:21 AM by Daniel Ordaz. Symptomatic LICA stenosis with infarction PVD (peripheral vascular disease) with claudication ([facility]) ICD-10-CM: I73.9 ICD-9-CM: 443.9  5/2/2011 - Present BPH (benign prostatic hypertrophy) ICD-10-CM: N40.0 ICD-9-CM: 600.00  10/17/2010 - Present Dyslipidemia ICD-10-CM: E78.5 ICD-9-CM: 272.4  10/17/2010 - Present Successful  PTCA (percutaneous transluminal coronary angioplasty)--90-95% instent restenosis RCA 10/16/10 ICD-10-CM: Z98.61 ICD-9-CM: V45.82  10/17/2010 - Present Successful PTCA with MARY ANN Xience stent Ramus intermedius 10/16/10 ICD-10-CM: Z95.9 ICD-9-CM: V45.89  10/17/2010 - Present Chronic back pain--DJD ICD-10-CM: G89.29 ICD-9-CM: 338.29  6/29/2010 - Present ED (erectile dysfunction) ICD-10-CM: N52.9 ICD-9-CM: 607.84  6/21/2010 - Present Essential hypertension ICD-10-CM: I10 
ICD-9-CM: 401.9  3/19/2010 - Present CAD (Coronary Artery Disease)--s/p PCI--MARY ANN [place] stents x4 5/09;MARY ANN [place] stents x3 8/09 ICD-10-CM: I25.10 ICD-9-CM: 414.00  3/19/2010 - Present Hypothyroidism ICD-10-CM: E03.9 ICD-9-CM: 244.9  11/11/2009 - Present RESOLVED: Right-sided sinonasal encephalocele (HCC) ICD-10-CM: Q01.8 ICD-9-CM: 742.0  1/9/2018 - 1/9/2018 RESOLVED: Epigastric abdominal pain ICD-10-CM: R10.13 ICD-9-CM: 789.06  2/13/2017 - 8/23/2017 RESOLVED: Community acquired bacterial pneumonia ICD-10-CM: J15.9 ICD-9-CM: 482.9  2/5/2017 - 8/23/2017 RESOLVED: Hypertension ICD-10-CM: I10 
ICD-9-CM: 401.9  3/8/2016 - 1/16/2017 RESOLVED: Hyperlipidemia ICD-10-CM: E78.5 ICD-9-CM: 272.4  3/8/2016 - 1/16/2017 RESOLVED: Elevated BP ICD-10-CM: [id number] ICD-9-CM: Candiss Block  2/12/2015 - 5/15/2017 RESOLVED: Chest pain ICD-10-CM: R07.9 ICD-9-CM: 786.50  2/10/2015 - 8/23/2017 RESOLVED: Right sinonasal encephalocele ICD-10-CM: Q01.8 ICD-9-CM: 742.0  10/15/2012 - 1/9/2018 RESOLVED: ASHD (arteriosclerotic heart disease) ICD-10-CM: I25.10 ICD-9-CM: 414.00  6/11/2011 - 10/15/2012 RESOLVED: Hypertension ICD-10-CM: I10 
ICD-9-CM: 401.9  6/11/2011 - 7/26/2011 RESOLVED: DM (diabetes mellitus), type 2 ([facility] ICD-10-CM: E11.9 ICD-9-CM: 250.00  10/17/2010 - 4/12/2012 RESOLVED: ACS (acute coronary syndrome) ([facility] ICD-10-CM: I24.9 ICD-9-CM: 411.1  10/17/2010 - 10/15/2012 Signed:  
Yolanda Lee MD 
3/23/2018 10:33 AM 
 
  
 
  
  
  
IMshopping Announcement We are excited to announce that we are making your provider's discharge notes available to you in IMshopping. You will see these notes when they are completed and signed by the physician that discharged you from your recent hospital stay. If you have any questions or concerns about any information you see in IMshopping, please call the Health Information Department where you were seen or reach out to your Primary Care Provider for more information about your plan of care. Introducing [facility]! T[employer] Centeno introduces IMshopping patient portal. Now you can access parts of your medical record, email your doctor's office, and request medication refills online. 1. In your internet browser, go to [email or web address] 2. Click on the First Time User? Click Here link in the Sign In box. You will see the New Member Sign Up page. 3. Enter your IMshopping Access Code exactly as it appears below. You will not need to use this code after youve completed the sign-up process. If you do not sign up before the expiration date, you must request a new code. · IMshopping Access Code: [email or web address] Expires: 5/28/2018  6:09 PM 
 
4. Enter the last four digits of your Social Security Number (xxxx) and Date of Birth (mm/dd/yyyy) as indicated and click Submit. You will be taken to the next sign-up page. 5. Create a AppointmentCityt ID. This will be your IMshopping login ID and cannot be changed, so think of one that is secure and easy to remember. 6. Create a AppointmentCityt password. You can change your password at any time. 7. Enter your Password Reset Question and Answer. This can be used at a later time if you forget your password. 8. Enter your e-mail address. You will receive e-mail notification when new information is available in [address] 9. Click Sign Up. You can now view and download portions of your medical record. 10. Click the Download Summary menu link to download a portable copy of your medical information. If you have questions, please visit the Frequently Asked Questions section of the [employer]Kid[employer] website. Remember, LiquidSpace is NOT to be used for urgent needs. For medical emergencies, dial 911. Now available from your iPhone and Android! Providers Seen During Your Hospitalization Provider Specialty Primary office phone Verona Marley MD Emergency Medicine [phone] Luly Beasley MD Hospitalist [phone] Dreik Dela Cruz MD Internal Medicine [phone] Christina Lee MD Internal Medicine [phone] Navdeep Graf MD Internal Medicine [phone] Lauri Sarmiento MD Internal Medicine [phone] Inocencio Coronado MD Internal Medicine [phone] Your Primary Care Physician (PCP) Primary Care Physician Office Phone Office Fax Josesito Gates [phone] You are allergic to the following Allergen Reactions Norco (Hydrocodone-Acetaminophen) Other (comments) Too sedated and felt like he was in a daze Pcn (Penicillins) Rash Percocet (Oxycodone-Acetaminophen) Nausea and Vomiting Recent Documentation Height Weight BMI Smoking Status 1.6 m 88.8 kg 34.68 kg/m2 Former Smoker Emergency Contacts Name Discharge Info Relation Home Work Mobile Teddy Jasso  Other Relative [6] [phone] Abhilash Castro DISCHARGE CAREGIVER [3] Child [2] [phone] Jenn Fox [5] [phone] Patient Belongings The following personal items are in your possession at time of discharge: 
  Dental Appliances: None  Visual Aid: Glasses, With patient   Hearing Aids/Status: With patient  Home Medications: None   Jewelry: None  Clothing: At bedside, Pants, Shirt, Slippers    Other Valuables: [employer], [employer] Please provide this summary of care documentation to your next provider. Signatures-by signing, you are acknowledging that this After Visit Summary has been reviewed with you and you have received a copy. Patient Signature:  ____________________________________________________________ Date:  ____________________________________________________________  
  
[facility] Provider Signature:  ____________________________________________________________ Date:  ____________________________________________________________

## 2018-03-16 NOTE — IP AVS SNAPSHOT
2700 [facility] [address] [place] [address] 
[phone] Patient: Manuela Adames 
MRN: [id number] OUZ:5/7/1965 A check racquel indicates which time of day the medication should be taken. My Medications START taking these medications Instructions Each Dose to Equal  
 Morning Noon Evening Bedtime  
 guaiFENesin-dextromethorphan 100-10 mg/5 mL syrup Commonly known as:  ROBITUSSIN DM Your last dose was: Your next dose is: Take 10 mL by mouth every six (6) hours as needed for up to 10 days. 10 mL CONTINUE taking these medications Instructions Each Dose to Equal  
 Morning Noon Evening Bedtime  
 albuterol 90 mcg/actuation inhaler Commonly known as:  PROVENTIL HFA, VENTOLIN HFA, PROAIR HFA Your last dose was: Your next dose is: Take 2 Puffs by inhalation every four (4) hours as needed for Wheezing. 2 Puff  
    
   
   
   
  
 albuterol-ipratropium 2.5 mg-0.5 mg/3 ml Nebu Commonly known as:  Shanon Tipton Your last dose was: Your next dose is:    
   
   
 3 mL by Nebulization route every six (6) hours as needed. 3 mL  
    
   
   
   
  
 amLODIPine 5 mg tablet Commonly known as:  Rishi Chen Your last dose was: Your next dose is: TAKE 1 TAB BY MOUTH DAILY. atorvastatin 40 mg tablet Commonly known as:  LIPITOR Your last dose was: Your next dose is: Take 1 Tab by mouth daily. YRN. Give patient BRAND LIPITOR. D/C Crestor 40 mg  
    
   
   
   
  
 clopidogrel 75 mg Tab Commonly known as:  PLAVIX Your last dose was: Your next dose is: TAKE 1 TAB BY MOUTH DAILY. INDICATIONS: THROMBOSIS PREVENTION AFTER PCI  
     
   
   
   
  
 clotrimazole-betamethasone topical cream  
Commonly known as:  Omi Minmercedez Your last dose was: Your next dose is:    
   
   
 Apply twice daily to affected area till resolved. finasteride 5 mg tablet Commonly known as:  PROSCAR Your last dose was: Your next dose is:    
   
   
 daily. isosorbide mononitrate ER 30 mg tablet Commonly known as:  IMDUR Your last dose was: Your next dose is: TAKE 1 TABLET BY MOUTH EVERY DAY  
     
   
   
   
  
 levothyroxine 88 mcg tablet Commonly known as:  SYNTHROID Your last dose was: Your next dose is: TAKE 1 TABLET BY MOUTH DAILY (BEFORE BREAKFAST). losartan-hydroCHLOROthiazide 100-12.5 mg per tablet Commonly known as:  HYZAAR Your last dose was: Your next dose is: TAKE 1 TABLET BY MOUTH EVERY DAY  
     
   
   
   
  
 metoprolol succinate 25 mg XL tablet Commonly known as:  TOPROL-XL Your last dose was: Your next dose is: TAKE 1 TABLET EVERY DAY  
     
   
   
   
  
 omeprazole 40 mg capsule Commonly known as:  PRILOSEC Your last dose was: Your next dose is: Take 1 Cap by mouth two (2) times a day. 40 mg  
    
   
   
   
  
 tamsulosin 0.4 mg capsule Commonly known as:  FLOMAX Your last dose was: Your next dose is: TAKE 1 CAPSULE EVERY DAY  
     
   
   
   
  
 VITAMIN C 1,000 mg tablet Generic drug:  ascorbic acid (vitamin C) Your last dose was: Your next dose is: Take  by mouth. VITAMIN D3 PO Your last dose was: Your next dose is: Take  by mouth. STOP taking these medications FISH OIL 1,000 mg Cap Generic drug:  omega-3 fatty acids-vitamin e  
   
  
 furosemide 20 mg tablet Commonly known as:  LASIX ASK your doctor about these medications Instructions Each Dose to Equal  
 Morning Noon Evening Bedtime  
 aspirin delayed-release 81 mg tablet Your last dose was: Your next dose is: Take 1 Tab by mouth daily for 360 days. 81 mg Where to Get Your Medications Information on where to get these meds will be given to you by the nurse or doctor. ! Ask your nurse or doctor about these medications  
  guaiFENesin-dextromethorphan 100-10 mg/5 mL syrup

## 2018-03-16 NOTE — PROGRESS NOTES
Vascular:    Patient with known PVD seen for routine follow up in office this morning. He had a left leg angioplasty several weeks ago. Found to have significant leg swelling and dyspnea. Venous duplex shows bilateral DVT - ileofemoral on right and popliteal on left. Referred to ED with copy of vascular studies for further evaluation and treatment. He has had a previous carotid endarterectomy and AAA endograft repair. He has known significant PVD right worse then left . Will be available as needed.

## 2018-03-16 NOTE — ED TRIAGE NOTES
Referred by  d/t bilateral DVT with no pulses felt in right popliteal and multiple blockages seen bilaterally. Pt c/o SOB.

## 2018-03-16 NOTE — PROGRESS NOTES
Admission Medication Reconciliation:    Information obtained from: Patient, RX Query, chart    Significant PMH/Disease States:   Past Medical History:   Diagnosis Date    AAA (abdominal aortic aneurysm) ([address]     Acute MI 12/2010    dr Kimani Cervantes    Acute prostatitis     again 9/12/16 f[facility]u note rec'd [facility]    Advance directive discussed with patient 04/20/2016    Aneurysm ([address] 6/2011    AAA    Borderline glaucoma (glaucoma suspect) 02/19/2015    notes from [address] [place] [address] rec'd    BPH (benign prostatic hyperplasia)     Bright red blood per rectum 02/04/2016    [facility] note Lucy Mate- w/u in progress   8/22/17 [facility] f[facility]u note    CAD (coronary artery disease)     Calculus of kidney     Cerebral embolism with cerebral infarction ([address]     Chronic pain     back    Dizzy spells 6/2012    DJD (degenerative joint disease) of lumbar spine     ED (erectile dysfunction)     8/30/17 [facility]u note [facility]    Elevated PSA 03/20/2014    [facility]     8/24/16 f/u note    Encephalocele ([address]     Hypercholesterolemia     Hypertension     Pneumonia 02/05/2017    Prostate cancer ([address] 05/12/2014    crystal henry/ Dr Paola Verma 8/22/17 f/u note    PVD (peripheral vascular disease) with claudication ([address]     S/P radiation therapy 15 months out    probable cause of the rectal bleeding    Stroke ([address] 6/2011    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 Taking? CHOLECALCIFEROL, VITAMIN D3, (VITAMIN D3 PO)   Yes Yes   Sig: Take  by mouth.    albuterol (PROVENTIL HFA, VENTOLIN HFA, PROAIR HFA) 90 mcg/actuation inhaler   No Yes   Sig: Take 2 Puffs by inhalation every four (4) hours as needed for Wheezing. albuterol-ipratropium (DUO-NEB) 2.5 mg-0.5 mg/3 ml nebu   No Yes   Sig: 3 mL by Nebulization route every six (6) hours as needed. amLODIPine (NORVASC) 5 mg tablet   No Yes   Sig: TAKE 1 TAB BY MOUTH DAILY. ascorbic acid (VITAMIN C) 1,000 mg tablet   Yes Yes   Sig: Take  by mouth. aspirin delayed-release 81 mg tablet   No Yes   Sig: Take 1 Tab by mouth daily for 360 days. atorvastatin (LIPITOR) 40 mg tablet   No Yes   Sig: Take 1 Tab by mouth daily. YRN. Give patient BRAND LIPITOR. D/C Crestor   clopidogrel (PLAVIX) 75 mg tab   Yes Yes   Sig: TAKE 1 TAB BY MOUTH DAILY. INDICATIONS: THROMBOSIS PREVENTION AFTER PCI   clotrimazole-betamethasone (LOTRISONE) topical cream Unknown at Unknown time  No No   Sig: Apply twice daily to affected area till resolved. finasteride (PROSCAR) 5 mg tablet   Yes Yes   Sig: daily. furosemide (LASIX) 20 mg tablet   No Yes   Sig: TAKE 1 TAB BY MOUTH DAILY. isosorbide mononitrate ER (IMDUR) 30 mg tablet   No Yes   Sig: TAKE 1 TABLET BY MOUTH EVERY DAY   levothyroxine (SYNTHROID) 88 mcg tablet   No Yes   Sig: TAKE 1 TABLET BY MOUTH DAILY (BEFORE BREAKFAST). losartan-hydroCHLOROthiazide (HYZAAR) 100-12.5 mg per tablet   No Yes   Sig: TAKE 1 TABLET BY MOUTH EVERY DAY   metoprolol succinate (TOPROL-XL) 25 mg XL tablet   No Yes   Sig: TAKE 1 TABLET EVERY DAY   omega-3 fatty acids-vitamin e (FISH OIL) 1,000 mg cap Unknown at Unknown time  Yes No   Sig: Take 1 Cap by mouth daily. omeprazole (PRILOSEC) 40 mg capsule Unknown at Unknown time  No No   Sig: Take 1 Cap by mouth two (2) times a day. tamsulosin (FLOMAX) 0.4 mg capsule   No Yes   Sig: TAKE 1 CAPSULE EVERY DAY      Facility-Administered Medications: None         Comments/Recommendations: Patient was very pleasant man, not terribly reliable historian. No one was with him so unknown as to whether this is his baseline.  States \"I take all my medicine and I took everything I needed to today\" without much elaboration. Attempted to review list as it appeared in RX Query and he agreed with everything (although Craig Cords doesn't show all being recently filled). He stated his allergies, noted. Please note (in light of presenting sx): 1. PLAVIX: shows last filled in November, but states he takes daily    Thank you for allowing me to participate in the care of your patient.     Landry Arreguin PharmD, RN #[id number]

## 2018-03-16 NOTE — ED NOTES

## 2018-03-17 ENCOUNTER — APPOINTMENT (OUTPATIENT)
Dept: ULTRASOUND IMAGING | Age: 83
DRG: 166 | End: 2018-03-17
Attending: HOSPITALIST
Payer: MEDICARE

## 2018-03-17 ENCOUNTER — APPOINTMENT (OUTPATIENT)
Dept: GENERAL RADIOLOGY | Age: 83
DRG: 166 | End: 2018-03-17
Attending: HOSPITALIST
Payer: MEDICARE

## 2018-03-17 LAB
AMPHET UR QL SCN: NEGATIVE
ANION GAP SERPL CALC-SCNC: 12 MMOL/L (ref 5–15)
APTT PPP: 30.8 SEC (ref 22.1–32)
APTT PPP: 68.2 SEC (ref 22.1–32)
APTT PPP: 74.8 SEC (ref 22.1–32)
APTT PPP: 85.9 SEC (ref 22.1–32)
ATRIAL RATE: 85 BPM
ATRIAL RATE: 88 BPM
BARBITURATES UR QL SCN: NEGATIVE
BASOPHILS # BLD: 0 K/UL (ref 0–0.1)
BASOPHILS NFR BLD: 1 % (ref 0–1)
BENZODIAZ UR QL: NEGATIVE
BNP SERPL-MCNC: 373 PG/ML (ref 0–450)
BUN SERPL-MCNC: 38 MG/DL (ref 6–20)
BUN/CREAT SERPL: 15 (ref 12–20)
CALCIUM SERPL-MCNC: 8.4 MG/DL (ref 8.5–10.1)
CALCULATED P AXIS, ECG09: 46 DEGREES
CALCULATED P AXIS, ECG09: 47 DEGREES
CALCULATED R AXIS, ECG10: -96 DEGREES
CALCULATED R AXIS, ECG10: -97 DEGREES
CALCULATED T AXIS, ECG11: 0 DEGREES
CALCULATED T AXIS, ECG11: 37 DEGREES
CANNABINOIDS UR QL SCN: NEGATIVE
CHLORIDE SERPL-SCNC: 104 MMOL/L (ref 97–108)
CO2 SERPL-SCNC: 25 MMOL/L (ref 21–32)
COCAINE UR QL SCN: NEGATIVE
CREAT SERPL-MCNC: 2.49 MG/DL (ref 0.7–1.3)
DIAGNOSIS, 93000: NORMAL
DIAGNOSIS, 93000: NORMAL
DIFFERENTIAL METHOD BLD: ABNORMAL
DRUG SCRN COMMENT,DRGCM: NORMAL
EOSINOPHIL # BLD: 0.1 K/UL (ref 0–0.4)
EOSINOPHIL NFR BLD: 3 % (ref 0–7)
ERYTHROCYTE [DISTWIDTH] IN BLOOD BY AUTOMATED COUNT: 17.3 % (ref 11.5–14.5)
ERYTHROCYTE [DISTWIDTH] IN BLOOD BY AUTOMATED COUNT: 17.5 % (ref 11.5–14.5)
GLUCOSE BLD STRIP.AUTO-MCNC: 103 MG/DL (ref 65–100)
GLUCOSE BLD STRIP.AUTO-MCNC: 160 MG/DL (ref 65–100)
GLUCOSE SERPL-MCNC: 89 MG/DL (ref 65–100)
HCT VFR BLD AUTO: 33.8 % (ref 36.6–50.3)
HCT VFR BLD AUTO: 35.5 % (ref 36.6–50.3)
HGB BLD-MCNC: 11.5 G/DL (ref 12.1–17)
HGB BLD-MCNC: 11.9 G/DL (ref 12.1–17)
IMM GRANULOCYTES # BLD: 0 K/UL (ref 0–0.04)
IMM GRANULOCYTES NFR BLD AUTO: 0 % (ref 0–0.5)
LYMPHOCYTES # BLD: 0.5 K/UL (ref 0.8–3.5)
LYMPHOCYTES NFR BLD: 12 % (ref 12–49)
MCH RBC QN AUTO: 29.7 PG (ref 26–34)
MCH RBC QN AUTO: 29.8 PG (ref 26–34)
MCHC RBC AUTO-ENTMCNC: 33.5 G/DL (ref 30–36.5)
MCHC RBC AUTO-ENTMCNC: 34 G/DL (ref 30–36.5)
MCV RBC AUTO: 87.6 FL (ref 80–99)
MCV RBC AUTO: 88.5 FL (ref 80–99)
METHADONE UR QL: NEGATIVE
MONOCYTES # BLD: 0.5 K/UL (ref 0–1)
MONOCYTES NFR BLD: 13 % (ref 5–13)
NEUTS SEG # BLD: 2.7 K/UL (ref 1.8–8)
NEUTS SEG NFR BLD: 70 % (ref 32–75)
NRBC # BLD: 0 K/UL (ref 0–0.01)
NRBC # BLD: 0 K/UL (ref 0–0.01)
NRBC BLD-RTO: 0 PER 100 WBC
NRBC BLD-RTO: 0 PER 100 WBC
OPIATES UR QL: NEGATIVE
P-R INTERVAL, ECG05: 202 MS
P-R INTERVAL, ECG05: 224 MS
PCP UR QL: NEGATIVE
PLATELET # BLD AUTO: 216 K/UL (ref 150–400)
PLATELET # BLD AUTO: 218 K/UL (ref 150–400)
PMV BLD AUTO: 10.2 FL (ref 8.9–12.9)
PMV BLD AUTO: 11 FL (ref 8.9–12.9)
POTASSIUM SERPL-SCNC: 2.8 MMOL/L (ref 3.5–5.1)
Q-T INTERVAL, ECG07: 444 MS
Q-T INTERVAL, ECG07: 456 MS
QRS DURATION, ECG06: 160 MS
QRS DURATION, ECG06: 164 MS
QTC CALCULATION (BEZET), ECG08: 528 MS
QTC CALCULATION (BEZET), ECG08: 551 MS
RBC # BLD AUTO: 3.86 M/UL (ref 4.1–5.7)
RBC # BLD AUTO: 4.01 M/UL (ref 4.1–5.7)
SERVICE CMNT-IMP: ABNORMAL
SERVICE CMNT-IMP: ABNORMAL
SODIUM SERPL-SCNC: 141 MMOL/L (ref 136–145)
THERAPEUTIC RANGE,PTTT: ABNORMAL SECS (ref 58–77)
THERAPEUTIC RANGE,PTTT: NORMAL SECS (ref 58–77)
TROPONIN I SERPL-MCNC: 0.14 NG/ML
TROPONIN I SERPL-MCNC: 0.14 NG/ML
VENTRICULAR RATE, ECG03: 85 BPM
VENTRICULAR RATE, ECG03: 88 BPM
WBC # BLD AUTO: 3.8 K/UL (ref 4.1–11.1)
WBC # BLD AUTO: 4.2 K/UL (ref 4.1–11.1)

## 2018-03-17 PROCEDURE — 85730 THROMBOPLASTIN TIME PARTIAL: CPT | Performed by: FAMILY MEDICINE

## 2018-03-17 PROCEDURE — 94640 AIRWAY INHALATION TREATMENT: CPT

## 2018-03-17 PROCEDURE — 80307 DRUG TEST PRSMV CHEM ANLYZR: CPT | Performed by: FAMILY MEDICINE

## 2018-03-17 PROCEDURE — 74011000250 HC RX REV CODE- 250: Performed by: HOSPITALIST

## 2018-03-17 PROCEDURE — 71045 X-RAY EXAM CHEST 1 VIEW: CPT

## 2018-03-17 PROCEDURE — 82962 GLUCOSE BLOOD TEST: CPT

## 2018-03-17 PROCEDURE — 65660000000 HC RM CCU STEPDOWN

## 2018-03-17 PROCEDURE — 36415 COLL VENOUS BLD VENIPUNCTURE: CPT | Performed by: HOSPITALIST

## 2018-03-17 PROCEDURE — 74011250637 HC RX REV CODE- 250/637: Performed by: HOSPITALIST

## 2018-03-17 PROCEDURE — 83880 ASSAY OF NATRIURETIC PEPTIDE: CPT | Performed by: HOSPITALIST

## 2018-03-17 PROCEDURE — 74011250636 HC RX REV CODE- 250/636: Performed by: HOSPITALIST

## 2018-03-17 PROCEDURE — 93306 TTE W/DOPPLER COMPLETE: CPT

## 2018-03-17 PROCEDURE — 76770 US EXAM ABDO BACK WALL COMP: CPT

## 2018-03-17 PROCEDURE — 85730 THROMBOPLASTIN TIME PARTIAL: CPT | Performed by: HOSPITALIST

## 2018-03-17 PROCEDURE — 84484 ASSAY OF TROPONIN QUANT: CPT | Performed by: HOSPITALIST

## 2018-03-17 PROCEDURE — 87449 NOS EACH ORGANISM AG IA: CPT | Performed by: FAMILY MEDICINE

## 2018-03-17 PROCEDURE — 80048 BASIC METABOLIC PNL TOTAL CA: CPT | Performed by: HOSPITALIST

## 2018-03-17 PROCEDURE — 85027 COMPLETE CBC AUTOMATED: CPT | Performed by: HOSPITALIST

## 2018-03-17 PROCEDURE — 85025 COMPLETE CBC W/AUTO DIFF WBC: CPT | Performed by: HOSPITALIST

## 2018-03-17 RX ORDER — POTASSIUM CHLORIDE 750 MG/1
40 TABLET, FILM COATED, EXTENDED RELEASE ORAL EVERY 4 HOURS
Status: DISPENSED | OUTPATIENT
Start: 2018-03-17 | End: 2018-03-17

## 2018-03-17 RX ORDER — PANTOPRAZOLE SODIUM 40 MG/1
40 TABLET, DELAYED RELEASE ORAL
Status: DISCONTINUED | OUTPATIENT
Start: 2018-03-17 | End: 2018-03-18

## 2018-03-17 RX ORDER — SODIUM CHLORIDE, SODIUM LACTATE, POTASSIUM CHLORIDE, CALCIUM CHLORIDE 600; 310; 30; 20 MG/100ML; MG/100ML; MG/100ML; MG/100ML
100 INJECTION, SOLUTION INTRAVENOUS CONTINUOUS
Status: DISCONTINUED | OUTPATIENT
Start: 2018-03-17 | End: 2018-03-18

## 2018-03-17 RX ADMIN — ASPIRIN 81 MG: 81 TABLET, COATED ORAL at 09:20

## 2018-03-17 RX ADMIN — Medication 10 ML: at 20:50

## 2018-03-17 RX ADMIN — METOPROLOL SUCCINATE 25 MG: 50 TABLET, EXTENDED RELEASE ORAL at 09:24

## 2018-03-17 RX ADMIN — POTASSIUM CHLORIDE 40 MEQ: 750 TABLET, EXTENDED RELEASE ORAL at 11:34

## 2018-03-17 RX ADMIN — TAMSULOSIN HYDROCHLORIDE 0.4 MG: 0.4 CAPSULE ORAL at 09:20

## 2018-03-17 RX ADMIN — ISOSORBIDE MONONITRATE 30 MG: 30 TABLET, EXTENDED RELEASE ORAL at 09:18

## 2018-03-17 RX ADMIN — GUAIFENESIN AND DEXTROMETHORPHAN 10 ML: 100; 10 SYRUP ORAL at 13:19

## 2018-03-17 RX ADMIN — IPRATROPIUM BROMIDE AND ALBUTEROL SULFATE 3 ML: .5; 3 SOLUTION RESPIRATORY (INHALATION) at 08:59

## 2018-03-17 RX ADMIN — IPRATROPIUM BROMIDE AND ALBUTEROL SULFATE 3 ML: .5; 3 SOLUTION RESPIRATORY (INHALATION) at 05:10

## 2018-03-17 RX ADMIN — DOCUSATE SODIUM 100 MG: 100 CAPSULE, LIQUID FILLED ORAL at 09:21

## 2018-03-17 RX ADMIN — HEPARIN SODIUM 16 UNITS/KG/HR: 10000 INJECTION, SOLUTION INTRAVENOUS at 02:06

## 2018-03-17 RX ADMIN — CLOPIDOGREL BISULFATE 75 MG: 75 TABLET ORAL at 09:19

## 2018-03-17 RX ADMIN — HEPARIN SODIUM 16 UNITS/KG/HR: 10000 INJECTION, SOLUTION INTRAVENOUS at 20:15

## 2018-03-17 RX ADMIN — IPRATROPIUM BROMIDE AND ALBUTEROL SULFATE 3 ML: .5; 3 SOLUTION RESPIRATORY (INHALATION) at 11:44

## 2018-03-17 RX ADMIN — Medication 10 ML: at 07:25

## 2018-03-17 RX ADMIN — AZITHROMYCIN MONOHYDRATE 500 MG: 500 INJECTION, POWDER, LYOPHILIZED, FOR SOLUTION INTRAVENOUS at 20:44

## 2018-03-17 RX ADMIN — ATORVASTATIN CALCIUM 40 MG: 40 TABLET, FILM COATED ORAL at 09:19

## 2018-03-17 RX ADMIN — SODIUM CHLORIDE, SODIUM LACTATE, POTASSIUM CHLORIDE, AND CALCIUM CHLORIDE 100 ML/HR: 600; 310; 30; 20 INJECTION, SOLUTION INTRAVENOUS at 11:35

## 2018-03-17 RX ADMIN — IPRATROPIUM BROMIDE AND ALBUTEROL SULFATE 3 ML: .5; 3 SOLUTION RESPIRATORY (INHALATION) at 15:50

## 2018-03-17 RX ADMIN — FINASTERIDE 5 MG: 5 TABLET, FILM COATED ORAL at 09:18

## 2018-03-17 RX ADMIN — Medication 10 ML: at 07:26

## 2018-03-17 RX ADMIN — LEVOTHYROXINE SODIUM 88 MCG: 88 TABLET ORAL at 07:25

## 2018-03-17 RX ADMIN — AMLODIPINE BESYLATE 5 MG: 5 TABLET ORAL at 09:21

## 2018-03-17 NOTE — PROGRESS NOTES
I returned page from Topher Guzman who reports patient has questionable AMS. She notes that ER MD provider who had seen patient earlier today notes no change in patient's mental status. Patient has however been sent for repeat CT head without contrast to rule out an acute intracranial process. Per nurse, patient has not received any sedative and/ or narcotic medications in the ED. There were no reports of patient having history of alcohol/ or illicit drugs. There were no reports of recent falls. Patient reportedly had become hypoxic with O2sats in the 80s. Per the ED, patient was placed on 3 liters O2 via nasal cannula with increased last recorded O2sat = 93%. Per my chart review, patient already had a NM VQ lung scan today which showed intermediate probability for PE. Patient already has been administered Lovenox 1 mg/kg sq empirically for possibility for PE. Latest labs showed troponin trending upward to 0. 13. A/P:   AMS with hypoxia and elevated troponin. Ordered stat ABG. Rule out hypercapnia in patient with known h/o COPD. Continue airway monitoring, with continuous pulse oximetry. On supplemental oxygen. Await results of CT head. Also repeating 12 lead EKG to rule out AMI. Trend troponin levels q 6 hours. Continue telemetry monitoring.

## 2018-03-17 NOTE — PROGRESS NOTES
Don Skaggs MD   Phone/text: [phone] (7am to 7pm)  After 7pm please call  for hospitalist on call    Hospitalist Progress Note     3/17/2018   PCP:  Dr. Apolinar Harrington MD  Chief Complaint   Patient presents with   Amilcar Silva Referral Request    Blood Clot    Irregular Heart Beat       Admission Summary:   An 76-year-old gentleman with known peripheral vascular disease, status post left leg angioplasty, coronary artery disease status post MARY ANN in November 2017, hypertension, stroke, rectal bleed 11/17/2017 was admitted at [facility] for carotid endarterectomy, status post AAA stenting who went to the vascular surgery clinic for routine followup, with his swelling in the right lower extremity (more than his usual edema). He got a venous duplex, which showed bilateral DVT, iliofemoral on the right and the popliteal on the left. The vascular surgeon, Dr. Lio Acharya directed the patient to the ER. Reason For Visit:  DVT/PE    Assessment/Plan   DVT/PE with evidence of acute cor pulmonale (POA) - likely causing positive troponin. Seems provoked  - V/Q scan 3/16 with intermediate probability of PE  - LE doppler positive for bilateral DVT by report  - Obtain echocardiogram  - Continue heparin drip    MARIAMA (POA) - creatinine well above baseline CKD3  - UA unremarkable  - Obtain renal US  - Gentle IVF  - Consider nephrology consult if not improving    CAD s/p stenting - troponin elevation is likely strain from PE rather than ACS  - MARY ANN in the mid-circ, MARY ANN in the distal RVA 11/6/17  - ECG with sinus, 1st degree AV block, old RBBB  - Continue aspirin, plavix, atorvastatin, imdur, metoprolol - could consider dropping to plavix alone with [address] to avoid triple anticoagulation for discharge    Altered mental status - overnight 3/16 to 3/17.  Hard of hearing at baseline but Mr. Rhiannon Giordano is oriented and appropriate now  - CT head 3/16 without acute process, slight interval increase in bilateral hygromas  - ABG compensated  - Re-orient as able    COPD with possible acute exacerbation - may have been exacerbated by PE  - Continue azithromycin started 3/16  - Bronchodilators  - Consider steroids if not improving    PUD (chronic) - no bleeding currently  - EGD 17 with antral erosions, hiatal hernia, schatzki's ring  - Add pantoprazole    PVD s/p left leg angioplasty  - Continue aspirin, plavix, atorvastatin    AAA s/p stenting    Carotid stenosis s/p endarterectomy    Hypothyroid (chronic) - continue home synthroid    BPH (chronic) - continue home finasteride, flomax    See orders for other plans. Code status: DNR, confirmed again with Mr. Cynthia Guerra  VTE prophylaxis: heparin  Discussed plan of care with Patient/Family and Nurse   Pre-admission lived at home  Discharge plan: home  Estimated time to discharge: unclear     Subjective   Mr. Cynthia Guerra is feeling fine. He is coughing more than normal but denies SOB with oxygen on. No chest pain, palpitations or dizziness. Reviewed interval history  Physical examination     Visit Vitals    BP (!) 146/98    Pulse 86    Temp 98.9 °F (37.2 °C)    Resp 23    Ht 5' 3\" (1.6 m)    Wt 89.4 kg (197 lb)    SpO2 95%  Comment: Simultaneous filing. User may not have seen previous data.  BMI 34.9 kg/m2       Temp (24hrs), Av.4 °F (36.9 °C), Min:97.9 °F (36.6 °C), Max:99 °F (37.2 °C)      Intake/Output Summary (Last 24 hours) at 18 1059  Last data filed at 18 1000   Gross per 24 hour   Intake                0 ml   Output              200 ml   Net             -200 ml       Gen - NAD  HEENT - MMM  Neck - supple, full ROM  CV - RRR, 2/6 systolic murmur at LLSB  Resp - lungs CTA b/l, no wheezing, normal WOB  GI - abdomen S, NT, ND, +BS   - no CVA tenderness, bladder non-palpable in lower abdomen  MSK - normal tone and bulk, 2+ edema in right LE, no edema in left LE.  Poor pulses  Neuro - A&O, no focal deficits but very hard of hearing  Psych - calm and cooperative with normal affect    Data Review       Telemetry x   ECG x   Xray    CT scan x   MRI    Echocardiogram    Ultrasound              I have reviewed the flow sheet and recent notes  New labs and data personally reviewed.     Recent Labs      03/17/18   0204  03/16/18   1236   WBC  3.8*  4.5   HGB  11.5*  13.4   HCT  33.8*  40.6   PLT  216  214     Recent Labs      03/17/18   0204  03/16/18   1348  03/16/18   1236   NA  141  137   --    K  2.8*  3.5   --    CL  104  102   --    CO2  25  27   --    GLU  89  97   --    BUN  38*  34*   --    CREA  2.49*  2.91*   --    CA  8.4*  9.0   --    ALB   --   3.2*   --    TBILI   --   0.7   --    SGOT   --   43*   --    ALT   --   26   --    INR   --    --   1.1       Medications reviewed  Current Facility-Administered Medications   Medication Dose Route Frequency    albuterol-ipratropium (DUO-NEB) 2.5 MG-0.5 MG/3 ML  3 mL Nebulization Q6H PRN    amLODIPine (NORVASC) tablet 5 mg  5 mg Oral DAILY    levothyroxine (SYNTHROID) tablet 88 mcg  88 mcg Oral ACB    tamsulosin (FLOMAX) capsule 0.4 mg  0.4 mg Oral DAILY    metoprolol succinate (TOPROL-XL) XL tablet 25 mg  25 mg Oral DAILY    isosorbide mononitrate ER (IMDUR) tablet 30 mg  30 mg Oral DAILY    finasteride (PROSCAR) tablet 5 mg  5 mg Oral DAILY    aspirin delayed-release tablet 81 mg  81 mg Oral DAILY    atorvastatin (LIPITOR) tablet 40 mg  40 mg Oral DAILY    clopidogrel (PLAVIX) tablet 75 mg  75 mg Oral DAILY    sodium chloride (NS) flush 5-10 mL  5-10 mL IntraVENous Q8H    sodium chloride (NS) flush 5-10 mL  5-10 mL IntraVENous PRN    ondansetron (ZOFRAN) injection 4 mg  4 mg IntraVENous Q6H PRN    docusate sodium (COLACE) capsule 100 mg  100 mg Oral BID    albuterol-ipratropium (DUO-NEB) 2.5 MG-0.5 MG/3 ML  3 mL Nebulization Q4H RT    guaiFENesin-dextromethorphan (ROBITUSSIN DM) 100-10 mg/5 mL syrup 10 mL  10 mL Oral Q6H PRN    heparin 25,000 units in D5W 250 ml infusion  16-36 Units/kg/hr IntraVENous TITRATE    azithromycin (ZITHROMAX) 500 mg in 0.9% sodium chloride (MBP/ADV) 250 mL  500 mg IntraVENous Q24H     Current Outpatient Prescriptions   Medication Sig    levothyroxine (SYNTHROID) 88 mcg tablet TAKE 1 TABLET BY MOUTH DAILY (BEFORE BREAKFAST).  amLODIPine (NORVASC) 5 mg tablet TAKE 1 TAB BY MOUTH DAILY.  isosorbide mononitrate ER (IMDUR) 30 mg tablet TAKE 1 TABLET BY MOUTH EVERY DAY    metoprolol succinate (TOPROL-XL) 25 mg XL tablet TAKE 1 TABLET EVERY DAY    losartan-hydroCHLOROthiazide (HYZAAR) 100-12.5 mg per tablet TAKE 1 TABLET BY MOUTH EVERY DAY    clopidogrel (PLAVIX) 75 mg tab TAKE 1 TAB BY MOUTH DAILY. INDICATIONS: THROMBOSIS PREVENTION AFTER PCI    furosemide (LASIX) 20 mg tablet TAKE 1 TAB BY MOUTH DAILY.  aspirin delayed-release 81 mg tablet Take 1 Tab by mouth daily for 360 days.  tamsulosin (FLOMAX) 0.4 mg capsule TAKE 1 CAPSULE EVERY DAY    albuterol (PROVENTIL HFA, VENTOLIN HFA, PROAIR HFA) 90 mcg/actuation inhaler Take 2 Puffs by inhalation every four (4) hours as needed for Wheezing.  atorvastatin (LIPITOR) 40 mg tablet Take 1 Tab by mouth daily. YRN. Give patient BRAND LIPITOR. D/C Crestor    albuterol-ipratropium (DUO-NEB) 2.5 mg-0.5 mg/3 ml nebu 3 mL by Nebulization route every six (6) hours as needed.  CHOLECALCIFEROL, VITAMIN D3, (VITAMIN D3 PO) Take  by mouth.  finasteride (PROSCAR) 5 mg tablet daily.  ascorbic acid (VITAMIN C) 1,000 mg tablet Take  by mouth.  omeprazole (PRILOSEC) 40 mg capsule Take 1 Cap by mouth two (2) times a day.  clotrimazole-betamethasone (LOTRISONE) topical cream Apply twice daily to affected area till resolved.  omega-3 fatty acids-vitamin e (FISH OIL) 1,000 mg cap Take 1 Cap by mouth daily.          Cort Peabody, MD  Internal Medicine  3/17/2018

## 2018-03-17 NOTE — H&P
[address]   ACUTE CARE HISTORY AND PHYSICAL    ARUN Pires  MR#: [id number]  : 1935  ACCOUNT #: [de-identified]   DATE OF SERVICE: 2018    PRIMARY CARE PHYSICIAN:  Sandra Mark MD    CHIEF COMPLAINT:  Patient was sent from the vascular surgery clinic for bilateral lower extremity DVT. HISTORY OF PRESENT ILLNESS:  An 81-year-old gentleman with known peripheral vascular disease, status post left leg angioplasty, coronary artery disease status post MARY ANN in 2017, hypertension, stroke, rectal bleed 2017 was admitted at [facility] for carotid endarterectomy, status post AAA stenting who went to the vascular surgery clinic for routine followup, with his swelling in the right lower extremity (more than his usual edema). He got a venous duplex, which showed bilateral DVT, iliofemoral on the right and the popliteal on the left. The vascular surgeon, Dr. Pro Jose directed the patient to the ER. The patient denies any previous history of blood clots, family history of blood clots, recent surgery or prolonged immobilization. He denies any travel history. He denies any chest pain or abdominal pain. The patient complains of productive cough for about two weeks, which is continuous and bothering him. He also states he is short of breath, but nothing significant. He has some shortness of breath at baseline as well. The patient denies any pleuritic chest pain, diarrhea or constipation or UTI symptoms. REVIEW OF SYSTEMS:  All other systems reviewed and all others were negative. Pertinent positives and negatives as mentioned above. EMERGENCY ROOM COURSE:  In the ER,  he just came back from V/Q scan. Patient received Lovenox for DVT. Hospitalist was called for his acute DVTs. PAST MEDICAL AND  SURGICAL HISTORY:  1. Coronary artery disease with MARY ANN in 2017. 2. Hypertension. 3.  Stroke.   4. Rectal bleed in 2017, admitted at [facility], EGD at that time showed duodenitis and antral erosions. 5. Hypothyroidism. 6. Peripheral vascular disease, status post left leg angioplasty. 7.  Carotid endarterectomy. 8.  Status post AAA stenting. 9. BPH.  10. Prostate cancer. 11. Osteoarthritis. FAMILY HISTORY:  Father and mother both , had diabetes. Sister , had lung cancer. Another brother , had colon cancer. SOCIAL HISTORY:  Patient states he quit smoking 6 months ago. Prior to that, he smoked 1 pack per day for about 27 years. Denies alcohol use. He is , has 1 son and 1 daughter. PHYSICAL EXAMINATION:  VITAL SIGNS:  Temperature 98, heart rate 81, respiratory rate 18, blood pressure 159/91, pulse ox 92% on 2 liters. GENERAL:  Not in acute distress. HEENT:  Atraumatic, normocephalic. NECK:  Supple, no JVD. Moist mucous membranes. CARDIOVASCULAR:  S1, S2 within normal limits, mildly tachycardic. RESPIRATION:  Diffuse bilateral coarse breath sounds, rhonchi, no wheezing. GASTROINTESTINAL:  Bowel sounds present in all four quadrants, soft, nontender, nondistended. LOWER EXTREMITIES:  Right lower extremity is more edematous than the left lower extremity. NEUROLOGIC:  Alert, oriented x3, hard of hearing, no focal motor deficits, patient answering questions appropriately, following commands. DIAGNOSTIC DATA:  LABS:  CBC unremarkable. Urinalysis positive for 1+ bacteria, negative for leukocyte esterase. Chemistry:  BUN 34, creatinine 2.91, troponin 0.1. His creatinine in 2017 was 1.2.  VQ scan with pending results. Chest x-ray, vague airspace disease, right mid lung. Persistent bibasilar atelectasis or scarring. A short term followup with PE on the lateral chest radiograph recommended. EKG:  Sinus rhythm with premature atrial complexes. Right bundle branch block. No acute ischemic changes. A 2D echo done in 2017 was limited.   It showed no pericardial effusion in that limited 2D echo, but no ejection fraction or diastolic function as it was a limited 2D echo. Catheterization done in November 2017. The EF was calculated at 50% at that time. ASSESSMENT AND PLAN:  The patient is an 67-year-old gentleman with complex medical problems including peripheral vascular disease, hypertension, coronary artery disease with recent stents, with recent MARY ANN in 11/2017, stenting of AAA, who now presented with acute DVT. PROBLEM LIST:  1. Acute bilateral deep venous thrombosis. 2.  Respiratory symptoms of productive cough, likely  chronic obstructive pulmonary disease exacerbation. I could not locate any pulmonary function tests. Positive troponins at 0.1 without any acute chest pain or ischemic changes on EKG, this could be likely secondary to acute kidney injury and possible pulmonary embolism    PLAN:    1. Tonight, we will admit him to the progressive care unit. We will do continuous pulse ox. For now, he is hemodynamically stable without any respiratory failure. He got Lovenox in the ER. He will start on IV heparin with his renal function, and will follow up on V/Q scan. 2. Positive troponins, this is less likely ACS related. This could be most likely related to his acute thromboembolism. We will monitor two more sets of cardiac enzymes. We will also get a 2D echo to see his overall cardiac function, and also if there is any right heart strain. Anyhow, he is going to be on IV heparin for his DVT. If needed, consider cardiology consult in a.m.  3. Acute kidney injury. We will hold diuretics, losartan, HCTZ for MARIAMA. We will monitor renal function. For now, we will not give any IV fluids secondary to his shortness of breath. 4.  Productive cough without any fevers, portable chest x-ray with vague airspace disease in the right mid lung. Atelectasis or scarring.   It looks like the patient has underlying chronic obstructive pulmonary disease (patient is on an inhaler and a nebulizer at home). For now, we will give azithromycin for possible COPD exacerbation. We will get a sputum culture and repeat chest x-ray AP and lateral in the a.m. Consider starting steroids depending on his clinical improvement. 5. Urinalysis positive for 1+ bacteria. The patient does not have any UTI symptoms. We will wait for a urine culture. 6.  Coronary artery disease. Continue with aspirin, Plavix, Imdur, metoprolol, statin. Will monitor H and H closely. 7.  Hypertension. Continue with Norvasc, Imdur, Toprol. 8.  Patient had duodenitis, antral erosions as per EGD, 11/2017, will continue with omeprazole. 9.  Hypothyroidism. Continue with levothyroxine. 10.  Deep vein thrombosis prophylaxis. Patient is on IV heparin for acute deep vein thrombosis. 11.  CODE STATUS:  I discussed with the patient and the patient does not have AMD.  The patient wants DO NOT RESUSCITATE. I spoke to the patient and explained to him three times, and the patient mentioned the same he does not want to be resuscitated and he does not want shock, defibrillator or CPR. Secondary to his deafness, I wrote it on a paper and showed it to the patient and he mentioned he does not want resuscitation/he wants to be DO NOT RESUSCITATE.       MD LIANG Mcbride / ALISA  D: 03/16/2018 19:06     T: 03/16/2018 20:20  JOB #: [id number]

## 2018-03-17 NOTE — PROGRESS NOTES
Pt seen and evaluated. VS:    BP= 106/58  HR= 86  RR= 18   O2sat= 92% on 3 liters O2   PE:  GEN- patient in mild respiratory distress with audible wheeze  PSYCH-A&Ox3. Hard of hearing. NEURO-GCS 15. Moves all extremities x 4. No slurred speech. No facial droop. Sensation grossly intact. No pronator drift  HEENT-NCAT/pupils 2 mm reactive bilateral. Oropharynx clear. NECK-supple, trachea midline  LUNGS- scattered wheezing B  HEART-RRR  ABD-soft, NT,ND,  +BS. No R/G/R  VASCULAR-2+ radial/1+DP pulses bilateral. 2+ pitting edema RLE. No edema LLE. SKIN-warm/dry  MS-no calf tenderness    A/P:  As noted in prior note.

## 2018-03-17 NOTE — ED NOTES
2000: Received report from John, [facility]. Patient resting comfortably. Denies needs. 2030: Entered patients room to hang abx, patient sats 82% RA. Placed on 3L NC with improvement to 93%. 2035: Responded to patients pump alarm. Patient noted to be climbing out of bed, pulling at lines. When asking patient why he was climbing out of bed, he seemed confused. Speech appeared slurred to this RN. NIH completed, no deficits noted with exception of slurred speech. Called ED provider back to bedside who stated speech is consistent with speech upon arrival and this was not a new finding for patient. Glucose checked, 109. ED provider to order repeat head CT. Page out to Dr. Donovan Fierro. 2100: Repeat page out to hospitalist regarding critical troponin and AMS. 2140: Dr. Mary Cavazos returned page, orders received. 2151: Repeat EKG shown to Dr. Jonathon Yarbrough, no changes noted. 2300: Pt resting comfortably on stretcher. Denies needs at this time. Call light within reach, patient instructed on use. 0000: Patient observed sleeping in bed. Will continue to monitor. 0200: Began heparin gtt. Patient sleeping in room. Updated on plan of care. 0300: Patients 02 sats remain intermittently low 02 increased to 4L. Patient in no acute distress. Remains sleeping in bed.     0500: Pt resting comfortably on stretcher. Denies needs at this time. Call light within reach, patient instructed on use. 0700: Patient resting in bed. Aware of care plan for day, denies needs. 0800: Report given to SIVA Gonzalez.

## 2018-03-17 NOTE — ED NOTES
TRANSFER - OUT REPORT:    Verbal report given to Diana(name) on Chava Harrison  being transferred to [facility](unit) for routine progression of care       Report consisted of patients Situation, Background, Assessment and   Recommendations(SBAR). Information from the following report(s) SBAR, ED Summary, MAR, Recent Results and Cardiac Rhythm NSR was reviewed with the receiving nurse. Lines:   Peripheral IV 03/16/18 Right Wrist (Active)   Site Assessment Clean, dry, & intact 3/16/2018 12:51 PM   Phlebitis Assessment 0 3/16/2018 12:51 PM   Infiltration Assessment 0 3/16/2018 12:51 PM   Dressing Status Clean, dry, & intact 3/16/2018 12:51 PM       Peripheral IV 03/16/18 Right Antecubital (Active)   Site Assessment Clean, dry, & intact 3/16/2018  1:46 PM   Phlebitis Assessment 0 3/16/2018  1:46 PM   Infiltration Assessment 0 3/16/2018  1:46 PM   Dressing Status Clean, dry, & intact 3/16/2018  1:46 PM   Dressing Type Tape;Transparent 3/16/2018  1:46 PM   Hub Color/Line Status Pink;Capped;Flushed;Patent 3/16/2018  1:46 PM   Action Taken Blood drawn 3/16/2018  1:46 PM        Opportunity for questions and clarification was provided.       Patient transported with:   Monitor  O2 @ 4 liters  Registered Nurse

## 2018-03-18 LAB
ABO + RH BLD: NORMAL
ANION GAP SERPL CALC-SCNC: 9 MMOL/L (ref 5–15)
APTT PPP: 54 SEC (ref 22.1–32)
APTT PPP: 54.3 SEC (ref 22.1–32)
APTT PPP: 79.1 SEC (ref 22.1–32)
BLOOD GROUP ANTIBODIES SERPL: NORMAL
BUN SERPL-MCNC: 30 MG/DL (ref 6–20)
BUN/CREAT SERPL: 18 (ref 12–20)
CALCIUM SERPL-MCNC: 8.6 MG/DL (ref 8.5–10.1)
CHLORIDE SERPL-SCNC: 106 MMOL/L (ref 97–108)
CO2 SERPL-SCNC: 26 MMOL/L (ref 21–32)
CREAT SERPL-MCNC: 1.65 MG/DL (ref 0.7–1.3)
ERYTHROCYTE [DISTWIDTH] IN BLOOD BY AUTOMATED COUNT: 17.6 % (ref 11.5–14.5)
GLUCOSE SERPL-MCNC: 102 MG/DL (ref 65–100)
HCT VFR BLD AUTO: 34.5 % (ref 36.6–50.3)
HCT VFR BLD AUTO: 34.6 % (ref 36.6–50.3)
HCT VFR BLD AUTO: 36.8 % (ref 36.6–50.3)
HGB BLD-MCNC: 11.5 G/DL (ref 12.1–17)
HGB BLD-MCNC: 11.6 G/DL (ref 12.1–17)
HGB BLD-MCNC: 12.1 G/DL (ref 12.1–17)
MCH RBC QN AUTO: 29.7 PG (ref 26–34)
MCHC RBC AUTO-ENTMCNC: 33.5 G/DL (ref 30–36.5)
MCV RBC AUTO: 88.5 FL (ref 80–99)
NRBC # BLD: 0 K/UL (ref 0–0.01)
NRBC BLD-RTO: 0 PER 100 WBC
PLATELET # BLD AUTO: 215 K/UL (ref 150–400)
PMV BLD AUTO: 10.8 FL (ref 8.9–12.9)
POTASSIUM SERPL-SCNC: 3 MMOL/L (ref 3.5–5.1)
RBC # BLD AUTO: 3.91 M/UL (ref 4.1–5.7)
SODIUM SERPL-SCNC: 141 MMOL/L (ref 136–145)
SPECIMEN EXP DATE BLD: NORMAL
THERAPEUTIC RANGE,PTTT: ABNORMAL SECS (ref 58–77)
WBC # BLD AUTO: 3.4 K/UL (ref 4.1–11.1)

## 2018-03-18 PROCEDURE — 77030029684 HC NEB SM VOL KT MONA -A

## 2018-03-18 PROCEDURE — 85027 COMPLETE CBC AUTOMATED: CPT | Performed by: HOSPITALIST

## 2018-03-18 PROCEDURE — 77010033678 HC OXYGEN DAILY

## 2018-03-18 PROCEDURE — 85018 HEMOGLOBIN: CPT | Performed by: HOSPITALIST

## 2018-03-18 PROCEDURE — 74011250637 HC RX REV CODE- 250/637: Performed by: HOSPITALIST

## 2018-03-18 PROCEDURE — 65660000000 HC RM CCU STEPDOWN

## 2018-03-18 PROCEDURE — C9113 INJ PANTOPRAZOLE SODIUM, VIA: HCPCS | Performed by: HOSPITALIST

## 2018-03-18 PROCEDURE — 36415 COLL VENOUS BLD VENIPUNCTURE: CPT | Performed by: HOSPITALIST

## 2018-03-18 PROCEDURE — 74011000250 HC RX REV CODE- 250: Performed by: HOSPITALIST

## 2018-03-18 PROCEDURE — 85730 THROMBOPLASTIN TIME PARTIAL: CPT | Performed by: HOSPITALIST

## 2018-03-18 PROCEDURE — 86900 BLOOD TYPING SEROLOGIC ABO: CPT | Performed by: HOSPITALIST

## 2018-03-18 PROCEDURE — 94640 AIRWAY INHALATION TREATMENT: CPT

## 2018-03-18 PROCEDURE — 80048 BASIC METABOLIC PNL TOTAL CA: CPT | Performed by: HOSPITALIST

## 2018-03-18 PROCEDURE — 74011250636 HC RX REV CODE- 250/636: Performed by: HOSPITALIST

## 2018-03-18 PROCEDURE — 74011636637 HC RX REV CODE- 636/637: Performed by: HOSPITALIST

## 2018-03-18 RX ORDER — SODIUM CHLORIDE AND POTASSIUM CHLORIDE .9; .15 G/100ML; G/100ML
SOLUTION INTRAVENOUS CONTINUOUS
Status: DISCONTINUED | OUTPATIENT
Start: 2018-03-18 | End: 2018-03-21

## 2018-03-18 RX ORDER — PREDNISONE 20 MG/1
40 TABLET ORAL
Status: COMPLETED | OUTPATIENT
Start: 2018-03-18 | End: 2018-03-22

## 2018-03-18 RX ORDER — IPRATROPIUM BROMIDE AND ALBUTEROL SULFATE 2.5; .5 MG/3ML; MG/3ML
3 SOLUTION RESPIRATORY (INHALATION)
Status: DISCONTINUED | OUTPATIENT
Start: 2018-03-18 | End: 2018-03-23 | Stop reason: HOSPADM

## 2018-03-18 RX ADMIN — SODIUM CHLORIDE AND POTASSIUM CHLORIDE: 9; 1.49 INJECTION, SOLUTION INTRAVENOUS at 20:12

## 2018-03-18 RX ADMIN — SODIUM CHLORIDE AND POTASSIUM CHLORIDE: 9; 1.49 INJECTION, SOLUTION INTRAVENOUS at 09:34

## 2018-03-18 RX ADMIN — IPRATROPIUM BROMIDE AND ALBUTEROL SULFATE 3 ML: .5; 3 SOLUTION RESPIRATORY (INHALATION) at 08:27

## 2018-03-18 RX ADMIN — METOPROLOL SUCCINATE 25 MG: 50 TABLET, EXTENDED RELEASE ORAL at 09:40

## 2018-03-18 RX ADMIN — ATORVASTATIN CALCIUM 40 MG: 40 TABLET, FILM COATED ORAL at 09:40

## 2018-03-18 RX ADMIN — Medication 8 ML: at 06:00

## 2018-03-18 RX ADMIN — AMLODIPINE BESYLATE 5 MG: 5 TABLET ORAL at 09:40

## 2018-03-18 RX ADMIN — DOCUSATE SODIUM 100 MG: 100 CAPSULE, LIQUID FILLED ORAL at 18:20

## 2018-03-18 RX ADMIN — ISOSORBIDE MONONITRATE 30 MG: 30 TABLET, EXTENDED RELEASE ORAL at 09:44

## 2018-03-18 RX ADMIN — Medication 10 ML: at 20:13

## 2018-03-18 RX ADMIN — HEPARIN SODIUM 12 UNITS/KG/HR: 10000 INJECTION, SOLUTION INTRAVENOUS at 07:21

## 2018-03-18 RX ADMIN — DOCUSATE SODIUM 100 MG: 100 CAPSULE, LIQUID FILLED ORAL at 09:40

## 2018-03-18 RX ADMIN — PREDNISONE 40 MG: 20 TABLET ORAL at 09:49

## 2018-03-18 RX ADMIN — TAMSULOSIN HYDROCHLORIDE 0.4 MG: 0.4 CAPSULE ORAL at 09:40

## 2018-03-18 RX ADMIN — CLOPIDOGREL BISULFATE 75 MG: 75 TABLET ORAL at 09:39

## 2018-03-18 RX ADMIN — PANTOPRAZOLE SODIUM 40 MG: 40 TABLET, DELAYED RELEASE ORAL at 07:22

## 2018-03-18 RX ADMIN — FINASTERIDE 5 MG: 5 TABLET, FILM COATED ORAL at 09:39

## 2018-03-18 RX ADMIN — Medication 10 ML: at 15:51

## 2018-03-18 RX ADMIN — AZITHROMYCIN MONOHYDRATE 500 MG: 500 INJECTION, POWDER, LYOPHILIZED, FOR SOLUTION INTRAVENOUS at 20:13

## 2018-03-18 RX ADMIN — LEVOTHYROXINE SODIUM 88 MCG: 88 TABLET ORAL at 07:22

## 2018-03-18 RX ADMIN — PANTOPRAZOLE SODIUM 40 MG: 40 INJECTION, POWDER, FOR SOLUTION INTRAVENOUS at 20:12

## 2018-03-18 RX ADMIN — HEPARIN SODIUM 13 UNITS/KG/HR: 10000 INJECTION, SOLUTION INTRAVENOUS at 16:48

## 2018-03-18 NOTE — PROGRESS NOTES
Bedside shift change report given to rTan Cisse RN (oncoming nurse) by Chicho Pittman RN (offgoing nurse). Report included the following information SBAR, Kardex, Procedure Summary, Intake/Output, MAR, Recent Results and Cardiac Rhythm NSR.

## 2018-03-18 NOTE — PROGRESS NOTES
Bedside shift change report given to [address] (oncoming nurse) by Tran Cisse (offgoing nurse). Report included the following information SBAR, Intake/Output, Recent Results and Cardiac Rhythm Sinus tach.

## 2018-03-18 NOTE — PROGRESS NOTES
Notified Dr. Sebastian Mcduffie about pt's potassium, bloody stool, changes in Heparin gtt. He will make adjustments and see the patient. The patient is still dyspneic at rest, maintaining saturations > 92% on 3.5 L NC which is an improvement from yesterday. Saturations drop int he 80's quickly on RA. Pt periodically removed NC. Noted both PIV on right arm have old dried blood on dressing, flush fine and no other evidence of leaking, erythema, or edema. Placed another IV in the left FA and hraper H/H and type and screen. Will monitor IV on the right for infiltration and recommend only RN to take pt's vital signs.

## 2018-03-18 NOTE — PROGRESS NOTES
Bedside and Verbal shift change report given to [address] [place] [address]. Report included the following information SBAR.

## 2018-03-18 NOTE — ROUTINE PROCESS
Primary Nurse Ran Christian, SIVA and David Hyde, RN performed a dual skin assessment on this patient No impairment noted - skin dry and flaky but no breakdown noted  Cristian score is 21    21:40 - Patient had small BM with bright red blood - STAT CBC drawn (resulted within normal limits)

## 2018-03-18 NOTE — PROGRESS NOTES
Yuko Arroyo MD   Phone/text: [phone] (7am to 7pm)  After 7pm please call  for hospitalist on call    Hospitalist Progress Note     3/18/2018   PCP:  Dr. Virginie Amezcua MD  Chief Complaint   Patient presents with   [facility] Referral Request    Blood Clot    Irregular Heart Beat       Admission Summary:   An 21-year-old gentleman with known peripheral vascular disease, status post left leg angioplasty, coronary artery disease status post MARY ANN in November 2017, hypertension, stroke, rectal bleed 11/17/2017 was admitted at [facility] for carotid endarterectomy, status post AAA stenting who went to the vascular surgery clinic for routine followup, with his swelling in the right lower extremity (more than his usual edema). He got a venous duplex, which showed bilateral DVT, iliofemoral on the right and the popliteal on the left. The vascular surgeon, Dr. Allan Mejia directed the patient to the ER. Reason For Visit:  DVT/PE    Assessment/Plan   DVT/PE with evidence of acute cor pulmonale (POA) - likely causing positive troponin. Seems provoked  - V/Q scan 3/16 with intermediate probability of PE  - LE doppler positive for bilateral DVT by report  - Echocardiogram pending  - Continue heparin drip but place IVC filter due to new rectal bleeding    GI bleed - bloody stool 3/17 PM. Difficult situation given fresh PE and recent cardiac stent  - EGD 11/22/17 with antral erosions, hiatal hernia, schatzki's ring  - Clear liquid diet  - Switch protonix to IV  - Monitor HH  - Consider [place] scan for bleeding if recurs given MARIAMA  - GI consulted    CAD s/p stenting - troponin elevation is likely strain from PE rather than ACS  - MARY ANN in the mid-circ, MARY ANN in the distal RVA 11/6/17  - ECG with sinus, 1st degree AV block, old RBBB  - Continue atorvastatin, imdur, metoprolol  - Stop aspirin.  Continue plavix given that he is only 4 months out from MARY ANN placement but I will consult cardiology to get their advice in the setting of ongoing bleeding    MARIAMA on CKD3 (POA) - creatinine improving back towards baseline  - UA unremarkable  - Renal US 3/17 with medical renal disease but no hydronephrosis  - Continue gentle IVF  - Consider nephrology consult if not improving    Altered mental status - overnight 3/16 to 3/17. Hard of hearing at baseline but Mr. Debbie Noonan is oriented and appropriate now  - CT head 3/16 without acute process, slight interval increase in bilateral hygromas  - ABG compensated  - Re-orient as able    COPD with acute exacerbation - may have been exacerbated by PE  - Continue azithromycin started 3/16  - Continue Bronchodilators  - Add prednisone for wheezing    PVD s/p left leg angioplasty  - Continue plavix, atorvastatin    AAA s/p stenting    Carotid stenosis s/p endarterectomy    Hypothyroid (chronic) - continue home synthroid    BPH (chronic) - continue home finasteride, flomax    See orders for other plans. Code status: DNR, confirmed again with Mr. Debbie Noonan  VTE prophylaxis: heparin  Discussed plan of care with Patient/Family and Nurse   Pre-admission lived at home  Discharge plan: home  Estimated time to discharge: unclear     Subjective   Mr. Debbie Noonan had a bloody bowel movement overnight. He denies abdominal pain, nausea, vomiting. Still a bit short of breath. No chest pain.     Reviewed interval history  Physical examination     Visit Vitals    /87 (BP 1 Location: Left arm, BP Patient Position: At rest)    Pulse 65    Temp 97.5 °F (36.4 °C)    Resp 20    Ht 5' 3\" (1.6 m)    Wt 90.1 kg (198 lb 10.2 oz)    SpO2 96%    BMI 35.19 kg/m2       Temp (24hrs), Av.4 °F (36.3 °C), Min:97.1 °F (36.2 °C), Max:97.6 °F (36.4 °C)      Intake/Output Summary (Last 24 hours) at 18 0919  Last data filed at 18 0405   Gross per 24 hour   Intake          2733.16 ml   Output              825 ml   Net          1908.16 ml       Gen - NAD  HEENT - MMM  Neck - supple, full ROM  CV - RRR, 2/6 systolic murmur at LLSB  Resp - lungs with scattered wheezing, normal WOB  GI - abdomen S, NT, ND, +BS   - no CVA tenderness, bladder non-palpable in lower abdomen  MSK - normal tone and bulk, 2+ edema in right LE, no edema in left LE. Poor pulses  Neuro - A&O, no focal deficits but very hard of hearing  Psych - calm and cooperative with normal affect    Data Review       Telemetry x   ECG    Xray    CT scan    MRI    Echocardiogram    Ultrasound x             I have reviewed the flow sheet and recent notes  New labs and data personally reviewed.     Recent Labs      03/18/18   0409  03/17/18   2140  03/17/18   0204   WBC  3.4*  4.2  3.8*   HGB  11.6*  11.9*  11.5*   HCT  34.6*  35.5*  33.8*   PLT  215  218  216     Recent Labs      03/18/18   0409  03/17/18   0204  03/16/18   1348  03/16/18   1236   NA  141  141  137   --    K  3.0*  2.8*  3.5   --    CL  106  104  102   --    CO2  26  25  27   --    GLU  102*  89  97   --    BUN  30*  38*  34*   --    CREA  1.65*  2.49*  2.91*   --    CA  8.6  8.4*  9.0   --    ALB   --    --   3.2*   --    TBILI   --    --   0.7   --    SGOT   --    --   43*   --    ALT   --    --   26   --    INR   --    --    --   1.1       Medications reviewed  Current Facility-Administered Medications   Medication Dose Route Frequency    0.9% sodium chloride with KCl 20 mEq/L infusion   IntraVENous CONTINUOUS    predniSONE (DELTASONE) tablet 40 mg  40 mg Oral DAILY WITH BREAKFAST    pantoprazole (PROTONIX) 40 mg in sodium chloride 10 mL injection  40 mg IntraVENous Q12H    albuterol-ipratropium (DUO-NEB) 2.5 MG-0.5 MG/3 ML  3 mL Nebulization Q6H PRN    amLODIPine (NORVASC) tablet 5 mg  5 mg Oral DAILY    levothyroxine (SYNTHROID) tablet 88 mcg  88 mcg Oral ACB    tamsulosin (FLOMAX) capsule 0.4 mg  0.4 mg Oral DAILY    metoprolol succinate (TOPROL-XL) XL tablet 25 mg  25 mg Oral DAILY    isosorbide mononitrate ER (IMDUR) tablet 30 mg  30 mg Oral DAILY    finasteride (PROSCAR) tablet 5 mg  5 mg Oral DAILY    atorvastatin (LIPITOR) tablet 40 mg  40 mg Oral DAILY    clopidogrel (PLAVIX) tablet 75 mg  75 mg Oral DAILY    sodium chloride (NS) flush 5-10 mL  5-10 mL IntraVENous Q8H    sodium chloride (NS) flush 5-10 mL  5-10 mL IntraVENous PRN    ondansetron (ZOFRAN) injection 4 mg  4 mg IntraVENous Q6H PRN    docusate sodium (COLACE) capsule 100 mg  100 mg Oral BID    albuterol-ipratropium (DUO-NEB) 2.5 MG-0.5 MG/3 ML  3 mL Nebulization Q4H RT    guaiFENesin-dextromethorphan (ROBITUSSIN DM) 100-10 mg/5 mL syrup 10 mL  10 mL Oral Q6H PRN    heparin 25,000 units in D5W 250 ml infusion  16-36 Units/kg/hr IntraVENous TITRATE    azithromycin (ZITHROMAX) 500 mg in 0.9% sodium chloride (MBP/ADV) 250 mL  500 mg IntraVENous Q24H         Nina Cali MD  Internal Medicine  3/18/2018

## 2018-03-18 NOTE — PROGRESS NOTES
TRANSFER - IN REPORT:    Verbal report received from Malina S Rainer Bruce on [place]  being received from ED for routine progression of care      Report consisted of patients Situation, Background, Assessment and   Recommendations(SBAR). Information from the following report(s) ED Summary was reviewed with the receiving nurse. Opportunity for questions and clarification was provided. Assessment completed upon patients arrival to unit and care assumed. Bedside and Verbal shift change report given to [facility]. Report included the following information SBAR.

## 2018-03-19 LAB
ANION GAP SERPL CALC-SCNC: 9 MMOL/L (ref 5–15)
APTT PPP: 61.4 SEC (ref 22.1–32)
BACTERIA SPEC CULT: ABNORMAL
BACTERIA SPEC CULT: ABNORMAL
BUN SERPL-MCNC: 18 MG/DL (ref 6–20)
BUN/CREAT SERPL: 14 (ref 12–20)
CALCIUM SERPL-MCNC: 8.5 MG/DL (ref 8.5–10.1)
CC UR VC: ABNORMAL
CHLORIDE SERPL-SCNC: 111 MMOL/L (ref 97–108)
CO2 SERPL-SCNC: 24 MMOL/L (ref 21–32)
CREAT SERPL-MCNC: 1.27 MG/DL (ref 0.7–1.3)
ERYTHROCYTE [DISTWIDTH] IN BLOOD BY AUTOMATED COUNT: 17.5 % (ref 11.5–14.5)
GLUCOSE BLD STRIP.AUTO-MCNC: 138 MG/DL (ref 65–100)
GLUCOSE SERPL-MCNC: 104 MG/DL (ref 65–100)
HCT VFR BLD AUTO: 34.1 % (ref 36.6–50.3)
HGB BLD-MCNC: 11.5 G/DL (ref 12.1–17)
L PNEUMO1 AG UR QL IA: NEGATIVE
MAGNESIUM SERPL-MCNC: 1.3 MG/DL (ref 1.6–2.4)
MCH RBC QN AUTO: 30.1 PG (ref 26–34)
MCHC RBC AUTO-ENTMCNC: 33.7 G/DL (ref 30–36.5)
MCV RBC AUTO: 89.3 FL (ref 80–99)
NRBC # BLD: 0 K/UL (ref 0–0.01)
NRBC BLD-RTO: 0 PER 100 WBC
PLATELET # BLD AUTO: 214 K/UL (ref 150–400)
PMV BLD AUTO: 10.9 FL (ref 8.9–12.9)
POTASSIUM SERPL-SCNC: 3.5 MMOL/L (ref 3.5–5.1)
RBC # BLD AUTO: 3.82 M/UL (ref 4.1–5.7)
SERVICE CMNT-IMP: ABNORMAL
SERVICE CMNT-IMP: ABNORMAL
SODIUM SERPL-SCNC: 144 MMOL/L (ref 136–145)
SPECIMEN SOURCE: NORMAL
THERAPEUTIC RANGE,PTTT: ABNORMAL SECS (ref 58–77)
WBC # BLD AUTO: 3.3 K/UL (ref 4.1–11.1)

## 2018-03-19 PROCEDURE — 74011250637 HC RX REV CODE- 250/637: Performed by: HOSPITALIST

## 2018-03-19 PROCEDURE — C9113 INJ PANTOPRAZOLE SODIUM, VIA: HCPCS | Performed by: HOSPITALIST

## 2018-03-19 PROCEDURE — 74011250636 HC RX REV CODE- 250/636: Performed by: NURSE PRACTITIONER

## 2018-03-19 PROCEDURE — 82962 GLUCOSE BLOOD TEST: CPT

## 2018-03-19 PROCEDURE — 83735 ASSAY OF MAGNESIUM: CPT | Performed by: NURSE PRACTITIONER

## 2018-03-19 PROCEDURE — 80048 BASIC METABOLIC PNL TOTAL CA: CPT | Performed by: HOSPITALIST

## 2018-03-19 PROCEDURE — 36415 COLL VENOUS BLD VENIPUNCTURE: CPT | Performed by: HOSPITALIST

## 2018-03-19 PROCEDURE — 85730 THROMBOPLASTIN TIME PARTIAL: CPT | Performed by: HOSPITALIST

## 2018-03-19 PROCEDURE — 74011636637 HC RX REV CODE- 636/637: Performed by: HOSPITALIST

## 2018-03-19 PROCEDURE — 74011250636 HC RX REV CODE- 250/636: Performed by: HOSPITALIST

## 2018-03-19 PROCEDURE — 65660000000 HC RM CCU STEPDOWN

## 2018-03-19 PROCEDURE — 74011250637 HC RX REV CODE- 250/637: Performed by: NURSE PRACTITIONER

## 2018-03-19 PROCEDURE — 85027 COMPLETE CBC AUTOMATED: CPT | Performed by: HOSPITALIST

## 2018-03-19 RX ORDER — ENOXAPARIN SODIUM 100 MG/ML
1 INJECTION SUBCUTANEOUS EVERY 12 HOURS
Status: DISCONTINUED | OUTPATIENT
Start: 2018-03-19 | End: 2018-03-21

## 2018-03-19 RX ORDER — LANOLIN ALCOHOL/MO/W.PET/CERES
400 CREAM (GRAM) TOPICAL 2 TIMES DAILY
Status: DISCONTINUED | OUTPATIENT
Start: 2018-03-19 | End: 2018-03-23 | Stop reason: HOSPADM

## 2018-03-19 RX ORDER — METOPROLOL SUCCINATE 50 MG/1
50 TABLET, EXTENDED RELEASE ORAL DAILY
Status: DISCONTINUED | OUTPATIENT
Start: 2018-03-20 | End: 2018-03-21

## 2018-03-19 RX ORDER — METOPROLOL SUCCINATE 25 MG/1
25 TABLET, EXTENDED RELEASE ORAL
Status: COMPLETED | OUTPATIENT
Start: 2018-03-19 | End: 2018-03-19

## 2018-03-19 RX ORDER — POTASSIUM CHLORIDE 750 MG/1
20 TABLET, FILM COATED, EXTENDED RELEASE ORAL
Status: COMPLETED | OUTPATIENT
Start: 2018-03-19 | End: 2018-03-19

## 2018-03-19 RX ORDER — ACETAMINOPHEN 325 MG/1
650 TABLET ORAL
Status: DISCONTINUED | OUTPATIENT
Start: 2018-03-19 | End: 2018-03-23 | Stop reason: HOSPADM

## 2018-03-19 RX ORDER — MAGNESIUM SULFATE HEPTAHYDRATE 40 MG/ML
2 INJECTION, SOLUTION INTRAVENOUS ONCE
Status: COMPLETED | OUTPATIENT
Start: 2018-03-19 | End: 2018-03-23

## 2018-03-19 RX ADMIN — Medication 400 MG: at 17:14

## 2018-03-19 RX ADMIN — PANTOPRAZOLE SODIUM 40 MG: 40 INJECTION, POWDER, FOR SOLUTION INTRAVENOUS at 09:04

## 2018-03-19 RX ADMIN — DOCUSATE SODIUM 100 MG: 100 CAPSULE, LIQUID FILLED ORAL at 17:14

## 2018-03-19 RX ADMIN — TAMSULOSIN HYDROCHLORIDE 0.4 MG: 0.4 CAPSULE ORAL at 09:05

## 2018-03-19 RX ADMIN — DOCUSATE SODIUM 100 MG: 100 CAPSULE, LIQUID FILLED ORAL at 09:05

## 2018-03-19 RX ADMIN — MAGNESIUM SULFATE HEPTAHYDRATE 2 G: 40 INJECTION, SOLUTION INTRAVENOUS at 12:24

## 2018-03-19 RX ADMIN — PANTOPRAZOLE SODIUM 40 MG: 40 INJECTION, POWDER, FOR SOLUTION INTRAVENOUS at 20:24

## 2018-03-19 RX ADMIN — ENOXAPARIN SODIUM 80 MG: 80 INJECTION SUBCUTANEOUS at 09:04

## 2018-03-19 RX ADMIN — ATORVASTATIN CALCIUM 40 MG: 40 TABLET, FILM COATED ORAL at 09:05

## 2018-03-19 RX ADMIN — METOPROLOL SUCCINATE 25 MG: 25 TABLET, EXTENDED RELEASE ORAL at 12:24

## 2018-03-19 RX ADMIN — SODIUM CHLORIDE AND POTASSIUM CHLORIDE: 9; 1.49 INJECTION, SOLUTION INTRAVENOUS at 06:44

## 2018-03-19 RX ADMIN — CLOPIDOGREL BISULFATE 75 MG: 75 TABLET ORAL at 09:05

## 2018-03-19 RX ADMIN — PREDNISONE 40 MG: 20 TABLET ORAL at 09:05

## 2018-03-19 RX ADMIN — METOPROLOL SUCCINATE 25 MG: 50 TABLET, EXTENDED RELEASE ORAL at 09:05

## 2018-03-19 RX ADMIN — AMLODIPINE BESYLATE 5 MG: 5 TABLET ORAL at 09:05

## 2018-03-19 RX ADMIN — ENOXAPARIN SODIUM 80 MG: 80 INJECTION SUBCUTANEOUS at 20:28

## 2018-03-19 RX ADMIN — POTASSIUM CHLORIDE 20 MEQ: 750 TABLET, EXTENDED RELEASE ORAL at 12:24

## 2018-03-19 RX ADMIN — ISOSORBIDE MONONITRATE 30 MG: 30 TABLET, EXTENDED RELEASE ORAL at 09:05

## 2018-03-19 RX ADMIN — Medication 10 ML: at 20:25

## 2018-03-19 RX ADMIN — FINASTERIDE 5 MG: 5 TABLET, FILM COATED ORAL at 09:16

## 2018-03-19 RX ADMIN — LEVOTHYROXINE SODIUM 88 MCG: 88 TABLET ORAL at 06:34

## 2018-03-19 RX ADMIN — HEPARIN SODIUM 14 UNITS/KG/HR: 10000 INJECTION, SOLUTION INTRAVENOUS at 00:18

## 2018-03-19 RX ADMIN — AZITHROMYCIN MONOHYDRATE 500 MG: 500 INJECTION, POWDER, LYOPHILIZED, FOR SOLUTION INTRAVENOUS at 20:24

## 2018-03-19 RX ADMIN — Medication 8 ML: at 06:00

## 2018-03-19 RX ADMIN — ACETAMINOPHEN 650 MG: 325 TABLET, FILM COATED ORAL at 14:01

## 2018-03-19 NOTE — PROGRESS NOTES
Vascular:    Events noted - appreciate Dr Camarillo's help    Agree with plan - will follow peripherally

## 2018-03-19 NOTE — PROGRESS NOTES
Perez Paul is an 81 yr old male who has been admitted due to DVT/PE, GI bleed, CAD, MARIAMA, altered mental status and  COPD with exacerbation. Upon interview patient stated he lives alone in an apartment and there are no steps. Patient is independent and drives. His son and nncajtf-T-lat listed as his NOK help provide transport. Patient stated he is not on home oxygen but is currently on 3.5 L. CM will follow hospital course to determine if home oxygen is needed. known peripheral vascular disease, status post left leg angioplasty, coronary artery disease status post MARY ANN in November 2017, hypertension, stroke, rectal bleed 11/17/2017 . Care Management Interventions  PCP Verified by CM:  Yes  MyChart Signup: No  Discharge Durable Medical Equipment: No  Physical Therapy Consult: No  Occupational Therapy Consult: No  Speech Therapy Consult: No  Current Support Network: Lives Alone  Confirm Follow Up Transport: Family  Plan discussed with Pt/Family/Caregiver: Yes  Freedom of Choice Offered: Yes  Discharge Location  Discharge Placement: Home

## 2018-03-19 NOTE — PROGRESS NOTES
Problem: Falls - Risk of  Goal: *Absence of Falls  Document Hakan Fall Risk and appropriate interventions in the flowsheet.    Outcome: Progressing Towards Goal  Fall Risk Interventions:  Mobility Interventions: Patient to call before getting OOB         Medication Interventions: Patient to call before getting OOB, Teach patient to arise slowly    Elimination Interventions: Call light in reach, Patient to call for help with toileting needs

## 2018-03-19 NOTE — PROGRESS NOTES
[address]  [address] [facility] [address] Nathaniel [address], [address]  [phone]                GI PROGRESS NOTE  Symone Delgado, [facility]  Work Cell: [phone]      NAME:   Devi Payan   :    1935   MRN:    [id number]     Assessment/Plan   1. 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 Hgb stable. No further bleeding.   - Diet as tolerated  - Supportive management per primary team  - Recommend CT with bleeding protocol for any large amounts of bleeding  - If recurrent bleeding persists, consider colonoscopy for further evaluation  - Monitor H&H, transfuse as needed  2. CAD - s/p multiple stent placements   3.  Acute DVT/PE     Patient Active Problem List   Diagnosis Code    Hypothyroidism E03.9    Essential hypertension I10    CAD (Coronary Artery Disease)--s/p PCI--MARY ANN [place] stents x4 ;MARY ANN [place] stents x3  I25.10    ED (erectile dysfunction) N52.9    Chronic back pain--DJD G89.29    BPH (benign prostatic hypertrophy) N40.0    Dyslipidemia E78.5    Successful  PTCA (percutaneous transluminal coronary angioplasty)--90-95% instent restenosis RCA 10/16/10 Z98.61    Successful PTCA with MARY ANN Xience stent Ramus intermedius 10/16/10 Z95.9    PVD (peripheral vascular disease) with claudication ([facility]) I73.9    Cerebral embolism with cerebral infarction ([address] I63.40    Carotid stenosis, left I65.22    Prediabetes R73.03    Tinea cruris B35.6    Nodule, subcutaneous R22.9    Vitamin D deficiency E55.9    Lipoma of back D17.1    Prostate cancer ([address] C61    Angina, class III ([address] I20.9    Alkaline phosphatase elevation R74.8    Myocardial infarction I21.9    Calculus of kidney N20.0    Osteoarthritis M19.90    SOBOE (shortness of breath on exertion) R06.02    S/P cardiac cath Z98.890    Rectal bleeding K62.5    Bilateral deafness H91.93    Claudication of both lower extremities ([facility]) I73.9    Acute DVT (deep venous thrombosis) ([facility]) I82.409       Subjective:     Feeling okay. Denies any pain. Hgb stable. Had BM today but denies any further bleeding. Tolerating clears. Review of Systems    Constitutional: negative fever, negative chills, negative weight loss  Eyes:   negative visual changes  ENT:   negative sore throat, tongue or lip swelling  Respiratory:  negative cough, negative dyspnea  Cards:  negative for chest pain, palpitations, lower extremity edema  GI:   See HPI  :  negative for frequency, dysuria  Integument:  negative for rash and pruritus  Heme:  negative for easy bruising and gum/nose bleeding  Musculoskel: negative for myalgias, back pain and muscle weakness  Neuro: negative for headaches, dizziness, vertigo  Psych:  negative for feelings of anxiety, depression     Objective:     VITALS:   Last 24hrs VS reviewed since prior hospitalist progress note. Most recent are:  Visit Vitals    BP (!) 153/96 (BP 1 Location: Right arm, BP Patient Position: Sitting)    Pulse 85    Temp 97.8 °F (36.6 °C)    Resp 19    Ht 5' 3\" (1.6 m)    Wt 83.2 kg (183 lb 6.8 oz)    SpO2 100%    BMI 32.49 kg/m2       Intake/Output Summary (Last 24 hours) at 03/19/18 1348  Last data filed at 03/19/18 0400   Gross per 24 hour   Intake          3048.53 ml   Output             1050 ml   Net          1998.53 ml        PHYSICAL EXAM:  General   well developed, well nourished, in no acute distress  EENT  Normocephalic, Atraumatic, PERRLA, EOMI, sclera clear  Respiratory   Clear To Auscultation bilaterally - no wheezes, rales, rhonchi, or crackles  Cardiology  Regular Rate and Rythmn  - no murmurs, rubs or gallops  Abdominal  Soft, non-tender, non-distended, positive bowel sounds, no hepatosplenomegaly, no palpable mass  Extremities  No clubbing, cyanosis, or edema. Pulses intact. Skin  Normal skin turgor.   No rashes or skin ulcers noted  Neurological  No focal neurological deficits noted  Psychological  Oriented x 3. Normal affect.        Lab Data   Recent Results (from the past 12 hour(s))   CBC W/O DIFF    Collection Time: 03/19/18  5:36 AM   Result Value Ref Range    WBC 3.3 (L) 4.1 - 11.1 K/uL    RBC 3.82 (L) 4.10 - 5.70 M/uL    HGB 11.5 (L) 12.1 - 17.0 g/dL    HCT 34.1 (L) 36.6 - 50.3 %    MCV 89.3 80.0 - 99.0 FL    MCH 30.1 26.0 - 34.0 PG    MCHC 33.7 30.0 - 36.5 g/dL    RDW 17.5 (H) 11.5 - 14.5 %    PLATELET 242 553 - 964 K/uL    MPV 10.9 8.9 - 12.9 FL    NRBC 0.0 0  WBC    ABSOLUTE NRBC 0.00 0.00 - 0.90 K/uL   METABOLIC PANEL, BASIC    Collection Time: 03/19/18  5:36 AM   Result Value Ref Range    Sodium 144 136 - 145 mmol/L    Potassium 3.5 3.5 - 5.1 mmol/L    Chloride 111 (H) 97 - 108 mmol/L    CO2 24 21 - 32 mmol/L    Anion gap 9 5 - 15 mmol/L    Glucose 104 (H) 65 - 100 mg/dL    BUN 18 6 - 20 MG/DL    Creatinine 1.27 0.70 - 1.30 MG/DL    BUN/Creatinine ratio 14 12 - 20      GFR est AA >60 >60 ml/min/1.73m2    GFR est non-AA 54 (L) >60 ml/min/1.73m2    Calcium 8.5 8.5 - 10.1 MG/DL   MAGNESIUM    Collection Time: 03/19/18  5:36 AM   Result Value Ref Range    Magnesium 1.3 (L) 1.6 - 2.4 mg/dL   PTT    Collection Time: 03/19/18  5:41 AM   Result Value Ref Range    aPTT 61.4 (H) 22.1 - 32.0 sec    aPTT, therapeutic range     58.0 - 77.0 SECS         Medications: Reviewed    PMH/ reviewed - no change compared to H&P  Mid-Level Provider: Kym Valenzuela NP   Date/Time:  3/19/2018

## 2018-03-19 NOTE — ROUTINE PROCESS
Bedside shift change report given to Ynes Jacobs (oncoming nurse) by Kb (offgoing nurse). Report included the following information SBAR, OR Summary, Intake/Output, Med Rec Status and Cardiac Rhythm NSR 1AVB BBB.

## 2018-03-19 NOTE — CONSULTS
[facility] AR[facility]  MR#: [id number]  : 1935  ACCOUNT #: [de-identified]   DATE OF SERVICE: 2018    CHIEF COMPLAINT:  Rectal bleeding. HISTORY OF PRESENT ILLNESS:  An 78-year-old very hard of hearing gentleman who has got multiple medical problems -- severe peripheral artery as well as coronary artery disease and cerebrovascular disease -- who is admitted for DVT and PE with associated acute cor pulmonale, who has been started on heparin for treatment. Apparently, he has been tolerating the treatment. Apparently, his overall status has been stable. Last night, he had a large bloody bowel movement. He states that rectal bleeding has been present intermittently for the last 2 weeks. He denies abdominal pain and denies urgency. He is moving his bowels once every other day. There is no nausea; there is no vomiting. He has got no fever. The patient is status post EGD in 2017, where he was found to have a stricture of the esophagus along with a hiatal hernia and some erosive gastritis. He had a Schatzki's ring and some mild duodenitis. He is also status post a colonoscopy in  for hematochezia and was found to have large internal hemorrhoids and an 8 mm ascending colon polyp. The overall prep of the colon was not good. During his hospitalization in 2017, it was recognized that he was having rectal bleeding, but apparently a colonoscopy for further evaluation was not performed. It is not clear on the discharge summary what had transpired. PAST MEDICAL HISTORY:  Positive for coronary artery disease with MARY ANN in 2017, hypertension, stroke, rectal bleed in 2017 with an EGD but no colonoscopy, hypothyroidism, peripheral vascular disease, radiation proctitis, prostate cancer, osteoarthritis. PAST SURGICAL HISTORY: Positive for carotid endarterectomy, status post AAA stenting.     FAMILY HISTORY:  Positive for diabetes and lung cancer. SOCIAL HISTORY:  Smoker up until 6 months ago. . No alcohol use. PHYSICAL EXAMINATION:  VITAL SIGNS:  Temperature is 97.7, pulse is 89, blood pressure is 130/82, respirations are 20. GENERAL APPEARANCE: The patient is a hard of hearing, elderly gentleman in no acute distress, oriented x3. HEENT:  Sclerae are anicteric. Conjunctivae are pink. Moist mucous membranes. NECK:  Supple. LYMPHATICS:  There is no adenopathy in the neck or the groin. CHEST:  Clear to auscultation and percussion. HEART:  Reveals a regular rate and rhythm. ABDOMEN:  Obese. It is nontender. EXTREMITIES:  The right lower extremity is more edematous than the left lower extremity. He is moving all his extremities. He is oriented x3. LABORATORY DATA:  Today's hemoglobin is 11.9 -- this is stable. White count today is 3.4. Platelet count is 673,159. Creatinine is 1.65; on admission it was 2.91. The patient has a mild elevation of TSH documented in January. IMAGING STUDIES: The patient's last CT scan of the abdomen and pelvis demonstrated diffuse colonic diverticulosis with an infrarenal abdominal aortic aneurysm, with aortoiliac stenting appearing unchanged. IMPRESSION AND PLAN:   1. Rectal bleeding, probably related to his radiation proctitis, exacerbated by his anticoagulation. Cannot rule out neoplasia or diverticular bleeding. Fortunately, the hemoglobin appears stable. We can follow him, and if the bleeding continues he may require colonoscopy for further evaluation. If it is massive bleeding, recommend a CT with bleeding protocol of the abdomen and pelvis. 2.  Coronary artery disease. 3.  Acute DVT and pulmonary embolus. Plan is as outlined above. MD Kalin Livingston / Alex  D: 03/18/2018 21:58     T: 03/18/2018 22:43  JOB #: [id number]

## 2018-03-19 NOTE — CONSULTS
CARDIOLOGY CONSULT NOTE     [facility] [address] [place] [place] [address], [address], [place]   [phone] fax [phone]    Name: Romain Landin  [id number]  3/19/2018 10:12 AM  Assessment/Plan/Discussion:Cardiology Attending:     Patient seen on the day of progress note and examined  and agree with Advance Practice Provider (QUYEN, NP,PA)  assessment and plans. Romain Landin is a 81 y.o. male   Multiple stents   Many MARY ANN in November, needs for at least 4 months  Suggest we cant really stop Plavix due to high risk for in stent restenosis  Erich with multiple stents  complains of right leg swell and SOB  Since Hgb stable, should be either OAC + Plavix or IVC filter for 2 months + Plavix and then switch to [address] and aspirin    Jorge Real MD           Assessment/Plan:      1. CAD s/p PCI with MARY ANN 11/6/17 to LCX and RCA - minimal troponin elevation likely due to PE, no acute ischemic changes noted on ECG, ASA has been stopped for GI bleed, CAN NOT STOP PLAVIX at this time due to recent PCI/MARY ANN in 11/17, would recommend continuing Plavix for another 2 months and then decision regarding antiplatelet and [address] for DVT/PE can be discussed with patient's primary cardiologist Dr. Massiel Cook  -continue Toprol XL, statin, Imdur   -needs to follow up with Dr. Massiel Cook post hospital discharge  2. DVT/PE - on Lovenox currently, with concurrent GI bleed and the necessity to continue Plavix would recommend IVC filter placement now for PE and then transition to [address] in 2 months once Plavix can be stopped safely, this transition should be discussed with patient's primary cardiologist Dr. Massiel Cook  -V/Q scan 3/16 with intermediate probability of PE  - LE doppler positive for DVT by report  3. 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   Cannot rule out neoplasia or diverticular bleeding. Fortunately, his Hgb is stable, will continue to follow CBC  -if the bleeding continues he may require colonoscopy for further evaluation. If it is massive bleeding, recommend a CT with bleeding protocol of the abdomen and pelvis  -cannot stop Plavix at this time, see discussion regarding antiplatelets and [address] above  -continue PPI  -EGD 11/22/17 with antral erosions, hiatal hernia, schatzki's ring  4. HTN - slightly elevated this AM, will increase Toprol XL to 50mg daily and continue other medications, continue to monitor   5. NSVT - had short run of NSVT overnight, will increase Toprol XL as above, replete K and check Mg level  6. Hypokalemia - K 3.5, will give KCL 20meq PO once now, recheck in AM  7. Dyslipidemia - on atorvastatin 40mg daily, LDL 70  8. Hx of PAD s/p left leg angioplasty - on plavix and statin as above  9. Hx of CVA - on plavix and statin as above, now with DVT/PE, see discussion regarding [address] versus IVC filter as above  10. COPD exacerbation - on antibiotics, nebulizers and prednisone per IM  11. Hx of AAA s/p stenting  12. Carotid stenosis s/p endarterectomy - on plavix and statin as above    Echo 11/17 - LVEF 50-55%, no WMA, mild to mod TR, PASP 46mmHg/mod PAHTN  Cath 11/17 - s/p PCI/MARY ANN to mid LCx and distal RCA    Soc Hx: quit smoking 6 months ago, no etoh use  Fam Hx: no early CAD in family     Admit Date: 3/16/2018     Admit Diagnosis: Acute DVT (deep venous thrombosis) [facility])  Primary Care Physician:Loy Blanchard MD     Attending Provider: Perla Bordy*  Primary Cardiologist: Dr. Nate Long Cardiologist: Dr. Epi Chanel: GI bleeding, recent PCI on plavix  Requesting Physician: Dr. Jerman Rodriguez    Subjective:    Almas Nicole is a 81 y.o. male admitted for Acute DVT (deep venous thrombosis) ([facility] [address]   He c/o increased dyspnea and LE edema on presentation to ER and diagnosed with DVT and probable PE, recently transitioned from IV heparin to Lovenox. He had minimal troponin elevation but no ischemic changes noted on ECG, troponin elevation likely due to PE. He developed a GI bleed with fairly stable Hgb on 3/17/18 and his ASA was stopped. He continues on Plavix post PCI/MARY ANN in 11/17. Currently he denies any chest pain and says his dyspnea is a little better now. He continues with diffuse wheezing and edema in his right leg which is painful. He denies any palpitations, syncope, diaphoresis, nausea. He denies any further rectal bleeding since 3/17/18. He is hard of hearing. Discussed the importance of continuing his Plavix post PCI and he verbalized understanding. Review of Symptoms:  A comprehensive review of systems was negative except for that written in the HPI. Previous treatment/evaluation includes Percutaneous Coronary Intervention, echocardiogram and stress thallium . Cardiac risk factors: smoking/ tobacco exposure, dyslipidemia, obesity, sedentary life style, male gender, hypertension, stress.     Past Medical History:   Diagnosis Date    AAA (abdominal aortic aneurysm) ([address]     Acute MI 12/2010    dr Shruti King    Acute prostatitis     again 9/12/16 f[facility] rec'd [facility]    Advance directive discussed with patient 04/20/2016    Aneurysm ([address] 6/2011    AAA    Borderline glaucoma (glaucoma suspect) 02/19/2015    notes from [address] [place] [address] rec'd    BPH (benign prostatic hyperplasia)     Bright red blood per rectum 02/04/2016    [facility] note [facility] [place]- w/u in progress   8/22/17 [facility] f/u note    CAD (coronary artery disease)     Calculus of kidney     Cerebral embolism with cerebral infarction ([address]     Chronic pain     back    Dizzy spells 6/2012    DJD (degenerative joint disease) of lumbar spine     ED (erectile dysfunction)     8/30/17 f/u note [facility]    Elevated PSA 03/20/2014    [facility] rec[facility]     8/24/16 f/u note    Encephalocele ([address]     Hypercholesterolemia     Hypertension  Pneumonia 02/05/2017    Prostate cancer ([address] 05/12/2014    crystal henry/ Dr Romelia Galarza 8/22/17 f/u note    PVD (peripheral vascular disease) with claudication ([address]     S/P radiation therapy 15 months out    probable cause of the rectal bleeding    Stroke [facility]) 6/2011    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     COLONOSCOPY N/A 8/31/2016    COLONOSCOPY performed by Edvin Briscoe [facility]    ENDOSCOPY, COLON, DIAGNOSTIC      HX CAROTID ENDARTERECTOMY Left     HX HEENT  10/2012    repair right drum cindy dawkins    HX ORTHOPAEDIC Bilateral     BUNIONECTOMY    CO LEFT HEART CATH,PERCUTANEOUS  10/16/2010         PTCA EACH ADDL VESSEL  10/16/2010         PTCA W/ STENT, INITIAL  10/16/2010         UPPER GI ENDOSCOPY,DIAGNOSIS  11/22/2017         VASCULAR SURGERY PROCEDURE UNLIST      left leg varicose vein stripping dr Dana Santoyo     Current Facility-Administered Medications   Medication Dose Route Frequency    enoxaparin (LOVENOX) injection 80 mg  1 mg/kg SubCUTAneous Q12H    potassium chloride SR (KLOR-CON 10) tablet 20 mEq  20 mEq Oral NOW    0.9% sodium chloride with KCl 20 mEq/L infusion   IntraVENous CONTINUOUS    predniSONE (DELTASONE) tablet 40 mg  40 mg Oral DAILY WITH BREAKFAST    pantoprazole (PROTONIX) 40 mg in sodium chloride (NS) 10 mL injection  40 mg IntraVENous Q12H    albuterol-ipratropium (DUO-NEB) 2.5 MG-0.5 MG/3 ML  3 mL Nebulization Q4H PRN    amLODIPine (NORVASC) tablet 5 mg  5 mg Oral DAILY    levothyroxine (SYNTHROID) tablet 88 mcg  88 mcg Oral ACB    tamsulosin (FLOMAX) capsule 0.4 mg  0.4 mg Oral DAILY    metoprolol succinate (TOPROL-XL) XL tablet 25 mg  25 mg Oral DAILY    isosorbide mononitrate ER (IMDUR) tablet 30 mg  30 mg Oral DAILY    finasteride (PROSCAR) tablet 5 mg  5 mg Oral DAILY    atorvastatin (LIPITOR) tablet 40 mg  40 mg Oral DAILY    clopidogrel (PLAVIX) tablet 75 mg  75 mg Oral DAILY    sodium chloride (NS) flush 5-10 mL  5-10 mL IntraVENous Q8H    sodium chloride (NS) flush 5-10 mL  5-10 mL IntraVENous PRN    ondansetron (ZOFRAN) injection 4 mg  4 mg IntraVENous Q6H PRN    docusate sodium (COLACE) capsule 100 mg  100 mg Oral BID    guaiFENesin-dextromethorphan (ROBITUSSIN DM) 100-10 mg/5 mL syrup 10 mL  10 mL Oral Q6H PRN    azithromycin (ZITHROMAX) 500 mg in 0.9% sodium chloride (MBP/ADV) 250 mL  500 mg IntraVENous Q24H       Allergies   Allergen Reactions    Norco [Hydrocodone-Acetaminophen] Other (comments)     Too sedated and felt like he was in a daze    Pcn [Penicillins] Rash    Percocet [Oxycodone-Acetaminophen] Nausea and Vomiting      Family History   Problem Relation Age of Onset    Diabetes Mother     Diabetes Father     Cancer Sister      LUNG    Cancer Brother      COLON    Cancer Sister      LUNG    Anesth Problems Neg Hx       Social History     Social History    Marital status: LEGALLY      Spouse name: N/A    Number of children: N/A    Years of education: N/A     Social History Main Topics    Smoking status: Former Smoker     Packs/day: 0.25     Quit date: 9/10/2014    Smokeless tobacco: Never Used      Comment: has not had cigarette in over 3 month    Alcohol use No    Drug use: No    Sexual activity: Not Currently     Other Topics Concern    None     Social History Narrative          Objective:      Physical Exam  Vitals:    03/18/18 2305 03/19/18 0400 03/19/18 0531 03/19/18 0713   BP: (!) 173/92 (!) 178/97  (!) 156/93   Pulse: 63 78  78   Resp: 18 16  18   Temp: 97.3 °F (36.3 °C) 97.9 °F (36.6 °C)  97.9 °F (36.6 °C)   SpO2: 95% 94%  94%   Weight:   183 lb 6.8 oz (83.2 kg) Height:           General:  Alert, cooperative, no distress, appears stated age. Eyes:  Conjunctivae/corneas clear. Ears:  Normal external ear canals both ears. Nose: Nares normal.     Mouth/Throat: Moist mucous membranes. Neck: Supple, symmetrical, trachea midline, no carotid bruit and no JVD. Back:   Symmetric, no curvature. ROM normal.    Lungs:   Bilateral wheezing throughout    Heart:  Regular rate and rhythm, S1, S2 normal, distant heart sounds, no murmur, click, rub or gallop. Abdomen:   Soft, non-tender. Bowel sounds normal. Obese   Extremities: Extremities normal, atraumatic, no cyanosis, 1+ LE edema in right leg    Vascular: 2+ and symmetric all extremities. Skin: Skin color normal. No rashes or lesions   Lymph nodes: Not assessed   Neurologic: CNII-XII intact. Normal strength throughout. Telemetry: normal sinus rhythm with PVCs, had 6 beat NSVT overnight   ECG: normal sinus rhythm with RBBB, first degree AV block    Data Review:     Recent Labs      03/17/18   0726  03/17/18   0204  03/16/18   2044   TROIQ  0.14*  0.14*  0.13*     Recent Labs      03/19/18   0536  03/18/18   0409   NA  144  141   K  3.5  3.0*   CL  111*  106   CO2  24  26   BUN  18  30*   CREA  1.27  1.65*   GLU  104*  102*   CA  8.5  8.6     Recent Labs      03/19/18   0536  03/18/18   1854   03/18/18   0409   WBC  3.3*   --    --   3.4*   HGB  11.5*  11.5*   < >  11.6*   HCT  34.1*  34.5*   < >  34.6*   PLT  214   --    --   215    < > = values in this interval not displayed. Recent Labs      03/16/18   1348  03/16/18   1236   PTP   --   11.1   INR   --   1.1   SGOT  43*   --    AP  135*   --      No results for input(s): CHOL, LDLC in the last 72 hours. No lab exists for component: TGL, HDLC,  HBA1C  No results for input(s): CRP, TSH, TSHEXT in the last 72 hours. No lab exists for component: ESR  Thank you very much for this referral. I appreciate the opportunity to participate in this patient's care. I will follow along with above stated plan.     MAKAYLA Rod MD   [facility] [address] [place] [address] 13, [address] [place] [address]  [facility]  [phone]    Gurdeep Bonilla MD

## 2018-03-19 NOTE — CDMP QUERY
Please clarify if this patient is (was) being treated/managed for:     =>  Acute Metabolic encephalopathy in the setting of Elderly pt with Acute PE, SOB, Hypoxia, documented altered mental status, required Stat CT of the head, Stat ABG, cont. pulse oximetry and supplemental oxygen    => Other explanation of clinical findings  => Clinically Undetermined (no explanation for clinical findings)    The medical record reflects the following clinical findings, treatment, and risk factors. Risk Factors:  PE, Acute Dvt, Short of breath  Clinical Indicators: Documented AMS from 03/16-03/17, o2 sats dropped down to 85% on Ra, \"Now pt is oriented  and appropriate now. Treatment:  documented altered mental status, required Stat CT of the head, Stat ABG, cont. pulse oximetry and supplemental oxygen    Please clarify and document your clinical opinion in the progress notes and discharge summary including the definitive and/or presumptive diagnosis, (suspected or probable), related to the above clinical findings. Please include clinical findings supporting your diagnosis.       Thank you  LEIDY GoelN, RN, Jeovanny Oliver  [phone]

## 2018-03-19 NOTE — ROUTINE PROCESS
Bedside shift change report given to [employer] (oncoming nurse) by Nilay Wu (offgoing nurse). Report included the following information SBAR, Kardex, Procedure Summary, Intake/Output, MAR and Recent Results.

## 2018-03-19 NOTE — PROGRESS NOTES
Cecil Wiseman MD   Phone/text: [phone] (7am to 7pm)  After 7pm please call  for hospitalist on call    Hospitalist Progress Note     3/19/2018   PCP:  Dr. Lupe Crocker MD  Chief Complaint   Patient presents with   [facility] Referral Request    Blood Clot    Irregular Heart Beat       Admission Summary:   An 81-year-old gentleman with known peripheral vascular disease, status post left leg angioplasty, coronary artery disease status post MARY ANN in November 2017, hypertension, stroke, rectal bleed 11/17/2017 was admitted at [facility] for carotid endarterectomy, status post AAA stenting who went to the vascular surgery clinic for routine followup, with his swelling in the right lower extremity (more than his usual edema). He got a venous duplex, which showed bilateral DVT, iliofemoral on the right and the popliteal on the left. The vascular surgeon, Dr. Axel Romero directed the patient to the ER. Reason For Visit:  DVT/PE    Assessment/Plan   DVT/PE with evidence of acute cor pulmonale (POA) - likely causing positive troponin. Seems provoked, doing well  - V/Q scan 3/16 with intermediate probability of PE  - LE doppler positive for bilateral DVT by report  - Echocardiogram pending  - Stop heparin drip, start enoxaparin - it is possible he will need an IVC filter    GI bleed - bloody stool 3/17 PM and 3/18 AM. Hgb stable  - EGD 11/22/17 with antral erosions, hiatal hernia, schatzki's ring  - Continue clear liquid diet  - Continue protonix  - Monitor HH  - Consider NM scan vs CT bleeding scan if more bleeding  - GI consulted    CAD s/p stenting - troponin elevation is likely strain from PE rather than ACS  - MARY ANN in the mid-circ, MARY ANN in the distal RVA 11/6/17  - ECG with sinus, 1st degree AV block, old RBBB  - Continue atorvastatin, imdur, metoprolol  - Stop aspirin.  Continue plavix given that he is only 4 months out from MARY ANN placement    MARIAMA on CKD3 (POA) - creatinine back to baseline  - UA unremarkable  - Renal US 3/17 with medical renal disease but no hydronephrosis  - Continue gentle IVF  - Consider nephrology consult if not improving    Altered mental status - overnight 3/16 to 3/17. Hard of hearing at baseline but Mr. Kait Lopez is oriented and appropriate now  - CT head 3/16 without acute process, slight interval increase in bilateral hygromas  - ABG compensated    COPD with acute exacerbation - may have been exacerbated by PE, improving  - Continue azithromycin started 3/16  - Continue Bronchodilators  - Continue prednisone x5 days for wheezing    PVD s/p left leg angioplasty  - Continue plavix, atorvastatin    AAA s/p stenting    Carotid stenosis s/p endarterectomy    Hypothyroid (chronic) - continue home synthroid    BPH (chronic) - continue home finasteride, flomax    See orders for other plans. Code status: DNR, confirmed again with Mr. Kait Lopez  VTE prophylaxis: heparin  Discussed plan of care with Patient/Family and Nurse   Pre-admission lived at home  Discharge plan: home  Estimated time to discharge: unclear     Subjective   Mr. Kait Lopez is doing well. No further BMs yesterday and overnight. No SOB, chest pain, dizziness.     Reviewed interval history  Physical examination     Visit Vitals    BP (!) 156/93 (BP 1 Location: Left arm, BP Patient Position: At rest)    Pulse 78    Temp 97.9 °F (36.6 °C)    Resp 18    Ht 5' 3\" (1.6 m)    Wt 83.2 kg (183 lb 6.8 oz)    SpO2 94%    BMI 32.49 kg/m2       Temp (24hrs), Av.6 °F (36.4 °C), Min:97.3 °F (36.3 °C), Max:97.9 °F (36.6 °C)      Intake/Output Summary (Last 24 hours) at 18 1102  Last data filed at 18 0400   Gross per 24 hour   Intake          3408.53 ml   Output             1050 ml   Net          2358.53 ml       Gen - NAD  HEENT - MMM  Neck - supple, full ROM  CV - RRR, 2/6 systolic murmur at LLSB  Resp - lungs without wheezing, normal WOB  GI - abdomen S, NT, ND, +BS   - no CVA tenderness, bladder non-palpable in lower abdomen  MSK - normal tone and bulk, 2+ edema in right LE, no edema in left LE. Poor pulses  Neuro - A&O, no focal deficits but very hard of hearing  Psych - calm and cooperative with normal affect    Data Review       Telemetry x   ECG    Xray    CT scan    MRI    Echocardiogram    Ultrasound              I have reviewed the flow sheet and recent notes  New labs and data personally reviewed. Recent Labs      03/19/18   0536  03/18/18   1854  03/18/18   1033  03/18/18   0409  03/17/18   2140   WBC  3.3*   --    --   3.4*  4.2   HGB  11.5*  11.5*  12.1  11.6*  11.9*   HCT  34.1*  34.5*  36.8  34.6*  35.5*   PLT  214   --    --   215  218     Recent Labs      03/19/18   0536  03/18/18   0409  03/17/18   0204  03/16/18   1348   03/16/18   1236   NA  144  141  141  137   < >   --    K  3.5  3.0*  2.8*  3.5   < >   --    CL  111*  106  104  102   < >   --    CO2  24  26  25  27   < >   --    GLU  104*  102*  89  97   < >   --    BUN  18  30*  38*  34*   < >   --    CREA  1.27  1.65*  2.49*  2.91*   < >   --    CA  8.5  8.6  8.4*  9.0   < >   --    MG  1.3*   --    --    --    --    --    ALB   --    --    --   3.2*   --    --    TBILI   --    --    --   0.7   --    --    SGOT   --    --    --   43*   --    --    ALT   --    --    --   26   --    --    INR   --    --    --    --    --   1.1    < > = values in this interval not displayed.        Medications reviewed  Current Facility-Administered Medications   Medication Dose Route Frequency    enoxaparin (LOVENOX) injection 80 mg  1 mg/kg SubCUTAneous Q12H    potassium chloride SR (KLOR-CON 10) tablet 20 mEq  20 mEq Oral NOW    [START ON 3/20/2018] metoprolol succinate (TOPROL-XL) XL tablet 50 mg  50 mg Oral DAILY    metoprolol succinate (TOPROL-XL) XL tablet 25 mg  25 mg Oral NOW    0.9% sodium chloride with KCl 20 mEq/L infusion   IntraVENous CONTINUOUS    predniSONE (DELTASONE) tablet 40 mg  40 mg Oral DAILY WITH BREAKFAST    pantoprazole (PROTONIX) 40 mg in sodium chloride (NS) 10 mL injection  40 mg IntraVENous Q12H    albuterol-ipratropium (DUO-NEB) 2.5 MG-0.5 MG/3 ML  3 mL Nebulization Q4H PRN    amLODIPine (NORVASC) tablet 5 mg  5 mg Oral DAILY    levothyroxine (SYNTHROID) tablet 88 mcg  88 mcg Oral ACB    tamsulosin (FLOMAX) capsule 0.4 mg  0.4 mg Oral DAILY    isosorbide mononitrate ER (IMDUR) tablet 30 mg  30 mg Oral DAILY    finasteride (PROSCAR) tablet 5 mg  5 mg Oral DAILY    atorvastatin (LIPITOR) tablet 40 mg  40 mg Oral DAILY    clopidogrel (PLAVIX) tablet 75 mg  75 mg Oral DAILY    sodium chloride (NS) flush 5-10 mL  5-10 mL IntraVENous Q8H    sodium chloride (NS) flush 5-10 mL  5-10 mL IntraVENous PRN    ondansetron (ZOFRAN) injection 4 mg  4 mg IntraVENous Q6H PRN    docusate sodium (COLACE) capsule 100 mg  100 mg Oral BID    guaiFENesin-dextromethorphan (ROBITUSSIN DM) 100-10 mg/5 mL syrup 10 mL  10 mL Oral Q6H PRN    azithromycin (ZITHROMAX) 500 mg in 0.9% sodium chloride (MBP/ADV) 250 mL  500 mg IntraVENous Q24H         Mandi Rice MD  Internal Medicine  3/19/2018

## 2018-03-20 ENCOUNTER — PATIENT OUTREACH (OUTPATIENT)
Dept: FAMILY MEDICINE CLINIC | Age: 83
End: 2018-03-20

## 2018-03-20 ENCOUNTER — APPOINTMENT (OUTPATIENT)
Dept: INTERVENTIONAL RADIOLOGY/VASCULAR | Age: 83
DRG: 166 | End: 2018-03-20
Attending: INTERNAL MEDICINE
Payer: MEDICARE

## 2018-03-20 LAB
ANION GAP SERPL CALC-SCNC: 8 MMOL/L (ref 5–15)
BACTERIA SPEC CULT: NORMAL
BACTERIA SPEC CULT: NORMAL
BASOPHILS # BLD: 0 K/UL (ref 0–0.1)
BASOPHILS NFR BLD: 1 % (ref 0–1)
BUN SERPL-MCNC: 13 MG/DL (ref 6–20)
BUN/CREAT SERPL: 11 (ref 12–20)
CALCIUM SERPL-MCNC: 8.9 MG/DL (ref 8.5–10.1)
CHLORIDE SERPL-SCNC: 112 MMOL/L (ref 97–108)
CO2 SERPL-SCNC: 23 MMOL/L (ref 21–32)
CREAT SERPL-MCNC: 1.17 MG/DL (ref 0.7–1.3)
DIFFERENTIAL METHOD BLD: ABNORMAL
EOSINOPHIL # BLD: 0.1 K/UL (ref 0–0.4)
EOSINOPHIL NFR BLD: 2 % (ref 0–7)
ERYTHROCYTE [DISTWIDTH] IN BLOOD BY AUTOMATED COUNT: 17.8 % (ref 11.5–14.5)
GLUCOSE BLD STRIP.AUTO-MCNC: 100 MG/DL (ref 65–100)
GLUCOSE BLD STRIP.AUTO-MCNC: 134 MG/DL (ref 65–100)
GLUCOSE BLD STRIP.AUTO-MCNC: 136 MG/DL (ref 65–100)
GLUCOSE SERPL-MCNC: 96 MG/DL (ref 65–100)
HCT VFR BLD AUTO: 35 % (ref 36.6–50.3)
HGB BLD-MCNC: 11.7 G/DL (ref 12.1–17)
IMM GRANULOCYTES # BLD: 0 K/UL (ref 0–0.04)
IMM GRANULOCYTES NFR BLD AUTO: 0 % (ref 0–0.5)
LYMPHOCYTES # BLD: 0.7 K/UL (ref 0.8–3.5)
LYMPHOCYTES NFR BLD: 19 % (ref 12–49)
MAGNESIUM SERPL-MCNC: 1.8 MG/DL (ref 1.6–2.4)
MCH RBC QN AUTO: 29.8 PG (ref 26–34)
MCHC RBC AUTO-ENTMCNC: 33.4 G/DL (ref 30–36.5)
MCV RBC AUTO: 89.3 FL (ref 80–99)
MONOCYTES # BLD: 0.3 K/UL (ref 0–1)
MONOCYTES NFR BLD: 7 % (ref 5–13)
NEUTS SEG # BLD: 2.8 K/UL (ref 1.8–8)
NEUTS SEG NFR BLD: 71 % (ref 32–75)
NRBC # BLD: 0 K/UL (ref 0–0.01)
NRBC BLD-RTO: 0 PER 100 WBC
PLATELET # BLD AUTO: 223 K/UL (ref 150–400)
PMV BLD AUTO: 10.9 FL (ref 8.9–12.9)
POTASSIUM SERPL-SCNC: 3.8 MMOL/L (ref 3.5–5.1)
RBC # BLD AUTO: 3.92 M/UL (ref 4.1–5.7)
RBC MORPH BLD: ABNORMAL
SERVICE CMNT-IMP: ABNORMAL
SERVICE CMNT-IMP: ABNORMAL
SERVICE CMNT-IMP: NORMAL
SERVICE CMNT-IMP: NORMAL
SODIUM SERPL-SCNC: 143 MMOL/L (ref 136–145)
WBC # BLD AUTO: 3.9 K/UL (ref 4.1–11.1)

## 2018-03-20 PROCEDURE — 74011250636 HC RX REV CODE- 250/636: Performed by: INTERNAL MEDICINE

## 2018-03-20 PROCEDURE — C1880 VENA CAVA FILTER: HCPCS

## 2018-03-20 PROCEDURE — 36415 COLL VENOUS BLD VENIPUNCTURE: CPT | Performed by: HOSPITALIST

## 2018-03-20 PROCEDURE — 93970 EXTREMITY STUDY: CPT

## 2018-03-20 PROCEDURE — 85025 COMPLETE CBC W/AUTO DIFF WBC: CPT | Performed by: HOSPITALIST

## 2018-03-20 PROCEDURE — 74011250636 HC RX REV CODE- 250/636: Performed by: HOSPITALIST

## 2018-03-20 PROCEDURE — 83735 ASSAY OF MAGNESIUM: CPT | Performed by: HOSPITALIST

## 2018-03-20 PROCEDURE — 74011250637 HC RX REV CODE- 250/637: Performed by: INTERNAL MEDICINE

## 2018-03-20 PROCEDURE — 97116 GAIT TRAINING THERAPY: CPT

## 2018-03-20 PROCEDURE — C9113 INJ PANTOPRAZOLE SODIUM, VIA: HCPCS | Performed by: HOSPITALIST

## 2018-03-20 PROCEDURE — C1892 INTRO/SHEATH,FIXED,PEEL-AWAY: HCPCS

## 2018-03-20 PROCEDURE — 74011250637 HC RX REV CODE- 250/637: Performed by: HOSPITALIST

## 2018-03-20 PROCEDURE — 80048 BASIC METABOLIC PNL TOTAL CA: CPT | Performed by: HOSPITALIST

## 2018-03-20 PROCEDURE — 74011636637 HC RX REV CODE- 636/637: Performed by: HOSPITALIST

## 2018-03-20 PROCEDURE — 65660000000 HC RM CCU STEPDOWN

## 2018-03-20 PROCEDURE — 74011636320 HC RX REV CODE- 636/320: Performed by: RADIOLOGY

## 2018-03-20 PROCEDURE — 97161 PT EVAL LOW COMPLEX 20 MIN: CPT

## 2018-03-20 PROCEDURE — 06H03DZ INSERTION OF INTRALUMINAL DEVICE INTO INFERIOR VENA CAVA, PERCUTANEOUS APPROACH: ICD-10-PCS | Performed by: RADIOLOGY

## 2018-03-20 PROCEDURE — 37191 INS ENDOVAS VENA CAVA FILTR: CPT

## 2018-03-20 PROCEDURE — 74011250637 HC RX REV CODE- 250/637: Performed by: NURSE PRACTITIONER

## 2018-03-20 PROCEDURE — 74011000250 HC RX REV CODE- 250

## 2018-03-20 PROCEDURE — 74011250637 HC RX REV CODE- 250/637: Performed by: SPECIALIST

## 2018-03-20 PROCEDURE — C1769 GUIDE WIRE: HCPCS

## 2018-03-20 RX ORDER — LISINOPRIL 10 MG/1
10 TABLET ORAL DAILY
Status: DISCONTINUED | OUTPATIENT
Start: 2018-03-20 | End: 2018-03-22

## 2018-03-20 RX ORDER — HYDRALAZINE HYDROCHLORIDE 20 MG/ML
10 INJECTION INTRAMUSCULAR; INTRAVENOUS ONCE
Status: COMPLETED | OUTPATIENT
Start: 2018-03-20 | End: 2018-03-20

## 2018-03-20 RX ORDER — LIDOCAINE HYDROCHLORIDE 20 MG/ML
0.1 INJECTION, SOLUTION INFILTRATION; PERINEURAL ONCE
Status: COMPLETED | OUTPATIENT
Start: 2018-03-20 | End: 2018-03-20

## 2018-03-20 RX ORDER — LIDOCAINE HYDROCHLORIDE 20 MG/ML
INJECTION, SOLUTION INFILTRATION; PERINEURAL
Status: COMPLETED
Start: 2018-03-20 | End: 2018-03-20

## 2018-03-20 RX ORDER — AMLODIPINE BESYLATE 5 MG/1
10 TABLET ORAL DAILY
Status: DISCONTINUED | OUTPATIENT
Start: 2018-03-21 | End: 2018-03-23 | Stop reason: HOSPADM

## 2018-03-20 RX ORDER — AMLODIPINE BESYLATE 5 MG/1
5 TABLET ORAL ONCE
Status: COMPLETED | OUTPATIENT
Start: 2018-03-20 | End: 2018-03-20

## 2018-03-20 RX ADMIN — PANTOPRAZOLE SODIUM 40 MG: 40 INJECTION, POWDER, FOR SOLUTION INTRAVENOUS at 08:40

## 2018-03-20 RX ADMIN — AMLODIPINE BESYLATE 5 MG: 5 TABLET ORAL at 15:46

## 2018-03-20 RX ADMIN — DOCUSATE SODIUM 100 MG: 100 CAPSULE, LIQUID FILLED ORAL at 17:09

## 2018-03-20 RX ADMIN — HYDRALAZINE HYDROCHLORIDE 10 MG: 20 INJECTION INTRAMUSCULAR; INTRAVENOUS at 21:22

## 2018-03-20 RX ADMIN — Medication 10 ML: at 21:19

## 2018-03-20 RX ADMIN — CLOPIDOGREL BISULFATE 75 MG: 75 TABLET ORAL at 08:40

## 2018-03-20 RX ADMIN — Medication 10 ML: at 15:48

## 2018-03-20 RX ADMIN — AZITHROMYCIN MONOHYDRATE 500 MG: 500 INJECTION, POWDER, LYOPHILIZED, FOR SOLUTION INTRAVENOUS at 21:18

## 2018-03-20 RX ADMIN — ENOXAPARIN SODIUM 80 MG: 80 INJECTION SUBCUTANEOUS at 08:41

## 2018-03-20 RX ADMIN — ENOXAPARIN SODIUM 80 MG: 80 INJECTION SUBCUTANEOUS at 21:18

## 2018-03-20 RX ADMIN — PANTOPRAZOLE SODIUM 40 MG: 40 INJECTION, POWDER, FOR SOLUTION INTRAVENOUS at 21:18

## 2018-03-20 RX ADMIN — LIDOCAINE HYDROCHLORIDE 10 ML: 20 INJECTION, SOLUTION INFILTRATION; PERINEURAL at 15:11

## 2018-03-20 RX ADMIN — METOPROLOL SUCCINATE 50 MG: 50 TABLET, EXTENDED RELEASE ORAL at 08:41

## 2018-03-20 RX ADMIN — FINASTERIDE 5 MG: 5 TABLET, FILM COATED ORAL at 08:40

## 2018-03-20 RX ADMIN — SODIUM CHLORIDE AND POTASSIUM CHLORIDE: 9; 1.49 INJECTION, SOLUTION INTRAVENOUS at 00:54

## 2018-03-20 RX ADMIN — DOCUSATE SODIUM 100 MG: 100 CAPSULE, LIQUID FILLED ORAL at 08:40

## 2018-03-20 RX ADMIN — LEVOTHYROXINE SODIUM 88 MCG: 88 TABLET ORAL at 06:58

## 2018-03-20 RX ADMIN — LISINOPRIL 10 MG: 10 TABLET ORAL at 17:17

## 2018-03-20 RX ADMIN — GUAIFENESIN AND DEXTROMETHORPHAN 10 ML: 100; 10 SYRUP ORAL at 21:18

## 2018-03-20 RX ADMIN — Medication 400 MG: at 08:41

## 2018-03-20 RX ADMIN — PREDNISONE 40 MG: 20 TABLET ORAL at 08:41

## 2018-03-20 RX ADMIN — TAMSULOSIN HYDROCHLORIDE 0.4 MG: 0.4 CAPSULE ORAL at 08:40

## 2018-03-20 RX ADMIN — IOPAMIDOL 30 ML: 612 INJECTION, SOLUTION INTRAVENOUS at 15:12

## 2018-03-20 RX ADMIN — Medication 10 ML: at 06:58

## 2018-03-20 RX ADMIN — ISOSORBIDE MONONITRATE 30 MG: 30 TABLET, EXTENDED RELEASE ORAL at 08:41

## 2018-03-20 RX ADMIN — ATORVASTATIN CALCIUM 40 MG: 40 TABLET, FILM COATED ORAL at 08:41

## 2018-03-20 RX ADMIN — AMLODIPINE BESYLATE 5 MG: 5 TABLET ORAL at 08:41

## 2018-03-20 RX ADMIN — Medication 400 MG: at 17:09

## 2018-03-20 NOTE — PROGRESS NOTES
NN Progress Note    [facility] ADT notification of current admission    - EMR reviewed. Per PT eval recommendation for d/c HH & increased family assistance & supervision to optimize recovery    - Pt with hx very [place]. Pt currently lives alone in an independent living apartment    - Sp[facility] with [facility] myers. Discussed concern for safe d/c. Possible short term SNF/Rehab vs returning home alone with HH.    - HCM appreciative for NN call & update. To f/u with son to discuss d/c disposition.

## 2018-03-20 NOTE — PROGRESS NOTES
Bedside shift change report given to [address] (oncoming nurse) by Abbey Lara (offgoing nurse). Report included the following information SBAR, Intake/Output, MAR, Recent Results and Cardiac Rhythm NSR.

## 2018-03-20 NOTE — PROGRESS NOTES
CM received a call from Fletcher Gomez, Nurse Navigator with the [facility] Physician [phone] regarding her concerns for discharge planing. She said she has been involved in patient's care for sometime now. She is concerned that patient may not be able to provide adequate care at home at this time without some rehab following discharge. CM will discuss patient with PT in am. Disposition plan is SNF  vs HHC. Gideon Centeno MSA, RN, CRM.

## 2018-03-20 NOTE — PROGRESS NOTES
Cardiology Progress Note     Assessment/Plan/Discussion:Cardiology Attending:     Patient seen on the day of progress note and examined  and agree with Advance Practice Provider (QUYEN, NP,PA)  assessment and plans. Randee Tanner is a 81 y.o. male PE  BP variable   No cp  Continue plavix  Now has IVC filter  Needs HTN treatment-gone up on beta blocker   Was on ARB-diuretic before held for MARIAMA  Will start lisinopril    Sarina Hankins MD               Admit Date: 3/16/2018  Admit Diagnosis: Acute DVT (deep venous thrombosis) ([facility] [address]  Date: 3/20/2018     Time: 11:12 AM    Subjective:  Doing well. No chest pain. Assessment and Plan     1. CAD s/p PCI with MARY ANN 11/6/17 to LCX and RCA   - minimal troponin elevation likely due to PE, no acute ischemic changes noted on ECG, ASA has been stopped for GI bleed, CAN NOT STOP PLAVIX at this time due to recent PCI/MARY ANN in 11/17, would recommend continuing Plavix for another 2 months and then decision regarding antiplatelet and [address] for DVT/PE can be discussed with patient's primary cardiologist Dr. Gisel Augustine  -continue Toprol XL, statin, Imdur   -needs to follow up with Dr. Gisel Augustine post hospital discharge  2. DVT/PE - on Lovenox currently, with concurrent GI bleed and the necessity to continue Plavix would recommend IVC filter placement now for PE and then transition to [address] in 2 months once Plavix can be stopped safely, this transition should be discussed with patient's primary cardiologist Dr. Gisel Augustine  -V/Q scan 3/16 with intermediate probability of PE  - LE doppler positive for DVT by report  3.   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  Cannot rule out neoplasia or diverticular bleeding.  Fortunately, his Hgb is stable, will continue to follow CBC  -if the bleeding continues he may require colonoscopy for further evaluation.  If it is massive bleeding, recommend a CT with bleeding protocol of the abdomen and pelvis  -cannot stop Plavix at this time, see discussion regarding antiplatelets and [address] above  -continue PPI  -EGD 11/22/17 with antral erosions, hiatal hernia, schatzki's ring  4. HTN - slightly elevated this AM, will increase Toprol XL to 50mg daily and continue other medications, continue to monitor   5. NSVT - had short run of NSVT overnight, will increase Toprol XL as above, replete K and check Mg level  6. Hypokalemia - K 3.5, will give KCL 20meq PO once now, recheck in AM  7. Dyslipidemia - on atorvastatin 40mg daily, LDL 70  8. Hx of PAD s/p left leg angioplasty - on plavix and statin as above  9. Hx of CVA - on plavix and statin as above, now with DVT/PE, see discussion regarding [address] versus IVC filter as above  10. COPD exacerbation - on antibiotics, nebulizers and prednisone per IM  11. Hx of AAA s/p stenting  12. Carotid stenosis s/p endarterectomy - on plavix and statin as above    Continuing Plavix for MARY ANN stents. Would have ALN filter placed for DVT and stop OAC for PTE/DVT as this would reduce risk for bleeding. ALN filter can be removed once designated PTE treatment time reached.        ROS:     GENERAL   Recent weight loss - no   Fever -----------------   no   Chills -----------------   no     EYES, VISION   Visual Changes - no     EARS, NOSE, THROAT   Hearing loss ----------- no   Swallowing difficulties - no     CARDIOVASCULAR   Chest pain/pressure ---- no   Arrhythmia/palpitations - no       RESPIRATORY   Cough ------------------ no   Shortness of breath - no   Wheezing -------------- no   GASTROINTESTINAL   Abdominal pain - no   Heartburn -------- no   Bloody stool ----- no     GENITOURINARY   Frequent urination - no   Urgency -------------- no     MUSCULOSKELETAL   Joint pain/swelling ---- no   Musculoskeletal pain - no     SKIN & INTEGUMENTARY   Rashes - no   Sores --- no         NEUROLOGICAL   Numbness/tingling - no   Sensation loss ------ no     PSYCHIATRIC   Nervousness/anxiety - no   Depression -------------- no     ENDOCRINE   Heat/cold intolerance - no   Excessive thirst -------- no     HEMATOLOGIC/LYMPHATIC   Abnormal bleeding - no     ALL/IMMUN   Allergic reaction ------ no   Recurrent infections - no     Objective:     Physical Exam:                Visit Vitals    BP (!) 160/111 (BP 1 Location: Right arm, BP Patient Position: Sitting)    Pulse 71    Temp 97.6 °F (36.4 °C)    Resp 18    Ht 5' 3\" (1.6 m)    Wt 186 lb 4.6 oz (84.5 kg)    SpO2 95%    BMI 33 kg/m2        General Appearance:   Well developed, well nourished,alert and oriented x 3, and   individual in no acute distress. Ears/Nose/Mouth/Throat:    Hearing grossly normal.         Neck:  Supple. Chest:    Lungs clear to auscultation bilaterally. Cardiovascular:   Regular rate and rhythm, S1, S2 normal, no murmur. Abdomen:    Soft, non-tender, bowel sounds are active. Extremities:  No edema bilaterally. Skin:  Warm and dry.      Telemetry: normal sinus rhythm          Data Review:    Labs:    Recent Results (from the past 24 hour(s))   GLUCOSE, POC    Collection Time: 03/19/18  4:22 PM   Result Value Ref Range    Glucose (POC) 138 (H) 65 - 100 mg/dL    Performed by Francine Busch    GLUCOSE, POC    Collection Time: 03/19/18 11:32 PM   Result Value Ref Range    Glucose (POC) 134 (H) 65 - 100 mg/dL    Performed by Robyn Shipley    GLUCOSE, POC    Collection Time: 03/20/18  6:58 AM   Result Value Ref Range    Glucose (POC) 100 65 - 100 mg/dL    Performed by Andrzej Jane    CBC WITH AUTOMATED DIFF    Collection Time: 03/20/18  7:00 AM   Result Value Ref Range    WBC 3.9 (L) 4.1 - 11.1 K/uL    RBC 3.92 (L) 4.10 - 5.70 M/uL    HGB 11.7 (L) 12.1 - 17.0 g/dL    HCT 35.0 (L) 36.6 - 50.3 %    MCV 89.3 80.0 - 99.0 FL    MCH 29.8 26.0 - 34.0 PG    MCHC 33.4 30.0 - 36.5 g/dL    RDW 17.8 (H) 11.5 - 14.5 %    PLATELET 994 846 - 206 K/uL    MPV 10.9 8.9 - 12.9 FL    NRBC 0.0 0  WBC    ABSOLUTE NRBC 0.00 0.00 - 0.01 K/uL    NEUTROPHILS 71 32 - 75 %    LYMPHOCYTES 19 12 - 49 %    MONOCYTES 7 5 - 13 %    EOSINOPHILS 2 0 - 7 %    BASOPHILS 1 0 - 1 %    IMMATURE GRANULOCYTES 0 0.0 - 0.5 %    ABS. NEUTROPHILS 2.8 1.8 - 8.0 K/UL    ABS. LYMPHOCYTES 0.7 (L) 0.8 - 3.5 K/UL    ABS. MONOCYTES 0.3 0.0 - 1.0 K/UL    ABS. EOSINOPHILS 0.1 0.0 - 0.4 K/UL    ABS. BASOPHILS 0.0 0.0 - 0.1 K/UL    ABS. IMM.  GRANS. 0.0 0.00 - 0.04 K/UL    DF SMEAR SCANNED      RBC COMMENTS ANISOCYTOSIS  1+       METABOLIC PANEL, BASIC    Collection Time: 03/20/18  7:00 AM   Result Value Ref Range    Sodium 143 136 - 145 mmol/L    Potassium 3.8 3.5 - 5.1 mmol/L    Chloride 112 (H) 97 - 108 mmol/L    CO2 23 21 - 32 mmol/L    Anion gap 8 5 - 15 mmol/L    Glucose 96 65 - 100 mg/dL    BUN 13 6 - 20 MG/DL    Creatinine 1.17 0.70 - 1.30 MG/DL    BUN/Creatinine ratio 11 (L) 12 - 20      GFR est AA >60 >60 ml/min/1.73m2    GFR est non-AA 60 (L) >60 ml/min/1.73m2    Calcium 8.9 8.5 - 10.1 MG/DL   MAGNESIUM    Collection Time: 03/20/18  7:00 AM   Result Value Ref Range    Magnesium 1.8 1.6 - 2.4 mg/dL          Radiology:        Current Facility-Administered Medications   Medication Dose Route Frequency    enoxaparin (LOVENOX) injection 80 mg  1 mg/kg SubCUTAneous Q12H    metoprolol succinate (TOPROL-XL) XL tablet 50 mg  50 mg Oral DAILY    magnesium oxide (MAG-OX) tablet 400 mg  400 mg Oral BID    acetaminophen (TYLENOL) tablet 650 mg  650 mg Oral Q4H PRN    0.9% sodium chloride with KCl 20 mEq/L infusion   IntraVENous CONTINUOUS    predniSONE (DELTASONE) tablet 40 mg  40 mg Oral DAILY WITH BREAKFAST    pantoprazole (PROTONIX) 40 mg in sodium chloride (NS) 10 mL injection  40 mg IntraVENous Q12H    albuterol-ipratropium (DUO-NEB) 2.5 MG-0.5 MG/3 ML  3 mL Nebulization Q4H PRN    amLODIPine (NORVASC) tablet 5 mg  5 mg Oral DAILY    levothyroxine (SYNTHROID) tablet 88 mcg  88 mcg Oral ACB    tamsulosin (FLOMAX) capsule 0.4 mg  0.4 mg Oral DAILY    isosorbide mononitrate ER (IMDUR) tablet 30 mg  30 mg Oral DAILY    finasteride (PROSCAR) tablet 5 mg  5 mg Oral DAILY    atorvastatin (LIPITOR) tablet 40 mg  40 mg Oral DAILY    clopidogrel (PLAVIX) tablet 75 mg  75 mg Oral DAILY    sodium chloride (NS) flush 5-10 mL  5-10 mL IntraVENous Q8H    sodium chloride (NS) flush 5-10 mL  5-10 mL IntraVENous PRN    ondansetron (ZOFRAN) injection 4 mg  4 mg IntraVENous Q6H PRN    docusate sodium (COLACE) capsule 100 mg  100 mg Oral BID    guaiFENesin-dextromethorphan (ROBITUSSIN DM) 100-10 mg/5 mL syrup 10 mL  10 mL Oral Q6H PRN    azithromycin (ZITHROMAX) 500 mg in 0.9% sodium chloride (MBP/ADV) 250 mL  500 mg IntraVENous Q24H       Josse Ferguson M.D.      [facility] [address] [place] [address] [facility] [address] [facility] [address]   CliffAki ibarraTwo [facility]   [phone]

## 2018-03-20 NOTE — PROGRESS NOTES
Hospitalist Progress Note  Richy Law MD  Answering service: [phone] OR [phone] from in house phone      Date of Service:  3/20/2018  NAME:  Devi Payan  :  1935  MRN:  [id number]      Admission Summary:   80 yo man with PAD s/p LLE angioplasty, CAD s/p stent, HTN, h/o stroke, h/o rectal bleed, carotid stenosis s/p CEA, AAA s/p stent was sent by Dr Yaz White for b/l DVT on 3/16/18. Patient was Vascular Surgery clinic for routine f/u s/p AAA stent and due to RLE swelling. He was admitted with acute b/l LE DVTs and cough. Interval history / Subjective:   Wants to go home but c/o SOB, continued right leg swelling; BP elevated, d/w nurse     Assessment & Plan:     Polymicrobial (Enterococcus, Klebsiella) UTI (POA)  - c/o some urinary urgency and due to acute encephalopathy 3/16-3/17, will consult ID for abx recs due to PCN allergy, CKD, and differing susceptibilities  - UCx 3/16 Enterococcus faecalis group D, Klebsiella  - renal US 3/17 medical renal disease, no hydronephrosis    DVT/PE with evidence of acute cor pulmonale (POA)   - likely provoked due to recent surgery  - likely causing indeterminate troponin elevation  - V/Q scan 3/16 with intermediate probability of PE  - LE doppler positive for bilateral DVT by report; will check venous duplex   - TTE 3/16 limited visualization, EF 60%, mild-mod PHTN  - s/p heparin gtt and started enoxaparin; will need to figure out [address] [place] [address] Road options  - consult IR for IVC filter placement     GI bleed - bloody stool 3/17 PM and 3/18 AM  - H/H stable  - EGD  antral erosions, hiatal hernia, schatzki's ring  - advance diet  - PPI  - GI following     CAD s/p stent - troponin 0.1 is likely strain from PE rather than ACS  - MARY ANN in the mid-circ, MARY ANN in the distal RVA 17  - ECG with sinus, 1st degree AV block, old RBBB  - Continue atorvastatin, imdur, metoprolol  - Stop aspirin.  Continue plavix given that he is only 4 months out from MARY ANN placement  - Cards following     MARIAMA on CKD3 (POA) - UA unremarkable  - renal US 3/17 with medical renal disease but no hydronephrosis  - resolving to baseline with gentle IVF     Acute encephalopathy   - overnight 3/16 to 3/17. Hard of hearing at baseline but oriented and appropriate now  - CT head 3/16 without acute process, slight interval increase in bilateral hygromas  - ABG compensated  - resolved to baseline but recommend treating UTI     COPD with acute exacerbation - may have been exacerbated by PE, improving  - s/p azithromycin 3/16  - nebs, s/p reBronchodilators  - prednisone x5 days for wheezing  - wean off O2 NC     PVD s/p left leg angioplasty  - continue Plavix, atorvastatin     AAA s/p stenting - followed by Dr Boy Abad, Vascular     Carotid stenosis s/p CEA     Hypothyroid (chronic) - continue home synthroid     BPH (chronic) - continue home finasteride, flomax    Obesity, Body mass index is 33 kg/(m^2). Code status: full    Care Plan discussed with: Patient/Family, Nurse and   Disposition: TBD     Hospital Problems  Date Reviewed: 3/16/2018          Codes Class Noted POA    * (Principal)Acute DVT (deep venous thrombosis) ([facility] [address] ICD-10-CM: I82.409  ICD-9-CM: 453.40  3/16/2018 Unknown            Review of Systems:   Pertinent items are noted in HPI. Vital Signs:    Last 24hrs VS reviewed since prior progress note.  Most recent are:  Visit Vitals    BP (!) 166/105 (BP 1 Location: Left arm, BP Patient Position: Sitting)    Pulse 76    Temp 97.7 °F (36.5 °C)    Resp 18    Ht 5' 3\" (1.6 m)    Wt 84.5 kg (186 lb 4.6 oz)    SpO2 95%    BMI 33 kg/m2         Intake/Output Summary (Last 24 hours) at 03/20/18 1311  Last data filed at 03/20/18 1148   Gross per 24 hour   Intake           1932.5 ml   Output              600 ml   Net           1332.5 ml      Physical Examination:     Constitutional:  awake, no acute distress, cooperative, pleasant, [place], obese, NC in place   ENT:  oral mucosa moist, oropharynx benign  Neck supple, no masses   Resp:  + wheezing   CV:  regular rhythm, normal rate, no m/r/g appreciated, RLE edema    GI:  +BS, soft, non distended, non tender     Musculoskeletal:  moves all extremities    Neurologic:  awake, alert, [place] but responds appropriately to questions and commands     Eyes:  PERRL    Data Review:    Review and/or order of clinical lab test  Review and/or order of tests in the radiology section of CPT  Review and/or order of tests in the medicine section of CPT    Labs:     Recent Labs      03/20/18   0700  03/19/18   0536   WBC  3.9*  3.3*   HGB  11.7*  11.5*   HCT  35.0*  34.1*   PLT  223  214     Recent Labs      03/20/18   0700  03/19/18   0536  03/18/18   0409   NA  143  144  141   K  3.8  3.5  3.0*   CL  112*  111*  106   CO2  23  24  26   BUN  13  18  30*   CREA  1.17  1.27  1.65*   GLU  96  104*  102*   CA  8.9  8.5  8.6   MG  1.8  1.3*   --      No results for input(s): SGOT, GPT, ALT, AP, TBIL, TBILI, TP, ALB, GLOB, GGT, AML, LPSE in the last 72 hours. No lab exists for component: AMYP, HLPSE  Recent Labs      03/19/18   0541  03/18/18   2304  03/18/18   1511   APTT  61.4*  54.3*  54.0*      No results for input(s): FE, TIBC, PSAT, FERR in the last 72 hours. No results found for: FOL, RBCF   No results for input(s): PH, PCO2, PO2 in the last 72 hours. No results for input(s): CPK, CKNDX, TROIQ in the last 72 hours.     No lab exists for component: CPKMB  Lab Results   Component Value Date/Time    Cholesterol, total 147 11/07/2017 03:24 AM    HDL Cholesterol 63 11/07/2017 03:24 AM    LDL, calculated 70.2 11/07/2017 03:24 AM    Triglyceride 69 11/07/2017 03:24 AM    CHOL/HDL Ratio 2.3 11/07/2017 03:24 AM     Lab Results   Component Value Date/Time    Glucose (POC) 100 03/20/2018 06:58 AM    Glucose (POC) 134 (H) 03/19/2018 11:32 PM    Glucose (POC) 138 (H) 03/19/2018 04:22 PM    Glucose (POC) 103 (H) 03/17/2018 06:16 PM    Glucose (POC) 160 (H) 03/17/2018 02:00 PM     Lab Results   Component Value Date/Time    Color YELLOW/STRAW 03/16/2018 02:45 PM    Appearance CLEAR 03/16/2018 02:45 PM    Specific gravity 1.014 03/16/2018 02:45 PM    pH (UA) 5.5 03/16/2018 02:45 PM    Protein NEGATIVE  03/16/2018 02:45 PM    Glucose NEGATIVE  03/16/2018 02:45 PM    Ketone NEGATIVE  03/16/2018 02:45 PM    Bilirubin NEGATIVE  03/16/2018 02:45 PM    Urobilinogen 1.0 03/16/2018 02:45 PM    Nitrites NEGATIVE  03/16/2018 02:45 PM    Leukocyte Esterase NEGATIVE  03/16/2018 02:45 PM    Epithelial cells FEW 03/16/2018 02:45 PM    Bacteria 1+ (A) 03/16/2018 02:45 PM    WBC 5-10 03/16/2018 02:45 PM    RBC 0-5 03/16/2018 02:45 PM     Medications Reviewed:     Current Facility-Administered Medications   Medication Dose Route Frequency    enoxaparin (LOVENOX) injection 80 mg  1 mg/kg SubCUTAneous Q12H    metoprolol succinate (TOPROL-XL) XL tablet 50 mg  50 mg Oral DAILY    magnesium oxide (MAG-OX) tablet 400 mg  400 mg Oral BID    acetaminophen (TYLENOL) tablet 650 mg  650 mg Oral Q4H PRN    0.9% sodium chloride with KCl 20 mEq/L infusion   IntraVENous CONTINUOUS    predniSONE (DELTASONE) tablet 40 mg  40 mg Oral DAILY WITH BREAKFAST    pantoprazole (PROTONIX) 40 mg in sodium chloride (NS) 10 mL injection  40 mg IntraVENous Q12H    albuterol-ipratropium (DUO-NEB) 2.5 MG-0.5 MG/3 ML  3 mL Nebulization Q4H PRN    amLODIPine (NORVASC) tablet 5 mg  5 mg Oral DAILY    levothyroxine (SYNTHROID) tablet 88 mcg  88 mcg Oral ACB    tamsulosin (FLOMAX) capsule 0.4 mg  0.4 mg Oral DAILY    isosorbide mononitrate ER (IMDUR) tablet 30 mg  30 mg Oral DAILY    finasteride (PROSCAR) tablet 5 mg  5 mg Oral DAILY    atorvastatin (LIPITOR) tablet 40 mg  40 mg Oral DAILY    clopidogrel (PLAVIX) tablet 75 mg  75 mg Oral DAILY    sodium chloride (NS) flush 5-10 mL  5-10 mL IntraVENous Q8H    sodium chloride (NS) flush 5-10 mL  5-10 mL IntraVENous PRN    ondansetron (ZOFRAN) injection 4 mg  4 mg IntraVENous Q6H PRN    docusate sodium (COLACE) capsule 100 mg  100 mg Oral BID    guaiFENesin-dextromethorphan (ROBITUSSIN DM) 100-10 mg/5 mL syrup 10 mL  10 mL Oral Q6H PRN    azithromycin (ZITHROMAX) 500 mg in 0.9% sodium chloride (MBP/ADV) 250 mL  500 mg IntraVENous Q24H     ______________________________________________________________________  EXPECTED LENGTH OF STAY: 4d 0h  ACTUAL LENGTH OF STAY:          170 Nikita Myrick MD

## 2018-03-20 NOTE — PROGRESS NOTES
1435: pt to angio holding for ivc filter placement  1503: procedure start  1513: procedure complete. Pt tolerated well. 1530: TRANSFER - OUT REPORT:    Verbal report given to [facility] JACKY PANIAGUA on Rea Saunders  being transferred to [facility] FOR CONTINUING [facility] CARE [place] for routine progression of care       Report consisted of patients Situation, Background, Assessment and   Recommendations(SBAR). Information from the following report(s) SBAR, Procedure Summary and MAR was reviewed with the receiving nurse. Lines:   Peripheral IV 03/16/18 Right Wrist (Active)   Site Assessment Clean, dry, & intact 3/20/2018 11:43 AM   Phlebitis Assessment 0 3/20/2018 11:43 AM   Infiltration Assessment 0 3/20/2018 11:43 AM   Dressing Status Clean, dry, & intact 3/20/2018 11:43 AM   Dressing Type Transparent 3/20/2018 11:43 AM   Hub Color/Line Status Pink;Capped 3/20/2018 11:43 AM   Action Taken Open ports on tubing capped 3/20/2018 11:43 AM   Alcohol Cap Used Yes 3/20/2018 11:43 AM       Peripheral IV 03/16/18 Right Antecubital (Active)   Site Assessment Clean, dry, & intact 3/20/2018 11:43 AM   Phlebitis Assessment 0 3/20/2018 11:43 AM   Infiltration Assessment 0 3/20/2018 11:43 AM   Dressing Status Clean, dry, & intact 3/20/2018 11:43 AM   Dressing Type Transparent 3/20/2018 11:43 AM   Hub Color/Line Status Pink; Infusing 3/20/2018 11:43 AM   Action Taken Open ports on tubing capped 3/20/2018 11:43 AM   Alcohol Cap Used Yes 3/20/2018 11:43 AM       Peripheral IV 03/18/18 Left Forearm (Active)   Site Assessment Clean, dry, & intact 3/20/2018 11:43 AM   Phlebitis Assessment 0 3/20/2018 11:43 AM   Infiltration Assessment 0 3/20/2018 11:43 AM   Dressing Status Clean, dry, & intact 3/20/2018 11:43 AM   Dressing Type Transparent 3/20/2018 11:43 AM   Hub Color/Line Status Pink;Capped 3/20/2018 11:43 AM   Action Taken Open ports on tubing capped 3/20/2018 11:43 AM   Alcohol Cap Used Yes 3/20/2018 11:43 AM        Opportunity for questions and clarification was provided. Patient transported with:   [employer]

## 2018-03-20 NOTE — PROCEDURES
[address]  *** FINAL REPORT ***    Name: Laura Barraza  MRN: [id number]    Inpatient  : 01 Mar 1935  HIS Order #: [id number]  [address] [facility] Visit #: [id number]  Date: 20 Mar 2018    TYPE OF TEST: Peripheral Venous Testing    REASON FOR TEST  Pain in limb, Limb swelling    Right Leg:-  Deep venous thrombosis:           Yes  Proximal extent of thrombus:      Common Femoral  Superficial venous thrombosis:    Yes  Deep venous insufficiency:        Not examined  Superficial venous insufficiency: Not examined    Left Leg:-  Deep venous thrombosis:           Yes  Proximal extent of thrombus:      Popliteal At The Knee  Superficial venous thrombosis:    No  Deep venous insufficiency:        Not examined  Superficial venous insufficiency: Not examined      INTERPRETATION/FINDINGS  PROCEDURE:  Color duplex ultrasound imaging of lower extremity veins. FINDINGS:       Right: The common femoral, deep femoral, femoral, popliteal,  posterior tibial and great saphenous veins are non-compressible, and  lack color flow. Left:   The popliteal vein is non-compressible and lacks color  flow. The common femoral, deep femoral, femoral, posterior tibial and great  saphenous are patent and without evidence of thrombus;  each is fully  compressible and there is no narrowing of the flow channel on color  Doppler imaging. Phasic flow is observed in the common femoral vein. IMPRESSION:  On the right, there is evidence of common femoral, deep  femoral, popliteal, posterior tibial and great saphenous vein  thrombosis, as described above. On the left there is evidence of  popliteal vein thrombosis, as described above. The ultrasound  appearance is more consistent with an acute than a chronic process. ADDITIONAL COMMENTS  Preliminary report was given to floor RN at completion of study on  3/20/2018. I have personally reviewed the data relevant to the interpretation of  this  study.     TECHNOLOGIST: Deloris Osullivan, RVS  Signed: 03/20/2018 06:48 PM    PHYSICIAN: Sukumar Agosto MD  Signed: 03/21/2018 03:31 PM

## 2018-03-20 NOTE — PROGRESS NOTES
[address]  217 [facility] 140 Nathaniel Whatley, [address]  [phone]                GI PROGRESS NOTE  Isaac Lopez, [facility]  Work Cell: [phone]      NAME:   Paco Wise   :    1935   MRN:    [id number]     Assessment/Plan   1. 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 Hgb stable. No further bleeding.   - Diet as tolerated  - Supportive management per primary team  - Recommend CT with bleeding protocol for any large amounts of bleeding  - If recurrent bleeding persists, consider colonoscopy for further evaluation  - Monitor H&H, transfuse as needed  2. CAD - s/p multiple stent placements   3. Acute DVT/PE    Will see PRN. Okay to discharge from GI standpoint to follow-up outpatient Dr. Jaison Morin for any recurrent bleeding.       Patient Active Problem List   Diagnosis Code    Hypothyroidism E03.9    Essential hypertension I10    CAD (Coronary Artery Disease)--s/p PCI--MARY ANN [place] stents x4 ;MARY ANN [place] stents x3  I25.10    ED (erectile dysfunction) N52.9    Chronic back pain--DJD G89.29    BPH (benign prostatic hypertrophy) N40.0    Dyslipidemia E78.5    Successful  PTCA (percutaneous transluminal coronary angioplasty)--90-95% instent restenosis RCA 10/16/10 Z98.61    Successful PTCA with MARY ANN Xience stent Ramus intermedius 10/16/10 Z95.9    PVD (peripheral vascular disease) with claudication ([facility]) I73.9    Cerebral embolism with cerebral infarction ([address] I63.40    Carotid stenosis, left I65.22    Prediabetes R73.03    Tinea cruris B35.6    Nodule, subcutaneous R22.9    Vitamin D deficiency E55.9    Lipoma of back D17.1    Prostate cancer ([address] C61    Angina, class III ([address] I20.9    Alkaline phosphatase elevation R74.8    Myocardial infarction I21.9    Calculus of kidney N20.0    Osteoarthritis M19.90    SOBOE (shortness of breath on exertion) R06.02  S/P cardiac cath Z98.890    Rectal bleeding K62.5    Bilateral deafness H91.93    Claudication of both lower extremities ([facility]) I73.9    Acute DVT (deep venous thrombosis) ([facility]) I82.409       Subjective:     Feeling well. Denies any pain. Tolerating clears. Hgb stable. No further bleeding. Review of Systems    Constitutional: negative fever, negative chills, negative weight loss  Eyes:   negative visual changes  ENT:   negative sore throat, tongue or lip swelling  Respiratory:  negative cough, negative dyspnea  Cards:  negative for chest pain, palpitations, lower extremity edema  GI:   See HPI  :  negative for frequency, dysuria  Integument:  negative for rash and pruritus  Heme:  negative for easy bruising and gum/nose bleeding  Musculoskel: negative for myalgias, back pain and muscle weakness  Neuro: negative for headaches, dizziness, vertigo  Psych:  negative for feelings of anxiety, depression     Objective:     VITALS:   Last 24hrs VS reviewed since prior hospitalist progress note. Most recent are:  Visit Vitals    BP (!) 160/111 (BP 1 Location: Right arm, BP Patient Position: Sitting)    Pulse 71    Temp 97.6 °F (36.4 °C)    Resp 18    Ht 5' 3\" (1.6 m)    Wt 84.5 kg (186 lb 4.6 oz)    SpO2 95%    BMI 33 kg/m2       Intake/Output Summary (Last 24 hours) at 03/20/18 9444  Last data filed at 03/20/18 0410   Gross per 24 hour   Intake           1932.5 ml   Output              350 ml   Net           1582.5 ml        PHYSICAL EXAM:  General   well developed, alert, [place], in no acute distress  EENT  Normocephalic, Atraumatic, PERRLA, EOMI, sclera clear  Respiratory   Clear To Auscultation bilaterally - no wheezes, rales, rhonchi, or crackles  Cardiology  Regular Rate and Rythmn  - no murmurs, rubs or gallops  Abdominal  Soft, non-tender, non-distended, positive bowel sounds, no hepatosplenomegaly, no palpable mass  Extremities  No clubbing, cyanosis, or edema. Pulses intact.   Neurological  No focal neurological deficits noted  Psychological  Oriented x 3. Normal affect. Lab Data   Recent Results (from the past 12 hour(s))   GLUCOSE, POC    Collection Time: 03/19/18 11:32 PM   Result Value Ref Range    Glucose (POC) 134 (H) 65 - 100 mg/dL    Performed by [address] [place] [address], POC    Collection Time: 03/20/18  6:58 AM   Result Value Ref Range    Glucose (POC) 100 65 - 100 mg/dL    Performed by Cynthia Nguyen    CBC WITH AUTOMATED DIFF    Collection Time: 03/20/18  7:00 AM   Result Value Ref Range    WBC 3.9 (L) 4.1 - 11.1 K/uL    RBC 3.92 (L) 4.10 - 5.70 M/uL    HGB 11.7 (L) 12.1 - 17.0 g/dL    HCT 35.0 (L) 36.6 - 50.3 %    MCV 89.3 80.0 - 99.0 FL    MCH 29.8 26.0 - 34.0 PG    MCHC 33.4 30.0 - 36.5 g/dL    RDW 17.8 (H) 11.5 - 14.5 %    PLATELET 611 898 - 881 K/uL    MPV 10.9 8.9 - 12.9 FL    NRBC 0.0 0  WBC    ABSOLUTE NRBC 0.00 0.00 - 0.01 K/uL    NEUTROPHILS 71 32 - 75 %    LYMPHOCYTES 19 12 - 49 %    MONOCYTES 7 5 - 13 %    EOSINOPHILS 2 0 - 7 %    BASOPHILS 1 0 - 1 %    IMMATURE GRANULOCYTES 0 0.0 - 0.5 %    ABS. NEUTROPHILS 2.8 1.8 - 8.0 K/UL    ABS. LYMPHOCYTES 0.7 (L) 0.8 - 3.5 K/UL    ABS. MONOCYTES 0.3 0.0 - 1.0 K/UL    ABS. EOSINOPHILS 0.1 0.0 - 0.4 K/UL    ABS. BASOPHILS 0.0 0.0 - 0.1 K/UL    ABS. IMM.  GRANS. 0.0 0.00 - 0.04 K/UL    DF SMEAR SCANNED      RBC COMMENTS ANISOCYTOSIS  1+       METABOLIC PANEL, BASIC    Collection Time: 03/20/18  7:00 AM   Result Value Ref Range    Sodium 143 136 - 145 mmol/L    Potassium 3.8 3.5 - 5.1 mmol/L    Chloride 112 (H) 97 - 108 mmol/L    CO2 23 21 - 32 mmol/L    Anion gap 8 5 - 15 mmol/L    Glucose 96 65 - 100 mg/dL    BUN 13 6 - 20 MG/DL    Creatinine 1.17 0.70 - 1.30 MG/DL    BUN/Creatinine ratio 11 (L) 12 - 20      GFR est AA >60 >60 ml/min/1.73m2    GFR est non-AA 60 (L) >60 ml/min/1.73m2    Calcium 8.9 8.5 - 10.1 MG/DL   MAGNESIUM    Collection Time: 03/20/18  7:00 AM   Result Value Ref Range    Magnesium 1.8 1.6 - 2.4 mg/dL Medications: Reviewed    PMH/SH reviewed - no change compared to H&P  Mid-Level Provider: Srinivasa Smith NP   Date/Time:  3/20/2018

## 2018-03-20 NOTE — PROGRESS NOTES
Problem: Mobility Impaired (Adult and Pediatric)  Goal: *Acute Goals and Plan of Care (Insert Text)  Physical Therapy Goals  Initiated 3/20/2018  1. Patient will move from supine to sit and sit to supine  in bed with independence within 7 day(s). 2.  Patient will transfer from bed to chair and chair to bed with modified independence using the least restrictive device within 7 day(s). 3.  Patient will perform sit to stand with modified independence within 7 day(s). 4.  Patient will ambulate with modified independence for 200 feet with the least restrictive device within 7 day(s). **All while maintaining spO2 >90%**    physical Therapy EVALUATION  Patient: Isak Choe (23 y.o. male)  Date: 3/20/2018  Primary Diagnosis: Acute DVT (deep venous thrombosis) (HCC)        Precautions:   Fall, DNR    ASSESSMENT :  Based on the objective data described below, the patient presents with impaired balance and gait, decreased tolerance to activity requiring supplemental oxygen after admission for bilateral LE DVTs. Patient received supine in bed on 3.5L of oxygen and spO2: 97% at rest. See below for oxygen monitoring during evaluation. Patient reported prior to admission he lives alone in an apartment on the fourth floor, but  has an elevator to access. Patient ambulates with a SPC and reportedly rides his bicycle down [address] regularly, drives, cooks, cleans. Patient reported his son will check on him. Patient tolerated evaluation fairly well. Patient very hard of hearing. Patient completed supine to sit with supervision, sit<>stand with SPC with standby assist. Patient ambulated with CGA in the hallway demonstrating decreased amanda, minor path deviations, but no overt LOB. Patient with increased SOB during gait, but recovered quickly once seated and returned to 2L. Patient will continue to benefit from PT to progress mobility, improve tolerance to activity and reach highest level of independence.  Patient will benefit from HHPT upon discharge and would also benefit from increased family assist and supervision to optimize recovery. Documentation for home O2:     ROOM AIR    AT REST   O2 SATS  92 HR  78   ROOM AIR WITH ACTIVITY 02 SATS  93 HR  88   (2    ) LITERS OF O2 WITH ACTIVITY O2 SATS  90 HR  94   (2    )LITERS OF 02 PATIENT LEFT COMFORTABLY  SITTING/SUPINE 02 SATS  92 HR  88           Patient will benefit from skilled intervention to address the above impairments. Patients rehabilitation potential is considered to be Fair  Factors which may influence rehabilitation potential include:   []         None noted  []         Mental ability/status  [x]         Medical condition  []         Home/family situation and support systems  []         Safety awareness  []         Pain tolerance/management  []         Other:      PLAN :  Recommendations and Planned Interventions:  [x]           Bed Mobility Training             [x]    Neuromuscular Re-Education  [x]           Transfer Training                   []    Orthotic/Prosthetic Training  [x]           Gait Training                         []    Modalities  [x]           Therapeutic Exercises           []    Edema Management/Control  [x]           Therapeutic Activities            [x]    Patient and Family Training/Education  []           Other (comment):    Frequency/Duration: Patient will be followed by physical therapy  5 times a week to address goals. Discharge Recommendations: Home Health  Further Equipment Recommendations for Discharge: ? oxygen     SUBJECTIVE:   Patient stated I ride my bike down [address] a lot.     OBJECTIVE DATA SUMMARY:   HISTORY:    Past Medical History:   Diagnosis Date    [facility]     Acute MI 12/2010    dr Rodriguez [place]    Acute prostatitis     again 9/12/16 [facility]    Advance directive discussed with patient 04/20/2016    Aneurysm ([facility] 6/2011    AAA    Borderline glaucoma (glaucoma suspect) 02/19/2015    notes from [address] [place] [address] rec'd    BPH (benign prostatic hyperplasia)     Bright red blood per rectum 02/04/2016    [facility] note [facility]- w/u in progress   8/22/17 [facility] f[facility] note    CAD (coronary artery disease)     Calculus of kidney     Cerebral embolism with cerebral infarction ([address]     Chronic pain     back    Dizzy spells 6/2012    DJD (degenerative joint disease) of lumbar spine     ED (erectile dysfunction)     8/30/17 [facility]    Elevated PSA 03/20/2014    [facility]     8/24/16 f/u note    Encephalocele ([address]     Hypercholesterolemia     Hypertension     Pneumonia 02/05/2017    Prostate cancer ([address] 05/12/2014    crystal henry/ Dr Shashi Roe 8/22/17 f/u note    PVD (peripheral vascular disease) with claudication ([address]     S/P radiation therapy 15 months out    probable cause of the rectal bleeding    Stroke ([address] 6/2011    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  COLONOSCOPY N/A 8/31/2016    COLONOSCOPY performed by Marquita Jay [facility] at [facility] ENDOSCOPY    ENDOSCOPY, COLON, DIAGNOSTIC      HX CAROTID ENDARTERECTOMY Left     HX HEENT  10/2012    repair right heidy dawkins    HX ORTHOPAEDIC Bilateral     BUNIONECTOMY    AR LEFT HEART CATH,PERCUTANEOUS  10/16/2010         PTCA EACH ADDL VESSEL  10/16/2010         PTCA W/ STENT, INITIAL  10/16/2010         UPPER GI ENDOSCOPY,DIAGNOSIS  11/22/2017         VASCULAR SURGERY PROCEDURE UNLIST      left leg varicose vein stripping dr Jagdish Valdes     Prior Level of Function/Home Situation: modified independent. Uses a SPC for mobility, lives alone in a apartment.  Pt reported he drives, cooks, cleans. Patient reported his son checks on him regularly  Personal factors and/or comorbidities impacting plan of care:     Home Situation  Home Environment: Private residence  # Steps to Enter: 0  One/Two Story Residence: One story  Living Alone: Yes  Support Systems: Child(felice), Family member(s)  Patient Expects to be Discharged to[de-identified] Private residence  Current DME Used/Available at Home: Cane, straight    EXAMINATION/PRESENTATION/DECISION MAKING:   Critical Behavior:              Hearing: Auditory  Auditory Impairment: Hard of hearing, bilateral, Hearing aid(s)  Hearing Aids/Status: With patient  Skin:    Edema:   Range Of Motion:  AROM: Within functional limits                       Strength:    Strength: Within functional limits                    Tone & Sensation:                                  Coordination:     Vision:      Functional Mobility:  Bed Mobility:     Supine to Sit: Supervision        Transfers:  Sit to Stand: Supervision  Stand to Sit: Supervision                       Balance:   Sitting: Intact  Standing: Intact; With support  Ambulation/Gait Training:  Distance (ft): 100 Feet (ft)  Assistive Device: Gait belt;Cane, straight  Ambulation - Level of Assistance: Contact guard assistance        Gait Abnormalities: Decreased step clearance; Path deviations;Trunk sway increased        Base of Support: Narrowed     Speed/Bria: Pace decreased (<100 feet/min)  Step Length: Left shortened;Right shortened       Functional Measure:  Tinetti test:    Sitting Balance: 1  Arises: 1  Attempts to Rise: 2  Immediate Standing Balance: 1  Standing Balance: 1  Nudged: 1  Eyes Closed: 1  Turn 360 Degrees - Continuous/Discontinuous: 1  Turn 360 Degrees - Steady/Unsteady: 1  Sitting Down: 1  Balance Score: 11  Indication of Gait: 1  R Step Length/Height: 1  L Step Length/Height: 1  R Foot Clearance: 1  L Foot Clearance: 1  Step Symmetry: 1  Step Continuity: 1  Path: 1  Trunk: 0  Walking Time: 0  Gait Score: 8  Total Score: 19       Tinetti Test and G-code impairment scale:  Percentage of Impairment CH    0%   CI    1-19% CJ    20-39% CK    40-59% CL    60-79% CM    80-99% CN     100%   Tinetti  Score 0-28 28 23-27 17-22 12-16 6-11 1-5 0       Tinetti Tool Score Risk of Falls  <19 = High Fall Risk  19-24 = Moderate Fall Risk  25-28 = Low Fall Risk  Tinetti ME. Performance-Oriented Assessment of Mobility Problems in Elderly Patients. [facility] 66; [id number]. (Scoring Description: PT Bulletin Feb. 10, 1993)    Older adults: Lon Stauffer et al, 2009; n = 1000 [facility] elderly evaluated with ABC, ALVARADO, ADL, and IADL)  · Mean ALVARADO score for males aged 69-68 years = 26.21(3.40)  · Mean ALVARADO score for females age 69-68 years = 25.16(4.30)  · Mean ALVARADO score for males over 80 years = 23.29(6.02)  · Mean ALVARADO score for females over 80 years = 17.20(8.32)         G codes: In compliance with CMSs Claims Based Outcome Reporting, the following G-code set was chosen for this patient based on their primary functional limitation being treated: The outcome measure chosen to determine the severity of the functional limitation was the Tinetti with a score of 19/28 which was correlated with the impairment scale. ? Mobility - Walking and Moving Around:     - CURRENT STATUS: CJ - 20%-39% impaired, limited or restricted    - GOAL STATUS: CI - 1%-19% impaired, limited or restricted    - D/C STATUS:  ---------------To be determined---------------      Based on the above components, the patient evaluation is determined to be of the following complexity level: LOW     Pain:  Pain Scale 1: Numeric (0 - 10)  Pain Intensity 1: 0              Activity Tolerance:   Fair. SpO2 decreased to 83% on room air during ambulation. Needs 2L at rest to recover to 92%  Please refer to the flowsheet for vital signs taken during this treatment.   After treatment:   [x]         Patient left in no apparent distress sitting up in chair  []         Patient left in no apparent distress in bed  [x]         Call bell left within reach  [x]         Nursing notified  []         Caregiver present  []         Bed alarm activated    COMMUNICATION/EDUCATION:   The patients plan of care was discussed with: Registered Nurse. [x]         Fall prevention education was provided and the patient/caregiver indicated understanding. [x]         Patient/family have participated as able in goal setting and plan of care. [x]         Patient/family agree to work toward stated goals and plan of care. []         Patient understands intent and goals of therapy, but is neutral about his/her participation. []         Patient is unable to participate in goal setting and plan of care.     Thank you for this referral.  Ángel Esposito, PT, DPT   Time Calculation: 16 mins

## 2018-03-20 NOTE — ROUTINE PROCESS
Bedside shift change report given to Noah Iqbal RN (oncoming nurse) by Mirella Landin RN (offgoing nurse). Report included the following information SBAR, Procedure Summary, Intake/Output and Recent Results.

## 2018-03-20 NOTE — PROGRESS NOTES
Problem: Falls - Risk of  Goal: *Absence of Falls  Document Hakan Fall Risk and appropriate interventions in the flowsheet. Outcome: Progressing Towards Goal  Fall Risk Interventions:  Mobility Interventions: Patient to call before getting OOB         Medication Interventions: Patient to call before getting OOB    Elimination Interventions: Call light in reach             Problem: Pressure Injury - Risk of  Goal: *Prevention of pressure ulcer  Outcome: Progressing Towards Goal  Skin cdi with no breakdown at this time.

## 2018-03-21 LAB
GLUCOSE BLD STRIP.AUTO-MCNC: 111 MG/DL (ref 65–100)
SERVICE CMNT-IMP: ABNORMAL

## 2018-03-21 PROCEDURE — 97116 GAIT TRAINING THERAPY: CPT

## 2018-03-21 PROCEDURE — 74011250636 HC RX REV CODE- 250/636: Performed by: INTERNAL MEDICINE

## 2018-03-21 PROCEDURE — 97165 OT EVAL LOW COMPLEX 30 MIN: CPT

## 2018-03-21 PROCEDURE — 74011250637 HC RX REV CODE- 250/637: Performed by: INTERNAL MEDICINE

## 2018-03-21 PROCEDURE — 97535 SELF CARE MNGMENT TRAINING: CPT

## 2018-03-21 PROCEDURE — 74011636637 HC RX REV CODE- 636/637: Performed by: HOSPITALIST

## 2018-03-21 PROCEDURE — G8987 SELF CARE CURRENT STATUS: HCPCS

## 2018-03-21 PROCEDURE — 74011250637 HC RX REV CODE- 250/637: Performed by: SPECIALIST

## 2018-03-21 PROCEDURE — 74011250636 HC RX REV CODE- 250/636: Performed by: HOSPITALIST

## 2018-03-21 PROCEDURE — 65660000000 HC RM CCU STEPDOWN

## 2018-03-21 PROCEDURE — 74011250637 HC RX REV CODE- 250/637: Performed by: NURSE PRACTITIONER

## 2018-03-21 PROCEDURE — G8988 SELF CARE GOAL STATUS: HCPCS

## 2018-03-21 PROCEDURE — 82962 GLUCOSE BLOOD TEST: CPT

## 2018-03-21 PROCEDURE — 74011250637 HC RX REV CODE- 250/637: Performed by: PHYSICIAN ASSISTANT

## 2018-03-21 PROCEDURE — C9113 INJ PANTOPRAZOLE SODIUM, VIA: HCPCS | Performed by: HOSPITALIST

## 2018-03-21 PROCEDURE — 74011250637 HC RX REV CODE- 250/637: Performed by: HOSPITALIST

## 2018-03-21 RX ORDER — ENOXAPARIN SODIUM 100 MG/ML
1 INJECTION SUBCUTANEOUS EVERY 12 HOURS
Status: DISCONTINUED | OUTPATIENT
Start: 2018-03-21 | End: 2018-03-22

## 2018-03-21 RX ORDER — METOPROLOL SUCCINATE 50 MG/1
50 TABLET, EXTENDED RELEASE ORAL
Status: COMPLETED | OUTPATIENT
Start: 2018-03-21 | End: 2018-03-21

## 2018-03-21 RX ORDER — METOPROLOL SUCCINATE 50 MG/1
100 TABLET, EXTENDED RELEASE ORAL DAILY
Status: DISCONTINUED | OUTPATIENT
Start: 2018-03-22 | End: 2018-03-23 | Stop reason: HOSPADM

## 2018-03-21 RX ADMIN — PREDNISONE 40 MG: 20 TABLET ORAL at 08:33

## 2018-03-21 RX ADMIN — LEVOTHYROXINE SODIUM 88 MCG: 88 TABLET ORAL at 07:28

## 2018-03-21 RX ADMIN — Medication 400 MG: at 08:33

## 2018-03-21 RX ADMIN — Medication 400 MG: at 17:37

## 2018-03-21 RX ADMIN — LISINOPRIL 10 MG: 10 TABLET ORAL at 08:33

## 2018-03-21 RX ADMIN — ENOXAPARIN SODIUM 90 MG: 100 INJECTION SUBCUTANEOUS at 20:18

## 2018-03-21 RX ADMIN — METOPROLOL SUCCINATE 50 MG: 50 TABLET, EXTENDED RELEASE ORAL at 08:33

## 2018-03-21 RX ADMIN — Medication 10 ML: at 07:28

## 2018-03-21 RX ADMIN — METOPROLOL SUCCINATE 50 MG: 50 TABLET, EXTENDED RELEASE ORAL at 11:20

## 2018-03-21 RX ADMIN — PANTOPRAZOLE SODIUM 40 MG: 40 INJECTION, POWDER, FOR SOLUTION INTRAVENOUS at 20:18

## 2018-03-21 RX ADMIN — PANTOPRAZOLE SODIUM 40 MG: 40 INJECTION, POWDER, FOR SOLUTION INTRAVENOUS at 08:43

## 2018-03-21 RX ADMIN — AMLODIPINE BESYLATE 10 MG: 5 TABLET ORAL at 08:33

## 2018-03-21 RX ADMIN — FINASTERIDE 5 MG: 5 TABLET, FILM COATED ORAL at 08:33

## 2018-03-21 RX ADMIN — TAMSULOSIN HYDROCHLORIDE 0.4 MG: 0.4 CAPSULE ORAL at 08:32

## 2018-03-21 RX ADMIN — SODIUM CHLORIDE AND POTASSIUM CHLORIDE: 9; 1.49 INJECTION, SOLUTION INTRAVENOUS at 07:40

## 2018-03-21 RX ADMIN — ATORVASTATIN CALCIUM 40 MG: 40 TABLET, FILM COATED ORAL at 08:33

## 2018-03-21 RX ADMIN — ENOXAPARIN SODIUM 80 MG: 80 INJECTION SUBCUTANEOUS at 08:43

## 2018-03-21 RX ADMIN — ISOSORBIDE MONONITRATE 30 MG: 30 TABLET, EXTENDED RELEASE ORAL at 08:33

## 2018-03-21 RX ADMIN — Medication 10 ML: at 17:37

## 2018-03-21 RX ADMIN — CLOPIDOGREL BISULFATE 75 MG: 75 TABLET ORAL at 08:32

## 2018-03-21 RX ADMIN — DOCUSATE SODIUM 100 MG: 100 CAPSULE, LIQUID FILLED ORAL at 08:32

## 2018-03-21 NOTE — PROGRESS NOTES
Hospitalist Progress Note  Mal Mckeon MD  Answering service: [phone] OR [phone] from in house phone      Date of Service:  3/21/2018  NAME:  Jacque Rodriguez  :  1935  MRN:  [id number]      Admission Summary:   82 yo man with PAD s/p LLE angioplasty, CAD s/p stent, HTN, h/o stroke, h/o rectal bleed, carotid stenosis s/p CEA, AAA s/p stent was sent by Dr Selene Dinh for b/l DVT on 3/16/18. Patient was Vascular Surgery clinic for routine f/u s/p AAA stent and due to RLE swelling. He was admitted with acute b/l LE DVTs and cough. Interval history / Subjective:   c/o continued right leg swelling;  Eating lunch   Cant understand why he has swelling right leg      Assessment & Plan:     Polymicrobial (Enterococcus, Klebsiella) UTI (POA)  - c/o some urinary urgency and due to acute encephalopathy 3/16-3/17,   - UCx 3/16 Enterococcus faecalis group D, Klebsiella  I dont see patient getting treated with antibiotics here , but ID has been consulted already so would defer to them       DVT/PE with evidence of acute cor pulmonale (POA)   - likely provoked due to recent surgery  - likely causing indeterminate troponin elevation  - V/Q scan 3/16 with intermediate probability of PE  Doppler lE On the right, there is evidence of common femoral, deep  femoral, popliteal, posterior tibial and great saphenous vein  thrombosis, as described above. On the left there is evidence of  popliteal vein thrombosis,  - TTE 3/16 limited visualization, EF 60%, mild-mod PHTN  - needs to stay on plavix , is on lovenox and has filter already   To decide  best next [facility] in setting of GI bleed , I have consulted hematology .   eliquis maybe ok given renal dysfunction ?     GI bleed - bloody stool 3/17 PM and 3/18 AM  - H/H stable  - EGD  antral erosions, hiatal hernia, schatzki's ring  - advance diet  - PPI  - GI following     CAD s/p stent - troponin 0.1 is likely strain from PE rather than ACS  - MARY ANN in the mid-circ, MARY ANN in the distal RVA 11/6/17  - ECG with sinus, 1st degree AV block, old RBBB  - Continue atorvastatin, imdur, metoprolol  - Stop aspirin. Continue plavix given that he is only 4 months out from MARY ANN placement  - Cards following     MARIAMA on CKD3 (POA) - UA unremarkable  - renal US 3/17 with medical renal disease but no hydronephrosis  - resolving to baseline with gentle IVF     Acute encephalopathy   - overnight 3/16 to 3/17. Hard of hearing at baseline but oriented and appropriate now  - CT head 3/16 without acute process, slight interval increase in bilateral hygromas  - ABG compensated  - resolved to baseline     COPD with acute exacerbation - may have been exacerbated by PE, improving  - s/p azithromycin 3/16  - nebs, s/p reBronchodilators  - prednisone x5 days for wheezing  - wean off O2 NC     PVD s/p left leg angioplasty  - continue Plavix, atorvastatin     AAA s/p stenting - followed by Dr Suzan Can, Vascular     Carotid stenosis s/p CEA     Hypothyroid (chronic) - continue home synthroid     BPH (chronic) - continue home finasteride, flomax    Obesity, Body mass index is 34.56 kg/(m^2). Code status: full    Care Plan discussed with: Patient/Family, Nurse and   Disposition: TBD     Hospital Problems  Date Reviewed: 3/16/2018          Codes Class Noted POA    * (Principal)Acute DVT (deep venous thrombosis) ([facility] [address] ICD-10-CM: I82.409  ICD-9-CM: 453.40  3/16/2018 Unknown            Review of Systems:   Pertinent items are noted in HPI. Vital Signs:    Last 24hrs VS reviewed since prior progress note.  Most recent are:  Visit Vitals    /80 (BP Patient Position: Sitting;Post activity)    Pulse 87    Temp 97.9 °F (36.6 °C)    Resp 18    Ht 5' 3\" (1.6 m)    Wt 88.5 kg (195 lb 1.7 oz)    SpO2 96%    BMI 34.56 kg/m2         Intake/Output Summary (Last 24 hours) at 03/21/18 1404  Last data filed at 03/20/18 2315   Gross per 24 hour   Intake 0 ml   Output              650 ml   Net             -650 ml      Physical Examination:     Constitutional:  awake, no acute distress, cooperative, pleasant, [place], obese, NC in place   ENT:  oral mucosa moist, oropharynx benign  Neck supple, no masses   Resp:  + wheezing   CV:  regular rhythm, normal rate, no m/r/g appreciated, RLE edema    GI:  +BS, soft, non distended, non tender     Musculoskeletal:  moves all extremities    Neurologic:  awake, alert, [place] but responds appropriately to questions and commands     Eyes:  PERRL    Data Review:    Review and/or order of clinical lab test  Review and/or order of tests in the radiology section of CPT  Review and/or order of tests in the medicine section of CPT    Labs:     Recent Labs      03/20/18   0700  03/19/18   0536   WBC  3.9*  3.3*   HGB  11.7*  11.5*   HCT  35.0*  34.1*   PLT  223  214     Recent Labs      03/20/18   0700  03/19/18   0536   NA  143  144   K  3.8  3.5   CL  112*  111*   CO2  23  24   BUN  13  18   CREA  1.17  1.27   GLU  96  104*   CA  8.9  8.5   MG  1.8  1.3*     No results for input(s): SGOT, GPT, ALT, AP, TBIL, TBILI, TP, ALB, GLOB, GGT, AML, LPSE in the last 72 hours. No lab exists for component: AMYP, HLPSE  Recent Labs      03/19/18   0541  03/18/18   2304  03/18/18   1511   APTT  61.4*  54.3*  54.0*      No results for input(s): FE, TIBC, PSAT, FERR in the last 72 hours. No results found for: FOL, RBCF   No results for input(s): PH, PCO2, PO2 in the last 72 hours. No results for input(s): CPK, CKNDX, TROIQ in the last 72 hours.     No lab exists for component: CPKMB  Lab Results   Component Value Date/Time    Cholesterol, total 147 11/07/2017 03:24 AM    HDL Cholesterol 63 11/07/2017 03:24 AM    LDL, calculated 70.2 11/07/2017 03:24 AM    Triglyceride 69 11/07/2017 03:24 AM    CHOL/HDL Ratio 2.3 11/07/2017 03:24 AM     Lab Results   Component Value Date/Time    Glucose (POC) 111 (H) 03/21/2018 11:19 AM    Glucose (POC) 136 (H) 03/20/2018 04:32 PM    Glucose (POC) 100 03/20/2018 06:58 AM    Glucose (POC) 134 (H) 03/19/2018 11:32 PM    Glucose (POC) 138 (H) 03/19/2018 04:22 PM     Lab Results   Component Value Date/Time    Color YELLOW/STRAW 03/16/2018 02:45 PM    Appearance CLEAR 03/16/2018 02:45 PM    Specific gravity 1.014 03/16/2018 02:45 PM    pH (UA) 5.5 03/16/2018 02:45 PM    Protein NEGATIVE  03/16/2018 02:45 PM    Glucose NEGATIVE  03/16/2018 02:45 PM    Ketone NEGATIVE  03/16/2018 02:45 PM    Bilirubin NEGATIVE  03/16/2018 02:45 PM    Urobilinogen 1.0 03/16/2018 02:45 PM    Nitrites NEGATIVE  03/16/2018 02:45 PM    Leukocyte Esterase NEGATIVE  03/16/2018 02:45 PM    Epithelial cells FEW 03/16/2018 02:45 PM    Bacteria 1+ (A) 03/16/2018 02:45 PM    WBC 5-10 03/16/2018 02:45 PM    RBC 0-5 03/16/2018 02:45 PM     Medications Reviewed:     Current Facility-Administered Medications   Medication Dose Route Frequency    [START ON 3/22/2018] metoprolol succinate (TOPROL-XL) XL tablet 100 mg  100 mg Oral DAILY    enoxaparin (LOVENOX) injection 90 mg  1 mg/kg SubCUTAneous Q12H    amLODIPine (NORVASC) tablet 10 mg  10 mg Oral DAILY    lisinopril (PRINIVIL, ZESTRIL) tablet 10 mg  10 mg Oral DAILY    magnesium oxide (MAG-OX) tablet 400 mg  400 mg Oral BID    acetaminophen (TYLENOL) tablet 650 mg  650 mg Oral Q4H PRN    0.9% sodium chloride with KCl 20 mEq/L infusion   IntraVENous CONTINUOUS    predniSONE (DELTASONE) tablet 40 mg  40 mg Oral DAILY WITH BREAKFAST    pantoprazole (PROTONIX) 40 mg in sodium chloride (NS) 10 mL injection  40 mg IntraVENous Q12H    albuterol-ipratropium (DUO-NEB) 2.5 MG-0.5 MG/3 ML  3 mL Nebulization Q4H PRN    levothyroxine (SYNTHROID) tablet 88 mcg  88 mcg Oral ACB    tamsulosin (FLOMAX) capsule 0.4 mg  0.4 mg Oral DAILY    isosorbide mononitrate ER (IMDUR) tablet 30 mg  30 mg Oral DAILY    finasteride (PROSCAR) tablet 5 mg  5 mg Oral DAILY    atorvastatin (LIPITOR) tablet 40 mg  40 mg Oral DAILY    clopidogrel (PLAVIX) tablet 75 mg  75 mg Oral DAILY    sodium chloride (NS) flush 5-10 mL  5-10 mL IntraVENous Q8H    sodium chloride (NS) flush 5-10 mL  5-10 mL IntraVENous PRN    ondansetron (ZOFRAN) injection 4 mg  4 mg IntraVENous Q6H PRN    docusate sodium (COLACE) capsule 100 mg  100 mg Oral BID    guaiFENesin-dextromethorphan (ROBITUSSIN DM) 100-10 mg/5 mL syrup 10 mL  10 mL Oral Q6H PRN     ______________________________________________________________________  EXPECTED LENGTH OF STAY: 4d 0h  ACTUAL LENGTH OF STAY:          Yemi Cartagena MD

## 2018-03-21 NOTE — PROGRESS NOTES
Problem: Self Care Deficits Care Plan (Adult)  Goal: *Acute Goals and Plan of Care (Insert Text)  Occupational Therapy Goals  Initiated 3/21/2018  1. Patient will perform shower transfer with supervision/set-up within 7 day(s). 2.  Patient will perform lower body dressing with AE PRN supervision/set-up within 7 day(s). 3.  Patient will perform simple home management with moderate assistance  within 7 day(s). 4.  Patient will perform full dressing and bathing ADLs with supervision/set-up within 7 day(s). 5.  Patient will participate in cardiopulmonary upper extremity therapeutic exercise/activities with supervision/set-up for 3 minutes within 7 day(s). 6.  Patient will utilize energy conservation techniques during functional activities with visual cues within 7 day(s). Occupational Therapy EVALUATION  Patient: Darci Draper (50 y.o. male)  Date: 3/21/2018  Primary Diagnosis: Acute DVT (deep venous thrombosis) ([facility])        Precautions:   Fall, DNR    ASSESSMENT :  Based on the objective data described below, the patient presents with independence upper body ADLs, moderate A lower body ADLs, bed mobility with modified independence, functional mobility in the room with cane with supervision 2* SOB and O2 line management. Patient stating discharging to [facility] for 2 weeks. If this is the case recommend HHOT to increase independence and safety during ADLs prior to discharging home alone. Currently, still on 3L NC, SOB with minimal activity, standing tolerance poor, edema and soreness B LEs from B DVTs and standing balance fair but compensates with cane. Asking for scooter so he can get to and from [facility] to  his medications, groceries, and household items.      Recommend with nursing patient to complete as able in order to maintain strength, endurance and independence: ADLs with supervision/setup, OOB to chair 3x/day and mobilizing to the bathroom for toileting with 1 assist. Thank you for your assistance. Patient will benefit from skilled intervention to address the above impairments. Patients rehabilitation potential is considered to be Good  Factors which may influence rehabilitation potential include:   [x]             None noted  []             Mental ability/status  []             Medical condition  []             Home/family situation and support systems  []             Safety awareness  []             Pain tolerance/management  []             Other:      PLAN :  Recommendations and Planned Interventions:  [x]               Self Care Training                  [x]        Therapeutic Activities  [x]               Functional Mobility Training    []        Cognitive Retraining  [x]               Therapeutic Exercises           [x]        Endurance Activities  [x]               Balance Training                   []        Neuromuscular Re-Education  []               Visual/Perceptual Training     [x]   Home Safety Training  [x]               Patient Education                 [x]        Family Training/Education  []               Other (comment):    Frequency/Duration: Patient will be followed by occupational therapy 5 times a week to address goals. Discharge Recommendations: Rehab, Home Health and To Be Determined  Further Equipment Recommendations for Discharge: TBD     SUBJECTIVE:   Patient stated I am supposed to go home tomorrow.     OBJECTIVE DATA SUMMARY:   HISTORY:   Past Medical History:   Diagnosis Date    AAA [facility]abdominal aortic aneurysm) [facility] [address]     Acute MI 12/2010    dr Kimani Cervantes    Acute prostatitis     again 9/12/16 f[facility]u [facility]'d [facility]    Advance directive discussed with patient 04/20/2016    Aneurysm ([facility] [address] 6/2011    AAA    Borderline glaucoma (glaucoma suspect) 02/19/2015    notes from [address] [place] [address] rec'd    BPH (benign prostatic hyperplasia)     Bright red blood per rectum 02/04/2016    [facility] note Lucy Carrera- w/u in progress   8/22/17 [facility] f/u note    CAD (coronary artery disease)     Calculus of kidney     Cerebral embolism with cerebral infarction ([address]     Chronic pain     back    Dizzy spells 6/2012    DJD (degenerative joint disease) of lumbar spine     ED (erectile dysfunction)     8/30/17 [facility]u note [facility]    Elevated PSA 03/20/2014    [facility] rec[facility]     8/24/16 f/u note    Encephalocele ([address]     Hypercholesterolemia     Hypertension     Pneumonia 02/05/2017    Prostate cancer ([address] 05/12/2014    crystal henry/ Dr Rowena Schmidt 8/22/17 f/u note    PVD (peripheral vascular disease) with claudication ([address]     S/P radiation therapy 15 months out    probable cause of the rectal bleeding    Stroke ([address] 6/2011    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  COLONOSCOPY N/A 8/31/2016    COL[facility] ENDOSCOPY    ENDOSCOPY, COLON, DIAGNOSTIC      HX CAROTID ENDARTERECTOMY Left     HX HEENT  10/2012    repair right drlisa dawkins    HX ORTHOPAEDIC Bilateral     BUNIONECTOMY    TX LEFT HEART CATH,PERCUTANEOUS  10/16/2010         PTCA EACH ADDL VESSEL  10/16/2010         PTCA W/ STENT, INITIAL  10/16/2010         UPPER GI ENDOSCOPY,DIAGNOSIS  11/22/2017         VASCULAR SURGERY PROCEDURE UNLIST      left leg varicose vein stripping dr Patricia Farah       Prior Level of Function/Environment/Context: independent to modified independence increased time all ADLs, sitting to shower, sitting to don all lower body clothing; cane for all functional mobility. Bicycle to and from [facility] for medications. Son checks in on him.    Occupations in which the patient is/was successful, what are the barriers preventing that success: Performance Patterns (routines, roles, habits, and rituals): taking care of himself and home  Personal Interests and/or values:   Expanded or extensive additional review of patient history:     Home Situation  Home Environment: Private residence  # Steps to Enter: 0  One/Two Story Residence: One story  Living Alone: Yes  Support Systems: Child(felice), Family member(s)  Patient Expects to be Discharged to[de-identified] Private residence  Current DME Used/Available at Home: Shower chair, Cane, straight (detachable shower head)  Tub or Shower Type: Shower  [x]  Right hand dominant   []  Left hand dominant    EXAMINATION OF PERFORMANCE DEFICITS:  Cognitive/Behavioral Status:  Neurologic State: Alert  Orientation Level: Oriented to person;Oriented to place;Oriented to situation  Cognition: Appropriate for age attention/concentration  Perception: Appears intact  Perseveration: No perseveration noted  Safety/Judgement: Awareness of environment    Skin: intact PIVs    Edema: increased B LEs    Hearing: Auditory  Auditory Impairment: Hard of hearing, bilateral, Hearing aid(s)  Hearing Aids/Status: With patient    Vision/Perceptual:                           Acuity: Impaired near vision (states baseline)    Corrective Lenses: Reading glasses    Range of Motion:  B UEs  AROM: Within functional limits                         Strength:  B UEs  Strength: Within functional limits                Coordination:  Coordination: Within functional limits  Fine Motor Skills-Upper: Left Intact; Right Intact    Gross Motor Skills-Upper: Left Intact; Right Intact    Tone & Sensation:    Tone: Normal  Sensation: Intact                      Balance:  Sitting: Intact; Without support    Functional Mobility and Transfers for ADLs:  Bed Mobility:  Supine to Sit: Supervision  Scooting: Supervision    Transfers:  Sit to Stand: Supervision  Stand to Sit: Supervision  Toilet Transfer : Supervision  Shower Transfer:  Total assistance    ADL Assessment:  Feeding: Independent    Oral Facial Hygiene/Grooming: Supervision setup on tray, sitting    Bathing: Moderate assistance infer from functional reach, fatigue, and SOB    Upper Body Dressing: Modified independent (increased time gathering, cane use, no A O2 line management)    Lower Body Dressing: Moderate assistance (sitting EOB to don shoes over feet and heals, unable to maintain functional reach to strap velcro R shoe nor tie shoe L 2* then SOB and fatigue; fair standing balance but using cane)    Toileting: Supervision reported                ADL Intervention and task modifications:         Patient instructed and indicated understanding energy conservation techniques to increase independence and safety during ADLs with  visual handout provided. Cognitive Retraining  Safety/Judgement: Awareness of environment    Therapeutic Exercise: Instruction and indicated understanding pulmonary exercises (handout provided), benefits sitting up and spirometer. Functional Measure:  Barthel Index:    Bathin  Bladder: 5  Bowels: 10  Groomin  Dressin  Feeding: 10  Mobility: 5  Stairs: 0  Toilet Use: 10  Transfer (Bed to Chair and Back): 10  Total: 60       Barthel and G-code impairment scale:  Percentage of impairment CH  0% CI  1-19% CJ  20-39% CK  40-59% CL  60-79% CM  80-99% CN  100%   Barthel Score 0-100 100 99-80 79-60 59-40 20-39 1-19   0   Barthel Score 0-20 20 17-19 13-16 9-12 5-8 1-4 0      The Barthel ADL Index: Guidelines  1. The index should be used as a record of what a patient does, not as a record of what a patient could do. 2. The main aim is to establish degree of independence from any help, physical or verbal, however minor and for whatever reason. 3. The need for supervision renders the patient not independent. 4. A patient's performance should be established using the best available evidence.  Asking the patient, friends/relatives and nurses are the usual sources, but direct observation and common sense are also important. However direct testing is not needed. 5. Usually the patient's performance over the preceding 24-48 hours is important, but occasionally longer periods will be relevant. 6. Middle categories imply that the patient supplies over 50 per cent of the effort. 7. Use of aids to be independent is allowed. Jamal Dominguez., Barthel, D.W. ([id number]). Functional evaluation: the Barthel Index. [address] (14)2. MIKAYLA Mckeon, Tulio Gerardo., [place] Ricketts., [place], [facility] (1999). Measuring the change indisability after inpatient rehabilitation; comparison of the responsiveness of the Barthel Index and Functional [place] Measure. Journal of Neurology, Neurosurgery, and Psychiatry, 66(4), 150-195. EDMAR Suarez, LISA Borja, & Edenilson Reyna M.A. (2004.) Assessment of post-stroke quality of life in cost-effectiveness studies: The usefulness of the Barthel Index and the EuroQoL-5D. Quality of Life Research, 13, 253-03         G codes: In compliance with CMSs Claims Based Outcome Reporting, the following G-code set was chosen for this patient based on their primary functional limitation being treated: The outcome measure chosen to determine the severity of the functional limitation was the Barthel  with a score of 60/100 which was correlated with the impairment scale. ?  Self Care:     - CURRENT STATUS: CJ - 20%-39% impaired, limited or restricted    - GOAL STATUS:  - 0% impaired, limited or restricted    - D/C STATUS:  ---------------To be determined---------------       Pain:  Pain Scale 1: Numeric (0 - 10)  Pain Intensity 1: 0              Activity Tolerance:   Vitals:    03/21/18 0310 03/21/18 0605 03/21/18 0753 03/21/18 1223   BP: 132/78  (!) 167/107 127/80   BP 1 Location: Right arm  Left arm    BP Patient Position: At rest  At rest Sitting;Post activity   Pulse: 83  (!) 103 87   Resp: 18  18    Temp: 97.4 °F (36.3 °C)  97.9 °F (36.6 °C)    SpO2: 97%  93% 96%   Weight:  88.5 kg (195 lb 1.7 oz)     Height:           Please refer to the flowsheet for vital signs taken during this treatment. After treatment:   [x] Patient left in no apparent distress sitting up in chair  [] Patient left in no apparent distress in bed  [x] Call bell left within reach  [x] Nursing notified  [] Caregiver present  [] Bed alarm activated    COMMUNICATION/EDUCATION:   The patients plan of care was discussed with: Registered Nurse. [x] Home safety education was provided and the patient/caregiver indicated understanding. [x] Patient/family have participated as able in goal setting and plan of care. [x] Patient/family agree to work toward stated goals and plan of care. [] Patient understands intent and goals of therapy, but is neutral about his/her participation. [] Patient is unable to participate in goal setting and plan of care. This patients plan of care is appropriate for delegation to [facility].     Thank you for this referral.  Zuleyma Connell  Time Calculation: 19 mins

## 2018-03-21 NOTE — PROGRESS NOTES
Problem: Mobility Impaired (Adult and Pediatric)  Goal: *Acute Goals and Plan of Care (Insert Text)  Physical Therapy Goals  Initiated 3/20/2018  1. Patient will move from supine to sit and sit to supine  in bed with independence within 7 day(s). 2.  Patient will transfer from bed to chair and chair to bed with modified independence using the least restrictive device within 7 day(s). 3.  Patient will perform sit to stand with modified independence within 7 day(s). 4.  Patient will ambulate with modified independence for 200 feet with the least restrictive device within 7 day(s). **All while maintaining spO2 >90%**     physical Therapy TREATMENT  Patient: Romain Landin (74 y.o. male)  Date: 3/21/2018  Diagnosis: Acute DVT (deep venous thrombosis) ([facility]) Acute DVT (deep venous thrombosis) ([facility])       Precautions: Fall, DNR  Chart, physical therapy assessment, plan of care and goals were reviewed. ASSESSMENT:  Patient sitting on EOB upon arrival and agreeable to ambulate. Pt stood with supervision, then ambulated 150 ft using SPC with CGA. Gait slightly unsteady at times, but no overt LOB noted. Pt also impulsive, standing without warning and beginning to ambulate before therapist ready with IV/oxygen tubing. SpO2 91% during gait on room air, however pt very SOB throughout. Patient remained on 3L post-activity at 98%, RN notified. Recommend SNF at discharge vs home with HHPT due to safety concerns with pt living alone. Will continue to follow. Progression toward goals:  [x]    Improving appropriately and progressing toward goals  []    Improving slowly and progressing toward goals  []    Not making progress toward goals and plan of care will be adjusted     PLAN:  Patient continues to benefit from skilled intervention to address the above impairments. Continue treatment per established plan of care.   Discharge Recommendations:  SNF vs home with 24 hour supervision/HHPT  Further Equipment Recommendations for Discharge:  None      SUBJECTIVE:   Patient stated Ok, let's walk.     OBJECTIVE DATA SUMMARY:   Critical Behavior:  Neurologic State: Alert  Orientation Level: Oriented to person, Oriented to place, Oriented to situation  Cognition: Appropriate for age attention/concentration  Safety/Judgement: Awareness of environment  Functional Mobility Training:  Bed Mobility:     Supine to Sit: Supervision  Sit to Supine: Supervision  Scooting: Supervision        Transfers:  Sit to Stand: Supervision  Stand to Sit: Supervision                             Balance:  Sitting: Intact  Standing: Intact; With support  Ambulation/Gait Training:  Distance (ft): 150 Feet (ft)  Assistive Device: Cane, straight;Gait belt  Ambulation - Level of Assistance: Contact guard assistance        Gait Abnormalities: Decreased step clearance;Trunk sway increased        Base of Support: Narrowed     Speed/Bria: Shuffled; Slow  Step Length: Left shortened;Right shortened       Pain:  Pain Scale 1: Numeric (0 - 10)  Pain Intensity 1: 0              Activity Tolerance:   Pulse Oximetry Assessment    94% at rest on room air  91% while ambulating on room air  99% at rest on 3LPM  96% while ambulating on 3LPM    Please refer to the flowsheet for vital signs taken during this treatment.   After treatment:   []    Patient left in no apparent distress sitting up in chair  [x]    Patient left in no apparent distress in bed  [x]    Call bell left within reach  [x]    Nursing notified  []    Caregiver present  []    Bed alarm activated    COMMUNICATION/COLLABORATION:   The patients plan of care was discussed with: Registered Nurse    Desirae Brooks, PT   Time Calculation: 18 mins

## 2018-03-21 NOTE — PROGRESS NOTES
CM met with patient to discuss discharge planning and his decreased endurance at this time. Patient agreed that he could benefit from a few weeks of rehab prior to returning home alone. CM offered a choice, patient has no preference, CM sent a referral to [facility] via [facility]. Gideon Centeno MSA, RN, CRM. 4:30 pm. Patient accepted at [facility]. Gideon Centeno MSA, RN, CRM.

## 2018-03-21 NOTE — CONSULTS
-Hematology / Oncology ([facility]) -  -CC- bilateral lower extremity DVTs    80 yo man admitted 3/16 after notatation of B LE DVTs with RLE swelling. Elevation of troponin was noted with VQ indeterminate, echo with mild-mod PHTN, leading to thought he also has PE. He had bloody stools 3/17 and 3/18. 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 Also on plavix which must be continued for drug eluting stent placed 17. Currently on enoxaparin. Had IVC filter placed 3/20/18. Course complicated by renal insufficiency, currently eGFR improved to ~60. PMHX: PVD s/p L leg antioplasty, CAD s/p jason 2017, HTN, stroke/ carotid stenosis s/p CEA, AAA s/p endograft repair, prostate CA s/p xrt ~, prior rectal bleed 2017, hypothyroidsism,     FAMHX: sister  2018 lung cancer. Brother  colon ca. SocHx: lives alone, retired [employer], quit tob , no etoh    -S-  Currently reports continued R leg swelling, had started 3 weeks ago. SOB stable, has been about the same since 2017 when had stenting to CAD followed by rectal bleeding. No cp. No palpable masses, weight loss. Complete ROS notable as above    -O-    Patient Vitals for the past 24 hrs:   Temp Pulse Resp BP SpO2   18 1509 97.9 °F (36.6 °C) 73 20 138/87 96 %   18 1223 - 87 - 127/80 96 %   18 1121 97.7 °F (36.5 °C) 81 24 149/88 100 %   18 0753 97.9 °F (36.6 °C) (!) 103 18 (!) 167/107 93 %   18 0310 97.4 °F (36.3 °C) 83 18 132/78 97 %   18 2314 97.5 °F (36.4 °C) 85 24 141/87 97 %   180 - - - (!) 177/109 -   18 1955 97.6 °F (36.4 °C) 86 24 - 96 %        Gen: nad  H+N: oral cavity moist  Lymph: no palpable COMFORT neck, ax  Chest: CTAB  CV: RRR  Abd: s/nt  Extr: moderate RLE edema  Skin: no excessive bruising  Neuro: moving all extremities.   Hard of hearing      -Labs-    Recent Labs      18   0700  18   0541  18   0536  18   2304  18   1854   WBC 3.9*   --   3.3*   --    --    HGB  11.7*   --   11.5*   --   11.5*   PLT  223   --   214   --    --    ANEU  2.8   --    --    --    --    APTT   --   61.4*   --   54.3*   --    NA  143   --   144   --    --    K  3.8   --   3.5   --    --    GLU  96   --   104*   --    --    BUN  13   --   18   --    --    CREA  1.17   --   1.27   --    --    CA  8.9   --   8.5   --    --    MG  1.8   --   1.3*   --    --        -Imaging- doppler 3/20- On the right, there is evidence of common femoral, deep femoral, popliteal, posterior tibial and great saphenous vein thrombosis, as described above. On the left there is evidence of popliteal vein thrombosis. The ultrasound appearance is more consistent with an acute than a chronic process    Portable cxr 3/17- Mild elevation of left hemidiaphragm with patchy left basilar consolidation versus atelectasis is unchanged. The lungs are otherwise clear. There is no pneumothorax or substantial pleural effusion. Cardiac, mediastinal and hilar contours are unchanged. Bony mineralization is mildly diminished    VQ 3/16- IMPRESSION: Intermediate/indeterminate probability for pulmonary embolism. Matched disease in the left lung compatible with emphysema. CT head s 3/16- images personally reviewed- Encephalomalacia in the left frontal lobe posteriorly. Expansion of the extra-axial space bilaterally which is slightly increased relative to comparison. There is no intracranial hemorrhage, mass, mass effect or midline shift. The basilar cisterns are open. No acute infarct is identified. The bone windows demonstrate no abnormalities. The visualized portions of the paranasal sinuses and mastoid air cells are clear 1. No evidence of acute intracranial abnormality by this modality.  2. Slightly increased size of bilateral hygromas over approximately one year.    -Assessment + Plan-     *) VTE (B DVT with suspected PE)  ---- difficult situation given repeated rectal bleeds and requirement of plavix  ---- s/p IVC filter 3/20/2018  ---- currently tolerating lovenox  ---- if further bleeding problems would consider omitting anticoagulation  ---- does have continued SOB and RLE edema so attempting some continuing outpt anticoagulation resonable  ---- I agree willem is attractive given his variable renal function.   Given age [de-identified] as well, I think risk:benefit balance may favor minimal dose at 2.5 mg po q 12 hrs

## 2018-03-21 NOTE — PROGRESS NOTES
Bedside shift change report given to Umang's RN (oncoming nurse) by Lamar Heck (offgoing nurse). Report included the following information SBAR, Procedure Summary, MAR, Recent Results and Cardiac Rhythm NSR.

## 2018-03-21 NOTE — ROUTINE PROCESS
Bedside shift change report given to Jose Antonio Guo (oncoming nurse) by Isaiah Pardo (offgoing nurse). Report included the following information SBAR, Kardex, Procedure Summary, Intake/Output, MAR and Recent Results.

## 2018-03-21 NOTE — PROGRESS NOTES
Cardiology Progress Note       Admit Date: 3/16/2018  Admit Diagnosis: Acute DVT (deep venous thrombosis) ([facility] [address]  Date: 3/21/2018     Time: 11:12 AM    Subjective:  Doing well. No chest pain. Getting OOB to chair     Assessment and Plan     1. CAD s/p PCI with MARY ANN 11/6/17 to LCX and RCA    - minimal troponin elevation likely due to PE, no acute ischemic changes noted on ECG   - ASA has been stopped for GI bleed, CAN NOT STOP PLAVIX at this time due to recent PCI/MARY ANN in 11/17,    - continue Statin, Imdur. Toprol XL increased to 100 mg   - needs to follow up with Dr. Mary Talavera post hospital discharge  2. DVT/PE -    -V/Q scan 3/16 with intermediate probability of PE   - LE doppler positive for DVT by report   - Now s/p IVCF  3.  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     - Cannot rule out neoplasia or diverticular bleeding.  Fortunately, his Hgb is stable, will continue to follow CBC   - if the bleeding continues he may require colonoscopy for further evaluation. GI following   - cannot stop Plavix at this time, see discussion regarding antiplatelets and [address] above   - continue PPI   - EGD 11/22/17 with antral erosions, hiatal hernia, schatzki's ring  4. HTN    - Increase Toprol XL to 100mg daily and continue other medications, continue to monitor   5. NSVT    - had short run of NSVT 2 nights ago   - Follow K and Mg   - Toprol XL now 100mg  6. Hypokalemia -    - resolved. Check daily K  7. Dyslipidemia    - on atorvastatin 40mg daily, LDL 70  8. Hx of PAD s/p left leg angioplasty - on plavix and statin as above  9. Hx of CVA - on plavix and statin as above, now with DVT/PE, see discussion regarding [address] versus IVC filter as above  10. COPD exacerbation - on antibiotics, nebulizers and prednisone per IM  11.   Hx of AAA s/p stenting  12. Carotid stenosis s/p endarterectomy - on plavix and statin as above    BP and HR up. Toprol XL increased. S/p IVCF. Hgb stable. Continue Plavix for MARY ANN. PT/OT for deconditioned state. ROS:     GENERAL   Recent weight loss - no   Fever -----------------   no   Chills -----------------   no     EYES, VISION   Visual Changes - no     EARS, NOSE, THROAT   Hearing loss ----------- no   Swallowing difficulties - no     CARDIOVASCULAR   Chest pain/pressure ---- no   Arrhythmia/palpitations - no       RESPIRATORY   Cough ------------------ no   Shortness of breath - no   Wheezing -------------- no   GASTROINTESTINAL   Abdominal pain - no   Heartburn -------- no   Bloody stool ----- no     GENITOURINARY   Frequent urination - no   Urgency -------------- no     MUSCULOSKELETAL   Joint pain/swelling ---- no   Musculoskeletal pain - no     SKIN & INTEGUMENTARY   Rashes - no   Sores --- no         NEUROLOGICAL   Numbness/tingling - no   Sensation loss ------ no     PSYCHIATRIC   Nervousness/anxiety - no   Depression -------------- no     ENDOCRINE   Heat/cold intolerance - no   Excessive thirst -------- no     HEMATOLOGIC/LYMPHATIC   Abnormal bleeding - no     ALL/IMMUN   Allergic reaction ------ no   Recurrent infections - no     Objective:     Physical Exam:                Visit Vitals    BP (!) 167/107 (BP 1 Location: Left arm, BP Patient Position: At rest)    Pulse (!) 103    Temp 97.9 °F (36.6 °C)    Resp 18    Ht 5' 3\" (1.6 m)    Wt 195 lb 1.7 oz (88.5 kg)    SpO2 93%    BMI 34.56 kg/m2        General Appearance:   Well developed, well nourished,alert and oriented x 3, and   individual in no acute distress. Ears/Nose/Mouth/Throat:    Hearing grossly normal.         Neck:  Supple. Chest:    Lungs clear to auscultation bilaterally. Cardiovascular:   Regular rate and rhythm, S1, S2 normal, no murmur. Abdomen:    Soft, non-tender, bowel sounds are active.    Extremities:  No edema bilaterally. Skin:  Warm and dry. Telemetry: normal sinus rhythm          Data Review:    Labs:    Recent Results (from the past 24 hour(s))   GLUCOSE, POC    Collection Time: 03/20/18  4:32 PM   Result Value Ref Range    Glucose (POC) 136 (H) 65 - 100 mg/dL    Performed by Unkown            Radiology:        Current Facility-Administered Medications   Medication Dose Route Frequency    [START ON 3/22/2018] metoprolol succinate (TOPROL-XL) XL tablet 100 mg  100 mg Oral DAILY    amLODIPine (NORVASC) tablet 10 mg  10 mg Oral DAILY    lisinopril (PRINIVIL, ZESTRIL) tablet 10 mg  10 mg Oral DAILY    enoxaparin (LOVENOX) injection 80 mg  1 mg/kg SubCUTAneous Q12H    magnesium oxide (MAG-OX) tablet 400 mg  400 mg Oral BID    acetaminophen (TYLENOL) tablet 650 mg  650 mg Oral Q4H PRN    0.9% sodium chloride with KCl 20 mEq/L infusion   IntraVENous CONTINUOUS    predniSONE (DELTASONE) tablet 40 mg  40 mg Oral DAILY WITH BREAKFAST    pantoprazole (PROTONIX) 40 mg in sodium chloride (NS) 10 mL injection  40 mg IntraVENous Q12H    albuterol-ipratropium (DUO-NEB) 2.5 MG-0.5 MG/3 ML  3 mL Nebulization Q4H PRN    levothyroxine (SYNTHROID) tablet 88 mcg  88 mcg Oral ACB    tamsulosin (FLOMAX) capsule 0.4 mg  0.4 mg Oral DAILY    isosorbide mononitrate ER (IMDUR) tablet 30 mg  30 mg Oral DAILY    finasteride (PROSCAR) tablet 5 mg  5 mg Oral DAILY    atorvastatin (LIPITOR) tablet 40 mg  40 mg Oral DAILY    clopidogrel (PLAVIX) tablet 75 mg  75 mg Oral DAILY    sodium chloride (NS) flush 5-10 mL  5-10 mL IntraVENous Q8H    sodium chloride (NS) flush 5-10 mL  5-10 mL IntraVENous PRN    ondansetron (ZOFRAN) injection 4 mg  4 mg IntraVENous Q6H PRN    docusate sodium (COLACE) capsule 100 mg  100 mg Oral BID    guaiFENesin-dextromethorphan (ROBITUSSIN DM) 100-10 mg/5 mL syrup 10 mL  10 mL Oral Q6H PRN       Clinton Vivas. Anuja Jones M.D.      [facility] [address] [place] [address], [address] [place] [address] [address]   [address] [facility]   [phone]

## 2018-03-21 NOTE — CONSULTS
ID consult  NAME:  Blayne Juarez                      :   1935                       MRN:   [id number]   Date/Time:  3/21/2018 1:31 PM  Subjective:   REASON FOR CONSULT: Klebsiella  and enterococcus in urine       Nanci Alvarado  is a 81 y.o. male with a history of CAD/HTN/CVA /prostate ca was admitted on 3/16 from 's office   because of B/l DVT . As per 's note PT  had a left leg angioplasty several weeks ago. HE was found to have significant leg swelling and dyspnea when he saw him in his office on 3/16/18     Venous duplex shows bilateral DVT . ileofemoral  on the  right and popliteal on left. He was started don heparin - VQ scan was intermediate probability for PE-  He had rectal bleed this admission and was seen by GI - HE had  a past h/o rectal bleed and it was thought to be due to radiation proctitis and In 2017 when he was in ED [facility] he underwent colonoscopy and treatment of the radiation telengectasia. He is also on plavix since 2017 for multiple MARY ANN stents placed then . He had slight increase in troponin and was seen by cardiology on 3/19. Also has pulmonary HTN. I am asked to see him for a urine culture that showed e.coli and enterococcus. He is totally asymptomatic - does not have dysuria, frequency, urgency or haematuria  No fever    He has a h/o prostate cancer diagnosed in   and had radiation and has been cancer free with normal PSA as of his last visit to urologist in 2017 . He is on flomax  He has had penile injection for ED in 2017 but he says never got any after that   He has had a previous carotid endarterectomy and AAA endograft repair.   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 Chronic back pain    PSH  PTCA- cardiac stents  AAA_ stent  LEft leg angioplasty  bunionectomy    Right transmastoid repair of encephalocele and repair of inner ear  Fistula. 12/11/12  Left carotid endarterectomy-June 2011  Family History   Problem Relation Age of Onset    Diabetes Mother     Diabetes Father     Cancer Sister      LUNG    Cancer Brother      COLON    Cancer Sister      LUNG    Anesth Problems Neg Hx      Allergies   Allergen Reactions    Norco [Hydrocodone-Acetaminophen] Other (comments)     Too sedated and felt like he was in a daze    Pcn [Penicillins] Rash    Percocet [Oxycodone-Acetaminophen] Nausea and Vomiting       MEds   plavix  Norvasc  Imdur  Lipitor  Lisinopril  Metoprolol  protonoix  zithromax for 5 days  Prednisone   Synthroid  Flomax  Proscar        REVIEW OF SYSTEMS:     Const: negative fever, negative chills, negative weight loss  Eyes:  negative diplopia or visual changes, negative eye pain  ENT:  negative coryza, negative sore throat  Resp:  cough, , dyspnea  Cards:No chest pain, palpitations, has lower extremity edema  : negative for frequency, dysuria and hematuria  Skin:  negative for rash and pruritus  Heme: negative for easy bruising and gum/nose bleeding  MS:  back pain and leg pain  Neurolo:negative for headaches, dizziness, vertigo, memory problems   Psych: negative for feelings of anxiety, depression       Objective:   VITALS:    Visit Vitals    /80 (BP Patient Position: Sitting;Post activity)    Pulse 87    Temp 97.9 °F (36.6 °C)    Resp 18    Ht 5' 3\" (1.6 m)    Wt 195 lb 1.7 oz (88.5 kg)    SpO2 96%    BMI 34.56 kg/m2       PHYSICAL EXAM:   General:    Alert, cooperative, oriented X4 on nasal oxygen  , hard of hearing  Head:   Normocephalic, without obvious abnormality, atraumatic. Eyes:   Conjunctival edema  Nose:  Nares normal. No drainage or sinus tenderness.   Oral cavity- no yeast   Neck: Rt subclavian -dressing-   .  Back: No CVA tenderness. Lungs:   B/l air entry  crepts base  Heart:  s1s2  Abdomen:   Soft,  Extremities:edema legs Rt > left  Skin:     No rashes or lesions. Not Jaundiced  Lymph: Cervical, supraclavicular normal.  Neurologic: Grossly non-focal    Pertinent Labs   WBC 3.9  HB 11.7    Cr 2.91>1.17  Urine-culture  pan sensitive klebsiella and enterococcus  UA- 5-10 WBC    IMAGING RESULTS:      V/Q scan  Pulmonary perfusion is decreased in the left mid and lower lobes. Right lung  activity is uniform. Ventilation inflow is uniform in both lungs, but there is xenon retention in the left mid and lower lung on washout images, compatible with air  trapping/emphysema, matching the perfusion defects.      Impression/Recommendation  Manuela Adames  is a 81 y.o. male with a history of CAD/HTN/CVA /prostate ca was admitted on 3/16 from 's office   because of B/l DVT . Asked to see him for positive urine culture    Bacteriuria- Asymptomatic - present on admission 3/16 - He is totally asymptomatic -Not been treated  and  is doing fine- So this is not an infection- likely a contamination  If he had encephalopathy during this admission it was due to hypoxia from the PE  and not due to \"UTI\" and it has resolved.   Will not give any antibiotics- IF he is to have a cystoscopy for any reason  check urine culture before that and if positive then will need treatment    B/l DVT/PE with acute corpulmonale - had IVC filter- on lovenox    Acute hypoxic resp failure due to PE/PHT_ on Oxygen    Rectal bleed- intermittent and chronic due to radiation proctitis and aggravated by anticoagulation    CAD- s/p MARY ANN in NOV 2017- on plavix    Treated prostate cancer- -in 2014 with radiation    Now on flomax and proscar( normal PSA in NOV 2017)     HTN- management as per primary team    MARIAMA on CKD- resolved    COPD- was treated with azithromycin and prednisone this admission-on bronchodilators    PCN allergy- he is not clear- says he had nausea and vomiting- PCN allergy test is an option - but not needed now    Discussed the management with patient-There is little else that I can offer at this point in time. I will sign off this patient's case. Please call back PRN.

## 2018-03-22 LAB
ANION GAP SERPL CALC-SCNC: 8 MMOL/L (ref 5–15)
BASOPHILS # BLD: 0 K/UL (ref 0–0.1)
BASOPHILS NFR BLD: 0 % (ref 0–1)
BUN SERPL-MCNC: 17 MG/DL (ref 6–20)
BUN/CREAT SERPL: 13 (ref 12–20)
CALCIUM SERPL-MCNC: 8.6 MG/DL (ref 8.5–10.1)
CHLORIDE SERPL-SCNC: 112 MMOL/L (ref 97–108)
CO2 SERPL-SCNC: 22 MMOL/L (ref 21–32)
CREAT SERPL-MCNC: 1.34 MG/DL (ref 0.7–1.3)
DIFFERENTIAL METHOD BLD: ABNORMAL
EOSINOPHIL # BLD: 0 K/UL (ref 0–0.4)
EOSINOPHIL NFR BLD: 0 % (ref 0–7)
ERYTHROCYTE [DISTWIDTH] IN BLOOD BY AUTOMATED COUNT: 17.6 % (ref 11.5–14.5)
GLUCOSE SERPL-MCNC: 114 MG/DL (ref 65–100)
HCT VFR BLD AUTO: 32 % (ref 36.6–50.3)
HGB BLD-MCNC: 10.6 G/DL (ref 12.1–17)
IMM GRANULOCYTES # BLD: 0 K/UL (ref 0–0.04)
IMM GRANULOCYTES NFR BLD AUTO: 0 % (ref 0–0.5)
LYMPHOCYTES # BLD: 0.4 K/UL (ref 0.8–3.5)
LYMPHOCYTES NFR BLD: 11 % (ref 12–49)
MCH RBC QN AUTO: 29.6 PG (ref 26–34)
MCHC RBC AUTO-ENTMCNC: 33.1 G/DL (ref 30–36.5)
MCV RBC AUTO: 89.4 FL (ref 80–99)
MONOCYTES # BLD: 0.3 K/UL (ref 0–1)
MONOCYTES NFR BLD: 7 % (ref 5–13)
NEUTS SEG # BLD: 3.4 K/UL (ref 1.8–8)
NEUTS SEG NFR BLD: 82 % (ref 32–75)
NRBC # BLD: 0 K/UL (ref 0–0.01)
NRBC BLD-RTO: 0 PER 100 WBC
PLATELET # BLD AUTO: 191 K/UL (ref 150–400)
PMV BLD AUTO: 10.7 FL (ref 8.9–12.9)
POTASSIUM SERPL-SCNC: 4.1 MMOL/L (ref 3.5–5.1)
RBC # BLD AUTO: 3.58 M/UL (ref 4.1–5.7)
SODIUM SERPL-SCNC: 142 MMOL/L (ref 136–145)
WBC # BLD AUTO: 4.1 K/UL (ref 4.1–11.1)

## 2018-03-22 PROCEDURE — 74011636637 HC RX REV CODE- 636/637: Performed by: HOSPITALIST

## 2018-03-22 PROCEDURE — 80048 BASIC METABOLIC PNL TOTAL CA: CPT | Performed by: INTERNAL MEDICINE

## 2018-03-22 PROCEDURE — 85025 COMPLETE CBC W/AUTO DIFF WBC: CPT | Performed by: INTERNAL MEDICINE

## 2018-03-22 PROCEDURE — 65660000000 HC RM CCU STEPDOWN

## 2018-03-22 PROCEDURE — 74011250637 HC RX REV CODE- 250/637: Performed by: HOSPITALIST

## 2018-03-22 PROCEDURE — 74011250637 HC RX REV CODE- 250/637: Performed by: PHYSICIAN ASSISTANT

## 2018-03-22 PROCEDURE — 74011250637 HC RX REV CODE- 250/637: Performed by: INTERNAL MEDICINE

## 2018-03-22 PROCEDURE — 74011250636 HC RX REV CODE- 250/636: Performed by: INTERNAL MEDICINE

## 2018-03-22 PROCEDURE — C9113 INJ PANTOPRAZOLE SODIUM, VIA: HCPCS | Performed by: HOSPITALIST

## 2018-03-22 PROCEDURE — 74011250637 HC RX REV CODE- 250/637: Performed by: NURSE PRACTITIONER

## 2018-03-22 PROCEDURE — 36415 COLL VENOUS BLD VENIPUNCTURE: CPT | Performed by: INTERNAL MEDICINE

## 2018-03-22 PROCEDURE — 97116 GAIT TRAINING THERAPY: CPT

## 2018-03-22 PROCEDURE — 74011250636 HC RX REV CODE- 250/636: Performed by: HOSPITALIST

## 2018-03-22 RX ORDER — LISINOPRIL 20 MG/1
20 TABLET ORAL DAILY
Status: DISCONTINUED | OUTPATIENT
Start: 2018-03-22 | End: 2018-03-23 | Stop reason: HOSPADM

## 2018-03-22 RX ADMIN — DOCUSATE SODIUM 100 MG: 100 CAPSULE, LIQUID FILLED ORAL at 09:13

## 2018-03-22 RX ADMIN — Medication 400 MG: at 09:12

## 2018-03-22 RX ADMIN — AMLODIPINE BESYLATE 10 MG: 5 TABLET ORAL at 10:24

## 2018-03-22 RX ADMIN — FINASTERIDE 5 MG: 5 TABLET, FILM COATED ORAL at 09:12

## 2018-03-22 RX ADMIN — PANTOPRAZOLE SODIUM 40 MG: 40 INJECTION, POWDER, FOR SOLUTION INTRAVENOUS at 09:24

## 2018-03-22 RX ADMIN — GUAIFENESIN AND DEXTROMETHORPHAN 10 ML: 100; 10 SYRUP ORAL at 16:42

## 2018-03-22 RX ADMIN — DOCUSATE SODIUM 100 MG: 100 CAPSULE, LIQUID FILLED ORAL at 17:27

## 2018-03-22 RX ADMIN — TAMSULOSIN HYDROCHLORIDE 0.4 MG: 0.4 CAPSULE ORAL at 09:12

## 2018-03-22 RX ADMIN — CLOPIDOGREL BISULFATE 75 MG: 75 TABLET ORAL at 09:12

## 2018-03-22 RX ADMIN — PANTOPRAZOLE SODIUM 40 MG: 40 INJECTION, POWDER, FOR SOLUTION INTRAVENOUS at 22:02

## 2018-03-22 RX ADMIN — Medication 400 MG: at 17:27

## 2018-03-22 RX ADMIN — ISOSORBIDE MONONITRATE 30 MG: 30 TABLET, EXTENDED RELEASE ORAL at 09:12

## 2018-03-22 RX ADMIN — LISINOPRIL 20 MG: 20 TABLET ORAL at 10:24

## 2018-03-22 RX ADMIN — Medication 10 ML: at 22:03

## 2018-03-22 RX ADMIN — Medication 10 ML: at 16:44

## 2018-03-22 RX ADMIN — PREDNISONE 40 MG: 20 TABLET ORAL at 09:12

## 2018-03-22 RX ADMIN — LEVOTHYROXINE SODIUM 88 MCG: 88 TABLET ORAL at 08:00

## 2018-03-22 RX ADMIN — ENOXAPARIN SODIUM 90 MG: 100 INJECTION SUBCUTANEOUS at 09:13

## 2018-03-22 RX ADMIN — METOPROLOL SUCCINATE 100 MG: 50 TABLET, EXTENDED RELEASE ORAL at 09:11

## 2018-03-22 RX ADMIN — ATORVASTATIN CALCIUM 40 MG: 40 TABLET, FILM COATED ORAL at 09:13

## 2018-03-22 NOTE — PROGRESS NOTES
Bedside and Verbal shift change report given to coy (oncoming nurse) by juan (offgoing nurse). Report included the following information SBAR, Kardex, Intake/Output, Recent Results and Cardiac Rhythm nsr.

## 2018-03-22 NOTE — PROGRESS NOTES
-Hematology / Oncology ([facility]) -  -CC- anemia    -S-  Rectal bleeding this am and oozing from line site R subclavian    -O-    Patient Vitals for the past 24 hrs:   Temp Pulse Resp BP SpO2   03/22/18 0901 97.8 °F (36.6 °C) 95 24 (!) 172/91 -   03/22/18 0346 98.2 °F (36.8 °C) 78 17 (!) 169/105 96 %   03/21/18 2312 97.3 °F (36.3 °C) 70 18 (!) 168/94 98 %   03/21/18 2059 - - - - 92 %   03/21/18 1911 98.1 °F (36.7 °C) 73 24 (!) 159/95 99 %   03/21/18 1509 97.9 °F (36.6 °C) 73 20 138/87 96 %   03/21/18 1223 - 87 - 127/80 96 %   03/21/18 1121 97.7 °F (36.5 °C) 81 24 149/88 100 %        Gen: nad  Chest: bilateral breath sounds present  Cardiac: rrr  Abd: s/nt  Skin: bandage at R subclavian with some blood present    -Labs-    Recent Labs      03/22/18   0025  03/20/18   0700   WBC  4.1  3.9*   HGB  10.6*  11.7*   PLT  191  223   ANEU  3.4  2.8   NA  142  143   K  4.1  3.8   GLU  114*  96   BUN  17  13   CREA  1.34*  1.17   CA  8.6  8.9   MG   --   1.8     -Imaging- doppler 3/20- On the right, there is evidence of common femoral, deep femoral, popliteal, posterior tibial and great saphenous vein thrombosis, as described above.  On the left there is evidence of popliteal vein thrombosis.  The ultrasound appearance is more consistent with an acute than a chronic process     Portable cxr 3/17- Mild elevation of left hemidiaphragm with patchy left basilar consolidation versus atelectasis is unchanged.  The lungs are otherwise clear. There is no pneumothorax or substantial pleural effusion.  Cardiac, mediastinal and hilar contours are unchanged.  Bony mineralization is mildly diminished     VQ 3/16- IMPRESSION: Intermediate/indeterminate probability for pulmonary embolism. Matched disease in the left lung compatible with emphysema.      CT head s 3/16- images personally reviewed- Encephalomalacia in the left frontal lobe posteriorly.  Expansion of the extra-axial space bilaterally which is slightly increased relative to comparison. There is no intracranial hemorrhage, mass, mass effect or midline shift.  The basilar cisterns are open. No acute infarct is identified. The bone windows demonstrate no abnormalities. The visualized portions of the paranasal sinuses and mastoid air cells are clear 1. No evidence of acute intracranial abnormality by this modality.  2. Slightly increased size of bilateral hygromas over approximately one year.     -Assessment + Plan-     *) VTE (B DVT with suspected PE)  ---- difficult situation given repeated rectal bleeds and requirement of plavix  ---- s/p IVC filter 3/20/2018  ----curently on lovenox, given continuing bleeding problems would consider omitting anticoagulation  ---- discontinued lovenox order given bleeding  ---- please call if further questions

## 2018-03-22 NOTE — PROGRESS NOTES
Problem: Mobility Impaired (Adult and Pediatric)  Goal: *Acute Goals and Plan of Care (Insert Text)  Physical Therapy Goals  Initiated 3/20/2018  1. Patient will move from supine to sit and sit to supine  in bed with independence within 7 day(s). 2.  Patient will transfer from bed to chair and chair to bed with modified independence using the least restrictive device within 7 day(s). 3.  Patient will perform sit to stand with modified independence within 7 day(s). 4.  Patient will ambulate with modified independence for 200 feet with the least restrictive device within 7 day(s). **All while maintaining spO2 >90%**     physical Therapy TREATMENT  Patient: Roslyn Og (68 y.o. male)  Date: 3/22/2018  Diagnosis: Acute DVT (deep venous thrombosis) ([facility]) Acute DVT (deep venous thrombosis) ([facility])       Precautions: Fall, DNR  Chart, physical therapy assessment, plan of care and goals were reviewed. ASSESSMENT:  Pt received in supine agreeable to PT. He was received on 3 liters of 02 and 02 sats at rest 92%. He came to sitting at EOB with supervision, stood with supervision and amb 200 feet using his cane with contact guard except for one loss of balance in a tight space requiring min assist to regain his balance. While amb on 3 liters of 02, his 02 sats ranged 92-94% but he was clearly VERONICA. I support SNF for rehab , pt lives alone. Progression toward goals:  []    Improving appropriately and progressing toward goals  [x]    Improving slowly and progressing toward goals  []    Not making progress toward goals and plan of care will be adjusted     PLAN:  Patient continues to benefit from skilled intervention to address the above impairments. Continue treatment per established plan of care.   Discharge Recommendations:  Skilled Nursing Facility  Further Equipment Recommendations for Discharge:  None from PT     SUBJECTIVE:   Patient stated I don't know what happened, regarding loss of balance X 1    OBJECTIVE DATA SUMMARY:   Chart checked, pt cleared by nursing  Critical Behavior:  Neurologic State: Alert  Orientation Level: Oriented X4  Cognition: Appropriate for age attention/concentration  Safety/Judgement: Awareness of environment  Functional Mobility Training:  Bed Mobility:     Supine to Sit: Supervision  Sit to Supine: Supervision           Transfers:  Sit to Stand: Supervision  Stand to Sit: Modified independent                             Balance:  Sitting: Intact  Standing: Impaired; With support  Standing - Static: Good  Standing - Dynamic : Fair  Ambulation/Gait Training:  Distance (ft): 200 Feet (ft)  Assistive Device: Gait belt;Cane, straight  Ambulation - Level of Assistance: Contact guard assistance (min assist X once)        Gait Abnormalities: Decreased step clearance        Base of Support: Narrowed     Speed/Bria: Slow  Step Length: Left shortened;Right shortened                    Stairs:              Neuro Re-Education:    Therapeutic Exercises:     Pain:                    Activity Tolerance:   See assessment above  Please refer to the flowsheet for vital signs taken during this treatment.   After treatment:   []    Patient left in no apparent distress sitting up in chair  [x]    Patient left in no apparent distress in bed  [x]    Call bell left within reach  [x]    Nursing notified  []    Caregiver present  []    Bed alarm activated    COMMUNICATION/COLLABORATION:   The patients plan of care was discussed with: Registered Nurse    Adelina Seaman   Time Calculation: 15 mins

## 2018-03-22 NOTE — ROUTINE PROCESS
Bedside and Verbal shift change report given to Cristo (oncoming nurse) by coy (offgoing nurse). Report included the following information SBAR, Kardex, MAR, Recent Results and Cardiac Rhythm bbb 1degree. [place] Puls

## 2018-03-22 NOTE — PROGRESS NOTES
Hospitalist Progress Note  Garo Lopez MD  Answering service: [phone] from in house phone      Date of Service:  3/22/2018  NAME:  June Fall  :  1935  MRN:  [id number]      Admission Summary:   80 yo man with PAD s/p LLE angioplasty, CAD s/p stent, HTN, h/o stroke, h/o rectal bleed, carotid stenosis s/p CEA, AAA s/p stent was sent by Dr Boy Abad for b/l DVT on 3/16/18. Patient was Vascular Surgery clinic for routine f/u s/p AAA stent and due to RLE swelling. He was admitted with acute b/l LE DVTs and cough. Interval history / Subjective:   Patient seen and examined; poor historian, unable to obtain detailed ROS. States he has rectal bleeding this morning. Also noted blood oozing from R subclavian line site (which was removed). Assessment & Plan:     Bacteriuria- (Polymicrobial -Enterococcus, Klebsiella) (POA), Asymptomatic  - UCx 3/16 Enterococcus faecalis group D, Klebsiella  -ID evaluation appreciated, likely contamination, patient asymptomatic. No abx at this time. DVT/PE with evidence of acute cor pulmonale (POA)   - likely provoked due to recent surgery  - likely causing indeterminate troponin elevation  - V/Q scan 3/16 with intermediate probability of PE  Doppler lE On the right, there is evidence of common femoral, deep  femoral, popliteal, posterior tibial and great saphenous vein  thrombosis, as described above. On the left there is evidence of  popliteal vein thrombosis,  - TTE 3/16 limited visualization, EF 60%, mild-mod PHTN  - hematology/oncology following, due to on going rectal bleeding, anticoagualtion discontinued today. Had IVC filter placed 3/20/18.     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 Hgb stable. Monitor CBC.   -Seen by GI, okay to discharge from GI standpoint to follow-up outpatient Dr. Alyssa Dee for any recurrent bleeding.      CAD s/p stent - troponin 0.1 is likely strain from PE rather than ACS  - MARY ANN in the mid-circ, MARY ANN in the distal RVA 11/6/17  - ECG with sinus, 1st degree AV block, old RBBB  - Continue atorvastatin, imdur, metoprolol  - Stop aspirin. Continue plavix given that he is only 4 months out from MARY ANN placement  - Cards following    HTN: continue metoprolol and amlodipine; increase lisinopril     MARIAMA on CKD3 (POA) - UA unremarkable  - renal US 3/17 with medical renal disease but no hydronephrosis  -monitor renal function     Acute encephalopathy   - overnight 3/16 to 3/17. Hard of hearing at baseline but oriented and appropriate now  - CT head 3/16 without acute process, slight interval increase in bilateral hygromas  - ABG compensated  - resolved to baseline     COPD with acute exacerbation - may have been exacerbated by PE, improving  - s/p azithromycin 3/16  - nebs, s/p reBronchodilators  - prednisone x5 days for wheezing  - wean off O2 NC     PVD s/p left leg angioplasty  - continue Plavix, atorvastatin     AAA s/p stenting - followed by Dr Lynsey Guerrero, Vascular     Carotid stenosis s/p CEA     Hypothyroid (chronic) - continue home synthroid     BPH (chronic) - continue home finasteride, flomax    Obesity, Body mass index is 34.56 kg/(m^2). Code status: full  Disposition: to SNF in AM; Will check CBC in AM, for stability of Hb, as patient has ongoing rectal bleeding and oozing at the line site. Care Plan discussed with: Patient/Family, Nurse and   Disposition: TBD     Hospital Problems  Date Reviewed: 3/16/2018          Codes Class Noted POA    * (Principal)Acute DVT (deep venous thrombosis) ([facility] [address] ICD-10-CM: I82.409  ICD-9-CM: 453.40  3/16/2018 Unknown            Review of Systems:   Pertinent items are noted in HPI. Vital Signs:    Last 24hrs VS reviewed since prior progress note.  Most recent are:  Visit Vitals    BP (!) 154/96    Pulse 72    Temp 97.8 °F (36.6 °C)    Resp 24    Ht 5' 3\" (1.6 m)    Wt 88.5 kg (195 lb 1.7 oz)    SpO2 96%    BMI 34.56 kg/m2         Intake/Output Summary (Last 24 hours) at 03/22/18 1102  Last data filed at 03/21/18 1837   Gross per 24 hour   Intake              960 ml   Output                0 ml   Net              960 ml      Physical Examination:     Constitutional:  awake, no acute distress, cooperative, pleasant, [place], obese, NC in place   ENT:  oral mucosa moist, oropharynx benign  Neck supple, no masses   Resp:  + wheezing   CV:  regular rhythm, normal rate, no m/r/g appreciated, RLE edema    GI:  +BS, soft, non distended, non tender     Musculoskeletal:  moves all extremities    Neurologic:  awake, alert, [place] but responds appropriately to questions and commands     Eyes:  PERRL    Data Review:    Review and/or order of clinical lab test  Review and/or order of tests in the radiology section of CPT  Review and/or order of tests in the medicine section of CPT    Labs:     Recent Labs      03/22/18   0025  03/20/18   0700   WBC  4.1  3.9*   HGB  10.6*  11.7*   HCT  32.0*  35.0*   PLT  191  223     Recent Labs      03/22/18   0025  03/20/18   0700   NA  142  143   K  4.1  3.8   CL  112*  112*   CO2  22  23   BUN  17  13   CREA  1.34*  1.17   GLU  114*  96   CA  8.6  8.9   MG   --   1.8     No results for input(s): SGOT, GPT, ALT, AP, TBIL, TBILI, TP, ALB, GLOB, GGT, AML, LPSE in the last 72 hours. No lab exists for component: AMYP, HLPSE  No results for input(s): INR, PTP, APTT in the last 72 hours. No lab exists for component: INREXT, INREXT   No results for input(s): FE, TIBC, PSAT, FERR in the last 72 hours. No results found for: FOL, RBCF   No results for input(s): PH, PCO2, PO2 in the last 72 hours. No results for input(s): CPK, CKNDX, TROIQ in the last 72 hours.     No lab exists for component: CPKMB  Lab Results   Component Value Date/Time    Cholesterol, total 147 11/07/2017 03:24 AM    HDL Cholesterol 63 11/07/2017 03:24 AM LDL, calculated 70.2 11/07/2017 03:24 AM    Triglyceride 69 11/07/2017 03:24 AM    CHOL/HDL Ratio 2.3 11/07/2017 03:24 AM     Lab Results   Component Value Date/Time    Glucose (POC) 111 (H) 03/21/2018 11:19 AM    Glucose (POC) 136 (H) 03/20/2018 04:32 PM    Glucose (POC) 100 03/20/2018 06:58 AM    Glucose (POC) 134 (H) 03/19/2018 11:32 PM    Glucose (POC) 138 (H) 03/19/2018 04:22 PM     Lab Results   Component Value Date/Time    Color YELLOW/STRAW 03/16/2018 02:45 PM    Appearance CLEAR 03/16/2018 02:45 PM    Specific gravity 1.014 03/16/2018 02:45 PM    pH (UA) 5.5 03/16/2018 02:45 PM    Protein NEGATIVE  03/16/2018 02:45 PM    Glucose NEGATIVE  03/16/2018 02:45 PM    Ketone NEGATIVE  03/16/2018 02:45 PM    Bilirubin NEGATIVE  03/16/2018 02:45 PM    Urobilinogen 1.0 03/16/2018 02:45 PM    Nitrites NEGATIVE  03/16/2018 02:45 PM    Leukocyte Esterase NEGATIVE  03/16/2018 02:45 PM    Epithelial cells FEW 03/16/2018 02:45 PM    Bacteria 1+ (A) 03/16/2018 02:45 PM    WBC 5-10 03/16/2018 02:45 PM    RBC 0-5 03/16/2018 02:45 PM     Medications Reviewed:     Current Facility-Administered Medications   Medication Dose Route Frequency    lisinopril (PRINIVIL, ZESTRIL) tablet 20 mg  20 mg Oral DAILY    metoprolol succinate (TOPROL-XL) XL tablet 100 mg  100 mg Oral DAILY    amLODIPine (NORVASC) tablet 10 mg  10 mg Oral DAILY    magnesium oxide (MAG-OX) tablet 400 mg  400 mg Oral BID    acetaminophen (TYLENOL) tablet 650 mg  650 mg Oral Q4H PRN    pantoprazole (PROTONIX) 40 mg in sodium chloride (NS) 10 mL injection  40 mg IntraVENous Q12H    albuterol-ipratropium (DUO-NEB) 2.5 MG-0.5 MG/3 ML  3 mL Nebulization Q4H PRN    levothyroxine (SYNTHROID) tablet 88 mcg  88 mcg Oral ACB    tamsulosin (FLOMAX) capsule 0.4 mg  0.4 mg Oral DAILY    isosorbide mononitrate ER (IMDUR) tablet 30 mg  30 mg Oral DAILY    finasteride (PROSCAR) tablet 5 mg  5 mg Oral DAILY    atorvastatin (LIPITOR) tablet 40 mg  40 mg Oral DAILY    clopidogrel (PLAVIX) tablet 75 mg  75 mg Oral DAILY    sodium chloride (NS) flush 5-10 mL  5-10 mL IntraVENous Q8H    sodium chloride (NS) flush 5-10 mL  5-10 mL IntraVENous PRN    ondansetron (ZOFRAN) injection 4 mg  4 mg IntraVENous Q6H PRN    docusate sodium (COLACE) capsule 100 mg  100 mg Oral BID    guaiFENesin-dextromethorphan (ROBITUSSIN DM) 100-10 mg/5 mL syrup 10 mL  10 mL Oral Q6H PRN     ______________________________________________________________________  EXPECTED LENGTH OF STAY: 4d 0h  ACTUAL LENGTH OF STAY:          6                 Felice Mcgrath MD

## 2018-03-22 NOTE — PROGRESS NOTES
Patient is very upset about not being discharged today. He also states he is not going to rehab. Nurse assisted patient fixing telephone. Patient left a message for his son.

## 2018-03-22 NOTE — PROGRESS NOTES
Assessment/Plan/Discussion:Cardiology Attending:     Patient seen on the day of progress note and examined  and agree with Advance Practice Provider (QUYEN, NP,PA)  assessment and plans. Steva Landau is a 81 y.o. male   Now with plan for [address] at low dose  Pt with no cp, clear lungs  Watch for bleed  Reevaluate in 2 months with Cards about stopping Plavix    Josué Walker MD                                                                                 Cardiology Progress Note       Admit Date: 3/16/2018  Admit Diagnosis: Acute DVT (deep venous thrombosis) ([address]  Date: 3/22/2018     Time: 11:12 AM    Subjective:  Doing well. No chest pain or SOB. Still RLE edema     Assessment and Plan     1. CAD s/p PCI with MARY ANN 11/6/17 to LCX and RCA    - minimal troponin elevation likely due to PE, no acute ischemic changes noted on ECG   - ASA has been stopped for GI bleed, CAN NOT STOP PLAVIX at this time due to recent PCI/MARY ANN in 11/17,    - continue Statin, Imdur. Toprol XL increased to 100 mg   - needs to follow up with Dr. Max Rodriguez post hospital discharge  2. DVT/PE -    -V/Q scan 3/16 with intermediate probability of PE   - LE doppler positive for DVT by report   - Now s/p IVCF  3.  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     - Cannot rule out neoplasia or diverticular bleeding.  Fortunately, his Hgb is stable, will continue to follow CBC   - if the bleeding continues he may require colonoscopy for further evaluation. GI following   - cannot stop Plavix at this time, see discussion regarding antiplatelets and [address] above   - continue PPI   - EGD 11/22/17 with antral erosions, hiatal hernia, schatzki's ring  4. HTN    - Increase Toprol XL to 100mg daily and continue other medications, continue to monitor   5.   NSVT    - had short run of NSVT 2 nights ago   - Follow K and Mg   - Toprol XL now 100mg  6. Hypokalemia -    - resolved. Check daily K  7. Dyslipidemia    - on atorvastatin 40mg daily, LDL 70  8. Hx of PAD s/p left leg angioplasty - on plavix and statin as above  9. Hx of CVA - on plavix and statin as above, now with DVT/PE, see discussion regarding [address] versus IVC filter as above  10. COPD exacerbation - on antibiotics, nebulizers and prednisone per IM  11. Hx of AAA s/p stenting  12. Carotid stenosis s/p endarterectomy - on plavix and statin as above    Stable and compensated from Cardiology standpoint. Appreciate Heme/Onc seeing. Agree with recommendation for Eliquis 2.5 mg PO BID. TO remain on Plavix with MARY ANN. D/w patient to watch for GI bleed. He can follow up with Dr. Mook El. Will route note to our NN. Will see again as needed. ROS:  Denies CP or SOB. Objective:     Physical Exam:                Visit Vitals    BP (!) 154/92 (BP 1 Location: Right arm, BP Patient Position: Sitting)    Pulse 79    Temp 98 °F (36.7 °C)    Resp 18    Ht 5' 3\" (1.6 m)    Wt 195 lb 1.7 oz (88.5 kg)    SpO2 96%    BMI 34.56 kg/m2        General Appearance:   Well developed, well nourished,alert and oriented x 3, and   individual in no acute distress. Ears/Nose/Mouth/Throat:    Hearing grossly normal.         Neck:  Supple. Chest:    Lungs clear to auscultation bilaterally. Cardiovascular:   Regular rate and rhythm, S1, S2 normal, no murmur. Abdomen:    Soft, non-tender, bowel sounds are active. Extremities:  No edema bilaterally. Skin:  Warm and dry.      Telemetry: normal sinus rhythm          Data Review:    Labs:    Recent Results (from the past 24 hour(s))   CBC WITH AUTOMATED DIFF    Collection Time: 03/22/18 12:25 AM   Result Value Ref Range    WBC 4.1 4.1 - 11.1 K/uL    RBC 3.58 (L) 4.10 - 5.70 M/uL    HGB 10.6 (L) 12.1 - 17.0 g/dL    HCT 32.0 (L) 36.6 - 50.3 %    MCV 89.4 80.0 - 99.0 FL    MCH 29.6 26.0 - 34.0 PG    MCHC 33.1 30.0 - 36.5 g/dL    RDW 17.6 (H) 11.5 - 14.5 %    PLATELET 160 505 - 709 K/uL    MPV 10.7 8.9 - 12.9 FL    NRBC 0.0 0  WBC    ABSOLUTE NRBC 0.00 0.00 - 0.01 K/uL    NEUTROPHILS 82 (H) 32 - 75 %    LYMPHOCYTES 11 (L) 12 - 49 %    MONOCYTES 7 5 - 13 %    EOSINOPHILS 0 0 - 7 %    BASOPHILS 0 0 - 1 %    IMMATURE GRANULOCYTES 0 0.0 - 0.5 %    ABS. NEUTROPHILS 3.4 1.8 - 8.0 K/UL    ABS. LYMPHOCYTES 0.4 (L) 0.8 - 3.5 K/UL    ABS. MONOCYTES 0.3 0.0 - 1.0 K/UL    ABS. EOSINOPHILS 0.0 0.0 - 0.4 K/UL    ABS. BASOPHILS 0.0 0.0 - 0.1 K/UL    ABS. IMM.  GRANS. 0.0 0.00 - 0.04 K/UL    DF AUTOMATED     METABOLIC PANEL, BASIC    Collection Time: 03/22/18 12:25 AM   Result Value Ref Range    Sodium 142 136 - 145 mmol/L    Potassium 4.1 3.5 - 5.1 mmol/L    Chloride 112 (H) 97 - 108 mmol/L    CO2 22 21 - 32 mmol/L    Anion gap 8 5 - 15 mmol/L    Glucose 114 (H) 65 - 100 mg/dL    BUN 17 6 - 20 MG/DL    Creatinine 1.34 (H) 0.70 - 1.30 MG/DL    BUN/Creatinine ratio 13 12 - 20      GFR est AA >60 >60 ml/min/1.73m2    GFR est non-AA 51 (L) >60 ml/min/1.73m2    Calcium 8.6 8.5 - 10.1 MG/DL          Radiology:        Current Facility-Administered Medications   Medication Dose Route Frequency    lisinopril (PRINIVIL, ZESTRIL) tablet 20 mg  20 mg Oral DAILY    metoprolol succinate (TOPROL-XL) XL tablet 100 mg  100 mg Oral DAILY    amLODIPine (NORVASC) tablet 10 mg  10 mg Oral DAILY    magnesium oxide (MAG-OX) tablet 400 mg  400 mg Oral BID    acetaminophen (TYLENOL) tablet 650 mg  650 mg Oral Q4H PRN    pantoprazole (PROTONIX) 40 mg in sodium chloride (NS) 10 mL injection  40 mg IntraVENous Q12H    albuterol-ipratropium (DUO-NEB) 2.5 MG-0.5 MG/3 ML  3 mL Nebulization Q4H PRN    levothyroxine (SYNTHROID) tablet 88 mcg  88 mcg Oral ACB    tamsulosin (FLOMAX) capsule 0.4 mg  0.4 mg Oral DAILY    isosorbide mononitrate ER (IMDUR) tablet 30 mg  30 mg Oral DAILY    finasteride (PROSCAR) tablet 5 mg  5 mg Oral DAILY    atorvastatin (LIPITOR) tablet 40 mg  40 mg Oral DAILY    clopidogrel (PLAVIX) tablet 75 mg  75 mg Oral DAILY    sodium chloride (NS) flush 5-10 mL  5-10 mL IntraVENous Q8H    sodium chloride (NS) flush 5-10 mL  5-10 mL IntraVENous PRN    ondansetron (ZOFRAN) injection 4 mg  4 mg IntraVENous Q6H PRN    docusate sodium (COLACE) capsule 100 mg  100 mg Oral BID    guaiFENesin-dextromethorphan (ROBITUSSIN DM) 100-10 mg/5 mL syrup 10 mL  10 mL Oral Q6H PRN       Alicia Martinez. Daphne Peterson M.D.      [facility] [address] [place] [address] 13, [address] [address]   [facility]   [phone]

## 2018-03-23 VITALS
TEMPERATURE: 98.2 F | WEIGHT: 195.8 LBS | HEIGHT: 63 IN | OXYGEN SATURATION: 100 % | HEART RATE: 80 BPM | BODY MASS INDEX: 34.69 KG/M2 | DIASTOLIC BLOOD PRESSURE: 93 MMHG | RESPIRATION RATE: 16 BRPM | SYSTOLIC BLOOD PRESSURE: 162 MMHG

## 2018-03-23 LAB
ANION GAP SERPL CALC-SCNC: 7 MMOL/L (ref 5–15)
BUN SERPL-MCNC: 16 MG/DL (ref 6–20)
BUN/CREAT SERPL: 13 (ref 12–20)
CALCIUM SERPL-MCNC: 8.8 MG/DL (ref 8.5–10.1)
CHLORIDE SERPL-SCNC: 113 MMOL/L (ref 97–108)
CO2 SERPL-SCNC: 25 MMOL/L (ref 21–32)
CREAT SERPL-MCNC: 1.26 MG/DL (ref 0.7–1.3)
ERYTHROCYTE [DISTWIDTH] IN BLOOD BY AUTOMATED COUNT: 17.9 % (ref 11.5–14.5)
GLUCOSE SERPL-MCNC: 96 MG/DL (ref 65–100)
HCT VFR BLD AUTO: 32.1 % (ref 36.6–50.3)
HGB BLD-MCNC: 10.4 G/DL (ref 12.1–17)
MCH RBC QN AUTO: 29.4 PG (ref 26–34)
MCHC RBC AUTO-ENTMCNC: 32.4 G/DL (ref 30–36.5)
MCV RBC AUTO: 90.7 FL (ref 80–99)
NRBC # BLD: 0 K/UL (ref 0–0.01)
NRBC BLD-RTO: 0 PER 100 WBC
PLATELET # BLD AUTO: 183 K/UL (ref 150–400)
PMV BLD AUTO: 10 FL (ref 8.9–12.9)
POTASSIUM SERPL-SCNC: 4.1 MMOL/L (ref 3.5–5.1)
RBC # BLD AUTO: 3.54 M/UL (ref 4.1–5.7)
SODIUM SERPL-SCNC: 145 MMOL/L (ref 136–145)
WBC # BLD AUTO: 4.8 K/UL (ref 4.1–11.1)

## 2018-03-23 PROCEDURE — C9113 INJ PANTOPRAZOLE SODIUM, VIA: HCPCS | Performed by: HOSPITALIST

## 2018-03-23 PROCEDURE — 80048 BASIC METABOLIC PNL TOTAL CA: CPT | Performed by: PHYSICIAN ASSISTANT

## 2018-03-23 PROCEDURE — 36415 COLL VENOUS BLD VENIPUNCTURE: CPT | Performed by: PHYSICIAN ASSISTANT

## 2018-03-23 PROCEDURE — 85027 COMPLETE CBC AUTOMATED: CPT | Performed by: INTERNAL MEDICINE

## 2018-03-23 PROCEDURE — 74011250637 HC RX REV CODE- 250/637: Performed by: NURSE PRACTITIONER

## 2018-03-23 PROCEDURE — 74011250637 HC RX REV CODE- 250/637: Performed by: PHYSICIAN ASSISTANT

## 2018-03-23 PROCEDURE — 74011250637 HC RX REV CODE- 250/637: Performed by: INTERNAL MEDICINE

## 2018-03-23 PROCEDURE — 74011250636 HC RX REV CODE- 250/636: Performed by: HOSPITALIST

## 2018-03-23 PROCEDURE — 74011250637 HC RX REV CODE- 250/637: Performed by: HOSPITALIST

## 2018-03-23 RX ORDER — GUAIFENESIN/DEXTROMETHORPHAN 100-10MG/5
10 SYRUP ORAL
Qty: 100 ML | Refills: 0 | Status: SHIPPED
Start: 2018-03-23 | End: 2018-04-02

## 2018-03-23 RX ADMIN — GUAIFENESIN AND DEXTROMETHORPHAN 10 ML: 100; 10 SYRUP ORAL at 11:18

## 2018-03-23 RX ADMIN — DOCUSATE SODIUM 100 MG: 100 CAPSULE, LIQUID FILLED ORAL at 09:14

## 2018-03-23 RX ADMIN — METOPROLOL SUCCINATE 100 MG: 50 TABLET, EXTENDED RELEASE ORAL at 09:14

## 2018-03-23 RX ADMIN — AMLODIPINE BESYLATE 10 MG: 5 TABLET ORAL at 09:14

## 2018-03-23 RX ADMIN — TAMSULOSIN HYDROCHLORIDE 0.4 MG: 0.4 CAPSULE ORAL at 09:14

## 2018-03-23 RX ADMIN — LEVOTHYROXINE SODIUM 88 MCG: 88 TABLET ORAL at 07:20

## 2018-03-23 RX ADMIN — Medication 10 ML: at 07:20

## 2018-03-23 RX ADMIN — ATORVASTATIN CALCIUM 40 MG: 40 TABLET, FILM COATED ORAL at 09:14

## 2018-03-23 RX ADMIN — LISINOPRIL 20 MG: 20 TABLET ORAL at 09:14

## 2018-03-23 RX ADMIN — CLOPIDOGREL BISULFATE 75 MG: 75 TABLET ORAL at 09:14

## 2018-03-23 RX ADMIN — FINASTERIDE 5 MG: 5 TABLET, FILM COATED ORAL at 09:21

## 2018-03-23 RX ADMIN — PANTOPRAZOLE SODIUM 40 MG: 40 INJECTION, POWDER, FOR SOLUTION INTRAVENOUS at 09:14

## 2018-03-23 RX ADMIN — ISOSORBIDE MONONITRATE 30 MG: 30 TABLET, EXTENDED RELEASE ORAL at 09:14

## 2018-03-23 RX ADMIN — Medication 400 MG: at 09:14

## 2018-03-23 NOTE — ROUTINE PROCESS
Bedside and Verbal shift change report given to Jacquelin Garg (oncoming nurse) by Mikhail Rosado (offgoing nurse). Report included the following information SBAR, Kardex, Intake/Output, MAR, Med Rec Status and Cardiac Rhythm BB 1stdegree block.

## 2018-03-23 NOTE — PROGRESS NOTES
Physical Therapy  Pt planned for D/C to SNF today. Will f/u in event of delayed discharge Monday.   Thank you,  Bhumika Bermeo, PT, DPT

## 2018-03-23 NOTE — DISCHARGE SUMMARY
Discharge Summary       PATIENT ID: Blayne Juarez  MRN: [id number]   YOB: 1935    DATE OF ADMISSION: 3/16/2018  1:38 PM    DATE OF DISCHARGE: 3/23/2018    PRIMARY CARE PROVIDER: Kai Chakraborty MD       ATTENDING PHYSICIAN: Oswald Milton MD  DISCHARGING PROVIDER: Oswald Milton MD     To contact this individual call [phone] and ask the  to page. If unavailable ask to be transferred the Adult Hospitalist Department. CONSULTATIONS: IP CONSULT TO VASCULAR SURGERY  IP CONSULT TO GASTROENTEROLOGY  IP CONSULT TO INFECTIOUS DISEASES  IP CONSULT TO HEMATOLOGY  IP CONSULT TO CARDIOLOGY    PROCEDURES/SURGERIES: * No surgery found *    ADMITTING DIAGNOSES & HOSPITAL COURSE:   82 yo man with PAD s/p LLE angioplasty, CAD s/p stent, HTN, h/o stroke, h/o rectal bleed, carotid stenosis s/p CEA, AAA s/p stent was sent by Dr Frances Agarwal for b/l DVT on 3/16/18. Patient was Vascular Surgery clinic for routine f/u s/p AAA stent and due to RLE swelling. He was admitted with acute b/l LE DVTs. Elevation of troponin was noted with VQ indeterminate, echo with mild-mod PHTN, leading to thought he also has PE, started  Enoxaparin. But he developed bloody stools 3/17 and 3/18. 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 Also on plavix which must be continued for drug eluting stent placed 11/6/17. Had IVC filter placed 3/20/18. Hem/onc recommended discontinuation of anticoagulation due to ongoing mild rectal bleeding. Enoxaparin discontinued on 3/22/18. Cardiology suggested continuation of Plavix and   Follow up with Dr. Madelyn Montenegro post hospital discharge in 2 months.       DISCHARGE DIAGNOSES / PLAN:      DVT/PE with evidence of acute cor pulmonale (POA)   - likely provoked due to recent surgery  - likely causing indeterminate troponin elevation  - V/Q scan 3/16 with intermediate probability of PE  Doppler lE On the right, there is evidence of common femoral, deep  femoral, popliteal, posterior tibial and great saphenous vein  thrombosis, as described above.  On the left there is evidence of  popliteal vein thrombosis,  - TTE 3/16 limited visualization, EF 60%, mild-mod PHTN  - hematology/oncology following, due to on going rectal bleeding, anticoagualtion discontinued. Had IVC filter placed 3/20/18.      GI bleed - likely related to his radiation proctitis, exacerbated by anticoagulation, cannot r/o neoplasia or diverticular bleeding. Hgb stable. Monitor CBC. -Seen by GI, okay to discharge from GI standpoint to follow-up outpatient Dr. Ellen Pierce for any recurrent bleeding.       CAD s/p stent - troponin 0.1 is likely strain from PE rather than ACS  - MARY ANN in the mid-circ, MARY ANN in the distal RVA 11/6/17  - ECG with sinus, 1st degree AV block, old RBBB  - Continue atorvastatin, imdur, metoprolol  - Stop aspirin. Continue plavix given that he is only 4 months out from MARY ANN placement  - Cardiology follow up as out pt in 2 months     HTN: Resumed home medications      MARIAMA on CKD3 (POA) - UA unremarkable  - renal US 3/17 with medical renal disease but no hydronephrosis  -monitor renal function      Acute encephalopathy   - overnight 3/16 to 3/17. Hard of hearing at baseline but oriented and appropriate now  - CT head 3/16 without acute process, slight interval increase in bilateral hygromas  - ABG compensated  - resolved to baseline      COPD with acute exacerbation - may have been exacerbated by PE, improving  - s/p azithromycin 3/16  - nebs, s/p reBronchodilators  - s/p prednisone x5 days for wheezing  - wean off O2 NC      Bacteriuria- (Polymicrobial -Enterococcus, Klebsiella) (POA), Asymptomatic  - UCx 3/16 Enterococcus faecalis group D, Klebsiella  -ID evaluation appreciated, likely contamination, patient asymptomatic. No abx at this time.     PVD s/p left leg angioplasty  - continue Plavix, atorvastatin      AAA s/p stenting - followed by Dr Britt Del Toro, Vascular      Carotid stenosis s/p CEA      Hypothyroid (chronic) - continue home synthroid      BPH (chronic) - continue home finasteride, flomax     Obesity, Body mass index is 34.56 kg/(m^2). PENDING TEST RESULTS:   At the time of discharge the following test results are still pending: none    FOLLOW UP APPOINTMENTS:    Follow-up Information     Follow up With Details Comments Contact Info    [address]   [address] [place], [place] [address]  [facility]  [address]  [address] [facility]  [phone]             ADDITIONAL CARE RECOMMENDATIONS: in 1-2 weeks check CBC and BMP in view of recent rectal bleed and MARIAMA on CKD    DIET: Cardiac Diet    ACTIVITY: Activity as tolerated        DISCHARGE MEDICATIONS:   DISCHARGE MEDICATIONS:  Current Discharge Medication List      START taking these medications    Details   guaiFENesin-dextromethorphan (ROBITUSSIN DM) 100-10 mg/5 mL syrup Take 10 mL by mouth every six (6) hours as needed for up to 10 days. Qty: 100 mL, Refills: 0         CONTINUE these medications which have NOT CHANGED    Details   levothyroxine (SYNTHROID) 88 mcg tablet TAKE 1 TABLET BY MOUTH DAILY (BEFORE BREAKFAST). Qty: 30 Tab, Refills: 0      amLODIPine (NORVASC) 5 mg tablet TAKE 1 TAB BY MOUTH DAILY. Qty: 30 Tab, Refills: 2      isosorbide mononitrate ER (IMDUR) 30 mg tablet TAKE 1 TABLET BY MOUTH EVERY DAY  Qty: 30 Tab, Refills: 2    Associated Diagnoses: PVD (peripheral vascular disease) with claudication ([address] Coronary artery disease due to lipid rich plaque      metoprolol succinate (TOPROL-XL) 25 mg XL tablet TAKE 1 TABLET EVERY DAY  Qty: 30 Tab, Refills: 2      losartan-hydroCHLOROthiazide (HYZAAR) 100-12.5 mg per tablet TAKE 1 TABLET BY MOUTH EVERY DAY  Qty: 30 Tab, Refills: 2      clopidogrel (PLAVIX) 75 mg tab TAKE 1 TAB BY MOUTH DAILY.  INDICATIONS: THROMBOSIS PREVENTION AFTER PCI  Refills: 11      tamsulosin (FLOMAX) 0.4 mg capsule TAKE 1 CAPSULE EVERY DAY  Qty: 90 Cap, Refills: 0      albuterol (PROVENTIL HFA, VENTOLIN HFA, PROAIR HFA) 90 mcg/actuation inhaler Take 2 Puffs by inhalation every four (4) hours as needed for Wheezing. Qty: 1 Inhaler, Refills: 1      atorvastatin (LIPITOR) 40 mg tablet Take 1 Tab by mouth daily. YRN. Give patient BRAND LIPITOR. D/C Crestor  Qty: 90 Tab, Refills: 3    Comments: Branded Lipitor is no cost for patient. Associated Diagnoses: Dyslipidemia      albuterol-ipratropium (DUO-NEB) 2.5 mg-0.5 mg/3 ml nebu 3 mL by Nebulization route every six (6) hours as needed. Qty: 30 Nebule, Refills: 0      CHOLECALCIFEROL, VITAMIN D3, (VITAMIN D3 PO) Take  by mouth. finasteride (PROSCAR) 5 mg tablet daily. Associated Diagnoses: BPH (benign prostatic hypertrophy)      ascorbic acid (VITAMIN C) 1,000 mg tablet Take  by mouth. omeprazole (PRILOSEC) 40 mg capsule Take 1 Cap by mouth two (2) times a day. Qty: 60 Cap, Refills: 1      clotrimazole-betamethasone (LOTRISONE) topical cream Apply twice daily to affected area till resolved. Qty: 60 g, Refills: 0    Associated Diagnoses: Contact dermatitis, unspecified contact dermatitis type, unspecified trigger         STOP taking these medications       furosemide (LASIX) 20 mg tablet Comments:   Reason for Stopping:         omega-3 fatty acids-vitamin e (FISH OIL) 1,000 mg cap Comments:   Reason for Stopping:                       NOTIFY YOUR PHYSICIAN FOR ANY OF THE FOLLOWING:   Fever over 101 degrees for 24 hours. Chest pain, shortness of breath, fever, chills, nausea, vomiting, diarrhea, change in mentation, falling, weakness, bleeding. Severe pain or pain not relieved by medications. Or, any other signs or symptoms that you may have questions about.     DISPOSITION:    Home With:   OT  PT  HH  RN      x SNF/Inpatient Rehab/LTAC    Independent/assisted living    Hospice    Other:       PATIENT CONDITION AT DISCHARGE:     Functional status   x Poor     Deconditioned     Independent      Cognition     Lucid    x Forgetful     Dementia      Catheters/lines (plus indication)    Trejo     PICC     PEG    x None      Code status     Full code    x DNR      PHYSICAL EXAMINATION AT DISCHARGE:     Constitutional:  awake, no acute distress, cooperative, pleasant, [place], obese, NC in place   ENT:  oral mucosa moist, oropharynx benign  Neck supple, no masses   Resp:  + wheezing   CV:  regular rhythm, normal rate, no m/r/g appreciated, RLE edema    GI:  +BS, soft, non distended, non tender     Musculoskeletal:  moves all extremities    Neurologic:  awake, alert, [place] but responds appropriately to questions and commands                                             Eyes:  PERRL        CHRONIC MEDICAL DIAGNOSES:  Problem List as of 3/23/2018  Date Reviewed: 3/16/2018          Codes Class Noted - Resolved    * (Principal)Acute DVT (deep venous thrombosis) ([facility] [address] ICD-10-CM: I82.409  ICD-9-CM: 453.40  3/16/2018 - Present        Claudication of both lower extremities ([facility] [address] ICD-10-CM: I73.9  ICD-9-CM: 443.9  2/12/2018 - Present        Bilateral deafness ICD-10-CM: H91.93  ICD-9-CM: 389.9  1/9/2018 - Present        Rectal bleeding ICD-10-CM: K62.5  ICD-9-CM: 569.3  11/21/2017 - Present        S/P cardiac cath ICD-10-CM: Z98.890  ICD-9-CM: V45.89  11/7/2017 - Present    Overview Signed 11/7/2017 10:36 AM by Denice Nova NP     11/6/17: PTCA Ramus Intermediate, MARY ANN Cx, MARY ANN  dRCA               SOBOE (shortness of breath on exertion) ICD-10-CM: R06.02  ICD-9-CM: 786.05  10/11/2017 - Present        Myocardial infarction ICD-10-CM: I21.9  ICD-9-CM: 410.90  3/8/2016 - Present        Calculus of kidney ICD-10-CM: N20.0  ICD-9-CM: 592.0  3/8/2016 - Present        Osteoarthritis ICD-10-CM: M19.90  ICD-9-CM: 715.90  3/8/2016 - Present        Alkaline phosphatase elevation ICD-10-CM: R74.8  ICD-9-CM: 790.5  1/14/2016 - Present        Angina, class III ([facility] [address] ICD-10-CM: I20.9  ICD-9-CM: 413.9  2/12/2015 - Present        Prostate cancer ([facility] [address] ICD-10-CM: C61  ICD-9-CM: 469  5/23/2014 - Present        Lipoma of back ICD-10-CM: D17.1  ICD-9-CM: 214.8  2/5/2014 - Present        Nodule, subcutaneous ICD-10-CM: R22.9  ICD-9-CM: 782.2  11/4/2013 - Present        Vitamin D deficiency ICD-10-CM: E55.9  ICD-9-CM: 268.9  11/4/2013 - Present        Prediabetes ICD-10-CM: R73.03  ICD-9-CM: 790.29  6/19/2013 - Present        Tinea cruris ICD-10-CM: B35.6  ICD-9-CM: 110.3  6/19/2013 - Present        Cerebral embolism with cerebral infarction [facility]) ICD-10-CM: I63.40  ICD-9-CM: 434.11  6/13/2011 - Present    Overview Signed 6/13/2011  8:21 AM by Patrica Campuzano. Left frontal cortical/subcortical infarcts             Carotid stenosis, left ICD-10-CM: B15.44  ICD-9-CM: 433.10  6/13/2011 - Present    Overview Signed 6/13/2011  8:21 AM by Patrica Campuzano.      Symptomatic LICA stenosis with infarction             PVD (peripheral vascular disease) with claudication (HCC) ICD-10-CM: I73.9  ICD-9-CM: 443.9  5/2/2011 - Present        BPH (benign prostatic hypertrophy) ICD-10-CM: N40.0  ICD-9-CM: 600.00  10/17/2010 - Present        Dyslipidemia ICD-10-CM: E78.5  ICD-9-CM: 272.4  10/17/2010 - Present        Successful  PTCA (percutaneous transluminal coronary angioplasty)--90-95% instent restenosis RCA 10/16/10 ICD-10-CM: Z98.61  ICD-9-CM: V45.82  10/17/2010 - Present        Successful PTCA with MARY ANN Xience stent Ramus intermedius 10/16/10 ICD-10-CM: Z95.9  ICD-9-CM: V45.89  10/17/2010 - Present        Chronic back pain--DJD ICD-10-CM: G89.29  ICD-9-CM: 338.29  6/29/2010 - Present        ED (erectile dysfunction) ICD-10-CM: N52.9  ICD-9-CM: 607.84  6/21/2010 - Present        Essential hypertension ICD-10-CM: I10  ICD-9-CM: 401.9  3/19/2010 - Present        CAD (Coronary Artery Disease)--s/p PCI--MARY ANN Xenia stents x4 5/09;MARY ANN Taxus stents x3 8/09 ICD-10-CM: I25.10  ICD-9-CM: 414.00  3/19/2010 - Present Hypothyroidism ICD-10-CM: E03.9  ICD-9-CM: 244.9  11/11/2009 - Present        RESOLVED: Right-sided sinonasal encephalocele (HCC) ICD-10-CM: Q01.8  ICD-9-CM: 742.0  1/9/2018 - 1/9/2018        RESOLVED: Epigastric abdominal pain ICD-10-CM: R10.13  ICD-9-CM: 789.06  2/13/2017 - 8/23/2017        RESOLVED: Community acquired bacterial pneumonia ICD-10-CM: J15.9  ICD-9-CM: 482.9  2/5/2017 - 8/23/2017        RESOLVED: Hypertension ICD-10-CM: I10  ICD-9-CM: 401.9  3/8/2016 - 1/16/2017        RESOLVED: Hyperlipidemia ICD-10-CM: E78.5  ICD-9-CM: 272.4  3/8/2016 - 1/16/2017        RESOLVED: Elevated BP ICD-10-CM: Trina Ards  ICD-9-CM: Trina Ards  2/12/2015 - 5/15/2017        RESOLVED: Chest pain ICD-10-CM: R07.9  ICD-9-CM: 786.50  2/10/2015 - 8/23/2017        RESOLVED: Right sinonasal encephalocele ICD-10-CM: Q01.8  ICD-9-CM: 742.0  10/15/2012 - 1/9/2018        RESOLVED: ASHD (arteriosclerotic heart disease) ICD-10-CM: I25.10  ICD-9-CM: 414.00  6/11/2011 - 10/15/2012        RESOLVED: Hypertension ICD-10-CM: I10  ICD-9-CM: 401.9  6/11/2011 - 7/26/2011        RESOLVED: DM (diabetes mellitus), type 2 ([facility] ICD-10-CM: E11.9  ICD-9-CM: 250.00  10/17/2010 - 4/12/2012        RESOLVED: ACS (acute coronary syndrome) ([facility] ICD-10-CM: I24.9  ICD-9-CM: 411.1  10/17/2010 - 10/15/2012              Greater than 35 minutes were spent with the patient on counseling and coordination of care      Signed:   Jayna Banuelos MD  3/23/2018  10:33 AM

## 2018-03-23 NOTE — PROGRESS NOTES
Bedside shift change report given to Jonatan Crane RN by Rylee Sims. Report included the following information SBAR, Kardex, ED Summary, Intake/Output, MAR, Recent Results and Cardiac Rhythm NSR with BBB/PVCs . 1100 Report called to Richard Lockhart LPN received report on patient. All questions were answered, opportunities for questions given. Patient to be transferred by his son. IVs removed without difficulty. 1130 I have reviewed discharge instructions and follow up appts with the patient and patient's son. The patient and patients son verbalized understanding.

## 2018-03-23 NOTE — DISCHARGE INSTRUCTIONS
Discharge SNF/Rehab Instructions/LTAC       PATIENT ID: Isabela Robles  MRN: [id number]   YOB: 1935    DATE OF ADMISSION: 3/16/2018  1:38 PM    DATE OF DISCHARGE: 3/23/2018    PRIMARY CARE PROVIDER: Marleny Bishop MD       ATTENDING PHYSICIAN: Manjinder Banerjee MD  DISCHARGING PROVIDER: Manjinder Banerjee MD     To contact this individual call [phone] and ask the  to page. If unavailable ask to be transferred the Adult Hospitalist Department. CONSULTATIONS: IP CONSULT TO VASCULAR SURGERY  IP CONSULT TO GASTROENTEROLOGY  IP CONSULT TO INFECTIOUS DISEASES  IP CONSULT TO HEMATOLOGY  IP CONSULT TO CARDIOLOGY    PROCEDURES/SURGERIES: * No surgery found *    ADMITTING DIAGNOSES & HOSPITAL COURSE:   82 yo man with PAD s/p LLE angioplasty, CAD s/p stent, HTN, h/o stroke, h/o rectal bleed, carotid stenosis s/p CEA, AAA s/p stent was sent by Dr Khoi Hardwikc for b/l DVT on 3/16/18. Patient was Vascular Surgery clinic for routine f/u s/p AAA stent and due to RLE swelling. He was admitted with acute b/l LE DVTs. Elevation of troponin was noted with VQ indeterminate, echo with mild-mod PHTN, leading to thought he also has PE, started  Enoxaparin. But he developed bloody stools 3/17 and 3/18. 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 Also on plavix which must be continued for drug eluting stent placed 11/6/17. Had IVC filter placed 3/20/18. Hem/onc recommended discontinuation of anticoagulation due to ongoing mild rectal bleeding. Enoxaparin discontinued on 3/22/18. Cardiology suggested continuation of Plavix and   Follow up with Dr. Mary Talavera post hospital discharge in 2 months.       DISCHARGE DIAGNOSES / PLAN:      DVT/PE with evidence of acute cor pulmonale (POA)   - likely provoked due to recent surgery  - likely causing indeterminate troponin elevation  - V/Q scan 3/16 with intermediate probability of PE  Doppler lE On the right, there is evidence of common femoral, deep  femoral, popliteal, posterior tibial and great saphenous vein  thrombosis, as described above.  On the left there is evidence of  popliteal vein thrombosis,  - TTE 3/16 limited visualization, EF 60%, mild-mod PHTN  - hematology/oncology following, due to on going rectal bleeding, anticoagualtion discontinued. Had IVC filter placed 3/20/18.      GI bleed - likely related to his radiation proctitis, exacerbated by anticoagulation, cannot r/o neoplasia or diverticular bleeding. Hgb stable. Monitor CBC. -Seen by soheila GALDAMEZ to discharge from GI standpoint to follow-up outpatient Dr. Noel Guerrero for any recurrent bleeding.       CAD s/p stent - troponin 0.1 is likely strain from PE rather than ACS  - MARY ANN in the mid-circ, MARY ANN in the distal RVA 11/6/17  - ECG with sinus, 1st degree AV block, old RBBB  - Continue atorvastatin, imdur, metoprolol  - Stop aspirin. Continue plavix given that he is only 4 months out from MARY ANN placement  - Cardiology follow up as out pt in 2 months     HTN: Resumed home medications      MARIAMA on CKD3 (POA) - UA unremarkable  - renal US 3/17 with medical renal disease but no hydronephrosis  -monitor renal function      Acute encephalopathy   - overnight 3/16 to 3/17. Hard of hearing at baseline but oriented and appropriate now  - CT head 3/16 without acute process, slight interval increase in bilateral hygromas  - ABG compensated  - resolved to baseline      COPD with acute exacerbation - may have been exacerbated by PE, improving  - s/p azithromycin 3/16  - nebs, s/p reBronchodilators  - s/p prednisone x5 days for wheezing  - wean off O2 NC      Bacteriuria- (Polymicrobial -Enterococcus, Klebsiella) (POA), Asymptomatic  - UCx 3/16 Enterococcus faecalis group D, Klebsiella  -ID evaluation appreciated, likely contamination, patient asymptomatic. No abx at this time.     PVD s/p left leg angioplasty  - continue Plavix, atorvastatin      AAA s/p stenting - followed by Dr Marybel Forman, Vascular      Carotid stenosis s/p CEA      Hypothyroid (chronic) - continue home synthroid      BPH (chronic) - continue home finasteride, flomax     Obesity, Body mass index is 34.56 kg/(m^2). PENDING TEST RESULTS:   At the time of discharge the following test results are still pending: none    FOLLOW UP APPOINTMENTS:    Follow-up Information     Follow up With Details Comments Contact Info    [facility]   [address] [place] [id number]  [facility] [address]  [address]  [address] [facility]  [phone]             ADDITIONAL CARE RECOMMENDATIONS: in 1-2 weeks check CBC and BMP in view of recent rectal bleed and MARIAMA on CKD    DIET: Cardiac Diet    ACTIVITY: Activity as tolerated        DISCHARGE MEDICATIONS:   See Medication Reconciliation Form      NOTIFY YOUR PHYSICIAN FOR ANY OF THE FOLLOWING:   Fever over 101 degrees for 24 hours. Chest pain, shortness of breath, fever, chills, nausea, vomiting, diarrhea, change in mentation, falling, weakness, bleeding. Severe pain or pain not relieved by medications. Or, any other signs or symptoms that you may have questions about.     DISPOSITION:    Home With:   OT  PT  HH  RN      x SNF/Inpatient Rehab/LTAC    Independent/assisted living    Hospice    Other:       PATIENT CONDITION AT DISCHARGE:     Functional status   x Poor     Deconditioned     Independent      Cognition     Lucid    x Forgetful     Dementia      Catheters/lines (plus indication)    Trejo     PICC     PEG    x None      Code status     Full code    x DNR      PHYSICAL EXAMINATION AT DISCHARGE:     Constitutional:  awake, no acute distress, cooperative, pleasant, [place], obese, NC in place   ENT:  oral mucosa moist, oropharynx benign  Neck supple, no masses   Resp:  + wheezing   CV:  regular rhythm, normal rate, no m/r/g appreciated, RLE edema    GI:  +BS, soft, non distended, non tender Musculoskeletal:  moves all extremities    Neurologic:  awake, alert, [place] but responds appropriately to questions and commands                                             Eyes:  PERRL        CHRONIC MEDICAL DIAGNOSES:  Problem List as of 3/23/2018  Date Reviewed: 3/16/2018          Codes Class Noted - Resolved    * (Principal)Acute DVT (deep venous thrombosis) ([facility] ICD-10-CM: I82.409  ICD-9-CM: 453.40  3/16/2018 - Present        Claudication of both lower extremities ([facility] ICD-10-CM: I73.9  ICD-9-CM: 443.9  2/12/2018 - Present        Bilateral deafness ICD-10-CM: H91.93  ICD-9-CM: 389.9  1/9/2018 - Present        Rectal bleeding ICD-10-CM: K62.5  ICD-9-CM: 569.3  11/21/2017 - Present        S/P cardiac cath ICD-10-CM: Z98.890  ICD-9-CM: V45.89  11/7/2017 - Present    Overview Signed 11/7/2017 10:36 AM by Milan Paredes NP     11/6/17: PTCA Ramus Intermediate, MARY ANN Cx, MARY ANN  dRCA               SOBOE (shortness of breath on exertion) ICD-10-CM: R06.02  ICD-9-CM: 786.05  10/11/2017 - Present        Myocardial infarction ICD-10-CM: I21.9  ICD-9-CM: 410.90  3/8/2016 - Present        Calculus of kidney ICD-10-CM: N20.0  ICD-9-CM: 592.0  3/8/2016 - Present        Osteoarthritis ICD-10-CM: M19.90  ICD-9-CM: 715.90  3/8/2016 - Present        Alkaline phosphatase elevation ICD-10-CM: R74.8  ICD-9-CM: 790.5  1/14/2016 - Present        Angina, class III ([facility] ICD-10-CM: I20.9  ICD-9-CM: 413.9  2/12/2015 - Present        Prostate cancer ([facility] ICD-10-CM: C61  ICD-9-CM: 658  5/23/2014 - Present        Lipoma of back ICD-10-CM: D17.1  ICD-9-CM: 214.8  2/5/2014 - Present        Nodule, subcutaneous ICD-10-CM: R22.9  ICD-9-CM: 782.2  11/4/2013 - Present        Vitamin D deficiency ICD-10-CM: E55.9  ICD-9-CM: 268.9  11/4/2013 - Present        Prediabetes ICD-10-CM: R73.03  ICD-9-CM: 790.29  6/19/2013 - Present        Tinea cruris ICD-10-CM: B35.6  ICD-9-CM: 110.3  6/19/2013 - Present        Cerebral embolism with cerebral infarction [facility]) ICD-10-CM: I63.40  ICD-9-CM: 434.11  6/13/2011 - Present    Overview Signed 6/13/2011  8:21 AM by Valentin Aldrich. Left frontal cortical/subcortical infarcts             Carotid stenosis, left ICD-10-CM: Q82.24  ICD-9-CM: 433.10  6/13/2011 - Present    Overview Signed 6/13/2011  8:21 AM by Valentin Aldrich.      Symptomatic LICA stenosis with infarction             PVD (peripheral vascular disease) with claudication (HCC) ICD-10-CM: I73.9  ICD-9-CM: 443.9  5/2/2011 - Present        BPH (benign prostatic hypertrophy) ICD-10-CM: N40.0  ICD-9-CM: 600.00  10/17/2010 - Present        Dyslipidemia ICD-10-CM: E78.5  ICD-9-CM: 272.4  10/17/2010 - Present        Successful  PTCA (percutaneous transluminal coronary angioplasty)--90-95% instent restenosis RCA 10/16/10 ICD-10-CM: Z98.61  ICD-9-CM: V45.82  10/17/2010 - Present        Successful PTCA with MARY ANN Xience stent Ramus intermedius 10/16/10 ICD-10-CM: Z95.9  ICD-9-CM: V45.89  10/17/2010 - Present        Chronic back pain--DJD ICD-10-CM: G89.29  ICD-9-CM: 338.29  6/29/2010 - Present        ED (erectile dysfunction) ICD-10-CM: N52.9  ICD-9-CM: 607.84  6/21/2010 - Present        Essential hypertension ICD-10-CM: I10  ICD-9-CM: 401.9  3/19/2010 - Present        CAD (Coronary Artery Disease)--s/p PCI--MARY ANN [place] stents x4 5/09;MARY ANN [place] stents x3 8/09 ICD-10-CM: I25.10  ICD-9-CM: 414.00  3/19/2010 - Present        Hypothyroidism ICD-10-CM: E03.9  ICD-9-CM: 244.9  11/11/2009 - Present        RESOLVED: Right-sided sinonasal encephalocele ([address] ICD-10-CM: Q01.8  ICD-9-CM: 742.0  1/9/2018 - 1/9/2018        RESOLVED: Epigastric abdominal pain ICD-10-CM: R10.13  ICD-9-CM: 789.06  2/13/2017 - 8/23/2017        RESOLVED: Community acquired bacterial pneumonia ICD-10-CM: J15.9  ICD-9-CM: 482.9  2/5/2017 - 8/23/2017        RESOLVED: Hypertension ICD-10-CM: I10  ICD-9-CM: 401.9  3/8/2016 - 1/16/2017        RESOLVED: Hyperlipidemia ICD-10-CM: E78.5  ICD-9-CM: 272.4 3/8/2016 - 1/16/2017        RESOLVED: Elevated BP ICD-10-CM: SHR3701  ICD-9-CM: Reesa Pilar  2/12/2015 - 5/15/2017        RESOLVED: Chest pain ICD-10-CM: R07.9  ICD-9-CM: 786.50  2/10/2015 - 8/23/2017        RESOLVED: Right sinonasal encephalocele ICD-10-CM: Q01.8  ICD-9-CM: 742.0  10/15/2012 - 1/9/2018        RESOLVED: ASHD (arteriosclerotic heart disease) ICD-10-CM: I25.10  ICD-9-CM: 414.00  6/11/2011 - 10/15/2012        RESOLVED: Hypertension ICD-10-CM: I10  ICD-9-CM: 401.9  6/11/2011 - 7/26/2011        RESOLVED: DM (diabetes mellitus), type 2 ([facility] ICD-10-CM: E11.9  ICD-9-CM: 250.00  10/17/2010 - 4/12/2012        RESOLVED: ACS (acute coronary syndrome) ([facility] ICD-10-CM: I24.9  ICD-9-CM: 411.1  10/17/2010 - 10/15/2012                    Signed:   Cassidy Whitehead MD  3/23/2018  10:33 AM

## 2018-03-23 NOTE — PROGRESS NOTES
CM called Sriram Jerez at [facility] to notify her of discharge today. They are willing to accept patient. Patient will be going to room 310 B, Wing 3,  nurse to call [phone] for report and After Visit Summary sent via [facility]. CM met with patient and son to discuss transition of care. [place] [facility] form signed and placed in patient's chart. Patient's son will provide transportation to [facility].  Joana Zimmer MSA, RN, CRM

## 2018-03-23 NOTE — PROGRESS NOTES
I was called by nurse who reports that patient has been having continuous bleeding and oozing from RIJ prior catheter site. Nurse notes that bleeding site was thought to have been from a prior IVC filter. Per chart records, patient had IVC filter placement on 3/20/2018. It was note certain if this bleeding site was from this procedure. Nurse notes that she has been holding pressure to the bleeding site and the bleeding has not stopped. Pt seen and evaluated. PE:  GEN-NAD  PSYCH-awake, but disoriented and very hard of hearing  NEURO- GCS 13 (E4 V3 M6). Moves all extremities x 4 with generalized weakness. No slurred speech. No facial droop. Sensation grossly intact. HEENT-NCAT/pupils 2 mm reactive bilateral. Oropharynx clear. NECK-supple, trachea midline. 1 site of continuous oozing of dark, non-pulsatile blood from nodular scarred area at prior venipunture RIJ site on anterior surface on right side of neck. LUNGS-CTA B  HEART-RRR  ABD-soft, NT,ND,  +BS. No R/G/R  VASCULAR-2+ radial/1+DP pulses bilateral. No C/C/E  SKIN-warm/dry  MS-no calf tenderness    A/P:  Uncontrolled bleeding - right side of neck s/p prior RIJ venipuncture/ or catheter insertion. Intervention:  I initially applied direct pressure, followed by application of alcohol to try to cauterize the bleeding site without success. Finally after prolonged direct pressure, I cleaned and dried the site and applied Dermabond to the site which stopped the bleeding without any hematoma or other adverse noted effects. Will place order for nurse to check wound site q 2 hours x 2, the q shift x 1 day to make sure bleeding does not resume.

## 2018-03-23 NOTE — PROGRESS NOTES
Charted: 3/23/18    Spiritual Care Partner Volunteer visited patient in Rm 439 on 3/22/18. Documented by:   Chaplain Tyson MDiv, MACE  [phone])

## 2018-03-26 ENCOUNTER — PATIENT OUTREACH (OUTPATIENT)
Dept: FAMILY MEDICINE CLINIC | Age: 83
End: 2018-03-26

## 2018-03-26 NOTE — PROGRESS NOTES
Hospital Discharge Follow-Up      Date/Time:  3/26/2018 11:56 AM    Patient was admitted to [facility] on 3/16/18 and discharged on 3/23/18 for DVT/PE with evidence of acute cor pulmonale (POA), GI Bleed .The physician discharge summary was available at the time of outreach. [facility] contacted within 2 business days of discharge. Also spoke with pt's dtr-in-law Charlie Barclay. Inpatient RRAT score: 27    Top Challenges reviewed with the provider   - Family concern pt not suitable to return to independent living. To discuss NERY vs Independent Living with SNF SW.     - Spoke with SNF SW. Pt adamant wants to leave SNF today. Trying to speak w/pt to advise not suitable to be d/c'd @ this time. \"He signed himself in here. Can't make him stay against his will. Right now he has no means of transportation to leave the facility. Spoke with son 5 mins ago & he's aware\". Method of communication with provider :chart routing    Nurse Navigator (NN) contacted the [address] & pt's dtr-in-law Charlie Barclay by telephone to perform post hospital discharge assessment. Verified name and  with [address] & pt's dtr-in-law Charlie Barclay as identifiers. Provided introduction to self, and explanation of the Nurse Navigator role. Dtr-in-law reported concern pt not suitable to return to Independent Living. Dtr-in-law &  (pt's son) to discuss NERY with SNF SW. Have left message for pt's dtr in [facility] to discuss also. Dtr-in-law reported she &  visited pt's apartment when pt in [place] [address]. Apt \"dirty & in disarray. Liquoir bottles throughout apt. He still smokes. Still drives & shouldn't be driving. [place]. Has hearing aids. Doesn't wear them. Visited him yesterday @ [place]. He's SOB. Is getting breathing txs. There's an O2 tank in the room\".       PCP/Specialist follow up:   Future Appointments  Date Time Provider Batsheva Torres   2018 1:15 PM Dimitry Henriquez Jeovany Weber, [address]   7/10/2018 8:20 AM Ivan Mart MD [facility] LC SCHED          Goals      Transitions of [address] [place] [address] Coordination to Prevent complications post hospitalization. 3/26/18- spoke with pt's dtr-in-law. Reported concern pt not suitable for Independent Living. More suitable for senior living. Reported pt \"hard-headed. Continues to drink & smoke. Noticed he's SOB\". Dtr-in-law &  (pt's son) called pt's dtr in [facility] to [facility] of situation. Message left. Spoke with SNF [facility]. Reported pt adamant about leaving SNF today. Doesn't have transportation. Pt signed himself in to SNF. Can't be kept against his will. Have spoken with son to inform. NN advised pt returning home alone @ this time not safe. Uncertain of pt's ability to self manage his meds & care. SNF SW to continue speaking with pt re gainst pt leaving.  Plan- NN to collaborate with pt's son, dtr-in-law, & SNF SW re discharge plan/disposition-ID

## 2018-03-27 ENCOUNTER — PATIENT OUTREACH (OUTPATIENT)
Dept: CASE MANAGEMENT | Age: 83
End: 2018-03-27

## 2018-03-27 NOTE — PROGRESS NOTES
Community Care Team Documentation for Patient in [facility]  Initial Follow Up       Patient was admitted to [facility] from 3/16 to 3/23. Patient was discharged to [facility], [facility], on 3/23 (date). See previous [place] Care Team documentation under Patient Outreach. Hospital Discharge diagnosis:  DVT/PE with evidence of acute cor pulmonale (POA), GI Bleed    RRAT score:  27    Advance Medical Directive on file in EMR? DDNR     Total Hospitalizations/ED visits last 6 months? IP - 3; ED - 0    PCP : Ivan Mart MD  Nurse Navigator in PCP office: Anna Galo  Note routed to Nurse Navigator team.    Per Laura Haq at [facility], Reviewed [place] [facility] questions with SNF to ensure patient arrived with admission packet in order. Discussed upcoming Cardio FU, Date: 4/13/2018 Time: 1:15 PM -Nae Falcon MD. Pt requesting to DC. Therapy not recommending pt return home alone. Pt's son and daughter in law looking into senior living apartments vs moving pt in with son and daughter in law. Pt being skilled by PT/OT. Currently ambulating 80ft with RW. Mod assist with transfers. SLP working on dysphagia management. Barriers: Fatigues easily and SOB with activity. LOS TBD. SNF Attending: Griselda Mass, MD    Medications were not reconciled and general patient assessment was not completed during this skilled nursing facility outreach.      ANUPAM Lugo

## 2018-04-03 ENCOUNTER — PATIENT OUTREACH (OUTPATIENT)
Dept: CASE MANAGEMENT | Age: 83
End: 2018-04-03

## 2018-04-03 NOTE — PROGRESS NOTES
Community Care Team Documentation for Patient in [facility]  Subsequent Follow up     Patient remains at [facility] ([facility]). See previous [facility] Team notes. PCP : Stephy Goddard MD  Nurse Navigator in PCP office: Roseann Dyson  PCP follow up appointment:  4/13 at 11:00 AM.    Per Kiki Obiren at [facility], PT/OT continue. Currently ambulating 40 ft with no assistive device, and navigating 4 steps. Sats drop to 88% on RA - monitoring. Currently mod assist for medication management - barrier to discharge. Serious concern for patient going home alone. Patient does not want to go home with son. Plan for DC 4/10 to home alone with [facility] for skilled nursing and medication management. PCP follow up 4/13 at 11:00 AM with NP Madi Phillips. Cardiology follow up on 4/13 at 1:15 PM with Dr. Aki Garcia. Medications were not reconciled and general patient assessment was not completed during this skilled nursing facility outreach.      Barbara Porter, MSN, RN, ACNS-BC, [facility]  Nurse Navigator, [employer] for [employer] [phone]

## 2018-04-10 ENCOUNTER — PATIENT OUTREACH (OUTPATIENT)
Dept: CASE MANAGEMENT | Age: 83
End: 2018-04-10

## 2018-04-17 ENCOUNTER — PATIENT OUTREACH (OUTPATIENT)
Dept: CASE MANAGEMENT | Age: 83
End: 2018-04-17

## 2018-04-17 NOTE — PROGRESS NOTES
Community Care Team Documentation for Patient in [facility]  Discharge Note    Per SNF staff, patient discharged from [facility] ([facility]). See previous [facility] Team notes. PCP : Consuelo Jimenez MD  Nurse Navigator in PCP office: Mita Mark at [facility], Confirmed pt was discharged 4/16 home alone with [facility] (previously used). PCP follow up 4/18 at 3:45 PM.    Medications were not reconciled and general patient assessment was not completed during this skilled nursing facility outreach. Community Care Team will sign off at this time.       ANUPAM Maldonado

## 2018-04-17 NOTE — Clinical Note
Confirmed pt was discharged 4/16 home alone with [facility] (previously used).   PCP follow up 4/18 at 3:45 PM.

## 2018-04-19 ENCOUNTER — PATIENT OUTREACH (OUTPATIENT)
Dept: FAMILY MEDICINE CLINIC | Age: 83
End: 2018-04-19

## 2018-04-20 ENCOUNTER — PATIENT OUTREACH (OUTPATIENT)
Dept: FAMILY MEDICINE CLINIC | Age: 83
End: 2018-04-20

## 2018-04-20 ENCOUNTER — OFFICE VISIT (OUTPATIENT)
Dept: FAMILY MEDICINE CLINIC | Age: 83
End: 2018-04-20

## 2018-04-20 VITALS
BODY MASS INDEX: 31.75 KG/M2 | TEMPERATURE: 97.4 F | HEART RATE: 91 BPM | RESPIRATION RATE: 18 BRPM | OXYGEN SATURATION: 95 % | DIASTOLIC BLOOD PRESSURE: 69 MMHG | SYSTOLIC BLOOD PRESSURE: 129 MMHG | WEIGHT: 179.2 LBS | HEIGHT: 63 IN

## 2018-04-20 DIAGNOSIS — I73.9 PVD (PERIPHERAL VASCULAR DISEASE) WITH CLAUDICATION (HCC): ICD-10-CM

## 2018-04-20 RX ORDER — FUROSEMIDE 20 MG/1
TABLET ORAL
Refills: 6 | COMMUNITY
Start: 2018-02-16 | End: 2018-05-20

## 2018-04-20 NOTE — PROGRESS NOTES
Home Care Face to Face Encounter    Patients Name: Jean Carlos Ramachandran    YOB: 1935    Primary Diagnosis: DVT/PE with evidence of acute cor pulmonale (POA) , CAD s/p stent placement; GI bleed    Date of Face to Face:  4/20/2018                           Face to Face Encounter findings are related to primary reason for home care:   no.     1. I certify that the patient needs intermittent care as follows: physical therapy: strengthening, gait/stair training and balance training  occupational therapy:  ADL safety (ie. cooking, bathing, dressing) and ROM Speech therapy: garbled speech. Uncertain if this is new or existing condition    2. I certify that this patient is homebound, that is: 1) patient requires the use of a cane or rollator device, special transportation, or assistance of another to leave the home; or 2) patient's condition makes leaving the home medically contraindicated; and 3) patient has a normal inability to leave the home and leaving the home requires considerable and taxing effort. Patient may leave the home for infrequent and short duration for medical reasons, and occasional absences for non-medical reasons. Homebound status is due to the following functional limitations: Patient with increased shortness of breath and elevated heart rate with ambulation greater than 20 feet limiting patient's ability to ambulate safely within the community. 3. I certify that this patient is under my care and that I, or a nurse practitioner or  [id number], or clinical nurse specialist, or certified nurse midwife, working with me, had a Face-to-Face Encounter that meets the physician Face-to-Face Encounter requirements. The following are the clinical findings from the [address] [place] [address] encounter that support the need for skilled services and is a summary of the encounter: SOB, lightheadedness and dizzy when moving.   LE edema with reduced sensation in right foot    See attached progess note      Fara El NP  4/21/2018         Subjective:      History obtained from:  the patient. Lives by himself but son comes by every day to check on him  Ashli Patino is a 81 y.o. male who presents today for face-to face transition of care. Assessment/Plan:    1. Transition of care performed with sharing of clinical summary  -HH coming to house - will be there Saturday  -lives alone but son comes daily to check on him  -+SOB, fatigue, lightheaded and dizzy upon moving; discussed safety issues and fall precautions; advised not to drive until sx resolve (pt drove himself to OV today)  -ambulating with cane  -pt states he is managing ADLs \"fine\"- states he is cooking healthy meals; feels safe at home    2. PVD (peripheral vascular disease) with claudication (HCC)  -right foot - cannot assess DP/PT; toes cool to touch, skin dry  -requested pt to tell [facility] nurse about foot and assess on visit; needs to tell cards     Transition of Care documentation  Date of discharge:  3/23/18 (admitted:  3/16/18)    Hospital Course:    [address]:   Note copied from record: 80 yo man with PAD s/p LLE angioplasty, CAD s/p stent, HTN, h/o stroke, h/o rectal bleed, carotid stenosis s/p CEA, AAA s/p stent was sent by Dr Alex Moss for b/l DVT on 3/16/18. Patient was Vascular Surgery clinic for routine f/u s/p AAA stent and due to RLE swelling. He was admitted with acute b/l LE DVTs.  Elevation of troponin was noted with VQ indeterminate, echo with mild-mod PHTN, leading to thought he also has PE, started  Enoxaparin. But he developed bloody stools 3/17 and 3/18.  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  Also on plavix which must be continued for drug eluting stent placed 11/6/17. Jaye Holt IVC filter placed 3/20/18.  Hem/onc recommended discontinuation of anticoagulation due to ongoing mild rectal bleeding. Enoxaparin discontinued on 3/22/18.  Cardiology suggested continuation of Plavix and   Follow up with Dr. Joseph San post hospital discharge in 2 months. D/C  DVT/PE with evidence of acute cor pulmonale (POA)   - likely provoked due to recent surgery  - hematology/oncology following, due to on going rectal bleeding, anticoagualtion discontinued. Had IVC filter placed 3/20/18. GI bleed - likely related to his radiation proctitis, exacerbated by anticoagulation- follow-up outpatient Dr. Jung Tran for any recurrent bleeding. CAD s/p stent -- Cardiology follow up as out pt in 2 months  - ECG with sinus, 1st degree AV block, old RBBB  - Continue atorvastatin, imdur, metoprolol  - Stop aspirin. Continue plavix given that he is only 4 months out from MARY ANN placement  MARIAMA on CKD3 (POA)    [facility] - d/c 3 days ago - couldn't wait to leave  Home health instituted yes. ADL's ok but gets tired very easily   -pt states he is managing ADLs \"fine\"- states he is cooking healthy meals; feels safe at home    Concerns for today: none  Drove himself today - \" a little surprised\" he can't drive like he used - felt lightheaded when driving - advised him not to drive himself; reviewed fall precautions and safety measures;   pt did not agree to stop driving himself  Has a rx for a power chair and walker - advised to talk with [place] nurse on Saturday who can help him send it in to be filled.     Social history:   Nutrition: feels he is eating well; cooking for himself; scrambled eggs and lr and potatoes, milk this am for breakfast  Lunch: salmon and rice   Social: lives by himself in 4th floor group home complex; states if he had any troubles, there would be someone there to help him    PHQ over the last two weeks 4/20/2018   Little interest or pleasure in doing things Not at all   Feeling down, depressed or hopeless Not at all   Total Score PHQ 2 0        I reviewed the following:   Patient Active Problem List    Diagnosis Date Noted    Acute DVT (deep venous thrombosis) [facility] 03/16/2018  Claudication of both lower extremities ([facility] 02/12/2018    Bilateral deafness 01/09/2018    Rectal bleeding 11/21/2017    S/P cardiac cath 11/07/2017    SOBOE (shortness of breath on exertion) 10/11/2017    Myocardial infarction ([facility] 03/08/2016    Calculus of kidney 03/08/2016    Osteoarthritis 03/08/2016    Alkaline phosphatase elevation 01/14/2016    Angina, class III ([facility] 02/12/2015    Prostate cancer ([facility] 05/23/2014    Lipoma of back 02/05/2014    Nodule, subcutaneous 11/04/2013    Vitamin D deficiency 11/04/2013    Prediabetes 06/19/2013    Tinea cruris 06/19/2013    Cerebral embolism with cerebral infarction ([facility] 06/13/2011    Carotid stenosis, left 06/13/2011    PVD (peripheral vascular disease) with claudication ([facility]) 05/02/2011    BPH (benign prostatic hypertrophy) 10/17/2010    Dyslipidemia 10/17/2010    Successful  PTCA (percutaneous transluminal coronary angioplasty)--90-95% instent restenosis RCA 10/16/10 10/17/2010    Successful PTCA with MARY ANN [place] stent Ramus intermedius 10/16/10 10/17/2010    Chronic back pain--DJD 06/29/2010    ED (erectile dysfunction) 06/21/2010    Essential hypertension 03/19/2010    CAD (Coronary Artery Disease)--s/p PCI--MARY ANN [place] stents x4 5/09;MARY ANN [place] stents x3 8/09 03/19/2010    Hypothyroidism 11/11/2009     Current Outpatient Prescriptions   Medication Sig Dispense Refill    levothyroxine (SYNTHROID) 88 mcg tablet TAKE 1 TABLET BY MOUTH DAILY (BEFORE BREAKFAST). 30 Tab 0    amLODIPine (NORVASC) 5 mg tablet TAKE 1 TAB BY MOUTH DAILY. 30 Tab 2    isosorbide mononitrate ER (IMDUR) 30 mg tablet TAKE 1 TABLET BY MOUTH EVERY DAY 30 Tab 2    metoprolol succinate (TOPROL-XL) 25 mg XL tablet TAKE 1 TABLET EVERY DAY 30 Tab 2    losartan-hydroCHLOROthiazide (HYZAAR) 100-12.5 mg per tablet TAKE 1 TABLET BY MOUTH EVERY DAY 30 Tab 2    clopidogrel (PLAVIX) 75 mg tab TAKE 1 TAB BY MOUTH DAILY.  INDICATIONS: THROMBOSIS PREVENTION AFTER PCI  11  omeprazole (PRILOSEC) 40 mg capsule Take 1 Cap by mouth two (2) times a day. 60 Cap 1    aspirin delayed-release 81 mg tablet Take 1 Tab by mouth daily for 360 days. 30 Tab 11    tamsulosin (FLOMAX) 0.4 mg capsule TAKE 1 CAPSULE EVERY DAY 90 Cap 0    albuterol (PROVENTIL HFA, VENTOLIN HFA, PROAIR HFA) 90 mcg/actuation inhaler Take 2 Puffs by inhalation every four (4) hours as needed for Wheezing. 1 Inhaler 1    atorvastatin (LIPITOR) 40 mg tablet Take 1 Tab by mouth daily. YRN. Give patient BRAND LIPITOR. D/C Crestor 90 Tab 3    albuterol-ipratropium (DUO-NEB) 2.5 mg-0.5 mg/3 ml nebu 3 mL by Nebulization route every six (6) hours as needed. 30 Nebule 0    CHOLECALCIFEROL, VITAMIN D3, (VITAMIN D3 PO) Take  by mouth.  finasteride (PROSCAR) 5 mg tablet daily.  ascorbic acid (VITAMIN C) 1,000 mg tablet Take  by mouth.  furosemide (LASIX) 20 mg tablet TAKE 1 TAB BY MOUTH DAILY. 6    clotrimazole-betamethasone (LOTRISONE) topical cream Apply twice daily to affected area till resolved.  60 g 0     Allergies   Allergen Reactions    Norco [Hydrocodone-Acetaminophen] Other (comments)     Too sedated and felt like he was in a daze    Pcn [Penicillins] Rash    Percocet [Oxycodone-Acetaminophen] Nausea and Vomiting     Past Medical History:   Diagnosis Date    AAA (abdominal aortic aneurysm) ([address]     Acute MI ([address] 12/2010    dr Rani Sterling    Acute prostatitis     again 9/12/16 f[facility]u [facility] rec'd [facility]    Advance directive discussed with patient 04/20/2016    Aneurysm ([address] 6/2011    AAA    Borderline glaucoma (glaucoma suspect) 02/19/2015    notes from [address] [place] [address] rec'd    BPH (benign prostatic hyperplasia)     Bright red blood per rectum 02/04/2016    [facility] note Rud[facility] Nyhan- w/u in progress   8/22/17 [facility] f/u note    CAD (coronary artery disease)     Calculus of kidney     Cerebral embolism with cerebral infarction ([address]     Chronic pain     back    Dizzy spells 6/2012    DJD (degenerative joint disease) of lumbar spine     ED (erectile dysfunction)     8/30/17 f[facility]u note [facility]    Elevated PSA 03/20/2014    [facility] rec[facility]     8/24/16 f/u note    Encephalocele ([facility]     Hypercholesterolemia     Hypertension     Pneumonia 02/05/2017    Prostate cancer ([facility] 05/12/2014    crystal henry/ Dr Duffy Beverage 8/22/17 f/u note    PVD (peripheral vascular disease) with claudication ([facility]     S/P radiation therapy 15 months out    probable cause of the rectal bleeding    Stroke [facility]) 6/2011    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     COLONOSCOPY N/A 8/31/2016    COL[facility]COPY performed by [facility] A [facility] at [facility] ENDOSCOPY    ENDOSCOPY, COLON, DIAGNOSTIC      HX CAROTID ENDARTERECTOMY Left     HX HEENT  10/2012    repair right drum cindyarianna dawkins    HX ORTHOPAEDIC Bilateral     BUNIONECTOMY    [place] LEFT HEART CATH,PERCUTANEOUS  10/16/2010         PTCA EACH ADDL VESSEL  10/16/2010         PTCA W/ STENT, INITIAL  10/16/2010         UPPER GI ENDOSCOPY,DIAGNOSIS  11/22/2017         VASCULAR SURGERY PROCEDURE UNLIST      left leg varicose vein stripping dr [facility]     Family History   Problem Relation Age of Onset    Diabetes Mother     Diabetes Father     Cancer Sister      LUNG    Cancer Brother      COLON    Cancer Sister      LUNG    Anesth Problems Neg Hx      Social History   Substance Use Topics    Smoking status: Former Smoker     Packs/day: 0.25     Quit date: 9/10/2014    Smokeless tobacco: Never Used      Comment: has not had cigarette in over 3 month    Alcohol use No      Social History     Social History Narrative        Review of Systems  - Constitutional Symptoms: no fevers, chills, weight loss  - Cardiovascular: no chest pain or palpitations  - Respiratory: + cough + shortness of breath  - Gastrointestinal: no dysphagia or abdominal pain; no issues with bowel movements  - Integumentary: no rashes, +dry skin LE bilat  - : no issues urination - feels safe getting up and going to bathroom     Objective:     Vitals:    04/20/18 1458   BP: 129/69   Pulse: 91   Resp: 18   Temp: 97.4 °F (36.3 °C)   SpO2: 95%   Weight: 179 lb 3.2 oz (81.3 kg)   Height: 5' 3\" (1.6 m)       GENERAL: Ethyl Lynn is in no acute distress. Non-toxic. Well nourished. Well developed. Appropriately groomed. HEAD:  Normocephalic. Atraumatic. Non tender sinuses x 4. RESP: Breath sounds are symmetrical bilaterally. Unlabored without SOB. Speaking in full sentences. CTA  CV: normal rate. Regular rhythm. S1, S2 audible. No murmur noted. No rubs, clicks or gallops noted. ABDOMEN: Flat without bulges or pulsations. Soft and nondistended. No tenderness on palpation. NEURO:  awake, alert and oriented to person, place, and time and event. Sensation is intact to light touch on UE. Slow gait, using cane to ambulate  HEME/LYMPH:   Left foot: DP/PT: 1+ No edema noted. Dry skin   Right foot; DP/PT not palpable   +1 pitting edema w/dry skin noted. Posterior to 3rd toe, are tender to palpation . PSYCH: appropriate behavior, dress and thought processes. Good eye contact. Slightly garbled speech. Hard of hearing. Full affect. Good insight.        Lab Results   Component Value Date/Time    Hemoglobin A1c 5.4 01/09/2018 10:10 AM    Hemoglobin A1c (POC) 5.7 01/14/2016 01:52 PM     Lab Results   Component Value Date/Time    Cholesterol, total 147 11/07/2017 03:24 AM    HDL Cholesterol 63 11/07/2017 03:24 AM    LDL, calculated 70.2 11/07/2017 03:24 AM    VLDL, calculated 13.8 11/07/2017 03:24 AM    Triglyceride 69 11/07/2017 03:24 AM    CHOL/HDL Ratio 2.3 11/07/2017 03:24 AM     Lab Results   Component Value Date/Time    Sodium 145 03/23/2018 04:16 AM    Potassium 4.1 03/23/2018 04:16 AM    Chloride 113 (H) 03/23/2018 04:16 AM    CO2 25 03/23/2018 04:16 AM    Anion gap 7 03/23/2018 04:16 AM    Glucose 96 03/23/2018 04:16 AM    BUN 16 03/23/2018 04:16 AM    Creatinine 1.26 03/23/2018 04:16 AM    BUN/Creatinine ratio 13 03/23/2018 04:16 AM    GFR est AA >60 03/23/2018 04:16 AM    GFR est non-AA 55 (L) 03/23/2018 04:16 AM    Calcium 8.8 03/23/2018 04:16 AM    Bilirubin, total 0.7 03/16/2018 01:48 PM    AST (SGOT) 43 (H) 03/16/2018 01:48 PM    Alk. phosphatase 135 (H) 03/16/2018 01:48 PM    Protein, total 7.9 03/16/2018 01:48 PM    Albumin 3.2 (L) 03/16/2018 01:48 PM    Globulin 4.7 (H) 03/16/2018 01:48 PM    A-G Ratio 0.7 (L) 03/16/2018 01:48 PM    ALT (SGPT) 26 03/16/2018 01:48 PM     Lab Results   Component Value Date/Time    WBC 4.8 03/23/2018 04:16 AM    Hemoglobin (POC) 13.3 02/05/2017 03:44 PM    HGB 10.4 (L) 03/23/2018 04:16 AM    Hematocrit (POC) 39 02/05/2017 03:44 PM    HCT 32.1 (L) 03/23/2018 04:16 AM    PLATELET 845 25/29/1281 04:16 AM    MCV 90.7 03/23/2018 04:16 AM     Lab Results   Component Value Date/Time    TSH 5.280 (H) 01/09/2018 10:10 AM    T4, Free 1.43 01/09/2018 10:10 AM     Lab Results   Component Value Date/Time    VITAMIN D, 25-HYDROXY 36.3 01/09/2018 10:10 AM       _____________________________________________________________________  I have discussed the diagnosis with the patient and the intended plan as seen in the above orders. The patient has received an after-visit summary and questions were answered concerning future plans. I have discussed medication side effects and warnings with the patient as well. Informed pt to return to the office if new symptoms arise. Urgent consultation with the nearest Emergency Department is strongly recommended if condition worsens.

## 2018-04-20 NOTE — PATIENT INSTRUCTIONS
1) Continue to eat healthy meals with protein to help you heal.  You should not be driving yourself if you are feeling dizzy or short of breath. 2) You have lower leg swelling on the right leg. Keep an eye on this. I cannot feel the pulse in the right foot with my hand, but you said the hospital could hear the blood flow with the doppler. Please show your feet to the home health nurse who is coming on Saturday so she can monitor it and make sure it is not worsening. If it is, you will want to see cardiology.     3) You have an appt with Dr. Anu Romero (cardiology) on Apirl 30th at 10:45am

## 2018-04-20 NOTE — MR AVS SNAPSHOT
[facility] 
 
 
 [address] 
[address] [place] [address] 
[phone] Patient: Bree Purvis 
MRN:  [id number] Visit Information Date & Time Provider Department Dept. Phone Encounter #  
 4/20/2018  3:00 PM Brittney Holder NP [facility] [phone] [id number] Your Appointments 4/30/2018 10:45 AM  
ESTABLISHED PATIENT with MD Ph[facility] Appt Note: hosp f/up per Tanesha Moran  ekr  mailed pcard, unable to LM on cell & no answer on home phone ekr; pt in a rehab , had to r/d from 4/13,[address]  
[phone] [address] [place] [address] [address] [address]  
  
    
 7/10/2018  8:20 AM  
ROUTINE CARE with MD Lasha Harmon [address] [place] [address] [facility]) Appt Note: 6 MO F/U Referral F/U & Labs [facility] [address] [facility] [address]  
[phone]  
  
   
 [address] [place] [address] Upcoming Health Maintenance Date Due DTaP/Tdap/Td series (1 - Tdap) 3/1/1956 ZOSTER VACCINE AGE 60> 1/1/1996 GLAUCOMA SCREENING Q2Y 3/2/2017 COLONOSCOPY 8/31/2018 MEDICARE YEARLY EXAM 10/18/2018 Allergies as of 4/20/2018  Review Complete On: 4/20/2018 By: Hiren Chung LPN Severity Noted Reaction Type Reactions Norco [Hydrocodone-acetaminophen]  01/18/2013    Other (comments) Too sedated and felt like he was in a daze Pcn [Penicillins]  10/09/2009    Rash Percocet [Oxycodone-acetaminophen]  05/02/2011   Side Effect Nausea and Vomiting Current Immunizations  Reviewed on 8/24/2017 Name Date Influenza High Dose Vaccine PF 10/17/2017, 10/5/2015 12:38 PM, 2/12/2015, 11/4/2013 Influenza Vaccine 10/1/2016 Influenza Vaccine Split 10/15/2012, 12/1/2010 Pneumococcal Conjugate (PCV-13) 3/15/2017 Pneumococcal Polysaccharide (PPSV-23) 1/12/2011 Not reviewed this visit You Were Diagnosed With   
  
 Codes Comments Transition of care performed with sharing of clinical summary    -  Primary ICD-10-CM: Z91.89 ICD-9-CM: V15.89 PVD (peripheral vascular disease) with claudication (HCC)     ICD-10-CM: I73.9 ICD-9-CM: 443. 9 Vitals BP Pulse Temp Resp Height(growth percentile) Weight(growth percentile) 129/69 (BP 1 Location: Right arm, BP Patient Position: Sitting) 91 97.4 °F (36.3 °C) 18 5' 3\" (1.6 m) 179 lb 3.2 oz (81.3 kg) SpO2 BMI Smoking Status 95% 31.74 kg/m2 Former Smoker BMI and BSA Data Body Mass Index Body Surface Area 31.74 kg/m 2 1.9 m 2 Preferred Pharmacy Pharmacy Name Phone [id number] [place], [place] [address] [place] [address] [phone] Your Updated Medication List  
  
   
This list is accurate as of 4/20/18  3:56 PM.  Always use your most recent med list.  
  
  
  
  
 albuterol 90 mcg/actuation inhaler Commonly known as:  PROVENTIL HFA, VENTOLIN HFA, PROAIR HFA Take 2 Puffs by inhalation every four (4) hours as needed for Wheezing. albuterol-ipratropium 2.5 mg-0.5 mg/3 ml Nebu Commonly known as:  DUO-NEB  
3 mL by Nebulization route every six (6) hours as needed. amLODIPine 5 mg tablet Commonly known as:  Skip Newcomer TAKE 1 TAB BY MOUTH DAILY. aspirin delayed-release 81 mg tablet Take 1 Tab by mouth daily for 360 days. atorvastatin 40 mg tablet Commonly known as:  LIPITOR Take 1 Tab by mouth daily. YRN. Give patient BRAND LIPITOR. D/C Crestor  
  
 clopidogrel 75 mg Tab Commonly known as:  PLAVIX TAKE 1 TAB BY MOUTH DAILY. INDICATIONS: THROMBOSIS PREVENTION AFTER PCI  
  
 clotrimazole-betamethasone topical cream  
Commonly known as:  Patrica Goad Apply twice daily to affected area till resolved. finasteride 5 mg tablet Commonly known as:  PROSCAR  
daily. furosemide 20 mg tablet Commonly known as:  LASIX TAKE 1 TAB BY MOUTH DAILY. isosorbide mononitrate ER 30 mg tablet Commonly known as:  IMDUR  
TAKE 1 TABLET BY MOUTH EVERY DAY  
  
 levothyroxine 88 mcg tablet Commonly known as:  SYNTHROID  
TAKE 1 TABLET BY MOUTH DAILY (BEFORE BREAKFAST). losartan-hydroCHLOROthiazide 100-12.5 mg per tablet Commonly known as:  HYZAAR  
TAKE 1 TABLET BY MOUTH EVERY DAY  
  
 metoprolol succinate 25 mg XL tablet Commonly known as:  TOPROL-XL  
TAKE 1 TABLET EVERY DAY  
  
 omeprazole 40 mg capsule Commonly known as:  PRILOSEC Take 1 Cap by mouth two (2) times a day. tamsulosin 0.4 mg capsule Commonly known as:  FLOMAX TAKE 1 CAPSULE EVERY DAY  
  
 VITAMIN C 1,000 mg tablet Generic drug:  ascorbic acid (vitamin C) Take  by mouth. VITAMIN D3 PO Take  by mouth. Patient Instructions 1) Continue to eat healthy meals with protein to help you heal. 
You should not be driving yourself if you are feeling dizzy or short of breath. 2) You have lower leg swelling on the right leg. Keep an eye on this. I cannot feel the pulse in the right foot with my hand, but you said the hospital could hear the blood flow with the doppler. Please show your feet to the home health nurse who is coming on Saturday so she can monitor it and make sure it is not worsening. If it is, you will want to see cardiology. 3) You have an appt with Dr. Louie Moyer (cardiology) on Apirl 30th at 10:45am 
 
 
 
  
Introducing \[facility]\"" & HEALTH SERVICES! Alverto Mccabe introduces [employer] patient portal. Now you can access parts of your medical record, email your doctor's office, and request medication refills online. 1. In your internet browser, go to [email or web address]://"[email or web address]. [employer]. [employer] 2. Click on the First Time User? Click Here link in the Sign In box. You will see the New Member Sign Up page. 3. Enter your Instilling Values Access Code exactly as it appears below. You will not need to use this code after youve completed the sign-up process. If you do not sign up before the expiration date, you must request a new code. · Instilling [employer] Access Code: [email or web address] Expires: 5/28/2018  6:09 PM 
 
4. Enter the last four digits of your Social Security Number (xxxx) and Date of Birth (mm/dd/yyyy) as indicated and click Submit. You will be taken to the next sign-up page. 5. Create a Instilling Values ID. This will be your Instilling Values login ID and cannot be changed, so think of one that is secure and easy to remember. 6. Create a Instilling [employer] password. You can change your password at any time. 7. Enter your Password Reset Question and Answer. This can be used at a later time if you forget your password. 8. Enter your e-mail address. You will receive e-mail notification when new information is available in [address] 9. Click Sign Up. You can now view and download portions of your medical record. 10. Click the Download Summary menu link to download a portable copy of your medical information. If you have questions, please visit the Frequently Asked Questions section of the [employer] website. Remember, [employer]illing [employer] is NOT to be used for urgent needs. For medical emergencies, dial 911. Now available from your iPhone and Android! Please provide this summary of care documentation to your next provider. Your primary care clinician is listed as [id number] If you have any questions after today's visit, please call [phone].

## 2018-04-20 NOTE — PROGRESS NOTES
Sherman Mathews  Identified pt with two pt identifiers(name and ). Chief Complaint   Patient presents with    Follow-up     3 days d/c from care home [facility]       1. Have you been to the ER, urgent care clinic since your last visit No Hospitalized since your last visit? Yes [address] 3/09/18 and was then sent to the snf    2. Have you seen or consulted any other health care providers outside of the [facility] since your last visit? Include any pap smears or colon screening. No    Today's provider has been notified of reason for visit, vitals and flowsheets obtained on patients.      Patient has aVD paperwork and plan but not on file        Health Maintenance Due   Topic    DTaP/Tdap/Td series (1 - Tdap)    ZOSTER VACCINE AGE 60>     GLAUCOMA SCREENING Q2Y     COLONOSCOPY        Wt Readings from Last 3 Encounters:   18 179 lb 3.2 oz (81.3 kg)   18 195 lb 12.8 oz (88.8 kg)   18 180 lb 9.6 oz (81.9 kg)     Temp Readings from Last 3 Encounters:   18 97.4 °F (36.3 °C)   18 98.2 °F (36.8 °C)   18 97.2 °F (36.2 °C) (Oral)     BP Readings from Last 3 Encounters:   18 129/69   18 (!) 162/93   18 148/81     Pulse Readings from Last 3 Encounters:   18 91   18 80   18 75     Vitals:    18 1458   BP: 129/69   Pulse: 91   Resp: 18   Temp: 97.4 °F (36.3 °C)   SpO2: 95%   Weight: 179 lb 3.2 oz (81.3 kg)   Height: 5' 3\" (1.6 m)   PainSc:   0 - No pain         Learning Assessment:  :     Learning Assessment 2016   PRIMARY LEARNER Patient Patient   BARRIERS PRIMARY LEARNER HEARING -   PRIMARY LANGUAGE ENGLISH ENGLISH   LEARNER PREFERENCE PRIMARY READING VIDEOS   ANSWERED BY PATIENT Michelle Boss   RELATIONSHIP SELF SELF       Depression Screening:  :     PHQ over the last two weeks 2018   Little interest or pleasure in doing things Not at all   Feeling down, depressed or hopeless Not at all   Total Score PHQ 2 0       Fall Risk Assessment:  :     Fall Risk Assessment, last 12 mths 4/20/2018   Able to walk? Yes   Fall in past 12 months? No       Abuse Screening:  :     Abuse Screening Questionnaire 10/17/2017   Do you ever feel afraid of your partner? N   Are you in a relationship with someone who physically or mentally threatens you? N   Is it safe for you to go home? Y       ADL Screening:  :     ADL Assessment 4/20/2018   Feeding yourself No Help Needed   Getting from bed to chair No Help Needed   Getting dressed No Help Needed   Bathing or showering No Help Needed   Walk across the room (includes cane/walker) No Help Needed   Using the telphone No Help Needed   Taking your medications No Help Needed   Preparing meals No Help Needed   Managing money (expenses/bills) No Help Needed   Moderately strenuous housework (laundry) No Help Needed   Shopping for personal items (toiletries/medicines) No Help Needed   Shopping for groceries No Help Needed   Driving No Help Needed   Climbing a flight of stairs No Help Needed   Getting to places beyond walking distances No Help Needed                 Medication reconciliation up to date and corrected with patient at this time.

## 2018-04-20 NOTE — PROGRESS NOTES
NN Progress Note    - Pt d/c'd from SNF [facility] 4/17/18    - Pt returned to his [address] apartment w/ [facility] SN/PT/OT    - During call w/ NN pt mentioned interest in \"power chair\". Pt reported currently ambulates with a cane. \"I had a clot in both legs\". Goals Addressed      Transitions of Care- Collaboration & Care Coordination to Prevent complications post hospitalization. 4/19/18- EMR reviewed. Per 4/17/18 CCT SNF f/u note \"Per Kiki Obrien at [facility], Confirmed pt was discharged 4/16 home alone with [employer] (previously used)\". NN attempted to contact pt's son. Message left. Spoke w/ pt. Pt reported lives alone. Son & dtr-in-law visit daily. [facility] visited Tuesday. Filled pillbox. Discussed need for PCP post SNF [facility] f/u. Appt scheduled for 4/20/18 w/ NP Sriram. Pt reported son will transport. Plan- pt to attend 4/20/18 PCP post SNF [facility] f/u. NN to collaborate with pt/family & Care Team Members as needed for Care Coordination. Next NN f/u ~ 2 weeks. NN out of office 4/23/18-4/27/18-ID.       3/26/18- spoke with pt's dtr-in-law. Reported concern pt not suitable for Independent Living. More suitable for long term. Reported pt \"hard-headed. Continues to drink & smoke. Noticed he's SOB\". Dtr-in-law &  (pt's son) called pt's dtr in [facility] to [facility] of situation. Message left. Spoke with SNF [facility]. Reported pt adamant about leaving SNF today. Doesn't have transportation. Pt signed himself in to SNF. Can't be kept against his will. Have spoken with son to inform. NN advised pt returning home alone @ this time not safe. Uncertain of pt's ability to self manage his meds & care. SNF SW to continue speaking with pt re gainst pt leaving.  Plan- NN to collaborate with pt's son, dtr-in-law, & SNF SW re discharge plan/disposition-ID

## 2018-04-21 DIAGNOSIS — G93.40 ENCEPHALOPATHY: Primary | ICD-10-CM

## 2018-04-21 RX ORDER — NEBULIZER AND COMPRESSOR
1 EACH MISCELLANEOUS AS NEEDED
Qty: 1 EACH | Refills: 0 | Status: SHIPPED | OUTPATIENT
Start: 2018-04-21

## 2018-04-23 ENCOUNTER — TELEPHONE (OUTPATIENT)
Dept: FAMILY MEDICINE CLINIC | Age: 83
End: 2018-04-23

## 2018-05-01 ENCOUNTER — PATIENT OUTREACH (OUTPATIENT)
Dept: FAMILY MEDICINE CLINIC | Age: 83
End: 2018-05-01

## 2018-05-01 NOTE — PROGRESS NOTES
NN Progress Note    Goals Addressed      Identification of barriers to adherence to a plan of care such as inability to afford medications, lack of insurance, lack of transportation, etc.                  5/1/18- attempted to contact pt. Unable to leave messages. Attempted to contact pt's son. Message left. Plan- next NN f/u ~ 1 week-ID.    4/20/18- spoke w/ dtr-in-law Demetria Quintanilla. Reported she nor  (pt's son) saw pt yesterday 4/19/18. Pt did not contact them to inform of today's appt. Neither will be available today to accompany pt. Dtr-in-law verbalized concern \"he shouldn't be living by himself. He shouldn't be driving. He doesn't listen to us. Doesn't wear his hearing aids. He does what he wants to do. Dtr lives in [place]. Will be moving to [place]. Will be stopping in [facility] to meet w/ my  to discuss what to do w/ their father\". Dtr-in-law requested appt not be cancelled. \"See if he show's up. If he doesn't schedule another appt for next week. We'll make sure to be there\". NN contacted pt. Pt thought NN was [address]. \"You are coming tomorrow\". NN clarified calling from PCP office. Inquired about appt. Pt reported \"hard time hearing you\". NN reminded pt of today's 3 PM appt. Pt reported son or dtr-in-law would take him. NN advised that wasn't possible today. NN offered to to reschedule. Pt declined. Pt reported \"I'm not driving, but my next door neighbor drives. I'll ask him to take me\". NN again offered to reschedule. Pt declined. \"I'll be there\". Uncertain if pt has a Nebulizer @ home. Spoke w/ [facility] Nurse Manager. Per SOC SN note \"pt doesn't have Nebulizer. Pt reported son has one. Can use his\". Per dtr-in-law son does not have Neb. She has one. Pt needs a Nebulizer. Plan- see if pt attends today's appt. If not NN to reschedule for next week. Provider to order Nebulizer if indicated. NN to continue collaboration w/ pt/family, [facility] staff & Care Team mmebers as needed for Care Coordination.  Next NN f/u ~ 2 weeks. NN out of office 4/23/18-4/27/18-ID       Transitions of [address] to Prevent complications post hospitalization. 5/1/18- received message from NP Ronnie Faustin re concern pt drove self to 4/20/18 appt c/o SOB & lightheadedness \"Not safe for him to be behind the wheel if experiencing sx!\". Also re foot pulse \"asked him to mention to nurse I can't palpate pulse in right foot - he said they used doppler in hospital and it was fine but wanted HH to keep an eye on it\". Spoke w/ [facility]. Advised of NP concerns. HH reported pt seen 4/30/18. No SOB noted. Had \"tight socks on\" advised to purchase Diabetic socks. Advised to go to ED if developed any calf pain as in past. HH reported pt still needs help w/ getting pill box straight. [facility] nurse reported \"pt drinking. Had a fifth of alcohol by rocking chair. [place] [facility]. Rides bike to [employer] [facility]. Pt reported alcohol helps him sleep\". NN advised pt had Cardio appt scheduled 4/30/18. Per EMR review pt didn't attend appt. NN attempted to contact pt's son. Message left. Attempted to contact pt @ listed h/m #s. Unable to reach or leave messages. Plan- HH to monitor pt for SOB & right foot for any circulatory changes. [facility] nurse to discuss need to re-schedule Cardip appt. HH to contact NN with any concerns. NN to continue collaboration w/ pt/family, [facility] staff & Care Team mmebers as needed for Care Coordination. Next NN f/u ~ 1 week-ID     4/20/18- spoke w/ dtr-in-law Maine Mancia, pt, [facility] Nurse Manager. Questionable if pt will attend today's appt. Pt declined to reschedule. See Barrier Goal documentation. Plan- pt to attend 4/20/18 PCP post SNF [facility] f/u. NN to collaborate with pt/family & Care Team Members as needed for Care Coordination. Next NN f/u ~ 2 weeks. NN out of office 4/23/18-4/27/18-ID. 4/19/18- EMR reviewed.  Per 4/17/18 CCT SNF f/u note \"Per Jovani Jimenez at [facility], Confirmed pt was discharged 4/16 home alone with [employer] (previously used)\". NN attempted to contact pt's son. Message left. Spoke w/ pt. Pt reported lives alone. Son & dtr-in-law visit daily. [facility] visited Tuesday. Filled pillbox. Discussed need for PCP post SNF [facility] f/u. Appt scheduled for 4/20/18 w/ NP Sriram. Pt reported son will transport. Plan- pt to attend 4/20/18 PCP post SNF [facility] f/u. NN to collaborate with pt/family & Care Team Members as needed for Care Coordination. Next NN f/u ~ 2 weeks. NN out of office 4/23/18-4/27/18-ID.    3/26/18- spoke with pt's dtr-in-law. Reported concern pt not suitable for Independent Living. More suitable for [facility]. Reported pt \"hard-headed. Continues to drink & smoke. Noticed he's SOB\". Dtr-in-law &  (pt's son) called pt's dtr in [facility] to [facility] of situation. Message left. Spoke with SNF [facility]. Reported pt adamant about leaving SNF today. Doesn't have transportation. Pt signed himself in to SNF. Can't be kept against his will. Have spoken with son to inform. NN advised pt returning home alone @ this time not safe. Uncertain of pt's ability to self manage his meds & care. SNF  to continue speaking with pt re gainst pt leaving.  Plan- NN to collaborate with pt's son, dtr-in-law, & SNF SW re discharge plan/disposition-ID

## 2018-05-02 DIAGNOSIS — E78.5 DYSLIPIDEMIA: ICD-10-CM

## 2018-05-02 RX ORDER — ATORVASTATIN CALCIUM 40 MG
TABLET ORAL
Qty: 90 TAB | Refills: 0 | Status: SHIPPED | OUTPATIENT
Start: 2018-05-02 | End: 2018-06-06 | Stop reason: SDUPTHER

## 2018-05-02 NOTE — TELEPHONE ENCOUNTER
PCP: Hair Hinojosa MD    Last appt: 4/20/2018  Future Appointments  Date Time Provider Batsheva Torres   7/10/2018 8:20 AM Hair Hinojosa MD [facility]       Requested Prescriptions     Pending Prescriptions Disp Refills    LIPITOR 40 mg tablet [Pharmacy Med Name: LIPITOR 40 MG TABLET] 90 Tab 2     Sig: TAKE 1 TABLET BY MOUTH EVERY DAY     Lab Results   Component Value Date/Time    Sodium 145 03/23/2018 04:16 AM    Potassium 4.1 03/23/2018 04:16 AM    Chloride 113 (H) 03/23/2018 04:16 AM    CO2 25 03/23/2018 04:16 AM    Anion gap 7 03/23/2018 04:16 AM    Glucose 96 03/23/2018 04:16 AM    BUN 16 03/23/2018 04:16 AM    Creatinine 1.26 03/23/2018 04:16 AM    BUN/Creatinine ratio 13 03/23/2018 04:16 AM    GFR est AA >60 03/23/2018 04:16 AM    GFR est non-AA 55 (L) 03/23/2018 04:16 AM    Calcium 8.8 03/23/2018 04:16 AM     Lab Results   Component Value Date/Time    Hemoglobin A1c 5.4 01/09/2018 10:10 AM    Hemoglobin A1c (POC) 5.7 01/14/2016 01:52 PM     Lab Results   Component Value Date/Time    Cholesterol, total 147 11/07/2017 03:24 AM    HDL Cholesterol 63 11/07/2017 03:24 AM    LDL, calculated 70.2 11/07/2017 03:24 AM    VLDL, calculated 13.8 11/07/2017 03:24 AM    Triglyceride 69 11/07/2017 03:24 AM    CHOL/HDL Ratio 2.3 11/07/2017 03:24 AM     Lab Results   Component Value Date/Time    TSH 5.280 (H) 01/09/2018 10:10 AM

## 2018-05-03 ENCOUNTER — PATIENT OUTREACH (OUTPATIENT)
Dept: FAMILY MEDICINE CLINIC | Age: 83
End: 2018-05-03

## 2018-05-03 NOTE — PROGRESS NOTES
NN F/U Progress Note    Goals Addressed      Transitions of [facility] Coordination to Prevent complications post hospitalization. 5/3/18- call from [facility]. Pt A7O during visit today. No SOB. Lungs clear. Faint pedal pulse on right foot. Still wearing socksw/ tight band. Sl edema above band. Will call son to discuss not to wear socks w/ tight band. No alcohol bottles noted in apartment today. Pt reported he didn't drive self to PCP. Next SN [facility] visit one day next week. Plan- SN HH visit week of 5/7/18. Next NN f/u ~ 5 days with pt & son/dtr-in-law. Cardio appt needs to be rescheduled. Will address avoidance of tight socks-ID.    5/1/18- received message from NP Letitia Neal re concern pt drove lf to 4/20/18 appt c/o SOB & lightheadedness \"Not safe for him to be behind the wheel if experiencing sx!\". Also re foot pulse \"asked him to mention to nurse I can't palpate pulse in right foot - he said they used doppler in hospital and it was fine but wanted HH to keep an eye on it\". Spoke w/ [facility]. Advised of NP concerns. HH reported pt seen 4/30/18. No SOB noted. Had \"tight socks on\" advised to purchase Diabetic socks. Advised to go to ED if developed any calf pain as in past. HH reported pt still needs help w/ getting pill box straight. [facility] nurse reported \"pt drinking. Had a fifth of alcohol by rocking chair. Lives across from [employer]. Rides bike to [employer]. Pt reported alcohol helps him sleep\". NN advised pt had Cardio appt scheduled 4/30/18. Per EMR review pt didn't attend appt. NN attempted to contact pt's son. Message left. Attempted to contact pt @ listed h/m #s. Unable to reach or leave messages. Plan- HH to monitor pt for SOB & right foot for any circulatory changes. [facility] nurse to discuss need to re-schedule Cardip appt. HH to contact NN with any concerns.  NN to continue collaboration w/ pt/family, [facility] staff & Care Team mmebers as needed for Care Coordination. Next NN f/u ~ 1 week-ID     4/20/18- spoke w/ dtr-in-law Aubrey Naranjo, pt, [place] Nurse Manager. Questionable if pt will attend today's appt. Pt declined to reschedule. See Barrier Goal documentation. Plan- pt to attend 4/20/18 PCP post SNF [facility] f/u. NN to collaborate with pt/family & Care Team Members as needed for Care Coordination. Next NN f/u ~ 2 weeks. NN out of office 4/23/18-4/27/18-ID. 4/19/18- EMR reviewed. Per 4/17/18 CCT SNF f/u note \"Per Angelina Taylor at [facility], Confirmed pt was discharged 4/16 home alone with [employer] (previously used)\". NN attempted to contact pt's son. Message left. Spoke w/ pt. Pt reported lives alone. Son & dtr-in-law visit daily. [place] visited Tuesday. Filled pillbox. Discussed need for PCP post SNF [facility] f/u. Appt scheduled for 4/20/18 w/ NP Sriram. Pt reported son will transport. Plan- pt to attend 4/20/18 PCP post SNF [facility] f/u. NN to collaborate with pt/family & Care Team Members as needed for Care Coordination. Next NN f/u ~ 2 weeks. NN out of office 4/23/18-4/27/18-ID.    3/26/18- spoke with pt's dtr-in-law. Reported concern pt not suitable for Independent Living. More suitable for [facility]. Reported pt \"hard-headed. Continues to drink & smoke. Noticed he's SOB\". Dtr-in-law &  (pt's son) called pt's dtr in [facility] to [facility] of situation. Message left. Spoke with SNF [facility]. Reported pt adamant about leaving SNF today. Doesn't have transportation. Pt signed himself in to SNF. Can't be kept against his will. Have spoken with son to inform. NN advised pt returning home alone @ this time not safe. Uncertain of pt's ability to self manage his meds & care. SNF SW to continue speaking with pt re gainst pt leaving.  Plan- NN to collaborate with pt's son, dtr-in-law, & SNF SW re discharge plan/disposition-ID

## 2018-05-10 ENCOUNTER — TELEPHONE (OUTPATIENT)
Dept: FAMILY MEDICINE CLINIC | Age: 83
End: 2018-05-10

## 2018-05-10 NOTE — TELEPHONE ENCOUNTER
Spoke with patient after obtaining 2 patient identifiers- Patient told to call the number on his insurance card to see where he is to get Medical equipmant.

## 2018-05-10 NOTE — TELEPHONE ENCOUNTER
----- Message from Leonarda Laws sent at 5/9/2018  4:54 PM EDT -----  Regarding: Dr. Jose Prasad  Patient was given a prescription for a power chair and walker and needs to know how to get them. His number is [phone].

## 2018-05-11 ENCOUNTER — APPOINTMENT (OUTPATIENT)
Dept: CT IMAGING | Age: 83
End: 2018-05-11
Attending: EMERGENCY MEDICINE
Payer: MEDICARE

## 2018-05-11 ENCOUNTER — APPOINTMENT (OUTPATIENT)
Dept: GENERAL RADIOLOGY | Age: 83
End: 2018-05-11
Attending: EMERGENCY MEDICINE
Payer: MEDICARE

## 2018-05-11 ENCOUNTER — HOSPITAL ENCOUNTER (EMERGENCY)
Age: 83
Discharge: ANOTHER HOSPITAL | End: 2018-05-11
Attending: EMERGENCY MEDICINE
Payer: MEDICARE

## 2018-05-11 VITALS
HEART RATE: 94 BPM | OXYGEN SATURATION: 93 % | WEIGHT: 180 LBS | SYSTOLIC BLOOD PRESSURE: 115 MMHG | TEMPERATURE: 97.6 F | DIASTOLIC BLOOD PRESSURE: 71 MMHG | RESPIRATION RATE: 27 BRPM | BODY MASS INDEX: 31.89 KG/M2

## 2018-05-11 DIAGNOSIS — R77.8 ELEVATED TROPONIN: ICD-10-CM

## 2018-05-11 DIAGNOSIS — S06.5XAA SUBDURAL HEMATOMA: Primary | ICD-10-CM

## 2018-05-11 DIAGNOSIS — N17.9 ACUTE RENAL FAILURE, UNSPECIFIED ACUTE RENAL FAILURE TYPE (HCC): ICD-10-CM

## 2018-05-11 LAB
ALBUMIN SERPL-MCNC: 3.4 G/DL (ref 3.5–5)
ALBUMIN/GLOB SERPL: 0.8 {RATIO} (ref 1.1–2.2)
ALP SERPL-CCNC: 113 U/L (ref 45–117)
ALT SERPL-CCNC: 17 U/L (ref 12–78)
ANION GAP SERPL CALC-SCNC: 16 MMOL/L (ref 5–15)
AST SERPL-CCNC: 28 U/L (ref 15–37)
BASOPHILS # BLD: 0 K/UL (ref 0–0.1)
BASOPHILS NFR BLD: 0 % (ref 0–1)
BILIRUB SERPL-MCNC: 0.6 MG/DL (ref 0.2–1)
BUN SERPL-MCNC: 10 MG/DL (ref 6–20)
BUN/CREAT SERPL: 7 (ref 12–20)
CALCIUM SERPL-MCNC: 8.8 MG/DL (ref 8.5–10.1)
CHLORIDE SERPL-SCNC: 106 MMOL/L (ref 97–108)
CK SERPL-CCNC: 196 U/L (ref 39–308)
CO2 SERPL-SCNC: 19 MMOL/L (ref 21–32)
CREAT SERPL-MCNC: 1.5 MG/DL (ref 0.7–1.3)
DIFFERENTIAL METHOD BLD: ABNORMAL
EOSINOPHIL # BLD: 0.1 K/UL (ref 0–0.4)
EOSINOPHIL NFR BLD: 1 % (ref 0–7)
ERYTHROCYTE [DISTWIDTH] IN BLOOD BY AUTOMATED COUNT: 18 % (ref 11.5–14.5)
ETHANOL SERPL-MCNC: 30 MG/DL
GLOBULIN SER CALC-MCNC: 4.3 G/DL (ref 2–4)
GLUCOSE SERPL-MCNC: 134 MG/DL (ref 65–100)
HCT VFR BLD AUTO: 36.3 % (ref 36.6–50.3)
HGB BLD-MCNC: 11.6 G/DL (ref 12.1–17)
IMM GRANULOCYTES # BLD: 0.1 K/UL (ref 0–0.04)
IMM GRANULOCYTES NFR BLD AUTO: 1 % (ref 0–0.5)
INR BLD: 1 (ref 0.9–1.2)
INR PPP: 1 (ref 0.9–1.1)
LYMPHOCYTES # BLD: 0.5 K/UL (ref 0.8–3.5)
LYMPHOCYTES NFR BLD: 6 % (ref 12–49)
MCH RBC QN AUTO: 26.9 PG (ref 26–34)
MCHC RBC AUTO-ENTMCNC: 32 G/DL (ref 30–36.5)
MCV RBC AUTO: 84.2 FL (ref 80–99)
MONOCYTES # BLD: 0.5 K/UL (ref 0–1)
MONOCYTES NFR BLD: 6 % (ref 5–13)
NEUTS SEG # BLD: 7.5 K/UL (ref 1.8–8)
NEUTS SEG NFR BLD: 86 % (ref 32–75)
NRBC # BLD: 0 K/UL (ref 0–0.01)
NRBC BLD-RTO: 0 PER 100 WBC
PLATELET # BLD AUTO: 182 K/UL (ref 150–400)
PMV BLD AUTO: 10.8 FL (ref 8.9–12.9)
POTASSIUM SERPL-SCNC: 3.2 MMOL/L (ref 3.5–5.1)
PROT SERPL-MCNC: 7.7 G/DL (ref 6.4–8.2)
PROTHROMBIN TIME: 10.4 SEC (ref 9–11.1)
RBC # BLD AUTO: 4.31 M/UL (ref 4.1–5.7)
RBC MORPH BLD: ABNORMAL
RBC MORPH BLD: ABNORMAL
SODIUM SERPL-SCNC: 141 MMOL/L (ref 136–145)
TROPONIN I SERPL-MCNC: 0.27 NG/ML
WBC # BLD AUTO: 8.7 K/UL (ref 4.1–11.1)

## 2018-05-11 PROCEDURE — 85610 PROTHROMBIN TIME: CPT | Performed by: EMERGENCY MEDICINE

## 2018-05-11 PROCEDURE — 80053 COMPREHEN METABOLIC PANEL: CPT | Performed by: EMERGENCY MEDICINE

## 2018-05-11 PROCEDURE — 70450 CT HEAD/BRAIN W/O DYE: CPT

## 2018-05-11 PROCEDURE — 96361 HYDRATE IV INFUSION ADD-ON: CPT

## 2018-05-11 PROCEDURE — 72125 CT NECK SPINE W/O DYE: CPT

## 2018-05-11 PROCEDURE — 74011250637 HC RX REV CODE- 250/637: Performed by: EMERGENCY MEDICINE

## 2018-05-11 PROCEDURE — 96360 HYDRATION IV INFUSION INIT: CPT

## 2018-05-11 PROCEDURE — 85610 PROTHROMBIN TIME: CPT

## 2018-05-11 PROCEDURE — 99285 EMERGENCY DEPT VISIT HI MDM: CPT

## 2018-05-11 PROCEDURE — 82550 ASSAY OF CK (CPK): CPT | Performed by: EMERGENCY MEDICINE

## 2018-05-11 PROCEDURE — 72100 X-RAY EXAM L-S SPINE 2/3 VWS: CPT

## 2018-05-11 PROCEDURE — 93005 ELECTROCARDIOGRAM TRACING: CPT

## 2018-05-11 PROCEDURE — 84484 ASSAY OF TROPONIN QUANT: CPT | Performed by: EMERGENCY MEDICINE

## 2018-05-11 PROCEDURE — 80307 DRUG TEST PRSMV CHEM ANLYZR: CPT | Performed by: EMERGENCY MEDICINE

## 2018-05-11 PROCEDURE — 85025 COMPLETE CBC W/AUTO DIFF WBC: CPT | Performed by: EMERGENCY MEDICINE

## 2018-05-11 PROCEDURE — 74011250636 HC RX REV CODE- 250/636: Performed by: EMERGENCY MEDICINE

## 2018-05-11 PROCEDURE — 73502 X-RAY EXAM HIP UNI 2-3 VIEWS: CPT

## 2018-05-11 RX ORDER — ASPIRIN 325 MG/1
100 TABLET, FILM COATED ORAL
Status: COMPLETED | OUTPATIENT
Start: 2018-05-11 | End: 2018-05-11

## 2018-05-11 RX ORDER — SODIUM CHLORIDE 9 MG/ML
1000 INJECTION, SOLUTION INTRAVENOUS ONCE
Status: COMPLETED | OUTPATIENT
Start: 2018-05-11 | End: 2018-05-11

## 2018-05-11 RX ORDER — FOLIC ACID 1 MG/1
1 TABLET ORAL DAILY
Status: DISCONTINUED | OUTPATIENT
Start: 2018-05-12 | End: 2018-05-12 | Stop reason: HOSPADM

## 2018-05-11 RX ADMIN — Medication 100 MG: at 20:05

## 2018-05-11 RX ADMIN — SODIUM CHLORIDE 1000 ML: 900 INJECTION, SOLUTION INTRAVENOUS at 21:39

## 2018-05-11 NOTE — ED NOTES
Per EMS pt said he stopped drinking 2 hours ago. He said he bought \"this bottle\" yesterday and it was almost gone.

## 2018-05-11 NOTE — ED NOTES
Assumed care of pt. Pt resting in bed. RN Krista at bedside for EKG. Krista informed pt called EMS for a fall and having back pain. EMS got there and found he was drinking bourbon. Pt is alert and oriented x 4. Pt lives at home alone. Pt reports back pain 10/10.

## 2018-05-11 NOTE — ED NOTES
Bedside shift change report given to [address] (oncoming nurse) by Joshua Patel (offgoing nurse). Report included the following information SBAR, Kardex, OR Summary and MAR     Pt in CT still at this time. RN and tech aware pt needs blood work and possibly a new IV as well as screening to be completed.

## 2018-05-11 NOTE — ED PROVIDER NOTES
EMERGENCY DEPARTMENT HISTORY AND PHYSICAL EXAM      Date: 5/11/2018  Patient Name: Prince Pompa    History of Presenting Illness     Chief Complaint   Patient presents with    Fall     Patient was found with burboun; patient states he fell and c/o back pain       History Provided By: Patient and EMS    HPI: Prince Pompa, 81 y.o. male with PMHx significant for CAD, hypercholesterolemia, HTN, chronic back pain, MI, CVA, DJD, AAA, prostate CA, thyroid disease, presents via EMS to the ED for evaluation of a GLF that occurred PTA. Per EMS, pt was found naked on his floor with a bottle of bourbon. Pt states that his \"legs just gave out\" and that he could not get back up. He now reports having severe constant lower back pain since his fall. Pt denies any abdominal pain. There are no other complaints, changes, or physical findings at this time.     PCP: Nicky Melissa MD    Facility-Administered Medications Ordered in Other Encounters   Medication Dose Route Frequency Provider Last Rate Last Dose    sodium chloride (NS) flush             SALINE PERIPHERAL FLUSH Q8H soln 5 mL  5 mL InterCATHeter Q8H Lucio Munoz MD        saline peripheral flush soln 10 mL  10 mL InterCATHeter PRN Dipak Mcfarlane MD        niCARdipine (CARDENE) 25 mg in 0.9% sodium chloride 250 mL infusion  0-15 mg/hr IntraVENous TITRATE Taqueria Graham MD           Past History     Past Medical History:  Past Medical History:   Diagnosis Date    AAA (abdominal aortic aneurysm) ([address]     Acute MI ([address] 12/2010    dr Acuña Even    Acute prostatitis     again 9/12/16 [facility]'d [facility]    Advance directive discussed with patient 04/20/2016    Aneurysm ([facility] 6/2011    AAA    Borderline glaucoma (glaucoma suspect) 02/19/2015    notes from [address] [place] [address] rec'd    BPH (benign prostatic hyperplasia)     Bright red blood per rectum 02/04/2016    [facility] note Lindia Decree- w/u in progress   8/22/17 [facility] f/u note    CAD (coronary artery disease)     Calculus of kidney     Cerebral embolism with cerebral infarction ([address]     Chronic pain     back    Dizzy spells 6/2012    DJD (degenerative joint disease) of lumbar spine     ED (erectile dysfunction)     8/30/17 [facility]u note [facility]    Elevated PSA 03/20/2014    [facility] rec[facility]     8/24/16 f/u note    Encephalocele ([address]     Hypercholesterolemia     Hypertension     Pneumonia 02/05/2017    Prostate cancer ([address] 05/12/2014    crystal henry/ Dr Barbie Be 8/22/17 f/u note    PVD (peripheral vascular disease) with claudication ([address]     S/P radiation therapy 15 months out    probable cause of the rectal bleeding    Stroke ([address] 6/2011    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     COLONOSCOPY N/A 8/31/2016    COLONOSCOPY performed by Luis Perez MD at [facility] ENDOSCOPY    ENDOSCOPY, COLON, DIAGNOSTIC      HX CAROTID ENDARTERECTOMY Left     HX HEENT  10/2012    repair right drum cindyarianna dawkins    HX ORTHOPAEDIC Bilateral     BUNIONECTOMY    [place] LEFT HEART CATH,PERCUTANEOUS  10/16/2010         PTCA EACH ADDL VESSEL  10/16/2010         PTCA W/ STENT, INITIAL  10/16/2010         UPPER GI ENDOSCOPY,DIAGNOSIS  11/22/2017         VASCULAR SURGERY PROCEDURE UNLIST      left leg varicose vein stripping dr Katheryn Hardy       Family History:  Family History   Problem Relation Age of Onset    Diabetes Mother     Diabetes Father     Cancer Sister      LUNG    Cancer Brother      COLON    Cancer Sister      LUNG    Anesth Problems Neg Hx        Social History:  Social History   Substance Use Topics    Smoking status: Former Smoker     Packs/day: 0.25     Quit date: 9/10/2014    Smokeless tobacco: Never Used      Comment: has not had cigarette in over 3 month    Alcohol use No       Allergies: Allergies   Allergen Reactions    Norco [Hydrocodone-Acetaminophen] Other (comments)     Too sedated and felt like he was in a daze    Pcn [Penicillins] Rash    Percocet [Oxycodone-Acetaminophen] Nausea and Vomiting         Review of Systems   Review of Systems   Constitutional: Negative for chills and fever. HENT: Negative for congestion and sore throat. Eyes: Negative for visual disturbance. Respiratory: Negative for cough and shortness of breath. Cardiovascular: Negative for chest pain and leg swelling. Gastrointestinal: Negative for abdominal pain, blood in stool, diarrhea and nausea. Endocrine: Negative for polyuria. Genitourinary: Negative for dysuria and testicular pain. Musculoskeletal: Positive for back pain (lower). Negative for arthralgias, joint swelling and myalgias. Skin: Negative for rash. Allergic/Immunologic: Negative for immunocompromised state. Neurological: Negative for weakness and headaches. Hematological: Does not bruise/bleed easily. Psychiatric/Behavioral: Negative for confusion. Physical Exam   Physical Exam   Constitutional: He is oriented to person, place, and time. He appears well-developed and well-nourished. HENT:   Head: Normocephalic. Head is with abrasion (anterior scalp). Moist mucous membranes, no palpable hematoma   Eyes: Conjunctivae are normal. Pupils are equal, round, and reactive to light. Right eye exhibits no discharge. Left eye exhibits no discharge. Neck: Normal range of motion. Neck supple. No tracheal deviation present. No midline neck pain   Cardiovascular: Normal rate, regular rhythm and normal heart sounds. No murmur heard. Pulmonary/Chest: Effort normal and breath sounds normal. No respiratory distress. He has no wheezes. He has no rales. Abdominal: Soft.  Bowel sounds are normal. There is no tenderness. There is no rebound and no guarding. Musculoskeletal: Normal range of motion. He exhibits no edema or deformity. Left hip: He exhibits tenderness (some to the SI joint). Neurological: He is alert and oriented to person, place, and time. Pt moving all extremities, no focal deficits, speech is slurred consistent with alcohol intoxication   Skin: Skin is warm and dry. No bruising and no rash noted. No erythema. Psychiatric: His behavior is normal.   Nursing note and vitals reviewed. Diagnostic Study Results     Labs -     Recent Results (from the past 12 hour(s))   EKG, 12 LEAD, INITIAL    Collection Time: 05/11/18  6:55 PM   Result Value Ref Range    Ventricular Rate 108 BPM    Atrial Rate 108 BPM    P-R Interval 170 ms    QRS Duration 148 ms    Q-T Interval 414 ms    QTC Calculation (Bezet) 554 ms    Calculated P Axis 42 degrees    Calculated R Axis -92 degrees    Calculated T Axis 45 degrees    Diagnosis       Sinus tachycardia with occasional premature ventricular complexes  Right bundle branch block  When compared with ECG of 16-MAR-2018 21:49,  premature ventricular complexes are now present  MS interval has decreased  T wave inversion no longer evident in Inferior leads     CBC WITH AUTOMATED DIFF    Collection Time: 05/11/18  8:06 PM   Result Value Ref Range    WBC 8.7 4.1 - 11.1 K/uL    RBC 4.31 4.10 - 5.70 M/uL    HGB 11.6 (L) 12.1 - 17.0 g/dL    HCT 36.3 (L) 36.6 - 50.3 %    MCV 84.2 80.0 - 99.0 FL    MCH 26.9 26.0 - 34.0 PG    MCHC 32.0 30.0 - 36.5 g/dL    RDW 18.0 (H) 11.5 - 14.5 %    PLATELET 313 050 - 514 K/uL    MPV 10.8 8.9 - 12.9 FL    NRBC 0.0 0  WBC    ABSOLUTE NRBC 0.00 0.00 - 0.01 K/uL    NEUTROPHILS 86 (H) 32 - 75 %    LYMPHOCYTES 6 (L) 12 - 49 %    MONOCYTES 6 5 - 13 %    EOSINOPHILS 1 0 - 7 %    BASOPHILS 0 0 - 1 %    IMMATURE GRANULOCYTES 1 (H) 0.0 - 0.5 %    ABS. NEUTROPHILS 7.5 1.8 - 8.0 K/UL    ABS.  LYMPHOCYTES 0.5 (L) 0.8 - 3.5 K/UL ABS. MONOCYTES 0.5 0.0 - 1.0 K/UL    ABS. EOSINOPHILS 0.1 0.0 - 0.4 K/UL    ABS. BASOPHILS 0.0 0.0 - 0.1 K/UL    ABS. IMM. GRANS. 0.1 (H) 0.00 - 0.04 K/UL    DF SMEAR SCANNED      RBC COMMENTS ANISOCYTOSIS  1+        RBC COMMENTS SCHISTOCYTES  1+       METABOLIC PANEL, COMPREHENSIVE    Collection Time: 05/11/18  8:06 PM   Result Value Ref Range    Sodium 141 136 - 145 mmol/L    Potassium 3.2 (L) 3.5 - 5.1 mmol/L    Chloride 106 97 - 108 mmol/L    CO2 19 (L) 21 - 32 mmol/L    Anion gap 16 (H) 5 - 15 mmol/L    Glucose 134 (H) 65 - 100 mg/dL    BUN 10 6 - 20 MG/DL    Creatinine 1.50 (H) 0.70 - 1.30 MG/DL    BUN/Creatinine ratio 7 (L) 12 - 20      GFR est AA 54 (L) >60 ml/min/1.73m2    GFR est non-AA 45 (L) >60 ml/min/1.73m2    Calcium 8.8 8.5 - 10.1 MG/DL    Bilirubin, total 0.6 0.2 - 1.0 MG/DL    ALT (SGPT) 17 12 - 78 U/L    AST (SGOT) 28 15 - 37 U/L    Alk. phosphatase 113 45 - 117 U/L    Protein, total 7.7 6.4 - 8.2 g/dL    Albumin 3.4 (L) 3.5 - 5.0 g/dL    Globulin 4.3 (H) 2.0 - 4.0 g/dL    A-G Ratio 0.8 (L) 1.1 - 2.2     ETHYL ALCOHOL    Collection Time: 05/11/18  8:06 PM   Result Value Ref Range    ALCOHOL(ETHYL),SERUM 30 (H) <10 MG/DL   CK    Collection Time: 05/11/18  8:06 PM   Result Value Ref Range     39 - 308 U/L   TROPONIN I    Collection Time: 05/11/18  8:06 PM   Result Value Ref Range    Troponin-I, Qt. 0.27 (H) <0.05 ng/mL   PROTHROMBIN TIME + INR    Collection Time: 05/11/18  8:06 PM   Result Value Ref Range    INR 1.0 0.9 - 1.1      Prothrombin time 10.4 9.0 - 11.1 sec   POC INR    Collection Time: 05/11/18  8:51 PM   Result Value Ref Range    INR (POC) 1.0 <1.2         Radiologic Studies -   XR HIP LT W OR WO PELV 2-3 VWS   Final Result   IMPRESSION:  Moderate to severe left and mild right hip osteoarthritis. .   XR SPINE LUMB 2 OR 3 V   Final Result   FINDINGS:  Frontal, lateral, and lumbosacral radiographs.  Lumbar alignment and vertebral  body heights are normal. L3-L4 degenerative disc disease is moderate; diffuse  degenerative disc disease is more mild. Mild to moderate L2-S1 facet  osteoarthritis. Postaortobiiliac endograft and IVC filter placement. The  bilateral internal iliac arteries are embolized.     IMPRESSION  IMPRESSION:  Normal alignment and vertebral body heights. Degenerative disease. CT Results  (Last 48 hours)               05/11/18 1925  CT HEAD WO CONT Final result    Impression:  IMPRESSION: Acute, small to moderate, right hemispheric, subdural hematoma. Local mass effect on the sulci without midline shift. The findings were called to Dr. Kelli Hu on 5/11/2018 7:38 PM by Dr. Jerri Mcfarlane. [phone]           Narrative:  HEAD CT WITHOUT CONTRAST: 5/11/2018 7:25 PM       INDICATION: Head trauma, closed, mild, GCS >= 13, no risk factors, neuro exam   normal. Fall. COMPARISON: 3/16/2018, 2/5/2017. PROCEDURE: Axial images of the head were obtained without contrast. Coronal and   sagittal reformats were performed. CT dose reduction was achieved through use of   a standardized protocol tailored for this examination and automatic exposure   control for dose modulation. FINDINGS: A small to moderate right hemispheric subdural hematoma is new, and   measures up to 18 mm. There is local mass effect on the sulci, though no midline   shift. A small old left frontal and tiny left parietal infarctions (602-43,   602-50) are chronic. The ventricles and sulci are appropriate in size and   configuration for age. No new loss of gray-white differentiation to suggest late   acute or early subacute infarction. 05/11/18 1925  CT SPINE CERV WO CONT Final result    Impression:  IMPRESSION:    1. Motion limited. No displaced fracture. 2. Extensive degenerative disease, with multilevel stenoses as described above. 3. Emphysema. Narrative:  EXAM:  CT CERVICAL SPINE WITHOUT CONTRAST       INDICATION:   Spinal cord injury . Fall. COMPARISON: None. TECHNIQUE: Multislice helical CT of the cervical spine was performed without   intravenous contrast administration. Sagittal and coronal reconstructions were   generated. CT dose reduction was achieved through use of a standardized protocol   tailored for this examination and automatic exposure control for dose   modulation. FINDINGS:   The examination is motion limited. No displaced fracture or dislocation. C6 and   C7 are auto fused across the disc space. The craniocervical junction is normal.   There is emphysema in the lung apices. There are surgical clips around the left   carotid artery, suggesting prior carotid endarterectomy. C2-C3:  Uncovertebral and facet osteoarthritis cause mild bilateral neural   foraminal stenosis. C3-C4:  Disc osteophyte complex causes mild canal stenosis. Uncovertebral and   facet osteoarthritis cause right and severe left neural foraminal stenosis. C4-C5:  Uncovertebral and facet osteoarthritis causes severe bilateral neural   foraminal stenosis. C5-C6:  Uncovertebral and facet osteoarthritis cause bilateral neural foraminal   stenosis. C6-C7:  Uncovertebral and facet osteoarthritis cause bilateral neural foraminal   stenosis. C7-T1:  Facet osteoarthritis causes mild bilateral neural foraminal stenosis. T1-T2: Facet osteoarthritis causes bilateral neural foraminal stenosis. Medical Decision Making   I am the first provider for this patient. I reviewed the vital signs, available nursing notes, past medical history, past surgical history, family history and social history. Vital Signs-Reviewed the patient's vital signs.   Patient Vitals for the past 12 hrs:   Temp Pulse Resp BP SpO2   05/11/18 2315 - 94 27 115/71 93 %   05/11/18 2303 - 94 24 140/66 92 %   05/11/18 2230 - 100 28 115/53 93 %   05/11/18 2220 - 92 22 - 93 %   05/11/18 2145 - (!) 105 (!) 31 131/61 94 %   05/11/18 2103 97.6 °F (36.4 °C) 94 15 118/56 94 %   05/11/18 2015 - (!) 102 21 128/52 -   05/11/18 2014 - - - - 93 %   05/11/18 2008 - (!) 102 25 - 91 %   05/11/18 2003 - - - 112/64 -   05/11/18 1908 - 98 29 - (!) 87 %   05/11/18 1900 - 99 28 121/50 93 %   05/11/18 1843 97.8 °F (36.6 °C) 93 18 116/49 91 %       Pulse Oximetry Analysis - 92% on room air    Cardiac Monitor:   Rate: 102 bpm  Rhythm: Normal Sinus Rhythm 116/49     EKG interpretation: (Preliminary)  6:55 PM  Rhythm: sinus tachycardia, RBBB with occasional PVC's; and regular . Rate (approx.): 108; Axis: normal; HI interval: normal; QRS interval: normal at 148 ms; ST/T wave: normal at 414/554 ms; Other findings: no acute ischemic changes. Written by Paola Padilla ED Scribe, as dictated by Radha Pinedo DO. Records Reviewed: Nursing Notes, Old Medical Records and Ambulance Run Sheet    Provider Notes (Medical Decision Making):   DDx: alcohol intoxication, rhabdomyolysis, strain, sprain, contusion, fracture, closed head injury, intercranial bleed. ED Course:   Initial assessment performed. The patients presenting problems have been discussed, and they are in agreement with the care plan formulated and outlined with them. I have encouraged them to ask questions as they arise throughout their visit. CONSULT NOTE:  7:48 PM  Radha Pinedo DO spoke with Kelly Napier MD  Specialty: Neurosurgery  Discussed patient's hx, disposition, and available diagnostic and imaging results. Reviewed care plans. Consultant agrees with plans as outlined. He will observe the films and decide if he wants to move the pt to his hospital.    8:18 PM  Dr. Kadi Shaw will see the pt at [address] NOTE:  8:33 PM  Radha Pinedo DO spoke with Virgen oLpez MD  Specialty: Hospitalist  Discussed patient's hx, disposition, and available diagnostic and imaging results. Reviewed care plans. Consultant agrees with plans as outlined. He will accept the pt for transfer.     Critical Care Time:   CRITICAL CARE NOTE :  8:38 PM    IMPENDING DETERIORATION -CNS  ASSOCIATED RISK FACTORS - CNS Decompensation  MANAGEMENT- Bedside Assessment, Supervision of Care and Transfer  INTERPRETATION -  CT Scan, ECG and Blood Pressure  INTERVENTIONS - Neurologic interventions   CASE REVIEW - Hospitalist and Medical Sub-Specialist  TREATMENT RESPONSE -Stable  PERFORMED BY - Self    NOTES   :  I have spent 45 minutes of critical care time involved in lab review, consultations with specialist, family decision- making, bedside attention and documentation. During this entire length of time I was immediately available to the patient . Disposition:  TRANSFER NOTE:  8:34 PM  Patient is being transferred to ICU at [address], transfer accepted by Dr. Vinod Davis. The reasons for patient's transfer have been discussed with the patient and available family. They convey agreement and understanding for the need to be transferred as explained to them by Wyatt Loomis DO. PLAN:  Transfer to ICU at [address]    Diagnosis     Clinical Impression:   1. Subdural hematoma ([address]    2. Acute renal failure, unspecified acute renal failure type (HCC)    3. Elevated troponin        Attestations: This note is prepared by Flor Del Rio, acting as Scribe for Wyatt Loomis DO. Wyatt Loomis DO: The scribe's documentation has been prepared under my direction and personally reviewed by me in its entirety. I confirm that the note above accurately reflects all work, treatment, procedures, and medical decision making performed by me.

## 2018-05-12 ENCOUNTER — APPOINTMENT (OUTPATIENT)
Dept: GENERAL RADIOLOGY | Age: 83
DRG: 025 | End: 2018-05-12
Attending: INTERNAL MEDICINE
Payer: MEDICARE

## 2018-05-12 ENCOUNTER — HOSPITAL ENCOUNTER (INPATIENT)
Age: 83
LOS: 8 days | Discharge: NURSING, REHAB OR LONG-TERM CARE | DRG: 025 | End: 2018-05-20
Attending: FAMILY MEDICINE | Admitting: INTERNAL MEDICINE
Payer: MEDICARE

## 2018-05-12 ENCOUNTER — ANESTHESIA (OUTPATIENT)
Dept: SURGERY | Age: 83
DRG: 025 | End: 2018-05-12
Payer: MEDICARE

## 2018-05-12 ENCOUNTER — ANESTHESIA EVENT (OUTPATIENT)
Dept: SURGERY | Age: 83
DRG: 025 | End: 2018-05-12
Payer: MEDICARE

## 2018-05-12 ENCOUNTER — APPOINTMENT (OUTPATIENT)
Dept: CT IMAGING | Age: 83
DRG: 025 | End: 2018-05-12
Attending: INTERNAL MEDICINE
Payer: MEDICARE

## 2018-05-12 PROBLEM — S06.5XAA SUBDURAL HEMATOMA: Status: ACTIVE | Noted: 2018-05-12

## 2018-05-12 LAB
ALBUMIN SERPL-MCNC: 3.4 G/DL (ref 3.5–5)
ALBUMIN/GLOB SERPL: 0.8 {RATIO} (ref 1.1–2.2)
ALP SERPL-CCNC: 117 U/L (ref 45–117)
ALT SERPL-CCNC: 18 U/L (ref 12–78)
ANION GAP SERPL CALC-SCNC: 6 MMOL/L (ref 5–15)
AST SERPL-CCNC: 39 U/L (ref 15–37)
ATRIAL RATE: 108 BPM
BASOPHILS # BLD: 0 K/UL (ref 0–0.1)
BASOPHILS NFR BLD: 0 % (ref 0–1)
BILIRUB SERPL-MCNC: 0.6 MG/DL (ref 0.2–1)
BUN SERPL-MCNC: 15 MG/DL (ref 6–20)
BUN/CREAT SERPL: 10 (ref 12–20)
CALCIUM SERPL-MCNC: 8.3 MG/DL (ref 8.5–10.1)
CALCULATED P AXIS, ECG09: 42 DEGREES
CALCULATED R AXIS, ECG10: -92 DEGREES
CALCULATED T AXIS, ECG11: 45 DEGREES
CHLORIDE SERPL-SCNC: 109 MMOL/L (ref 97–108)
CHOLEST SERPL-MCNC: 144 MG/DL
CK MB CFR SERPL CALC: 0.9 % (ref 0–2.5)
CK MB SERPL-MCNC: 2.9 NG/ML (ref 5–25)
CK SERPL-CCNC: 339 U/L (ref 39–308)
CO2 SERPL-SCNC: 24 MMOL/L (ref 21–32)
CREAT SERPL-MCNC: 1.48 MG/DL (ref 0.7–1.3)
DIAGNOSIS, 93000: NORMAL
DIFFERENTIAL METHOD BLD: ABNORMAL
EOSINOPHIL # BLD: 0.1 K/UL (ref 0–0.4)
EOSINOPHIL NFR BLD: 1 % (ref 0–7)
ERYTHROCYTE [DISTWIDTH] IN BLOOD BY AUTOMATED COUNT: 17.4 % (ref 11.5–14.5)
EST. AVERAGE GLUCOSE BLD GHB EST-MCNC: 117 MG/DL
GLOBULIN SER CALC-MCNC: 4.2 G/DL (ref 2–4)
GLUCOSE SERPL-MCNC: 122 MG/DL (ref 65–100)
HBA1C MFR BLD: 5.7 % (ref 4.2–6.3)
HCT VFR BLD AUTO: 34.8 % (ref 36.6–50.3)
HDLC SERPL-MCNC: 78 MG/DL
HDLC SERPL: 1.8 {RATIO} (ref 0–5)
HGB BLD-MCNC: 10.9 G/DL (ref 12.1–17)
IMM GRANULOCYTES # BLD: 0 K/UL (ref 0–0.04)
IMM GRANULOCYTES NFR BLD AUTO: 0 % (ref 0–0.5)
LDLC SERPL CALC-MCNC: 53.6 MG/DL (ref 0–100)
LIPID PROFILE,FLP: NORMAL
LYMPHOCYTES # BLD: 0.7 K/UL (ref 0.8–3.5)
LYMPHOCYTES NFR BLD: 10 % (ref 12–49)
MAGNESIUM SERPL-MCNC: 1.6 MG/DL (ref 1.6–2.4)
MCH RBC QN AUTO: 26.5 PG (ref 26–34)
MCHC RBC AUTO-ENTMCNC: 31.3 G/DL (ref 30–36.5)
MCV RBC AUTO: 84.5 FL (ref 80–99)
MONOCYTES # BLD: 0.7 K/UL (ref 0–1)
MONOCYTES NFR BLD: 11 % (ref 5–13)
NEUTS SEG # BLD: 5.2 K/UL (ref 1.8–8)
NEUTS SEG NFR BLD: 78 % (ref 32–75)
NRBC # BLD: 0 K/UL (ref 0–0.01)
NRBC BLD-RTO: 0 PER 100 WBC
P-R INTERVAL, ECG05: 170 MS
PHOSPHATE SERPL-MCNC: 3.1 MG/DL (ref 2.6–4.7)
PLATELET # BLD AUTO: 138 K/UL (ref 150–400)
PMV BLD AUTO: 10 FL (ref 8.9–12.9)
POTASSIUM SERPL-SCNC: 4.6 MMOL/L (ref 3.5–5.1)
PROT SERPL-MCNC: 7.6 G/DL (ref 6.4–8.2)
Q-T INTERVAL, ECG07: 414 MS
QRS DURATION, ECG06: 148 MS
QTC CALCULATION (BEZET), ECG08: 554 MS
RBC # BLD AUTO: 4.12 M/UL (ref 4.1–5.7)
RBC MORPH BLD: ABNORMAL
SODIUM SERPL-SCNC: 139 MMOL/L (ref 136–145)
TRIGL SERPL-MCNC: 62 MG/DL (ref ?–150)
TROPONIN I SERPL-MCNC: 0.25 NG/ML
TSH SERPL DL<=0.05 MIU/L-ACNC: 4.25 UIU/ML (ref 0.36–3.74)
VENTRICULAR RATE, ECG03: 108 BPM
VLDLC SERPL CALC-MCNC: 12.4 MG/DL
WBC # BLD AUTO: 6.7 K/UL (ref 4.1–11.1)

## 2018-05-12 PROCEDURE — 77030004472 HC BUR TAPR MEDT -B: Performed by: NEUROLOGICAL SURGERY

## 2018-05-12 PROCEDURE — 76210000016 HC OR PH I REC 1 TO 1.5 HR: Performed by: NEUROLOGICAL SURGERY

## 2018-05-12 PROCEDURE — 77030005402 HC CATH RAD ART LN KT TELE -B

## 2018-05-12 PROCEDURE — 74011250636 HC RX REV CODE- 250/636: Performed by: NEUROLOGICAL SURGERY

## 2018-05-12 PROCEDURE — 85025 COMPLETE CBC W/AUTO DIFF WBC: CPT | Performed by: INTERNAL MEDICINE

## 2018-05-12 PROCEDURE — 77030002946 HC SUT NRLN J&J -B: Performed by: NEUROLOGICAL SURGERY

## 2018-05-12 PROCEDURE — 76060000034 HC ANESTHESIA 1.5 TO 2 HR: Performed by: NEUROLOGICAL SURGERY

## 2018-05-12 PROCEDURE — 74011000272 HC RX REV CODE- 272: Performed by: NEUROLOGICAL SURGERY

## 2018-05-12 PROCEDURE — 77030008684 HC TU ET CUF COVD -B: Performed by: NURSE ANESTHETIST, CERTIFIED REGISTERED

## 2018-05-12 PROCEDURE — 71045 X-RAY EXAM CHEST 1 VIEW: CPT

## 2018-05-12 PROCEDURE — 77030026438 HC STYL ET INTUB CARD -A: Performed by: NURSE ANESTHETIST, CERTIFIED REGISTERED

## 2018-05-12 PROCEDURE — 77030004391 HC BUR FLUT MEDT -C: Performed by: NEUROLOGICAL SURGERY

## 2018-05-12 PROCEDURE — 74011000250 HC RX REV CODE- 250

## 2018-05-12 PROCEDURE — 74011000258 HC RX REV CODE- 258

## 2018-05-12 PROCEDURE — 74011000250 HC RX REV CODE- 250: Performed by: NEUROLOGICAL SURGERY

## 2018-05-12 PROCEDURE — 77030003029 HC SUT VCRL J&J -B: Performed by: NEUROLOGICAL SURGERY

## 2018-05-12 PROCEDURE — 74011000258 HC RX REV CODE- 258: Performed by: NEUROLOGICAL SURGERY

## 2018-05-12 PROCEDURE — 77030012602 HC SPNG PTTY NEUR J&J -B: Performed by: NEUROLOGICAL SURGERY

## 2018-05-12 PROCEDURE — 76010000162 HC OR TIME 1.5 TO 2 HR INTENSV-TIER 1: Performed by: NEUROLOGICAL SURGERY

## 2018-05-12 PROCEDURE — 77030014007 HC SPNG HEMSTAT J&J -B: Performed by: NEUROLOGICAL SURGERY

## 2018-05-12 PROCEDURE — 77030032490 HC SLV COMPR SCD KNE COVD -B: Performed by: NEUROLOGICAL SURGERY

## 2018-05-12 PROCEDURE — 77030013797 HC KT TRNSDUC PRSSR EDWD -A

## 2018-05-12 PROCEDURE — 74011250637 HC RX REV CODE- 250/637: Performed by: INTERNAL MEDICINE

## 2018-05-12 PROCEDURE — 74011250636 HC RX REV CODE- 250/636: Performed by: INTERNAL MEDICINE

## 2018-05-12 PROCEDURE — 77030013079 HC BLNKT BAIR HGGR 3M -A: Performed by: NURSE ANESTHETIST, CERTIFIED REGISTERED

## 2018-05-12 PROCEDURE — 74011250636 HC RX REV CODE- 250/636: Performed by: ANESTHESIOLOGY

## 2018-05-12 PROCEDURE — 77030018836 HC SOL IRR NACL ICUM -A: Performed by: NEUROLOGICAL SURGERY

## 2018-05-12 PROCEDURE — 36591 DRAW BLOOD OFF VENOUS DEVICE: CPT

## 2018-05-12 PROCEDURE — 74011250636 HC RX REV CODE- 250/636

## 2018-05-12 PROCEDURE — 84484 ASSAY OF TROPONIN QUANT: CPT | Performed by: INTERNAL MEDICINE

## 2018-05-12 PROCEDURE — 80061 LIPID PANEL: CPT | Performed by: INTERNAL MEDICINE

## 2018-05-12 PROCEDURE — 77030003892 HC BIT DRL TWST MEDT -B: Performed by: NEUROLOGICAL SURGERY

## 2018-05-12 PROCEDURE — 65610000006 HC RM INTENSIVE CARE

## 2018-05-12 PROCEDURE — 83735 ASSAY OF MAGNESIUM: CPT | Performed by: INTERNAL MEDICINE

## 2018-05-12 PROCEDURE — 83036 HEMOGLOBIN GLYCOSYLATED A1C: CPT | Performed by: INTERNAL MEDICINE

## 2018-05-12 PROCEDURE — 77030013567 HC DRN WND RESERV BARD -A: Performed by: NEUROLOGICAL SURGERY

## 2018-05-12 PROCEDURE — 82550 ASSAY OF CK (CPK): CPT | Performed by: INTERNAL MEDICINE

## 2018-05-12 PROCEDURE — 74011000250 HC RX REV CODE- 250: Performed by: INTERNAL MEDICINE

## 2018-05-12 PROCEDURE — 80053 COMPREHEN METABOLIC PANEL: CPT | Performed by: INTERNAL MEDICINE

## 2018-05-12 PROCEDURE — P9045 ALBUMIN (HUMAN), 5%, 250 ML: HCPCS

## 2018-05-12 PROCEDURE — 36415 COLL VENOUS BLD VENIPUNCTURE: CPT | Performed by: INTERNAL MEDICINE

## 2018-05-12 PROCEDURE — 77030009081 HC CLP NEUR GUN SET MEDT -B: Performed by: NEUROLOGICAL SURGERY

## 2018-05-12 PROCEDURE — 77030012406 HC DRN WND PENRS BARD -A: Performed by: NEUROLOGICAL SURGERY

## 2018-05-12 PROCEDURE — 00C40ZZ EXTIRPATION OF MATTER FROM INTRACRANIAL SUBDURAL SPACE, OPEN APPROACH: ICD-10-PCS | Performed by: NEUROLOGICAL SURGERY

## 2018-05-12 PROCEDURE — 51798 US URINE CAPACITY MEASURE: CPT

## 2018-05-12 PROCEDURE — 84100 ASSAY OF PHOSPHORUS: CPT | Performed by: INTERNAL MEDICINE

## 2018-05-12 PROCEDURE — 77030014647 HC SEAL FBRN TISSL BAXT -D: Performed by: NEUROLOGICAL SURGERY

## 2018-05-12 PROCEDURE — 84443 ASSAY THYROID STIM HORMONE: CPT | Performed by: INTERNAL MEDICINE

## 2018-05-12 PROCEDURE — 77030011233 HC DRN FACE WND AXIO -B: Performed by: NEUROLOGICAL SURGERY

## 2018-05-12 DEVICE — COVER BUR H L DIA18.5MM THK0.5MM 5 H NEURO TI W/O TAB: Type: IMPLANTABLE DEVICE | Site: SKULL | Status: FUNCTIONAL

## 2018-05-12 DEVICE — PLATE BONE LNG L16MM THK0.6MM 2 H TI STR FOR 1.5MM SCR: Type: IMPLANTABLE DEVICE | Site: SKULL | Status: FUNCTIONAL

## 2018-05-12 DEVICE — SCREW BNE L4MM DIA1.5MM CORT MAXILLOMANDIBULAR GRN TI SELF: Type: IMPLANTABLE DEVICE | Site: SKULL | Status: FUNCTIONAL

## 2018-05-12 RX ORDER — CEFAZOLIN SODIUM/WATER 2 G/20 ML
2 SYRINGE (ML) INTRAVENOUS ONCE
Status: COMPLETED | OUTPATIENT
Start: 2018-05-12 | End: 2018-05-12

## 2018-05-12 RX ORDER — ROCURONIUM BROMIDE 10 MG/ML
INJECTION, SOLUTION INTRAVENOUS AS NEEDED
Status: DISCONTINUED | OUTPATIENT
Start: 2018-05-12 | End: 2018-05-12 | Stop reason: HOSPADM

## 2018-05-12 RX ORDER — FAMOTIDINE 20 MG/1
20 TABLET, FILM COATED ORAL 2 TIMES DAILY
Status: DISCONTINUED | OUTPATIENT
Start: 2018-05-12 | End: 2018-05-12

## 2018-05-12 RX ORDER — SODIUM CHLORIDE 0.9 % (FLUSH) 0.9 %
10 SYRINGE (ML) INJECTION AS NEEDED
Status: DISCONTINUED | OUTPATIENT
Start: 2018-05-12 | End: 2018-05-17

## 2018-05-12 RX ORDER — MIDAZOLAM HYDROCHLORIDE 1 MG/ML
0.5 INJECTION, SOLUTION INTRAMUSCULAR; INTRAVENOUS
Status: DISCONTINUED | OUTPATIENT
Start: 2018-05-12 | End: 2018-05-12 | Stop reason: HOSPADM

## 2018-05-12 RX ORDER — SODIUM CHLORIDE 0.9 % (FLUSH) 0.9 %
SYRINGE (ML) INJECTION
Status: COMPLETED
Start: 2018-05-12 | End: 2018-05-12

## 2018-05-12 RX ORDER — LIDOCAINE HYDROCHLORIDE AND EPINEPHRINE 10; 10 MG/ML; UG/ML
INJECTION, SOLUTION INFILTRATION; PERINEURAL AS NEEDED
Status: DISCONTINUED | OUTPATIENT
Start: 2018-05-12 | End: 2018-05-12 | Stop reason: HOSPADM

## 2018-05-12 RX ORDER — ROPIVACAINE HYDROCHLORIDE 5 MG/ML
30 INJECTION, SOLUTION EPIDURAL; INFILTRATION; PERINEURAL AS NEEDED
Status: DISCONTINUED | OUTPATIENT
Start: 2018-05-12 | End: 2018-05-12 | Stop reason: HOSPADM

## 2018-05-12 RX ORDER — HYDROMORPHONE HYDROCHLORIDE 2 MG/1
2 TABLET ORAL
Status: DISCONTINUED | OUTPATIENT
Start: 2018-05-12 | End: 2018-05-20 | Stop reason: HOSPADM

## 2018-05-12 RX ORDER — SODIUM CHLORIDE 0.9 % (FLUSH) 0.9 %
5-10 SYRINGE (ML) INJECTION EVERY 8 HOURS
Status: DISCONTINUED | OUTPATIENT
Start: 2018-05-12 | End: 2018-05-20 | Stop reason: HOSPADM

## 2018-05-12 RX ORDER — PROPOFOL 10 MG/ML
INJECTION, EMULSION INTRAVENOUS
Status: DISCONTINUED | OUTPATIENT
Start: 2018-05-12 | End: 2018-05-12 | Stop reason: HOSPADM

## 2018-05-12 RX ORDER — HYDROMORPHONE HYDROCHLORIDE 1 MG/ML
0.2 INJECTION, SOLUTION INTRAMUSCULAR; INTRAVENOUS; SUBCUTANEOUS
Status: DISCONTINUED | OUTPATIENT
Start: 2018-05-12 | End: 2018-05-12 | Stop reason: HOSPADM

## 2018-05-12 RX ORDER — ACETAMINOPHEN 325 MG/1
650 TABLET ORAL
Status: DISCONTINUED | OUTPATIENT
Start: 2018-05-12 | End: 2018-05-20 | Stop reason: HOSPADM

## 2018-05-12 RX ORDER — ALBUMIN HUMAN 50 G/1000ML
SOLUTION INTRAVENOUS AS NEEDED
Status: DISCONTINUED | OUTPATIENT
Start: 2018-05-12 | End: 2018-05-12 | Stop reason: HOSPADM

## 2018-05-12 RX ORDER — GLYCOPYRROLATE 0.2 MG/ML
0.2 INJECTION INTRAMUSCULAR; INTRAVENOUS
Status: DISCONTINUED | OUTPATIENT
Start: 2018-05-12 | End: 2018-05-12 | Stop reason: HOSPADM

## 2018-05-12 RX ORDER — SODIUM CHLORIDE 9 MG/ML
25 INJECTION, SOLUTION INTRAVENOUS CONTINUOUS
Status: DISCONTINUED | OUTPATIENT
Start: 2018-05-12 | End: 2018-05-12 | Stop reason: HOSPADM

## 2018-05-12 RX ORDER — SODIUM CHLORIDE, SODIUM LACTATE, POTASSIUM CHLORIDE, CALCIUM CHLORIDE 600; 310; 30; 20 MG/100ML; MG/100ML; MG/100ML; MG/100ML
1000 INJECTION, SOLUTION INTRAVENOUS CONTINUOUS
Status: DISCONTINUED | OUTPATIENT
Start: 2018-05-12 | End: 2018-05-12 | Stop reason: HOSPADM

## 2018-05-12 RX ORDER — ONDANSETRON 2 MG/ML
4 INJECTION INTRAMUSCULAR; INTRAVENOUS
Status: DISCONTINUED | OUTPATIENT
Start: 2018-05-12 | End: 2018-05-20 | Stop reason: HOSPADM

## 2018-05-12 RX ORDER — SODIUM CHLORIDE AND POTASSIUM CHLORIDE .9; .15 G/100ML; G/100ML
SOLUTION INTRAVENOUS CONTINUOUS
Status: DISCONTINUED | OUTPATIENT
Start: 2018-05-12 | End: 2018-05-15

## 2018-05-12 RX ORDER — NICARDIPINE HYDROCHLORIDE 0.2 MG/ML
INJECTION INTRAVENOUS
Status: DISCONTINUED | OUTPATIENT
Start: 2018-05-12 | End: 2018-05-12 | Stop reason: HOSPADM

## 2018-05-12 RX ORDER — FINASTERIDE 5 MG/1
5 TABLET, FILM COATED ORAL DAILY
Status: DISCONTINUED | OUTPATIENT
Start: 2018-05-12 | End: 2018-05-20 | Stop reason: HOSPADM

## 2018-05-12 RX ORDER — MORPHINE SULFATE 10 MG/ML
2 INJECTION, SOLUTION INTRAMUSCULAR; INTRAVENOUS
Status: DISCONTINUED | OUTPATIENT
Start: 2018-05-12 | End: 2018-05-12 | Stop reason: HOSPADM

## 2018-05-12 RX ORDER — SODIUM CHLORIDE 0.9 % (FLUSH) 0.9 %
5 SYRINGE (ML) INJECTION EVERY 8 HOURS
Status: DISCONTINUED | OUTPATIENT
Start: 2018-05-12 | End: 2018-05-17

## 2018-05-12 RX ORDER — HYDROMORPHONE HYDROCHLORIDE 2 MG/ML
1 INJECTION, SOLUTION INTRAMUSCULAR; INTRAVENOUS; SUBCUTANEOUS
Status: DISCONTINUED | OUTPATIENT
Start: 2018-05-12 | End: 2018-05-14

## 2018-05-12 RX ORDER — SODIUM CHLORIDE 0.9 % (FLUSH) 0.9 %
5-10 SYRINGE (ML) INJECTION AS NEEDED
Status: DISCONTINUED | OUTPATIENT
Start: 2018-05-12 | End: 2018-05-12

## 2018-05-12 RX ORDER — ATORVASTATIN CALCIUM 40 MG/1
40 TABLET, FILM COATED ORAL
Status: DISCONTINUED | OUTPATIENT
Start: 2018-05-12 | End: 2018-05-20 | Stop reason: HOSPADM

## 2018-05-12 RX ORDER — ONDANSETRON 2 MG/ML
INJECTION INTRAMUSCULAR; INTRAVENOUS AS NEEDED
Status: DISCONTINUED | OUTPATIENT
Start: 2018-05-12 | End: 2018-05-12 | Stop reason: HOSPADM

## 2018-05-12 RX ORDER — LABETALOL HYDROCHLORIDE 5 MG/ML
INJECTION, SOLUTION INTRAVENOUS AS NEEDED
Status: DISCONTINUED | OUTPATIENT
Start: 2018-05-12 | End: 2018-05-12 | Stop reason: HOSPADM

## 2018-05-12 RX ORDER — TAMSULOSIN HYDROCHLORIDE 0.4 MG/1
0.4 CAPSULE ORAL DAILY
Status: DISCONTINUED | OUTPATIENT
Start: 2018-05-12 | End: 2018-05-20 | Stop reason: HOSPADM

## 2018-05-12 RX ORDER — LORAZEPAM 2 MG/ML
INJECTION INTRAMUSCULAR
Status: DISPENSED
Start: 2018-05-12 | End: 2018-05-13

## 2018-05-12 RX ORDER — LEVETIRACETAM 500 MG/1
500 TABLET ORAL 2 TIMES DAILY
Status: DISCONTINUED | OUTPATIENT
Start: 2018-05-12 | End: 2018-05-12

## 2018-05-12 RX ORDER — EPHEDRINE SULFATE 50 MG/ML
5 INJECTION, SOLUTION INTRAVENOUS AS NEEDED
Status: DISCONTINUED | OUTPATIENT
Start: 2018-05-12 | End: 2018-05-12 | Stop reason: HOSPADM

## 2018-05-12 RX ORDER — ONDANSETRON 2 MG/ML
4 INJECTION INTRAMUSCULAR; INTRAVENOUS AS NEEDED
Status: DISCONTINUED | OUTPATIENT
Start: 2018-05-12 | End: 2018-05-12 | Stop reason: HOSPADM

## 2018-05-12 RX ORDER — FENTANYL CITRATE 50 UG/ML
50 INJECTION, SOLUTION INTRAMUSCULAR; INTRAVENOUS AS NEEDED
Status: DISCONTINUED | OUTPATIENT
Start: 2018-05-12 | End: 2018-05-12 | Stop reason: HOSPADM

## 2018-05-12 RX ORDER — FAMOTIDINE 20 MG/1
20 TABLET, FILM COATED ORAL DAILY
Status: DISCONTINUED | OUTPATIENT
Start: 2018-05-12 | End: 2018-05-13

## 2018-05-12 RX ORDER — FENTANYL CITRATE 50 UG/ML
INJECTION, SOLUTION INTRAMUSCULAR; INTRAVENOUS AS NEEDED
Status: DISCONTINUED | OUTPATIENT
Start: 2018-05-12 | End: 2018-05-12 | Stop reason: HOSPADM

## 2018-05-12 RX ORDER — DEXAMETHASONE SODIUM PHOSPHATE 4 MG/ML
INJECTION, SOLUTION INTRA-ARTICULAR; INTRALESIONAL; INTRAMUSCULAR; INTRAVENOUS; SOFT TISSUE AS NEEDED
Status: DISCONTINUED | OUTPATIENT
Start: 2018-05-12 | End: 2018-05-12 | Stop reason: HOSPADM

## 2018-05-12 RX ORDER — SUCCINYLCHOLINE CHLORIDE 20 MG/ML
INJECTION INTRAMUSCULAR; INTRAVENOUS AS NEEDED
Status: DISCONTINUED | OUTPATIENT
Start: 2018-05-12 | End: 2018-05-12 | Stop reason: HOSPADM

## 2018-05-12 RX ORDER — PROPOFOL 10 MG/ML
INJECTION, EMULSION INTRAVENOUS AS NEEDED
Status: DISCONTINUED | OUTPATIENT
Start: 2018-05-12 | End: 2018-05-12 | Stop reason: HOSPADM

## 2018-05-12 RX ORDER — LIDOCAINE HYDROCHLORIDE 20 MG/ML
INJECTION, SOLUTION EPIDURAL; INFILTRATION; INTRACAUDAL; PERINEURAL AS NEEDED
Status: DISCONTINUED | OUTPATIENT
Start: 2018-05-12 | End: 2018-05-12 | Stop reason: HOSPADM

## 2018-05-12 RX ORDER — LORAZEPAM 2 MG/ML
1 INJECTION INTRAMUSCULAR
Status: DISCONTINUED | OUTPATIENT
Start: 2018-05-12 | End: 2018-05-20 | Stop reason: HOSPADM

## 2018-05-12 RX ORDER — MIDAZOLAM HYDROCHLORIDE 1 MG/ML
1 INJECTION, SOLUTION INTRAMUSCULAR; INTRAVENOUS AS NEEDED
Status: DISCONTINUED | OUTPATIENT
Start: 2018-05-12 | End: 2018-05-12 | Stop reason: HOSPADM

## 2018-05-12 RX ORDER — LIDOCAINE HYDROCHLORIDE 10 MG/ML
0.1 INJECTION, SOLUTION EPIDURAL; INFILTRATION; INTRACAUDAL; PERINEURAL AS NEEDED
Status: DISCONTINUED | OUTPATIENT
Start: 2018-05-12 | End: 2018-05-12 | Stop reason: HOSPADM

## 2018-05-12 RX ORDER — LEVOTHYROXINE SODIUM 88 UG/1
88 TABLET ORAL
Status: DISCONTINUED | OUTPATIENT
Start: 2018-05-12 | End: 2018-05-20 | Stop reason: HOSPADM

## 2018-05-12 RX ORDER — ISOSORBIDE MONONITRATE 30 MG/1
30 TABLET, EXTENDED RELEASE ORAL DAILY
Status: DISCONTINUED | OUTPATIENT
Start: 2018-05-12 | End: 2018-05-13

## 2018-05-12 RX ORDER — CEFAZOLIN SODIUM/WATER 2 G/20 ML
2 SYRINGE (ML) INTRAVENOUS EVERY 8 HOURS
Status: COMPLETED | OUTPATIENT
Start: 2018-05-13 | End: 2018-05-14

## 2018-05-12 RX ORDER — FENTANYL CITRATE 50 UG/ML
25 INJECTION, SOLUTION INTRAMUSCULAR; INTRAVENOUS
Status: DISCONTINUED | OUTPATIENT
Start: 2018-05-12 | End: 2018-05-12 | Stop reason: HOSPADM

## 2018-05-12 RX ORDER — IPRATROPIUM BROMIDE AND ALBUTEROL SULFATE 2.5; .5 MG/3ML; MG/3ML
3 SOLUTION RESPIRATORY (INHALATION)
Status: DISCONTINUED | OUTPATIENT
Start: 2018-05-12 | End: 2018-05-20 | Stop reason: HOSPADM

## 2018-05-12 RX ORDER — ALBUTEROL SULFATE 0.83 MG/ML
2.5 SOLUTION RESPIRATORY (INHALATION) AS NEEDED
Status: DISCONTINUED | OUTPATIENT
Start: 2018-05-12 | End: 2018-05-12 | Stop reason: HOSPADM

## 2018-05-12 RX ORDER — DIPHENHYDRAMINE HYDROCHLORIDE 50 MG/ML
12.5 INJECTION, SOLUTION INTRAMUSCULAR; INTRAVENOUS AS NEEDED
Status: DISCONTINUED | OUTPATIENT
Start: 2018-05-12 | End: 2018-05-12 | Stop reason: HOSPADM

## 2018-05-12 RX ADMIN — PROPOFOL 150 MG: 10 INJECTION, EMULSION INTRAVENOUS at 16:56

## 2018-05-12 RX ADMIN — ROCURONIUM BROMIDE 35 MG: 10 INJECTION, SOLUTION INTRAVENOUS at 17:06

## 2018-05-12 RX ADMIN — ACETAMINOPHEN 650 MG: 325 TABLET, FILM COATED ORAL at 03:05

## 2018-05-12 RX ADMIN — SODIUM CHLORIDE AND POTASSIUM CHLORIDE: 9; 1.49 INJECTION, SOLUTION INTRAVENOUS at 07:30

## 2018-05-12 RX ADMIN — LIDOCAINE HYDROCHLORIDE 60 MG: 20 INJECTION, SOLUTION EPIDURAL; INFILTRATION; INTRACAUDAL; PERINEURAL at 16:56

## 2018-05-12 RX ADMIN — LABETALOL HYDROCHLORIDE 10 MG: 5 INJECTION, SOLUTION INTRAVENOUS at 18:48

## 2018-05-12 RX ADMIN — SODIUM CHLORIDE 500 MG: 900 INJECTION, SOLUTION INTRAVENOUS at 09:28

## 2018-05-12 RX ADMIN — FENTANYL CITRATE 25 MCG: 50 INJECTION, SOLUTION INTRAMUSCULAR; INTRAVENOUS at 19:40

## 2018-05-12 RX ADMIN — PROPOFOL 100 MCG/KG/MIN: 10 INJECTION, EMULSION INTRAVENOUS at 17:01

## 2018-05-12 RX ADMIN — Medication 5 ML: at 03:05

## 2018-05-12 RX ADMIN — FENTANYL CITRATE 100 MCG: 50 INJECTION, SOLUTION INTRAMUSCULAR; INTRAVENOUS at 16:56

## 2018-05-12 RX ADMIN — Medication 10 ML: at 08:16

## 2018-05-12 RX ADMIN — LORAZEPAM 1 MG: 2 INJECTION INTRAMUSCULAR; INTRAVENOUS at 20:33

## 2018-05-12 RX ADMIN — MIDAZOLAM 0.5 MG: 1 INJECTION INTRAMUSCULAR; INTRAVENOUS at 19:37

## 2018-05-12 RX ADMIN — Medication 10 ML: at 15:07

## 2018-05-12 RX ADMIN — DEXAMETHASONE SODIUM PHOSPHATE 10 MG: 4 INJECTION, SOLUTION INTRA-ARTICULAR; INTRALESIONAL; INTRAMUSCULAR; INTRAVENOUS; SOFT TISSUE at 17:08

## 2018-05-12 RX ADMIN — LABETALOL HYDROCHLORIDE 10 MG: 5 INJECTION, SOLUTION INTRAVENOUS at 18:21

## 2018-05-12 RX ADMIN — Medication 5 ML: at 21:50

## 2018-05-12 RX ADMIN — Medication 2 G: at 17:08

## 2018-05-12 RX ADMIN — SODIUM CHLORIDE 500 MG: 900 INJECTION, SOLUTION INTRAVENOUS at 21:50

## 2018-05-12 RX ADMIN — ROCURONIUM BROMIDE 5 MG: 10 INJECTION, SOLUTION INTRAVENOUS at 16:56

## 2018-05-12 RX ADMIN — ALBUMIN HUMAN 250 ML: 50 SOLUTION INTRAVENOUS at 17:55

## 2018-05-12 RX ADMIN — FOLIC ACID: 5 INJECTION, SOLUTION INTRAMUSCULAR; INTRAVENOUS; SUBCUTANEOUS at 08:15

## 2018-05-12 RX ADMIN — NICARDIPINE HYDROCHLORIDE 5 MG/HR: 0.2 INJECTION INTRAVENOUS at 18:09

## 2018-05-12 RX ADMIN — SODIUM CHLORIDE, POTASSIUM CHLORIDE, SODIUM LACTATE AND CALCIUM CHLORIDE: 600; 310; 30; 20 INJECTION, SOLUTION INTRAVENOUS at 16:37

## 2018-05-12 RX ADMIN — SUCCINYLCHOLINE CHLORIDE 160 MG: 20 INJECTION INTRAMUSCULAR; INTRAVENOUS at 16:56

## 2018-05-12 RX ADMIN — FENTANYL CITRATE 100 MCG: 50 INJECTION, SOLUTION INTRAMUSCULAR; INTRAVENOUS at 17:19

## 2018-05-12 RX ADMIN — ONDANSETRON 4 MG: 2 INJECTION INTRAMUSCULAR; INTRAVENOUS at 17:08

## 2018-05-12 RX ADMIN — Medication 10 ML: at 21:50

## 2018-05-12 RX ADMIN — FENTANYL CITRATE 50 MCG: 50 INJECTION, SOLUTION INTRAMUSCULAR; INTRAVENOUS at 18:55

## 2018-05-12 RX ADMIN — LABETALOL HYDROCHLORIDE 5 MG: 5 INJECTION, SOLUTION INTRAVENOUS at 18:17

## 2018-05-12 RX ADMIN — SODIUM CHLORIDE, POTASSIUM CHLORIDE, SODIUM LACTATE AND CALCIUM CHLORIDE: 600; 310; 30; 20 INJECTION, SOLUTION INTRAVENOUS at 17:55

## 2018-05-12 RX ADMIN — Medication 5 ML: at 07:30

## 2018-05-12 RX ADMIN — Medication 5 ML: at 15:07

## 2018-05-12 RX ADMIN — HYDROMORPHONE HYDROCHLORIDE 1 MG: 2 INJECTION INTRAMUSCULAR; INTRAVENOUS; SUBCUTANEOUS at 23:59

## 2018-05-12 NOTE — PROGRESS NOTES
Pt seen and examined. Full consult dictated. General h/a for weeks. Sub acute sdh. Will take to or for drainage later today. Risks for etoh withdraw.   Risks and goals of surgery explained

## 2018-05-12 NOTE — ANESTHESIA PREPROCEDURE EVALUATION
Anesthetic History   No history of anesthetic complications            Review of Systems / Medical History  Patient summary reviewed, nursing notes reviewed and pertinent labs reviewed    Pulmonary                   Neuro/Psych       CVA  TIA    Comments: Right Sinonasal Encephalocele repair    Subacute SDH Cardiovascular    Hypertension: poorly controlled          CAD, cardiac stents and hyperlipidemia    Exercise tolerance: <4 METS  Comments: TTE (11/2/17):   Trivial MR and AI, mild PI, moderate pulmonary HTN, EF=55%    Chronic anticoagulation    S/P Left CEA   GI/Hepatic/Renal         Renal disease: stones and CRI      Comments: GI Bleed on plavix Endo/Other      Hypothyroidism: well controlled  Obesity and arthritis    Comments: H/O Prostate Cancer Other Findings   Comments: BPH  Erectile Dysfunction  Vitamin D Deficiency         Physical Exam    Airway  Mallampati: III  TM Distance: > 6 cm  Neck ROM: normal range of motion   Mouth opening: Normal     Cardiovascular  Regular rate and rhythm,  S1 and S2 normal,  no murmur, click, rub, or gallop             Dental    Dentition: Full upper dentures and Lower partial plate     Pulmonary  Breath sounds clear to auscultation               Abdominal  GI exam deferred       Other Findings            Anesthetic Plan    ASA: 3  Anesthesia type: general    Monitoring Plan: Arterial line      Induction: Intravenous  Anesthetic plan and risks discussed with: Patient

## 2018-05-12 NOTE — ANESTHESIA PROCEDURE NOTES
Arterial Line Placement    Start time: 5/12/2018 4:15 PM  End time: 5/12/2018 4:24 PM  Performed by: Michael Jeffers  Authorized by: Michael Jeffers     Pre-Procedure  Indications:  Arterial pressure monitoring and blood sampling  Preanesthetic Checklist: patient identified, risks and benefits discussed, anesthesia consent, site marked, patient being monitored, timeout performed and patient being monitored      Procedure:   Prep:  ChloraPrep  Seldinger Technique?: Yes    Orientation:  Left  Location:  Radial artery  Catheter size:  20 G  Number of attempts:  1  Cont Cardiac Output Sensor: No      Assessment:   Post-procedure:  Line secured and sterile dressing applied  Patient Tolerance:  Patient tolerated the procedure well with no immediate complications

## 2018-05-12 NOTE — IP AVS SNAPSHOT
[address] [facility] [address] [place] [address] 
[phone] Patient: Luz Caldwell 
MRN: [id number] BCM:3/5/20[birth year] A check racquel indicates which time of day the medication should be taken. My Medications START taking these medications Instructions Each Dose to Equal  
 Morning Noon Evening Bedtime  
 albuterol 5 mg/mL nebulizer solution Commonly known as:  PROVENTIL Replaces:  albuterol 90 mcg/actuation inhaler Your last dose was: Your next dose is: 0.5 mL by Nebulization route every six (6) hours as needed for Wheezing. 2.5 mg  
    
   
   
   
  
 arformoterol 15 mcg/2 mL Nebu neb solution Commonly known as:  Scarlet Larve Your last dose was: Your next dose is:    
   
   
 2 mL by Nebulization route two (2) times a day. 15 mcg  
    
   
   
   
  
 budesonide 0.5 mg/2 mL Nbsp Commonly known as:  PULMICORT Your last dose was: Your next dose is:    
   
   
 2 mL by Nebulization route two (2) times a day. 500 mcg  
    
   
   
   
  
 levETIRAcetam 500 mg tablet Commonly known as:  KEPPRA Your last dose was: Your next dose is: Take 1 Tab by mouth every twelve (12) hours. 500 mg  
    
   
   
   
  
 minoxidil 2.5 mg tablet Commonly known as:  Aminta Bains Your last dose was: Your next dose is: Take 2 Tabs by mouth daily. 5 mg CHANGE how you take these medications Instructions Each Dose to Equal  
 Morning Noon Evening Bedtime  
 amLODIPine 10 mg tablet Commonly known as:  Rajan Napier What changed:  See the new instructions. Your last dose was: Your next dose is: Take 1 Tab by mouth daily. 10 mg CONTINUE taking these medications Instructions Each Dose to Equal  
 Morning Noon Evening Bedtime  
 finasteride 5 mg tablet Commonly known as:  PROSCAR Your last dose was: Your next dose is:    
   
   
 daily. isosorbide mononitrate ER 30 mg tablet Commonly known as:  IMDUR Your last dose was: Your next dose is: TAKE 1 TABLET BY MOUTH EVERY DAY  
     
   
   
   
  
 levothyroxine 88 mcg tablet Commonly known as:  SYNTHROID Your last dose was: Your next dose is: TAKE 1 TABLET BY MOUTH DAILY (BEFORE BREAKFAST). LIPITOR 40 mg tablet Generic drug:  atorvastatin Your last dose was: Your next dose is: TAKE 1 TABLET BY MOUTH EVERY DAY  
     
   
   
   
  
 losartan-hydroCHLOROthiazide 100-12.5 mg per tablet Commonly known as:  HYZAAR Your last dose was: Your next dose is: TAKE 1 TABLET BY MOUTH EVERY DAY  
     
   
   
   
  
 metoprolol succinate 25 mg XL tablet Commonly known as:  TOPROL-XL Your last dose was: Your next dose is: TAKE 1 TABLET EVERY DAY Nebulizer & Compressor machine Your last dose was: Your next dose is:    
   
   
 1 Each by Does Not Apply route as needed. 1 Each  
    
   
   
   
  
 omeprazole 40 mg capsule Commonly known as:  PRILOSEC Your last dose was: Your next dose is: Take 1 Cap by mouth two (2) times a day. 40 mg  
    
   
   
   
  
 tamsulosin 0.4 mg capsule Commonly known as:  FLOMAX Your last dose was: Your next dose is: TAKE 1 CAPSULE EVERY DAY  
     
   
   
   
  
 VITAMIN D3 PO Your last dose was: Your next dose is: Take  by mouth. STOP taking these medications   
 albuterol 90 mcg/actuation inhaler Commonly known as:  PROVENTIL HFA, VENTOLIN HFA, PROAIR HFA Replaced by:  albuterol 5 mg/mL nebulizer solution albuterol-ipratropium 2.5 mg-0.5 mg/3 ml Nebu Commonly known as:  DUO-NEB  
   
  
 aspirin delayed-release 81 mg tablet  
   
  
 clopidogrel 75 mg Tab Commonly known as:  PLAVIX  
   
  
 clotrimazole-betamethasone topical cream  
Commonly known as:  LOTRISONE  
   
  
 furosemide 20 mg tablet Commonly known as:  LASIX  
   
  
 VITAMIN C 1,000 mg tablet Generic drug:  ascorbic acid (vitamin C) Where to Get Your Medications Information on where to get these meds will be given to you by the nurse or doctor. ! Ask your nurse or doctor about these medications  
  albuterol 5 mg/mL nebulizer solution  
 amLODIPine 10 mg tablet  
 arformoterol 15 mcg/2 mL Nebu neb solution  
 budesonide 0.5 mg/2 mL Nbsp  
 levETIRAcetam 500 mg tablet  
 minoxidil 2.5 mg tablet

## 2018-05-12 NOTE — H&P
[address]   HISTORY AND PHYSICAL      ARUN Archibald  MR#: [id number]  : 1935  ACCOUNT #: [de-identified]   ADMIT DATE: 2018    Patient was seen in ICU at about 0200. PRIMARY CARE PHYSICIAN:  Winifred Cotto MD     SOURCE OF INFORMATION:  The patient. CHIEF COMPLAINT:  Back pain. HISTORY OF PRESENT ILLNESS:  This is an 61-year-old man with a past medical history significant for coronary artery disease status post stent placement, dyslipidemia, hypertension, CVA, COPD, peripheral vascular disease, hypothyroidism, benign prostatic hyperplasia, who was in his usual state of health until the day of presentation at the emergency room when it was reported that the patient sustained a ground level fall. According to report, the patient was found naked on the floor with a bottle of bourbon. He stated that his leg gave out on him and he fell as a result. Following the fall, the patient started complaining of back pain. The back pain is located at the lower back. The pain is constant with no radiation, 7/10 in severity, worse with movement of the back. No known relieving factors. Patient was taken to [facility] for further evaluation. When the patient arrived at the emergency room, CT scan of the head was obtained. The CT scan shows a subdural hematoma. The emergency room physician consulted a neurosurgeon at [facility] who advised a transfer to [place] [address] service was asked to accept the patient as a direct admission to the hospitalist service. The patient was last admitted to this hospital from 2018 to 2018. He was admitted and treated for thromboembolism. The patient has no fever, no rigors, no chills.     PAST MEDICAL HISTORY:  Coronary artery disease status post stent placement, hypertension, COPD, prostate cancer, hypothyroidism, peripheral vascular disease, benign prostatic hyperplasia. ALLERGIES:  THE PATIENT IS ALLERGIC TO Orren Comfort, PENICILLIN, PERCOCET. MEDICATIONS:  Prior to admission, Lipitor 40 mg daily, Lasix 20 mg daily, Synthroid 88 mcg daily, Norvasc 5 mg daily, Imdur 30 mg daily, Toprol-XL 25 mg daily, Hyzaar 100/12.5 one tablet daily, Plavix 75 mg daily, Prilosec 40 mg daily, aspirin 81 mg daily, Flomax 0.4 mg daily, albuterol 90 mcg 2 puffs by inhalation q.4h. as needed, DuoNeb nebulizer every 6 hours as needed, Proscar 5 mg daily, Vitamin C 1000 mg daily. FAMILY HISTORY:  This was reviewed. His mother and father had diabetes. PAST SURGICAL HISTORY:  This is significant for cardiac stent placement, carotid endarterectomy. SOCIAL HISTORY:  Patient is a former smoker. Patient consumes hard liquor regularly. REVIEW OF SYSTEMS:  HEAD, EYES, EARS, NOSE AND THROAT:  No headache, no dizziness, no blurring of vision, no photophobia. RESPIRATORY:  No cough, no shortness of breath, no hemoptysis. CARDIOVASCULAR:  No chest pain, no orthopnea. No palpitations. GASTROINTESTINAL:  No nausea and vomiting, no diarrhea, no constipation. GENITOURINARY SYSTEM:  No dysuria, no urgency, and no frequency. All other systems are reviewed and they are negative. PHYSICAL EXAMINATION:  GENERAL:  Patient appeared ill, in moderate distress. VITAL SIGNS:  Temperature 97.6, pulse 106, blood pressure 135/79, oxygen saturation 93% on 4 liters of oxygen. HEAD:  A small scalp abrasion noted. EYES:  Normal eye movement. No redness, no drainage. EARS:  Normal external ears with no obvious drainage. NOSE:  No deformity and no drainage. MOUTH AND THROAT:  No visible oral lesion. Dry oral mucosa. NECK:  Supple, no JVD, no thyromegaly. CHEST:  Clear breath sounds. No wheezing, no crackles. HEART:  Normal S1 and S2, regular, no clinically appreciable murmur. ABDOMEN:  Soft, nontender, normal bowel sounds. CENTRAL NERVOUS SYSTEM:  Alert, oriented x 3.   No gross focal neurological deficit. EXTREMITIES:  No edema. Pulses 2+ bilaterally. MUSCULOSKELETAL SYSTEM:  Mild tenderness in lower back. No deformity. SKIN:  Mild scalp abrasion. PSYCHIATRIC:  Normal mood and affect. LYMPHATIC SYSTEM:  No cervical lymphadenopathy. DIAGNOSTIC DATA:  EKG shows sinus tachycardia, nonspecific ST and T-wave abnormalities. X-ray of the hip:  No fracture. CT scan of the head without contrast shows a small to moderate right subdural hematoma. CT scan of the cervical spine, no fracture. LABORATORY DATA:  Hematology:  WBC 8.7, hemoglobin 11.6, hematocrit 36.3, platelet 208. Chemistry:  Sodium 141, potassium 3.2, chloride 106, CO2 19, glucose 134, BUN 10, creatinine 1.50, calcium 8.8, bilirubin total 0.6, ALT 17, AST 28, alkaline phosphatase 113, total protein 7.7, albumin level 3.4, globulin 4.3. Serum alcohol level was 30. Cardiac profile, troponin 0.27. Coagulation profile:  INR 1.0, PT 10.4. ASSESSMENT:  1.  Subdural hematoma. 2.  Coronary artery disease status post stent placement. 3.  Hypertension. 4.  Fall. 5.  Chronic obstructive pulmonary disease. 6.  Peripheral vascular disease status post angioplasty of the left leg. 7.  Hypothyroidism. 8.  Alcohol abuse. 9.  Benign prostatic hyperplasia. 10.  Hypokalemia. 11.  Elevated troponin level. 12.  Hyperglycemia. PLAN:  1.  Subdural hematoma. We will admit the patient into the intensive care unit. Neurosurgery consult has been requested. We will await further recommendation from the neurosurgeon. 2.  Coronary artery disease status post stent placement. Patient is chest pain free. We will hold Plavix and aspirin because of the subdural hematoma. 3.  Hypertension. We will resume home medication and monitor the patient's blood pressure closely. 4.  Fall. This is the cause of the patient's subdural hematoma. We will obtain a CT scan of the abdomen and pelvis to evaluate the patient for fracture.   5. COPD.  We will place the patient on DuoNeb. 6.  Peripheral vascular disease, status post left leg angioplasty. Antiplatelet therapy on hold because of the subdural hematoma. 7.  Hypothyroidism. We will continue with Synthroid. We will check a TSH level. 8.  Alcohol abuse. We will place the patient on a banana bag. Patient advised to cut back. If the patient's CIWA score is elevated, we will initiate CIWA protocol. 9.  Benign prostatic hyperplasia. Will resume home medication. 10.  Hypokalemia. Will replace potassium and repeat a potassium level and we will also check magnesium level. 11.  Elevated troponin level. We will repeat a troponin level. If this remains elevated or higher, patient will require the cardiology consult. 12.  Hyperglycemia. Will check hemoglobin A1c level. The patient has no history of diabetes. 13.  Other issues. CODE STATUS:  PATIENT IS A FULL CODE. We will request SCDs for DVT prophylaxis.       Jermaine Kramer MD       RE/LN  D: 05/12/2018 04:47     T: 05/12/2018 06:27  JOB #: [id number]  CC: Merry Medrano MD

## 2018-05-12 NOTE — OP NOTES
[address] [place] [address] REPORT    Ed Babs.  MR#: [id number]  : 1935  ACCOUNT #: [de-identified]   DATE OF SERVICE: 2018    PREOPERATIVE DIAGNOSIS:  Subacute right frontoparietal subdural hematoma. POSTOPERATIVE DIAGNOSIS:  Subacute right frontoparietal subdural hematoma. PROCEDURE:  Right frontoparietal craniotomy with evacuation of subdural hematoma and placement of subdural drain. SURGEON:  Dr. Alberta Daly    ASSISTANT:   Gurpreet Aguilar. ANESTHESIA:  General endotracheal anesthesia. BLOOD LOSS:  10 mL. COMPLICATIONS:  None. SPECIMENS:  None. 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 OPERATIVE INDICATIONS:  An 56-year-old male who was found down somewhat confused. Head CT showed a subacute subdural hematoma with local mass effect on the right hemisphere. After discussing treatment options and risks of surgery with the patient and his family, informed consent was obtained. OPERATION IN DETAIL:  The patient was taken to the operating room and placed under general endotracheal anesthesia. All necessary lines and monitors were placed, and he was given an appropriate dose of IV antibiotics. SCDs and Trejo were placed. He was placed with a shoulder roll under his right shoulder, head turned to the left. All pressure points were padded. The right frontal strip was shaved and the right frontal parietal region and was prepped and draped in the standard sterile fashion. A linear incision was made with a skin knife, carried down Bovie electrocautery through the scalp and pericranium. Spencer clips were used for hemostasis. Pericranium was elevated and a self-retaining retractor was replaced with Weitlaners. An oval craniotomy flap was then made with a high speed Midas Thai and elevated without difficulty. The patient had significant venous channels and bleeding within the bone.   This was controlled with bone wax. The dura which was somewhat full and bluish was cauterized and opened in a cruciate fashion. Underneath the dura, there was a membrane. I opened this and a significant chronic subdural came out under pressure. I then cauterized back the dura and the membranes exposing the underlying brain and a deeper membrane. We aggressively irrigated out in this plane getting significant amount of chronic subdural out. I then elevated the second membrane and incised this and opened this up, peeled some of this away. I continued to irrigate and got more chronic subdural out. Once the irrigant was clean  and there is no obvious bleeding I placed a 4 mm facial drain in the subdural space, brought out through separate stab incision. The bone flap was then reaffixed with the Biomet craniofacial plating system. The retractors removed. The wound was then closed using inverted 2-0 Vicryl to close the galea and staples on the skin. Wounds were cleaned and dried, dressed with sterile dressing. The patient was then extubated, taken to recovery room in stable condition. MD PIOTR Agosto / BEAU  D: 05/12/2018 18:05     T: 05/12/2018 18:41  JOB #: [id number]  CC: Julius Tamayo MD

## 2018-05-12 NOTE — PERIOP NOTES
TRANSFER - OUT REPORT:    Verbal report given to arabella (name) khang Herrera  being transferred to icu 4 (unit) for routine post - op       Report consisted of patients Situation, Background, Assessment and   Recommendations(SBAR). Time Pre op antibiotic given: 2 grams ancef 1708   Anesthesia Stop time: 1857   Trejo Present on Transfer to floor:yes  Order for Trejo on Chart:yes  Discharge Prescriptions with Chart:no    Information from the following report(s) SBAR, OR Summary, Procedure Summary, Intake/Output, MAR, Recent Results and Cardiac Rhythm nsr/ bbb was reviewed with the receiving nurse. Opportunity for questions and clarification was provided. Is the patient on 02? YES       L/Min 10       Other- face tent    Is the patient on a monitor? YES    Is the nurse transporting with the patient? YES    Surgical Waiting Area notified of patient's transfer from PACU? YES- non one waiting      The following personal items collected during your admission accompanied patient upon transfer:   Dental Appliance: Dental Appliances:  At bedside  Vision:    Hearing Aid: Hearing Aid: Bilateral, At home  Jewelry:    Clothing:    Other Valuables:    Valuables sent to safe:

## 2018-05-12 NOTE — ANESTHESIA POSTPROCEDURE EVALUATION
Post-Anesthesia Evaluation and Assessment    Patient: Ashli Patino MRN: [id number]  SSN: [id number]    YOB: 1935  Age: 81 y.o. Sex: male       Cardiovascular Function/Vital Signs  Visit Vitals    /65    Pulse 75    Temp 37 °C (98.6 °F)    Resp 26    Wt 84.4 kg (186 lb 1.1 oz)    SpO2 (!) 88%    BMI 32.96 kg/m2       Patient is status post general anesthesia for Procedure(s):  CRANIOTOMY. Nausea/Vomiting: None    Postoperative hydration reviewed and adequate. Pain:  Pain Scale 1: Numeric (0 - 10) (05/12/18 1845)  Pain Intensity 1: 10 (05/12/18 1845)   Managed    Neurological Status:   Neuro (WDL): Exceptions to WDL (05/12/18 1850)  Neuro  Neurologic State: Agitated (05/12/18 1850)  Orientation Level: Disoriented X4 (05/12/18 1838)  Cognition: No command following (05/12/18 1838)  Speech:  (growling verbalizations) (05/12/18 1850)  Assessment L Pupil: Sluggish;Round (05/12/18 1838)  Size L Pupil (mm): 3 (05/12/18 1838)  Assessment R Pupil: Sluggish;Round (05/12/18 1838)  Size R Pupil (mm): 3 (05/12/18 1838)  LUE Motor Response: Purposeful (x1) (05/12/18 1850)  LLE Motor Response: Spontaneous  (05/12/18 1850)  RUE Motor Response: Purposeful (x1) (05/12/18 1850)  RLE Motor Response: Spontaneous  (05/12/18 1850)   At baseline    Mental Status and Level of Consciousness: Arousable    Pulmonary Status:   O2 Device: CO2 nasal cannula;02 face tent (05/12/18 1845)   Adequate oxygenation and airway patent    Complications related to anesthesia: None    Post-anesthesia assessment completed.  No concerns    Signed By: Chris Ledezma MD     May 12, 2018

## 2018-05-12 NOTE — PERIOP NOTES
Patient resting quietly in bed. Hands restrained due to briskly attempting to remove IV in agitated state. Continues to move extremities spontaneous with growling verbals. End of shift hand off report given to oncoming RN.

## 2018-05-12 NOTE — PROGRESS NOTES
0015. Pt arrives to ICU #4 Alert and oriented; neuro intact; c/o lower back pain; Hospitalist paged and notified of patients arrival (Dr. Gil Aguirre)    Daren 56. Dr. Gurdeep Ballard paged and made aware of pt arrival and clinical status; new orders received to keep pt SBP<160 and if higher, initiate Cardene infusion, Clear liquid diet only for now    0220. Dr. Gil Aguirre at the bedside; orders received for Tylenol and K-pad for back pain    0300. Tylenol given; heat pack made and applied to pt left thigh    0600. Pt sleeping    0730.  Report given to oncoming RN; no changes in mentation

## 2018-05-12 NOTE — BRIEF OP NOTE
BRIEF OPERATIVE NOTE    Date of Procedure: 5/12/2018   Preoperative Diagnosis: subdurral hematoma, right frontal crani  Postoperative Diagnosis: subdurral hematoma, right frontal crani    Procedure(s):  CRANIOTOMY right frontal craniotomy with evacuation of subdural hematoma.  Drain placement  Surgeon(s) and Role:     * Paul Carballo MD - Primary         Surgical Assistant: heather    Surgical Staff:  Circ-1: Quang Washington RN  Scrub RN-1: Yolanda Garnica RN  Scrub RN-2: Victoria Mishra RN  Surg Asst-1: Cristin Kellogg  Event Time In   Incision Start 1721   Incision Close 1757     Anesthesia: General   Estimated Blood Loss: 10  Specimens: * No specimens in log *   Findings: sdh   Complications: no  Implants: * No implants in log *

## 2018-05-12 NOTE — CONSULTS
Loculated right hemisphere sdh. NOT acute. This is subacute. Will need drainage, but not emergently. Recommend xfer to [facility].  Spoke to Dr. Chasity Ortega in ER

## 2018-05-12 NOTE — IP AVS SNAPSHOT
Summary of Care Report The Summary of Care report has been created to help improve care coordination. Users with access to [address] or [address] (Web-based application) may access additional patient information including the Discharge Summary. If you are not currently a [address] user and need more information, please call the number listed below in the [place] [address] section and ask to be connected with Medical Records. Facility Information Name Address Phone [phone] [facility] 7 [phone] Patient Information Patient Name Sex YFN Armenta ([id number]) Male 1935 Discharge Information Admitting Provider Service Area Unit Ted Trujillo MD / [address] [facility] / [phone] Discharge Provider Discharge Date/Time Discharge Disposition Destination (none) 2018 (Pending) STACI (none) Patient Language Language ENGLISH [13] Hospital Problems as of 2018  Reviewed: 2018  9:52 AM by Jed Mcgraw MD  
 None Non-Hospital Problems as of 2018  Reviewed: 2018  9:52 AM by Jed Mcgraw MD  
  
  
  
 Class Noted - Resolved Last Modified Active Problems Hypothyroidism  2009 - Present 2017 by Candance Kind, NP Entered by Marie Mcgill MD  
  Essential hypertension  3/19/2010 - Present 2017 by Candance Kind, NP   Entered by Marie Mcgill MD  
  CAD (Coronary Artery Disease)--s/p PCI--MARY ANN Jesse stents x4 ;MARY ANN Taxus stents x3 8/09  3/19/2010 - Present 2017 by More Aguillon MD  
  Entered by Marie Mcgill MD  
  ED (erectile dysfunction)  2010 - Present 2010 by Marie Mcgill MD  
  Entered by Marie Mcgill MD  
  Chronic back pain--DJD  2010 - Present 2017 by Candance Kind, NP  
 Entered by Lilly Castleman, MD  
  BPH (benign prostatic hypertrophy)  10/17/2010 - Present 2/13/2017 by Jazlyn Caballero NP Entered by Suresh Saxena MD  
  Dyslipidemia  10/17/2010 - Present 2/13/2017 by Jazlyn Caballero NP Entered by Suresh Saxena MD  
  Successful  PTCA (percutaneous transluminal coronary angioplasty)--90-95% instent restenosis RCA 10/16/10  10/17/2010 - Present 2/13/2017 by Jazlyn Caballero NP Entered by Suresh Saxena MD  
  Successful PTCA with MARY ANN Xience stent Ramus intermedius 10/16/10  10/17/2010 - Present 10/17/2010 Entered by Suresh Saxena MD  
  PVD (peripheral vascular disease) with claudication ([facility]  5/2/2011 - Present 5/2/2011 by Lilly Castleman, MD  
  Entered by Lilly Castleman, MD  
  Cerebral embolism with cerebral infarction [facility])  6/13/2011 - Present 6/15/2011 Entered by Sonia Manuel. Overview Signed 6/13/2011  8:21 AM by Sonia Manuel. Left frontal cortical/subcortical infarcts Carotid stenosis, left  6/13/2011 - Present 6/15/2011 Entered by Sonia Manuel. Overview Signed 6/13/2011  8:21 AM by Sonia Manuel. Symptomatic LICA stenosis with infarction Prediabetes  6/19/2013 - Present 6/19/2013 by Lilly Castleman, MD  
  Entered by Lilly Castleman, MD  
  Tinea cruris  6/19/2013 - Present 6/19/2013 by Lilly Castleman, MD  
  Entered by Lilly Castleman, MD  
  Nodule, subcutaneous  11/4/2013 - Present 11/4/2013 by Lilly Castleman, MD  
  Entered by Lilly Castleman, MD  
  Vitamin D deficiency  11/4/2013 - Present 11/4/2013 by Lilly Castleman, MD  
  Entered by Lilly Castleman, MD  
  Lipoma of back  2/5/2014 - Present 2/5/2014 by Lilly Castleman, MD  
  Entered by Lilly Castleman, MD  
  Prostate cancer [facility])  5/23/2014 - Present 2/13/2017 by Jazlyn Caballero NP   Entered by Lilly Castleman, MD  
  Angina, class III ([facility]  2/12/2015 - Present 11/7/2017 by Marylou Landry NP  
 Entered by Kimberli Leon MD  
  Alkaline phosphatase elevation  1/14/2016 - Present 1/14/2016 by Kimberli Leon MD  
  Entered by Kimberli Leon MD  
  Myocardial infarction [facility])  3/8/2016 - Present 3/8/2016 by Kimberli Leon MD  
  Entered by Kimberli Leon MD  
  Calculus of kidney  3/8/2016 - Present 3/8/2016 by Kimberli Leon MD  
  Entered by Kimberli Leon MD  
  Osteoarthritis  3/8/2016 - Present 3/8/2016 by Kimberli Leon MD  
  Entered by Kimberli Leon MD  
  SOBOE (shortness of breath on exertion)  10/11/2017 - Present 10/11/2017 by Marielos Powell LPN Entered by Marielos Powell LPN  
  S/P cardiac cath  11/7/2017 - Present 11/7/2017 by Yu Brady NP Entered by Yu Brady NP Overview Signed 11/7/2017 10:36 AM by Yu Brady NP  
   11/6/17: PTCA Ramus Intermediate, MARY ANN Cx, MARY ANN  dRCA Rectal bleeding  11/21/2017 - Present 11/22/2017 by Madai Bacon MD  
  Entered by Omaira Crawford MD  
  Bilateral deafness  1/9/2018 - Present 1/9/2018 by Kimberli Leon MD  
  Entered by Kimberli Leon MD  
  Claudication of both lower extremities ([facility] [address]  2/12/2018 - Present 2/12/2018 by Kimberli Leon MD  
  Entered by Kimberli Leon MD  
  Acute DVT (deep venous thrombosis) ([facility] [address]  3/16/2018 - Present 3/16/2018 by Tank Zepeda MD  
  Entered by Tank Zepeda MD  
  
You are allergic to the following Allergen Reactions Norco (Hydrocodone-Acetaminophen) Other (comments) Too sedated and felt like he was in a daze Pcn (Penicillins) Rash Percocet (Oxycodone-Acetaminophen) Nausea and Vomiting Current Discharge Medication List  
  
START taking these medications Dose & Instructions Dispensing Information Comments  
 albuterol 5 mg/mL nebulizer solution Commonly known as:  PROVENTIL Replaces:  albuterol 90 mcg/actuation inhaler  Dose:  2.5 mg  
 0.5 mL by Nebulization route every six (6) hours as needed for Wheezing. Quantity:  40 mL Refills:  0  
   
 arformoterol 15 mcg/2 mL Nebu neb solution Commonly known as:  Jermaine Abner Dose:  15 mcg  
2 mL by Nebulization route two (2) times a day. Quantity:  60 Vial  
Refills:  0  
   
 budesonide 0.5 mg/2 mL Nbsp Commonly known as:  PULMICORT Dose:  500 mcg 2 mL by Nebulization route two (2) times a day. Quantity:  60 Each Refills:  0  
   
 levETIRAcetam 500 mg tablet Commonly known as:  KEPPRA Dose:  500 mg Take 1 Tab by mouth every twelve (12) hours. Quantity:  60 Tab Refills:  0  
   
 minoxidil 2.5 mg tablet Commonly known as:  Drew Lard Dose:  5 mg Take 2 Tabs by mouth daily. Quantity:  30 Tab Refills:  0 CONTINUE these medications which have CHANGED Dose & Instructions Dispensing Information Comments  
 amLODIPine 10 mg tablet Commonly known as:  Juan Carlos Hernández What changed:  See the new instructions. Dose:  10 mg Take 1 Tab by mouth daily. Quantity:  60 Tab Refills:  0 CONTINUE these medications which have NOT CHANGED Dose & Instructions Dispensing Information Comments  
 finasteride 5 mg tablet Commonly known as:  PROSCAR  
 daily. Refills:  0  
   
 isosorbide mononitrate ER 30 mg tablet Commonly known as:  IMDUR  
 TAKE 1 TABLET BY MOUTH EVERY DAY Quantity:  30 Tab Refills:  2  
   
 levothyroxine 88 mcg tablet Commonly known as:  SYNTHROID  
 TAKE 1 TABLET BY MOUTH DAILY (BEFORE BREAKFAST). Quantity:  30 Tab Refills:  0 LIPITOR 40 mg tablet Generic drug:  atorvastatin TAKE 1 TABLET BY MOUTH EVERY DAY Quantity:  90 Tab Refills:  0  
   
 losartan-hydroCHLOROthiazide 100-12.5 mg per tablet Commonly known as:  HYZAAR  
 TAKE 1 TABLET BY MOUTH EVERY DAY Quantity:  30 Tab Refills:  2  
   
 metoprolol succinate 25 mg XL tablet Commonly known as:  TOPROL-XL  
 TAKE 1 TABLET EVERY DAY Quantity:  30 Tab Refills:  2 Nebulizer & Compressor machine Dose:  1 Each  
1 Each by Does Not Apply route as needed. Quantity:  1 Each Refills:  0  
   
 omeprazole 40 mg capsule Commonly known as:  PRILOSEC Dose:  40 mg Take 1 Cap by mouth two (2) times a day. Quantity:  60 Cap Refills:  1  
   
 tamsulosin 0.4 mg capsule Commonly known as:  FLOMAX TAKE 1 CAPSULE EVERY DAY Quantity:  90 Cap Refills:  0  
   
 VITAMIN D3 PO Take  by mouth. Refills:  0 STOP taking these medications Comments  
 albuterol 90 mcg/actuation inhaler Commonly known as:  PROVENTIL HFA, VENTOLIN HFA, PROAIR HFA Replaced by:  albuterol 5 mg/mL nebulizer solution  
   
   
 albuterol-ipratropium 2.5 mg-0.5 mg/3 ml Nebu Commonly known as:  DUO-NEB  
   
   
 aspirin delayed-release 81 mg tablet  
   
   
 clopidogrel 75 mg Tab Commonly known as:  PLAVIX  
   
   
 clotrimazole-betamethasone topical cream  
Commonly known as:  LOTRISONE  
   
   
 furosemide 20 mg tablet Commonly known as:  LASIX  
   
   
 VITAMIN C 1,000 mg tablet Generic drug:  ascorbic acid (vitamin C) Current Immunizations Name Date Influenza High Dose Vaccine PF 10/17/2017, 10/5/2015, 2/12/2015, 11/4/2013 Influenza Vaccine 10/1/2016 Influenza Vaccine Split 10/15/2012, 12/1/2010 Pneumococcal Conjugate (PCV-13) 3/15/2017 Pneumococcal Polysaccharide (PPSV-23) 1/12/2011 Surgery Information ID Date/Time Status Primary Surgeon All Procedures Location [id number] 5/12/2018  Amanda Shaikh MD CRANIOTOMY [address] [place] [address] OR Follow-up Information Follow up With Details Comments Contact Info [address] [facility] [address] 
[phone] Megan Henry MD In 1 week  [address] [place] [address] 
[phone] Discharge Instructions Discharge Instructions PATIENT ID: Artem Mary 
MRN: [id number] YOB: 1935 DATE OF ADMISSION: 5/12/2018 12:31 AM   
DATE OF DISCHARGE: 5/20/2018 PRIMARY CARE PROVIDER: Hair Hinojosa MD  
 
ATTENDING PHYSICIAN: Mariel Lopez MD 
DISCHARGING PROVIDER: Mariel Lopez MD   
To contact this individual call [phone] and ask the  to page. If unavailable ask to be transferred the Adult Hospitalist Department. DISCHARGE DIAGNOSES see dc note CONSULTATIONS: None PROCEDURES/SURGERIES: Procedure(s): CRANIOTOMY PENDING TEST RESULTS:  
At the time of discharge the following test results are still pending: na 
 
FOLLOW UP APPOINTMENTS:  
Follow-up Information Follow up With Details Comments Contact Info [address] [place] [id number] [phone] Hair Hinojosa MD In 1 week  [address] [place] [address] [address] [employer] [place] [address] 
[phone] ADDITIONAL CARE RECOMMENDATIONS: na 
 
DIET: Cardiac Diet ACTIVITY: Activity as tolerated WOUND CARE: na 
 
EQUIPMENT needed: na 
 
 
  
 SNF/Inpatient Rehab/LTAC  
x Independent/assisted living Hospice Other:  
 
  
 
Signed:  
Telma Rod MD 
5/20/2018 10:05 AM 
 
Chart Review Routing History Recipient Method Report Sent By Lorena Alexandre MD  
Phone: [phone] MD Shekhar Rubin 2/1/2012  4:31 AM 02/01/2012 MD Shekhar Rubin 2/1/2012  4:31 AM 02/01/2012 Kenneth Boss MD  
Phone: [phone] In Basket IP Auto Routed Notes Analia Hernandez MD [[id number]] 2/5/2017  5:46 PM 02/05/2017 Kenneth Boss MD  
Phone: [phone] In Basket IP Auto Routed Notes Aanlia Hernandez MD [[id number]] 2/5/2017  5:48 PM 02/05/2017 Kenneth Boss MD  
Phone: [phone] In Basket IP Auto Routed Notes Wing Ilir MD [[id number]] 2/14/2017  8:51 PM 02/14/2017 Kenneth Boss MD  
Phone: [phone] In Basket IP Auto Routed Notes MD Mariana Wyatt 11/21/2017  9:36 PM 11/21/2017 Kenneth Boss MD  
Phone: [phone] In Basket IP Auto Routed Notes MD Mariana Wyatt 11/21/2017  9:42 PM 11/21/2017 Kenneth Boss MD  
Phone: [phone] In Basket IP Auto Routed Notes Nae Muñoz MD [[id number]] 11/23/2017  8:58 AM 11/23/2017 Kenneth Boss MD  
Phone: [phone] In [employer] Routed [address] [[id number]] 3/17/2018  8:09 PM 03/17/2018 Kenneth Boss MD  
Phone: [phone] In Basket IP Auto Routed Notes Danuta Nunn MD [[id number]] 3/23/2018 10:56 AM 03/23/2018 Kenneth Boss MD  
Phone: [phone] In Basket IP Auto Routed Enma Dawn MD [[id number]] 5/12/2018  4:48 AM 05/12/2018 Kenneth Boss MD  
Phone: [phone] In Basket IP Auto Routed Sumi Hua MD [[id number]] 5/12/2018  7:00 AM 05/12/2018 Kenneth Boss MD  
Phone: [phone] In Basket IP Auto Routed Stephanie Omalley MD [[id number]] 5/13/2018  9:16 AM 05/13/2018 Victor Hugo Zepeda MD  
Phone: [phone] In Basket IP Auto Routed Notes Omar Orr MD [[id number]] 5/20/2018 10:13 AM 05/20/2018

## 2018-05-12 NOTE — PERIOP NOTES
Spoke to patients son, Crissy Saenz, and informed him of surgical start time and provided patient status update.

## 2018-05-12 NOTE — ROUTINE PROCESS
0730 Bedside and Verbal shift change report given to DIDI Elena RN (oncoming nurse) by Nelly Grey RN (offgoing nurse). Report included the following information SBAR, Kardex, ED Summary, Procedure Summary, Intake/Output, MAR, Accordion and Recent Results. 1550 Report given to Lauren Cosby RN. OR ready to receive patient. 1600 Patient transported to OR with RNx2, monitor, and oxygen.

## 2018-05-12 NOTE — PROGRESS NOTES
Renal Dosing/Monitoring  Medication: Famotidine  Indication:  SUP  Current regimen:  20 mg every 12 hr  Recent Labs      05/12/18   0827  05/11/18 2006   WBC  6.7  8.7   CREA  1.48*  1.50*   BUN  15  10       Estimated CrCl:  37 ml/min  Plan: Will change to 20 mg Q 24 hrs for crcl < 50 per renal adjustment protocol. Pharmacy to monitor patient daily for dosage adjustments as needed.

## 2018-05-12 NOTE — IP AVS SNAPSHOT
[address] [facility] [address] [facility] 
[phone] Patient: Anastacia Lynn 
MRN: [id number] About your hospitalization You were admitted on:  May 12, 2018 You last received care in the:  [facility] 6S NEURO-SCI TELE You were discharged on:  May 20, 2018 Why you were hospitalized Your primary diagnosis was:  Subdural Hematoma (Hcc) Follow-up Information Follow up With Details Comments Contact Info [address] [facility] [address] 
[phone] Stephanie Javier MD In 1 week  [address] 
[address] [facility] [employer] [facility] [place] [address] 
[phone] Your Scheduled Appointments Tuesday July 10, 2018  8:20 AM EDT ROUTINE CARE with Stephanie Javier MD  
[facility] [address] [place] [address]) [address] [facility] [place] [address]  
[phone] Discharge Orders None A check racquel indicates which time of day the medication should be taken. My Medications START taking these medications Instructions Each Dose to Equal  
 Morning Noon Evening Bedtime  
 albuterol 5 mg/mL nebulizer solution Commonly known as:  PROVENTIL Replaces:  albuterol 90 mcg/actuation inhaler Your last dose was: Your next dose is: 0.5 mL by Nebulization route every six (6) hours as needed for Wheezing. 2.5 mg  
    
   
   
   
  
 arformoterol 15 mcg/2 mL Nebu neb solution Commonly known as:  Delories Baars Your last dose was: Your next dose is:    
   
   
 2 mL by Nebulization route two (2) times a day. 15 mcg  
    
   
   
   
  
 budesonide 0.5 mg/2 mL Nbsp Commonly known as:  PULMICORT Your last dose was: Your next dose is:    
   
   
 2 mL by Nebulization route two (2) times a day. 500 mcg levETIRAcetam 500 mg tablet Commonly known as:  KEPPRA Your last dose was: Your next dose is: Take 1 Tab by mouth every twelve (12) hours. 500 mg  
    
   
   
   
  
 minoxidil 2.5 mg tablet Commonly known as:  Kathy Lo Your last dose was: Your next dose is: Take 2 Tabs by mouth daily. 5 mg CHANGE how you take these medications Instructions Each Dose to Equal  
 Morning Noon Evening Bedtime  
 amLODIPine 10 mg tablet Commonly known as:  Yolanda Archer What changed:  See the new instructions. Your last dose was: Your next dose is: Take 1 Tab by mouth daily. 10 mg CONTINUE taking these medications Instructions Each Dose to Equal  
 Morning Noon Evening Bedtime  
 finasteride 5 mg tablet Commonly known as:  PROSCAR Your last dose was: Your next dose is:    
   
   
 daily. isosorbide mononitrate ER 30 mg tablet Commonly known as:  IMDUR Your last dose was: Your next dose is: TAKE 1 TABLET BY MOUTH EVERY DAY  
     
   
   
   
  
 levothyroxine 88 mcg tablet Commonly known as:  SYNTHROID Your last dose was: Your next dose is: TAKE 1 TABLET BY MOUTH DAILY (BEFORE BREAKFAST). LIPITOR 40 mg tablet Generic drug:  atorvastatin Your last dose was: Your next dose is: TAKE 1 TABLET BY MOUTH EVERY DAY  
     
   
   
   
  
 losartan-hydroCHLOROthiazide 100-12.5 mg per tablet Commonly known as:  HYZAAR Your last dose was: Your next dose is: TAKE 1 TABLET BY MOUTH EVERY DAY  
     
   
   
   
  
 metoprolol succinate 25 mg XL tablet Commonly known as:  TOPROL-XL Your last dose was: Your next dose is:  TAKE 1 TABLET EVERY DAY  
     
   
   
   
  
 Nebulizer & Compressor machine Your last dose was: Your next dose is:    
   
   
 1 Each by Does Not Apply route as needed. 1 Each  
    
   
   
   
  
 omeprazole 40 mg capsule Commonly known as:  PRILOSEC Your last dose was: Your next dose is: Take 1 Cap by mouth two (2) times a day. 40 mg  
    
   
   
   
  
 tamsulosin 0.4 mg capsule Commonly known as:  FLOMAX Your last dose was: Your next dose is: TAKE 1 CAPSULE EVERY DAY  
     
   
   
   
  
 VITAMIN D3 PO Your last dose was: Your next dose is: Take  by mouth. STOP taking these medications   
 albuterol 90 mcg/actuation inhaler Commonly known as:  PROVENTIL HFA, VENTOLIN HFA, PROAIR HFA Replaced by:  albuterol 5 mg/mL nebulizer solution  
   
  
 albuterol-ipratropium 2.5 mg-0.5 mg/3 ml Nebu Commonly known as:  DUO-NEB  
   
  
 aspirin delayed-release 81 mg tablet  
   
  
 clopidogrel 75 mg Tab Commonly known as:  PLAVIX  
   
  
 clotrimazole-betamethasone topical cream  
Commonly known as:  LOTRISONE  
   
  
 furosemide 20 mg tablet Commonly known as:  LASIX  
   
  
 VITAMIN C 1,000 mg tablet Generic drug:  ascorbic acid (vitamin C) Where to Get Your Medications Information on where to get these meds will be given to you by the nurse or doctor. ! Ask your nurse or doctor about these medications  
  albuterol 5 mg/mL nebulizer solution  
 amLODIPine 10 mg tablet  
 arformoterol 15 mcg/2 mL Nebu neb solution  
 budesonide 0.5 mg/2 mL Nbsp  
 levETIRAcetam 500 mg tablet  
 minoxidil 2.5 mg tablet Discharge Instructions Discharge Instructions PATIENT ID: Chong Jarrett 
MRN: [id number] YOB: 1935 DATE OF ADMISSION: 5/12/2018 12:31 AM   
DATE OF DISCHARGE: 5/20/2018 PRIMARY CARE PROVIDER: Marie Mcgill MD  
 
 ATTENDING PHYSICIAN: Cristi Ramires MD 
DISCHARGING PROVIDER: Cristi Ramires MD   
To contact this individual call [phone] and ask the  to page. If unavailable ask to be transferred the Adult Hospitalist Department. DISCHARGE DIAGNOSES see dc note CONSULTATIONS: None PROCEDURES/SURGERIES: Procedure(s): CRANIOTOMY PENDING TEST RESULTS:  
At the time of discharge the following test results are still pending: na 
 
FOLLOW UP APPOINTMENTS:  
Follow-up Information Follow up With Details Comments [facility] [address] [phone] Consuelo Jimenez MD In 1 week  [address] 
[address] [facility] [employer] [facility] [id number] [phone] ADDITIONAL CARE RECOMMENDATIONS: na 
 
DIET: Cardiac Diet ACTIVITY: Activity as tolerated WOUND CARE: na 
 
EQUIPMENT needed: na 
 
 
  
 SNF/Inpatient Rehab/LTAC  
x Independent/assisted living Hospice Other:  
 
  
 
Signed:  
Cristi Ramires MD 
5/20/2018 10:05 AM 
 
  
  
  
[facility] Announcement We are excited to announce that we are making your provider's discharge notes available to you in [employer]. You will see these notes when they are completed and signed by the physician that discharged you from your recent hospital stay. If you have any questions or concerns about any information you see in [employer], please call the Health Information Department where you were seen or reach out to your Primary Care Provider for more information about your plan of care. Introducing [facility] & HEALTH SERVICES! Emilio Blackmon introduces [employer] patient portal. Now you can access parts of your medical record, email your doctor's office, and request medication refills online. 1. In your internet browser, go to [email or web address] 2. Click on the First Time User? Click Here link in the Sign In box. You will see the New Member Sign Up page. 3. Enter your [employer] Access Code exactly as it appears below. You will not need to use this code after youve completed the sign-up process. If you do not sign up before the expiration date, you must request a new code. · [employer] [facility] Access Code: [email or web address] Expires: 5/28/2018  6:09 PM 
 
4. Enter the last four digits of your Social Security Number (xxxx) and Date of Birth (mm/dd/yyyy) as indicated and click Submit. You will be taken to the next sign-up page. 5. Create a [employer] [facility] ID. This will be your [employer] login ID and cannot be changed, so think of one that is secure and easy to remember. 6. Create a [employer] [facility] password. You can change your password at any time. 7. Enter your Password Reset Question and Answer. This can be used at a later time if you forget your password. 8. Enter your e-mail address. You will receive e-mail notification when new information is available in [address] 9. Click Sign Up. You can now view and download portions of your medical record.  
10. Click the Download Summary menu link to download a portable copy of your medical information. If you have questions, please visit the Frequently Asked Questions section of the [employer] website. Remember, [employer] is NOT to be used for urgent needs. For medical emergencies, dial 911. Now available from your iPhone and Android! Introducing Derek Dickson As a [employer] patient, I wanted to make you aware of our electronic visit tool called Derek Dickson. [employer] 24/7 allows you to connect within minutes with a medical provider 24 hours a day, seven days a week via a mobile device or tablet or logging into a secure website from your computer. You can access Derek Dickson from anywhere in the [place]. A virtual visit might be right for you when you have a simple condition and feel like you just dont want to get out of bed, or cant get away from work for an appointment, when your regular [employer] provider is not available (evenings, weekends or holidays), or when youre out of town and need minor care. Electronic visits cost only $49 and if the [employer] 24/7 provider determines a prescription is needed to treat your condition, one can be electronically transmitted to a nearby pharmacy*. Please take a moment to enroll today if you have not already done so. The enrollment process is free and takes just a few minutes. To enroll, please download the [employer] 24/7 carmelita to your tablet or phone, or visit [email or web address] to enroll on your computer. And, as an [address] [place] [address] patient with a [employer] account, the results of your visits will be scanned into your electronic medical record and your primary care provider will be able to view the scanned results. We urge you to continue to see your regular [employer] provider for your ongoing medical care.   And while your primary care provider may not be the one available when you seek a Derek Dickson virtual visit, the peace of mind you get from getting a real diagnosis real time can be priceless. For more information on Derek Dickson, view our Frequently Asked Questions (FAQs) at [email or web address]. Sincerely, 
 
Christopher Chase MD 
Chief Medical Officer [employer] *:  certain medications cannot be prescribed via Derek Dickson Unresulted Labs-Please follow up with your PCP about these lab tests Order Current Status CULTURE, WOUND W GRAM STAIN Preliminary result Providers Seen During Your Hospitalization Provider Specialty Primary office phone Marina Grace MD Hospitalist [phone] Ricky Carson MD Internal Medicine [phone] Devonte Isaac MD Internal Medicine [phone] Your Primary Care Physician (PCP) Primary Care Physician Office Phone Office Fax Abby Caceresneha [phone] You are allergic to the following Allergen Reactions Norco (Hydrocodone-Acetaminophen) Other (comments) Too sedated and felt like he was in a daze Pcn (Penicillins) Rash Percocet (Oxycodone-Acetaminophen) Nausea and Vomiting Recent Documentation Height Weight BMI Smoking Status 1.6 m 80.4 kg 31.4 kg/m2 Former Smoker Emergency Contacts Name Discharge Info Relation Home Work Mobile Teddy Jasso  Other Relative [6] [phone] Abhilash Castro DISCHARGE CAREGIVER [3] Child [2] [phone] Patient Belongings The following personal items are in your possession at time of discharge: 
  Dental Appliances: At bedside Please provide this summary of care documentation to your next provider. Signatures-by signing, you are acknowledging that this After Visit Summary has been reviewed with you and you have received a copy.   
  
 
  
    
    
 Patient Signature: ____________________________________________________________ Date:  ____________________________________________________________  
  
Connye Brod Provider Signature:  ____________________________________________________________ Date:  ____________________________________________________________

## 2018-05-13 ENCOUNTER — APPOINTMENT (OUTPATIENT)
Dept: CT IMAGING | Age: 83
DRG: 025 | End: 2018-05-13
Attending: NEUROLOGICAL SURGERY
Payer: MEDICARE

## 2018-05-13 LAB
ANION GAP SERPL CALC-SCNC: 5 MMOL/L (ref 5–15)
BUN SERPL-MCNC: 15 MG/DL (ref 6–20)
BUN/CREAT SERPL: 12 (ref 12–20)
CALCIUM SERPL-MCNC: 8 MG/DL (ref 8.5–10.1)
CHLORIDE SERPL-SCNC: 112 MMOL/L (ref 97–108)
CO2 SERPL-SCNC: 24 MMOL/L (ref 21–32)
CREAT SERPL-MCNC: 1.21 MG/DL (ref 0.7–1.3)
ERYTHROCYTE [DISTWIDTH] IN BLOOD BY AUTOMATED COUNT: 18 % (ref 11.5–14.5)
GLUCOSE BLD STRIP.AUTO-MCNC: 104 MG/DL (ref 65–100)
GLUCOSE BLD STRIP.AUTO-MCNC: 105 MG/DL (ref 65–100)
GLUCOSE BLD STRIP.AUTO-MCNC: 98 MG/DL (ref 65–100)
GLUCOSE SERPL-MCNC: 158 MG/DL (ref 65–100)
HCT VFR BLD AUTO: 30.7 % (ref 36.6–50.3)
HGB BLD-MCNC: 9.4 G/DL (ref 12.1–17)
MCH RBC QN AUTO: 26.9 PG (ref 26–34)
MCHC RBC AUTO-ENTMCNC: 30.6 G/DL (ref 30–36.5)
MCV RBC AUTO: 87.7 FL (ref 80–99)
NRBC # BLD: 0 K/UL (ref 0–0.01)
NRBC BLD-RTO: 0 PER 100 WBC
PLATELET # BLD AUTO: 125 K/UL (ref 150–400)
PMV BLD AUTO: 11.1 FL (ref 8.9–12.9)
POTASSIUM SERPL-SCNC: 4.3 MMOL/L (ref 3.5–5.1)
RBC # BLD AUTO: 3.5 M/UL (ref 4.1–5.7)
SERVICE CMNT-IMP: ABNORMAL
SERVICE CMNT-IMP: ABNORMAL
SERVICE CMNT-IMP: NORMAL
SODIUM SERPL-SCNC: 141 MMOL/L (ref 136–145)
WBC # BLD AUTO: 7.4 K/UL (ref 4.1–11.1)

## 2018-05-13 PROCEDURE — 85027 COMPLETE CBC AUTOMATED: CPT | Performed by: NEUROLOGICAL SURGERY

## 2018-05-13 PROCEDURE — 74011000258 HC RX REV CODE- 258: Performed by: INTERNAL MEDICINE

## 2018-05-13 PROCEDURE — 74011250636 HC RX REV CODE- 250/636: Performed by: INTERNAL MEDICINE

## 2018-05-13 PROCEDURE — 77030029684 HC NEB SM VOL KT MONA -A

## 2018-05-13 PROCEDURE — 36591 DRAW BLOOD OFF VENOUS DEVICE: CPT

## 2018-05-13 PROCEDURE — 74011000250 HC RX REV CODE- 250: Performed by: INTERNAL MEDICINE

## 2018-05-13 PROCEDURE — 80048 BASIC METABOLIC PNL TOTAL CA: CPT | Performed by: NEUROLOGICAL SURGERY

## 2018-05-13 PROCEDURE — 36415 COLL VENOUS BLD VENIPUNCTURE: CPT | Performed by: NEUROLOGICAL SURGERY

## 2018-05-13 PROCEDURE — 77010033678 HC OXYGEN DAILY

## 2018-05-13 PROCEDURE — 74011000258 HC RX REV CODE- 258: Performed by: NEUROLOGICAL SURGERY

## 2018-05-13 PROCEDURE — 74011250636 HC RX REV CODE- 250/636: Performed by: NEUROLOGICAL SURGERY

## 2018-05-13 PROCEDURE — 65610000006 HC RM INTENSIVE CARE

## 2018-05-13 PROCEDURE — 82962 GLUCOSE BLOOD TEST: CPT

## 2018-05-13 PROCEDURE — 70450 CT HEAD/BRAIN W/O DYE: CPT

## 2018-05-13 PROCEDURE — C9113 INJ PANTOPRAZOLE SODIUM, VIA: HCPCS | Performed by: INTERNAL MEDICINE

## 2018-05-13 PROCEDURE — 94640 AIRWAY INHALATION TREATMENT: CPT

## 2018-05-13 RX ORDER — DEXTROSE 50 % IN WATER (D50W) INTRAVENOUS SYRINGE
12.5-25 AS NEEDED
Status: DISCONTINUED | OUTPATIENT
Start: 2018-05-13 | End: 2018-05-20 | Stop reason: HOSPADM

## 2018-05-13 RX ORDER — MAGNESIUM SULFATE 100 %
4 CRYSTALS MISCELLANEOUS AS NEEDED
Status: DISCONTINUED | OUTPATIENT
Start: 2018-05-13 | End: 2018-05-20 | Stop reason: HOSPADM

## 2018-05-13 RX ORDER — INSULIN LISPRO 100 [IU]/ML
INJECTION, SOLUTION INTRAVENOUS; SUBCUTANEOUS EVERY 6 HOURS
Status: DISCONTINUED | OUTPATIENT
Start: 2018-05-13 | End: 2018-05-16

## 2018-05-13 RX ORDER — ARFORMOTEROL TARTRATE 15 UG/2ML
15 SOLUTION RESPIRATORY (INHALATION)
Status: DISCONTINUED | OUTPATIENT
Start: 2018-05-13 | End: 2018-05-20 | Stop reason: HOSPADM

## 2018-05-13 RX ORDER — BUDESONIDE 0.5 MG/2ML
500 INHALANT ORAL
Status: DISCONTINUED | OUTPATIENT
Start: 2018-05-13 | End: 2018-05-20 | Stop reason: HOSPADM

## 2018-05-13 RX ORDER — IPRATROPIUM BROMIDE AND ALBUTEROL SULFATE 2.5; .5 MG/3ML; MG/3ML
3 SOLUTION RESPIRATORY (INHALATION)
Status: DISCONTINUED | OUTPATIENT
Start: 2018-05-13 | End: 2018-05-13 | Stop reason: SDUPTHER

## 2018-05-13 RX ADMIN — LORAZEPAM 1 MG: 2 INJECTION INTRAMUSCULAR; INTRAVENOUS at 21:48

## 2018-05-13 RX ADMIN — LORAZEPAM 1 MG: 2 INJECTION INTRAMUSCULAR; INTRAVENOUS at 13:29

## 2018-05-13 RX ADMIN — HYDROMORPHONE HYDROCHLORIDE 1 MG: 2 INJECTION INTRAMUSCULAR; INTRAVENOUS; SUBCUTANEOUS at 09:14

## 2018-05-13 RX ADMIN — Medication 5 ML: at 21:09

## 2018-05-13 RX ADMIN — Medication 5 ML: at 13:29

## 2018-05-13 RX ADMIN — BUDESONIDE 500 MCG: 0.5 INHALANT RESPIRATORY (INHALATION) at 20:11

## 2018-05-13 RX ADMIN — HYDROMORPHONE HYDROCHLORIDE 1 MG: 2 INJECTION INTRAMUSCULAR; INTRAVENOUS; SUBCUTANEOUS at 04:11

## 2018-05-13 RX ADMIN — LORAZEPAM 1 MG: 2 INJECTION INTRAMUSCULAR; INTRAVENOUS at 01:43

## 2018-05-13 RX ADMIN — Medication 2 G: at 00:03

## 2018-05-13 RX ADMIN — SODIUM CHLORIDE 500 MG: 900 INJECTION, SOLUTION INTRAVENOUS at 20:32

## 2018-05-13 RX ADMIN — SODIUM CHLORIDE 500 MG: 900 INJECTION, SOLUTION INTRAVENOUS at 09:24

## 2018-05-13 RX ADMIN — Medication 10 ML: at 21:09

## 2018-05-13 RX ADMIN — ARFORMOTEROL TARTRATE 15 MCG: 15 SOLUTION RESPIRATORY (INHALATION) at 20:11

## 2018-05-13 RX ADMIN — FOLIC ACID: 5 INJECTION, SOLUTION INTRAMUSCULAR; INTRAVENOUS; SUBCUTANEOUS at 10:28

## 2018-05-13 RX ADMIN — Medication 5 ML: at 05:25

## 2018-05-13 RX ADMIN — SODIUM CHLORIDE 0.2 MCG/KG/HR: 900 INJECTION, SOLUTION INTRAVENOUS at 10:33

## 2018-05-13 RX ADMIN — Medication 2 G: at 16:58

## 2018-05-13 RX ADMIN — Medication 2 G: at 08:35

## 2018-05-13 RX ADMIN — Medication 10 ML: at 05:26

## 2018-05-13 RX ADMIN — Medication 10 ML: at 13:29

## 2018-05-13 RX ADMIN — SODIUM CHLORIDE AND POTASSIUM CHLORIDE: 9; 1.49 INJECTION, SOLUTION INTRAVENOUS at 05:25

## 2018-05-13 RX ADMIN — PANTOPRAZOLE SODIUM 40 MG: 40 INJECTION, POWDER, FOR SOLUTION INTRAVENOUS at 16:17

## 2018-05-13 RX ADMIN — LORAZEPAM 1 MG: 2 INJECTION INTRAMUSCULAR; INTRAVENOUS at 07:05

## 2018-05-13 NOTE — ROUTINE PROCESS
0730 Bedside and Verbal shift change report given to DIDI Elena RN (oncoming nurse) by Angela Ellington RN (offgoing nurse). Report included the following information SBAR, Kardex, ED Summary, Procedure Summary, Intake/Output, MAR, Accordion and Recent Results. Dual neurologic assessment done with offgoing RN. SHIFT SUMMARY: Precedex gtt started for agitation, restlessness. Patient oriented to self for majority of today, at times oriented to year. Moves all extremities with equal strength, pupils brisk/round consistently. ARIEL to half charge suction q8hr, ~150ml sanguinous output. Low but adequate urine output. Afebrile. CAM positive, RASS -1 to +1.    2000 Bedside and Verbal shift change report given to Abdi Bruce (oncoming nurse) by Stephanie Rosas RN (offgoing nurse). Report included the following information SBAR, Kardex, ED Summary, Procedure Summary, Intake/Output, MAR, Accordion and Recent Results. Dual neurologic assessment done with offgoing RN.

## 2018-05-13 NOTE — ROUTINE PROCESS
1950. TRANSFER - OUT REPORT:    Verbal report given to MEGANO [employer] (name) on Sabra Hubbard  being transferred to ICU #4  (unit) for routine post - op       Report consisted of patients Situation, Background, Assessment and   Recommendations(SBAR). Information from the following report(s) SBAR, Kardex, ED Summary, OR Summary, Procedure Summary, Intake/Output, MAR, Accordion, Recent Results, Med Rec Status, Cardiac Rhythm NSR, BBB and Alarm Parameters  was reviewed with the receiving nurse. Lines:   Peripheral IV 05/12/18 Left Antecubital (Active)   Site Assessment Clean, dry, & intact 5/12/2018  7:24 PM   Phlebitis Assessment 0 5/12/2018  7:24 PM   Infiltration Assessment 0 5/12/2018  7:24 PM   Dressing Status Clean, dry, & intact 5/12/2018  7:24 PM   Dressing Type Transparent 5/12/2018  7:24 PM   Hub Color/Line Status Pink;Capped;Flushed 5/12/2018  8:00 PM   Action Taken Open ports on tubing capped 5/12/2018  7:24 PM   Alcohol Cap Used Yes 5/12/2018  7:24 PM       Peripheral IV 05/12/18 Right Forearm (Active)   Site Assessment Clean, dry, & intact 5/12/2018  7:24 PM   Phlebitis Assessment 0 5/12/2018  7:24 PM   Infiltration Assessment 0 5/12/2018  7:24 PM   Dressing Status Clean, dry, & intact 5/12/2018  7:24 PM   Dressing Type Transparent 5/12/2018  7:24 PM   Hub Color/Line Status Pink; Infusing;Flushed 5/12/2018  8:00 PM   Action Taken Open ports on tubing capped 5/12/2018  7:24 PM   Alcohol Cap Used Yes 5/12/2018  7:24 PM       Arterial Line 05/12/18 Left Radial artery (Active)   Site Assessment Clean, dry, & intact 5/12/2018  7:24 PM   Dressing Status Clean, dry, & intact 5/12/2018  7:24 PM   Dressing Type Transparent 5/12/2018  7:24 PM   Line Status Intact and in place 5/12/2018  7:24 PM   Treatment Arm board on 5/12/2018  7:24 PM        Opportunity for questions and clarification was provided. Patient transported with:   Monitor  O2 @ 4 liters  Registered Nurse      0730.  Bedside, Verbal and Written shift change report given to Maxx Lopez RN  (oncoming nurse) by Susie Crockett RN (offgoing nurse). Report included the following information SBAR, Kardex, ED Summary, OR Summary, Procedure Summary, Intake/Output, MAR, Accordion, Recent Results, Med Rec Status, Cardiac Rhythm NSR with BBB and Alarm Parameters .

## 2018-05-13 NOTE — PROGRESS NOTES
2000. Pt arrives back from PACU confused and agitated when stimulated; Tata; VSS; on 100% face tent    2030. Pt thrashing in bed yelling \"oh please\"; Neuro sx paged to clarify which sedation he prefers for pt; hospitalist also paged to initiate CIWA protocol. 2033. 1mg Ativan given IVP    2100. Pt sleeping; VSS    0000. No changes; pt having periods of restlessness requiring PRN Ativan for sedation; PRN Dilaudid given for c/o head pain; otherwise neuro status remains the same; pt able to give name and state the month    0100. Pt won't keep face tent on; 4L NC applied    0400. PRN Dilaudid given for FLACC score of 7    0420. 02 face tent applied d/t low sats-88%    0730.  Report given to oncoming RN

## 2018-05-13 NOTE — CONSULTS
PULMONARY/CRITICAL CARE/SLEEP MEDICINE    Initial Physician Consultation Note    Name: Shantelle Walker   : 1935   MRN: [id number]   Date: 2018      Subjective:   Consult Note: 2018   Requesting Physician:  Reason for consult:    Medical records and data reviewed. Patient is a 81 y.o. male who presented to the hospital after a fall. Emergency room at the outside hospital and CT scan of the head revealed subdural hematoma. He was transferred to [facility] for evacuation which he has undergone. He is being observed in the Intensive Care Unit postoperatively and has cranial drain in place. He has a history of alcohol use and overnight has required multiple doses of benzodiazepines. At the time of evaluation this morning he appeared to be lethargic with visible episodes of hypoxemia and near apneas with suspicion for obstructive sleep apnea. He has been periodically agitated and agree with trial of Precedex. He does need benzodiazepines to combat withdrawal but judicious use as warranted. He is too lethargic to take oral medications at present. Stress ulcer prophylaxis as well as other Intensive Care Unit protocols are being started. Vitamin replacement for alcohol withdrawal will be started as well.  Neurosurgery is following and new findings of presence of subarachnoid blood was noted on CT scan    Review of Systems:     A comprehensive 12 system review of systems was not obtainable    Assessment:     Subdural hematoma, status post evacuation  Postoperative encephalopathy with subarachnoid blood  Possible alcohol withdrawal  COPD, unknown severity, not bronchospastic  History of organic cardiac disease  History of hypothyroidism  History of prostate cancer  Suspected sleep apnea  Hyperglycemia  Other medical problems as per chart    Recommendations:   Oxygen  Postoperative care per neurosurgery  Vitamin replacement  Precedex infusion  Judicious use of Ativan which needs to be continued however for control of alcohol withdrawal  On perioperative antibiotics  Holding oral medications  Start bronchodilators  On seizure prophylaxis  Continue Protonix  Glycemic control  Will need tube feeding if encephalopathy continues  In restraints for interference with medical management  Stress ulcer prophylaxis  Use of CPAP may be limited by presence of intracranial drain  Eventual sleep study is recommended  Continue Intensive Care Unit observation  Discussed with RN  Patient is critically ill and is at high risk for further decline. Critical care time spent 30 minutes      Active Problem List:     Problem List  Date Reviewed: 5/12/2018          Codes Class    * (Principal)Subdural hematoma (HCC) ICD-10-CM: I62.00  ICD-9-CM: 432.1         Acute DVT (deep venous thrombosis) (HCC) ICD-10-CM: I82.409  ICD-9-CM: 453.40         Claudication of both lower extremities (HCC) ICD-10-CM: I73.9  ICD-9-CM: 443.9         Bilateral deafness ICD-10-CM: H91.93  ICD-9-CM: 389.9         Rectal bleeding ICD-10-CM: K62.5  ICD-9-CM: 569.3         S/P cardiac cath ICD-10-CM: Z98.890  ICD-9-CM: V45.89     Overview Signed 11/7/2017 10:36 AM by Sarahi Welch NP     11/6/17: PTCA Ramus Intermediate, MARY ANN Cx, MARY ANN  dRCA               SOBOE (shortness of breath on exertion) ICD-10-CM: R06.02  ICD-9-CM: 786.05         Myocardial infarction ([facility] ICD-10-CM: I21.9  ICD-9-CM: 410.90         Calculus of kidney ICD-10-CM: N20.0  ICD-9-CM: 592.0         Osteoarthritis ICD-10-CM: M19.90  ICD-9-CM: 715.90         Alkaline phosphatase elevation ICD-10-CM: R74.8  ICD-9-CM: 790.5         Angina, class III (HCC) ICD-10-CM: I20.9  ICD-9-CM: 413.9         Prostate cancer ([facility] ICD-10-CM: C61  ICD-9-CM: 185         Lipoma of back ICD-10-CM: D17.1  ICD-9-CM: 214.8         Nodule, subcutaneous ICD-10-CM: R22.9  ICD-9-CM: 341. 2         Vitamin D deficiency ICD-10-CM: E55.9  ICD-9-CM: 268.9         Prediabetes ICD-10-CM: R73.03  ICD-9-CM: 790.29 Tinea cruris ICD-10-CM: B35.6  ICD-9-CM: 110.3         Cerebral embolism with cerebral infarction [facility]) ICD-10-CM: I63.40  ICD-9-CM: 434.11     Overview Signed 6/13/2011  8:21 AM by Scotty Nunez. Left frontal cortical/subcortical infarcts             Carotid stenosis, left ICD-10-CM: D37.89  ICD-9-CM: 433.10     Overview Signed 6/13/2011  8:21 AM by Scotty Nunez. Symptomatic LICA stenosis with infarction             PVD (peripheral vascular disease) with claudication ([facility]) ICD-10-CM: I73.9  ICD-9-CM: 443.9         BPH (benign prostatic hypertrophy) ICD-10-CM: N40.0  ICD-9-CM: 600.00         Dyslipidemia ICD-10-CM: E78.5  ICD-9-CM: 272.4         Successful  PTCA (percutaneous transluminal coronary angioplasty)--90-95% instent restenosis RCA 10/16/10 ICD-10-CM: Z98.61  ICD-9-CM: V45.82         Successful PTCA with MARY ANN Xience stent Ramus intermedius 10/16/10 ICD-10-CM: Z95.9  ICD-9-CM: V45.89         Chronic back pain--DJD ICD-10-CM: G89.29  ICD-9-CM: 338.29         ED (erectile dysfunction) ICD-10-CM: N52.9  ICD-9-CM: 607.84         Essential hypertension ICD-10-CM: I10  ICD-9-CM: 401.9         CAD (Coronary Artery Disease)--s/p PCI--MARY ANN Ripon stents x4 5/09;MARY ANN Taxus stents x3 8/09 ICD-10-CM: I25.10  ICD-9-CM: 414.00         Hypothyroidism ICD-10-CM: E03.9  ICD-9-CM: 244.9               Past Medical History:      has a past medical history of AAA (abdominal aortic aneurysm) ([facility] Acute MI ([facility] (12/2010); Acute prostatitis; Advance directive discussed with patient (04/20/2016); Aneurysm ([facility] (6/2011); Borderline glaucoma (glaucoma suspect) (02/19/2015); BPH (benign prostatic hyperplasia); Bright red blood per rectum (02/04/2016); CAD (coronary artery disease); Calculus of kidney; Cerebral embolism with cerebral infarction [facility]); Chronic pain; Dizzy spells (6/2012); DJD (degenerative joint disease) of lumbar spine; ED (erectile dysfunction); Elevated PSA (03/20/2014);  En[facility] ([facility] Hypercholesterolemia; Hypertension; Pneumonia (02/05/2017); Prostate cancer ([facility] (05/12/2014); PVD (peripheral vascular disease) with claudication (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 Past Surgical History:      has a past surgical history that includes endoscopy, colon, diagnostic; ptca w/ stent, initial (10/16/2010); ptca each addl vessel (10/16/2010); hx heent (10/2012); hx orthopaedic (Bilateral); vascular surgery procedure unlist; hx carotid endarterectomy (Left); colonoscopy (N/A, 8/31/2016); upper gi endoscopy,diagnosis (11/22/2017); pr cardiac surg procedure unlist (12/2010); pr left heart cath,percutaneous (10/16/2010); and pr cardiac surg procedure unlist (12/2017). Home Medications:     Prior to Admission medications    Medication Sig Start Date End Date Taking? Authorizing Provider   LIPITOR 40 mg tablet TAKE 1 TABLET BY MOUTH EVERY DAY 5/2/18   Talia Ip, NP   Nebulizer & Compressor machine 1 Each by Does Not Apply route as needed. 4/21/18   Talia Ip, NP   furosemide (LASIX) 20 mg tablet TAKE 1 TAB BY MOUTH DAILY. 2/16/18   Historical Provider   levothyroxine (SYNTHROID) 88 mcg tablet TAKE 1 TABLET BY MOUTH DAILY (BEFORE BREAKFAST). 2/20/18   Domonique Davis MD   amLODIPine (NORVASC) 5 mg tablet TAKE 1 TAB BY MOUTH DAILY. 2/16/18   Christen Muir NP   isosorbide mononitrate ER (IMDUR) 30 mg tablet TAKE 1 TABLET BY MOUTH EVERY DAY 2/16/18   Christen Muir NP   metoprolol succinate (TOPROL-XL) 25 mg XL tablet TAKE 1 TABLET EVERY DAY 2/5/18   Christen Muir NP   losartan-hydroCHLOROthiazide [facility]) 100-12.5 mg per tablet TAKE 1 TABLET BY MOUTH EVERY DAY 1/18/18   Christen Muir NP   clopidogrel (PLAVIX) 75 mg tab TAKE 1 TAB BY MOUTH DAILY.  INDICATIONS: THROMBOSIS PREVENTION AFTER PCI 11/7/17   Historical Provider   omeprazole (PRILOSEC) 40 mg capsule Take 1 Cap by mouth two (2) times a day. 17   Supa Pete MD   aspirin delayed-release 81 mg tablet Take 1 Tab by mouth daily for 360 days. 17  Mckenzie Zavala NP   tamsulosin (FLOMAX) 0.4 mg capsule TAKE 1 CAPSULE EVERY DAY 10/12/17   Shante Cotto MD   albuterol (PROVENTIL HFA, VENTOLIN HFA, PROAIR HFA) 90 mcg/actuation inhaler Take 2 Puffs by inhalation every four (4) hours as needed for Wheezing. 17   Chalino Abbott MD   clotrimazole-betamethasone (LOTRISONE) topical cream Apply twice daily to affected area till resolved. 17   Gómez Rdz MD   albuterol-ipratropium (DUO-NEB) 2.5 mg-0.5 mg/3 ml nebu 3 mL by Nebulization route every six (6) hours as needed. 17   Nate Chen NP   CHOLECALCIFEROL, VITAMIN D3, (VITAMIN D3 PO) Take  by mouth. Historical Provider   finasteride (PROSCAR) 5 mg tablet daily. 1/15/15   Historical Provider   ascorbic acid (VITAMIN C) 1,000 mg tablet Take  by mouth. Historical Provider       Allergies/Social/Family History:      Allergies   Allergen Reactions    Norco [Hydrocodone-Acetaminophen] Other (comments)     Too sedated and felt like he was in a daze    Pcn [Penicillins] Rash    Percocet [Oxycodone-Acetaminophen] Nausea and Vomiting      Social History   Substance Use Topics    Smoking status: Former Smoker     Packs/day: 0.25     Quit date: 9/10/2014    Smokeless tobacco: Never Used      Comment: has not had cigarette in over 3 month    Alcohol use Yes      Family History   Problem Relation Age of Onset    Diabetes Mother     Diabetes Father     Cancer Sister      LUNG    Cancer Brother      COLON    Cancer Sister      LUNG    Anesth Problems Neg Hx             Objective:   Vital Signs:  Visit Vitals    /65    Pulse 70    Temp 97.8 °F (36.6 °C)    Resp 12    Wt 83.6 kg (184 lb 4.9 oz)    SpO2 95%    BMI 32.65 kg/m2    O2 Flow Rate (L/min): 4 l/min O2 Device: Nasal cannula Temp (24hrs), Av.8 °F (36.6 °C), Min:97 °F (36.1 °C), Max:98.8 °F (37.1 °C)           Intake/Output:     Intake/Output Summary (Last 24 hours) at 05/13/18 1522  Last data filed at 05/13/18 1400   Gross per 24 hour   Intake             2670 ml   Output             1890 ml   Net              780 ml       Physical Exam:   General:  Lethargic, no distress, appears stated age. Head:  Normocephalic, without obvious abnormality, atraumatic. Eyes:  Conjunctivae/corneas clear. PERRL   Neck: Supple, symmetrical, no adenopathy, no carotid bruit and no JVD. Lungs:   Clear to auscultation bilaterally. Chest wall:  No tenderness or deformity. Heart:  Regular rate and rhythm, S1-S2 normal, no murmur, no click, rub or gallop. Abdomen:   Soft, non-tender. Bowel sounds present. No masses,  No organomegaly. Extremities: Atraumatic, no cyanosis or edema. Pulses: Palpable   Skin: No rashes or lesions   Neurologic: Lethargic, moving all 4         LABS AND  DATA: Personally reviewed  Recent Labs      05/13/18 0415 05/12/18   0827   WBC  7.4  6.7   HGB  9.4*  10.9*   HCT  30.7*  34.8*   PLT  125*  138*     Recent Labs      05/13/18 0415  05/12/18 0827   NA  141  139   K  4.3  4.6   CL  112*  109*   CO2  24  24   BUN  15  15   CREA  1.21  1.48*   GLU  158*  122*   CA  8.0*  8.3*   MG   --   1.6   PHOS   --   3.1     Recent Labs      05/12/18 0827 05/11/18 2006   SGOT  39*  28   AP  117  113   TP  7.6  7.7   ALB  3.4*  3.4*   GLOB  4.2*  4.3*     Recent Labs      05/11/18 2051 05/11/18 2006   INR  1.0  1.0   PTP   --   10.4      No results for input(s): PHI, PCO2I, PO2I, FIO2I in the last 72 hours.   Recent Labs      05/12/18   0827 05/11/18 2006   CPK  339*  196   CKMB  2.9   --    TROIQ  0.25*  0.27*       MEDS: Reviewed    Chest Imaging: personally reviewed and report checked    Tele- reviewed    Medical decision making:   I have reviewed the flowsheet and previous day's notes  Patient has acute or chronic illness that poses a threat to life or bodily function  Review and order of Clinical lab tests  Review and Order of Radiology tests  Independent visualization of Image  Reviewed Ventilator / NiPPV  Parenteral controlled substances - Reviewed/ Adjusted / [place] Benes / Started  High Risk Drug therapy requiring intensive monitoring for toxicity: eg steroids, pressors, antibiotics  Discussed with: Nursing      Thank you for allowing me to participate in this patient's care.     MD Dmitriy Ricardo MD, [facility]  [facility]

## 2018-05-13 NOTE — PROGRESS NOTES
Hospitalist Progress Note  Fredis Wheatley MD  Answering service: [phone] from in house phone  Cell:       Date of Service:  2018  NAME:  aRsta Cr  :  1935  MRN:  [id number]      Admission Summary: This is an 50-year-old man with a past medical history significant for coronary artery disease status post stent placement, dyslipidemia, hypertension, CVA, COPD, peripheral vascular disease, hypothyroidism, benign prostatic hyperplasia, who was in his usual state of health until the day of presentation at the emergency room when it was reported that the patient sustained a ground level fall. According to report, the patient was found naked on the floor with a bottle of bourbon. He stated that his leg gave out on him and he fell as a result. Following the fall, the patient started complaining of back pain. The back pain is located at the lower back. The pain is constant with no radiation, 7/10 in severity, worse with movement of the back. No known relieving factors. Patient was taken to [facility] for further evaluation. When the patient arrived at the emergency room, CT scan of the head was obtained. The CT scan shows a subdural hematoma. The emergency room physician consulted a neurosurgeon at [facility] who advised a transfer to [place] [id number] service was asked to accept the patient as a direct admission to the hospitalist service. The patient was last admitted to this hospital from 2018 to 2018. He was admitted and treated for thromboembolism. The patient has no fever, no rigors, no chills. Interval history / Subjective:    sleeping unable to obtain any history     Assessment & Plan: 1. Subacute right frontoparietal subdural hematoma  Right frontoparietal craniotomy with evacuation of subdural hematoma and placement of subdural drain.   2. Alcoholism: On scheduled Ativan. 3.Coronary artery disease status post stent placement. Plavix and aspirin on hold because of the subdural hematoma. 4.Hypothyroidism. We will continue with Synthroid. 5. Anemia : Monitor CBC. Code status: full  DVT prophylaxis: SCD    Care Plan discussed with: Patient/Family  Disposition: Home w/Family and TBD     Hospital Problems  Date Reviewed: 5/12/2018          Codes Class Noted POA    * (Principal)Subdural hematoma ([facility] ICD-10-CM: I62.00  ICD-9-CM: 432.1  5/12/2018 Yes                Review of Systems:   Review of systems not obtained due to patient factors. Vital Signs:    Last 24hrs VS reviewed since prior progress note. Most recent are:  Visit Vitals    /70    Pulse 88    Temp 97.8 °F (36.6 °C)    Resp 18    Wt 83.6 kg (184 lb 4.9 oz)    SpO2 100%    BMI 32.65 kg/m2         Intake/Output Summary (Last 24 hours) at 05/13/18 1015  Last data filed at 05/13/18 2916   Gross per 24 hour   Intake             2620 ml   Output             1725 ml   Net              895 ml        Physical Examination:             Constitutional:  No acute distress, cooperative, pleasant    ENT:  Oral mucous moist, oropharynx benign. Neck supple,    Resp:  CTA bilaterally. No wheezing/rhonchi/rales. No accessory muscle use   CV:  Regular rhythm, normal rate, no murmurs, gallops, rubs    GI:  Soft, non distended, non tender. normoactive bowel sounds, no hepatosplenomegaly     Musculoskeletal:  No edema, warm, 2+ pulses throughout    Neurologic:  Moves all extremities.   AAOx3, CN II-XII reviewed     Skin:  Good turgor, no rashes or ulcers       Data Review:    Review and/or order of clinical lab test      Labs:     Recent Labs      05/13/18 0415 05/12/18 0827   WBC  7.4  6.7   HGB  9.4*  10.9*   HCT  30.7*  34.8*   PLT  125*  138*     Recent Labs      05/13/18 0415 05/12/18   0827  05/11/18 2006   NA  141  139  141   K  4.3  4.6  3.2*   CL  112*  109*  106   CO2  24 24  19*   BUN  15  15  10   CREA  1.21  1.48*  1.50*   GLU  158*  122*  134*   CA  8.0*  8.3*  8.8   MG   --   1.6   --    PHOS   --   3.1   --      Recent Labs      05/12/18 0827 05/11/18 2006   SGOT  39*  28   ALT  18  17   AP  117  113   TBILI  0.6  0.6   TP  7.6  7.7   ALB  3.4*  3.4*   GLOB  4.2*  4.3*     Recent Labs      05/11/18 2051 05/11/18 2006   INR  1.0  1.0   PTP   --   10.4      No results for input(s): FE, TIBC, PSAT, FERR in the last 72 hours. No results found for: FOL, RBCF   No results for input(s): PH, PCO2, PO2 in the last 72 hours.   Recent Labs      05/12/18 0827 05/11/18 2006   CPK  339*  196   CKNDX  0.9   --    TROIQ  0.25*  0.27*     Lab Results   Component Value Date/Time    Cholesterol, total 144 05/12/2018 08:27 AM    HDL Cholesterol 78 05/12/2018 08:27 AM    LDL, calculated 53.6 05/12/2018 08:27 AM    Triglyceride 62 05/12/2018 08:27 AM    CHOL/HDL Ratio 1.8 05/12/2018 08:27 AM     Lab Results   Component Value Date/Time    Glucose (POC) 111 (H) 03/21/2018 11:19 AM    Glucose (POC) 136 (H) 03/20/2018 04:32 PM    Glucose (POC) 100 03/20/2018 06:58 AM    Glucose (POC) 134 (H) 03/19/2018 11:32 PM    Glucose (POC) 138 (H) 03/19/2018 04:22 PM     Lab Results   Component Value Date/Time    Color YELLOW/STRAW 03/16/2018 02:45 PM    Appearance CLEAR 03/16/2018 02:45 PM    Specific gravity 1.014 03/16/2018 02:45 PM    pH (UA) 5.5 03/16/2018 02:45 PM    Protein NEGATIVE  03/16/2018 02:45 PM    Glucose NEGATIVE  03/16/2018 02:45 PM    Ketone NEGATIVE  03/16/2018 02:45 PM    Bilirubin NEGATIVE  03/16/2018 02:45 PM    Urobilinogen 1.0 03/16/2018 02:45 PM    Nitrites NEGATIVE  03/16/2018 02:45 PM    Leukocyte Esterase NEGATIVE  03/16/2018 02:45 PM    Epithelial cells FEW 03/16/2018 02:45 PM    Bacteria 1+ (A) 03/16/2018 02:45 PM    WBC 5-10 03/16/2018 02:45 PM    RBC 0-5 03/16/2018 02:45 PM         Medications Reviewed:     Current Facility-Administered Medications   Medication Dose Route Frequency    0.9% sodium chloride 1,000 mL with mvi, adult no. 4 with vit K 10 mL, thiamine 350 mg, folic acid 1 mg infusion   IntraVENous Q24H    dexmedeTOMidine (PRECEDEX) 400 mcg in 0.9% sodium chloride 100 mL infusion  0.2-0.7 mcg/kg/hr IntraVENous TITRATE    glucose chewable tablet 16 g  4 Tab Oral PRN    dextrose (D50W) injection syrg 12.5-25 g  12.5-25 g IntraVENous PRN    glucagon (GLUCAGEN) injection 1 mg  1 mg IntraMUSCular PRN    insulin lispro (HUMALOG) injection   SubCUTAneous Q6H    SALINE PERIPHERAL FLUSH Q8H soln 5 mL  5 mL InterCATHeter Q8H    saline peripheral flush soln 10 mL  10 mL InterCATHeter PRN    niCARdipine (CARDENE) 25 mg in 0.9% sodium chloride 250 mL infusion  0-15 mg/hr IntraVENous TITRATE    acetaminophen (TYLENOL) tablet 650 mg  650 mg Oral Q4H PRN    albuterol-ipratropium (DUO-NEB) 2.5 MG-0.5 MG/3 ML  3 mL Nebulization Q6H PRN    finasteride (PROSCAR) tablet 5 mg  5 mg Oral DAILY    isosorbide mononitrate ER (IMDUR) tablet 30 mg  30 mg Oral DAILY    levothyroxine (SYNTHROID) tablet 88 mcg  88 mcg Oral ACB    atorvastatin (LIPITOR) tablet 40 mg  40 mg Oral QHS    tamsulosin (FLOMAX) capsule 0.4 mg  0.4 mg Oral DAILY    sodium chloride (NS) flush 5-10 mL  5-10 mL IntraVENous Q8H    0.9% sodium chloride with KCl 20 mEq/L infusion   IntraVENous CONTINUOUS    ondansetron (ZOFRAN) injection 4 mg  4 mg IntraVENous Q4H PRN    levETIRAcetam (KEPPRA) 500 mg in 0.9% sodium chloride 100 mL IVPB  500 mg IntraVENous Q12H    famotidine (PEPCID) tablet 20 mg  20 mg Oral DAILY    HYDROmorphone (DILAUDID) injection 1 mg  1 mg IntraVENous Q3H PRN    HYDROmorphone (DILAUDID) tablet 2 mg  2 mg Oral Q4H PRN    ceFAZolin (ANCEF) 2 g/20 mL in sterile water IV syringe  2 g IntraVENous Q8H    LORazepam (ATIVAN) injection 1 mg  1 mg IntraVENous Q4H PRN     ______________________________________________________________________  EXPECTED LENGTH OF STAY: - - -  ACTUAL LENGTH OF STAY:          [facility] Juan Long, MD

## 2018-05-13 NOTE — PROGRESS NOTES
Ct with less sd, more pneumocephalus. Some diffuse sah likely from shifting of brain, will decrease ela sxn. Quite sedated for agitation and recent ct. High risk of etoh wdraw. Cont icu.  Prn benzo

## 2018-05-14 ENCOUNTER — APPOINTMENT (OUTPATIENT)
Dept: CT IMAGING | Age: 83
DRG: 025 | End: 2018-05-14
Attending: NEUROLOGICAL SURGERY
Payer: MEDICARE

## 2018-05-14 LAB
ALBUMIN SERPL-MCNC: 2.9 G/DL (ref 3.5–5)
ALBUMIN/GLOB SERPL: 0.7 {RATIO} (ref 1.1–2.2)
ALP SERPL-CCNC: 90 U/L (ref 45–117)
ALT SERPL-CCNC: 13 U/L (ref 12–78)
ANION GAP SERPL CALC-SCNC: 7 MMOL/L (ref 5–15)
AST SERPL-CCNC: 23 U/L (ref 15–37)
BASOPHILS # BLD: 0 K/UL (ref 0–0.1)
BASOPHILS NFR BLD: 0 % (ref 0–1)
BILIRUB SERPL-MCNC: 0.4 MG/DL (ref 0.2–1)
BUN SERPL-MCNC: 18 MG/DL (ref 6–20)
BUN/CREAT SERPL: 18 (ref 12–20)
CALCIUM SERPL-MCNC: 8.4 MG/DL (ref 8.5–10.1)
CHLORIDE SERPL-SCNC: 114 MMOL/L (ref 97–108)
CO2 SERPL-SCNC: 22 MMOL/L (ref 21–32)
CREAT SERPL-MCNC: 1.01 MG/DL (ref 0.7–1.3)
DIFFERENTIAL METHOD BLD: ABNORMAL
EOSINOPHIL # BLD: 0.1 K/UL (ref 0–0.4)
EOSINOPHIL NFR BLD: 2 % (ref 0–7)
ERYTHROCYTE [DISTWIDTH] IN BLOOD BY AUTOMATED COUNT: 17.7 % (ref 11.5–14.5)
GLOBULIN SER CALC-MCNC: 4 G/DL (ref 2–4)
GLUCOSE BLD STRIP.AUTO-MCNC: 108 MG/DL (ref 65–100)
GLUCOSE BLD STRIP.AUTO-MCNC: 111 MG/DL (ref 65–100)
GLUCOSE BLD STRIP.AUTO-MCNC: 112 MG/DL (ref 65–100)
GLUCOSE BLD STRIP.AUTO-MCNC: 82 MG/DL (ref 65–100)
GLUCOSE SERPL-MCNC: 107 MG/DL (ref 65–100)
HCT VFR BLD AUTO: 30.2 % (ref 36.6–50.3)
HGB BLD-MCNC: 9.2 G/DL (ref 12.1–17)
IMM GRANULOCYTES # BLD: 0 K/UL (ref 0–0.04)
IMM GRANULOCYTES NFR BLD AUTO: 0 % (ref 0–0.5)
LYMPHOCYTES # BLD: 0.4 K/UL (ref 0.8–3.5)
LYMPHOCYTES NFR BLD: 6 % (ref 12–49)
MAGNESIUM SERPL-MCNC: 1.9 MG/DL (ref 1.6–2.4)
MCH RBC QN AUTO: 26.9 PG (ref 26–34)
MCHC RBC AUTO-ENTMCNC: 30.5 G/DL (ref 30–36.5)
MCV RBC AUTO: 88.3 FL (ref 80–99)
MONOCYTES # BLD: 0.6 K/UL (ref 0–1)
MONOCYTES NFR BLD: 8 % (ref 5–13)
NEUTS SEG # BLD: 5.7 K/UL (ref 1.8–8)
NEUTS SEG NFR BLD: 84 % (ref 32–75)
NRBC # BLD: 0 K/UL (ref 0–0.01)
NRBC BLD-RTO: 0 PER 100 WBC
PHOSPHATE SERPL-MCNC: 2 MG/DL (ref 2.6–4.7)
PLATELET # BLD AUTO: 131 K/UL (ref 150–400)
PMV BLD AUTO: 12.5 FL (ref 8.9–12.9)
POTASSIUM SERPL-SCNC: 4.4 MMOL/L (ref 3.5–5.1)
PROT SERPL-MCNC: 6.9 G/DL (ref 6.4–8.2)
RBC # BLD AUTO: 3.42 M/UL (ref 4.1–5.7)
SERVICE CMNT-IMP: ABNORMAL
SERVICE CMNT-IMP: NORMAL
SODIUM SERPL-SCNC: 143 MMOL/L (ref 136–145)
WBC # BLD AUTO: 6.8 K/UL (ref 4.1–11.1)

## 2018-05-14 PROCEDURE — 70450 CT HEAD/BRAIN W/O DYE: CPT

## 2018-05-14 PROCEDURE — 36415 COLL VENOUS BLD VENIPUNCTURE: CPT | Performed by: INTERNAL MEDICINE

## 2018-05-14 PROCEDURE — 74011000258 HC RX REV CODE- 258: Performed by: NEUROLOGICAL SURGERY

## 2018-05-14 PROCEDURE — 82962 GLUCOSE BLOOD TEST: CPT

## 2018-05-14 PROCEDURE — 85025 COMPLETE CBC W/AUTO DIFF WBC: CPT | Performed by: INTERNAL MEDICINE

## 2018-05-14 PROCEDURE — 74011250636 HC RX REV CODE- 250/636: Performed by: NEUROLOGICAL SURGERY

## 2018-05-14 PROCEDURE — 74011250636 HC RX REV CODE- 250/636: Performed by: INTERNAL MEDICINE

## 2018-05-14 PROCEDURE — 83735 ASSAY OF MAGNESIUM: CPT | Performed by: INTERNAL MEDICINE

## 2018-05-14 PROCEDURE — 94640 AIRWAY INHALATION TREATMENT: CPT

## 2018-05-14 PROCEDURE — 97162 PT EVAL MOD COMPLEX 30 MIN: CPT

## 2018-05-14 PROCEDURE — 92610 EVALUATE SWALLOWING FUNCTION: CPT | Performed by: SPEECH-LANGUAGE PATHOLOGIST

## 2018-05-14 PROCEDURE — G8988 SELF CARE GOAL STATUS: HCPCS

## 2018-05-14 PROCEDURE — 74011250637 HC RX REV CODE- 250/637: Performed by: INTERNAL MEDICINE

## 2018-05-14 PROCEDURE — 77010033678 HC OXYGEN DAILY

## 2018-05-14 PROCEDURE — 74011000258 HC RX REV CODE- 258: Performed by: INTERNAL MEDICINE

## 2018-05-14 PROCEDURE — 74011250636 HC RX REV CODE- 250/636: Performed by: NURSE PRACTITIONER

## 2018-05-14 PROCEDURE — 65610000006 HC RM INTENSIVE CARE

## 2018-05-14 PROCEDURE — 97165 OT EVAL LOW COMPLEX 30 MIN: CPT

## 2018-05-14 PROCEDURE — C9113 INJ PANTOPRAZOLE SODIUM, VIA: HCPCS | Performed by: INTERNAL MEDICINE

## 2018-05-14 PROCEDURE — 97530 THERAPEUTIC ACTIVITIES: CPT

## 2018-05-14 PROCEDURE — G8987 SELF CARE CURRENT STATUS: HCPCS

## 2018-05-14 PROCEDURE — 74011000250 HC RX REV CODE- 250: Performed by: INTERNAL MEDICINE

## 2018-05-14 PROCEDURE — 80053 COMPREHEN METABOLIC PANEL: CPT | Performed by: INTERNAL MEDICINE

## 2018-05-14 PROCEDURE — 74011250637 HC RX REV CODE- 250/637: Performed by: NURSE PRACTITIONER

## 2018-05-14 PROCEDURE — 84100 ASSAY OF PHOSPHORUS: CPT | Performed by: INTERNAL MEDICINE

## 2018-05-14 RX ORDER — ISOSORBIDE MONONITRATE 30 MG/1
30 TABLET, EXTENDED RELEASE ORAL DAILY
Status: DISCONTINUED | OUTPATIENT
Start: 2018-05-15 | End: 2018-05-20 | Stop reason: HOSPADM

## 2018-05-14 RX ORDER — METOPROLOL SUCCINATE 25 MG/1
25 TABLET, EXTENDED RELEASE ORAL DAILY
Status: DISCONTINUED | OUTPATIENT
Start: 2018-05-15 | End: 2018-05-20 | Stop reason: HOSPADM

## 2018-05-14 RX ORDER — LORAZEPAM 2 MG/ML
1 INJECTION INTRAMUSCULAR
Status: DISCONTINUED | OUTPATIENT
Start: 2018-05-14 | End: 2018-05-20 | Stop reason: HOSPADM

## 2018-05-14 RX ORDER — LORAZEPAM 0.5 MG/1
1 TABLET ORAL
Status: DISCONTINUED | OUTPATIENT
Start: 2018-05-14 | End: 2018-05-20 | Stop reason: HOSPADM

## 2018-05-14 RX ORDER — LORAZEPAM 2 MG/ML
2 INJECTION INTRAMUSCULAR
Status: DISCONTINUED | OUTPATIENT
Start: 2018-05-14 | End: 2018-05-20 | Stop reason: HOSPADM

## 2018-05-14 RX ORDER — LORAZEPAM 2 MG/1
2 TABLET ORAL
Status: DISCONTINUED | OUTPATIENT
Start: 2018-05-14 | End: 2018-05-20 | Stop reason: HOSPADM

## 2018-05-14 RX ORDER — LABETALOL HYDROCHLORIDE 5 MG/ML
10 INJECTION, SOLUTION INTRAVENOUS
Status: DISCONTINUED | OUTPATIENT
Start: 2018-05-14 | End: 2018-05-20 | Stop reason: HOSPADM

## 2018-05-14 RX ORDER — LEVETIRACETAM 500 MG/1
500 TABLET ORAL EVERY 12 HOURS
Status: DISCONTINUED | OUTPATIENT
Start: 2018-05-14 | End: 2018-05-20 | Stop reason: HOSPADM

## 2018-05-14 RX ORDER — HYDRALAZINE HYDROCHLORIDE 20 MG/ML
10 INJECTION INTRAMUSCULAR; INTRAVENOUS
Status: DISCONTINUED | OUTPATIENT
Start: 2018-05-14 | End: 2018-05-20 | Stop reason: HOSPADM

## 2018-05-14 RX ORDER — FENTANYL CITRATE 50 UG/ML
25 INJECTION, SOLUTION INTRAMUSCULAR; INTRAVENOUS
Status: DISCONTINUED | OUTPATIENT
Start: 2018-05-14 | End: 2018-05-20 | Stop reason: HOSPADM

## 2018-05-14 RX ORDER — AMLODIPINE BESYLATE 5 MG/1
5 TABLET ORAL DAILY
Status: DISCONTINUED | OUTPATIENT
Start: 2018-05-15 | End: 2018-05-17

## 2018-05-14 RX ADMIN — LORAZEPAM 1 MG: 2 INJECTION INTRAMUSCULAR; INTRAVENOUS at 09:57

## 2018-05-14 RX ADMIN — FINASTERIDE 5 MG: 5 TABLET, FILM COATED ORAL at 08:51

## 2018-05-14 RX ADMIN — LORAZEPAM 1 MG: 1 TABLET ORAL at 19:55

## 2018-05-14 RX ADMIN — HYDRALAZINE HYDROCHLORIDE 10 MG: 20 INJECTION INTRAMUSCULAR; INTRAVENOUS at 23:05

## 2018-05-14 RX ADMIN — HYDROMORPHONE HYDROCHLORIDE 1 MG: 2 INJECTION INTRAMUSCULAR; INTRAVENOUS; SUBCUTANEOUS at 01:38

## 2018-05-14 RX ADMIN — BUDESONIDE 500 MCG: 0.5 INHALANT RESPIRATORY (INHALATION) at 07:14

## 2018-05-14 RX ADMIN — PANTOPRAZOLE SODIUM 40 MG: 40 INJECTION, POWDER, FOR SOLUTION INTRAVENOUS at 16:03

## 2018-05-14 RX ADMIN — LORAZEPAM 2 MG: 2 INJECTION INTRAMUSCULAR; INTRAVENOUS at 21:11

## 2018-05-14 RX ADMIN — BUDESONIDE 500 MCG: 0.5 INHALANT RESPIRATORY (INHALATION) at 19:04

## 2018-05-14 RX ADMIN — Medication 2 G: at 00:18

## 2018-05-14 RX ADMIN — Medication 10 ML: at 14:00

## 2018-05-14 RX ADMIN — TAMSULOSIN HYDROCHLORIDE 0.4 MG: 0.4 CAPSULE ORAL at 08:51

## 2018-05-14 RX ADMIN — ATORVASTATIN CALCIUM 40 MG: 40 TABLET, FILM COATED ORAL at 21:04

## 2018-05-14 RX ADMIN — SODIUM CHLORIDE 0.2 MCG/KG/HR: 900 INJECTION, SOLUTION INTRAVENOUS at 05:17

## 2018-05-14 RX ADMIN — LEVETIRACETAM 500 MG: 500 TABLET ORAL at 20:15

## 2018-05-14 RX ADMIN — ARFORMOTEROL TARTRATE 15 MCG: 15 SOLUTION RESPIRATORY (INHALATION) at 19:04

## 2018-05-14 RX ADMIN — LORAZEPAM 2 MG: 2 INJECTION INTRAMUSCULAR; INTRAVENOUS at 11:09

## 2018-05-14 RX ADMIN — SODIUM CHLORIDE 500 MG: 900 INJECTION, SOLUTION INTRAVENOUS at 09:03

## 2018-05-14 RX ADMIN — Medication 2 G: at 08:53

## 2018-05-14 RX ADMIN — FOLIC ACID: 5 INJECTION, SOLUTION INTRAMUSCULAR; INTRAVENOUS; SUBCUTANEOUS at 10:28

## 2018-05-14 RX ADMIN — LORAZEPAM 2 MG: 2 INJECTION INTRAMUSCULAR; INTRAVENOUS at 22:29

## 2018-05-14 RX ADMIN — Medication 5 ML: at 05:46

## 2018-05-14 RX ADMIN — HYDRALAZINE HYDROCHLORIDE 10 MG: 20 INJECTION INTRAMUSCULAR; INTRAVENOUS at 09:30

## 2018-05-14 RX ADMIN — LORAZEPAM 2 MG: 2 INJECTION INTRAMUSCULAR; INTRAVENOUS at 14:45

## 2018-05-14 RX ADMIN — FENTANYL CITRATE 25 MCG: 50 INJECTION, SOLUTION INTRAMUSCULAR; INTRAVENOUS at 16:02

## 2018-05-14 RX ADMIN — Medication 10 ML: at 21:04

## 2018-05-14 RX ADMIN — LORAZEPAM 2 MG: 2 INJECTION INTRAMUSCULAR; INTRAVENOUS at 23:29

## 2018-05-14 RX ADMIN — Medication 10 ML: at 05:46

## 2018-05-14 RX ADMIN — Medication 2 G: at 17:25

## 2018-05-14 RX ADMIN — SODIUM CHLORIDE AND POTASSIUM CHLORIDE: 9; 1.49 INJECTION, SOLUTION INTRAVENOUS at 20:02

## 2018-05-14 RX ADMIN — ARFORMOTEROL TARTRATE 15 MCG: 15 SOLUTION RESPIRATORY (INHALATION) at 07:14

## 2018-05-14 RX ADMIN — Medication 5 ML: at 14:00

## 2018-05-14 RX ADMIN — LORAZEPAM 2 MG: 2 INJECTION INTRAMUSCULAR; INTRAVENOUS at 18:13

## 2018-05-14 RX ADMIN — Medication 5 ML: at 21:04

## 2018-05-14 NOTE — CDMP QUERY
Please give the clinical significance of the following lab data. Troponin levels 0.25 and 0.27 on (05/12 & 05/11)    Please clarify and document your clinical opinion in the progress notes and discharge summary including the definitive and/or presumptive diagnosis, (suspected or probable), related to the above clinical findings. Please include clinical findings supporting your diagnosis.     Thank you,    Ally Roldan RN, BSN, CCRN,[facility]  [phone]

## 2018-05-14 NOTE — PROGRESS NOTES
1930:Bedside shift change report given to Jessenia Michelle RN (oncoming nurse) by Uzma Hunter RN (offgoing nurse). Report included the following information SBAR, Kardex, ED Summary, Intake/Output, MAR, Recent Results, Med Rec Status, Cardiac Rhythm SR and Alarm Parameters . 0715: Spoke with Dr. Cristhian Rod, ordered head CT since there wasn't one put in for the morning, also made NS aware of increase ARIEL output. 0730: Bedside shift change report given to Samm Almonte (oncoming nurse) by Jessenia Michelle RN (offgoing nurse). Report included the following information SBAR, Kardex, ED Summary, Intake/Output, MAR, Recent Results, Med Rec Status, Cardiac Rhythm SR and Alarm Parameters . Shift Summary:  Patient confused, but alert and oriented x1-4 depending on pt condition (i.e. Received meds, etc), eyes open spontaneously and/or to voice, PERRL, SCHNEIDER but weak, decreased command following/attention, focuses, afebrile, HR: 60s-70s, BP 110s-150s, lung sounds clear, diminished in bases, pt currently NPO due to confusion, garbled speech, ARIEL output 315 ml sanguinous drainage, and UOP adequate.

## 2018-05-14 NOTE — PROGRESS NOTES
Problem: Self Care Deficits Care Plan (Adult)  Goal: *Acute Goals and Plan of Care (Insert Text)  Occupational Therapy Goals  Initiated 5/14/2018    1. Patient will follow 1 step simple command during ADL with 100% consistency within 7 days. 2.  Patient will perform grooming seated EOB with Deuce within 7 days. 3.  Patient will perform toilet transfer to [facility] with modA within 7 days. 4.  Patient will perform anterior UB bathing seated in chair with modA within 7 days. 5.  Patient will participate in UE therapeutic exercise/activities with Deuce for 5 minutes within 7 days. Occupational Therapy EVALUATION  Patient: Esther Hunter (25 y.o. male)  Date: 5/14/2018  Primary Diagnosis: SDH  Subdural hematoma (HCC)  subdurral hematoma, right frontal crani  Procedure(s) (LRB):  CRANIOTOMY (Right) 2 Days Post-Op   Precautions:   Fall, Bed Alarm    ASSESSMENT :  Based on the objective data described below, the patient presents with decreased functional strength, AROM, coordination, impaired cognition, motor planning, alertness, orientation, s/p right craniotomy for SDH POD 2 (following fall and ETOH). Pt drowsy (received Ativan) and oriented to self only. ADLs overall max-totalA, min-modA bed mobility, maxAx2 for sit<>stand with BLE knee buckling. Evaluation limited d/t alertness/cognition. Pt was able to state he lives alone, uses RW and stated name. Brother-in-law present at bedside and reports pt drives at baseline. At this time, recommending discharge to rehab as pt is far below safe and functional baseline with ADL/mobility. Next session: UB ADLs, command following during tasks. Patient will benefit from skilled intervention to address the above impairments.   Patients rehabilitation potential is considered to be Fair  Factors which may influence rehabilitation potential include:   []             None noted  []             Mental ability/status  [x]             Medical condition  []             Home/family situation and support systems  []             Safety awareness  []             Pain tolerance/management  []             Other:      PLAN :  Recommendations and Planned Interventions:  [x]               Self Care Training                  [x]        Therapeutic Activities  [x]               Functional Mobility Training    [x]        Cognitive Retraining  [x]               Therapeutic Exercises           [x]        Endurance Activities  [x]               Balance Training                   [x]        Neuromuscular Re-Education  [x]               Visual/Perceptual Training     [x]   Home Safety Training  [x]               Patient Education                 [x]        Family Training/Education  []               Other (comment):    Frequency/Duration: Patient will be followed by occupational therapy 5 times a week to address goals. Discharge Recommendations: Rehab  Further Equipment Recommendations for Discharge: TBD     SUBJECTIVE:   Patient stated Lulla Book.     OBJECTIVE DATA SUMMARY:   HISTORY:   Past Medical History:   Diagnosis Date    AAA [facility]     Acute MI ([facility] 12/2010    dr Kai Brittle    Acute prostatitis     again 9/12/16 [facility]'d [facility]    Advance directive discussed with patient 04/20/2016    Aneurysm ([facility] 6/2011    AAA    Borderline glaucoma (glaucoma suspect) 02/19/2015    notes from [address] [place] [address] rec'd    BPH (benign prostatic hyperplasia)     Bright red blood per rectum 02/04/2016    [facility] note Verle Coins- w/u in progress   8/22/17 [facility] f[facility]u note    CAD (coronary artery disease)     Calculus of kidney     Cerebral embolism with cerebral infarction ([facility]     Chronic pain     back    Dizzy spells 6/2012    DJD (degenerative joint disease) of lumbar spine     ED (erectile dysfunction)     8/30/17 [facility]u note [facility]    Elevated PSA 03/20/2014    [facility]     8/24/16 f/u note    Encephalocele ([facility]     Hypercholesterolemia     Hypertension     Pneumonia 02/05/2017    Prostate cancer ([facility] [address] 05/12/2014    crystal henry/ Dr Oscar Vance 8/22/17 f/u note    PVD (peripheral vascular disease) with claudication ([facility]     S/P radiation therapy 15 months out    probable cause of the rectal bleeding    Stroke [facility]) 6/2011    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  COLONOSCOPY N/A 8/31/2016    COLONOSCOPY performed by Keith Freeman MD at hospitals ENDOSCOPY    ENDOSCOPY, COLON, DIAGNOSTIC      HX CAROTID ENDARTERECTOMY Left     HX HEENT  10/2012    repair right drlisa dawkins    HX ORTHOPAEDIC Bilateral     BUNIONECTOMY    ME LEFT HEART CATH,PERCUTANEOUS  10/16/2010         PTCA EACH ADDL VESSEL  10/16/2010         PTCA W/ STENT, INITIAL  10/16/2010         UPPER GI ENDOSCOPY,DIAGNOSIS  11/22/2017         VASCULAR SURGERY PROCEDURE UNLIST      left leg varicose vein stripping dr Tracie Negro       Prior Level of Function/Environment/Context: Pt unable to provide full prior level. Uses RW. Per brother-in-law present, pt lives alone, drives, IND-Michael with ADLs.   Occupations in which the patient is/was successful, what are the barriers preventing that success:   Performance Patterns (routines, roles, habits, and rituals):   Personal Interests and/or values:   Expanded or extensive additional review of patient history:     Home Situation  Home Environment: Apartment  Living Alone: Yes  Support Systems: Family member(s)  Patient Expects to be Discharged to[de-identified] Unknown  Current DME Used/Available at Home: Walker, rolling  Tub or Shower Type: Tub/Shower combination  [x]  Right hand dominant   []  Left hand dominant    EXAMINATION OF PERFORMANCE DEFICITS:  Cognitive/Behavioral Status:  Neurologic State: Drowsy  Orientation Level: Oriented to person;Disoriented to situation;Disoriented to place; Disoriented to time  Cognition: Decreased command following;Decreased attention/concentration; Impaired decision making  Perception: Appears intact  Perseveration: Perseverates during conversation  Safety/Judgement: Decreased awareness of need for assistance;Decreased awareness of environment;Decreased awareness of need for safety;Decreased insight into deficits    Skin: please see nursing ntoes    Edema: none noted in BUEs       Vision/Perceptual:    Tracking:  (unable to formally test d/t cognition/alertness)                                Range of Motion:    AROM: Generally decreased, functional  PROM: Generally decreased, functional                      Strength:    Strength: Grossly decreased, non-functional                Coordination:  Coordination: Grossly decreased, non-functional  Fine Motor Skills-Upper: Left Intact; Right Intact    Gross Motor Skills-Upper: Left Impaired;Right Impaired    Tone & Sensation:    Tone: Normal  Sensation:  (not formally tested d/t cognition)                      Balance:  Sitting: Impaired  Standing: Impaired; With support  Standing - Static: Constant support;Poor    Functional Mobility and Transfers for ADLs:  Bed Mobility:  Rolling: Minimum assistance; Moderate assistance;Assist x2  Supine to Sit: Minimum assistance; Moderate assistance;Assist x2  Sit to Supine: Minimum assistance; Moderate assistance;Assist x2  Scooting: Minimum assistance    Transfers:  Sit to Stand: Moderate assistance;Maximum assistance;Assist x2  Stand to Sit: Maximum assistance;Assist x2  Toilet Transfer : Total assistance    ADL Assessment:  Feeding: Maximum assistance    Oral Facial Hygiene/Grooming: Maximum assistance    Bathing: Total assistance    Upper Body Dressing: Total assistance    Lower Body Dressing: Total assistance    Toileting:  Total assistance                ADL Intervention and task modifications:                                     Cognitive Retraining  Safety/Judgement: Decreased awareness of need for assistance;Decreased awareness of environment;Decreased awareness of need for safety;Decreased insight into deficits       Functional Measure:  Barthel Index:    Bathin  Bladder: 0  Bowels: 5  Groomin  Dressin  Feedin  Mobility: 0  Stairs: 0  Toilet Use: 0  Transfer (Bed to Chair and Back): 5  Total: 10       Barthel and G-code impairment scale:  Percentage of impairment CH  0% CI  1-19% CJ  20-39% CK  40-59% CL  60-79% CM  80-99% CN  100%   Barthel Score 0-100 100 99-80 79-60 59-40 20-39 1-19   0   Barthel Score 0-20 20 17-19 13-16 9-12 5-8 1-4 0      The Barthel ADL Index: Guidelines  1. The index should be used as a record of what a patient does, not as a record of what a patient could do. 2. The main aim is to establish degree of independence from any help, physical or verbal, however minor and for whatever reason. 3. The need for supervision renders the patient not independent. 4. A patient's performance should be established using the best available evidence. Asking the patient, friends/relatives and nurses are the usual sources, but direct observation and common sense are also important. However direct testing is not needed. 5. Usually the patient's performance over the preceding 24-48 hours is important, but occasionally longer periods will be relevant. 6. Middle categories imply that the patient supplies over 50 per cent of the effort. 7. Use of aids to be independent is allowed. Chloe De La Rosa., Barthel, D.W. (4609). Functional evaluation: the Barthel Index. [address] [facility] (14)2. MIKAYLA Garcia, Salvador Lemus., Sona Rivers., [place], [address] (). Measuring the change indisability after inpatient rehabilitation; comparison of the responsiveness of the Barthel Index and Functional [place] Measure. Journal of Neurology, Neurosurgery, and Psychiatry, 66(4), 997-711. EDMAR Carcamo, LISA Borja, & Oliva Ruiz M.A. (2004.) Assessment of post-stroke quality of life in cost-effectiveness studies: The usefulness of the Barthel Index and the EuroQoL-5D. Quality of Life Research, 13, 248-77         G codes: In compliance with CMSs Claims Based Outcome Reporting, the following G-code set was chosen for this patient based on their primary functional limitation being treated: The outcome measure chosen to determine the severity of the functional limitation was the Barthel Index with a score of 10/100 which was correlated with the impairment scale. ? Self Care:     - CURRENT STATUS: CM - 80%-99% impaired, limited or restricted    - GOAL STATUS: CK - 40%-59% impaired, limited or restricted    - D/C STATUS:  ---------------To be determined---------------     Occupational Therapy Evaluation Charge Determination   History Examination Decision-Making   LOW Complexity : Brief history review  HIGH Complexity : 5 or more performance deficits relating to physical, cognitive , or psychosocial skils that result in activity limitations and / or participation restrictions HIGH Complexity : Patient presents with comorbidities that affect occupational performance. Signifigant modification of tasks or assistance (eg, physical or verbal) with assessment (s) is necessary to enable patient to complete evaluation       Based on the above components, the patient evaluation is determined to be of the following complexity level: LOW         Activity Tolerance:   VSS  Please refer to the flowsheet for vital signs taken during this treatment.   After treatment:   [] Patient left in no apparent distress sitting up in chair  [x] Patient left in no apparent distress in bed  [x] Call bell left within reach  [x] Nursing notified  [] Caregiver present  [] Bed alarm activated    COMMUNICATION/EDUCATION:   The patients plan of care was discussed with: Physical Therapist and Registered Nurse.  [] Home safety education was provided and the patient/caregiver indicated understanding. [] Patient/family have participated as able in goal setting and plan of care. [] Patient/family agree to work toward stated goals and plan of care. [] Patient understands intent and goals of therapy, but is neutral about his/her participation. [x] Patient is unable to participate in goal setting and plan of care. This patients plan of care is appropriate for delegation to [facility].     Thank you for this referral.  Sabina Kowalski OT  Time Calculation: 16 mins

## 2018-05-14 NOTE — PROGRESS NOTES
Neurosurgery Progress Note  Marleni Zelaya  [phone]        Admit Date: 2018   LOS: 2 days        Daily Progress Note: 2018    POD:2 Days Post-Op    S/P: Procedure(s):  CRANIOTOMY    Subjective: The patient sustained a ground level fall and was found naked on the floor with a bottle of bourbon after his leg gave out. He also had lower back pain. He presented to the ER at [facility] where a head CT revealed a right SDH. He transferred to [facility] and was taken to the OR for evacuation of the SDH. He has a history of alcoholism. His alcohol level was 30 mg/DL. This morning he is awake, asking to sit on the side of the bed. He is agitated and squirrely in the bed. He has had some oozing at his incision site. Objective:     Vital signs  Temp (24hrs), Av °F (36.7 °C), Min:97.6 °F (36.4 °C), Max:98.3 °F (36.8 °C)   701 - 1900  In: 5998 [I.V.:1275]  Out: 510 [Urine:410; Drains:100]  1901 -  0700  In: 2748.1 [I.V.:2748.1]  Out:  [Urine:1270; Drains:755]    Visit Vitals    /79    Pulse 82    Temp 98.3 °F (36.8 °C)    Resp 28    Wt 81.8 kg (180 lb 5.4 oz)    SpO2 99%    BMI 31.95 kg/m2    O2 Flow Rate (L/min): 4 l/min O2 Device: Nasal cannula     Pain control  Pain Assessment  Pain Scale 1: Numeric (0 - 10)  Pain Intensity 1: 0  Pain Onset 1: ongoing  Pain Location 1: Back  Pain Orientation 1: Lower  Pain Description 1: Aching  Pain Intervention(s) 1: Medication (see MAR)    PT/OT  Gait                 Physical Exam:  Gen:NAD. Neuro: A&Ox1. Answers yes/no correctly for place. Does not know year. Intermittently follows commands. Speech clear. Affect agitated. PERRL. EOMI. Face symmetric. Tongue midline. SCHNEIDER spontaneously  Gait deferred. Skin: Right frontoparietal incision with staples in place. Oozing blood.  No edema or erythema    CT head without contrast on 18 shows status post right parietal craniotomy with subdural drain in the right extra-axial collection which is unchanged in size. Right temporal parenchymal hemorrhage minimally increased with bilateral parietal subarachnoid hemorrhage unchanged. 24 hour results:    Recent Results (from the past 24 hour(s))   GLUCOSE, POC    Collection Time: 05/13/18  6:01 PM   Result Value Ref Range    Glucose (POC) 98 65 - 100 mg/dL    Performed by Rayne Mcdonald    GLUCOSE, POC    Collection Time: 05/13/18 11:32 PM   Result Value Ref Range    Glucose (POC) 104 (H) 65 - 100 mg/dL    Performed by Diana Heap    CBC WITH AUTOMATED DIFF    Collection Time: 05/14/18  4:35 AM   Result Value Ref Range    WBC 6.8 4.1 - 11.1 K/uL    RBC 3.42 (L) 4.10 - 5.70 M/uL    HGB 9.2 (L) 12.1 - 17.0 g/dL    HCT 30.2 (L) 36.6 - 50.3 %    MCV 88.3 80.0 - 99.0 FL    MCH 26.9 26.0 - 34.0 PG    MCHC 30.5 30.0 - 36.5 g/dL    RDW 17.7 (H) 11.5 - 14.5 %    PLATELET 079 (L) 250 - 400 K/uL    MPV 12.5 8.9 - 12.9 FL    NRBC 0.0 0  WBC    ABSOLUTE NRBC 0.00 0.00 - 0.01 K/uL    NEUTROPHILS 84 (H) 32 - 75 %    LYMPHOCYTES 6 (L) 12 - 49 %    MONOCYTES 8 5 - 13 %    EOSINOPHILS 2 0 - 7 %    BASOPHILS 0 0 - 1 %    IMMATURE GRANULOCYTES 0 0.0 - 0.5 %    ABS. NEUTROPHILS 5.7 1.8 - 8.0 K/UL    ABS. LYMPHOCYTES 0.4 (L) 0.8 - 3.5 K/UL    ABS. MONOCYTES 0.6 0.0 - 1.0 K/UL    ABS. EOSINOPHILS 0.1 0.0 - 0.4 K/UL    ABS. BASOPHILS 0.0 0.0 - 0.1 K/UL    ABS. IMM.  GRANS. 0.0 0.00 - 0.04 K/UL    DF AUTOMATED     METABOLIC PANEL, COMPREHENSIVE    Collection Time: 05/14/18  4:35 AM   Result Value Ref Range    Sodium 143 136 - 145 mmol/L    Potassium 4.4 3.5 - 5.1 mmol/L    Chloride 114 (H) 97 - 108 mmol/L    CO2 22 21 - 32 mmol/L    Anion gap 7 5 - 15 mmol/L    Glucose 107 (H) 65 - 100 mg/dL    BUN 18 6 - 20 MG/DL    Creatinine 1.01 0.70 - 1.30 MG/DL    BUN/Creatinine ratio 18 12 - 20      GFR est AA >60 >60 ml/min/1.73m2    GFR est non-AA >60 >60 ml/min/1.73m2    Calcium 8.4 (L) 8.5 - 10.1 MG/DL    Bilirubin, total 0.4 0.2 - 1.0 MG/DL    ALT (SGPT) 13 12 - 78 U/L    AST (SGOT) 23 15 - 37 U/L    Alk. phosphatase 90 45 - 117 U/L    Protein, total 6.9 6.4 - 8.2 g/dL    Albumin 2.9 (L) 3.5 - 5.0 g/dL    Globulin 4.0 2.0 - 4.0 g/dL    A-G Ratio 0.7 (L) 1.1 - 2.2     MAGNESIUM    Collection Time: 05/14/18  4:35 AM   Result Value Ref Range    Magnesium 1.9 1.6 - 2.4 mg/dL   PHOSPHORUS    Collection Time: 05/14/18  4:35 AM   Result Value Ref Range    Phosphorus 2.0 (L) 2.6 - 4.7 MG/DL   GLUCOSE, POC    Collection Time: 05/14/18  5:45 AM   Result Value Ref Range    Glucose (POC) 108 (H) 65 - 100 mg/dL    Performed by Valentin Marino, POC    Collection Time: 05/14/18 12:08 PM   Result Value Ref Range    Glucose (POC) 82 65 - 100 mg/dL    Performed by Gertrudis Arce           Assessment:     Principal Problem:    Subdural hematoma ([facility] [address] (5/12/2018)        Plan:   1. Right traumatic SDH   - s/p crani 05/12   - Normalize and mobilize   - PT/OT/Speech   - Uncharge ARIEL drain and likely remove tomorrow   - Cont ICU care with SD drain   - Suture placed in incision site where it was oozing  2. Brain compression   - due to #1   - plans as above  3. Alcohol abuse   - CIWA protocol with lower doses of Ativan   - Intensivist/hospitalit following  4. Acute encephalopathy   - due to #1, 2, 3   - supportive care  5. HTN   - Restart Norvasc, Imdur, metoprolol from home   - Hold losartan/HCTZ for now   - SBP<160   - Hydralazine/labetalol PRN   - Hospitalist following  6. Obesity   - BMI 31.8   - Counseling as able  7. CAD   - s/p stent placement   - ASA/Plavix on hold  8. Dyslipidemia   - Cont lipitor from home   - Hospitalist following  9. Hypothyroidism   - cont levothyroxine from home   - Hospitalist following  10. BPH   - Cont Proscar and Flomax   - D/C Trejo and voiding trial  11. COPD   - Duo-nebs PRN   - Hospitalist following    Activity: up with assist  DVT ppx: SCDs  Dispo: tbd    Plan d/w Dr. Jose Dumas.  Keep in ICU today with SD drain      Michelle Burnham NP

## 2018-05-14 NOTE — PROGRESS NOTES
Problem: Mobility Impaired (Adult and Pediatric)  Goal: *Acute Goals and Plan of Care (Insert Text)  Physical Therapy Goals  Initiated 5/14/2018  1. Patient will move from supine to sit and sit to supine , scoot up and down and roll side to side in bed with minimal assistance/contact guard assist within 7 day(s). 2.  Patient will transfer from bed to chair and chair to bed with minimal assistance/contact guard assist using the least restrictive device within 7 day(s). 3.  Patient will perform sit to stand with minimal assistance/contact guard assist within 7 day(s). 4.  Patient will ambulate with moderate assistance  for 20 feet with the least restrictive device within 7 day(s). 5.  Patient will improve Fan Balance score by 7 points within 7 days. physical Therapy EVALUATION- neuro population    Patient: Gerri Herrera (82 y.o. male)  Date: 5/14/2018  Primary Diagnosis: SDH  Subdural hematoma (HCC)  subdurral hematoma, right frontal crani  Procedure(s) (LRB):  CRANIOTOMY (Right) 2 Days Post-Op   Precautions:   (P) Fall, Bed Alarm    ASSESSMENT :  Based on the objective data described below, the patient presents with decreased independence with mobility compared to his baseline limited by drowsiness, decreased strength, decreased ROM, impaired balance, pain, CIWA, and decreased safety awareness. Patient accompanied by brother in law who assisted with history information this date. Patient mod I at baseline with RW. This date overall required min-max A x 2 for functional mobility to include bed mobility, EOB sitting, and attempted standing. Patient with inability to advance R LE to take side steps this date. Able to laterally scoot toward L with assist for better positioning in bed. Returned to supine and B wrist restraints applied. Patient will likely need rehab at d/c. Patient will benefit from skilled intervention to address the above impairments.   Patients rehabilitation potential is considered to be Fair  Factors which may influence rehabilitation potential include:   []           None noted  []           Mental ability/status  [x]           Medical condition  []           Home/family situation and support systems  []           Safety awareness  []           Pain tolerance/management  []           Other:      PLAN :  Recommendations and Planned Interventions:  []             Bed Mobility Training             []      Neuromuscular Re-Education  []             Transfer Training                   []      Orthotic/Prosthetic Training  []             Gait Training                         []      Modalities  []             Therapeutic Exercises           []      Edema Management/Control  []             Therapeutic Activities            []      Patient and Family Training/Education  []             Other (comment):  Frequency/Duration: Patient will be followed by physical therapy 5 times a week to address goals. Discharge Recommendations: Rehab  Further Equipment Recommendations for Discharge: tbd     SUBJECTIVE:   Patient stated My brother in law.     OBJECTIVE DATA SUMMARY:   HISTORY:    Past Medical History:   Diagnosis Date    AAA [facility]     Acute MI ([facility] 12/2010    dr Kailyn Fuller    Acute prostatitis     again 9/12/16 [facility]'d [facility]    Advance directive discussed with patient 04/20/2016    Aneurysm ([facility] 6/2011    AAA    Borderline glaucoma (glaucoma suspect) 02/19/2015    notes from [address] [place] [address] rec'd    BPH (benign prostatic hyperplasia)     Bright red blood per rectum 02/04/2016    [facility] note Gus Riff- w/u in progress   8/22/17 [facility] f[facility] note    CAD (coronary artery disease)     Calculus of kidney     Cerebral embolism with cerebral infarction ([facility]     Chronic pain     back    Dizzy spells 6/2012    DJD (degenerative joint disease) of lumbar spine     ED (erectile dysfunction)     8/30/17 [facility] note [facility]    Elevated PSA 03/20/2014    [facility] 8/24/16 f/u note    Encephalocele ([facility]     Hypercholesterolemia     Hypertension     Pneumonia 02/05/2017    Prostate cancer ([facility] [facility] 05/12/2014    crystal henry/ Dr Joe Real 8/22/17 f/u note    PVD (peripheral vascular disease) with claudication ([facility]     S/P radiation therapy 15 months out    probable cause of the rectal bleeding    Stroke [facility]) 6/2011    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     COLONOSCOPY N/A 8/31/2016    COLONOSCOPY performed by Ginette Lagos [facility] ENDOSCOPY    ENDOSCOPY, COLON, DIAGNOSTIC      HX CAROTID ENDARTERECTOMY Left     HX HEENT  10/2012    repair right drum cindy shaia    HX ORTHOPAEDIC Bilateral     BUNIONECTOMY    NE LEFT HEART CATH,PERCUTANEOUS  10/16/2010         PTCA EACH ADDL VESSEL  10/16/2010         PTCA W/ STENT, INITIAL  10/16/2010         UPPER GI ENDOSCOPY,DIAGNOSIS  11/22/2017         VASCULAR SURGERY PROCEDURE UNLIST      left leg varicose vein stripping dr Brie Silver     Prior Level of Function/Home Situation: mod I with RW  Personal factors and/or comorbidities impacting plan of care: lives alone, CIWA    Home Situation  Home Environment: (P) Apartment  Living Alone: (P) Yes  Support Systems: (P) Family member(s)  Patient Expects to be Discharged to[de-identified] (P) Unknown  Current DME Used/Available at Home: (P) Walker, rolling  Tub or Shower Type: (P) Tub/Shower combination    EXAMINATION/PRESENTATION/DECISION MAKING:   Critical Behavior:  Neurologic State: (P) Drowsy  Orientation Level: (P) Oriented to person, Disoriented to situation, Disoriented to place, Disoriented to time  Cognition: (P) Decreased command following, Decreased attention/concentration, Impaired decision making  Safety/Judgement: (P) Decreased awareness of need for assistance, Decreased awareness of environment, Decreased awareness of need for safety, Decreased insight into deficits  Hearing:       Range Of Motion:  AROM: (P) Generally decreased, functional           PROM: (P) Generally decreased, functional           Strength:    Strength: (P) Grossly decreased, non-functional                    Tone & Sensation:   Tone: (P) Normal              Sensation: (P)  (not formally tested d/t cognition)               Coordination:  Coordination: (P) Grossly decreased, non-functional  Vision:   Tracking: (P)  (unable to formally test d/t cognition/alertness)  Functional Mobility:  Bed Mobility:  Rolling: Minimum assistance; Moderate assistance;Assist x2  Supine to Sit: Minimum assistance; Moderate assistance;Assist x2  Sit to Supine: Minimum assistance; Moderate assistance;Assist x2  Scooting: Minimum assistance  Transfers:  Sit to Stand:  Moderate assistance;Maximum assistance;Assist x2  Stand to Sit: Moderate assistance;Maximum assistance;Assist x2                       Balance:   Sitting: (P) Impaired  Standing: Impaired    Functional Measure  Fan Balance Test:    Sitting to Standin  Standing Unsupported: 0  Sitting with Back Unsupported: 3  Standing to Sittin  Transfers: 0  Standing Unsupported with Eyes Closed: 0  Standing Unsupported with Feet Together: 0  Reach Forward with Outstretched Arm: 0   Object: 0  Turn to Look Over Shoulders: 0  Turn 360 Degrees: 0  Alternate Foot on Step/Stool: 0  Standing Unsupported One Foot in Front: 0  Stand on One Le  Total: 3         56=Maximum possible score;   0-20=High fall risk  21-40=Moderate fall risk   41-56=Low fall risk     Fan Balance Test and G-code impairment scale:  Percentage of Impairment CH    0%   CI    1-19% CJ    20-39% CK    40-59% CL    60-79% CM    80-99% CN     100%   Fan   Score 0-56 56 45-55 34-44 23-33 12-22 1-11 0       G codes: In compliance with CMSs Claims Based Outcome Reporting, the following G-code set was chosen for this patient based on their primary functional limitation being treated: The outcome measure chosen to determine the severity of the functional limitation was the Fallon with a score of 3/56 which was correlated with the impairment scale. ? Mobility - Walking and Moving Around:     - CURRENT STATUS: CM - 80%-99% impaired, limited or restricted    - GOAL STATUS: CL - 60%-79% impaired, limited or restricted    - D/C STATUS:  ---------------To be determined---------------     Physical Therapy Evaluation Charge Determination   History Examination Presentation Decision-Making   HIGH Complexity :3+ comorbidities / personal factors will impact the outcome/ POC  MEDIUM Complexity : 3 Standardized tests and measures addressing body structure, function, activity limitation and / or participation in recreation  LOW Complexity : Stable, uncomplicated  MEDIUM Complexity : FOTO score of 26-74      Based on the above components, the patient evaluation is determined to be of the following complexity level: LOW       Pain:  Pain Scale 1: Numeric (0 - 10)  Pain Intensity 1: 0              Activity Tolerance:   VSS  Please refer to the flowsheet for vital signs taken during this treatment. After treatment:   []     Patient left in no apparent distress sitting up in chair  [x]     Patient left in no apparent distress in bed  [x]     Call bell left within reach  [x]     Nursing notified  [x]     Caregiver present  []     Bed alarm activated    COMMUNICATION/EDUCATION:   The patients plan of care was discussed with: Occupational Therapist and Registered Nurse. [x]  Fall prevention education was provided and the patient/caregiver indicated understanding. [x]  Patient/family have participated as able in goal setting and plan of care.   [x]  Patient/family agree to work toward stated goals and plan of care. []  Patient understands intent and goals of therapy, but is neutral about his/her participation. []  Patient is unable to participate in goal setting and plan of care.     Thank you for this referral.  Loee Clayton, PT , DPT   Time Calculation: 39 mins

## 2018-05-14 NOTE — PROGRESS NOTES
0730: Bedside shift change report given to Bree Petersen RN (oncoming nurse) by Sabra Lentz (offgoing nurse). Report included the following information SBAR, Kardex, ED Summary, OR Summary, Procedure Summary, Intake/Output, MAR, Accordion and Recent Results. 0800: Assessment complete. A&Ox4, SBP >160. NP asked about PRN meds for SBP. Patient did pass STAND this however when PRN Ativan is given for CIWA, patient gets sleepy and sometimes confused. 1000: Patient down to CT  0000: SLP at bedside. 1426: NP notified about pink tinged urine, per NP ok to continue to cage removal. Cage removed and condom cath placed. 1540: NP at bedside, ARIEL removed, stitch in place. 1930: Bedside and Verbal shift change report given to Keily Poole RN (oncoming nurse) by Bree Petersen RN (offgoing nurse). Report included the following information SBAR, Kardex, ED Summary, Procedure Summary, Intake/Output, MAR, Accordion, Recent Results and Med Rec Status.

## 2018-05-14 NOTE — PROGRESS NOTES
Hospitalist Progress Note  Staci Chandra MD  Answering service: [phone] from in house phone  Cell:       Date of Service:  2018  NAME:  Adalid Killian  :  1935  MRN:  [id number]      Admission Summary: This is an 67-year-old man with a past medical history significant for coronary artery disease status post stent placement, dyslipidemia, hypertension, CVA, COPD, peripheral vascular disease, hypothyroidism, benign prostatic hyperplasia, who was in his usual state of health until the day of presentation at the emergency room when it was reported that the patient sustained a ground level fall. According to report, the patient was found naked on the floor with a bottle of bourbon. He stated that his leg gave out on him and he fell as a result. Following the fall, the patient started complaining of back pain. The back pain is located at the lower back. The pain is constant with no radiation, 7/10 in severity, worse with movement of the back. No known relieving factors. Patient was [facility] to [facility] for [facility] evaluation. When the patient arrived at the emergency room, CT scan of the head was obtained. The CT scan shows a subdural hematoma. The emergency room physician consulted a neurosurgeon at [facility] who advised a transfer to [place] [address] service was asked to accept the patient as a direct admission to the hospitalist service. The patient was last admitted to this hospital from 2018 to 2018. He was admitted and treated for thromboembolism. The patient has no fever, no rigors, no chills. Interval history / Subjective:    sleeping but more awake today     Assessment & Plan: 1. Subacute right frontoparietal subdural hematoma  Right frontoparietal craniotomy with evacuation of subdural hematoma and placement of subdural drain.   2. Alcoholism: On scheduled Ativan. 3.Coronary artery disease status post stent placement. Plavix and aspirin on hold because of the subdural hematoma. 4.Hypothyroidism. We will continue with Synthroid. 5. Anemia : Monitor CBC. 6.COPD, unknown severity, not bronchospastic  7. History of hypothyroidism  8. History of prostate cancer    Code status: full  DVT prophylaxis: SCD    Care Plan discussed with: Patient/Family  Disposition: Home w/Family and TBD     Hospital Problems  Date Reviewed: 5/12/2018          Codes Class Noted POA    * (Principal)Subdural hematoma ([facility] [address] ICD-10-CM: I62.00  ICD-9-CM: 432.1  5/12/2018 Yes                Review of Systems:   Review of systems not obtained due to patient factors. Vital Signs:    Last 24hrs VS reviewed since prior progress note. Most recent are:  Visit Vitals    BP (!) 142/92    Pulse 89    Temp 98.2 °F (36.8 °C)    Resp 24    Wt 81.8 kg (180 lb 5.4 oz)    SpO2 96%    BMI 31.95 kg/m2         Intake/Output Summary (Last 24 hours) at 05/14/18 1048  Last data filed at 05/14/18 1000   Gross per 24 hour   Intake          1313.11 ml   Output             1365 ml   Net           -51.89 ml        Physical Examination:             Constitutional:  No acute distress, cooperative, pleasant    ENT:  Oral mucous moist, oropharynx benign. Neck supple,    Resp:  CTA bilaterally. No wheezing/rhonchi/rales. No accessory muscle use   CV:  Regular rhythm, normal rate, no murmurs, gallops, rubs    GI:  Soft, non distended, non tender. normoactive bowel sounds, no hepatosplenomegaly     Musculoskeletal:  No edema, warm, 2+ pulses throughout    Neurologic:  Moves all extremities.   AAOx3, CN II-XII reviewed     Skin:  Good turgor, no rashes or ulcers       Data Review:    Review and/or order of clinical lab test      Labs:     Recent Labs      05/14/18   0435  05/13/18   0415   WBC  6.8  7.4   HGB  9.2*  9.4*   HCT  30.2*  30.7*   PLT  131*  125*     Recent Labs      05/14/18   0435  05/13/18 0415  05/12/18 0827   NA  143  141  139   K  4.4  4.3  4.6   CL  114*  112*  109*   CO2  22  24  24   BUN  18  15  15   CREA  1.01  1.21  1.48*   GLU  107*  158*  122*   CA  8.4*  8.0*  8.3*   MG  1.9   --   1.6   PHOS  2.0*   --   3.1     Recent Labs      05/14/18   0435  05/12/18 0827 05/11/18 2006   SGOT  23  39*  28   ALT  13  18  17   AP  90  117  113   TBILI  0.4  0.6  0.6   TP  6.9  7.6  7.7   ALB  2.9*  3.4*  3.4*   GLOB  4.0  4.2*  4.3*     Recent Labs      05/11/18 2051 05/11/18 2006   INR  1.0  1.0   PTP   --   10.4      No results for input(s): FE, TIBC, PSAT, FERR in the last 72 hours. No results found for: FOL, RBCF   No results for input(s): PH, PCO2, PO2 in the last 72 hours.   Recent Labs      05/12/18 0827 05/11/18 2006   CPK  339*  196   CKNDX  0.9   --    TROIQ  0.25*  0.27*     Lab Results   Component Value Date/Time    Cholesterol, total 144 05/12/2018 08:27 AM    HDL Cholesterol 78 05/12/2018 08:27 AM    LDL, calculated 53.6 05/12/2018 08:27 AM    Triglyceride 62 05/12/2018 08:27 AM    CHOL/HDL Ratio 1.8 05/12/2018 08:27 AM     Lab Results   Component Value Date/Time    Glucose (POC) 108 (H) 05/14/2018 05:45 AM    Glucose (POC) 104 (H) 05/13/2018 11:32 PM    Glucose (POC) 98 05/13/2018 06:01 PM    Glucose (POC) 105 (H) 05/13/2018 12:31 PM    Glucose (POC) 111 (H) 03/21/2018 11:19 AM     Lab Results   Component Value Date/Time    Color YELLOW/STRAW 03/16/2018 02:45 PM    Appearance CLEAR 03/16/2018 02:45 PM    Specific gravity 1.014 03/16/2018 02:45 PM    pH (UA) 5.5 03/16/2018 02:45 PM    Protein NEGATIVE  03/16/2018 02:45 PM    Glucose NEGATIVE  03/16/2018 02:45 PM    Ketone NEGATIVE  03/16/2018 02:45 PM    Bilirubin NEGATIVE  03/16/2018 02:45 PM    Urobilinogen 1.0 03/16/2018 02:45 PM    Nitrites NEGATIVE  03/16/2018 02:45 PM    Leukocyte Esterase NEGATIVE  03/16/2018 02:45 PM    Epithelial cells FEW 03/16/2018 02:45 PM    Bacteria 1+ (A) 03/16/2018 02:45 PM    WBC 5-10 03/16/2018 02:45 PM    RBC 0-5 03/16/2018 02:45 PM         Medications Reviewed:     Current Facility-Administered Medications   Medication Dose Route Frequency    LORazepam (ATIVAN) tablet 1 mg  1 mg Oral Q1H PRN    LORazepam (ATIVAN) tablet 2 mg  2 mg Oral Q1H PRN    LORazepam (ATIVAN) injection 1 mg  1 mg IntraVENous Q1H PRN    LORazepam (ATIVAN) injection 2 mg  2 mg IntraVENous Q1H PRN    hydrALAZINE (APRESOLINE) 20 mg/mL injection 10 mg  10 mg IntraVENous Q4H PRN    labetalol (NORMODYNE;TRANDATE) injection 10 mg  10 mg IntraVENous Q4H PRN    0.9% sodium chloride 1,000 mL with mvi, adult no. 4 with vit K 10 mL, thiamine 027 mg, folic acid 1 mg infusion   IntraVENous Q24H    glucose chewable tablet 16 g  4 Tab Oral PRN    dextrose (D50W) injection syrg 12.5-25 g  12.5-25 g IntraVENous PRN    glucagon (GLUCAGEN) injection 1 mg  1 mg IntraMUSCular PRN    insulin lispro (HUMALOG) injection   SubCUTAneous Q6H    arformoterol (BROVANA) neb solution 15 mcg  15 mcg Nebulization BID RT    And    budesonide (PULMICORT) 500 mcg/2 ml nebulizer suspension  500 mcg Nebulization BID RT    pantoprazole (PROTONIX) 40 mg in sodium chloride (NS) 10 mL injection  40 mg IntraVENous DAILY    SALINE PERIPHERAL FLUSH Q8H soln 5 mL  5 mL InterCATHeter Q8H    saline peripheral flush soln 10 mL  10 mL InterCATHeter PRN    niCARdipine (CARDENE) 25 mg in 0.9% sodium chloride 250 mL infusion  0-15 mg/hr IntraVENous TITRATE    acetaminophen (TYLENOL) tablet 650 mg  650 mg Oral Q4H PRN    albuterol-ipratropium (DUO-NEB) 2.5 MG-0.5 MG/3 ML  3 mL Nebulization Q6H PRN    finasteride (PROSCAR) tablet 5 mg  5 mg Oral DAILY    levothyroxine (SYNTHROID) tablet 88 mcg  88 mcg Oral ACB    atorvastatin (LIPITOR) tablet 40 mg  40 mg Oral QHS    tamsulosin (FLOMAX) capsule 0.4 mg  0.4 mg Oral DAILY    sodium chloride (NS) flush 5-10 mL  5-10 mL IntraVENous Q8H    0.9% sodium chloride with KCl 20 mEq/L infusion   IntraVENous CONTINUOUS    ondansetron (ZOFRAN) injection 4 mg  4 mg IntraVENous Q4H PRN    levETIRAcetam (KEPPRA) 500 mg in 0.9% sodium chloride 100 mL IVPB  500 mg IntraVENous Q12H    HYDROmorphone (DILAUDID) injection 1 mg  1 mg IntraVENous Q3H PRN    HYDROmorphone (DILAUDID) tablet 2 mg  2 mg Oral Q4H PRN    ceFAZolin (ANCEF) 2 g/20 mL in sterile water IV syringe  2 g IntraVENous Q8H    LORazepam (ATIVAN) injection 1 mg  1 mg IntraVENous Q4H PRN     ______________________________________________________________________  EXPECTED LENGTH OF STAY: - - -  ACTUAL LENGTH OF STAY:          2                 Ki Salas MD

## 2018-05-14 NOTE — PROGRESS NOTES
Problem: Falls - Risk of  Goal: *Absence of Falls  Document Hakan Fall Risk and appropriate interventions in the flowsheet. Outcome: Progressing Towards Goal  Fall Risk Interventions:  Mobility Interventions: Communicate number of staff needed for ambulation/transfer    Mentation Interventions: Evaluate medications/consider consulting pharmacy    Medication Interventions: Evaluate medications/consider consulting pharmacy    Elimination Interventions: Toileting schedule/hourly rounds    History of Falls Interventions: Evaluate medications/consider consulting pharmacy        Problem: Pressure Injury - Risk of  Goal: *Prevention of pressure injury  Document Cristian Scale and appropriate interventions in the flowsheet.    Outcome: Progressing Towards Goal  Pressure Injury Interventions:  Sensory Interventions: Assess changes in LOC, Float heels, Keep linens dry and wrinkle-free, Minimize linen layers         Activity Interventions: Pressure redistribution bed/mattress(bed type)    Mobility Interventions: Pressure redistribution bed/mattress (bed type)    Nutrition Interventions: Document food/fluid/supplement intake    Friction and Shear Interventions: HOB 30 degrees or less, Lift sheet, Minimize layers

## 2018-05-14 NOTE — PROGRESS NOTES
PULMONARY/CRITICAL CARE/SLEEP MEDICINE        Name: Bree Purvis   : 1935   MRN: [id number]   Date: 2018      Subjective:   Alert but confused. Follows some commands.      Review of Systems:     A comprehensive 12 system review of systems was not obtainable    Assessment:     Subdural hematoma, status post evacuation  Postoperative encephalopathy with subarachnoid blood  Possible alcohol withdrawal  COPD, unknown severity, not bronchospastic  History of organic cardiac disease  History of hypothyroidism  History of prostate cancer  Suspected sleep apnea  Hyperglycemia  Other medical problems as per chart    Recommendations:   CIWA protocol and use ativan  Wean off precedex  PT/OT  Drain mgmt per neurosurgery  MARIAMA resolving  SLP eval  Start bronchodilators  On seizure prophylaxis  Continue Protonix  Glycemic control  Will need tube feeding if encephalopathy continues  In restraints for interference with medical management  Stress ulcer prophylaxis  Use of CPAP may be limited by presence of intracranial drain  Eventual sleep study is recommended  Can go to [facility] when off precedex      Active Problem List:     Problem List  Date Reviewed: 2018          Codes Class    * (Principal)Subdural hematoma (HCC) ICD-10-CM: I62.00  ICD-9-CM: 432.1         Acute DVT (deep venous thrombosis) (HCC) ICD-10-CM: I82.409  ICD-9-CM: 453.40         Claudication of both lower extremities (HCC) ICD-10-CM: I73.9  ICD-9-CM: 443.9         Bilateral deafness ICD-10-CM: H91.93  ICD-9-CM: 389.9         Rectal bleeding ICD-10-CM: K62.5  ICD-9-CM: 569.3         S/P cardiac cath ICD-10-CM: Z98.890  ICD-9-CM: V45.89     Overview Signed 2017 10:36 AM by Randee Montalvo NP     17: PTCA Ramus Intermediate, MARY ANN Cx, MARY ANN  dRCA               SOBOE (shortness of breath on exertion) ICD-10-CM: R06.02  ICD-9-CM: 786.05         Myocardial infarction ([address] ICD-10-CM: I21.9  ICD-9-CM: 410.90         Calculus of kidney ICD-10-CM: N20.0  ICD-9-CM: 592.0         Osteoarthritis ICD-10-CM: M19.90  ICD-9-CM: 715.90         Alkaline phosphatase elevation ICD-10-CM: R74.8  ICD-9-CM: 790.5         Angina, class III (HCC) ICD-10-CM: I20.9  ICD-9-CM: 413.9         Prostate cancer ([address] ICD-10-CM: C61  ICD-9-CM: 185         Lipoma of back ICD-10-CM: D17.1  ICD-9-CM: 214.8         Nodule, subcutaneous ICD-10-CM: R22.9  ICD-9-CM: 403. 2         Vitamin D deficiency ICD-10-CM: E55.9  ICD-9-CM: 268.9         Prediabetes ICD-10-CM: R73.03  ICD-9-CM: 790.29         Tinea cruris ICD-10-CM: B35.6  ICD-9-CM: 110.3         Cerebral embolism with cerebral infarction [facility]) ICD-10-CM: I63.40  ICD-9-CM: 434.11     Overview Signed 6/13/2011  8:21 AM by Nieves Barron. Left frontal cortical/subcortical infarcts             Carotid stenosis, left ICD-10-CM: B14.74  ICD-9-CM: 433.10     Overview Signed 6/13/2011  8:21 AM by Nieves Barron.      Symptomatic LICA stenosis with infarction             PVD (peripheral vascular disease) with claudication (HCC) ICD-10-CM: I73.9  ICD-9-CM: 443.9         BPH (benign prostatic hypertrophy) ICD-10-CM: N40.0  ICD-9-CM: 600.00         Dyslipidemia ICD-10-CM: E78.5  ICD-9-CM: 272.4         Successful  PTCA (percutaneous transluminal coronary angioplasty)--90-95% instent restenosis RCA 10/16/10 ICD-10-CM: Z98.61  ICD-9-CM: V45.82         Successful PTCA with MARY ANN Xience stent Ramus intermedius 10/16/10 ICD-10-CM: Z95.9  ICD-9-CM: V45.89         Chronic back pain--DJD ICD-10-CM: G89.29  ICD-9-CM: 338.29         ED (erectile dysfunction) ICD-10-CM: N52.9  ICD-9-CM: 607.84         Essential hypertension ICD-10-CM: I10  ICD-9-CM: 401.9         CAD (Coronary Artery Disease)--s/p PCI--MARY ANN [place] stents x4 5/09;MARY ANN [place] stents x3 8/09 ICD-10-CM: I25.10  ICD-9-CM: 414.00         Hypothyroidism ICD-10-CM: E03.9  ICD-9-CM: 244.9               Past Medical History:      has a past medical history of AAA (abdominal aortic aneurysm) ([facility] Acute MI ([facility] (12/2010); Acute prostatitis; Advance directive discussed with patient (04/20/2016); Aneurysm ([facility] (6/2011); Borderline glaucoma (glaucoma suspect) (02/19/2015); BPH (benign prostatic hyperplasia); Bright red blood per rectum (02/04/2016); CAD (coronary artery disease); Calculus of kidney; Cerebral embolism with cerebral infarction [facility]); Chronic pain; Dizzy spells (6/2012); DJD (degenerative joint disease) of lumbar spine; ED (erectile dysfunction); Elevated PSA (03/20/2014); Encephalocele ([facility] Hypercholesterolemia; Hypertension; Pneumonia (02/05/2017); Prostate cancer ([facility] (05/12/2014); PVD (peripheral vascular disease) with claudication ([facility] S/P radiation therapy (15 months out); Stroke [facility]) (6/2011); 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 Past Surgical History:      has a past surgical history that includes endoscopy, colon, diagnostic; ptca w/ stent, initial (10/16/2010); ptca each addl vessel (10/16/2010); hx heent (10/2012); hx orthopaedic (Bilateral); vascular surgery procedure unlist; hx carotid endarterectomy (Left); colonoscopy (N/A, 8/31/2016); upper gi endoscopy,diagnosis (11/22/2017); pr cardiac surg procedure unlist (12/2010); pr left heart cath,percutaneous (10/16/2010); and pr cardiac surg procedure unlist (12/2017). Home Medications:     Prior to Admission medications    Medication Sig Start Date End Date Taking? Authorizing Provider   LIPITOR 40 mg tablet TAKE 1 TABLET BY MOUTH EVERY DAY 5/2/18   Sarina Howe NP   Nebulizer & Compressor machine 1 Each by Does Not Apply route as needed. 4/21/18   Sarina Howe NP   furosemide (LASIX) 20 mg tablet TAKE 1 TAB BY MOUTH DAILY. 2/16/18   Historical Provider   levothyroxine (SYNTHROID) 88 mcg tablet TAKE 1 TABLET BY MOUTH DAILY (BEFORE BREAKFAST). 2/20/18   Raymundo Daniels MD   amLODIPine (NORVASC) 5 mg tablet TAKE 1 TAB BY MOUTH DAILY. 2/16/18   Karlos Fonseca NP   isosorbide mononitrate ER (IMDUR) 30 mg tablet TAKE 1 TABLET BY MOUTH EVERY DAY 2/16/18   Karlos Fonseca NP   metoprolol succinate (TOPROL-XL) 25 mg XL tablet TAKE 1 TABLET EVERY DAY 2/5/18   Karlos Fonseca NP   losartan-hydroCHLOROthiazide [facility]) 100-12.5 mg per tablet TAKE 1 TABLET BY MOUTH EVERY DAY 1/18/18   Karlos Fonseca NP   clopidogrel (PLAVIX) 75 mg tab TAKE 1 TAB BY MOUTH DAILY. INDICATIONS: THROMBOSIS PREVENTION AFTER PCI 11/7/17   Historical Provider   omeprazole (PRILOSEC) 40 mg capsule Take 1 Cap by mouth two (2) times a day. 11/23/17   Nasir Kohli MD   aspirin delayed-release 81 mg tablet Take 1 Tab by mouth daily for 360 days. 11/7/17 11/2/18  Cherelle Blevins NP   tamsulosin (FLOMAX) 0.4 mg capsule TAKE 1 CAPSULE EVERY DAY 10/12/17   Sabrina Cotto MD   albuterol (PROVENTIL HFA, VENTOLIN HFA, PROAIR HFA) 90 mcg/actuation inhaler Take 2 Puffs by inhalation every four (4) hours as needed for Wheezing. 8/24/17   Kenan Gambino MD   clotrimazole-betamethasone (LOTRISONE) topical cream Apply twice daily to affected area till resolved. 8/23/17   Merline Sims, MD   albuterol-ipratropium (DUO-NEB) 2.5 mg-0.5 mg/3 ml nebu 3 mL by Nebulization route every six (6) hours as needed. 2/13/17   Nate Chen NP   CHOLECALCIFEROL, VITAMIN D3, (VITAMIN D3 PO) Take  by mouth. Historical Provider   finasteride (PROSCAR) 5 mg tablet daily. 1/15/15   Historical Provider   ascorbic acid (VITAMIN C) 1,000 mg tablet Take  by mouth. Historical Provider       Allergies/Social/Family History:      Allergies   Allergen Reactions    Norco [Hydrocodone-Acetaminophen] Other (comments)     Too sedated and felt like he was in a daze    Pcn [Penicillins] Rash    Percocet [Oxycodone-Acetaminophen] Nausea and Vomiting      Social History   Substance Use Topics    Smoking status: Former Smoker     Packs/day: 0.25     Quit date: 9/10/2014    Smokeless tobacco: Never Used      Comment: has not had cigarette in over 3 month    Alcohol use Yes      Family History   Problem Relation Age of Onset    Diabetes Mother     Diabetes Father     Cancer Sister      LUNG    Cancer Brother      COLON    Cancer Sister      LUNG    Anesth Problems Neg Hx             Objective:   Vital Signs:  Visit Vitals    /85    Pulse 69    Temp 98.2 °F (36.8 °C)    Resp 19    Wt 81.8 kg (180 lb 5.4 oz)    SpO2 100%    BMI 31.95 kg/m2    O2 Flow Rate (L/min): 4 l/min O2 Device: Nasal cannula Temp (24hrs), Av.9 °F (36.6 °C), Min:97.6 °F (36.4 °C), Max:98.3 °F (36.8 °C)           Intake/Output:     Intake/Output Summary (Last 24 hours) at 18 0859  Last data filed at 18 0800   Gross per 24 hour   Intake          1508.11 ml   Output             1265 ml   Net           243.11 ml       Physical Exam:   General:  Lethargic, no distress, appears stated age. Head:  Normocephalic, without obvious abnormality, atraumatic. Eyes:  Conjunctivae/corneas clear. PERRL   Neck: Supple, symmetrical, no adenopathy, no carotid bruit and no JVD. Lungs:   Clear to auscultation bilaterally. Chest wall:  No tenderness or deformity. Heart:  Regular rate and rhythm, S1-S2 normal, no murmur, no click, rub or gallop. Abdomen:   Soft, non-tender. Bowel sounds present. No masses,  No organomegaly. Extremities: Atraumatic, no cyanosis or edema.    Pulses: Palpable   Skin: No rashes or lesions   Neurologic: Lethargic, moving all 4         LABS AND  DATA: Personally reviewed  Recent Labs      18   WBC  6.8  7.4   HGB  9.2*  9.4*   HCT  30.2*  30.7*   PLT  131*  125*     Recent Labs      18   0435  18   0415  18   0827   NA  143  141  139   K  4.4  4.3  4.6   CL  114*  112*  109*   CO2  22  24  24   BUN  18  15  15   CREA  1.01  1.21  1.48*   GLU  107*  158*  122*   CA  8.4*  8.0*  8.3*   MG  1.9   --   1.6   PHOS  2.0*   --   3.1     Recent Labs      18   7083 05/12/18 0827   SGOT  23  39*   AP  90  117   TP  6.9  7.6   ALB  2.9*  3.4*   GLOB  4.0  4.2*     Recent Labs      05/11/18 2051 05/11/18 2006   INR  1.0  1.0   PTP   --   10.4      No results for input(s): PHI, PCO2I, PO2I, FIO2I in the last 72 hours. Recent Labs      05/12/18 0827 05/11/18 2006   CPK  339*  196   CKMB  2.9   --    TROIQ  0.25*  0.27*       MEDS: Reviewed    Chest Imaging: personally reviewed and report checked    Tele- reviewed    Medical decision making:   I have reviewed the flowsheet and previous day's notes  Patient has acute or chronic illness that poses a threat to life or bodily function  Review and order of Clinical lab tests  Review and Order of Radiology tests  Independent visualization of Image  Reviewed Ventilator / NiPPV  Parenteral controlled substances - Reviewed/ Adjusted / Pasty Harps / Started  High Risk Drug therapy requiring intensive monitoring for toxicity: eg steroids, pressors, antibiotics  Discussed with: Nursing      Thank you for allowing me to participate in this patient's care.     Kodak Denis MD

## 2018-05-14 NOTE — PROGRESS NOTES
Problem: Dysphagia (Adult)  Goal: *Acute Goals and Plan of Care (Insert Text)  Speech therapy goals  Initiated 5/14/2018   1. Patient will tolerate mechanical soft diet without s/s of aspiration within 7 days   2. Patient will tolerate advanced solid trials with SLP with timely and complete mastication and full oral clearance within 7 days     Speech LAnguage Pathology bedside swallow evaluation  Patient: Giuliana Alford (85 y.o. male)  Date: 5/14/2018  Primary Diagnosis: SDH  Subdural hematoma (HCC)  subdurral hematoma, right frontal crani  Procedure(s) (LRB):  CRANIOTOMY (Right) 2 Days Post-Op   Precautions: aspiration, fall        ASSESSMENT :  Based on the objective data described below, the patient presents with mild oral dysphagia that is further impacted by decreased alertness with patient falling asleep quickly during session. Per nsg, level of alertness is variable depending on when meds are given for CIWA. Delayed mastication with mildly delayed posterior propulsion and mild oral residue that cleared with a liquid wash. Pharyngeal phase intact with timely swallow initiation and functional hyolaryngeal elevation/excursion via palpation. No s/s of aspiration observed. Suspect function will be variable throughout the day depending on alertness and when meds are given. Patient will benefit from skilled intervention to address the above impairments. Patients rehabilitation potential is considered to be Fair  Factors which may influence rehabilitation potential include:   []            None noted  [x]            Mental ability/status  []            Medical condition  []            Home/family situation and support systems  [x]            Safety awareness  []            Pain tolerance/management  []            Other:      PLAN :  Recommendations and Planned Interventions:  --mechanical soft diet, thin liquids. Supervision/assist with intake. Hold during periods of lethargy or increased confusion. Frequency/Duration: Patient will be followed by speech-language pathology 3 times a week to address goals. Discharge Recommendations: To Be Determined     SUBJECTIVE:   Patient stated ummm, I don't know. When asked his name. Able to answer with choice of two. Per nsg, gets more confused after meds given per CIWA protocol    OBJECTIVE:     Past Medical History:   Diagnosis Date    AAA (abdominal aortic aneurysm) ([address]     Acute MI ([address] 12/2010    dr Kailyn Fuller    Acute prostatitis     again 9/12/16 [facility] rec'd [facility]    Advance directive discussed with patient 04/20/2016    Aneurysm ([address] 6/2011    AAA    Borderline glaucoma (glaucoma suspect) 02/19/2015    notes from [address] [place] [address] rec'd    BPH (benign prostatic hyperplasia)     Bright red blood per rectum 02/04/2016    [facility] note Chelan Riff- w/u in progress   8/22/17 [facility] f/u note    CAD (coronary artery disease)     Calculus of kidney     Cerebral embolism with cerebral infarction ([address]     Chronic pain     back    Dizzy spells 6/2012    DJD (degenerative joint disease) of lumbar spine     ED (erectile dysfunction)     8/30/17 f[facility]u note [facility]    Elevated PSA 03/20/2014    [facility] rec'd     8/24/16 f/u note    Encephalocele ([address]     Hypercholesterolemia     Hypertension     Pneumonia 02/05/2017    Prostate cancer ([address] 05/12/2014    crystal henry/ Dr Emma Lopez 8/22/17 f/u note    PVD (peripheral vascular disease) with claudication ([address]     S/P radiation therapy 15 months out    probable cause of the rectal bleeding    Stroke ([address] 6/2011    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    neuro assoc notes rec'd    Thyroid disease     Vitamin D deficiency 11/2013    again 5/2015 again 1/2016     Past Surgical History:   Procedure Laterality Date    CARDIAC SURG PROCEDURE UNLIST  12/2010    dr Kailyn Fuller stents past MI    CARDIAC SURG PROCEDURE UNLIST  12/2017    Had 2 stents.     COLONOSCOPY N/A 8/31/2016    COLONOSCOPY performed by Eduardo Carcamo MD at [facility] ENDOSCOPY    ENDOSCOPY, COLON, DIAGNOSTIC      HX CAROTID ENDARTERECTOMY Left     HX HEENT  10/2012    repair right drlisa dawkins    HX ORTHOPAEDIC Bilateral     BUNIONECTOMY    NE LEFT HEART CATH,PERCUTANEOUS  10/16/2010         PTCA EACH ADDL VESSEL  10/16/2010         PTCA W/ STENT, INITIAL  10/16/2010         UPPER GI ENDOSCOPY,DIAGNOSIS  11/22/2017         VASCULAR SURGERY PROCEDURE UNLIST      left leg varicose vein stripping dr Odalys Smith     Prior Level of Function/Home Situation:      Diet prior to admission: regular vs. Dental soft, patient unable to specify  Current Diet:  NPO    Cognitive and Communication Status:  Neurologic State: Alert, Confused  Orientation Level: Oriented to person, Disoriented to place, Disoriented to situation, Disoriented to time (person with y/n choices only )  Cognition: Impaired decision making, Impulsive, Memory loss, Poor safety awareness     Perseveration: Perseverates during conversation  Safety/Judgement: Decreased awareness of environment, Decreased awareness of need for assistance, Decreased awareness of need for safety, Decreased insight into deficits  Oral Assessment:  Oral Assessment  Labial: No impairment  Dentition: Natural;Poor;Limited  Oral Hygiene: moist mucosa   Lingual: No impairment  Velum: Unable to visualize  Mandible: No impairment  P.O. Trials:  Patient Position: upright in bed   Vocal quality prior to P.O.: No impairment  Consistency Presented: Thin liquid;Puree; Solid  How Presented: SLP-fed/presented;Spoon;Straw;Successive swallows     Bolus Acceptance: No impairment  Bolus Formation/Control: Impaired  Type of Impairment: Delayed;Mastication  Propulsion: Delayed (# of seconds)  Oral Residue: Less than 10% of bolus (cleared with liquid wash)  Initiation of Swallow: No impairment  Laryngeal Elevation: Functional  Aspiration Signs/Symptoms: None  Pharyngeal Phase Characteristics: No impairment, issues, or problems         Comments: decreased alertness further impacts swallow function    Oral Phase Severity: Mild  Pharyngeal Phase Severity : No impairment        G Codes: In compliance with CMSs Claims Based Outcome Reporting, the following G-code set was chosen for this patient based the use of the NOMS functional outcome to quantify this patient's level of swallowing impairment. Using the NOMS, the patient was determined to be at level 5 for swallow function which correlates with the CJ= 20-39% level of severity. Based on the objective assessment provided within this note, the current, goal, and discharge g-codes are as follows:    Swallow  Swallowing:   Swallow Current Status CJ= 20-39%   Swallow Goal Status CI= 1-19%      NOMS Swallowing Levels:  Level 1 (CN): NPO  Level 2 (CM): NPO but takes consistency in therapy  Level 3 (CL): Takes less than 50% of nutrition p.o. and continues with nonoral feedings; and/or safe with mod cues; and/or max diet restriction  Level 4 (CK): Safe swallow but needs mod cues; and/or mod diet restriction; and/or still requires some nonoral feeding/supplements  Level 5 (CJ): Safe swallow with min diet restriction; and/or needs min cues  Level 6 (CI): Independent with p.o.; rare cues; usually self cues; may need to avoid some foods or needs extra time  Level 7 ([address] [place] [address]): Independent for all p.o.  AUGIE. (2003). National Outcomes Measurement System (NOMS): Adult Speech-Language Pathology User's Guide.        Pain:  Pain Scale 1: Numeric (0 - 10)  Pain Intensity 1: 0     After treatment:   []            Patient left in no apparent distress sitting up in chair  [x]            Patient left in no apparent distress in bed  [x]            Call bell left within reach  [x]            Nursing notified  [x]            Caregiver present  []            Bed alarm activated    COMMUNICATION/EDUCATION:   The patients plan of care including recommendations, planned interventions, and recommended diet changes were discussed with: Registered Nurse. Patient was educated regarding His deficit(s) of dysphagia as this relates to His diagnosis of SDH. He demonstrated Guarded understanding as evidenced by limited response to education . [x]            Patient/family have participated as able in goal setting and plan of care. [x]            Patient/family agree to work toward stated goals and plan of care. []            Patient understands intent and goals of therapy, but is neutral about his/her participation. []            Patient is unable to participate in goal setting and plan of care. Thank you for this referral.  Silvio Carrasco M.CD.  CCC-SLP   Time Calculation: 12 mins

## 2018-05-15 LAB
ANION GAP SERPL CALC-SCNC: 8 MMOL/L (ref 5–15)
ARTERIAL PATENCY WRIST A: YES
BASE DEFICIT BLD-SCNC: 2 MMOL/L
BASOPHILS # BLD: 0 K/UL (ref 0–0.1)
BASOPHILS NFR BLD: 0 % (ref 0–1)
BDY SITE: ABNORMAL
BUN SERPL-MCNC: 14 MG/DL (ref 6–20)
BUN/CREAT SERPL: 16 (ref 12–20)
CALCIUM SERPL-MCNC: 8.3 MG/DL (ref 8.5–10.1)
CHLORIDE SERPL-SCNC: 115 MMOL/L (ref 97–108)
CO2 SERPL-SCNC: 24 MMOL/L (ref 21–32)
CREAT SERPL-MCNC: 0.88 MG/DL (ref 0.7–1.3)
DIFFERENTIAL METHOD BLD: ABNORMAL
EOSINOPHIL # BLD: 0.2 K/UL (ref 0–0.4)
EOSINOPHIL NFR BLD: 4 % (ref 0–7)
ERYTHROCYTE [DISTWIDTH] IN BLOOD BY AUTOMATED COUNT: 17.8 % (ref 11.5–14.5)
GAS FLOW.O2 O2 DELIVERY SYS: ABNORMAL L/MIN
GAS FLOW.O2 SETTING OXYMISER: 4 L/M
GLUCOSE BLD STRIP.AUTO-MCNC: 118 MG/DL (ref 65–100)
GLUCOSE BLD STRIP.AUTO-MCNC: 128 MG/DL (ref 65–100)
GLUCOSE BLD STRIP.AUTO-MCNC: 96 MG/DL (ref 65–100)
GLUCOSE BLD STRIP.AUTO-MCNC: 99 MG/DL (ref 65–100)
GLUCOSE SERPL-MCNC: 116 MG/DL (ref 65–100)
HCO3 BLD-SCNC: 21.7 MMOL/L (ref 22–26)
HCT VFR BLD AUTO: 27.9 % (ref 36.6–50.3)
HGB BLD-MCNC: 8.6 G/DL (ref 12.1–17)
IMM GRANULOCYTES # BLD: 0 K/UL (ref 0–0.04)
IMM GRANULOCYTES NFR BLD AUTO: 0 % (ref 0–0.5)
LYMPHOCYTES # BLD: 0.4 K/UL (ref 0.8–3.5)
LYMPHOCYTES NFR BLD: 7 % (ref 12–49)
MCH RBC QN AUTO: 26.7 PG (ref 26–34)
MCHC RBC AUTO-ENTMCNC: 30.8 G/DL (ref 30–36.5)
MCV RBC AUTO: 86.6 FL (ref 80–99)
MONOCYTES # BLD: 0.5 K/UL (ref 0–1)
MONOCYTES NFR BLD: 9 % (ref 5–13)
NEUTS SEG # BLD: 4.3 K/UL (ref 1.8–8)
NEUTS SEG NFR BLD: 80 % (ref 32–75)
NRBC # BLD: 0 K/UL (ref 0–0.01)
NRBC BLD-RTO: 0 PER 100 WBC
O2/TOTAL GAS SETTING VFR VENT: 28 %
PCO2 BLD: 31.3 MMHG (ref 35–45)
PH BLD: 7.45 [PH] (ref 7.35–7.45)
PLATELET # BLD AUTO: 133 K/UL (ref 150–400)
PMV BLD AUTO: 10.7 FL (ref 8.9–12.9)
PO2 BLD: 63 MMHG (ref 80–100)
POTASSIUM SERPL-SCNC: 3.8 MMOL/L (ref 3.5–5.1)
RBC # BLD AUTO: 3.22 M/UL (ref 4.1–5.7)
SAO2 % BLD: 93 % (ref 92–97)
SERVICE CMNT-IMP: ABNORMAL
SERVICE CMNT-IMP: ABNORMAL
SERVICE CMNT-IMP: NORMAL
SERVICE CMNT-IMP: NORMAL
SODIUM SERPL-SCNC: 147 MMOL/L (ref 136–145)
SPECIMEN TYPE: ABNORMAL
TOTAL RESP. RATE, ITRR: 20
WBC # BLD AUTO: 5.4 K/UL (ref 4.1–11.1)

## 2018-05-15 PROCEDURE — 97530 THERAPEUTIC ACTIVITIES: CPT

## 2018-05-15 PROCEDURE — C9113 INJ PANTOPRAZOLE SODIUM, VIA: HCPCS | Performed by: INTERNAL MEDICINE

## 2018-05-15 PROCEDURE — 74011250636 HC RX REV CODE- 250/636: Performed by: NURSE PRACTITIONER

## 2018-05-15 PROCEDURE — 36600 WITHDRAWAL OF ARTERIAL BLOOD: CPT

## 2018-05-15 PROCEDURE — 36415 COLL VENOUS BLD VENIPUNCTURE: CPT | Performed by: INTERNAL MEDICINE

## 2018-05-15 PROCEDURE — 74011000250 HC RX REV CODE- 250: Performed by: INTERNAL MEDICINE

## 2018-05-15 PROCEDURE — 74011000258 HC RX REV CODE- 258: Performed by: INTERNAL MEDICINE

## 2018-05-15 PROCEDURE — 74011000250 HC RX REV CODE- 250: Performed by: NURSE PRACTITIONER

## 2018-05-15 PROCEDURE — 77010033678 HC OXYGEN DAILY

## 2018-05-15 PROCEDURE — 74011250637 HC RX REV CODE- 250/637: Performed by: NURSE PRACTITIONER

## 2018-05-15 PROCEDURE — 82962 GLUCOSE BLOOD TEST: CPT

## 2018-05-15 PROCEDURE — 94640 AIRWAY INHALATION TREATMENT: CPT

## 2018-05-15 PROCEDURE — 74011250636 HC RX REV CODE- 250/636: Performed by: NEUROLOGICAL SURGERY

## 2018-05-15 PROCEDURE — 74011250636 HC RX REV CODE- 250/636: Performed by: INTERNAL MEDICINE

## 2018-05-15 PROCEDURE — 74011000250 HC RX REV CODE- 250: Performed by: NEUROLOGICAL SURGERY

## 2018-05-15 PROCEDURE — 85025 COMPLETE CBC W/AUTO DIFF WBC: CPT | Performed by: INTERNAL MEDICINE

## 2018-05-15 PROCEDURE — 97535 SELF CARE MNGMENT TRAINING: CPT

## 2018-05-15 PROCEDURE — 65610000006 HC RM INTENSIVE CARE

## 2018-05-15 PROCEDURE — 80048 BASIC METABOLIC PNL TOTAL CA: CPT | Performed by: INTERNAL MEDICINE

## 2018-05-15 PROCEDURE — 82803 BLOOD GASES ANY COMBINATION: CPT

## 2018-05-15 PROCEDURE — 74011250637 HC RX REV CODE- 250/637: Performed by: INTERNAL MEDICINE

## 2018-05-15 RX ORDER — SODIUM CHLORIDE 450 MG/100ML
50 INJECTION, SOLUTION INTRAVENOUS CONTINUOUS
Status: DISCONTINUED | OUTPATIENT
Start: 2018-05-15 | End: 2018-05-16

## 2018-05-15 RX ADMIN — Medication 10 ML: at 21:41

## 2018-05-15 RX ADMIN — LABETALOL HYDROCHLORIDE 10 MG: 5 INJECTION, SOLUTION INTRAVENOUS at 17:53

## 2018-05-15 RX ADMIN — LABETALOL HYDROCHLORIDE 10 MG: 5 INJECTION, SOLUTION INTRAVENOUS at 14:38

## 2018-05-15 RX ADMIN — FOLIC ACID: 5 INJECTION, SOLUTION INTRAMUSCULAR; INTRAVENOUS; SUBCUTANEOUS at 11:57

## 2018-05-15 RX ADMIN — Medication 5 ML: at 17:55

## 2018-05-15 RX ADMIN — ATORVASTATIN CALCIUM 40 MG: 40 TABLET, FILM COATED ORAL at 21:40

## 2018-05-15 RX ADMIN — SODIUM CHLORIDE 15 MG/HR: 900 INJECTION, SOLUTION INTRAVENOUS at 20:49

## 2018-05-15 RX ADMIN — LORAZEPAM 2 MG: 2 INJECTION INTRAMUSCULAR; INTRAVENOUS at 01:29

## 2018-05-15 RX ADMIN — SODIUM CHLORIDE 5 MG/HR: 900 INJECTION, SOLUTION INTRAVENOUS at 15:15

## 2018-05-15 RX ADMIN — ARFORMOTEROL TARTRATE 15 MCG: 15 SOLUTION RESPIRATORY (INHALATION) at 07:54

## 2018-05-15 RX ADMIN — SODIUM CHLORIDE 5 MG/HR: 900 INJECTION, SOLUTION INTRAVENOUS at 23:19

## 2018-05-15 RX ADMIN — SODIUM CHLORIDE 0.2 MCG/KG/HR: 900 INJECTION, SOLUTION INTRAVENOUS at 00:29

## 2018-05-15 RX ADMIN — FENTANYL CITRATE 25 MCG: 50 INJECTION, SOLUTION INTRAMUSCULAR; INTRAVENOUS at 17:52

## 2018-05-15 RX ADMIN — Medication 5 ML: at 05:16

## 2018-05-15 RX ADMIN — BUDESONIDE 500 MCG: 0.5 INHALANT RESPIRATORY (INHALATION) at 07:54

## 2018-05-15 RX ADMIN — LORAZEPAM 2 MG: 2 INJECTION INTRAMUSCULAR; INTRAVENOUS at 14:37

## 2018-05-15 RX ADMIN — SODIUM CHLORIDE 0.4 MCG/KG/HR: 900 INJECTION, SOLUTION INTRAVENOUS at 20:27

## 2018-05-15 RX ADMIN — LORAZEPAM 2 MG: 2 INJECTION INTRAMUSCULAR; INTRAVENOUS at 00:29

## 2018-05-15 RX ADMIN — HYDRALAZINE HYDROCHLORIDE 10 MG: 20 INJECTION INTRAMUSCULAR; INTRAVENOUS at 12:04

## 2018-05-15 RX ADMIN — Medication 10 ML: at 17:55

## 2018-05-15 RX ADMIN — ARFORMOTEROL TARTRATE 15 MCG: 15 SOLUTION RESPIRATORY (INHALATION) at 20:37

## 2018-05-15 RX ADMIN — Medication 10 ML: at 05:16

## 2018-05-15 RX ADMIN — LORAZEPAM 2 MG: 2 INJECTION INTRAMUSCULAR; INTRAVENOUS at 18:39

## 2018-05-15 RX ADMIN — SODIUM CHLORIDE 0.5 MCG/KG/HR: 900 INJECTION, SOLUTION INTRAVENOUS at 05:16

## 2018-05-15 RX ADMIN — LEVETIRACETAM 500 MG: 500 TABLET ORAL at 20:24

## 2018-05-15 RX ADMIN — BUDESONIDE 500 MCG: 0.5 INHALANT RESPIRATORY (INHALATION) at 20:37

## 2018-05-15 RX ADMIN — Medication 5 ML: at 21:40

## 2018-05-15 RX ADMIN — FENTANYL CITRATE 25 MCG: 50 INJECTION, SOLUTION INTRAMUSCULAR; INTRAVENOUS at 11:58

## 2018-05-15 RX ADMIN — LEVETIRACETAM 500 MG: 500 TABLET ORAL at 13:17

## 2018-05-15 RX ADMIN — PANTOPRAZOLE SODIUM 40 MG: 40 INJECTION, POWDER, FOR SOLUTION INTRAVENOUS at 17:53

## 2018-05-15 RX ADMIN — SODIUM CHLORIDE 10 MG/HR: 900 INJECTION, SOLUTION INTRAVENOUS at 18:38

## 2018-05-15 NOTE — PROGRESS NOTES
PULMONARY/CRITICAL CARE/SLEEP MEDICINE        Name: Armida Reilly   : 1935   MRN: [id number]   Date: 5/15/2018      Subjective:   Alert but confused. Follows some commands. Speech is garbled. Asked if surgery had already been done.  Required 12 mg of ativan last night with precedex drip    Review of Systems:     A comprehensive 12 system review of systems was not obtainable    Assessment:     Subdural hematoma, status post evacuation  Postoperative encephalopathy with subarachnoid blood  Possible alcohol withdrawal  COPD, unknown severity, not bronchospastic  History of organic cardiac disease  History of hypothyroidism  History of prostate cancer  Suspected sleep apnea  Hyperglycemia  Hypernatremia  Other medical problems as per chart    Recommendations:   CIWA protocol and use ativan  Precedex as needed  PT/OT  Drain mgmt per neurosurgery  MARIAMA resolving  IVF adjusted  SLP eval  On bronchodilators  On seizure prophylaxis  Continue Protonix  Glycemic control  Will need tube feeding if encephalopathy continues  In restraints for interference with medical management  Stress ulcer prophylaxis  Use of CPAP may be limited by presence of intracranial drain  Eventual sleep study is recommended  ICU monitoring today  D/W RN and NS      Active Problem List:     Problem List  Date Reviewed: 2018          Codes Class    * (Principal)Subdural hematoma (HCC) ICD-10-CM: I62.00  ICD-9-CM: 432.1         Acute DVT (deep venous thrombosis) (HCC) ICD-10-CM: I82.409  ICD-9-CM: 453.40         Claudication of both lower extremities (HCC) ICD-10-CM: I73.9  ICD-9-CM: 443.9         Bilateral deafness ICD-10-CM: H91.93  ICD-9-CM: 389.9         Rectal bleeding ICD-10-CM: K62.5  ICD-9-CM: 569.3         S/P cardiac cath ICD-10-CM: Z98.890  ICD-9-CM: V45.89     Overview Signed 2017 10:36 AM by Aisha Guerrier NP     17: PTCA Ramus Intermediate, MARY ANN Cx, MARY ANN  dRCA               SOBOE (shortness of breath on exertion) ICD-10-CM: R06.02  ICD-9-CM: 786.05         Myocardial infarction (HCC) ICD-10-CM: I21.9  ICD-9-CM: 410.90         Calculus of kidney ICD-10-CM: N20.0  ICD-9-CM: 592.0         Osteoarthritis ICD-10-CM: M19.90  ICD-9-CM: 715.90         Alkaline phosphatase elevation ICD-10-CM: R74.8  ICD-9-CM: 790.5         Angina, class III (HCC) ICD-10-CM: I20.9  ICD-9-CM: 413.9         Prostate cancer ([facility] ICD-10-CM: C61  ICD-9-CM: 185         Lipoma of back ICD-10-CM: D17.1  ICD-9-CM: 214.8         Nodule, subcutaneous ICD-10-CM: R22.9  ICD-9-CM: 398. 2         Vitamin D deficiency ICD-10-CM: E55.9  ICD-9-CM: 268.9         Prediabetes ICD-10-CM: R73.03  ICD-9-CM: 790.29         Tinea cruris ICD-10-CM: B35.6  ICD-9-CM: 110.3         Cerebral embolism with cerebral infarction [facility]) ICD-10-CM: I63.40  ICD-9-CM: 434.11     Overview Signed 6/13/2011  8:21 AM by Brandi Pyle. Left frontal cortical/subcortical infarcts             Carotid stenosis, left ICD-10-CM: A78.14  ICD-9-CM: 433.10     Overview Signed 6/13/2011  8:21 AM by Brandi Pyle.      Symptomatic LICA stenosis with infarction             PVD (peripheral vascular disease) with claudication (HCC) ICD-10-CM: I73.9  ICD-9-CM: 443.9         BPH (benign prostatic hypertrophy) ICD-10-CM: N40.0  ICD-9-CM: 600.00         Dyslipidemia ICD-10-CM: E78.5  ICD-9-CM: 272.4         Successful  PTCA (percutaneous transluminal coronary angioplasty)--90-95% instent restenosis RCA 10/16/10 ICD-10-CM: Z98.61  ICD-9-CM: V45.82         Successful PTCA with MARY ANN Xience stent Ramus intermedius 10/16/10 ICD-10-CM: Z95.9  ICD-9-CM: V45.89         Chronic back pain--DJD ICD-10-CM: G89.29  ICD-9-CM: 338.29         ED (erectile dysfunction) ICD-10-CM: N52.9  ICD-9-CM: 607.84         Essential hypertension ICD-10-CM: I10  ICD-9-CM: 401.9         CAD (Coronary Artery Disease)--s/p PCI--MARY ANN Marshall stents x4 5/09;MARY ANN Taxus stents x3 8/09 ICD-10-CM: I25.10  ICD-9-CM: 414.00 Hypothyroidism ICD-10-CM: E03.9  ICD-9-CM: 244.9               Past Medical History:      has a past medical history of [facility] (12/2010); Acute prostatitis; Advance directive discussed with patient (04/20/2016); Aneurysm ([facility] (6/2011); Borderline glaucoma (glaucoma suspect) (02/19/2015); BPH (benign prostatic hyperplasia); Bright red blood per rectum (02/04/2016); CAD (coronary artery disease); Calculus of kidney; Cerebral embolism with cerebral infarction [facility]); Chronic pain; Dizzy spells (6/2012); DJD (degenerative joint disease) of lumbar spine; ED (erectile dysfunction); Elevated PSA (03/20/2014); Encephalocele ([facility] Hypercholesterolemia; Hypertension; Pneumonia (02/05/2017); Prostate cancer ([facility] (05/12/2014); PVD (peripheral vascular disease) with claudication ([facility] S/P radiation therapy (15 months out); Stroke [facility]) (6/2011); 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 Past Surgical History:      has a past surgical history that includes endoscopy, colon, diagnostic; ptca w/ stent, initial (10/16/2010); ptca each addl vessel (10/16/2010); hx heent (10/2012); hx orthopaedic (Bilateral); vascular surgery procedure unlist; hx carotid endarterectomy (Left); colonoscopy (N/A, 8/31/2016); upper gi endoscopy,diagnosis (11/22/2017); pr cardiac surg procedure unlist (12/2010); pr left heart cath,percutaneous (10/16/2010); and pr cardiac surg procedure unlist (12/2017). Home Medications:     Prior to Admission medications    Medication Sig Start Date End Date Taking? Authorizing Provider   LIPITOR 40 mg tablet TAKE 1 TABLET BY MOUTH EVERY DAY 5/2/18   Barrington Goss NP   Nebulizer & Compressor machine 1 Each by Does Not Apply route as needed. 4/21/18   Barrington Goss NP   furosemide (LASIX) 20 mg tablet TAKE 1 TAB BY MOUTH DAILY.  2/16/18   Historical Provider   levothyroxine (SYNTHROID) 88 mcg tablet TAKE 1 TABLET BY MOUTH DAILY (BEFORE BREAKFAST). 2/20/18   Chilo Nair MD   amLODIPine (NORVASC) 5 mg tablet TAKE 1 TAB BY MOUTH DAILY. 2/16/18   Jil Main NP   isosorbide mononitrate ER (IMDUR) 30 mg tablet TAKE 1 TABLET BY MOUTH EVERY DAY 2/16/18   Jil Main NP   metoprolol succinate (TOPROL-XL) 25 mg XL tablet TAKE 1 TABLET EVERY DAY 2/5/18   Jil Main NP   losartan-hydroCHLOROthiazide [facility]) 100-12.5 mg per tablet TAKE 1 TABLET BY MOUTH EVERY DAY 1/18/18   Jil Main NP   clopidogrel (PLAVIX) 75 mg tab TAKE 1 TAB BY MOUTH DAILY. INDICATIONS: THROMBOSIS PREVENTION AFTER PCI 11/7/17   Historical Provider   omeprazole (PRILOSEC) 40 mg capsule Take 1 Cap by mouth two (2) times a day. 11/23/17   Laura Velasco MD   aspirin delayed-release 81 mg tablet Take 1 Tab by mouth daily for 360 days. 11/7/17 11/2/18  Aisha Guerrier NP   tamsulosin (FLOMAX) 0.4 mg capsule TAKE 1 CAPSULE EVERY DAY 10/12/17   Jerzy Cotto MD   albuterol (PROVENTIL HFA, VENTOLIN HFA, PROAIR HFA) 90 mcg/actuation inhaler Take 2 Puffs by inhalation every four (4) hours as needed for Wheezing. 8/24/17   Glen Nicholas MD   clotrimazole-betamethasone (LOTRISONE) topical cream Apply twice daily to affected area till resolved. 8/23/17   Chilo Nair MD   albuterol-ipratropium (DUO-NEB) 2.5 mg-0.5 mg/3 ml nebu 3 mL by Nebulization route every six (6) hours as needed. 2/13/17   Nate Chen NP   CHOLECALCIFEROL, VITAMIN D3, (VITAMIN D3 PO) Take  by mouth. Historical Provider   finasteride (PROSCAR) 5 mg tablet daily. 1/15/15   Historical Provider   ascorbic acid (VITAMIN C) 1,000 mg tablet Take  by mouth. Historical Provider       Allergies/Social/Family History:      Allergies   Allergen Reactions    Norco [Hydrocodone-Acetaminophen] Other (comments)     Too sedated and felt like he was in a daze    Pcn [Penicillins] Rash    Percocet [Oxycodone-Acetaminophen] Nausea and Vomiting      Social History Substance Use Topics    Smoking status: Former Smoker     Packs/day: 0.25     Quit date: 9/10/2014    Smokeless tobacco: Never Used      Comment: has not had cigarette in over 3 month    Alcohol use Yes      Family History   Problem Relation Age of Onset    Diabetes Mother     Diabetes Father     Cancer Sister      LUNG    Cancer Brother      COLON    Cancer Sister      LUNG    Anesth Problems Neg Hx             Objective:   Vital Signs:  Visit Vitals    /76    Pulse 77    Temp 97 °F (36.1 °C)    Resp 22    Wt 83.6 kg (184 lb 3.2 oz)    SpO2 98%    BMI 32.63 kg/m2    O2 Flow Rate (L/min): 4 l/min O2 Device: Nasal cannula Temp (24hrs), Av.2 °F (36.8 °C), Min:97 °F (36.1 °C), Max:98.7 °F (37.1 °C)           Intake/Output:     Intake/Output Summary (Last 24 hours) at 05/15/18 1041  Last data filed at 05/15/18 0700   Gross per 24 hour   Intake          3741.88 ml   Output              370 ml   Net          3371.88 ml       Physical Exam:   General:  Lethargic, no distress, appears stated age. Head:  Normocephalic, without obvious abnormality, atraumatic. Eyes:  Conjunctivae/corneas clear. PERRL   Neck: Supple, symmetrical, no adenopathy, no carotid bruit and no JVD. Lungs:   Clear to auscultation bilaterally. Chest wall:  No tenderness or deformity. Heart:  Regular rate and rhythm, S1-S2 normal, no murmur, no click, rub or gallop. Abdomen:   Soft, non-tender. Bowel sounds present. No masses,  No organomegaly. Extremities: Atraumatic, no cyanosis or edema.    Pulses: Palpable   Skin: No rashes or lesions   Neurologic: Lethargic, moving all 4         LABS AND  DATA: Personally reviewed  Recent Labs      05/15/18   03318   0435   WBC  5.4  6.8   HGB  8.6*  9.2*   HCT  27.9*  30.2*   PLT  133*  131*     Recent Labs      05/15/18   03318   0435   NA  147*  143   K  3.8  4.4   CL  115*  114*   CO2  24  22   BUN  14  18   CREA  0.88  1.01   GLU  116*  107*   CA  8.3* 8.4*   MG   --   1.9   PHOS   --   2.0*     Recent Labs      05/14/18   0435   SGOT  23   AP  90   TP  6.9   ALB  2.9*   GLOB  4.0     No results for input(s): INR, PTP, APTT in the last 72 hours. No lab exists for component: INREXT, INREXT   No results for input(s): PHI, PCO2I, PO2I, FIO2I in the last 72 hours. No results for input(s): CPK, CKMB, TROIQ, BNPP in the last 72 hours. MEDS: Reviewed    Chest Imaging: personally reviewed and report checked    Tele- reviewed    Medical decision making:   I have reviewed the flowsheet and previous day's notes  Patient has acute or chronic illness that poses a threat to life or bodily function  Review and order of Clinical lab tests  Review and Order of Radiology tests  Independent visualization of Image  Reviewed Ventilator / NiPPV  Parenteral controlled substances - Reviewed/ Adjusted / Cherene Fonder / Started  High Risk Drug therapy requiring intensive monitoring for toxicity: eg steroids, pressors, antibiotics  Discussed with: Nursing      Thank you for allowing me to participate in this patient's care.     Carolee Urena MD

## 2018-05-15 NOTE — PROGRESS NOTES
Problem: Self Care Deficits Care Plan (Adult)  Goal: *Acute Goals and Plan of Care (Insert Text)  Occupational Therapy Goals  Initiated 5/14/2018    1. Patient will follow 1 step simple command during ADL with 100% consistency within 7 days. 2.  Patient will perform grooming seated EOB with Deuce within 7 days. 3.  Patient will perform toilet transfer to [facility] with modA within 7 days. 4.  Patient will perform anterior UB bathing seated in chair with modA within 7 days. 5.  Patient will participate in UE therapeutic exercise/activities with Deuce for 5 minutes within 7 days. Occupational Therapy TREATMENT  Patient: Gerri Herrera (35 y.o. male)  Date: 5/15/2018  Diagnosis: SDH  Subdural hematoma (HCC)  subdurral hematoma, right frontal crani Subdural hematoma (HCC)  Procedure(s) (LRB):  CRANIOTOMY (Right) 3 Days Post-Op  Precautions: Fall, Bed Alarm  Chart, occupational therapy assessment, plan of care, and goals were reviewed. ASSESSMENT:  Pt cleared by RN for therapy. Pt received drowsy, however, alert in bed; oriented to self and place only. Pt participated in self-feeding lunch tray, requiring min-maxA and benefited from built-up utensil, proximal stability/support and additional time. ADLs are limited by cognition (orientation, memory, attention, problem-solving), generally decreased functional strength,  strength/dexterity, activity tolerance, balance. Discharge recommendations for rehab once medically stable. Next session: bathing in supported sitting, 2 step command following, functional transfers. Progression toward goals:  [x]       Improving appropriately and progressing toward goals  []       Improving slowly and progressing toward goals  []       Not making progress toward goals and plan of care will be adjusted     PLAN:  Patient continues to benefit from skilled intervention to address the above impairments. Continue treatment per established plan of care.   Discharge Recommendations: Rehab  Further Equipment Recommendations for Discharge:  TBD     SUBJECTIVE:   Patient stated So, I'm at the HOSPITAL? !    OBJECTIVE DATA SUMMARY:   Cognitive/Behavioral Status:  Neurologic State: Alert;Drowsy  Orientation Level: Oriented to person;Disoriented to situation;Disoriented to time;Oriented to place  Cognition: Follows commands;Decreased attention/concentration  Perception: Appears intact  Perseveration: No perseveration noted  Safety/Judgement: Decreased awareness of environment;Decreased awareness of need for assistance;Decreased awareness of need for safety;Decreased insight into deficits    Functional Mobility and Transfers for ADLs:  Bed Mobility:  Rolling: Moderate assistance;Maximum assistance;Assist x2  Supine to Sit: Moderate assistance;Maximum assistance;Assist x2  Sit to Supine: Moderate assistance;Maximum assistance;Assist x2  Scooting: Moderate assistance; Additional time    Transfers:  Sit to Stand: Moderate assistance;Assist x2          Balance:  Sitting: Impaired  Sitting - Static: Poor (constant support)  Sitting - Dynamic: Poor (constant support)  Standing: Impaired; With support  Standing - Static: Poor  Standing - Dynamic : Poor    ADL Intervention:  Feeding  Container Management: Total assistance (dependent)  Cutting Food: Total assistance (dependent)  Utensil Management: Moderate assistance  Food to Mouth: Moderate assistance  Drink to Mouth: Moderate assistance  Cues: Physical assistance;Verbal cues provided; Tactile cues provided;Visual cues provided  Adaptive Equipment: Built up fork    Cognitive Retraining  Safety/Judgement: Decreased awareness of environment;Decreased awareness of need for assistance;Decreased awareness of need for safety;Decreased insight into deficits        Activity Tolerance:   Fair tolerance, 100% O2, RR 28-35    Please refer to the flowsheet for vital signs taken during this treatment.   After treatment:   [] Patient left in no apparent distress sitting up in chair  [x] Patient left in no apparent distress in bed  [x] Call bell left within reach  [x] Nursing notified  [x] Caregiver present  [] Bed alarm activated    COMMUNICATION/COLLABORATION:   The patients plan of care was discussed with: Physical Therapist and Registered Nurse    Suzie Alba OT  Time Calculation: 23 mins

## 2018-05-15 NOTE — PROGRESS NOTES
Problem: Mobility Impaired (Adult and Pediatric)  Goal: *Acute Goals and Plan of Care (Insert Text)  Physical Therapy Goals  Initiated 5/14/2018  1. Patient will move from supine to sit and sit to supine , scoot up and down and roll side to side in bed with minimal assistance/contact guard assist within 7 day(s). 2.  Patient will transfer from bed to chair and chair to bed with minimal assistance/contact guard assist using the least restrictive device within 7 day(s). 3.  Patient will perform sit to stand with minimal assistance/contact guard assist within 7 day(s). 4.  Patient will ambulate with moderate assistance  for 20 feet with the least restrictive device within 7 day(s). 5.  Patient will improve Fan Balance score by 7 points within 7 days. physical Therapy TREATMENT  Patient: Blanquita Bergeron (39 y.o. male)  Date: 5/15/2018  Diagnosis: SDH  Subdural hematoma (HCC)  subdurral hematoma, right frontal crani Subdural hematoma (HCC)  Procedure(s) (LRB):  CRANIOTOMY (Right) 3 Days Post-Op  Precautions: Fall, Bed Alarm  Chart, physical therapy assessment, plan of care and goals were reviewed. ASSESSMENT:  Patient cleared by RN for continued mobility with PT. Received asleep, however able to wake to PT voice and light tactile cues. Fairly drowsy during session requiring repeated instruction and cues. Overall required mod-max A x 2 for functional mobility to include bed mobility, EOB sitting, and attempted standing x 3 reps. Able to clear bottom and patient with constant lean toward R. Unable to correct without max cues. Able to take 3 side steps this date with max A to laterally weight shift. Able to laterally scoot toward L with assist for better positioning in bed. Returned to supine.  Patient will need rehab at d/c.     Progression toward goals:  []    Improving appropriately and progressing toward goals  [x]    Improving slowly and progressing toward goals  []    Not making progress toward goals and plan of care will be adjusted     PLAN:  Patient continues to benefit from skilled intervention to address the above impairments. Continue treatment per established plan of care. Discharge Recommendations:  Rehab  Further Equipment Recommendations for Discharge:  tbd     SUBJECTIVE:   Patient stated Jewell Polk.     OBJECTIVE DATA SUMMARY:   Critical Behavior:  Neurologic State: Drowsy  Orientation Level: Oriented to person, Oriented to time, Disoriented to place, Oriented to situation  Cognition: Follows commands  Safety/Judgement: Decreased awareness of need for assistance, Decreased awareness of environment, Decreased awareness of need for safety, Decreased insight into deficits  Functional Mobility Training:  Bed Mobility:  Rolling: Moderate assistance;Maximum assistance;Assist x2  Supine to Sit: Moderate assistance;Maximum assistance;Assist x2  Sit to Supine: Moderate assistance;Maximum assistance;Assist x2  Scooting: Moderate assistance; Additional time        Transfers:  Sit to Stand: Moderate assistance;Assist x2  Stand to Sit: Moderate assistance;Assist x2                             Balance:  Sitting: Impaired  Sitting - Static: Poor (constant support)  Sitting - Dynamic: Poor (constant support)  Standing: Impaired; With support  Standing - Static: Poor  Standing - Dynamic : Poor  Ambulation/Gait Training:                                  Pain:  Pain Scale 1: Visual  Pain Intensity 1: 0 (pt sleeping)              Activity Tolerance:   VSS  Please refer to the flowsheet for vital signs taken during this treatment.   After treatment:   []    Patient left in no apparent distress sitting up in chair  [x]    Patient left in no apparent distress in bed  [x]    Call bell left within reach  [x]    Nursing notified   [x]    Caregiver present  [x]    Bed alarm activated    COMMUNICATION/COLLABORATION:   The patients plan of care was discussed with: Occupational Therapist and Registered Nurse    Fifi León Navi, PT, DPT   Time Calculation: 24 mins

## 2018-05-15 NOTE — PROGRESS NOTES
2000. Assumed care of pt; CIWA-11 PRN Ativan 1mg PO given; VSS    2100. Pt pulled condom catheter off and voided in bed; very restless; pt cleaned, linens changed, and repositioned in bed;     2110. CIWA-15; Ativan 2 mg IVP given    2230. CIWA-15; Ativan 2 mg IVP given    2300. SBP>160; PRN Hydralazine given IVP    0000. Hospitalist paged for orders to restart precedex as Ativan is NOT working to keep him calm in the bed; spoke with Dr. Deleta Moritz, pt info given-new orders received to restart precedex. 0145. Precedex at 0.7 mcg/kg/hr; HR-83 RR-24 02 sats-94% BP-154/91 (105); pt sleeping at this time    0415. Pt calm and sleeping; Precedex at 0.6 mcg/kg/hr; AM labs drawn and sent    0615. Precedex down to 0.2 mcg/kg/hr    0700. Precedex off; pt sleeping    0730.  Report given to oncoming RN

## 2018-05-15 NOTE — PROGRESS NOTES
Neurosurgery Progress Note  Krysta Brown ACNP-BC  [phone]        Admit Date: 2018   LOS: 3 days        Daily Progress Note: 5/15/2018    POD: 3 Days Post-Op    S/P: Procedure(s):  CRANIOTOMY    HPI: The patient sustained a ground level fall and was found naked on the floor with a bottle of bourbon after his leg gave out. He also had lower back pain. He presented to the ER at [facility] where a head CT revealed a right SDH. He transferred to [facility] and was taken to the OR for evacuation of the SDH. He has a history of alcoholism. His alcohol level was 30 mg/DL    Subjective: The patient sustained a ground level fall and was found naked on the floor with a bottle of bourbon after his leg gave out. He also had lower back pain. He presented to the ER at [facility] where a head CT revealed a right SDH. He transferred to [facility] and was taken to the OR for evacuation of the SDH. He has a history of alcoholism. His alcohol level was 30 mg/DL. This morning he is awake, asking to sit on the side of the bed. He is agitated and squirrely in the bed. He has had some oozing at his incision site. Objective:     Vital signs  Temp (24hrs), Av.2 °F (36.8 °C), Min:97 °F (36.1 °C), Max:98.7 °F (37.1 °C)       1901 - 05/15 0700  In: 7688 [P.O.:570; I.V.:4350]  Out: 1515 [Urine:1100; Drains:415]    Visit Vitals    /76    Pulse 77    Temp 97 °F (36.1 °C)    Resp 22    Wt 83.6 kg (184 lb 3.2 oz)    SpO2 98%    BMI 32.63 kg/m2    O2 Flow Rate (L/min): 4 l/min O2 Device: Nasal cannula     Pain control  Pain Assessment  Pain Scale 1: Visual  Pain Intensity 1: 0 (pt sleeping)  Pain Onset 1: ongoing  Pain Location 1: Back  Pain Orientation 1: Lower  Pain Description 1: Aching  Pain Intervention(s) 1: Medication (see MAR)    PT/OT  Gait                 Physical Exam:  Gen:NAD. Neuro: A&Ox1. Answers yes/no correctly for place. Tells me it is 2019. Intermittently follows commands. Speech dysarthric. Affect flat. PERRL. EOMI. Face symmetric. Tongue midline. SCHNEIDER spontaneously  Gait deferred. Skin: Right frontoparietal incision with staples in place. No erythema, edema, or drainage    CT head without contrast on 05/14/18 shows status post right parietal craniotomy with subdural drain in the right extra-axial collection which is unchanged in size. Right temporal parenchymal hemorrhage minimally increased with bilateral parietal subarachnoid hemorrhage unchanged.     24 hour results:    Recent Results (from the past 24 hour(s))   GLUCOSE, POC    Collection Time: 05/14/18 12:08 PM   Result Value Ref Range    Glucose (POC) 82 65 - 100 mg/dL    Performed by [address] [place] [address], POC    Collection Time: 05/14/18  5:59 PM   Result Value Ref Range    Glucose (POC) 111 (H) 65 - 100 mg/dL    Performed by [address] [place] [address], POC    Collection Time: 05/14/18 11:11 PM   Result Value Ref Range    Glucose (POC) 112 (H) 65 - 100 mg/dL    Performed by Sushma Johns    METABOLIC PANEL, BASIC    Collection Time: 05/15/18  3:31 AM   Result Value Ref Range    Sodium 147 (H) 136 - 145 mmol/L    Potassium 3.8 3.5 - 5.1 mmol/L    Chloride 115 (H) 97 - 108 mmol/L    CO2 24 21 - 32 mmol/L    Anion gap 8 5 - 15 mmol/L    Glucose 116 (H) 65 - 100 mg/dL    BUN 14 6 - 20 MG/DL    Creatinine 0.88 0.70 - 1.30 MG/DL    BUN/Creatinine ratio 16 12 - 20      GFR est AA >60 >60 ml/min/1.73m2    GFR est non-AA >60 >60 ml/min/1.73m2    Calcium 8.3 (L) 8.5 - 10.1 MG/DL   CBC WITH AUTOMATED DIFF    Collection Time: 05/15/18  3:31 AM   Result Value Ref Range    WBC 5.4 4.1 - 11.1 K/uL    RBC 3.22 (L) 4.10 - 5.70 M/uL    HGB 8.6 (L) 12.1 - 17.0 g/dL    HCT 27.9 (L) 36.6 - 50.3 %    MCV 86.6 80.0 - 99.0 FL    MCH 26.7 26.0 - 34.0 PG    MCHC 30.8 30.0 - 36.5 g/dL    RDW 17.8 (H) 11.5 - 14.5 %    PLATELET 721 (L) 566 - 400 K/uL    MPV 10.7 8.9 - 12.9 FL    NRBC 0.0 0  WBC    ABSOLUTE NRBC 0.00 0.00 - 0.01 K/uL    NEUTROPHILS 80 (H) 32 - 75 % LYMPHOCYTES 7 (L) 12 - 49 %    MONOCYTES 9 5 - 13 %    EOSINOPHILS 4 0 - 7 %    BASOPHILS 0 0 - 1 %    IMMATURE GRANULOCYTES 0 0.0 - 0.5 %    ABS. NEUTROPHILS 4.3 1.8 - 8.0 K/UL    ABS. LYMPHOCYTES 0.4 (L) 0.8 - 3.5 K/UL    ABS. MONOCYTES 0.5 0.0 - 1.0 K/UL    ABS. EOSINOPHILS 0.2 0.0 - 0.4 K/UL    ABS. BASOPHILS 0.0 0.0 - 0.1 K/UL    ABS. IMM. GRANS. 0.0 0.00 - 0.04 K/UL    DF AUTOMATED     GLUCOSE, POC    Collection Time: 05/15/18  5:15 AM   Result Value Ref Range    Glucose (POC) 99 65 - 100 mg/dL    Performed by Nelly Gerber           Assessment:     Principal Problem:    Subdural hematoma ([address] (5/12/2018)        Plan:   1. Right traumatic SDH   - s/p crani 05/12   - Normalize and mobilize   - PT/OT/Speech   - SD drain pulled 05/14   - Suture placed in incision site where it was oozing  2. Brain compression   - due to #1   - plans as above  3. Alcohol abuse   - CIWA protocol with lower doses of Ativan   - Intensivist/hospitalist following  4. Acute encephalopathy   - due to #1, 2, 3   - supportive care  5. HTN   - Restart Norvasc, Imdur, metoprolol from home   - Hold losartan/HCTZ for now   - SBP<160   - Hydralazine/labetalol PRN   - Hospitalist following  6. Obesity   - BMI 31.8   - Counseling as able  7. CAD   - s/p stent placement   - ASA/Plavix on hold  8. Dyslipidemia   - Cont lipitor from home   - Hospitalist following  9. Hypothyroidism   - cont levothyroxine from home   - Hospitalist following  10. BPH   - Cont Proscar and Flomax  11. COPD   - Duo-nebs PRN   - Hospitalist following    Activity: up with assist  DVT ppx: SCDs  Dispo: tbd    Plan d/w Dr. Luis Melchor. Keep in ICU today due to alcohol withdrawal. Pt required Precedex drip overnight and may require it again later today.        Pauline Whipple NP

## 2018-05-15 NOTE — ROUTINE PROCESS
1930. Bedside, Verbal and Written shift change report given to Fady Pate RN (oncoming nurse) by Amara Stephens  (offgoing nurse). Report included the following information SBAR, Kardex, ED Summary, OR Summary, Procedure Summary, Intake/Output, MAR, Accordion, Recent Results, Med Rec Status, Cardiac Rhythm NSR/BBB and Alarm Parameters . 0730. Bedside, Verbal and Written shift change report given to Darnell Marin RN (oncoming nurse) by Fady Pate RN (offgoing nurse). Report included the following information SBAR, Kardex, ED Summary, OR Summary, Procedure Summary, Intake/Output, MAR, Accordion, Recent Results, Med Rec Status and Cardiac Rhythm NSR/BBB.

## 2018-05-16 LAB
ANION GAP SERPL CALC-SCNC: 10 MMOL/L (ref 5–15)
BASOPHILS # BLD: 0 K/UL (ref 0–0.1)
BASOPHILS NFR BLD: 1 % (ref 0–1)
BUN SERPL-MCNC: 9 MG/DL (ref 6–20)
BUN/CREAT SERPL: 12 (ref 12–20)
CALCIUM SERPL-MCNC: 8.8 MG/DL (ref 8.5–10.1)
CHLORIDE SERPL-SCNC: 111 MMOL/L (ref 97–108)
CO2 SERPL-SCNC: 24 MMOL/L (ref 21–32)
CREAT SERPL-MCNC: 0.75 MG/DL (ref 0.7–1.3)
DIFFERENTIAL METHOD BLD: ABNORMAL
EOSINOPHIL # BLD: 0.2 K/UL (ref 0–0.4)
EOSINOPHIL NFR BLD: 3 % (ref 0–7)
ERYTHROCYTE [DISTWIDTH] IN BLOOD BY AUTOMATED COUNT: 17.7 % (ref 11.5–14.5)
GLUCOSE BLD STRIP.AUTO-MCNC: 106 MG/DL (ref 65–100)
GLUCOSE BLD STRIP.AUTO-MCNC: 108 MG/DL (ref 65–100)
GLUCOSE BLD STRIP.AUTO-MCNC: 109 MG/DL (ref 65–100)
GLUCOSE BLD STRIP.AUTO-MCNC: 116 MG/DL (ref 65–100)
GLUCOSE SERPL-MCNC: 113 MG/DL (ref 65–100)
HCT VFR BLD AUTO: 30.3 % (ref 36.6–50.3)
HGB BLD-MCNC: 9.5 G/DL (ref 12.1–17)
IMM GRANULOCYTES # BLD: 0 K/UL (ref 0–0.04)
IMM GRANULOCYTES NFR BLD AUTO: 0 % (ref 0–0.5)
LYMPHOCYTES # BLD: 0.4 K/UL (ref 0.8–3.5)
LYMPHOCYTES NFR BLD: 7 % (ref 12–49)
MAGNESIUM SERPL-MCNC: 1.8 MG/DL (ref 1.6–2.4)
MCH RBC QN AUTO: 26.7 PG (ref 26–34)
MCHC RBC AUTO-ENTMCNC: 31.4 G/DL (ref 30–36.5)
MCV RBC AUTO: 85.1 FL (ref 80–99)
MONOCYTES # BLD: 0.5 K/UL (ref 0–1)
MONOCYTES NFR BLD: 9 % (ref 5–13)
NEUTS SEG # BLD: 4.8 K/UL (ref 1.8–8)
NEUTS SEG NFR BLD: 80 % (ref 32–75)
NRBC # BLD: 0 K/UL (ref 0–0.01)
NRBC BLD-RTO: 0 PER 100 WBC
PHOSPHATE SERPL-MCNC: 2.6 MG/DL (ref 2.6–4.7)
PLATELET # BLD AUTO: 132 K/UL (ref 150–400)
PMV BLD AUTO: 10.2 FL (ref 8.9–12.9)
POTASSIUM SERPL-SCNC: 3.6 MMOL/L (ref 3.5–5.1)
RBC # BLD AUTO: 3.56 M/UL (ref 4.1–5.7)
SERVICE CMNT-IMP: ABNORMAL
SODIUM SERPL-SCNC: 145 MMOL/L (ref 136–145)
WBC # BLD AUTO: 5.9 K/UL (ref 4.1–11.1)

## 2018-05-16 PROCEDURE — 97530 THERAPEUTIC ACTIVITIES: CPT

## 2018-05-16 PROCEDURE — 82962 GLUCOSE BLOOD TEST: CPT

## 2018-05-16 PROCEDURE — 74011000250 HC RX REV CODE- 250: Performed by: INTERNAL MEDICINE

## 2018-05-16 PROCEDURE — 74011000258 HC RX REV CODE- 258: Performed by: INTERNAL MEDICINE

## 2018-05-16 PROCEDURE — 74011250636 HC RX REV CODE- 250/636: Performed by: NURSE PRACTITIONER

## 2018-05-16 PROCEDURE — 74011250636 HC RX REV CODE- 250/636: Performed by: INTERNAL MEDICINE

## 2018-05-16 PROCEDURE — 85025 COMPLETE CBC W/AUTO DIFF WBC: CPT | Performed by: INTERNAL MEDICINE

## 2018-05-16 PROCEDURE — 74011250637 HC RX REV CODE- 250/637: Performed by: INTERNAL MEDICINE

## 2018-05-16 PROCEDURE — 74011000250 HC RX REV CODE- 250: Performed by: NURSE PRACTITIONER

## 2018-05-16 PROCEDURE — 92526 ORAL FUNCTION THERAPY: CPT | Performed by: SPEECH-LANGUAGE PATHOLOGIST

## 2018-05-16 PROCEDURE — 74011000250 HC RX REV CODE- 250: Performed by: NEUROLOGICAL SURGERY

## 2018-05-16 PROCEDURE — 36415 COLL VENOUS BLD VENIPUNCTURE: CPT | Performed by: INTERNAL MEDICINE

## 2018-05-16 PROCEDURE — 84100 ASSAY OF PHOSPHORUS: CPT | Performed by: INTERNAL MEDICINE

## 2018-05-16 PROCEDURE — 83735 ASSAY OF MAGNESIUM: CPT | Performed by: INTERNAL MEDICINE

## 2018-05-16 PROCEDURE — 74011250636 HC RX REV CODE- 250/636: Performed by: NEUROLOGICAL SURGERY

## 2018-05-16 PROCEDURE — 74011250637 HC RX REV CODE- 250/637: Performed by: NURSE PRACTITIONER

## 2018-05-16 PROCEDURE — 94640 AIRWAY INHALATION TREATMENT: CPT

## 2018-05-16 PROCEDURE — 65610000006 HC RM INTENSIVE CARE

## 2018-05-16 PROCEDURE — 77010033678 HC OXYGEN DAILY

## 2018-05-16 PROCEDURE — 80048 BASIC METABOLIC PNL TOTAL CA: CPT | Performed by: INTERNAL MEDICINE

## 2018-05-16 RX ORDER — QUETIAPINE FUMARATE 25 MG/1
25 TABLET, FILM COATED ORAL
Status: DISCONTINUED | OUTPATIENT
Start: 2018-05-16 | End: 2018-05-20 | Stop reason: HOSPADM

## 2018-05-16 RX ORDER — INSULIN LISPRO 100 [IU]/ML
INJECTION, SOLUTION INTRAVENOUS; SUBCUTANEOUS
Status: DISCONTINUED | OUTPATIENT
Start: 2018-05-16 | End: 2018-05-20 | Stop reason: HOSPADM

## 2018-05-16 RX ORDER — PANTOPRAZOLE SODIUM 40 MG/1
40 TABLET, DELAYED RELEASE ORAL
Status: DISCONTINUED | OUTPATIENT
Start: 2018-05-16 | End: 2018-05-20 | Stop reason: HOSPADM

## 2018-05-16 RX ORDER — THERA TABS 400 MCG
1 TAB ORAL DAILY
Status: DISCONTINUED | OUTPATIENT
Start: 2018-05-17 | End: 2018-05-20 | Stop reason: HOSPADM

## 2018-05-16 RX ORDER — LANOLIN ALCOHOL/MO/W.PET/CERES
100 CREAM (GRAM) TOPICAL DAILY
Status: DISCONTINUED | OUTPATIENT
Start: 2018-05-17 | End: 2018-05-20 | Stop reason: HOSPADM

## 2018-05-16 RX ORDER — FOLIC ACID 1 MG/1
1 TABLET ORAL DAILY
Status: DISCONTINUED | OUTPATIENT
Start: 2018-05-17 | End: 2018-05-20 | Stop reason: HOSPADM

## 2018-05-16 RX ADMIN — BUDESONIDE 500 MCG: 0.5 INHALANT RESPIRATORY (INHALATION) at 08:27

## 2018-05-16 RX ADMIN — Medication 10 ML: at 05:14

## 2018-05-16 RX ADMIN — SODIUM CHLORIDE 5 MG/HR: 900 INJECTION, SOLUTION INTRAVENOUS at 04:20

## 2018-05-16 RX ADMIN — FENTANYL CITRATE 25 MCG: 50 INJECTION, SOLUTION INTRAMUSCULAR; INTRAVENOUS at 15:19

## 2018-05-16 RX ADMIN — Medication 5 ML: at 05:14

## 2018-05-16 RX ADMIN — SODIUM CHLORIDE 50 ML/HR: 450 INJECTION, SOLUTION INTRAVENOUS at 01:25

## 2018-05-16 RX ADMIN — LORAZEPAM 1 MG: 2 INJECTION INTRAMUSCULAR; INTRAVENOUS at 08:40

## 2018-05-16 RX ADMIN — Medication 10 ML: at 21:05

## 2018-05-16 RX ADMIN — ARFORMOTEROL TARTRATE 15 MCG: 15 SOLUTION RESPIRATORY (INHALATION) at 20:20

## 2018-05-16 RX ADMIN — METOPROLOL SUCCINATE 25 MG: 25 TABLET, EXTENDED RELEASE ORAL at 09:23

## 2018-05-16 RX ADMIN — LABETALOL HYDROCHLORIDE 10 MG: 5 INJECTION, SOLUTION INTRAVENOUS at 12:31

## 2018-05-16 RX ADMIN — LEVETIRACETAM 500 MG: 500 TABLET ORAL at 20:06

## 2018-05-16 RX ADMIN — Medication 10 ML: at 15:23

## 2018-05-16 RX ADMIN — ATORVASTATIN CALCIUM 40 MG: 40 TABLET, FILM COATED ORAL at 21:04

## 2018-05-16 RX ADMIN — PANTOPRAZOLE SODIUM 40 MG: 40 TABLET, DELAYED RELEASE ORAL at 17:45

## 2018-05-16 RX ADMIN — SODIUM CHLORIDE 0.5 MCG/KG/HR: 900 INJECTION, SOLUTION INTRAVENOUS at 04:20

## 2018-05-16 RX ADMIN — LEVOTHYROXINE SODIUM 88 MCG: 88 TABLET ORAL at 09:24

## 2018-05-16 RX ADMIN — FINASTERIDE 5 MG: 5 TABLET, FILM COATED ORAL at 09:23

## 2018-05-16 RX ADMIN — LEVETIRACETAM 500 MG: 500 TABLET ORAL at 09:22

## 2018-05-16 RX ADMIN — AMLODIPINE BESYLATE 5 MG: 5 TABLET ORAL at 09:23

## 2018-05-16 RX ADMIN — LABETALOL HYDROCHLORIDE 10 MG: 5 INJECTION, SOLUTION INTRAVENOUS at 20:16

## 2018-05-16 RX ADMIN — SODIUM CHLORIDE 5 MG/HR: 900 INJECTION, SOLUTION INTRAVENOUS at 23:49

## 2018-05-16 RX ADMIN — FOLIC ACID: 5 INJECTION, SOLUTION INTRAMUSCULAR; INTRAVENOUS; SUBCUTANEOUS at 10:47

## 2018-05-16 RX ADMIN — TAMSULOSIN HYDROCHLORIDE 0.4 MG: 0.4 CAPSULE ORAL at 09:23

## 2018-05-16 RX ADMIN — Medication 5 ML: at 15:23

## 2018-05-16 RX ADMIN — ARFORMOTEROL TARTRATE 15 MCG: 15 SOLUTION RESPIRATORY (INHALATION) at 08:27

## 2018-05-16 RX ADMIN — FENTANYL CITRATE 25 MCG: 50 INJECTION, SOLUTION INTRAMUSCULAR; INTRAVENOUS at 20:10

## 2018-05-16 RX ADMIN — BUDESONIDE 500 MCG: 0.5 INHALANT RESPIRATORY (INHALATION) at 20:20

## 2018-05-16 RX ADMIN — QUETIAPINE FUMARATE 25 MG: 25 TABLET ORAL at 21:04

## 2018-05-16 RX ADMIN — Medication 5 ML: at 21:00

## 2018-05-16 RX ADMIN — HYDRALAZINE HYDROCHLORIDE 10 MG: 20 INJECTION INTRAMUSCULAR; INTRAVENOUS at 21:05

## 2018-05-16 RX ADMIN — ISOSORBIDE MONONITRATE 30 MG: 30 TABLET, EXTENDED RELEASE ORAL at 09:23

## 2018-05-16 NOTE — PROGRESS NOTES
Hospitalist Progress Note  Jolly Cooper MD  Answering service: [phone] OR 36 from in house phone         Date of Service: late entry for 5/15/2018  NAME:  Jnoo Ashley  :  1935  MRN:  [id number]    Admission Summary:    This is an 81-year-old man with a past medical history significant for coronary artery disease status post stent placement, dyslipidemia, hypertension, CVA, COPD, peripheral vascular disease, hypothyroidism, benign prostatic hyperplasia, who was in his usual state of health until the day of presentation at the emergency room when it was reported that the patient sustained a ground level fall.  According to report, the patient was found naked on the floor with a bottle of bourbon.  He stated that his leg gave out on him and he fell as a result.  Following the fall, the patient started complaining of back pain.  The back pain is located at the lower back.  The pain is constant with no radiation, 7/10 in severity, worse with movement of the back.  No known relieving factors.  Patient was taken to [place] [facility] for further evaluation.  When the patient arrived at the emergency room, CT scan of the head was obtained.  The CT scan shows a subdural hematoma.  The emergency room physician consulted a neurosurgeon at [facility] who advised a transfer to [facility]. [facility] service was asked to accept the patient as a direct admission to the hospitalist service.  The patient was last admitted to this hospital from 2018 to 2018. Emma Anderson was admitted and treated for thromboembolism.  The patient has no fever, no rigors, no chills.     Interval history / Subjective:     5/15:  Pt non verbal, pupils reactive equal,  Attempts to open eyes on command.       Assessment & Plan:      ·  Subacute right frontoparietal subdural hematoma  Right frontoparietal craniotomy with evacuation of subdural hematoma and placement of subdural drain. , seems to be progression as attempting to follow. ·  Alcoholism: On scheduled Ativan. And goodie bag  ·  Coronary artery disease status post stent placement. Plavix and aspirin on hold because of the subdural hematoma. ·  Hypothyroidism.  We will continue with Synthroid. ·  Anemia : Monitor CBC. ·  COPD, unknown severity, not bronchospastic  ·  History of hypothyroidism  ·  History of prostate cancer     Code status: full  DVT prophylaxis: SCD     Care Plan discussed with: Patient/Family  Disposition: Home w/Family and TBD          Hospital Problems  Date Reviewed: 5/12/2018          Codes Class Noted POA    * (Principal)Subdural hematoma ([facility] [address] ICD-10-CM: I62.00  ICD-9-CM: 432.1  5/12/2018 Yes                Review of Systems:   Review of systems not obtained due to patient factors. Vital Signs:    Last 24hrs VS reviewed since prior progress note. Most recent are:  Visit Vitals    /77    Pulse 73    Temp 97.6 °F (36.4 °C)    Resp 27    Wt 81.9 kg (180 lb 8.9 oz)    SpO2 92%    BMI 31.98 kg/m2         Intake/Output Summary (Last 24 hours) at 05/16/18 1540  Last data filed at 05/16/18 1200   Gross per 24 hour   Intake          1396.69 ml   Output             3000 ml   Net         -1603.31 ml        Physical Examination:             Constitutional:  ongoing distress  acute distress,     ENT:  Oral mucous moist, oropharynx benign. Neck supple,    Resp:  CTA bilaterally. No wheezing/rhonchi/rales. No accessory muscle use   CV:  Regular rhythm, normal rate, no murmurs, gallops, rubs    GI:  Soft, non distended, non tender. normoactive bowel sounds, no hepatosplenomegaly     Musculoskeletal:  No edema, warm, 2+ pulses throughout    Neurologic:   Lethargic, equal puples; tries to follow as in open eyes on command. Psych:   somnolent.    Skin:  Good turgor, no rashes or ulcers       Data Review:    Review and/or order of clinical lab test      Labs:     Recent Labs      05/16/18   0421  05/15/18   0331   WBC  5.9  5.4   HGB  9.5*  8.6*   HCT  30.3*  27.9*   PLT  132*  133*     Recent Labs      05/16/18   0421  05/15/18   0331  05/14/18 0435   NA  145  147*  143   K  3.6  3.8  4.4   CL  111*  115*  114*   CO2  24  24  22   BUN  9  14  18   CREA  0.75  0.88  1.01   GLU  113*  116*  107*   CA  8.8  8.3*  8.4*   MG  1.8   --   1.9   PHOS  2.6   --   2.0*     Recent Labs      05/14/18 0435   SGOT  23   ALT  13   AP  90   TBILI  0.4   TP  6.9   ALB  2.9*   GLOB  4.0     No results for input(s): INR, PTP, APTT in the last 72 hours. No lab exists for component: INREXT   No results for input(s): FE, TIBC, PSAT, FERR in the last 72 hours. No results found for: FOL, RBCF   No results for input(s): PH, PCO2, PO2 in the last 72 hours. No results for input(s): CPK, CKNDX, TROIQ in the last 72 hours.     No lab exists for component: CPKMB  Lab Results   Component Value Date/Time    Cholesterol, total 144 05/12/2018 08:27 AM    HDL Cholesterol 78 05/12/2018 08:27 AM    LDL, calculated 53.6 05/12/2018 08:27 AM    Triglyceride 62 05/12/2018 08:27 AM    CHOL/HDL Ratio 1.8 05/12/2018 08:27 AM     Lab Results   Component Value Date/Time    Glucose (POC) 106 (H) 05/16/2018 11:56 AM    Glucose (POC) 109 (H) 05/16/2018 05:16 AM    Glucose (POC) 118 (H) 05/15/2018 11:23 PM    Glucose (POC) 128 (H) 05/15/2018 06:38 PM    Glucose (POC) 96 05/15/2018 12:02 PM     Lab Results   Component Value Date/Time    Color YELLOW/STRAW 03/16/2018 02:45 PM    Appearance CLEAR 03/16/2018 02:45 PM    Specific gravity 1.014 03/16/2018 02:45 PM    pH (UA) 5.5 03/16/2018 02:45 PM    Protein NEGATIVE  03/16/2018 02:45 PM    Glucose NEGATIVE  03/16/2018 02:45 PM    Ketone NEGATIVE  03/16/2018 02:45 PM    Bilirubin NEGATIVE  03/16/2018 02:45 PM    Urobilinogen 1.0 03/16/2018 02:45 PM    Nitrites NEGATIVE  03/16/2018 02:45 PM    Leukocyte Esterase NEGATIVE  03/16/2018 02:45 PM Epithelial cells FEW 03/16/2018 02:45 PM    Bacteria 1+ (A) 03/16/2018 02:45 PM    WBC 5-10 03/16/2018 02:45 PM    RBC 0-5 03/16/2018 02:45 PM         Medications Reviewed:     Current Facility-Administered Medications   Medication Dose Route Frequency    [START ON 5/17/2018] therapeutic multivitamin (THERAGRAN) tablet 1 Tab  1 Tab Oral DAILY    [START ON 5/17/2018] thiamine (B-1) tablet 100 mg  100 mg Oral DAILY    [START ON 8/89/0901] folic acid (FOLVITE) tablet 1 mg  1 mg Oral DAILY    QUEtiapine (SEROquel) tablet 25 mg  25 mg Oral QHS    pantoprazole (PROTONIX) tablet 40 mg  40 mg Oral ACD    dexmedeTOMidine (PRECEDEX) 400 mcg in 0.9% sodium chloride 100 mL infusion  0.2-0.7 mcg/kg/hr IntraVENous TITRATE    LORazepam (ATIVAN) tablet 1 mg  1 mg Oral Q1H PRN    LORazepam (ATIVAN) tablet 2 mg  2 mg Oral Q1H PRN    LORazepam (ATIVAN) injection 1 mg  1 mg IntraVENous Q1H PRN    LORazepam (ATIVAN) injection 2 mg  2 mg IntraVENous Q1H PRN    hydrALAZINE (APRESOLINE) 20 mg/mL injection 10 mg  10 mg IntraVENous Q4H PRN    labetalol (NORMODYNE;TRANDATE) injection 10 mg  10 mg IntraVENous Q4H PRN    fentaNYL citrate (PF) injection 25 mcg  25 mcg IntraVENous Q2H PRN    levETIRAcetam (KEPPRA) tablet 500 mg  500 mg Oral Q12H    amLODIPine (NORVASC) tablet 5 mg  5 mg Oral DAILY    isosorbide mononitrate ER (IMDUR) tablet 30 mg  30 mg Oral DAILY    metoprolol succinate (TOPROL-XL) XL tablet 25 mg  25 mg Oral DAILY    glucose chewable tablet 16 g  4 Tab Oral PRN    dextrose (D50W) injection syrg 12.5-25 g  12.5-25 g IntraVENous PRN    glucagon (GLUCAGEN) injection 1 mg  1 mg IntraMUSCular PRN    insulin lispro (HUMALOG) injection   SubCUTAneous Q6H    arformoterol (BROVANA) neb solution 15 mcg  15 mcg Nebulization BID RT    And    budesonide (PULMICORT) 500 mcg/2 ml nebulizer suspension  500 mcg Nebulization BID RT    SALINE PERIPHERAL FLUSH Q8H soln 5 mL  5 mL InterCATHeter Q8H    saline peripheral flush soln 10 mL  10 mL InterCATHeter PRN    niCARdipine (CARDENE) 25 mg in 0.9% sodium chloride 250 mL infusion  0-15 mg/hr IntraVENous TITRATE    acetaminophen (TYLENOL) tablet 650 mg  650 mg Oral Q4H PRN    albuterol-ipratropium (DUO-NEB) 2.5 MG-0.5 MG/3 ML  3 mL Nebulization Q6H PRN    finasteride (PROSCAR) tablet 5 mg  5 mg Oral DAILY    levothyroxine (SYNTHROID) tablet 88 mcg  88 mcg Oral ACB    atorvastatin (LIPITOR) tablet 40 mg  40 mg Oral QHS    tamsulosin (FLOMAX) capsule 0.4 mg  0.4 mg Oral DAILY    sodium chloride (NS) flush 5-10 mL  5-10 mL IntraVENous Q8H    ondansetron (ZOFRAN) injection 4 mg  4 mg IntraVENous Q4H PRN    HYDROmorphone (DILAUDID) tablet 2 mg  2 mg Oral Q4H PRN    LORazepam (ATIVAN) injection 1 mg  1 mg IntraVENous Q4H PRN     ______________________________________________________________________  EXPECTED LENGTH OF STAY: 7d 0h  ACTUAL LENGTH OF STAY:          4                 Kelley Cardoza MD

## 2018-05-16 NOTE — PROGRESS NOTES
2000. Assumed care of pt; pt restless attempting to get out of bed; bilateral soft wrist restraints re-applied to patient; Cardene at 15 mg/hr     2030. Precedex restarted. 0000. Pt sleeping; Cardene at 5mg/hr and Precedex at 0.5mcg/kg/hr    0400. No changes    0730.  Report given to oncoming RN

## 2018-05-16 NOTE — PROGRESS NOTES
Hospitalist Progress Note  Devonte Isaac MD  Answering service: [phone] from in house phone         Date of Service: late entry for 5/15/2018  NAME:  Ashli Patino  :  1935  MRN:  [id number]    Admission Summary:    This is an 81-year-old man with a past medical history significant for coronary artery disease status post stent placement, dyslipidemia, hypertension, CVA, COPD, peripheral vascular disease, hypothyroidism, benign prostatic hyperplasia, who was in his usual state of health until the day of presentation at the emergency room when it was reported that the patient sustained a ground level fall.  According to report, the patient was found naked on the floor with a bottle of bourbon.  He stated that his leg gave out on him and he fell as a result.  Following the fall, the patient started complaining of back pain.  The back pain is located at the lower back.  The pain is constant with no radiation, 7/10 in severity, worse with movement of the back.  No known relieving factors.  Patient was taken to [facility] for further evaluation.  When the patient arrived at the emergency room, CT scan of the head was obtained.  The CT scan shows a subdural hematoma.  The emergency room physician consulted a neurosurgeon at [address] who advised a transfer to [facility] [address] [place] [address]. [facility] service was asked to accept the patient as a direct admission to the hospitalist service.  The patient was last admitted to this hospital from 2018 to 2018. Nenita Jane was admitted and treated for thromboembolism.  The patient has no fever, no rigors, no chills.     Interval history / Subjective:     2018   Verbal follows well, no c/o pain.       Assessment & Plan:      ·  Subacute right frontoparietal subdural hematoma  Right frontoparietal craniotomy with evacuation of subdural hematoma and placement of subdural drain. , seems to be progression as attempting to follow. And 5/16/2018 is following, equal motor upper ext /pupils equal   ·  Alcoholism: On scheduled Ativan. And goodie bag  ·  Coronary artery disease status post stent placement. Plavix and aspirin on hold because of the subdural hematoma. ·  Hypothyroidism.  We will continue with Synthroid. ·  Anemia : Monitor CBC. ·  COPD, unknown severity, not bronchospastic  ·  History of hypothyroidism  ·  History of prostate cancer     Code status: full  DVT prophylaxis: SCD     Care Plan discussed with: Patient/Family  Disposition: Home w/Family and TBD          Hospital Problems  Date Reviewed: 5/12/2018          Codes Class Noted POA    * (Principal)Subdural hematoma ([facility] [address] ICD-10-CM: I62.00  ICD-9-CM: 432.1  5/12/2018 Yes                Review of Systems:   Review of systems not obtained due to patient factors. Vital Signs:    Last 24hrs VS reviewed since prior progress note. Most recent are:  Visit Vitals    /77    Pulse 73    Temp 97.6 °F (36.4 °C)    Resp 27    Wt 81.9 kg (180 lb 8.9 oz)    SpO2 92%    BMI 31.98 kg/m2         Intake/Output Summary (Last 24 hours) at 05/16/18 1548  Last data filed at 05/16/18 1200   Gross per 24 hour   Intake          1396.69 ml   Output             3000 ml   Net         -1603.31 ml        Physical Examination:           Same exam but  More alert and now moves  Upper ext on comands  Constitutional:  ongoing distress  acute distress,     ENT:  Oral mucous moist, oropharynx benign. Neck supple,    Resp:  CTA bilaterally. No wheezing/rhonchi/rales. No accessory muscle use   CV:  Regular rhythm, normal rate, no murmurs, gallops, rubs    GI:  Soft, non distended, non tender. normoactive bowel sounds, no hepatosplenomegaly     Musculoskeletal:  No edema, warm, 2+ pulses throughout    Neurologic:   Alert follows symetrical upper ext movements. , equal puples; tries to follow as in open eyes on command. Data Review:    Review and/or order of clinical lab test      Labs:     Recent Labs      05/16/18   0421  05/15/18   0331   WBC  5.9  5.4   HGB  9.5*  8.6*   HCT  30.3*  27.9*   PLT  132*  133*     Recent Labs      05/16/18   0421  05/15/18   0331  05/14/18 0435   NA  145  147*  143   K  3.6  3.8  4.4   CL  111*  115*  114*   CO2  24  24  22   BUN  9  14  18   CREA  0.75  0.88  1.01   GLU  113*  116*  107*   CA  8.8  8.3*  8.4*   MG  1.8   --   1.9   PHOS  2.6   --   2.0*     Recent Labs      05/14/18 0435   SGOT  23   ALT  13   AP  90   TBILI  0.4   TP  6.9   ALB  2.9*   GLOB  4.0     No results for input(s): INR, PTP, APTT in the last 72 hours. No lab exists for component: INREXT, INREXT   No results for input(s): FE, TIBC, PSAT, FERR in the last 72 hours. No results found for: FOL, RBCF   No results for input(s): PH, PCO2, PO2 in the last 72 hours. No results for input(s): CPK, CKNDX, TROIQ in the last 72 hours.     No lab exists for component: CPKMB  Lab Results   Component Value Date/Time    Cholesterol, total 144 05/12/2018 08:27 AM    HDL Cholesterol 78 05/12/2018 08:27 AM    LDL, calculated 53.6 05/12/2018 08:27 AM    Triglyceride 62 05/12/2018 08:27 AM    CHOL/HDL Ratio 1.8 05/12/2018 08:27 AM     Lab Results   Component Value Date/Time    Glucose (POC) 106 (H) 05/16/2018 11:56 AM    Glucose (POC) 109 (H) 05/16/2018 05:16 AM    Glucose (POC) 118 (H) 05/15/2018 11:23 PM    Glucose (POC) 128 (H) 05/15/2018 06:38 PM    Glucose (POC) 96 05/15/2018 12:02 PM     Lab Results   Component Value Date/Time    Color YELLOW/STRAW 03/16/2018 02:45 PM    Appearance CLEAR 03/16/2018 02:45 PM    Specific gravity 1.014 03/16/2018 02:45 PM    pH (UA) 5.5 03/16/2018 02:45 PM    Protein NEGATIVE  03/16/2018 02:45 PM    Glucose NEGATIVE  03/16/2018 02:45 PM    Ketone NEGATIVE  03/16/2018 02:45 PM    Bilirubin NEGATIVE  03/16/2018 02:45 PM    Urobilinogen 1.0 03/16/2018 02:45 PM    Nitrites NEGATIVE  03/16/2018 02:45 PM    Leukocyte Esterase NEGATIVE  03/16/2018 02:45 PM    Epithelial cells FEW 03/16/2018 02:45 PM    Bacteria 1+ (A) 03/16/2018 02:45 PM    WBC 5-10 03/16/2018 02:45 PM    RBC 0-5 03/16/2018 02:45 PM         Medications Reviewed:     Current Facility-Administered Medications   Medication Dose Route Frequency    [START ON 5/17/2018] therapeutic multivitamin (THERAGRAN) tablet 1 Tab  1 Tab Oral DAILY    [START ON 5/17/2018] thiamine (B-1) tablet 100 mg  100 mg Oral DAILY    [START ON 5/54/9366] folic acid (FOLVITE) tablet 1 mg  1 mg Oral DAILY    QUEtiapine (SEROquel) tablet 25 mg  25 mg Oral QHS    pantoprazole (PROTONIX) tablet 40 mg  40 mg Oral ACD    LORazepam (ATIVAN) tablet 1 mg  1 mg Oral Q1H PRN    LORazepam (ATIVAN) tablet 2 mg  2 mg Oral Q1H PRN    LORazepam (ATIVAN) injection 1 mg  1 mg IntraVENous Q1H PRN    LORazepam (ATIVAN) injection 2 mg  2 mg IntraVENous Q1H PRN    hydrALAZINE (APRESOLINE) 20 mg/mL injection 10 mg  10 mg IntraVENous Q4H PRN    labetalol (NORMODYNE;TRANDATE) injection 10 mg  10 mg IntraVENous Q4H PRN    fentaNYL citrate (PF) injection 25 mcg  25 mcg IntraVENous Q2H PRN    levETIRAcetam (KEPPRA) tablet 500 mg  500 mg Oral Q12H    amLODIPine (NORVASC) tablet 5 mg  5 mg Oral DAILY    isosorbide mononitrate ER (IMDUR) tablet 30 mg  30 mg Oral DAILY    metoprolol succinate (TOPROL-XL) XL tablet 25 mg  25 mg Oral DAILY    glucose chewable tablet 16 g  4 Tab Oral PRN    dextrose (D50W) injection syrg 12.5-25 g  12.5-25 g IntraVENous PRN    glucagon (GLUCAGEN) injection 1 mg  1 mg IntraMUSCular PRN    insulin lispro (HUMALOG) injection   SubCUTAneous Q6H    arformoterol (BROVANA) neb solution 15 mcg  15 mcg Nebulization BID RT    And    budesonide (PULMICORT) 500 mcg/2 ml nebulizer suspension  500 mcg Nebulization BID RT    SALINE PERIPHERAL FLUSH Q8H soln 5 mL  5 mL InterCATHeter Q8H    saline peripheral flush soln 10 mL  10 mL InterCATHeter PRN    niCARdipine (CARDENE) 25 mg in 0.9% sodium chloride 250 mL infusion  0-15 mg/hr IntraVENous TITRATE    acetaminophen (TYLENOL) tablet 650 mg  650 mg Oral Q4H PRN    albuterol-ipratropium (DUO-NEB) 2.5 MG-0.5 MG/3 ML  3 mL Nebulization Q6H PRN    finasteride (PROSCAR) tablet 5 mg  5 mg Oral DAILY    levothyroxine (SYNTHROID) tablet 88 mcg  88 mcg Oral ACB    atorvastatin (LIPITOR) tablet 40 mg  40 mg Oral QHS    tamsulosin (FLOMAX) capsule 0.4 mg  0.4 mg Oral DAILY    sodium chloride (NS) flush 5-10 mL  5-10 mL IntraVENous Q8H    ondansetron (ZOFRAN) injection 4 mg  4 mg IntraVENous Q4H PRN    HYDROmorphone (DILAUDID) tablet 2 mg  2 mg Oral Q4H PRN    LORazepam (ATIVAN) injection 1 mg  1 mg IntraVENous Q4H PRN     ______________________________________________________________________  EXPECTED LENGTH OF STAY: 7d 0h  ACTUAL LENGTH OF STAY:          4                 Juan Antonio Raphael MD

## 2018-05-16 NOTE — PROGRESS NOTES
Problem: Dysphagia (Adult)  Goal: *Acute Goals and Plan of Care (Insert Text)  Speech therapy goals  Initiated 5/14/2018   1. Patient will tolerate mechanical soft diet without s/s of aspiration within 7 days   2. Patient will tolerate advanced solid trials with SLP with timely and complete mastication and full oral clearance within 7 days      Speech language pathology dysphagia treatment  Patient: Wilfrido Livingston (68 y.o. male)  Date: 5/16/2018  Diagnosis: SDH  Subdural hematoma (HCC)  subdurral hematoma, right frontal crani Subdural hematoma (HCC)  Procedure(s) (LRB):  CRANIOTOMY (Right) 4 Days Post-Op  Precautions:  Fall, Bed Alarm    ASSESSMENT:  Patient alert but slowed response to questions noted. Patient demonstrates mild oral dysphagia characterized by slow mastication and oral transit. Mild lingual residue noted with solids but clears with liquid wash. No overt s/s aspiration with any consistency. Limited intake. Suspect swallow function will improve as alertness and mentation improve. Progression toward goals:  []         Improving appropriately and progressing toward goals  [x]         Improving slowly and progressing toward goals  []         Not making progress toward goals and plan of care will be adjusted     PLAN:  Recommendations and Planned Interventions:  Continue mechanical soft diet. Patient continues to benefit from skilled intervention to address the above impairments. Continue treatment per established plan of care. Discharge Recommendations: To Be Determined     SUBJECTIVE:   Patient stated I've had enough. RN reports limited intake but no difficulty swallowing.     OBJECTIVE:   Cognitive and Communication Status:  Neurologic State: Alert  Orientation Level: Oriented to person, Oriented to place  Cognition: Decreased attention/concentration, Decreased command following  Perception: Appears intact  Perseveration: No perseveration noted  Safety/Judgement: Decreased awareness of environment, Decreased awareness of need for assistance, Decreased awareness of need for safety, Decreased insight into deficits  Dysphagia Treatment:  Oral Assessment:  Oral Assessment  Labial: No impairment  Dentition: Natural;Limited;Poor  Oral Hygiene: WNL  Lingual: No impairment  Velum: Unable to visualize  Mandible: No impairment  P.O. Trials:  Patient Position: Upright in bed. Vocal quality prior to P.O.: No impairment  Consistency Presented: Puree; Solid; Thin liquid  How Presented: SLP-fed/presented;Spoon;Straw;Successive swallows     Bolus Acceptance: No impairment  Bolus Formation/Control: Impaired  Type of Impairment: Delayed;Mastication  Propulsion: Delayed (# of seconds)  Oral Residue: Less than 10% of bolus  Initiation of Swallow: No impairment  Laryngeal Elevation: Functional  Aspiration Signs/Symptoms: None                Oral Phase Severity: Mild  Pharyngeal Phase Severity : No impairment     After treatment:   []              Patient left in no apparent distress sitting up in chair  [x]              Patient left in no apparent distress in bed  [x]              Call bell left within reach  [x]              Nursing notified  []              Caregiver present  []              Bed alarm activated    COMMUNICATION/EDUCATION:   Patient was educated regarding role of SLP, diet and POC. Patient did not respond. Further education warranted. The patients plan of care including recommendations, planned interventions, and recommended diet changes were discussed with: Registered Nurse. []              Posted safety precautions in patient's room.     DINO Bradley  Time Calculation: 15 mins

## 2018-05-16 NOTE — ROUTINE PROCESS
1930. Bedside, Verbal and Written shift change report given to Claire Colunga RN (oncoming nurse) by Lisa Valderrama RN  (offgoing nurse). Report included the following information SBAR, Kardex, ED Summary, OR Summary, Procedure Summary, Intake/Output, MAR, Accordion, Recent Results, Med Rec Status, Cardiac Rhythm NSR/BBB and Alarm Parameters .      0730. Bedside, Verbal and Written shift change report given to Dulce Cole RN (oncoming nurse) by Claire Colunga RN (offgoing nurse). Report included the following information SBAR, Kardex, ED Summary, OR Summary, Procedure Summary, Intake/Output, MAR, Accordion, Recent Results, Med Rec Status and Cardiac Rhythm NSR/BBB.

## 2018-05-16 NOTE — PROGRESS NOTES
PULMONARY/CRITICAL CARE/SLEEP MEDICINE        Name: Chong Jarrett   : 1935   MRN: [id number]   Date: 2018      Subjective:   Confused, does not follow commands. No WOB. Review of Systems:     A comprehensive 12 system review of systems was not obtainable    Assessment:     Subdural hematoma, status post evacuation  Postoperative encephalopathy with subarachnoid blood  Possible alcohol withdrawal  COPD, unknown severity, not bronchospastic  History of organic cardiac disease  History of hypothyroidism  History of prostate cancer  Suspected sleep apnea  Hyperglycemia  Hypernatremia  Other medical problems as per chart    Recommendations:   CIWA protocol and use ativan  Wean off precedex  Can go to [facility] if CIWA controlled and off drips.    PT/OT  Drain mgmt per neurosurgery  MARIAMA resolving  IVF adjusted  SLP eval  On bronchodilators  On seizure prophylaxis  Continue Protonix  Glycemic control  Will need tube feeding if encephalopathy continues  In restraints for interference with medical management  Stress ulcer prophylaxis  Use of CPAP may be limited by presence of intracranial drain  Eventual sleep study is recommended  ICU monitoring today  D/W RN and NS      Active Problem List:     Problem List  Date Reviewed: 2018          Codes Class    * (Principal)Subdural hematoma (HCC) ICD-10-CM: I62.00  ICD-9-CM: 432.1         Acute DVT (deep venous thrombosis) (HCC) ICD-10-CM: I82.409  ICD-9-CM: 453.40         Claudication of both lower extremities ([address] ICD-10-CM: I73.9  ICD-9-CM: 443.9         Bilateral deafness ICD-10-CM: H91.93  ICD-9-CM: 389.9         Rectal bleeding ICD-10-CM: K62.5  ICD-9-CM: 569.3         S/P cardiac cath ICD-10-CM: Z98.890  ICD-9-CM: V45.89     Overview Signed 2017 10:36 AM by Kimberly Daniels NP     17: PTCA Ramus Intermediate, MARY ANN Cx, MARY ANN  dRCA               SOBOE (shortness of breath on exertion) ICD-10-CM: R06.02  ICD-9-CM: 786.05         Myocardial infarction [facility]) ICD-10-CM: I21.9  ICD-9-CM: 410.90         Calculus of kidney ICD-10-CM: N20.0  ICD-9-CM: 592.0         Osteoarthritis ICD-10-CM: M19.90  ICD-9-CM: 715.90         Alkaline phosphatase elevation ICD-10-CM: R74.8  ICD-9-CM: 790.5         Angina, class III (HCC) ICD-10-CM: I20.9  ICD-9-CM: 413.9         Prostate cancer ([facility] ICD-10-CM: C61  ICD-9-CM: 185         Lipoma of back ICD-10-CM: D17.1  ICD-9-CM: 214.8         Nodule, subcutaneous ICD-10-CM: R22.9  ICD-9-CM: 006. 2         Vitamin D deficiency ICD-10-CM: E55.9  ICD-9-CM: 268.9         Prediabetes ICD-10-CM: R73.03  ICD-9-CM: 790.29         Tinea cruris ICD-10-CM: B35.6  ICD-9-CM: 110.3         Cerebral embolism with cerebral infarction [facility]) ICD-10-CM: I63.40  ICD-9-CM: 434.11     Overview Signed 6/13/2011  8:21 AM by Sonia Manuel. Left frontal cortical/subcortical infarcts             Carotid stenosis, left ICD-10-CM: C88.44  ICD-9-CM: 433.10     Overview Signed 6/13/2011  8:21 AM by Sonia Manuel.      Symptomatic LICA stenosis with infarction             PVD (peripheral vascular disease) with claudication ([facility]) ICD-10-CM: I73.9  ICD-9-CM: 443.9         BPH (benign prostatic hypertrophy) ICD-10-CM: N40.0  ICD-9-CM: 600.00         Dyslipidemia ICD-10-CM: E78.5  ICD-9-CM: 272.4         Successful  PTCA (percutaneous transluminal coronary angioplasty)--90-95% instent restenosis RCA 10/16/10 ICD-10-CM: Z98.61  ICD-9-CM: V45.82         Successful PTCA with MARY ANN Xience stent Ramus intermedius 10/16/10 ICD-10-CM: Z95.9  ICD-9-CM: V45.89         Chronic back pain--DJD ICD-10-CM: G89.29  ICD-9-CM: 338.29         ED (erectile dysfunction) ICD-10-CM: N52.9  ICD-9-CM: 607.84         Essential hypertension ICD-10-CM: I10  ICD-9-CM: 401.9         CAD (Coronary Artery Disease)--s/p PCI--MARY ANN [place] stents x4 5/09;MARY ANN [place] stents x3 8/09 ICD-10-CM: I25.10  ICD-9-CM: 414.00         Hypothyroidism ICD-10-CM: E03.9  ICD-9-CM: 244.9               Past Medical History:      has a past medical history of AAA (abdominal aortic aneurysm) ([facility] Acute MI ([facility] (12/2010); Acute prostatitis; Advance directive discussed with patient (04/20/2016); Aneurysm ([facility] (6/2011); Borderline glaucoma (glaucoma suspect) (02/19/2015); BPH (benign prostatic hyperplasia); Bright red blood per rectum (02/04/2016); CAD (coronary artery disease); Calculus of kidney; Cerebral embolism with cerebral infarction [facility]); Chronic pain; Dizzy spells (6/2012); DJD (degenerative joint disease) of lumbar spine; ED (erectile dysfunction); Elevated PSA (03/20/2014); Encephalocele ([email or web address] Hypercholesterolemia; Hypertension; Pneumonia (02/05/2017); Prostate cancer ([email or web address] (05/12/2014); PVD (peripheral vascular disease) with claudication ([email or web address] S/P radiation therapy (15 months out); Stroke [facility]) (6/2011); 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 Past Surgical History:      has a past surgical history that includes endoscopy, colon, diagnostic; ptca w/ stent, initial (10/16/2010); ptca each addl vessel (10/16/2010); hx heent (10/2012); hx orthopaedic (Bilateral); vascular surgery procedure unlist; hx carotid endarterectomy (Left); colonoscopy (N/A, 8/31/2016); upper gi endoscopy,diagnosis (11/22/2017); pr cardiac surg procedure unlist (12/2010); pr left heart cath,percutaneous (10/16/2010); and pr cardiac surg procedure unlist (12/2017). Home Medications:     Prior to Admission medications    Medication Sig Start Date End Date Taking? Authorizing Provider   LIPITOR 40 mg tablet TAKE 1 TABLET BY MOUTH EVERY DAY 5/2/18   Marimar Watts NP   Nebulizer & Compressor machine 1 Each by Does Not Apply route as needed. 4/21/18   Marimar Watts NP   furosemide (LASIX) 20 mg tablet TAKE 1 TAB BY MOUTH DAILY. 2/16/18   Historical Provider   levothyroxine (SYNTHROID) 88 mcg tablet TAKE 1 TABLET BY MOUTH DAILY (BEFORE BREAKFAST).  2/20/18 Marie Mcgill MD   amLODIPine (NORVASC) 5 mg tablet TAKE 1 TAB BY MOUTH DAILY. 2/16/18   Rosemary Celeste NP   isosorbide mononitrate ER (IMDUR) 30 mg tablet TAKE 1 TABLET BY MOUTH EVERY DAY 2/16/18   Rosemary Celeste NP   metoprolol succinate (TOPROL-XL) 25 mg XL tablet TAKE 1 TABLET EVERY DAY 2/5/18   Rosemary Celeste NP   losartan-hydroCHLOROthiazide [facility]) 100-12.5 mg per tablet TAKE 1 TABLET BY MOUTH EVERY DAY 1/18/18   Rosemary Celeste NP   clopidogrel (PLAVIX) 75 mg tab TAKE 1 TAB BY MOUTH DAILY. INDICATIONS: THROMBOSIS PREVENTION AFTER PCI 11/7/17   Historical Provider   omeprazole (PRILOSEC) 40 mg capsule Take 1 Cap by mouth two (2) times a day. 11/23/17   More Aguillon MD   aspirin delayed-release 81 mg tablet Take 1 Tab by mouth daily for 360 days. 11/7/17 11/2/18  Kimberly Daniels NP   tamsulosin (FLOMAX) 0.4 mg capsule TAKE 1 CAPSULE EVERY DAY 10/12/17   Paola Cotto MD   albuterol (PROVENTIL HFA, VENTOLIN HFA, PROAIR HFA) 90 mcg/actuation inhaler Take 2 Puffs by inhalation every four (4) hours as needed for Wheezing. 8/24/17   Margareth Mims MD   clotrimazole-betamethasone (LOTRISONE) topical cream Apply twice daily to affected area till resolved. 8/23/17   Marie Mcgill MD   albuterol-ipratropium (DUO-NEB) 2.5 mg-0.5 mg/3 ml nebu 3 mL by Nebulization route every six (6) hours as needed. 2/13/17   Hyelie Chen NP   CHOLECALCIFEROL, VITAMIN D3, (VITAMIN D3 PO) Take  by mouth. Historical Provider   finasteride (PROSCAR) 5 mg tablet daily. 1/15/15   Historical Provider   ascorbic acid (VITAMIN C) 1,000 mg tablet Take  by mouth. Historical Provider       Allergies/Social/Family History:      Allergies   Allergen Reactions    Norco [Hydrocodone-Acetaminophen] Other (comments)     Too sedated and felt like he was in a daze    Pcn [Penicillins] Rash    Percocet [Oxycodone-Acetaminophen] Nausea and Vomiting      Social History   Substance Use Topics    Smoking status: Former Smoker     Packs/day: 0.25     Quit date: 9/10/2014    Smokeless tobacco: Never Used      Comment: has not had cigarette in over 3 month    Alcohol use Yes      Family History   Problem Relation Age of Onset    Diabetes Mother     Diabetes Father     Cancer Sister      LUNG    Cancer Brother      COLON    Cancer Sister      LUNG    Anesth Problems Neg Hx             Objective:   Vital Signs:  Visit Vitals    /67 (BP 1 Location: Right arm, BP Patient Position: At rest)    Pulse 72    Temp 97.4 °F (36.3 °C)    Resp 25    Wt 81.9 kg (180 lb 8.9 oz)    SpO2 99%    BMI 31.98 kg/m2    O2 Flow Rate (L/min): 2 l/min O2 Device: Nasal cannula Temp (24hrs), Av.4 °F (36.3 °C), Min:96.3 °F (35.7 °C), Max:98.1 °F (36.7 °C)           Intake/Output:     Intake/Output Summary (Last 24 hours) at 18 1016  Last data filed at 18 0839   Gross per 24 hour   Intake          1986.69 ml   Output             2650 ml   Net          -663.31 ml       Physical Exam:   General:  Lethargic, no distress, appears stated age. Head:  Normocephalic, without obvious abnormality, atraumatic. Eyes:  Conjunctivae/corneas clear. PERRL   Neck: Supple, symmetrical, no adenopathy, no carotid bruit and no JVD. Lungs:   Clear to auscultation bilaterally. Chest wall:  No tenderness or deformity. Heart:  Regular rate and rhythm, S1-S2 normal, no murmur, no click, rub or gallop. Abdomen:   Soft, non-tender. Bowel sounds present. No masses,  No organomegaly. Extremities: Atraumatic, no cyanosis or edema.    Pulses: Palpable   Skin: No rashes or lesions   Neurologic: Lethargic, moving all 4         LABS AND  DATA: Personally reviewed  Recent Labs      05/16/18   0421  05/15/18   033   WBC  5.9  5.4   HGB  9.5*  8.6*   HCT  30.3*  27.9*   PLT  132*  133*     Recent Labs      18   0421  05/15/18   0331  18   0435   NA  145  147*  143   K  3.6  3.8  4.4   CL  111*  115*  114*   CO2  24  24  22   BUN  9  14  18   CREA  0.75  0.88 1.01   GLU  113*  116*  107*   CA  8.8  8.3*  8.4*   MG  1.8   --   1.9   PHOS  2.6   --   2.0*     Recent Labs      05/14/18   0435   SGOT  23   AP  90   TP  6.9   ALB  2.9*   GLOB  4.0     No results for input(s): INR, PTP, APTT in the last 72 hours. No lab exists for component: INREXT, INREXT   Recent Labs      05/15/18   1709   PHI  7.449   PCO2I  31.3*   PO2I  63*   FIO2I  28     No results for input(s): CPK, CKMB, TROIQ, BNPP in the last 72 hours. MEDS: Reviewed    Chest Imaging: personally reviewed and report checked    Tele- reviewed    Medical decision making:   I have reviewed the flowsheet and previous day's notes  Patient has acute or chronic illness that poses a threat to life or bodily function  Review and order of Clinical lab tests  Review and Order of Radiology tests  Independent visualization of Image  Reviewed Ventilator / NiPPV  Parenteral controlled substances - Reviewed/ Adjusted / Mary Kate Link / Started  High Risk Drug therapy requiring intensive monitoring for toxicity: eg steroids, pressors, antibiotics  Discussed with: Nursing      Thank you for allowing me to participate in this patient's care.     Lisa Babb MD

## 2018-05-16 NOTE — PROGRESS NOTES
0730 Bedside and Verbal shift change report given to [facility], RN (oncoming nurse) by Korina Almonte RN (offgoing nurse). Report included the following information SBAR, Kardex, ED Summary, OR Summary, Procedure Summary, Intake/Output, MAR, Recent Results and Cardiac Rhythm SR.     1930 Bedside and Verbal shift change report given to Korina Almonte RN (oncoming nurse) by [facility], RN (offgoing nurse). Report included the following information SBAR, Kardex, ED Summary, OR Summary, Procedure Summary, Intake/Output, MAR, Recent Results and Cardiac Rhythm SR. Shift Summary: Remained off Precedex gtt throughout shift, PRN Ativan given x 1 dose. Drowsy at times w/ headache- NS made aware. SBP < 160, Cardene gtt off. Up to chair for a few hours. Crani site w/ old drainage.

## 2018-05-16 NOTE — PROGRESS NOTES
Neurosurgery Progress Note  Unique Licea, ACNP-BC  [phone]        Admit Date: 2018   LOS: 4 days        Daily Progress Note: 2018    POD: 4 Days Post-Op    S/P: Procedure(s):  CRANIOTOMY    HPI: The patient sustained a ground level fall and was found naked on the floor with a bottle of bourbon after his leg gave out. He also had lower back pain. He presented to the ER at [address] [facility] where a head CT revealed a right SDH. He transferred to [facility] and was taken to the OR for evacuation of the SDH. He has a history of alcoholism. His alcohol level was 30 mg/DL    Subjective:   Required a Precedex drip overnight. Received 9 mg of Ativan in the past 24 hours. Still in alcohol withdrawal window. Objective:     Vital signs  Temp (24hrs), Av.4 °F (36.3 °C), Min:96.3 °F (35.7 °C), Max:98.1 °F (36.7 °C)    07 -  1900  In: 65.8 [I.V.:65.8]  Out: 350 [Urine:350]  1901 -  0700  In: 4069.8 [P.O.:617; I.V.:3452.8]  Out: 2300 [Urine:1850]    Visit Vitals    /67 (BP 1 Location: Right arm, BP Patient Position: At rest)    Pulse 72    Temp 97.4 °F (36.3 °C)    Resp 25    Wt 81.9 kg (180 lb 8.9 oz)    SpO2 99%    BMI 31.98 kg/m2    O2 Flow Rate (L/min): 2 l/min O2 Device: Nasal cannula     Pain control  Pain Assessment  Pain Scale 1: Numeric (0 - 10)  Pain Intensity 1: 0  Pain Onset 1: ongoing  Pain Location 1: Back  Pain Orientation 1: Lower  Pain Description 1: Aching  Pain Intervention(s) 1: Medication (see MAR)    PT/OT  Gait                 Physical Exam:  Gen:NAD. Neuro: A&Ox3. Intermittently follows commands. Speech dysarthric. Affect flat. PERRL. EOMI. Face symmetric. Tongue midline. SCHNEIDER spontaneously  Gait deferred. Skin: Right frontoparietal incision with staples in place. No erythema, edema, or drainage    CT head without contrast on 18 shows status post right parietal craniotomy with subdural drain in the right extra-axial collection which is unchanged in size. Right temporal parenchymal hemorrhage minimally increased with bilateral parietal subarachnoid hemorrhage unchanged. 24 hour results:    Recent Results (from the past 24 hour(s))   GLUCOSE, POC    Collection Time: 05/15/18 12:02 PM   Result Value Ref Range    Glucose (POC) 96 65 - 100 mg/dL    Performed by Arely Magallanes    POC G3 - PUL    Collection Time: 05/15/18  5:09 PM   Result Value Ref Range    FIO2 (POC) 28 %    pH (POC) 7.449 7.35 - 7.45      pCO2 (POC) 31.3 (L) 35.0 - 45.0 MMHG    pO2 (POC) 63 (L) 80 - 100 MMHG    HCO3 (POC) 21.7 (L) 22 - 26 MMOL/L    sO2 (POC) 93 92 - 97 %    Base deficit (POC) 2 mmol/L    Site RIGHT RADIAL      Device: NASAL CANNULA      Flow rate (POC) 4.0 L/M    Allens test (POC) YES      Specimen type (POC) ARTERIAL      Total resp. rate 20     GLUCOSE, POC    Collection Time: 05/15/18  6:38 PM   Result Value Ref Range    Glucose (POC) 128 (H) 65 - 100 mg/dL    Performed by Arely Magallanes    GLUCOSE, POC    Collection Time: 05/15/18 11:23 PM   Result Value Ref Range    Glucose (POC) 118 (H) 65 - 100 mg/dL    Performed by Enriqueta Lindsey    CBC WITH AUTOMATED DIFF    Collection Time: 05/16/18  4:21 AM   Result Value Ref Range    WBC 5.9 4.1 - 11.1 K/uL    RBC 3.56 (L) 4.10 - 5.70 M/uL    HGB 9.5 (L) 12.1 - 17.0 g/dL    HCT 30.3 (L) 36.6 - 50.3 %    MCV 85.1 80.0 - 99.0 FL    MCH 26.7 26.0 - 34.0 PG    MCHC 31.4 30.0 - 36.5 g/dL    RDW 17.7 (H) 11.5 - 14.5 %    PLATELET 458 (L) 068 - 400 K/uL    MPV 10.2 8.9 - 12.9 FL    NRBC 0.0 0  WBC    ABSOLUTE NRBC 0.00 0.00 - 0.01 K/uL    NEUTROPHILS 80 (H) 32 - 75 %    LYMPHOCYTES 7 (L) 12 - 49 %    MONOCYTES 9 5 - 13 %    EOSINOPHILS 3 0 - 7 %    BASOPHILS 1 0 - 1 %    IMMATURE GRANULOCYTES 0 0.0 - 0.5 %    ABS. NEUTROPHILS 4.8 1.8 - 8.0 K/UL    ABS. LYMPHOCYTES 0.4 (L) 0.8 - 3.5 K/UL    ABS. MONOCYTES 0.5 0.0 - 1.0 K/UL    ABS. EOSINOPHILS 0.2 0.0 - 0.4 K/UL    ABS. BASOPHILS 0.0 0.0 - 0.1 K/UL    ABS. IMM.  GRANS. 0.0 0.00 - 0.04 K/UL DF AUTOMATED     METABOLIC PANEL, BASIC    Collection Time: 05/16/18  4:21 AM   Result Value Ref Range    Sodium 145 136 - 145 mmol/L    Potassium 3.6 3.5 - 5.1 mmol/L    Chloride 111 (H) 97 - 108 mmol/L    CO2 24 21 - 32 mmol/L    Anion gap 10 5 - 15 mmol/L    Glucose 113 (H) 65 - 100 mg/dL    BUN 9 6 - 20 MG/DL    Creatinine 0.75 0.70 - 1.30 MG/DL    BUN/Creatinine ratio 12 12 - 20      GFR est AA >60 >60 ml/min/1.73m2    GFR est non-AA >60 >60 ml/min/1.73m2    Calcium 8.8 8.5 - 10.1 MG/DL   MAGNESIUM    Collection Time: 05/16/18  4:21 AM   Result Value Ref Range    Magnesium 1.8 1.6 - 2.4 mg/dL   PHOSPHORUS    Collection Time: 05/16/18  4:21 AM   Result Value Ref Range    Phosphorus 2.6 2.6 - 4.7 MG/DL   GLUCOSE, POC    Collection Time: 05/16/18  5:16 AM   Result Value Ref Range    Glucose (POC) 109 (H) 65 - 100 mg/dL    Performed by Maya Bo           Assessment:     Principal Problem:    Subdural hematoma ([facility] (5/12/2018)        Plan:   1. Right traumatic SDH   - s/p crani 05/12   - Normalize and mobilize   - PT/OT/Speech   - SD drain pulled 05/14   - Suture placed in incision site where it was oozing  2. Brain compression   - due to #1   - plans as above  3. Alcohol abuse   - CIWA protocol with lower doses of Ativan. Still in withdrawal window. Would keep in ICU one more day to make sure he remains off of drips   - Intensivist/hospitalist following  4. Acute encephalopathy   - due to #1, 2, 3   - supportive care  5. HTN   - Restart Norvasc, Imdur, metoprolol from home   - Hold losartan/HCTZ for now   - SBP<160   - Hydralazine/labetalol PRN   - Hospitalist following  6. Obesity   - BMI 31.8   - Counseling as able  7. CAD   - s/p stent placement   - ASA/Plavix on hold  8. Dyslipidemia   - Cont lipitor from home   - Hospitalist following  9. Hypothyroidism   - cont levothyroxine from home   - Hospitalist following  10. BPH   - Cont Proscar and Flomax  11.  COPD   - Duo-nebs PRN   - Hospitalist following    Activity: up with assist  DVT ppx: SCDs  Dispo: tbd    Plan d/w Dr. Lester Gomez. Keep in ICU today due to alcohol withdrawal. Pt required Precedex drip overnight and may require it again later today. Hopefully can just control with Ativan and not need Precedex restarted.       Jerod Alexandre, NP

## 2018-05-16 NOTE — PROGRESS NOTES
Problem: Mobility Impaired (Adult and Pediatric)  Goal: *Acute Goals and Plan of Care (Insert Text)  Physical Therapy Goals  Initiated 5/14/2018  1. Patient will move from supine to sit and sit to supine , scoot up and down and roll side to side in bed with minimal assistance/contact guard assist within 7 day(s). 2.  Patient will transfer from bed to chair and chair to bed with minimal assistance/contact guard assist using the least restrictive device within 7 day(s). 3.  Patient will perform sit to stand with minimal assistance/contact guard assist within 7 day(s). 4.  Patient will ambulate with moderate assistance  for 20 feet with the least restrictive device within 7 day(s). 5.  Patient will improve Fan Balance score by 7 points within 7 days. physical Therapy TREATMENT  Patient: Armida Reilly (47 y.o. male)  Date: 5/16/2018  Diagnosis: SDH  Subdural hematoma (HCC)  subdurral hematoma, right frontal crani Subdural hematoma (HCC)  Procedure(s) (LRB):  CRANIOTOMY (Right) 4 Days Post-Op  Precautions: Fall, Bed Alarm  Chart, physical therapy assessment, plan of care and goals were reviewed. ASSESSMENT:  Patient cleared by RN for continued mobility with PT. Received asleep, however able to wake to PT voice and light tactile cues. Fairly drowsy during session requiring repeated instruction and cues. Overall required mod-max A x 2 for functional mobility to include bed mobility, EOB sitting and stand pivot transfer to chair. PT provided manual cue to patient sacrum to facilitate anterior pelvic shift for improved posture. Lateral lean improved. Seated in chair with all needs met. Patient will need rehab at d/c.   Progression toward goals:  [x]    Improving appropriately and progressing toward goals  []    Improving slowly and progressing toward goals  []    Not making progress toward goals and plan of care will be adjusted     PLAN:  Patient continues to benefit from skilled intervention to address the above impairments. Continue treatment per established plan of care. Discharge Recommendations:  Rehab  Further Equipment Recommendations for Discharge:  tbd     SUBJECTIVE:   Patient stated I have a headache.     OBJECTIVE DATA SUMMARY:   Critical Behavior:  Neurologic State: Alert  Orientation Level: Oriented to person, Oriented to place  Cognition: Decreased attention/concentration, Decreased command following  Safety/Judgement: Decreased awareness of environment, Decreased awareness of need for assistance, Decreased awareness of need for safety, Decreased insight into deficits  Functional Mobility Training:  Bed Mobility:     Supine to Sit: Maximum assistance;Assist x2     Scooting: Maximum assistance        Transfers:  Sit to Stand: Moderate assistance  Stand to Sit: Moderate assistance                             Balance:  Sitting: Impaired  Sitting - Static: Fair (occasional)  Sitting - Dynamic: Poor (constant support)  Standing: Impaired  Standing - Static: Fair;Constant support  Standing - Dynamic : Poor    Pain:  Pain Scale 1: Numeric (0 - 10)  Pain Intensity 1: 0              Activity Tolerance:   VSS  Please refer to the flowsheet for vital signs taken during this treatment.   After treatment:   []    Patient left in no apparent distress sitting up in chair  [x]    Patient left in no apparent distress in bed  [x]    Call bell left within reach  [x]    Nursing notified  [x]    Caregiver present  [x]    Bed alarm activated    COMMUNICATION/COLLABORATION:   The patients plan of care was discussed with: Occupational Therapist and Registered Nurse    Maegan Pete PT, DPT   Time Calculation: 27 mins

## 2018-05-17 LAB
ANION GAP SERPL CALC-SCNC: 11 MMOL/L (ref 5–15)
BASOPHILS # BLD: 0 K/UL (ref 0–0.1)
BASOPHILS NFR BLD: 0 % (ref 0–1)
BUN SERPL-MCNC: 10 MG/DL (ref 6–20)
BUN/CREAT SERPL: 12 (ref 12–20)
CALCIUM SERPL-MCNC: 9 MG/DL (ref 8.5–10.1)
CHLORIDE SERPL-SCNC: 108 MMOL/L (ref 97–108)
CO2 SERPL-SCNC: 22 MMOL/L (ref 21–32)
CREAT SERPL-MCNC: 0.81 MG/DL (ref 0.7–1.3)
DIFFERENTIAL METHOD BLD: ABNORMAL
EOSINOPHIL # BLD: 0.1 K/UL (ref 0–0.4)
EOSINOPHIL NFR BLD: 2 % (ref 0–7)
ERYTHROCYTE [DISTWIDTH] IN BLOOD BY AUTOMATED COUNT: 17.7 % (ref 11.5–14.5)
GLUCOSE BLD STRIP.AUTO-MCNC: 104 MG/DL (ref 65–100)
GLUCOSE BLD STRIP.AUTO-MCNC: 114 MG/DL (ref 65–100)
GLUCOSE BLD STRIP.AUTO-MCNC: 125 MG/DL (ref 65–100)
GLUCOSE BLD STRIP.AUTO-MCNC: 156 MG/DL (ref 65–100)
GLUCOSE SERPL-MCNC: 107 MG/DL (ref 65–100)
HCT VFR BLD AUTO: 28.6 % (ref 36.6–50.3)
HGB BLD-MCNC: 9 G/DL (ref 12.1–17)
IMM GRANULOCYTES # BLD: 0 K/UL (ref 0–0.04)
IMM GRANULOCYTES NFR BLD AUTO: 1 % (ref 0–0.5)
LYMPHOCYTES # BLD: 0.6 K/UL (ref 0.8–3.5)
LYMPHOCYTES NFR BLD: 9 % (ref 12–49)
MCH RBC QN AUTO: 26.2 PG (ref 26–34)
MCHC RBC AUTO-ENTMCNC: 31.5 G/DL (ref 30–36.5)
MCV RBC AUTO: 83.4 FL (ref 80–99)
MONOCYTES # BLD: 0.5 K/UL (ref 0–1)
MONOCYTES NFR BLD: 8 % (ref 5–13)
NEUTS SEG # BLD: 5.4 K/UL (ref 1.8–8)
NEUTS SEG NFR BLD: 81 % (ref 32–75)
NRBC # BLD: 0 K/UL (ref 0–0.01)
NRBC BLD-RTO: 0 PER 100 WBC
PLATELET # BLD AUTO: 154 K/UL (ref 150–400)
PMV BLD AUTO: 9.8 FL (ref 8.9–12.9)
POTASSIUM SERPL-SCNC: 3.6 MMOL/L (ref 3.5–5.1)
RBC # BLD AUTO: 3.43 M/UL (ref 4.1–5.7)
SERVICE CMNT-IMP: ABNORMAL
SODIUM SERPL-SCNC: 141 MMOL/L (ref 136–145)
WBC # BLD AUTO: 6.7 K/UL (ref 4.1–11.1)

## 2018-05-17 PROCEDURE — 36415 COLL VENOUS BLD VENIPUNCTURE: CPT | Performed by: INTERNAL MEDICINE

## 2018-05-17 PROCEDURE — 74011250637 HC RX REV CODE- 250/637: Performed by: NEUROLOGICAL SURGERY

## 2018-05-17 PROCEDURE — 77010033678 HC OXYGEN DAILY

## 2018-05-17 PROCEDURE — 97116 GAIT TRAINING THERAPY: CPT

## 2018-05-17 PROCEDURE — 74011636637 HC RX REV CODE- 636/637: Performed by: INTERNAL MEDICINE

## 2018-05-17 PROCEDURE — 74011000250 HC RX REV CODE- 250: Performed by: INTERNAL MEDICINE

## 2018-05-17 PROCEDURE — 82962 GLUCOSE BLOOD TEST: CPT

## 2018-05-17 PROCEDURE — 97530 THERAPEUTIC ACTIVITIES: CPT

## 2018-05-17 PROCEDURE — 80048 BASIC METABOLIC PNL TOTAL CA: CPT | Performed by: INTERNAL MEDICINE

## 2018-05-17 PROCEDURE — 74011250637 HC RX REV CODE- 250/637: Performed by: INTERNAL MEDICINE

## 2018-05-17 PROCEDURE — 97535 SELF CARE MNGMENT TRAINING: CPT

## 2018-05-17 PROCEDURE — 92526 ORAL FUNCTION THERAPY: CPT | Performed by: SPEECH-LANGUAGE PATHOLOGIST

## 2018-05-17 PROCEDURE — 74011000250 HC RX REV CODE- 250: Performed by: NEUROLOGICAL SURGERY

## 2018-05-17 PROCEDURE — 85025 COMPLETE CBC W/AUTO DIFF WBC: CPT | Performed by: INTERNAL MEDICINE

## 2018-05-17 PROCEDURE — 74011250637 HC RX REV CODE- 250/637: Performed by: NURSE PRACTITIONER

## 2018-05-17 PROCEDURE — 65660000000 HC RM CCU STEPDOWN

## 2018-05-17 PROCEDURE — 94640 AIRWAY INHALATION TREATMENT: CPT

## 2018-05-17 PROCEDURE — 74011250636 HC RX REV CODE- 250/636: Performed by: NEUROLOGICAL SURGERY

## 2018-05-17 RX ORDER — LOSARTAN POTASSIUM 50 MG/1
100 TABLET ORAL DAILY
Status: DISCONTINUED | OUTPATIENT
Start: 2018-05-17 | End: 2018-05-20 | Stop reason: HOSPADM

## 2018-05-17 RX ORDER — AMLODIPINE BESYLATE 5 MG/1
10 TABLET ORAL DAILY
Status: DISCONTINUED | OUTPATIENT
Start: 2018-05-18 | End: 2018-05-20 | Stop reason: HOSPADM

## 2018-05-17 RX ORDER — SODIUM CHLORIDE 0.9 % (FLUSH) 0.9 %
10 SYRINGE (ML) INJECTION AS NEEDED
Status: DISCONTINUED | OUTPATIENT
Start: 2018-05-17 | End: 2018-05-20 | Stop reason: HOSPADM

## 2018-05-17 RX ORDER — LANOLIN ALCOHOL/MO/W.PET/CERES
100 CREAM (GRAM) TOPICAL DAILY
Status: DISCONTINUED | OUTPATIENT
Start: 2018-05-18 | End: 2018-05-17 | Stop reason: SDUPTHER

## 2018-05-17 RX ADMIN — LEVETIRACETAM 500 MG: 500 TABLET ORAL at 08:35

## 2018-05-17 RX ADMIN — ARFORMOTEROL TARTRATE 15 MCG: 15 SOLUTION RESPIRATORY (INHALATION) at 20:50

## 2018-05-17 RX ADMIN — Medication 10 ML: at 17:11

## 2018-05-17 RX ADMIN — THERA TABS 1 TABLET: TAB at 08:35

## 2018-05-17 RX ADMIN — AMLODIPINE BESYLATE 5 MG: 5 TABLET ORAL at 08:35

## 2018-05-17 RX ADMIN — LEVETIRACETAM 500 MG: 500 TABLET ORAL at 20:24

## 2018-05-17 RX ADMIN — ARFORMOTEROL TARTRATE 15 MCG: 15 SOLUTION RESPIRATORY (INHALATION) at 09:55

## 2018-05-17 RX ADMIN — LEVOTHYROXINE SODIUM 88 MCG: 88 TABLET ORAL at 06:30

## 2018-05-17 RX ADMIN — SODIUM CHLORIDE 5 MG/HR: 900 INJECTION, SOLUTION INTRAVENOUS at 03:20

## 2018-05-17 RX ADMIN — HYDROMORPHONE HYDROCHLORIDE 2 MG: 2 TABLET ORAL at 01:22

## 2018-05-17 RX ADMIN — HYDROMORPHONE HYDROCHLORIDE 2 MG: 2 TABLET ORAL at 20:00

## 2018-05-17 RX ADMIN — Medication 100 MG: at 08:35

## 2018-05-17 RX ADMIN — QUETIAPINE FUMARATE 25 MG: 25 TABLET ORAL at 20:26

## 2018-05-17 RX ADMIN — METOPROLOL SUCCINATE 25 MG: 25 TABLET, EXTENDED RELEASE ORAL at 08:35

## 2018-05-17 RX ADMIN — ATORVASTATIN CALCIUM 40 MG: 40 TABLET, FILM COATED ORAL at 20:26

## 2018-05-17 RX ADMIN — PANTOPRAZOLE SODIUM 40 MG: 40 TABLET, DELAYED RELEASE ORAL at 17:11

## 2018-05-17 RX ADMIN — BUDESONIDE 500 MCG: 0.5 INHALANT RESPIRATORY (INHALATION) at 20:51

## 2018-05-17 RX ADMIN — TAMSULOSIN HYDROCHLORIDE 0.4 MG: 0.4 CAPSULE ORAL at 08:35

## 2018-05-17 RX ADMIN — INSULIN LISPRO 2 UNITS: 100 INJECTION, SOLUTION INTRAVENOUS; SUBCUTANEOUS at 11:46

## 2018-05-17 RX ADMIN — Medication 10 ML: at 05:16

## 2018-05-17 RX ADMIN — BUDESONIDE 500 MCG: 0.5 INHALANT RESPIRATORY (INHALATION) at 09:55

## 2018-05-17 RX ADMIN — FOLIC ACID 1 MG: 1 TABLET ORAL at 08:35

## 2018-05-17 RX ADMIN — FINASTERIDE 5 MG: 5 TABLET, FILM COATED ORAL at 08:36

## 2018-05-17 RX ADMIN — LOSARTAN POTASSIUM 100 MG: 50 TABLET, FILM COATED ORAL at 12:55

## 2018-05-17 RX ADMIN — ISOSORBIDE MONONITRATE 30 MG: 30 TABLET, EXTENDED RELEASE ORAL at 08:35

## 2018-05-17 RX ADMIN — Medication 5 ML: at 05:15

## 2018-05-17 RX ADMIN — Medication 10 ML: at 22:00

## 2018-05-17 RX ADMIN — HYDROMORPHONE HYDROCHLORIDE 2 MG: 2 TABLET ORAL at 14:41

## 2018-05-17 NOTE — PROGRESS NOTES
0730 Bedside and Verbal shift change report given to Arnold Copeland RN (oncoming nurse) by Marleny Tillman RN (offgoing nurse).  Report included the following information SBAR, Kardex, ED Summary, OR Summary, Procedure Summary, Intake/Output, MAR, Recent Results and Cardiac Rhythm SR.

## 2018-05-17 NOTE — PROGRESS NOTES
Problem: Mobility Impaired (Adult and Pediatric)  Goal: *Acute Goals and Plan of Care (Insert Text)  Physical Therapy Goals  Initiated 5/14/2018  1. Patient will move from supine to sit and sit to supine , scoot up and down and roll side to side in bed with minimal assistance/contact guard assist within 7 day(s). 2.  Patient will transfer from bed to chair and chair to bed with minimal assistance/contact guard assist using the least restrictive device within 7 day(s). 3.  Patient will perform sit to stand with minimal assistance/contact guard assist within 7 day(s). 4.  Patient will ambulate with moderate assistance  for 20 feet with the least restrictive device within 7 day(s). 5.  Patient will improve Fan Balance score by 7 points within 7 days. physical Therapy TREATMENT  Patient: Dilip Pereira (77 y.o. male)  Date: 5/17/2018  Diagnosis: SDH  Subdural hematoma (HCC)  subdurral hematoma, right frontal crani Subdural hematoma (HCC)  Procedure(s) (LRB):  CRANIOTOMY (Right) 5 Days Post-Op  Precautions: Fall, Bed Alarm  Chart, physical therapy assessment, plan of care and goals were reviewed. ASSESSMENT:  Patient progressing slowly with activity but with improved command following today and able to ambulate a short distance. Moderate assistance required to safely manage RW and weight shift left to advance RLE. Patient strongly fatigued and somewhat VERONICA once seated in chair. Anticipate discharge to a rehab setting when medically stable. Progression toward goals:  [x]    Improving appropriately and progressing toward goals  []    Improving slowly and progressing toward goals  []    Not making progress toward goals and plan of care will be adjusted     PLAN:  Patient continues to benefit from skilled intervention to address the above impairments. Continue treatment per established plan of care.   Discharge Recommendations:  Rehab and To Be Determined  Further Equipment Recommendations for Discharge: to be determined       SUBJECTIVE:   Patient stated I can try.     OBJECTIVE DATA SUMMARY:   Critical Behavior:  Neurologic State: Alert  Orientation Level: Oriented X4  Cognition: Follows commands  Safety/Judgement: Fall prevention, Decreased insight into deficits, Decreased awareness of environment  Functional Mobility Training:  Bed Mobility:  Rolling: Moderate assistance;Assist x1;Assist x2; Additional time  Supine to Sit: Moderate assistance;Assist x1;Additional time     Scooting: Minimum assistance;Assist x1;Additional time; Adaptive equipment (Bed rails)        Transfers:  Sit to Stand: Minimum assistance;Assist x2; Additional time; Adaptive equipment (RW)  Stand to Sit: Minimum assistance;Assist x2; Additional time; Adaptive equipment (RW)                             Balance:  Sitting: Impaired  Sitting - Static: Good (unsupported)  Sitting - Dynamic: Fair (occasional)  Standing: Impaired; With support  Standing - Static: Fair  Standing - Dynamic : Fair  Ambulation/Gait Training:  Distance (ft): 15 Feet (ft)  Assistive Device: Gait belt;Walker, rolling  Ambulation - Level of Assistance: Moderate assistance        Gait Abnormalities: Decreased step clearance;Shuffling gait (difficulty advancing RLE)        Base of Support: Widened     Speed/Bria: Shuffled; Slow  Step Length: Right shortened                    Pain:  Pain Scale 1: Numeric (0 - 10)  Pain Intensity 1: 0              Activity Tolerance:   HR to 115 BPM max  Please refer to the flowsheet for vital signs taken during this treatment.   After treatment:   [x]    Patient left in no apparent distress sitting up in chair  []    Patient left in no apparent distress in bed  [x]    Call bell left within reach  [x]    Nursing notified  []    Caregiver present  [x]    Bed alarm activated    COMMUNICATION/COLLABORATION:   The patients plan of care was discussed with: Registered Nurse    Aditya Nina PT, DPT   Time Calculation: 19 mins

## 2018-05-17 NOTE — PROGRESS NOTES
Problem: Falls - Risk of  Goal: *Absence of Falls  Document Hakan Fall Risk and appropriate interventions in the flowsheet.    Outcome: Progressing Towards Goal  Fall Risk Interventions:  Mobility Interventions: Assess mobility with egress test, Bed/chair exit alarm, Communicate number of staff needed for ambulation/transfer, OT consult for ADLs, Patient to call before getting OOB, PT Consult for mobility concerns, PT Consult for assist device competence, Strengthening exercises (ROM-active/passive), Utilize walker, cane, or other assitive device, Utilize gait belt for transfers/ambulation    Mentation Interventions: Adequate sleep, hydration, pain control, Bed/chair exit alarm, Door open when patient unattended, Gait belt with transfers/ambulation, Increase mobility, More frequent rounding, Room close to nurse's station, Reorient patient, Toileting rounds, Update white board    Medication Interventions: Bed/chair exit alarm, Patient to call before getting OOB, Utilize gait belt for transfers/ambulation, Teach patient to arise slowly    Elimination Interventions: Bed/chair exit alarm, Call light in reach, Elevated toilet seat, Patient to call for help with toileting needs, Toilet paper/wipes in reach, Toileting schedule/hourly rounds    History of Falls Interventions: Bed/chair exit alarm, Consult care management for discharge planning, Door open when patient unattended, Investigate reason for fall, Room close to nurse's station, Utilize gait belt for transfer/ambulation

## 2018-05-17 NOTE — PROGRESS NOTES
TRANSFER - OUT REPORT:    Verbal report given to Serena 53 (name) on Dilip Pereira  being transferred to [facility](unit) for routine progression of care       Report consisted of patients Situation, Background, Assessment and   Recommendations(SBAR). Information from the following report(s) SBAR, Kardex, ED Summary, OR Summary, Procedure Summary, Intake/Output, MAR, Recent Results and Cardiac Rhythm SR was reviewed with the receiving nurse. Lines:   Peripheral IV 05/15/18 Left; Lower Forearm (Active)   Site Assessment Clean, dry, & intact 5/17/2018  8:00 AM   Phlebitis Assessment 0 5/17/2018  8:00 AM   Infiltration Assessment 0 5/17/2018  8:00 AM   Dressing Status Clean, dry, & intact 5/17/2018  8:00 AM   Dressing Type Transparent;Tape 5/17/2018  8:00 AM   Hub Color/Line Status Blue; Infusing 5/17/2018  8:00 AM   Action Taken Open ports on tubing capped 5/17/2018  8:00 AM   Alcohol Cap Used Yes 5/17/2018  8:00 AM        Opportunity for questions and clarification was provided.       Patient transported with:   Monitor  O2 @ 2 liters  Registered Nurse  [facility]

## 2018-05-17 NOTE — PROGRESS NOTES
TRANSFER - IN REPORT:    Verbal report received from Siddharth Hathaway RN(name) on Jose A Chew  being received from ICU(unit) for routine progression of care      Report consisted of patients Situation, Background, Assessment and   Recommendations(SBAR). Information from the following report(s) SBAR, Kardex, Procedure Summary, Intake/Output, MAR, Accordion and Cardiac Rhythm NSR BBB was reviewed with the receiving nurse. Opportunity for questions and clarification was provided. Assessment completed upon patients arrival to unit and care assumed.

## 2018-05-17 NOTE — PROGRESS NOTES
Hospitalist Progress Note  John Crow MD  Answering service: [phone] from in house phone         Date of Service:    NAME:  Armida Reilly  :  1935  MRN:  [id number]    Admission Summary: This is an 41-year-old man with a past medical history significant for coronary artery disease status post stent placement, dyslipidemia, hypertension, CVA, COPD, peripheral vascular disease, hypothyroidism, benign prostatic hyperplasia, who was in his usual state of health until the day of presentation at the emergency room when it was reported that the patient sustained a ground level fall.  According to report, the patient was found naked on the floor with a bottle of bourbon.  He stated that his leg gave out on him and he fell as a result.  Following the fall, the patient started complaining of back pain.  The back pain is located at the lower back.  The pain is constant with no radiation, 7/10 in severity, worse with movement of the back.  No known relieving factors.  Patient was taken to [facility] for further evaluation.  When the patient arrived at the emergency room, CT scan of the head was obtained.  The CT scan shows a subdural hematoma.  The emergency room physician consulted a neurosurgeon at [facility] who advised a transfer to [address] [place] [address] service was asked to accept the patient as a direct admission to the hospitalist service.  The patient was last admitted to this hospital from 2018 to 2018. Warren Sen was admitted and treated for thromboembolism.  The patient has no fever, no rigors, no chills.     Interval history / Subjective:     2018   Verbal follows well, no c/o pain.   Non focal volitional . Pleasant,  For PT /placement soon       Assessment & Plan:      ·  Subacute right frontoparietal subdural hematoma  Right frontoparietal craniotomy with evacuation of subdural hematoma and placement of subdural drain. , seems to be progression as attempting to follow. And 5/16/2018 is following, equal motor upper ext /pupils equal  5/17/2018 non focal and fully volitional in motor  · Elevated troponin, possible NSTMI , treatilng the main problem, acute SDH as only option at this time. Bb added. No asa no heparin products. No anticoagulation option, pt now alert w/o cp   ·  Alcoholism: On scheduled Ativan. And goodie bag, now po thiamin  ·  Coronary artery disease status post stent placement. Plavix and aspirin on hold because of the subdural hematoma. ·  Hypothyroidism.  We will continue with Synthroid. ·  Anemia : Monitor CBC. ·  COPD, unknown severity, not bronchospastic  ·  History of hypothyroidism  ·  History of prostate cancer     Code status: full  DVT prophylaxis: SCD     Care Plan discussed with: Patient/Family  Disposition: Home w/Family and TBD           Hospital Problems  Date Reviewed: 5/12/2018          Codes Class Noted POA    * (Principal)Subdural hematoma ([facility] [address] ICD-10-CM: I62.00  ICD-9-CM: 432.1  5/12/2018 Yes                Review of Systems:   Review of systems not obtained due to patient factors. Vital Signs:    Last 24hrs VS reviewed since prior progress note. Most recent are:  Visit Vitals    BP (!) 159/91 (BP 1 Location: Right arm, BP Patient Position: At rest)    Pulse 87    Temp 97.8 °F (36.6 °C)    Resp 22    Wt 81.9 kg (180 lb 8.9 oz)    SpO2 95%    BMI 31.98 kg/m2         Intake/Output Summary (Last 24 hours) at 05/17/18 1510  Last data filed at 05/17/18 0900   Gross per 24 hour   Intake             1210 ml   Output             1525 ml   Net             -315 ml        Physical Examination:           Same exam but  More alert and now moves all ext volitionally ,symetrically   Constitutional:  ongoing distress  acute distress,     ENT:  Oral mucous moist, oropharynx benign. Neck supple,    Resp:  CTA bilaterally.  No wheezing/rhonchi/rales. No accessory muscle use   CV:  Regular rhythm, normal rate, no murmurs, gallops, rubs    GI:  Soft, non distended, non tender. normoactive bowel sounds, no hepatosplenomegaly     Musculoskeletal:  No edema, warm, 2+ pulses throughout    neuro; as per above. Data Review:    Review and/or order of clinical lab test      Labs:     Recent Labs      05/17/18 0320 05/16/18   0421   WBC  6.7  5.9   HGB  9.0*  9.5*   HCT  28.6*  30.3*   PLT  154  132*     Recent Labs      05/17/18 0320 05/16/18   0421  05/15/18   0331   NA  141  145  147*   K  3.6  3.6  3.8   CL  108  111*  115*   CO2  22  24  24   BUN  10  9  14   CREA  0.81  0.75  0.88   GLU  107*  113*  116*   CA  9.0  8.8  8.3*   MG   --   1.8   --    PHOS   --   2.6   --      No results for input(s): SGOT, GPT, ALT, AP, TBIL, TBILI, TP, ALB, GLOB, GGT, AML, LPSE in the last 72 hours. No lab exists for component: AMYP, HLPSE  No results for input(s): INR, PTP, APTT in the last 72 hours. No lab exists for component: INREXT, INREXT   No results for input(s): FE, TIBC, PSAT, FERR in the last 72 hours. No results found for: FOL, RBCF   No results for input(s): PH, PCO2, PO2 in the last 72 hours. No results for input(s): CPK, CKNDX, TROIQ in the last 72 hours.     No lab exists for component: CPKMB  Lab Results   Component Value Date/Time    Cholesterol, total 144 05/12/2018 08:27 AM    HDL Cholesterol 78 05/12/2018 08:27 AM    LDL, calculated 53.6 05/12/2018 08:27 AM    Triglyceride 62 05/12/2018 08:27 AM    CHOL/HDL Ratio 1.8 05/12/2018 08:27 AM     Lab Results   Component Value Date/Time    Glucose (POC) 156 (H) 05/17/2018 11:42 AM    Glucose (POC) 104 (H) 05/17/2018 06:24 AM    Glucose (POC) 116 (H) 05/16/2018 09:08 PM    Glucose (POC) 108 (H) 05/16/2018 05:45 PM    Glucose (POC) 106 (H) 05/16/2018 11:56 AM     Lab Results   Component Value Date/Time    Color YELLOW/STRAW 03/16/2018 02:45 PM    Appearance CLEAR 03/16/2018 02:45 PM    Specific gravity 1.014 03/16/2018 02:45 PM    pH (UA) 5.5 03/16/2018 02:45 PM    Protein NEGATIVE  03/16/2018 02:45 PM    Glucose NEGATIVE  03/16/2018 02:45 PM    Ketone NEGATIVE  03/16/2018 02:45 PM    Bilirubin NEGATIVE  03/16/2018 02:45 PM    Urobilinogen 1.0 03/16/2018 02:45 PM    Nitrites NEGATIVE  03/16/2018 02:45 PM    Leukocyte Esterase NEGATIVE  03/16/2018 02:45 PM    Epithelial cells FEW 03/16/2018 02:45 PM    Bacteria 1+ (A) 03/16/2018 02:45 PM    WBC 5-10 03/16/2018 02:45 PM    RBC 0-5 03/16/2018 02:45 PM         Medications Reviewed:     Current Facility-Administered Medications   Medication Dose Route Frequency    [START ON 5/18/2018] amLODIPine (NORVASC) tablet 10 mg  10 mg Oral DAILY    losartan (COZAAR) tablet 100 mg  100 mg Oral DAILY    [START ON 5/18/2018] thiamine (B-1) tablet 100 mg  100 mg Oral DAILY    therapeutic multivitamin (THERAGRAN) tablet 1 Tab  1 Tab Oral DAILY    thiamine (B-1) tablet 100 mg  100 mg Oral DAILY    folic acid (FOLVITE) tablet 1 mg  1 mg Oral DAILY    QUEtiapine (SEROquel) tablet 25 mg  25 mg Oral QHS    pantoprazole (PROTONIX) tablet 40 mg  40 mg Oral ACD    insulin lispro (HUMALOG) injection   SubCUTAneous AC&HS    LORazepam (ATIVAN) tablet 1 mg  1 mg Oral Q1H PRN    LORazepam (ATIVAN) tablet 2 mg  2 mg Oral Q1H PRN    LORazepam (ATIVAN) injection 1 mg  1 mg IntraVENous Q1H PRN    LORazepam (ATIVAN) injection 2 mg  2 mg IntraVENous Q1H PRN    hydrALAZINE (APRESOLINE) 20 mg/mL injection 10 mg  10 mg IntraVENous Q4H PRN    labetalol (NORMODYNE;TRANDATE) injection 10 mg  10 mg IntraVENous Q4H PRN    fentaNYL citrate (PF) injection 25 mcg  25 mcg IntraVENous Q2H PRN    levETIRAcetam (KEPPRA) tablet 500 mg  500 mg Oral Q12H    isosorbide mononitrate ER (IMDUR) tablet 30 mg  30 mg Oral DAILY    metoprolol succinate (TOPROL-XL) XL tablet 25 mg  25 mg Oral DAILY    glucose chewable tablet 16 g  4 Tab Oral PRN    dextrose (D50W) injection syrg 12.5-25 g  12.5-25 g IntraVENous PRN    glucagon (GLUCAGEN) injection 1 mg  1 mg IntraMUSCular PRN    arformoterol (BROVANA) neb solution 15 mcg  15 mcg Nebulization BID RT    And    budesonide (PULMICORT) 500 mcg/2 ml nebulizer suspension  500 mcg Nebulization BID RT    SALINE PERIPHERAL FLUSH Q8H soln 5 mL  5 mL InterCATHeter Q8H    saline peripheral flush soln 10 mL  10 mL InterCATHeter PRN    acetaminophen (TYLENOL) tablet 650 mg  650 mg Oral Q4H PRN    albuterol-ipratropium (DUO-NEB) 2.5 MG-0.5 MG/3 ML  3 mL Nebulization Q6H PRN    finasteride (PROSCAR) tablet 5 mg  5 mg Oral DAILY    levothyroxine (SYNTHROID) tablet 88 mcg  88 mcg Oral ACB    atorvastatin (LIPITOR) tablet 40 mg  40 mg Oral QHS    tamsulosin (FLOMAX) capsule 0.4 mg  0.4 mg Oral DAILY    sodium chloride (NS) flush 5-10 mL  5-10 mL IntraVENous Q8H    ondansetron (ZOFRAN) injection 4 mg  4 mg IntraVENous Q4H PRN    HYDROmorphone (DILAUDID) tablet 2 mg  2 mg Oral Q4H PRN    LORazepam (ATIVAN) injection 1 mg  1 mg IntraVENous Q4H PRN     ______________________________________________________________________  EXPECTED LENGTH OF STAY: 7d 0h  ACTUAL LENGTH OF STAY:          5                 Cira Nieves MD

## 2018-05-17 NOTE — ROUTINE PROCESS
1930. Bedside, Verbal and Written shift change report given to Claire Colunga RN (oncoming nurse) by Dulce Cole RN  (offgoing nurse). Report included the following information SBAR, Kardex, ED Summary, OR Summary, Procedure Summary, Intake/Output, MAR, Accordion, Recent Results, Med Rec Status, Cardiac Rhythm NSR/BBB and Alarm Parameters .       0730. Bedside, Verbal and Written shift change report given to Dulce Cole RN (oncoming nurse) by Claire Colunga RN (offgoing nurse). Report included the following information SBAR, Kardex, ED Summary, OR Summary, Procedure Summary, Intake/Output, MAR, Accordion, Recent Results, Med Rec Status and Cardiac Rhythm NSR/BBB  .

## 2018-05-17 NOTE — PROGRESS NOTES
Problem: Self Care Deficits Care Plan (Adult)  Goal: *Acute Goals and Plan of Care (Insert Text)  Occupational Therapy Goals  Initiated 5/14/2018    1. Patient will follow 1 step simple command during ADL with 100% consistency within 7 days. 2.  Patient will perform grooming seated EOB with Deuce within 7 days. 3.  Patient will perform toilet transfer to [facility] with modA within 7 days. 4.  Patient will perform anterior UB bathing seated in chair with modA within 7 days. 5.  Patient will participate in UE therapeutic exercise/activities with Deuce for 5 minutes within 7 days. Occupational Therapy TREATMENT  Patient: Yu Jacobo (83 y.o. male)  Date: 5/17/2018  Diagnosis: SDH  Subdural hematoma (HCC)  subdurral hematoma, right frontal crani Subdural hematoma (HCC)  Procedure(s) (LRB):  CRANIOTOMY (Right) 5 Days Post-Op  Precautions: Fall, Bed Alarm  Chart, occupational therapy assessment, plan of care, and goals were reviewed. ASSESSMENT:  Patient received supine in bed, cleared for therapy by nursing. Patient is now POD 3 from craniotomy. Patient agreeable to OOB activity, limited by hearing impairments but overall good command following and attention. Patient with improved mobility today, overall Min A x2 with RW, assist to safely guide AD, and verbal cues for pathway. Patient sitting in chair, HR max 115bpm immediately after ambulation, O2 sats WNL on 2LO2 NC, BP 130s/80s. Patient completing simple grooming tasks in chair, limited by bilateral hand weakness, dysmetria, and impaired coordination. Patient with improved activity tolerance for mobility and ADLs today. Was living at home alone, Mod I with RW, and driving at baseline. Would benefit from d/c to inpatient rehab when medically stable; working towards tolerating 3 hours/day of therapy.    Progression toward goals:  []       Improving appropriately and progressing toward goals  [x]       Improving slowly and progressing toward goals  []       Not making progress toward goals and plan of care will be adjusted     PLAN:  Patient continues to benefit from skilled intervention to address the above impairments. Continue treatment per established plan of care. Discharge Recommendations:  Inpatient Rehab  Further Equipment Recommendations for Discharge:  TBD     SUBJECTIVE:   Patient stated Oh okay.     OBJECTIVE DATA SUMMARY:   Cognitive/Behavioral Status:  Neurologic State: Alert  Orientation Level: Oriented X4  Cognition: Appropriate for age attention/concentration; Follows commands  Perception: Appears intact  Perseveration: No perseveration noted  Safety/Judgement: Fall prevention;Decreased insight into deficits; Decreased awareness of environment    Functional Mobility and Transfers for ADLs:  Bed Mobility:  Supine to Sit: Moderate assistance;Assist x1;Additional time  Scooting: Minimum assistance;Assist x1;Additional time; Adaptive equipment (Bed rails)    Transfers:  Sit to Stand: Minimum assistance;Assist x2; Additional time; Adaptive equipment (RW)          Balance:  Sitting: Impaired  Sitting - Static: Good (unsupported)  Sitting - Dynamic: Fair (occasional)  Standing: Impaired; With support  Standing - Static: Fair  Standing - Dynamic : Fair    ADL Intervention:  Grooming  Brushing Teeth: Maximum assistance  Shaving: Minimum assistance (** Applying deodorant)  Cues: Physical assistance;Verbal cues provided  Adaptive Equipment: Adaptive toothbrush (Built up )    Cognitive Retraining  Safety/Judgement: Fall prevention;Decreased insight into deficits; Decreased awareness of environment    Neuro Re-Education:   - Completed simple fine motor grooming, requiring built up  for tools/utensils.    - Verbal cuing for correct use of tools  - Requiring increased time, poor strength and coordination needed for full completion of brushing teeth    Therapeutic Exercises:   - Patient tolerating sitting EOB for approximately 5 minutes, able to maintain sitting balance with UE assessment and no prop support  - Patient ambulating almost to doorway and back with RW. Difficulty lifting RLE. Pushing strongly through RW. Pain:  Pain Scale 1: Numeric (0 - 10)  Pain Intensity 1: 0    Activity Tolerance:   Fair. VSS. Max HR 115bpm immediately after session. Please refer to the flowsheet for vital signs taken during this treatment.   After treatment:   [x] Patient left in no apparent distress sitting up in chair  [] Patient left in no apparent distress in bed  [x] Call bell left within reach  [x] Nursing notified  [] Caregiver present  [x] Chair alarm activated    COMMUNICATION/COLLABORATION:   The patients plan of care was discussed with: Physical Therapist and Registered Nurse    Bjorn Aguilera OT  Time Calculation: 27 mins

## 2018-05-17 NOTE — PROGRESS NOTES
Neurosurgery Progress Note  Janie Medeiros, ACNP-BC  [phone]        Admit Date: 2018   LOS: 5 days        Daily Progress Note: 2018    POD: 5 Days Post-Op    S/P: Procedure(s):  CRANIOTOMY    HPI: The patient sustained a ground level fall and was found naked on the floor with a bottle of bourbon after his leg gave out. He also had lower back pain. He presented to the ER at [facility] where a head CT revealed a right SDH. He transferred to [facility] and was taken to the OR for evacuation of the SDH. He has a history of alcoholism. His alcohol level was 30 mg/DL    Subjective: Only received 1 mg of Ativan yesterday am at 0800. Had a better night. Did require a Cardene drip overnight. Talked with hospitalist about restarting some of his home BP meds that have been held. Objective:     Vital signs  Temp (24hrs), Av.1 °F (36.7 °C), Min:97.6 °F (36.4 °C), Max:98.6 °F (37 °C)   701 - 1900  In: 240 [P.O.:240]  Out: 150 [Urine:150]  05/15 1901 -  0700  In: 2066.7 [P.O.:1060; I.V.:1006.7]  Out: 6145 [Urine:3925]    Visit Vitals    /87    Pulse (!) 114    Temp 97.7 °F (36.5 °C)    Resp 30    Wt 81.9 kg (180 lb 8.9 oz)    SpO2 96%    BMI 31.98 kg/m2    O2 Flow Rate (L/min): 3 l/min O2 Device: Nasal cannula     Pain control  Pain Assessment  Pain Scale 1: Numeric (0 - 10)  Pain Intensity 1: 0  Pain Onset 1: post op   Pain Location 1: Head  Pain Orientation 1: Right  Pain Description 1: Aching  Pain Intervention(s) 1: Medication (see MAR)    PT/OT  Gait                 Physical Exam:  Gen:NAD. Neuro: A&Ox3. Intermittently follows commands. Speech dysarthric. Affect flat. PERRL. EOMI. Face symmetric. Tongue midline. SCHNEIDER spontaneously  Gait deferred. Skin: Right frontoparietal incision with staples in place. No erythema or drainage. Incision is boggy though on anterior end.     CT head without contrast on 18 shows status post right parietal craniotomy with subdural drain in the right extra-axial collection which is unchanged in size. Right temporal parenchymal hemorrhage minimally increased with bilateral parietal subarachnoid hemorrhage unchanged. 24 hour results:    Recent Results (from the past 24 hour(s))   GLUCOSE, POC    Collection Time: 05/16/18 11:56 AM   Result Value Ref Range    Glucose (POC) 106 (H) 65 - 100 mg/dL    Performed by [employer], POC    Collection Time: 05/16/18  5:45 PM   Result Value Ref Range    Glucose (POC) 108 (H) 65 - 100 mg/dL    Performed by [employer], POC    Collection Time: 05/16/18  9:08 PM   Result Value Ref Range    Glucose (POC) 116 (H) 65 - 100 mg/dL    Performed by Donato Quiroz    METABOLIC PANEL, BASIC    Collection Time: 05/17/18  3:20 AM   Result Value Ref Range    Sodium 141 136 - 145 mmol/L    Potassium 3.6 3.5 - 5.1 mmol/L    Chloride 108 97 - 108 mmol/L    CO2 22 21 - 32 mmol/L    Anion gap 11 5 - 15 mmol/L    Glucose 107 (H) 65 - 100 mg/dL    BUN 10 6 - 20 MG/DL    Creatinine 0.81 0.70 - 1.30 MG/DL    BUN/Creatinine ratio 12 12 - 20      GFR est AA >60 >60 ml/min/1.73m2    GFR est non-AA >60 >60 ml/min/1.73m2    Calcium 9.0 8.5 - 10.1 MG/DL   CBC WITH AUTOMATED DIFF    Collection Time: 05/17/18  3:20 AM   Result Value Ref Range    WBC 6.7 4.1 - 11.1 K/uL    RBC 3.43 (L) 4.10 - 5.70 M/uL    HGB 9.0 (L) 12.1 - 17.0 g/dL    HCT 28.6 (L) 36.6 - 50.3 %    MCV 83.4 80.0 - 99.0 FL    MCH 26.2 26.0 - 34.0 PG    MCHC 31.5 30.0 - 36.5 g/dL    RDW 17.7 (H) 11.5 - 14.5 %    PLATELET 786 338 - 062 K/uL    MPV 9.8 8.9 - 12.9 FL    NRBC 0.0 0  WBC    ABSOLUTE NRBC 0.00 0.00 - 0.01 K/uL    NEUTROPHILS 81 (H) 32 - 75 %    LYMPHOCYTES 9 (L) 12 - 49 %    MONOCYTES 8 5 - 13 %    EOSINOPHILS 2 0 - 7 %    BASOPHILS 0 0 - 1 %    IMMATURE GRANULOCYTES 1 (H) 0.0 - 0.5 %    ABS. NEUTROPHILS 5.4 1.8 - 8.0 K/UL    ABS. LYMPHOCYTES 0.6 (L) 0.8 - 3.5 K/UL    ABS. MONOCYTES 0.5 0.0 - 1.0 K/UL    ABS.  EOSINOPHILS 0.1 0.0 - 0.4 K/UL    ABS. BASOPHILS 0.0 0.0 - 0.1 K/UL    ABS. IMM. GRANS. 0.0 0.00 - 0.04 K/UL    DF AUTOMATED     GLUCOSE, POC    Collection Time: 05/17/18  6:24 AM   Result Value Ref Range    Glucose (POC) 104 (H) 65 - 100 mg/dL    Performed by William Paz           Assessment:     Principal Problem:    Subdural hematoma ([address] (5/12/2018)        Plan:   1. Right traumatic SDH   - s/p crani 05/12   - Normalize and mobilize   - PT/OT/Speech   - SD drain pulled 05/14   - Suture placed in incision site where it was oozing   - ok to transfer out of ICU if off Cardene drip and bed close to nurse's station is available  2. Brain compression   - due to #1   - plans as above  3. Alcohol abuse   - CIWA protocol with lower doses of Ativan. Still in withdrawal window. - Getting out of window   - Intensivist/hospitalist following  4. Acute encephalopathy   - due to #1, 2, 3   - supportive care  5. HTN   - Restart Norvasc, Imdur, metoprolol from home   - SBP<160   - Hydralazine/labetalol PRN   - Hospitalist following  6. Obesity   - BMI 31.8   - Counseling as able  7. CAD   - s/p stent placement   - ASA/Plavix on hold  8. Dyslipidemia   - Cont lipitor from home   - Hospitalist following  9. Hypothyroidism   - cont levothyroxine from home   - Hospitalist following  10. BPH   - Cont Proscar and Flomax  11. COPD   - Duo-nebs PRN   - Hospitalist following    Activity: up with assist  DVT ppx: SCDs  Dispo: tbd    Plan d/w Dr. Eben Diehl. Ok to transfer out of ICU if bed close to nurse's station available.  Spoke with hospitalist.      Sandhya Rollins NP

## 2018-05-17 NOTE — PROGRESS NOTES
2000. Assumed care of pt; oriented x4; cooperative and appropriate safety awareness    2010. 25 mcg Fentanyl given for headache 8/10    2015. SBP>160; PRN labetalol given IVP    2100. SBP>170;  PRN Labetalol IVP given    2300. ; too soon to give any more PRN antihypertensive meds; Cardene restarted and titrated to keep SBP<160 as ordered; no neuro changes    0120. Pt c/o right sided head pain; PRN PO Dilaudid given     0400. Pt resting; he has periods where he attempts to sit up and get OOB but is easily redirectable    0730.  Report given to oncoming RN

## 2018-05-17 NOTE — PROGRESS NOTES
Problem: Dysphagia (Adult)  Goal: *Acute Goals and Plan of Care (Insert Text)  Speech therapy goals  Initiated 5/14/2018   1. Patient will tolerate mechanical soft diet without s/s of aspiration within 7 days; met 5/17/2018 revise to dental soft   2. Patient will tolerate advanced solid trials with SLP with timely and complete mastication and full oral clearance within 7 days met 5/17/2018      Speech language pathology dysphagia treatment  Patient: Prince Pompa (31 y.o. male)  Date: 5/17/2018  Diagnosis: SDH  Subdural hematoma (HCC)  subdurral hematoma, right frontal crani Subdural hematoma (HCC)  Procedure(s) (LRB):  CRANIOTOMY (Right) 5 Days Post-Op  Precautions:  Fall, Bed Alarm    ASSESSMENT:  Patient with improved mental status and interactions today. Improved mastication noted with only mildly delayed mastication and propulsion that appears overall appropriate for dentition and suspect nearing or at his baseline. No oral residue or s/s of aspiration. Ready for diet upgrade. Progression toward goals:  []         Improving appropriately and progressing toward goals  [x]         Improving slowly and progressing toward goals  []         Not making progress toward goals and plan of care will be adjusted     PLAN:  Recommendations and Planned Interventions:  --recommend dental soft diet. Suspect this is his baseline diet. Will follow at least x1 to ensure tolerance  Patient continues to benefit from skilled intervention to address the above impairments. Continue treatment per established plan of care. Discharge Recommendations: To Be Determined     SUBJECTIVE:   Patient stated I don't like to wear my teeth. Referring to top plate of dentures.     OBJECTIVE:   Cognitive and Communication Status:  Neurologic State: Alert  Orientation Level: Oriented X4  Cognition: Decreased attention/concentration, Follows commands  Perception: Appears intact  Perseveration: No perseveration noted  Safety/Judgement: Not assessed  Dysphagia Treatment:  Oral Assessment:  Oral Assessment  Labial: No impairment  Dentition: Natural;Limited;Poor (has upper plate but does not want to wear)  Oral Hygiene: moist mucosa   Lingual: No impairment  Velum: Unable to visualize  Mandible: No impairment  P.O. Trials:  Patient Position: upright in bed   Vocal quality prior to P.O.: No impairment  Consistency Presented: Thin liquid; Solid  How Presented: Self-fed/presented;Straw;Successive swallows     Bolus Acceptance: No impairment  Bolus Formation/Control: Impaired  Type of Impairment: Delayed;Mastication (mildly slow but functional for dentition )  Propulsion: Delayed (# of seconds) (mild with solids )  Oral Residue: None  Initiation of Swallow: No impairment  Laryngeal Elevation: Functional  Aspiration Signs/Symptoms: None  Pharyngeal Phase Characteristics: No impairment, issues, or problems              Oral Phase Severity: Mild (overall appropriate for dentition )  Pharyngeal Phase Severity : No impairment                                                                                                                    Pain:  Pain Scale 1: Numeric (0 - 10)  Pain Intensity 1: 0  Pain Location 1: Head  After treatment:   []              Patient left in no apparent distress sitting up in chair  [x]              Patient left in no apparent distress in bed  [x]              Call bell left within reach  [x]              Nursing notified  []              Caregiver present  []              Bed alarm activated    COMMUNICATION/EDUCATION:   The patients plan of care including recommendations, planned interventions, and recommended diet changes were discussed with: Registered Nurse and NP. Zayra Santamaria M.CD.  CCC-SLP   Time Calculation: 10 mins

## 2018-05-17 NOTE — PROGRESS NOTES
PULMONARY/CRITICAL CARE/SLEEP MEDICINE        Name: Gutierrez Pelayo   : 1935   MRN: [id number]   Date: 2018      Subjective:   More alert.  Eating breakfast.     Review of Systems:     A comprehensive 12 system review of systems was not obtainable    Assessment:     Traumatic Subdural hematoma, status post evacuation  Postoperative encephalopathy with subarachnoid blood  Possible alcohol withdrawal  HTN  COPD, unknown severity, not bronchospastic  History of organic cardiac disease  History of hypothyroidism  History of prostate cancer  Suspected sleep apnea  Hyperglycemia  Hypernatremia  Other medical problems as per chart    Recommendations:   CIWA protocol and use ativan  Inc Amlodipine  PT/OT  Drain mgmt per neurosurgery  MARIAMA resolving  IVF adjusted  SLP eval  On bronchodilators  On seizure prophylaxis  Continue Protonix  Glycemic control  Will need tube feeding if encephalopathy continues  In restraints for interference with medical management  Stress ulcer prophylaxis  Use of CPAP may be limited by presence of intracranial drain  Eventual sleep study is recommended  ICU monitoring today  D/W RN and NS      Active Problem List:     Problem List  Date Reviewed: 2018          Codes Class    * (Principal)Subdural hematoma (HCC) ICD-10-CM: I62.00  ICD-9-CM: 432.1         Acute DVT (deep venous thrombosis) (HCC) ICD-10-CM: I82.409  ICD-9-CM: 453.40         Claudication of both lower extremities ([facility] ICD-10-CM: I73.9  ICD-9-CM: 443.9         Bilateral deafness ICD-10-CM: H91.93  ICD-9-CM: 389.9         Rectal bleeding ICD-10-CM: K62.5  ICD-9-CM: 569.3         S/P cardiac cath ICD-10-CM: Z98.890  ICD-9-CM: V45.89     Overview Signed 2017 10:36 AM by Jessy Noriega NP     17: PTCA Ramus Intermediate, MARY ANN Cx, MARY ANN  dRCA               SOBOE (shortness of breath on exertion) ICD-10-CM: R06.02  ICD-9-CM: 786.05         Myocardial infarction ([facility] ICD-10-CM: I21.9  ICD-9-CM: 410.90 Calculus of kidney ICD-10-CM: N20.0  ICD-9-CM: 592.0         Osteoarthritis ICD-10-CM: M19.90  ICD-9-CM: 715.90         Alkaline phosphatase elevation ICD-10-CM: R74.8  ICD-9-CM: 790.5         Angina, class III (HCC) ICD-10-CM: I20.9  ICD-9-CM: 413.9         Prostate cancer ([address] ICD-10-CM: C61  ICD-9-CM: 185         Lipoma of back ICD-10-CM: D17.1  ICD-9-CM: 214.8         Nodule, subcutaneous ICD-10-CM: R22.9  ICD-9-CM: 940. 2         Vitamin D deficiency ICD-10-CM: E55.9  ICD-9-CM: 268.9         Prediabetes ICD-10-CM: R73.03  ICD-9-CM: 790.29         Tinea cruris ICD-10-CM: B35.6  ICD-9-CM: 110.3         Cerebral embolism with cerebral infarction [facility]) ICD-10-CM: I63.40  ICD-9-CM: 434.11     Overview Signed 6/13/2011  8:21 AM by Bar Bauer. Left frontal cortical/subcortical infarcts             Carotid stenosis, left ICD-10-CM: H60.43  ICD-9-CM: 433.10     Overview Signed 6/13/2011  8:21 AM by Bar Bauer.      Symptomatic LICA stenosis with infarction             PVD (peripheral vascular disease) with claudication (HCC) ICD-10-CM: I73.9  ICD-9-CM: 443.9         BPH (benign prostatic hypertrophy) ICD-10-CM: N40.0  ICD-9-CM: 600.00         Dyslipidemia ICD-10-CM: E78.5  ICD-9-CM: 272.4         Successful  PTCA (percutaneous transluminal coronary angioplasty)--90-95% instent restenosis RCA 10/16/10 ICD-10-CM: Z98.61  ICD-9-CM: V45.82         Successful PTCA with MARY ANN Xience stent Ramus intermedius 10/16/10 ICD-10-CM: Z95.9  ICD-9-CM: V45.89         Chronic back pain--DJD ICD-10-CM: G89.29  ICD-9-CM: 338.29         ED (erectile dysfunction) ICD-10-CM: N52.9  ICD-9-CM: 607.84         Essential hypertension ICD-10-CM: I10  ICD-9-CM: 401.9         CAD (Coronary Artery Disease)--s/p PCI--MARY ANN [place] stents x4 5/09;MARY ANN [place] stents x3 8/09 ICD-10-CM: I25.10  ICD-9-CM: 414.00         Hypothyroidism ICD-10-CM: E03.9  ICD-9-CM: 244.9               Past Medical History:      has a past medical history of AAA (abdominal aortic aneurysm) ([facility] Acute MI ([facility] [address] (12/2010); Acute prostatitis; Advance directive discussed with patient (04/20/2016); Aneurysm ([facility] (6/2011); Borderline glaucoma (glaucoma suspect) (02/19/2015); BPH (benign prostatic hyperplasia); Bright red blood per rectum (02/04/2016); CAD (coronary artery disease); Calculus of kidney; Cerebral embolism with cerebral infarction [facility]); Chronic pain; Dizzy spells (6/2012); DJD (degenerative joint disease) of lumbar spine; ED (erectile dysfunction); Elevated PSA (03/20/2014); Encephalocele ([facility] Hypercholesterolemia; Hypertension; Pneumonia (02/05/2017); Prostate cancer ([facility] (05/12/2014); PVD (peripheral vascular disease) with claudication ([facility] S/P radiation therapy (15 months out); Stroke [facility]) (6/2011); 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 Past Surgical History:      has a past surgical history that includes endoscopy, colon, diagnostic; ptca w/ stent, initial (10/16/2010); ptca each addl vessel (10/16/2010); hx heent (10/2012); hx orthopaedic (Bilateral); vascular surgery procedure unlist; hx carotid endarterectomy (Left); colonoscopy (N/A, 8/31/2016); upper gi endoscopy,diagnosis (11/22/2017); pr cardiac surg procedure unlist (12/2010); pr left heart cath,percutaneous (10/16/2010); and pr cardiac surg procedure unlist (12/2017). Home Medications:     Prior to Admission medications    Medication Sig Start Date End Date Taking? Authorizing Provider   LIPITOR 40 mg tablet TAKE 1 TABLET BY MOUTH EVERY DAY 5/2/18   Kannan Rosenbaum NP   Nebulizer & Compressor machine 1 Each by Does Not Apply route as needed. 4/21/18   Kannan Rosenbaum NP   furosemide (LASIX) 20 mg tablet TAKE 1 TAB BY MOUTH DAILY. 2/16/18   Historical Provider   levothyroxine (SYNTHROID) 88 mcg tablet TAKE 1 TABLET BY MOUTH DAILY (BEFORE BREAKFAST).  2/20/18   Hernán Trimble MD   amLODIPine (NORVASC) 5 mg tablet TAKE 1 TAB BY MOUTH DAILY. 2/16/18   Quin Erickson NP   isosorbide mononitrate ER (IMDUR) 30 mg tablet TAKE 1 TABLET BY MOUTH EVERY DAY 2/16/18   Quin Erickson NP   metoprolol succinate (TOPROL-XL) 25 mg XL tablet TAKE 1 TABLET EVERY DAY 2/5/18   Quin Erickson NP   losartan-hydroCHLOROthiazide [facility]) 100-12.5 mg per tablet TAKE 1 TABLET BY MOUTH EVERY DAY 1/18/18   Quin Erickson NP   clopidogrel (PLAVIX) 75 mg tab TAKE 1 TAB BY MOUTH DAILY. INDICATIONS: THROMBOSIS PREVENTION AFTER PCI 11/7/17   Historical Provider   omeprazole (PRILOSEC) 40 mg capsule Take 1 Cap by mouth two (2) times a day. 11/23/17   Sherry Ballard MD   aspirin delayed-release 81 mg tablet Take 1 Tab by mouth daily for 360 days. 11/7/17 11/2/18  Porfirio Edwards NP   tamsulosin (FLOMAX) 0.4 mg capsule TAKE 1 CAPSULE EVERY DAY 10/12/17   Alma Cotto MD   albuterol (PROVENTIL HFA, VENTOLIN HFA, PROAIR HFA) 90 mcg/actuation inhaler Take 2 Puffs by inhalation every four (4) hours as needed for Wheezing. 8/24/17   Lilian Thomas MD   clotrimazole-betamethasone (LOTRISONE) topical cream Apply twice daily to affected area till resolved. 8/23/17   Maura Oneal MD   albuterol-ipratropium (DUO-NEB) 2.5 mg-0.5 mg/3 ml nebu 3 mL by Nebulization route every six (6) hours as needed. 2/13/17   Nate Chen NP   CHOLECALCIFEROL, VITAMIN D3, (VITAMIN D3 PO) Take  by mouth. Historical Provider   finasteride (PROSCAR) 5 mg tablet daily. 1/15/15   Historical Provider   ascorbic acid (VITAMIN C) 1,000 mg tablet Take  by mouth. Historical Provider       Allergies/Social/Family History:      Allergies   Allergen Reactions    Norco [Hydrocodone-Acetaminophen] Other (comments)     Too sedated and felt like he was in a daze    Pcn [Penicillins] Rash    Percocet [Oxycodone-Acetaminophen] Nausea and Vomiting      Social History   Substance Use Topics    Smoking status: Former Smoker     Packs/day: 0.25     Quit date: 9/10/2014    Smokeless tobacco: Never Used Comment: has not had cigarette in over 3 month    Alcohol use Yes      Family History   Problem Relation Age of Onset    Diabetes Mother     Diabetes Father     Cancer Sister      LUNG    Cancer Brother      COLON    Cancer Sister      LUNG    Anesth Problems Neg Hx             Objective:   Vital Signs:  Visit Vitals    /75 (BP 1 Location: Right arm, BP Patient Position: At rest)    Pulse 99    Temp 97.7 °F (36.5 °C)    Resp 21    Wt 81.9 kg (180 lb 8.9 oz)    SpO2 94%    BMI 31.98 kg/m2    O2 Flow Rate (L/min): 3 l/min O2 Device: Nasal cannula Temp (24hrs), Av.1 °F (36.7 °C), Min:97.6 °F (36.4 °C), Max:98.6 °F (37 °C)           Intake/Output:     Intake/Output Summary (Last 24 hours) at 18 0859  Last data filed at 18 0800   Gross per 24 hour   Intake             1090 ml   Output             1875 ml   Net             -785 ml       Physical Exam:   General:  alert no distress, appears stated age. Head:  Normocephalic, without obvious abnormality, atraumatic. Eyes:  Conjunctivae/corneas clear. PERRL   Neck: Supple, symmetrical, no adenopathy, no carotid bruit and no JVD. Lungs:   Clear to auscultation bilaterally. Chest wall:  No tenderness or deformity. Heart:  Regular rate and rhythm, S1-S2 normal, no murmur, no click, rub or gallop. Abdomen:   Soft, non-tender. Bowel sounds present. No masses,  No organomegaly. Extremities: Atraumatic, no cyanosis or edema.    Pulses: Palpable   Skin: No rashes or lesions   Neurologic: Lethargic, moving all 4         LABS AND  DATA: Personally reviewed  Recent Labs      18   0320  18   WBC  6.7  5.9   HGB  9.0*  9.5*   HCT  28.6*  30.3*   PLT  154  132*     Recent Labs      18   0320  18   042   NA  141  145   K  3.6  3.6   CL  108  111*   CO2  22  24   BUN  10  9   CREA  0.81  0.75   GLU  107*  113*   CA  9.0  8.8   MG   --   1.8   PHOS   --   2.6     No results for input(s): SGOT, GPT, AP, TBIL, TP, ALB, GLOB, AML, LPSE in the last 72 hours. No lab exists for component: AMYP  No results for input(s): INR, PTP, APTT in the last 72 hours. No lab exists for component: INREXT, INREXT   Recent Labs      05/15/18   1709   PHI  7.449   PCO2I  31.3*   PO2I  63*   FIO2I  28     No results for input(s): CPK, CKMB, TROIQ, BNPP in the last 72 hours. MEDS: Reviewed    Chest Imaging: personally reviewed and report checked    Tele- reviewed    Medical decision making:   I have reviewed the flowsheet and previous day's notes  Patient has acute or chronic illness that poses a threat to life or bodily function  Review and order of Clinical lab tests  Review and Order of Radiology tests  Independent visualization of Image  Reviewed Ventilator / NiPPV  Parenteral controlled substances - Reviewed/ Adjusted / Kourtney Pimentel / Started  High Risk Drug therapy requiring intensive monitoring for toxicity: eg steroids, pressors, antibiotics  Discussed with: Nursing      Thank you for allowing me to participate in this patient's care.     Aurora Arias MD

## 2018-05-17 NOTE — PROGRESS NOTES
Bedside shift change report given to Tanika So RN (oncoming nurse) by Carolyne Pandya RN (offgoing nurse). Report included the following information SBAR, Kardex, Intake/Output, MAR, Accordion and Cardiac Rhythm NSR BBB.

## 2018-05-18 ENCOUNTER — APPOINTMENT (OUTPATIENT)
Dept: CT IMAGING | Age: 83
DRG: 025 | End: 2018-05-18
Attending: INTERNAL MEDICINE
Payer: MEDICARE

## 2018-05-18 LAB
ANION GAP SERPL CALC-SCNC: 9 MMOL/L (ref 5–15)
BASOPHILS # BLD: 0 K/UL (ref 0–0.1)
BASOPHILS NFR BLD: 0 % (ref 0–1)
BUN SERPL-MCNC: 11 MG/DL (ref 6–20)
BUN/CREAT SERPL: 13 (ref 12–20)
CALCIUM SERPL-MCNC: 9.1 MG/DL (ref 8.5–10.1)
CHLORIDE SERPL-SCNC: 107 MMOL/L (ref 97–108)
CO2 SERPL-SCNC: 24 MMOL/L (ref 21–32)
CREAT SERPL-MCNC: 0.83 MG/DL (ref 0.7–1.3)
DIFFERENTIAL METHOD BLD: ABNORMAL
EOSINOPHIL # BLD: 0.1 K/UL (ref 0–0.4)
EOSINOPHIL NFR BLD: 2 % (ref 0–7)
ERYTHROCYTE [DISTWIDTH] IN BLOOD BY AUTOMATED COUNT: 17.5 % (ref 11.5–14.5)
GLUCOSE BLD STRIP.AUTO-MCNC: 113 MG/DL (ref 65–100)
GLUCOSE BLD STRIP.AUTO-MCNC: 114 MG/DL (ref 65–100)
GLUCOSE SERPL-MCNC: 96 MG/DL (ref 65–100)
HCT VFR BLD AUTO: 28.7 % (ref 36.6–50.3)
HGB BLD-MCNC: 9.1 G/DL (ref 12.1–17)
IMM GRANULOCYTES # BLD: 0 K/UL (ref 0–0.04)
IMM GRANULOCYTES NFR BLD AUTO: 0 % (ref 0–0.5)
LYMPHOCYTES # BLD: 0.5 K/UL (ref 0.8–3.5)
LYMPHOCYTES NFR BLD: 7 % (ref 12–49)
MCH RBC QN AUTO: 26.5 PG (ref 26–34)
MCHC RBC AUTO-ENTMCNC: 31.7 G/DL (ref 30–36.5)
MCV RBC AUTO: 83.4 FL (ref 80–99)
MONOCYTES # BLD: 0.7 K/UL (ref 0–1)
MONOCYTES NFR BLD: 10 % (ref 5–13)
NEUTS SEG # BLD: 5.9 K/UL (ref 1.8–8)
NEUTS SEG NFR BLD: 81 % (ref 32–75)
NRBC # BLD: 0 K/UL (ref 0–0.01)
NRBC BLD-RTO: 0 PER 100 WBC
PLATELET # BLD AUTO: 185 K/UL (ref 150–400)
PMV BLD AUTO: 11.5 FL (ref 8.9–12.9)
POTASSIUM SERPL-SCNC: 3.5 MMOL/L (ref 3.5–5.1)
RBC # BLD AUTO: 3.44 M/UL (ref 4.1–5.7)
RBC MORPH BLD: ABNORMAL
SERVICE CMNT-IMP: ABNORMAL
SODIUM SERPL-SCNC: 140 MMOL/L (ref 136–145)
WBC # BLD AUTO: 7.2 K/UL (ref 4.1–11.1)

## 2018-05-18 PROCEDURE — 97530 THERAPEUTIC ACTIVITIES: CPT

## 2018-05-18 PROCEDURE — 92526 ORAL FUNCTION THERAPY: CPT | Performed by: SPEECH-LANGUAGE PATHOLOGIST

## 2018-05-18 PROCEDURE — 85025 COMPLETE CBC W/AUTO DIFF WBC: CPT | Performed by: INTERNAL MEDICINE

## 2018-05-18 PROCEDURE — 87205 SMEAR GRAM STAIN: CPT | Performed by: NEUROLOGICAL SURGERY

## 2018-05-18 PROCEDURE — 74011250636 HC RX REV CODE- 250/636: Performed by: NURSE PRACTITIONER

## 2018-05-18 PROCEDURE — 74011250637 HC RX REV CODE- 250/637: Performed by: INTERNAL MEDICINE

## 2018-05-18 PROCEDURE — 82962 GLUCOSE BLOOD TEST: CPT

## 2018-05-18 PROCEDURE — 94664 DEMO&/EVAL PT USE INHALER: CPT

## 2018-05-18 PROCEDURE — 70450 CT HEAD/BRAIN W/O DYE: CPT

## 2018-05-18 PROCEDURE — 36415 COLL VENOUS BLD VENIPUNCTURE: CPT | Performed by: INTERNAL MEDICINE

## 2018-05-18 PROCEDURE — 77010033678 HC OXYGEN DAILY

## 2018-05-18 PROCEDURE — 74011250637 HC RX REV CODE- 250/637: Performed by: NURSE PRACTITIONER

## 2018-05-18 PROCEDURE — 65660000000 HC RM CCU STEPDOWN

## 2018-05-18 PROCEDURE — 74011000250 HC RX REV CODE- 250: Performed by: INTERNAL MEDICINE

## 2018-05-18 PROCEDURE — 94640 AIRWAY INHALATION TREATMENT: CPT

## 2018-05-18 PROCEDURE — 80048 BASIC METABOLIC PNL TOTAL CA: CPT | Performed by: INTERNAL MEDICINE

## 2018-05-18 PROCEDURE — 74011250637 HC RX REV CODE- 250/637: Performed by: NEUROLOGICAL SURGERY

## 2018-05-18 PROCEDURE — 92523 SPEECH SOUND LANG COMPREHEN: CPT | Performed by: SPEECH-LANGUAGE PATHOLOGIST

## 2018-05-18 RX ORDER — MINOXIDIL 2.5 MG/1
5 TABLET ORAL DAILY
Status: DISCONTINUED | OUTPATIENT
Start: 2018-05-18 | End: 2018-05-20 | Stop reason: HOSPADM

## 2018-05-18 RX ADMIN — LEVETIRACETAM 500 MG: 500 TABLET ORAL at 09:09

## 2018-05-18 RX ADMIN — BUDESONIDE 500 MCG: 0.5 INHALANT RESPIRATORY (INHALATION) at 08:23

## 2018-05-18 RX ADMIN — QUETIAPINE FUMARATE 25 MG: 25 TABLET ORAL at 21:58

## 2018-05-18 RX ADMIN — ARFORMOTEROL TARTRATE 15 MCG: 15 SOLUTION RESPIRATORY (INHALATION) at 19:50

## 2018-05-18 RX ADMIN — HYDRALAZINE HYDROCHLORIDE 10 MG: 20 INJECTION INTRAMUSCULAR; INTRAVENOUS at 07:05

## 2018-05-18 RX ADMIN — PANTOPRAZOLE SODIUM 40 MG: 40 TABLET, DELAYED RELEASE ORAL at 16:53

## 2018-05-18 RX ADMIN — ISOSORBIDE MONONITRATE 30 MG: 30 TABLET, EXTENDED RELEASE ORAL at 09:09

## 2018-05-18 RX ADMIN — FINASTERIDE 5 MG: 5 TABLET, FILM COATED ORAL at 09:08

## 2018-05-18 RX ADMIN — AMLODIPINE BESYLATE 10 MG: 5 TABLET ORAL at 09:09

## 2018-05-18 RX ADMIN — Medication 10 ML: at 21:59

## 2018-05-18 RX ADMIN — LEVOTHYROXINE SODIUM 88 MCG: 88 TABLET ORAL at 07:30

## 2018-05-18 RX ADMIN — METOPROLOL SUCCINATE 25 MG: 25 TABLET, EXTENDED RELEASE ORAL at 09:08

## 2018-05-18 RX ADMIN — THERA TABS 1 TABLET: TAB at 09:08

## 2018-05-18 RX ADMIN — HYDROMORPHONE HYDROCHLORIDE 2 MG: 2 TABLET ORAL at 12:53

## 2018-05-18 RX ADMIN — MINOXIDIL 5 MG: 2.5 TABLET ORAL at 09:08

## 2018-05-18 RX ADMIN — LEVETIRACETAM 500 MG: 500 TABLET ORAL at 21:58

## 2018-05-18 RX ADMIN — HYDROMORPHONE HYDROCHLORIDE 2 MG: 2 TABLET ORAL at 07:53

## 2018-05-18 RX ADMIN — BUDESONIDE 500 MCG: 0.5 INHALANT RESPIRATORY (INHALATION) at 19:51

## 2018-05-18 RX ADMIN — ARFORMOTEROL TARTRATE 15 MCG: 15 SOLUTION RESPIRATORY (INHALATION) at 08:23

## 2018-05-18 RX ADMIN — LOSARTAN POTASSIUM 100 MG: 50 TABLET, FILM COATED ORAL at 09:09

## 2018-05-18 RX ADMIN — Medication 100 MG: at 09:08

## 2018-05-18 RX ADMIN — FOLIC ACID 1 MG: 1 TABLET ORAL at 09:08

## 2018-05-18 RX ADMIN — Medication 10 ML: at 14:58

## 2018-05-18 RX ADMIN — ATORVASTATIN CALCIUM 40 MG: 40 TABLET, FILM COATED ORAL at 21:58

## 2018-05-18 RX ADMIN — TAMSULOSIN HYDROCHLORIDE 0.4 MG: 0.4 CAPSULE ORAL at 09:08

## 2018-05-18 RX ADMIN — Medication 10 ML: at 06:56

## 2018-05-18 NOTE — PROGRESS NOTES
Problem: Falls - Risk of  Goal: *Absence of Falls  Document Hakan Fall Risk and appropriate interventions in the flowsheet.    Outcome: Progressing Towards Goal  Fall Risk Interventions:  Mobility Interventions: Bed/chair exit alarm, Communicate number of staff needed for ambulation/transfer, OT consult for ADLs, Patient to call before getting OOB, PT Consult for mobility concerns, PT Consult for assist device competence, Utilize walker, cane, or other assitive device, Utilize gait belt for transfers/ambulation    Mentation Interventions: Adequate sleep, hydration, pain control, Bed/chair exit alarm, Door open when patient unattended, Evaluate medications/consider consulting pharmacy, Eyeglasses and hearing aids, Familiar objects from home, Family/sitter at bedside, Gait belt with transfers/ambulation, Increase mobility, More frequent rounding, Reorient patient, Room close to nurse's station, Toileting rounds, Update white board    Medication Interventions: Bed/chair exit alarm, Evaluate medications/consider consulting pharmacy, Patient to call before getting OOB, Teach patient to arise slowly, Utilize gait belt for transfers/ambulation    Elimination Interventions: Bed/chair exit alarm, Call light in reach, Patient to call for help with toileting needs, Toilet paper/wipes in reach, Toileting schedule/hourly rounds    History of Falls Interventions: Bed/chair exit alarm, Consult care management for discharge planning, Door open when patient unattended, Evaluate medications/consider consulting pharmacy, Investigate reason for fall, Room close to nurse's station, Utilize gait belt for transfer/ambulation

## 2018-05-18 NOTE — PROGRESS NOTES
Problem: Pressure Injury - Risk of  Goal: *Prevention of pressure injury  Document Cristian Scale and appropriate interventions in the flowsheet. Outcome: Progressing Towards Goal  Pressure Injury Interventions:  Sensory Interventions: Assess changes in LOC, Assess need for specialty bed, Avoid rigorous massage over bony prominences, Check visual cues for pain, Discuss PT/OT consult with provider, Float heels, Keep linens dry and wrinkle-free, Maintain/enhance activity level, Minimize linen layers, Monitor skin under medical devices, Pressure redistribution bed/mattress (bed type)    Moisture Interventions: Absorbent underpads, Apply protective barrier, creams and emollients, Assess need for specialty bed, Check for incontinence Q2 hours and as needed, Contain wound drainage, Internal/External urinary devices, Maintain skin hydration (lotion/cream), Minimize layers, Moisture barrier    Activity Interventions: Assess need for specialty bed, Increase time out of bed, Pressure redistribution bed/mattress(bed type), PT/OT evaluation    Mobility Interventions: Assess need for specialty bed, Float heels, Pressure redistribution bed/mattress (bed type), PT/OT evaluation, Turn and reposition approx.  every two hours(pillow and wedges)    Nutrition Interventions: Document food/fluid/supplement intake, Discuss nutritional consult with provider, Offer support with meals,snacks and hydration    Friction and Shear Interventions: Apply protective barrier, creams and emollients, Foam dressings/transparent film/skin sealants, Lift sheet

## 2018-05-18 NOTE — PROGRESS NOTES
Problem: Dysphagia (Adult)  Goal: *Acute Goals and Plan of Care (Insert Text)  Speech therapy goals  Initiated 5/14/2018   1. Patient will tolerate mechanical soft diet without s/s of aspiration within 7 days; met 5/17/2018 revise to dental soft   2. Patient will tolerate advanced solid trials with SLP with timely and complete mastication and full oral clearance within 7 days met 5/17/2018       Speech language pathology dysphagia treatment  Patient: Esther Hunter (79 y.o. male)  Date: 5/18/2018  Diagnosis: SDH  Subdural hematoma (HCC)  subdurral hematoma, right frontal crani Subdural hematoma (HCC)  Procedure(s) (LRB):  CRANIOTOMY (Right) 6 Days Post-Op  Precautions: Fall, Bed Alarm    ASSESSMENT:  Dental soft diet upgrade was recommended yesterday for patient but not yet ordered. Diet order is now in the system. Patient was seen up in chair with mechanical soft diet tray and thin liquids. He tolerated this well without difficulty. Progression toward goals:  [x]         Improving appropriately and progressing toward goals  []         Improving slowly and progressing toward goals  []         Not making progress toward goals and plan of care will be adjusted     PLAN:  Recommendations and Planned Interventions:  Dental Soft diet with thin liquids  SLP to tx x1 for swallowing to ensure diet tolerance of upgraded texture  Patient continues to benefit from skilled intervention to address the above impairments. Continue treatment per established plan of care. Discharge Recommendations:  None     SUBJECTIVE:   Patient stated My head hurts. RN notified.     OBJECTIVE:   Cognitive and Communication Status:  Neurologic State: Alert  Orientation Level: Oriented to person, Oriented to place, Disoriented to situation, Disoriented to time  Cognition: Follows commands, Decreased attention/concentration, Poor safety awareness  Perception: Appears intact  Perseveration: No perseveration noted  Safety/Judgement: Fall prevention, Decreased insight into deficits, Decreased awareness of environment  Dysphagia Treatment:  Oral Assessment:     P.O. Trials:  Patient Position: upright in chair  Vocal quality prior to P.O.: No impairment  Consistency Presented: Solid; Thin liquid  How Presented: Self-fed/presented;Straw;Successive swallows     Bolus Acceptance: No impairment  Bolus Formation/Control: Impaired  Type of Impairment: Mastication;Delayed  Propulsion: Delayed (# of seconds)  Oral Residue: None  Initiation of Swallow: No impairment  Laryngeal Elevation: Functional  Aspiration Signs/Symptoms: None  Pharyngeal Phase Characteristics: No impairment, issues, or problems              Oral Phase Severity: Mild  Pharyngeal Phase Severity : No impairment  Exercises:  Laryngeal Exercises:                                                                                                                                   Pain:  Pain Scale 1: Numeric (0 - 10)  Pain Intensity 1: 6  Pain Location 1: Head  After treatment:   [x]              Patient left in no apparent distress sitting up in chair  []              Patient left in no apparent distress in bed  [x]              Call bell left within reach  []              Nursing notified  []              Caregiver present  []              Bed alarm activated    COMMUNICATION/EDUCATION:   Patient was educated regarding His deficit(s) of dysphagia as this relates to His diagnosis of SDH. He demonstrated Fair understanding. The patients plan of care including recommendations, planned interventions, and recommended diet changes were discussed with: Registered Nurse. []              Posted safety precautions in patient's room.     DINO Flynn  Time Calculation: 17 mins

## 2018-05-18 NOTE — PROGRESS NOTES
Problem: Neurolinguistics Impaired (Adult)  Goal: *Acute Goals and Plan of Care (Insert Text)  Speech Pathology Goals Initiated 5/18/18  1. Patient will recall 3 facts after 5 min delay with no cues within 7 days  2. Patient will recall biographical info (address, phone number, etc.) with no cues within 7 days  3. Patient will provide solutions to mod-complex problems with 75% within 7 days  Speech LAnguage Pathology evaluation  Patient: Nathaniel Rees (78 y.o. 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 His deficits are exacerbated by significant hearing loss. Patient would benefit from skilled SLP services to improve neurolinguistic skills. Baseline performance is unclear. Supervision will be needed upon discharge. Patient will benefit from skilled intervention to address the above impairments. Patients rehabilitation potential is considered to be Fair  Factors which may influence rehabilitation potential include:   []              None noted  [x]              Mental ability/status  [x]              Medical condition  [x]              Home/family situation and support systems  [x]              Safety awareness  []              Pain tolerance/management  []              Other:      PLAN :  Recommendations and Planned Interventions:  Skilled slp tx to address neurolinguistic deficits  Frequency/Duration: Patient will be followed by speech-language pathology 3 times a week to address goals. Discharge Recommendations: Skilled Nursing Facility     SUBJECTIVE:   Patient stated My head hurts. RN was notified. OBJECTIVE:     Past Medical History:   Diagnosis Date    AAA (abdominal aortic aneurysm) ([address]     Acute MI ([address] 12/2010    dr Mary Carlton    Acute prostatitis     again 9/12/16 [facility] rec'd [facility]    Advance directive discussed with patient 04/20/2016    Aneurysm ([address] 6/2011    AAA    Borderline glaucoma (glaucoma suspect) 02/19/2015    notes from [address] [place] [address] rec'd    BPH (benign prostatic hyperplasia)     Bright red blood per rectum 02/04/2016    [facility] Tanners[facility]- w/u in progress   8/22/17 [facility] f/u note    CAD (coronary artery disease)     Calculus of kidney     Cerebral embolism with cerebral infarction ([address]     Chronic pain     back    Dizzy spells 6/2012    DJD (degenerative joint disease) of lumbar spine     ED (erectile dysfunction)     8/30/17 [facility]u note [facility]    Elevated PSA 03/20/2014    [facility] rec[facility]     8/24/16 f/u note    Encephalocele ([address]     Hypercholesterolemia     Hypertension     Pneumonia 02/05/2017    Prostate cancer ([address] 05/12/2014    crystal henry/ Dr Jose Rowley 8/22/17 f/u note    PVD (peripheral vascular disease) with claudication ([address]     S/P radiation therapy 15 months out    probable cause of the rectal bleeding    Stroke ([address] 6/2011    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     COLONOSCOPY N/A 8/31/2016    COLONOSCOPY performed by Hank Watson MD at \[facility]\"" ENDOSCOPY    ENDOSCOPY, COLON, DIAGNOSTIC      HX CAROTID ENDARTERECTOMY Left     HX HEENT  10/2012    repair right drlisa dawkins    HX ORTHOPAEDIC Bilateral     BUNIONECTOMY    NV LEFT HEART CATH,PERCUTANEOUS  10/16/2010         PTCA EACH ADDL VESSEL  10/16/2010  PTCA W/ STENT, INITIAL  10/16/2010         UPPER GI ENDOSCOPY,DIAGNOSIS  2017         VASCULAR SURGERY PROCEDURE UNLIST      left leg varicose vein stripping dr Ashley Rosario     Prior Level of Function/Home Situation: home alone  Home Situation  Home Environment: Apartment  One/Two Story Residence: Other (Comment)  Living Alone: Yes  Support Systems: Family member(s)  Patient Expects to be Discharged to[de-identified] Unknown  Current DME Used/Available at Home: Walker, rolling  Tub or Shower Type: Tub/Shower combination  Mental Status:  Neurologic State: Alert  Orientation Level: Oriented to place, Oriented to person  Cognition: Decreased attention/concentration, Impaired decision making, Poor safety awareness, Other (comment) (decreased command following in part due to [facility])  Perception: Appears intact  Perseveration: No perseveration noted  Safety/Judgement: Decreased insight into deficits, Decreased awareness of need for safety, Fall prevention  Motor Speech:     Language Comprehension and Expression:  Auditory Comprehension  Auditory Impairment: Yes  Hearing Aid: Bilateral;At home  Response to Moderately Complex Yes/No Questions (%): 40 %  2 step commands 100%  3 step commands 25%  Interfering Components: Hearing  Effective Techniques: Extra processing time; Increased volume;Remove environmental distractions;Repetition;Stressing words; Slowed speech;Visual/Gestural cues  Cueing type: Repetition;Verbal  Cueing amount: Maximum           Neuro-Linguistics:        Verbal Problem Solving: Impaired  Verbal Organization: Impaired        Memory: Impaired  Attention : Impaired          Memory Exercises  Recall Message Parts: 2  Recall Message Time Delay: 5 minutes  Recall Message Accuracy (%): 100 %  Recall Message Cues: None  Patient recalled his  and street name. He was unable to recall his apartment number or zip code. Verbal Organization Exercises  Convergent categorization (level of impairment):  Intermediate (simple 100%)  Convergent Categorization Accuracy (%): 25 %  Divergent naming (level of impairment): Simple (9 animals named in 1 minute)  Verbal Problem Solving Exercises  Solution Level of Impairment: Simple  Solution Accuracy (%): 50 %           Pragmatics:        Voice:                 Vocal Quality: No impairment                                 G Codes: In compliance with CMSs Claims Based Outcome Reporting, the following G-code set was chosen for this patient based the use of the NOMS functional outcome to quantify this patient's level of memory impairment. Using the NOMS, the patient was determined to be at level 3 for memory which correlates with the CL= 60-79% level of severity. Based on the objective assessment provided within this note, the current, goal, and discharge g-codes are as follows:    Memory   Memory Current Status CL= 60-79%   Memory Goal Status CK= 40-59%    NOMS:   The NOMS functional outcome measure was used to quantify this patient's level of memory impairment. Based on the NOMS, the patient was determined to be at level 3 for memory function. NOMS Memory:  Level 1 (CN): Unable to recall any information regardless of cues  Level 2 (CM): Constant max cues or external aids to recall personal info  Level 3 (CL): Max cues or use external aids for simple routine and personal info. Level 4 (CK): Min cues to recall/use aids for simple info. Max cues to recall/ use aids for complex/novel info and plan/follow through on simple future events    Level 5 (CJ): Min cues to recall/use aids for complex/novel info and to plan/follow through complex future events  Level 6 (CI): Recalls or uses aids/strategies for complex info & planning future events. Min cues for breakdowns   Level 7 ([address] [place] [address]): Independent with recall/use of aids/strategies. AUGIE. (2003). National Outcomes Measurement System (NOMS): Adult Speech-Language Pathology User's Guide.        Pain:  Pain Scale 1: Numeric (0 - 10)  Pain Intensity 1: 6  Pain Location 1: Head  After treatment:   [x]              Patient left in no apparent distress sitting up in chair  []              Patient left in no apparent distress in bed  [x]              Call bell left within reach  []              Nursing notified  []              Caregiver present  []              Bed alarm activated    COMMUNICATION/EDUCATION:   The patients plan of care including recommendations and planned interventions was discussed with: Physical Therapy Assistant, Occupational Therapist and Registered Nurse. Patient was educated regarding His deficit(s) of neurolinguistic deficits as this relates to His diagnosis of SDH. He demonstrated Fair understanding as evidenced by verbalization. [x]  Patient/family have participated as able in goal setting and plan of care. [x]  Patient/family agree to work toward stated goals and plan of care. []  Patient understands intent and goals of therapy, but is neutral about his/her participation. []  Patient is unable to participate in goal setting and plan of care.     Thank you for this referral.  Portia Guzman SLP  Time Calculation: 17 mins

## 2018-05-18 NOTE — PROGRESS NOTES
Pt discussed in [address] rounds. The team is recommending inpatient rehab for this pt. CM called pt's son, Arnel Cifuentes ([phone]) and left him a message asking him to call this CM .  Marilynn Miranda

## 2018-05-18 NOTE — PROGRESS NOTES
Hospitalist Progress Note  Pastora Marrero MD  Answering service: [phone] from in house phone         Date of Service:  2018   NAME:  Reynaldo Shearer  :  1935  MRN:  [id number]    Admission Summary: This is an 60-year-old man with a past medical history significant for coronary artery disease status post stent placement, dyslipidemia, hypertension, CVA, COPD, peripheral vascular disease, hypothyroidism, benign prostatic hyperplasia, who was in his usual state of health until the day of presentation at the emergency room when it was reported that the patient sustained a ground level fall.  According to report, the patient was found naked on the floor with a bottle of bourbon.  He stated that his leg gave out on him and he fell as a result.  Following the fall, the patient started complaining of back pain.  The back pain is located at the lower back.  The pain is constant with no radiation, 7/10 in severity, worse with movement of the back.  No known relieving factors.  Patient was taken to [facility] [place] evaluation.  When the patient arrived at the emergency room, CT scan of the head was obtained.  The CT scan shows a subdural hematoma.  The emergency room physician consulted a neurosurgeon at [facility] who advised a transfer to [address] [place] [address] service was asked to accept the patient as a direct admission to the hospitalist service.  The patient was last admitted to this hospital from 2018 to 2018. [facility] was admitted and treated for thromboembolism.  The patient has no fever, no rigors, no chills.     Interval history / Subjective:       2018   This note is a follow up note for multiple medical issues as listed below and partially expanded on herewith.     Pt c/o headache, f/u CT stable 2018 , pt ok for discharge       Assessment & Plan:      · Subacute right frontoparietal subdural hematoma  Right frontoparietal craniotomy with evacuation of subdural hematoma and placement of subdural drain. , seems to be progression as attempting to follow. And 5/16/2018 is following, equal motor upper ext /pupils equal  5/18/2018 non focal and fully volitional in motor  · Elevated troponin, possible NSTMI , treatilng the main problem, acute SDH as only option at this time. Bb added. No asa no heparin products. No anticoagulation option, pt now alert w/o cp   ·  Alcoholism: On scheduled Ativan. And goodie bag, now po thiamin  ·  Coronary artery disease status post stent placement. Plavix and aspirin on hold because of the subdural hematoma. ·  Hypothyroidism.  We will continue with Synthroid. ·  Anemia : Monitor CBC. ·  COPD, unknown severity, not bronchospastic  ·  History of hypothyroidism  ·  History of prostate cancer     Code status: full  DVT prophylaxis: SCD     Care Plan discussed with: Patient/Family  Disposition: Home w/Family and TBD           Hospital Problems  Date Reviewed: 5/12/2018          Codes Class Noted POA    * (Principal)Subdural hematoma ([facility] ICD-10-CM: I62.00  ICD-9-CM: 432.1  5/12/2018 Yes                Review of Systems:    headache, no n/v,   No cp no sob. Vital Signs:    Last 24hrs VS reviewed since prior progress note. Most recent are:  Visit Vitals    /81 (BP 1 Location: Right arm, BP Patient Position: Post activity)    Pulse 97    Temp 98.3 °F (36.8 °C)    Resp 19    Wt 82.8 kg (182 lb 8.7 oz)    SpO2 100%    BMI 32.34 kg/m2         Intake/Output Summary (Last 24 hours) at 05/18/18 1509  Last data filed at 05/18/18 1059   Gross per 24 hour   Intake                0 ml   Output             1450 ml   Net            -1450 ml        Physical Examination:              Constitutional:  no acute distress, alert cooperative. ENT:  Oral mucous moist, oropharynx benign. Neck supple,    Resp:  CTA bilaterally.  No wheezing/rhonchi/rales. No accessory muscle use   CV:  Regular rhythm, normal rate, no murmurs, gallops, rubs    GI:  Soft, non distended, non tender. normoactive bowel sounds, no hepatosplenomegaly     Musculoskeletal:  No edema, warm, 2+ pulses throughout    neuro. non focal grossly             Data Review:    Review and/or order of clinical lab test      Labs:     Recent Labs      05/18/18 0315 05/17/18   0320   WBC  7.2  6.7   HGB  9.1*  9.0*   HCT  28.7*  28.6*   PLT  185  154     Recent Labs      05/18/18 0315 05/17/18   0320  05/16/18   0421   NA  140  141  145   K  3.5  3.6  3.6   CL  107  108  111*   CO2  24  22  24   BUN  11  10  9   CREA  0.83  0.81  0.75   GLU  96  107*  113*   CA  9.1  9.0  8.8   MG   --    --   1.8   PHOS   --    --   2.6     No results for input(s): SGOT, GPT, ALT, AP, TBIL, TBILI, TP, ALB, GLOB, GGT, AML, LPSE in the last 72 hours. No lab exists for component: AMYP, HLPSE  No results for input(s): INR, PTP, APTT in the last 72 hours. No lab exists for component: INREXT, INREXT   No results for input(s): FE, TIBC, PSAT, FERR in the last 72 hours. No results found for: FOL, RBCF   No results for input(s): PH, PCO2, PO2 in the last 72 hours. No results for input(s): CPK, CKNDX, TROIQ in the last 72 hours.     No lab exists for component: CPKMB  Lab Results   Component Value Date/Time    Cholesterol, total 144 05/12/2018 08:27 AM    HDL Cholesterol 78 05/12/2018 08:27 AM    LDL, calculated 53.6 05/12/2018 08:27 AM    Triglyceride 62 05/12/2018 08:27 AM    CHOL/HDL Ratio 1.8 05/12/2018 08:27 AM     Lab Results   Component Value Date/Time    Glucose (POC) 114 (H) 05/18/2018 11:39 AM    Glucose (POC) 114 (H) 05/18/2018 07:54 AM    Glucose (POC) 125 (H) 05/17/2018 09:58 PM    Glucose (POC) 114 (H) 05/17/2018 04:28 PM    Glucose (POC) 156 (H) 05/17/2018 11:42 AM     Lab Results   Component Value Date/Time    Color YELLOW/STRAW 03/16/2018 02:45 PM    Appearance CLEAR 03/16/2018 02:45 PM    Specific gravity 1.014 03/16/2018 02:45 PM    pH (UA) 5.5 03/16/2018 02:45 PM    Protein NEGATIVE  03/16/2018 02:45 PM    Glucose NEGATIVE  03/16/2018 02:45 PM    Ketone NEGATIVE  03/16/2018 02:45 PM    Bilirubin NEGATIVE  03/16/2018 02:45 PM    Urobilinogen 1.0 03/16/2018 02:45 PM    Nitrites NEGATIVE  03/16/2018 02:45 PM    Leukocyte Esterase NEGATIVE  03/16/2018 02:45 PM    Epithelial cells FEW 03/16/2018 02:45 PM    Bacteria 1+ (A) 03/16/2018 02:45 PM    WBC 5-10 03/16/2018 02:45 PM    RBC 0-5 03/16/2018 02:45 PM         Medications Reviewed:     Current Facility-Administered Medications   Medication Dose Route Frequency    minoxidil (LONITEN) tablet 5 mg  5 mg Oral DAILY    amLODIPine (NORVASC) tablet 10 mg  10 mg Oral DAILY    losartan (COZAAR) tablet 100 mg  100 mg Oral DAILY    saline peripheral flush soln 10 mL  10 mL IntraVENous PRN    therapeutic multivitamin (THERAGRAN) tablet 1 Tab  1 Tab Oral DAILY    thiamine (B-1) tablet 100 mg  100 mg Oral DAILY    folic acid (FOLVITE) tablet 1 mg  1 mg Oral DAILY    QUEtiapine (SEROquel) tablet 25 mg  25 mg Oral QHS    pantoprazole (PROTONIX) tablet 40 mg  40 mg Oral ACD    insulin lispro (HUMALOG) injection   SubCUTAneous AC&HS    LORazepam (ATIVAN) tablet 1 mg  1 mg Oral Q1H PRN    LORazepam (ATIVAN) tablet 2 mg  2 mg Oral Q1H PRN    LORazepam (ATIVAN) injection 1 mg  1 mg IntraVENous Q1H PRN    LORazepam (ATIVAN) injection 2 mg  2 mg IntraVENous Q1H PRN    hydrALAZINE (APRESOLINE) 20 mg/mL injection 10 mg  10 mg IntraVENous Q4H PRN    labetalol (NORMODYNE;TRANDATE) injection 10 mg  10 mg IntraVENous Q4H PRN    fentaNYL citrate (PF) injection 25 mcg  25 mcg IntraVENous Q2H PRN    levETIRAcetam (KEPPRA) tablet 500 mg  500 mg Oral Q12H    isosorbide mononitrate ER (IMDUR) tablet 30 mg  30 mg Oral DAILY    metoprolol succinate (TOPROL-XL) XL tablet 25 mg  25 mg Oral DAILY    glucose chewable tablet 16 g  4 Tab Oral PRN    dextrose (D50W) injection syrg 12.5-25 g  12.5-25 g IntraVENous PRN    glucagon (GLUCAGEN) injection 1 mg  1 mg IntraMUSCular PRN    arformoterol (BROVANA) neb solution 15 mcg  15 mcg Nebulization BID RT    And    budesonide (PULMICORT) 500 mcg/2 ml nebulizer suspension  500 mcg Nebulization BID RT    acetaminophen (TYLENOL) tablet 650 mg  650 mg Oral Q4H PRN    albuterol-ipratropium (DUO-NEB) 2.5 MG-0.5 MG/3 ML  3 mL Nebulization Q6H PRN    finasteride (PROSCAR) tablet 5 mg  5 mg Oral DAILY    levothyroxine (SYNTHROID) tablet 88 mcg  88 mcg Oral ACB    atorvastatin (LIPITOR) tablet 40 mg  40 mg Oral QHS    tamsulosin (FLOMAX) capsule 0.4 mg  0.4 mg Oral DAILY    sodium chloride (NS) flush 5-10 mL  5-10 mL IntraVENous Q8H    ondansetron (ZOFRAN) injection 4 mg  4 mg IntraVENous Q4H PRN    HYDROmorphone (DILAUDID) tablet 2 mg  2 mg Oral Q4H PRN    LORazepam (ATIVAN) injection 1 mg  1 mg IntraVENous Q4H PRN     ______________________________________________________________________  EXPECTED LENGTH OF STAY: 7d 0h  ACTUAL LENGTH OF STAY:          6                 Ole Patiño MD

## 2018-05-18 NOTE — PROGRESS NOTES
Problem: Mobility Impaired (Adult and Pediatric)  Goal: *Acute Goals and Plan of Care (Insert Text)  Physical Therapy Goals  Initiated 5/14/2018  1. Patient will move from supine to sit and sit to supine , scoot up and down and roll side to side in bed with minimal assistance/contact guard assist within 7 day(s). 2.  Patient will transfer from bed to chair and chair to bed with minimal assistance/contact guard assist using the least restrictive device within 7 day(s). 3.  Patient will perform sit to stand with minimal assistance/contact guard assist within 7 day(s). 4.  Patient will ambulate with moderate assistance  for 20 feet with the least restrictive device within 7 day(s). 5.  Patient will improve Fan Balance score by 7 points within 7 days. physical Therapy TREATMENT  Patient: Millicent Pelayo (89 y.o. male)  Date: 5/18/2018  Diagnosis: SDH  Subdural hematoma (HCC)  subdurral hematoma, right frontal crani Subdural hematoma (HCC)  Procedure(s) (LRB):  CRANIOTOMY (Right) 6 Days Post-Op  Precautions: Fall, Bed Alarm  Chart, physical therapy assessment, plan of care and goals were reviewed. ASSESSMENT:  Patient cleared by RN for continued mobility with PT. Received in bed, eating lunch. Reminded patient to try to be in chair for all meals. Drowsiness has significantly improved and patient much more alert. Overall required CGA-min A x 2 for functional mobility to include bed mobility, EOB sitting and stand pivot transfer to chair. PT provided manual cue to patient sacrum to facilitate anterior pelvic shift for improved posture. Has difficulty managing walker. Seated in chair with all needs met. Patient will need rehab at d/c.   Progression toward goals:  [x]    Improving appropriately and progressing toward goals  []    Improving slowly and progressing toward goals  []    Not making progress toward goals and plan of care will be adjusted     PLAN:  Patient continues to benefit from skilled intervention to address the above impairments. Continue treatment per established plan of care. Discharge Recommendations:  Rehab  Further Equipment Recommendations for Discharge:  tbd     SUBJECTIVE:   Patient stated Ok.     OBJECTIVE DATA SUMMARY:   Critical Behavior:  Neurologic State: Alert  Orientation Level: Oriented to person, Oriented to place  Cognition: Follows commands, Decreased attention/concentration, Impulsive  Safety/Judgement: Decreased awareness of environment, Decreased awareness of need for safety, Decreased awareness of need for assistance, Decreased insight into deficits  Functional Mobility Training:  Bed Mobility:     Supine to Sit: Supervision; Additional time              Transfers:  Sit to Stand: Contact guard assistance; Additional time;Assist x2           Bed to Chair: Minimum assistance; Adaptive equipment; Additional time;Assist x2                    Balance:  Sitting: Impaired; Without support  Sitting - Static: Good (unsupported)  Sitting - Dynamic: Fair (occasional)  Standing: Impaired; Without support  Standing - Static: Fair  Standing - Dynamic : Fair  Ambulation/Gait Training:                                            Pain:  Pain Scale 1: Numeric (0 - 10)  Pain Intensity 1: 2  Pain Location 1: Head  Pain Orientation 1: Right  Pain Description 1: Aching  Pain Intervention(s) 1: Medication (see MAR)  Activity Tolerance:   VSS  Please refer to the flowsheet for vital signs taken during this treatment.   After treatment:   [x]    Patient left in no apparent distress sitting up in chair  []    Patient left in no apparent distress in bed  [x]    Call bell left within reach  [x]    Nursing notified  [x]    Caregiver present  [x]    Bed alarm activated    COMMUNICATION/COLLABORATION:   The patients plan of care was discussed with: Occupational Therapist and Registered Nurse    Ana María Allen PT, DPT   Time Calculation: 10 mins

## 2018-05-18 NOTE — ROUTINE PROCESS
Bedside shift change report given to [address] [place] [address] (oncoming nurse) by Althea No (offgoing nurse). Report included the following information SBAR, Kardex, Recent Results and Cardiac Rhythm NSR c BBB.

## 2018-05-18 NOTE — PROGRESS NOTES
Problem: Self Care Deficits Care Plan (Adult)  Goal: *Acute Goals and Plan of Care (Insert Text)  Occupational Therapy Goals  Initiated 5/14/2018    1. Patient will follow 1 step simple command during ADL with 100% consistency within 7 days. 2.  Patient will perform grooming seated EOB with Deuce within 7 days. 3.  Patient will perform toilet transfer to [facility] with modA within 7 days. 4.  Patient will perform anterior UB bathing seated in chair with modA within 7 days. 5.  Patient will participate in UE therapeutic exercise/activities with Deuce for 5 minutes within 7 days. Occupational Therapy TREATMENT  Patient: Yu Jacobo (82 y.o. male)  Date: 5/18/2018  Diagnosis: SDH  Subdural hematoma (HCC)  subdurral hematoma, right frontal crani Subdural hematoma (HCC)  Procedure(s) (LRB):  CRANIOTOMY (Right) 6 Days Post-Op  Precautions: Fall, Bed Alarm  Chart, occupational therapy assessment, plan of care, and goals were reviewed. ASSESSMENT:  Pt making steady progress with acute therapy. Pt received in bed eating lunch and agreeable to OOB. Pt very [place] which greatly limits pt. Pt c/o constant frontal headache during session, RN aware. Pt is now able to complete bed mobility with supervision and additional time, transfers CGA-Deuce and transfers to chair with RW with minAx2, cues for RW management/safety and fall prevention. Pt is also able to perform self-feeding tasks with much less assistance, however, does benefit from pillow for proximal support. Continue to strongly recommend inpatient rehab at discharge as pt is high fall risk and below safe baseline with ADL/IADL routine. Pt left in chair, call bell in reach, chair alarm on, SLP present.     Progression toward goals:  [x]       Improving appropriately and progressing toward goals  []       Improving slowly and progressing toward goals  []       Not making progress toward goals and plan of care will be adjusted     PLAN:  Patient continues to benefit from skilled intervention to address the above impairments. Continue treatment per established plan of care. Discharge Recommendations:  Inpatient Rehab  Further Equipment Recommendations for Discharge:  TBD at rehab     SUBJECTIVE:   Patient stated I have a headache.  Pt pointing to frontal area of head. OBJECTIVE DATA SUMMARY:   Cognitive/Behavioral Status:  Neurologic State: Alert  Orientation Level: Oriented to person;Oriented to place  Cognition: Follows commands;Decreased attention/concentration; Impulsive  Perception: Appears intact  Perseveration: No perseveration noted  Safety/Judgement: Decreased awareness of environment;Decreased awareness of need for safety;Decreased awareness of need for assistance;Decreased insight into deficits    Functional Mobility and Transfers for ADLs:  Bed Mobility:  Supine to Sit: Supervision; Additional time    Transfers:  Sit to Stand: Contact guard assistance; Additional time;Assist x2     Bed to Chair: Minimum assistance; Adaptive equipment; Additional time;Assist x2    Balance:  Sitting: Impaired; Without support  Sitting - Static: Good (unsupported)  Sitting - Dynamic: Fair (occasional)  Standing: Impaired; Without support  Standing - Static: Fair  Standing - Dynamic : Fair    ADL Intervention:  Feeding  Utensil Management: Supervision/set-up  Food to Mouth: Supervision/set-up                                  Cognitive Retraining  Safety/Judgement: Decreased awareness of environment;Decreased awareness of need for safety;Decreased awareness of need for assistance;Decreased insight into deficits    Activity Tolerance:   VSS, c/o frontal headache. Please refer to the flowsheet for vital signs taken during this treatment.   After treatment:   [x] Patient left in no apparent distress sitting up in chair  [] Patient left in no apparent distress in bed  [x] Call bell left within reach  [x] Nursing notified  [x] Caregiver present  [x] Bed alarm activated    COMMUNICATION/COLLABORATION:   The patients plan of care was discussed with: Physical Therapist, Speech Therapist and Registered Nurse    Malik Lynn OT  Time Calculation: 10 mins

## 2018-05-18 NOTE — PROGRESS NOTES
Bedside shift change report given to [facility] (oncoming nurse) by Bobby Marie RN (offgoing nurse). Report included the following information SBAR, Kardex, OR Summary, Intake/Output, MAR, Recent Results and Cardiac Rhythm SR w/BBB and PVCs.

## 2018-05-19 LAB
GLUCOSE BLD STRIP.AUTO-MCNC: 103 MG/DL (ref 65–100)
GLUCOSE BLD STRIP.AUTO-MCNC: 116 MG/DL (ref 65–100)
GLUCOSE BLD STRIP.AUTO-MCNC: 121 MG/DL (ref 65–100)
GLUCOSE BLD STRIP.AUTO-MCNC: 131 MG/DL (ref 65–100)
SERVICE CMNT-IMP: ABNORMAL

## 2018-05-19 PROCEDURE — 74011250637 HC RX REV CODE- 250/637: Performed by: INTERNAL MEDICINE

## 2018-05-19 PROCEDURE — 74011250637 HC RX REV CODE- 250/637: Performed by: NEUROLOGICAL SURGERY

## 2018-05-19 PROCEDURE — 74011000250 HC RX REV CODE- 250: Performed by: INTERNAL MEDICINE

## 2018-05-19 PROCEDURE — 65660000000 HC RM CCU STEPDOWN

## 2018-05-19 PROCEDURE — 74011250637 HC RX REV CODE- 250/637: Performed by: NURSE PRACTITIONER

## 2018-05-19 PROCEDURE — 82962 GLUCOSE BLOOD TEST: CPT

## 2018-05-19 PROCEDURE — 94640 AIRWAY INHALATION TREATMENT: CPT

## 2018-05-19 RX ADMIN — BUDESONIDE 500 MCG: 0.5 INHALANT RESPIRATORY (INHALATION) at 08:47

## 2018-05-19 RX ADMIN — METOPROLOL SUCCINATE 25 MG: 25 TABLET, EXTENDED RELEASE ORAL at 11:28

## 2018-05-19 RX ADMIN — LEVOTHYROXINE SODIUM 88 MCG: 88 TABLET ORAL at 06:32

## 2018-05-19 RX ADMIN — AMLODIPINE BESYLATE 10 MG: 5 TABLET ORAL at 11:27

## 2018-05-19 RX ADMIN — PANTOPRAZOLE SODIUM 40 MG: 40 TABLET, DELAYED RELEASE ORAL at 16:49

## 2018-05-19 RX ADMIN — FOLIC ACID 1 MG: 1 TABLET ORAL at 11:28

## 2018-05-19 RX ADMIN — ATORVASTATIN CALCIUM 40 MG: 40 TABLET, FILM COATED ORAL at 21:42

## 2018-05-19 RX ADMIN — LEVETIRACETAM 500 MG: 500 TABLET ORAL at 21:42

## 2018-05-19 RX ADMIN — QUETIAPINE FUMARATE 25 MG: 25 TABLET ORAL at 21:42

## 2018-05-19 RX ADMIN — Medication 10 ML: at 13:21

## 2018-05-19 RX ADMIN — MINOXIDIL 5 MG: 2.5 TABLET ORAL at 11:27

## 2018-05-19 RX ADMIN — THERA TABS 1 TABLET: TAB at 11:28

## 2018-05-19 RX ADMIN — HYDROMORPHONE HYDROCHLORIDE 2 MG: 2 TABLET ORAL at 03:12

## 2018-05-19 RX ADMIN — Medication 100 MG: at 11:28

## 2018-05-19 RX ADMIN — LOSARTAN POTASSIUM 100 MG: 50 TABLET, FILM COATED ORAL at 11:26

## 2018-05-19 RX ADMIN — ARFORMOTEROL TARTRATE 15 MCG: 15 SOLUTION RESPIRATORY (INHALATION) at 20:28

## 2018-05-19 RX ADMIN — FINASTERIDE 5 MG: 5 TABLET, FILM COATED ORAL at 11:29

## 2018-05-19 RX ADMIN — Medication 10 ML: at 06:27

## 2018-05-19 RX ADMIN — ARFORMOTEROL TARTRATE 15 MCG: 15 SOLUTION RESPIRATORY (INHALATION) at 08:47

## 2018-05-19 RX ADMIN — Medication 10 ML: at 21:43

## 2018-05-19 RX ADMIN — ISOSORBIDE MONONITRATE 30 MG: 30 TABLET, EXTENDED RELEASE ORAL at 11:27

## 2018-05-19 RX ADMIN — BUDESONIDE 500 MCG: 0.5 INHALANT RESPIRATORY (INHALATION) at 20:28

## 2018-05-19 RX ADMIN — LEVETIRACETAM 500 MG: 500 TABLET ORAL at 11:29

## 2018-05-19 RX ADMIN — TAMSULOSIN HYDROCHLORIDE 0.4 MG: 0.4 CAPSULE ORAL at 11:29

## 2018-05-19 NOTE — INTERDISCIPLINARY ROUNDS
IDR/SLIDR Summary          Patient: Rayshawn Arias MRN: [id number]    Age: 81 y.o. YOB: 1935 Room/Bed: [facility]   Admit Diagnosis: SDH  Subdural hematoma (HCC)  subdurral hematoma, right frontal crani  Principal Diagnosis: Subdural hematoma (HCC)   Goals: Discharge Planning; Needs IP Rehab placement  Readmission: NO  Quality Measure: Not applicable  VTE Prophylaxis: Mechanical  Influenza Vaccine screening completed? YES  Pneumococcal Vaccine screening completed? YES  Mobility needs: Yes   Nutrition plan:Yes  Consults:P.T, O.T. and Speech    Financial concerns:Unknown  Escalated to CM? YES  RRAT Score: 27   Interventions:Needs IP Rehab  Testing due for pt today?  NO  LOS: 8 days Expected length of stay 8-9 days  Discharge plan: TBD; Needs IP Rehab Placement   PCP: Tran Andrew MD  Transportation needs: Yes    Days before discharge:two or more days before discharge   Discharge disposition: Rehab    Signed:     Marti Coulter  5/19/2018  2:05 AM

## 2018-05-19 NOTE — PROGRESS NOTES
Care Management    Received call from [facility], they have accepted Mr. Memo Reeder. Spoke to RN, plan for discharge tomorrow. EMTALA to [facility]. [facility] - 10:30AM  Will set up ambulance.     EMTALA to [facility], Dr. Jarrett Rich Accepting MD. Vladislav Gamez, CRM

## 2018-05-19 NOTE — PROGRESS NOTES
Bedside and Verbal shift change report given to SIVA Hernandez (oncoming nurse) by Amaury Valenzuela (offgoing nurse). Report included the following information SBAR, Kardex, Procedure Summary, Intake/Output, MAR, Recent Results and Cardiac Rhythm NSR/STach with activity.

## 2018-05-19 NOTE — PROGRESS NOTES
POD 7  tmax 99.1  Subgaleal fluid culture from yest - neg  Alert  Incision c/d/i  Small re-accummulation of subgaleal fluid  S/p: crani for right SDH  Doing well

## 2018-05-19 NOTE — PROGRESS NOTES
SHERRILL spoke with pt's son, Kenith Fabry ([phone]) regarding Inpatient rehab. He stated that he would like a referral to go to [facility]. CM sent a referral to [facility] in [employer].  Idania Ferreira, ANUPAM,ACM-SW

## 2018-05-19 NOTE — PROGRESS NOTES
Hospitalist Progress Note  Pastora Marrero MD  Answering service: [phone] from in house phone         Date of Service:  2018   NAME:  Reynaldo Shearer  :  1935  MRN:  [id number]    Admission Summary: This is an 64-year-old man with a past medical history significant for coronary artery disease status post stent placement, dyslipidemia, hypertension, CVA, COPD, peripheral vascular disease, hypothyroidism, benign prostatic hyperplasia, who was in his usual state of health until the day of presentation at the emergency room when it was reported that the patient sustained a ground level fall.  According to report, the patient was found naked on the floor with a bottle of bourbon.  He stated that his leg gave out on him and he fell as a result.  Following the fall, the patient started complaining of back pain.  The back pain is located at the lower back.  The pain is constant with no radiation, 7/10 in severity, worse with movement of the back.  No known relieving factors.  Patient was taken to [facility] for further evaluation.  When the patient arrived at the emergency room, CT scan of the head was obtained.  The CT scan shows a subdural hematoma.  The emergency room physician consulted a neurosurgeon at [address] who advised a transfer to [address] [place] [address], service was asked to accept the patient as a direct admission to the hospitalist service.  The patient was last admitted to this hospital from 2018 to 2018. Mya Schafer was admitted and treated for thromboembolism.  The patient has no fever, no rigors, no chills.     Interval history / Subjective:       2018   This note is a follow up note for multiple medical issues as listed below and partially expanded on herewith. Up in chair eating, is oriented to recent events, [facility].    Head better after tap by Neurosurg  pm. Pt is cleared for discharge to inpt rehab.         Assessment & Plan:       Subacute right frontoparietal subdural hematoma  Right frontoparietal craniotomy with evacuation of subdural hematoma and placement of subdural drain. , seems to be progression as attempting to follow. And 5/16/2018 is following, equal motor upper ext /pupils equal  5/19/2018 non focal and fully volitional in motor. 5/19/2018 : Up in chair eating, is oriented to recent events, [facility]. Head better after tap by Neurosurg 5/18 pm.   · Pt is cleared for discharge to inpt rehab. · Elevated troponin, possible NSTMI , treatilng the main problem, acute SDH as only option at this time. Bb added. No asa no heparin products. No anticoagulation option, pt now alert w/o cp   ·  Alcoholism: On scheduled Ativan. And goodie bag, now po thiamin- discussed 5/19/2018 as pt alert enough to participate in conversation   ·  Coronary artery disease status post stent placement. Plavix and aspirin on hold because of the subdural hematoma., BB on board   ·  Hypothyroidism.  We will continue with Synthroid. ·  Anemia : Monitor CBC. Lab Results   Component Value Date/Time    Hemoglobin (POC) 13.3 02/05/2017 03:44 PM    HGB 9.1 (L) 05/18/2018 03:15 AM      ·  COPD, unknown severity, not bronchospastic  ·  History of hypothyroidism  ·  History of prostate cancer     Code status: full  DVT prophylaxis: SCD     Care Plan discussed with: Patient/Family  Disposition: Home w/Family and TBD           Hospital Problems  Date Reviewed: 5/12/2018          Codes Class Noted POA    * (Principal)Subdural hematoma ([facility] [address] ICD-10-CM: I62.00  ICD-9-CM: 432.1  5/12/2018 Yes                Review of Systems:    headache, no n/v,   No cp no sob. Vital Signs:    Last 24hrs VS reviewed since prior progress note.  Most recent are:  Visit Vitals    /65 (BP 1 Location: Left arm, BP Patient Position: At rest)    Pulse 100    Temp 99.1 °F (37.3 °C)    Resp 16    Ht 5' 3\" (1.6 m)    Wt 84.3 kg (185 lb 13.6 oz)    SpO2 94%    BMI 32.92 kg/m2         Intake/Output Summary (Last 24 hours) at 05/19/18 0850  Last data filed at 05/19/18 0400   Gross per 24 hour   Intake                0 ml   Output             1500 ml   Net            -1500 ml        Physical Examination:              Constitutional:  no acute distress, alert cooperative. ENT:  Oral mucous moist, oropharynx benign. Neck supple,    Resp:  CTA bilaterally. No wheezing/rhonchi/rales. No accessory muscle use   CV:  Regular rhythm, normal rate, no murmurs, gallops, rubs    GI:  Soft, non distended, non tender. normoactive bowel sounds, no hepatosplenomegaly     Musculoskeletal:  No edema, warm, 2+ pulses throughout    neuro. non focal grossly  oriented to recent events and [facility]. Data Review:    Review and/or order of clinical lab test      Labs:     Recent Labs      05/18/18   0315  05/17/18   0320   WBC  7.2  6.7   HGB  9.1*  9.0*   HCT  28.7*  28.6*   PLT  185  154     Recent Labs      05/18/18   0315  05/17/18   0320   NA  140  141   K  3.5  3.6   CL  107  108   CO2  24  22   BUN  11  10   CREA  0.83  0.81   GLU  96  107*   CA  9.1  9.0     No results for input(s): SGOT, GPT, ALT, AP, TBIL, TBILI, TP, ALB, GLOB, GGT, AML, LPSE in the last 72 hours. No lab exists for component: AMYP, HLPSE  No results for input(s): INR, PTP, APTT in the last 72 hours. No lab exists for component: INREXT, INREXT   No results for input(s): FE, TIBC, PSAT, FERR in the last 72 hours. No results found for: FOL, RBCF   No results for input(s): PH, PCO2, PO2 in the last 72 hours. No results for input(s): CPK, CKNDX, TROIQ in the last 72 hours.     No lab exists for component: CPKMB  Lab Results   Component Value Date/Time    Cholesterol, total 144 05/12/2018 08:27 AM    HDL Cholesterol 78 05/12/2018 08:27 AM    LDL, calculated 53.6 05/12/2018 08:27 AM    Triglyceride 62 05/12/2018 08:27 AM    CHOL/HDL Ratio 1.8 05/12/2018 08:27 AM     Lab Results   Component Value Date/Time    Glucose (POC) 116 (H) 05/19/2018 07:46 AM    Glucose (POC) 114 (H) 05/18/2018 09:55 PM    Glucose (POC) 113 (H) 05/18/2018 04:34 PM    Glucose (POC) 114 (H) 05/18/2018 11:39 AM    Glucose (POC) 114 (H) 05/18/2018 07:54 AM     Lab Results   Component Value Date/Time    Color YELLOW/STRAW 03/16/2018 02:45 PM    Appearance CLEAR 03/16/2018 02:45 PM    Specific gravity 1.014 03/16/2018 02:45 PM    pH (UA) 5.5 03/16/2018 02:45 PM    Protein NEGATIVE  03/16/2018 02:45 PM    Glucose NEGATIVE  03/16/2018 02:45 PM    Ketone NEGATIVE  03/16/2018 02:45 PM    Bilirubin NEGATIVE  03/16/2018 02:45 PM    Urobilinogen 1.0 03/16/2018 02:45 PM    Nitrites NEGATIVE  03/16/2018 02:45 PM    Leukocyte Esterase NEGATIVE  03/16/2018 02:45 PM    Epithelial cells FEW 03/16/2018 02:45 PM    Bacteria 1+ (A) 03/16/2018 02:45 PM    WBC 5-10 03/16/2018 02:45 PM    RBC 0-5 03/16/2018 02:45 PM         Medications Reviewed:     Current Facility-Administered Medications   Medication Dose Route Frequency    minoxidil (LONITEN) tablet 5 mg  5 mg Oral DAILY    amLODIPine (NORVASC) tablet 10 mg  10 mg Oral DAILY    losartan (COZAAR) tablet 100 mg  100 mg Oral DAILY    saline peripheral flush soln 10 mL  10 mL IntraVENous PRN    therapeutic multivitamin (THERAGRAN) tablet 1 Tab  1 Tab Oral DAILY    thiamine (B-1) tablet 100 mg  100 mg Oral DAILY    folic acid (FOLVITE) tablet 1 mg  1 mg Oral DAILY    QUEtiapine (SEROquel) tablet 25 mg  25 mg Oral QHS    pantoprazole (PROTONIX) tablet 40 mg  40 mg Oral ACD    insulin lispro (HUMALOG) injection   SubCUTAneous AC&HS    LORazepam (ATIVAN) tablet 1 mg  1 mg Oral Q1H PRN    LORazepam (ATIVAN) tablet 2 mg  2 mg Oral Q1H PRN    LORazepam (ATIVAN) injection 1 mg  1 mg IntraVENous Q1H PRN    LORazepam (ATIVAN) injection 2 mg  2 mg IntraVENous Q1H PRN    hydrALAZINE (APRESOLINE) 20 mg/mL injection 10 mg  10 mg IntraVENous Q4H PRN    labetalol (NORMODYNE;TRANDATE) injection 10 mg  10 mg IntraVENous Q4H PRN    fentaNYL citrate (PF) injection 25 mcg  25 mcg IntraVENous Q2H PRN    levETIRAcetam (KEPPRA) tablet 500 mg  500 mg Oral Q12H    isosorbide mononitrate ER (IMDUR) tablet 30 mg  30 mg Oral DAILY    metoprolol succinate (TOPROL-XL) XL tablet 25 mg  25 mg Oral DAILY    glucose chewable tablet 16 g  4 Tab Oral PRN    dextrose (D50W) injection syrg 12.5-25 g  12.5-25 g IntraVENous PRN    glucagon (GLUCAGEN) injection 1 mg  1 mg IntraMUSCular PRN    arformoterol (BROVANA) neb solution 15 mcg  15 mcg Nebulization BID RT    And    budesonide (PULMICORT) 500 mcg/2 ml nebulizer suspension  500 mcg Nebulization BID RT    acetaminophen (TYLENOL) tablet 650 mg  650 mg Oral Q4H PRN    albuterol-ipratropium (DUO-NEB) 2.5 MG-0.5 MG/3 ML  3 mL Nebulization Q6H PRN    finasteride (PROSCAR) tablet 5 mg  5 mg Oral DAILY    levothyroxine (SYNTHROID) tablet 88 mcg  88 mcg Oral ACB    atorvastatin (LIPITOR) tablet 40 mg  40 mg Oral QHS    tamsulosin (FLOMAX) capsule 0.4 mg  0.4 mg Oral DAILY    sodium chloride (NS) flush 5-10 mL  5-10 mL IntraVENous Q8H    ondansetron (ZOFRAN) injection 4 mg  4 mg IntraVENous Q4H PRN    HYDROmorphone (DILAUDID) tablet 2 mg  2 mg Oral Q4H PRN    LORazepam (ATIVAN) injection 1 mg  1 mg IntraVENous Q4H PRN     ______________________________________________________________________  EXPECTED LENGTH OF STAY: 7d 0h  ACTUAL LENGTH OF STAY:          7                 Kelley Cardoza MD

## 2018-05-19 NOTE — PROGRESS NOTES
Problem: Falls - Risk of  Goal: *Absence of Falls  Document Hakan Fall Risk and appropriate interventions in the flowsheet. Outcome: Progressing Towards Goal  Fall Risk Interventions:  Mobility Interventions: Assess mobility with egress test, Bed/chair exit alarm, OT consult for ADLs, Patient to call before getting OOB, PT Consult for mobility concerns, PT Consult for assist device competence, Strengthening exercises (ROM-active/passive)    Mentation Interventions: Adequate sleep, hydration, pain control, Bed/chair exit alarm, Door open when patient unattended, Evaluate medications/consider consulting pharmacy, Familiar objects from home, Increase mobility, More frequent rounding, Reorient patient, Room close to nurse's station, Self-releasing belt, Toileting rounds, Update white board    Medication Interventions: Assess postural VS orthostatic hypotension, Bed/chair exit alarm, Evaluate medications/consider consulting pharmacy, Patient to call before getting OOB, Teach patient to arise slowly    Elimination Interventions: Bed/chair exit alarm, Call light in reach, Elevated toilet seat, Patient to call for help with toileting needs, Toilet paper/wipes in reach, Toileting schedule/hourly rounds    History of Falls Interventions: Bed/chair exit alarm, Consult care management for discharge planning, Door open when patient unattended, Evaluate medications/consider consulting pharmacy, Investigate reason for fall, Room close to nurse's station        Problem: Pressure Injury - Risk of  Goal: *Prevention of pressure injury  Document Cristian Scale and appropriate interventions in the flowsheet. Outcome: Progressing Towards Goal  Pressure Injury Interventions:  Sensory Interventions: Assess changes in LOC, Discuss PT/OT consult with provider, Float heels, Keep linens dry and wrinkle-free, Maintain/enhance activity level, Minimize linen layers, Turn and reposition approx.  every two hours (pillows and wedges if needed)    Moisture Interventions: Absorbent underpads, Apply protective barrier, creams and emollients, Assess need for specialty bed, Contain wound drainage, Internal/External urinary devices, Limit adult briefs, Maintain skin hydration (lotion/cream), Minimize layers, Moisture barrier, Offer toileting Q_hr    Activity Interventions: Increase time out of bed, Pressure redistribution bed/mattress(bed type), PT/OT evaluation    Mobility Interventions: Assess need for specialty bed, Float heels, HOB 30 degrees or less, PT/OT evaluation, Turn and reposition approx.  every two hours(pillow and wedges)    Nutrition Interventions: Document food/fluid/supplement intake    Friction and Shear Interventions: Apply protective barrier, creams and emollients, Feet elevated on foot rest, Lift sheet, Lift team/patient mobility team, Transferring/repositioning devices

## 2018-05-19 NOTE — PROGRESS NOTES
Bedside shift change report given to Alex Hernandez RN (oncoming nurse) by Jean Carlos Shelby RN (offgoing nurse). Report included the following information SBAR, Kardex, ED Summary, Procedure Summary, Intake/Output, MAR, Accordion, Recent Results, Med Rec Status, Cardiac Rhythm NSR, Sinus Tach and Alarm Parameters .

## 2018-05-19 NOTE — PROGRESS NOTES
Bedside shift change report given to [facility] (oncoming nurse) by Maxx Watson (offgoing nurse). Report included the following information SBAR, Kardex, ED Summary, OR Summary, Procedure Summary, Intake/Output, MAR and Recent Results.

## 2018-05-20 VITALS
SYSTOLIC BLOOD PRESSURE: 133 MMHG | TEMPERATURE: 98.5 F | HEIGHT: 63 IN | DIASTOLIC BLOOD PRESSURE: 67 MMHG | BODY MASS INDEX: 31.41 KG/M2 | RESPIRATION RATE: 21 BRPM | HEART RATE: 80 BPM | OXYGEN SATURATION: 97 % | WEIGHT: 177.25 LBS

## 2018-05-20 PROBLEM — S06.5XAA SUBDURAL HEMATOMA: Status: RESOLVED | Noted: 2018-05-12 | Resolved: 2018-05-20

## 2018-05-20 LAB
BACTERIA SPEC CULT: NORMAL
GLUCOSE BLD STRIP.AUTO-MCNC: 111 MG/DL (ref 65–100)
GRAM STN SPEC: NORMAL
GRAM STN SPEC: NORMAL
SERVICE CMNT-IMP: ABNORMAL
SERVICE CMNT-IMP: NORMAL

## 2018-05-20 PROCEDURE — 94640 AIRWAY INHALATION TREATMENT: CPT

## 2018-05-20 PROCEDURE — 74011250637 HC RX REV CODE- 250/637: Performed by: INTERNAL MEDICINE

## 2018-05-20 PROCEDURE — 74011250637 HC RX REV CODE- 250/637: Performed by: NURSE PRACTITIONER

## 2018-05-20 PROCEDURE — 82962 GLUCOSE BLOOD TEST: CPT

## 2018-05-20 PROCEDURE — 74011000250 HC RX REV CODE- 250: Performed by: INTERNAL MEDICINE

## 2018-05-20 RX ORDER — AMLODIPINE BESYLATE 10 MG/1
10 TABLET ORAL DAILY
Qty: 60 TAB | Refills: 0 | Status: SHIPPED | OUTPATIENT
Start: 2018-05-20 | End: 2018-08-15 | Stop reason: ALTCHOICE

## 2018-05-20 RX ORDER — ARFORMOTEROL TARTRATE 15 UG/2ML
15 SOLUTION RESPIRATORY (INHALATION) 2 TIMES DAILY
Qty: 60 VIAL | Refills: 0 | Status: SHIPPED | OUTPATIENT
Start: 2018-05-20 | End: 2018-08-15 | Stop reason: ALTCHOICE

## 2018-05-20 RX ORDER — LEVETIRACETAM 500 MG/1
500 TABLET ORAL EVERY 12 HOURS
Qty: 60 TAB | Refills: 0 | Status: SHIPPED | OUTPATIENT
Start: 2018-05-20 | End: 2018-09-12 | Stop reason: SDUPTHER

## 2018-05-20 RX ORDER — ARFORMOTEROL TARTRATE 15 UG/2ML
15 SOLUTION RESPIRATORY (INHALATION) DAILY
Qty: 30 VIAL | Refills: 0 | Status: SHIPPED | OUTPATIENT
Start: 2018-05-20 | End: 2018-05-20

## 2018-05-20 RX ORDER — ALBUTEROL SULFATE 5 MG/ML
2.5 SOLUTION RESPIRATORY (INHALATION)
Qty: 40 ML | Refills: 0 | Status: SHIPPED | OUTPATIENT
Start: 2018-05-20 | End: 2018-06-06 | Stop reason: DRUGHIGH

## 2018-05-20 RX ORDER — MINOXIDIL 2.5 MG/1
5 TABLET ORAL DAILY
Qty: 30 TAB | Refills: 0 | Status: SHIPPED | OUTPATIENT
Start: 2018-05-20 | End: 2018-08-15 | Stop reason: ALTCHOICE

## 2018-05-20 RX ORDER — BUDESONIDE 0.5 MG/2ML
500 INHALANT ORAL 2 TIMES DAILY
Qty: 60 EACH | Refills: 0 | Status: SHIPPED | OUTPATIENT
Start: 2018-05-20 | End: 2018-08-15 | Stop reason: SDUPTHER

## 2018-05-20 RX ADMIN — TAMSULOSIN HYDROCHLORIDE 0.4 MG: 0.4 CAPSULE ORAL at 10:12

## 2018-05-20 RX ADMIN — THERA TABS 1 TABLET: TAB at 10:12

## 2018-05-20 RX ADMIN — LEVETIRACETAM 500 MG: 500 TABLET ORAL at 10:14

## 2018-05-20 RX ADMIN — Medication 10 ML: at 06:43

## 2018-05-20 RX ADMIN — FINASTERIDE 5 MG: 5 TABLET, FILM COATED ORAL at 10:12

## 2018-05-20 RX ADMIN — BUDESONIDE 500 MCG: 0.5 INHALANT RESPIRATORY (INHALATION) at 07:27

## 2018-05-20 RX ADMIN — ARFORMOTEROL TARTRATE 15 MCG: 15 SOLUTION RESPIRATORY (INHALATION) at 07:27

## 2018-05-20 RX ADMIN — FOLIC ACID 1 MG: 1 TABLET ORAL at 10:14

## 2018-05-20 RX ADMIN — ACETAMINOPHEN 650 MG: 325 TABLET, FILM COATED ORAL at 10:13

## 2018-05-20 RX ADMIN — ISOSORBIDE MONONITRATE 30 MG: 30 TABLET, EXTENDED RELEASE ORAL at 10:14

## 2018-05-20 RX ADMIN — LOSARTAN POTASSIUM 100 MG: 50 TABLET, FILM COATED ORAL at 10:14

## 2018-05-20 RX ADMIN — LEVOTHYROXINE SODIUM 88 MCG: 88 TABLET ORAL at 06:43

## 2018-05-20 RX ADMIN — MINOXIDIL 5 MG: 2.5 TABLET ORAL at 10:13

## 2018-05-20 RX ADMIN — METOPROLOL SUCCINATE 25 MG: 25 TABLET, EXTENDED RELEASE ORAL at 10:14

## 2018-05-20 RX ADMIN — AMLODIPINE BESYLATE 10 MG: 5 TABLET ORAL at 10:14

## 2018-05-20 RX ADMIN — Medication 100 MG: at 10:13

## 2018-05-20 NOTE — PROGRESS NOTES
I have reviewed discharge instructions with the patient. The patient verbalized understanding but is only A&O to self and is unable to sign r/t AMS. TRANSFER - OUT REPORT:    Verbal report given to [facility] (name) khang Ramachandran  being transferred to [facility] (unit) for routine progression of care       Report consisted of patients Situation, Background, Assessment and   Recommendations(SBAR). Information from the following report(s) SBAR, Kardex, Procedure Summary, Intake/Output, MAR, Recent Results and Cardiac Rhythm NSR was reviewed with the receiving nurse. Opportunity for questions and clarification was provided.       Patient transported with:   EMS via stretcher and personal belongings

## 2018-05-20 NOTE — PROGRESS NOTES
Care Management     Discharge    Lisandra/[facility] Ree, they have accepted Mr. Dinah Gomez. Spoke to RN, Nessa Rivera, she is aware. Dr. Addison April to discharge patient this AM.       AMR - 10:30AM      EMTALA to [facility], Dr. Raudel Galaviz Accepting MD. Tanika Hickey PICKUP:  [facility]  [phone]     TO COMPLETE DISCHARGE,  RN PLEASE:    1. Call Report:  [phone]    2. Add to Discharge Envelope (on chart):      __ Discharge Orders    __ MAR    __ Kardex    __ Hard-copy prescriptions (if any narcotics/controlled substances)      Care Management Interventions  PCP Verified by CM:  Yes (Dr Siva Bustos)  Mode of Transport at Discharge: BLS ([facility] to transport)  Hospital Transport Time of Discharge: 1030  Transition of Care Consult (CM Consult): Discharge Planning, Other (Discharging to [place] [address])  [address]: No  Reason Outside [place]: Patient already serviced by other home care/hospice agency  MyChart Signup: No  Discharge Durable Medical Equipment: No  Physical Therapy Consult: No  Occupational Therapy Consult: No  Speech Therapy Consult: No  Current Support Network: Lives Alone (Familia Gonzalez [phone])  Plan discussed with Pt/Family/Caregiver: Yes  Freedom of Choice Offered: Yes  Discharge Location  Discharge Placement: Rehab hospital/unit acute [facility])       GERALD Harvey

## 2018-05-20 NOTE — DISCHARGE SUMMARY
Discharge Summary       PATIENT ID: Wendee Krabbe  MRN: [id number]   YOB: 1935    DATE OF ADMISSION: 5/12/2018 12:31 AM    DATE OF DISCHARGE: 5/20/2018    PRIMARY CARE PROVIDER: Maura Oneal MD     ATTENDING PHYSICIAN: Joan Goyal MD   DISCHARGING PROVIDER: Joan Goyal MD    To contact this individual call [phone] and ask the  to page. If unavailable ask to be transferred the Adult Hospitalist Department. CONSULTATIONS: None    PROCEDURES/SURGERIES: Procedure(s):  CRANIOTOMY    ADMITTING DIAGNOSES & HOSPITAL COURSE:       Last NSurgy note:    Seth Bolaños MD Physician Signed Neurosurgery Progress Notes Date of Service: 05/19/18 1024         []Hide copied text  []Steviever for attribution information  POD 7  tmax 99.1  Subgaleal fluid culture from yest - neg  Alert  Incision c/d/i  Small re-accummulation of subgaleal fluid  S/p: crani for right SDH  Doing well             Admission Summary:    This is an 60-year-old man with a past medical history significant for coronary artery disease status post stent placement, dyslipidemia, hypertension, CVA, COPD, peripheral vascular disease, hypothyroidism, benign prostatic hyperplasia, who was in his usual state of health until the day of presentation at the emergency room when it was reported that the patient sustained a ground level fall.  According to report, the patient was found naked on the floor with a bottle of bourbon.  He stated that his leg gave out on him and he fell as a result.  Following the fall, the patient started complaining of back pain.  The back pain is located at the lower back.  The pain is constant with no radiation, 7/10 in severity, worse with movement of the back.  No known relieving factors.  Patient was taken to [facility] for further evaluation.  When the patient arrived at the emergency room, CT scan of the head was obtained.  The CT scan shows a subdural hematoma.  The emergency room physician consulted a neurosurgeon at [facility] who advised a transfer to [address] [place] [address] service was asked to accept the patient as a direct admission to the hospitalist service. Laverne Velasquez patient was last admitted to this hospital from 03/16/2018 to 03/23/2018. Isabeal Vieyra was admitted and treated for thromboembolism.  The patient has no fever, no rigors, no chills.      Interval history / Subjective:         5/19/2018   This note is a follow up note for multiple medical issues as listed below and partially expanded on herewith. Up in chair eating, is oriented to recent events, [facility]. Head better after tap by Neurosurg 5/18 pm.   Pt is cleared for discharge to inpt rehab.         Assessment & Plan:        Subacute right frontoparietal subdural hematoma  Right frontoparietal craniotomy with evacuation of subdural hematoma and placement of subdural drain. , seems to be progression as attempting to follow. And 5/16/2018 is following, equal motor upper ext /pupils equal  5/19/2018 non focal and fully volitional in motor. 5/19/2018 : Up in chair eating, is oriented to recent events, [facility]. Head better after tap by Neurosurg 5/18 pm.   · Pt is cleared for discharge to inpt rehab. · Elevated troponin, possible NSTMI , treatilng the main problem, acute SDH as only option at this time. Bb added. No asa no heparin products. No anticoagulation option, pt now alert w/o cp   ·  Alcoholism: On scheduled Ativan. And goodie bag, now po thiamin- discussed 5/19/2018 as pt alert enough to participate in conversation   ·  Coronary artery disease status post stent placement.  Plavix and aspirin on hold because of the subdural hematoma., BB on board   ·  Hypothyroidism.  We will continue with Synthroid. ·  Anemia : Monitor CBC.         Lab Results   Component Value Date/Time     Hemoglobin (POC) 13.3 02/05/2017 03:44 PM     HGB 9.1 (L) 05/18/2018 03:15 AM      ·  COPD, unknown severity, not bronchospastic  ·  History of hypothyroidism  ·  History of prostate cancer      Code status: full  DVT prophylaxis: SCD      Care Plan discussed with: Patient/Family  Disposition: Home w/Family and TBD        -- -- -- -- -- -- -- -- -- -- -- 80.4 kg (177 lb 4 oz)      05/20/18 0639 98.2 °F (36.8 °C) 74 116/63 81 -- -- 16 94 % -- -- -- --     05/20/18 0319 98.2 °F (36.8 °C) 74 111/52 72 -- -- 17 95 % -- -- -- --     05/19/18 2300 99 °F (37.2 °C) 76 107/60 76 -- -- 19 95 % -- -- -- --     05/19/18 2040 -- -- -- -- -- -- --  86 % Nasal cannula;Humidifier 3 l/min -- --     05/19/18 2028 -- -- -- -- -- -- -- 90 % Room air -- -- --     05/19/18 1900 98.5 °F (36.9 °C) 80 92/43  59 -- At rest;Supine 20 94 % Room air -- -- --     05/19/18 1600 -- -- -- -- -- -- -- -- Room air -- -- --     05/19/18 1500 98.3 °F (36.8 °C) 89 113/59 77 -- At rest;Sitting 18 93 % Room air -- -- --     05/19/18 1200 -- -- -- -- -- -- -- -- Room air -- -- --     05/19/18 1126 -- 88 133/85 -- -- -- -- -- -- -- -- --     05/19/18 1100 98.4 °F (36.9 °C) 89 133/85 101 -- Lying left side 18 94 % Room air -- -- --     05/19/18 0847 -- -- -- -- -- -- -- 94 % Room air -- -- --     05/19/18 0800 -- -- -- -- -- -- -- -- Room air -- -- --     05/19/18 0700 99.1 °F (37.3 °C) 100 131/65 87 -- At rest 16 93               Lab Results   Component Value Date/Time    WBC 7.2 05/18/2018 03:15 AM    Hemoglobin (POC) 13.3 02/05/2017 03:44 PM    HGB 9.1 (L) 05/18/2018 03:15 AM    Hematocrit (POC) 39 02/05/2017 03:44 PM    HCT 28.7 (L) 05/18/2018 03:15 AM    PLATELET 650 68/16/0012 03:15 AM    MCV 83.4 05/18/2018 03:15 AM     This SmartLink has not been configured with any valid records.       Lab Results   Component Value Date/Time    Sodium 140 05/18/2018 03:15 AM    Potassium 3.5 05/18/2018 03:15 AM    Chloride 107 05/18/2018 03:15 AM    CO2 24 05/18/2018 03:15 AM    Anion gap 9 05/18/2018 03:15 AM    Glucose 96 05/18/2018 03:15 AM    BUN 11 05/18/2018 03:15 AM Creatinine 0.83 05/18/2018 03:15 AM    BUN/Creatinine ratio 13 05/18/2018 03:15 AM    GFR est AA >60 05/18/2018 03:15 AM    GFR est non-AA >60 05/18/2018 03:15 AM    Calcium 9.1 05/18/2018 03:15 AM      [address] Problems  Date Reviewed: 5/12/2018             Codes Class Noted POA     * (Principal)Subdural hematoma ([facility] ICD-10-CM: I62.00  ICD-9-CM: 024. 1   5/12/2018 Yes                     Review of Systems:    headache, no n/v,   No cp no sob.         Vital Signs:    Last 24hrs VS reviewed since prior progress note. Most recent are:       Visit Vitals    /65 (BP 1 Location: Left arm, BP Patient Position: At rest)    Pulse 100    Temp 99.1 °F (37.3 °C)    Resp 16    Ht 5' 3\" (1.6 m)    Wt 84.3 kg (185 lb 13.6 oz)    SpO2 94%    BMI 32.92 kg/m2            Intake/Output Summary (Last 24 hours) at 05/19/18 0850  Last data filed at 05/19/18 0400    Gross per 24 hour   Intake                0 ml   Output             1500 ml   Net            -1500 ml         Physical Examination:             Constitutional:  no acute distress, alert cooperative. ENT:  Oral mucous moist, oropharynx benign. Neck supple,    Resp:  CTA bilaterally. No wheezing/rhonchi/rales. No accessory muscle use   CV:  Regular rhythm, normal rate, no murmurs, gallops, rubs    GI:  Soft, non distended, non tender. normoactive bowel sounds, no hepatosplenomegaly     Musculoskeletal:  No edema, warm, 2+ pulses throughout    neuro. non focal grossly  oriented to recent events and [facility].                                                        Data Review:    Review and/or order of clinical lab test        Labs:           Recent Labs       05/18/18   0315  05/17/18   0320   WBC  7.2  6.7   HGB  9.1*  9.0*   HCT  28.7*  28.6*   PLT  185  154           Recent Labs       05/18/18   0315  05/17/18   0320   NA  140  141   K  3.5  3.6   CL  107  108   CO2  24  22   BUN  11  10   CREA  0.83  0.81   GLU  96  107*   CA  9.1  9.0      No results for input(s): SGOT, GPT, ALT, AP, TBIL, TBILI, TP, ALB, GLOB, GGT, AML, LPSE in the last 72 hours.     No lab exists for component: AMYP, HLPSE  No results for input(s): INR, PTP, APTT in the last 72 hours.     No lab exists for component: INREXT, INREXT   No results for input(s): FE, TIBC, PSAT, FERR in the last 72 hours. No results found for: FOL, RBCF   No results for input(s): PH, PCO2, PO2 in the last 72 hours.   No results for input(s): CPK, CKNDX, TROIQ in the last 72 hours.     No lab exists for component: CPKMB        Lab Results   Component Value Date/Time     Cholesterol, total 144 05/12/2018 08:27 AM     HDL Cholesterol 78 05/12/2018 08:27 AM     LDL, calculated 53.6 05/12/2018 08:27 AM     Triglyceride 62 05/12/2018 08:27 AM     CHOL/HDL Ratio 1.8 05/12/2018 08:27 AM            Lab Results   Component Value Date/Time     Glucose (POC) 116 (H) 05/19/2018 07:46 AM     Glucose (POC) 114 (H) 05/18/2018 09:55 PM     Glucose (POC) 113 (H) 05/18/2018 04:34 PM     Glucose (POC) 114 (H) 05/18/2018 11:39 AM     Glucose (POC) 114 (H) 05/18/2018 07:54 AM      Lab Results   Component Value Date/Time     Color YELLOW/STRAW 03/16/2018 02:45 PM     Appearance CLEAR 03/16/2018 02:45 PM     Specific gravity 1.014 03/16/2018 02:45 PM     pH (UA) 5.5 03/16/2018 02:45 PM     Protein NEGATIVE  03/16/2018 02:45 PM     Glucose NEGATIVE  03/16/2018 02:45 PM     Ketone NEGATIVE  03/16/2018 02:45 PM     Bilirubin NEGATIVE  03/16/2018 02:45 PM     Urobilinogen 1.0 03/16/2018 02:45 PM     Nitrites NEGATIVE  03/16/2018 02:45 PM     Leukocyte Esterase NEGATIVE  03/16/2018 02:45 PM     Epithelial cells FEW 03/16/2018 02:45 PM     Bacteria 1+ (A) 03/16/2018 02:45 PM     WBC 5-10 03/16/2018 02:45 PM     RBC 0-5 03/16/2018 02:45 PM              x   ______________________________________________________________________        DISCHARGE DIAGNOSES / PLAN:      1.  as above        PENDING TEST RESULTS:   At the time of discharge the following test results are still pending: na    FOLLOW UP APPOINTMENTS:    Follow-up Information     Follow up With Details Comments Brandi [address] [facility]    Domonique Davis MD In 1 week  [address]  [address]  [phone]             ADDITIONAL CARE RECOMMENDATIONS: na    DIET: Cardiac Diet    ACTIVITY: Activity as tolerated    WOUND CARE: na    EQUIPMENT needed: na      DISCHARGE MEDICATIONS:  Current Discharge Medication List      START taking these medications    Details   budesonide (PULMICORT) 0.5 mg/2 mL nbsp 2 mL by Nebulization route two (2) times a day. Qty: 60 Each, Refills: 0      levETIRAcetam (KEPPRA) 500 mg tablet Take 1 Tab by mouth every twelve (12) hours. Qty: 60 Tab, Refills: 0      albuterol (PROVENTIL) 5 mg/mL nebulizer solution 0.5 mL by Nebulization route every six (6) hours as needed for Wheezing. Qty: 40 mL, Refills: 0      arformoterol (BROVANA) 15 mcg/2 mL nebu neb solution 2 mL by Nebulization route two (2) times a day. Qty: 60 Vial, Refills: 0      minoxidil (LONITEN) 2.5 mg tablet Take 2 Tabs by mouth daily. Qty: 30 Tab, Refills: 0         CONTINUE these medications which have CHANGED    Details   amLODIPine (NORVASC) 10 mg tablet Take 1 Tab by mouth daily. Qty: 60 Tab, Refills: 0         CONTINUE these medications which have NOT CHANGED    Details   LIPITOR 40 mg tablet TAKE 1 TABLET BY MOUTH EVERY DAY  Qty: 90 Tab, Refills: 0    Associated Diagnoses: Dyslipidemia      Nebulizer & Compressor machine 1 Each by Does Not Apply route as needed. Qty: 1 Each, Refills: 0      levothyroxine (SYNTHROID) 88 mcg tablet TAKE 1 TABLET BY MOUTH DAILY (BEFORE BREAKFAST).   Qty: 30 Tab, Refills: 0      isosorbide mononitrate ER (IMDUR) 30 mg tablet TAKE 1 TABLET BY MOUTH EVERY DAY  Qty: 30 Tab, Refills: 2    Associated Diagnoses: PVD (peripheral vascular disease) with claudication ([facility] [address] Coronary artery disease due to lipid rich plaque      metoprolol succinate (TOPROL-XL) 25 mg XL tablet TAKE 1 TABLET EVERY DAY  Qty: 30 Tab, Refills: 2      losartan-hydroCHLOROthiazide (HYZAAR) 100-12.5 mg per tablet TAKE 1 TABLET BY MOUTH EVERY DAY  Qty: 30 Tab, Refills: 2      omeprazole (PRILOSEC) 40 mg capsule Take 1 Cap by mouth two (2) times a day. Qty: 60 Cap, Refills: 1      tamsulosin (FLOMAX) 0.4 mg capsule TAKE 1 CAPSULE EVERY DAY  Qty: 90 Cap, Refills: 0      CHOLECALCIFEROL, VITAMIN D3, (VITAMIN D3 PO) Take  by mouth. finasteride (PROSCAR) 5 mg tablet daily. Associated Diagnoses: BPH (benign prostatic hypertrophy)         STOP taking these medications       furosemide (LASIX) 20 mg tablet Comments:   Reason for Stopping:         clopidogrel (PLAVIX) 75 mg tab Comments:   Reason for Stopping:         aspirin delayed-release 81 mg tablet Comments:   Reason for Stopping:         albuterol (PROVENTIL HFA, VENTOLIN HFA, PROAIR HFA) 90 mcg/actuation inhaler Comments:   Reason for Stopping:         clotrimazole-betamethasone (LOTRISONE) topical cream Comments:   Reason for Stopping:         albuterol-ipratropium (DUO-NEB) 2.5 mg-0.5 mg/3 ml nebu Comments:   Reason for Stopping:         ascorbic acid (VITAMIN C) 1,000 mg tablet Comments:   Reason for Stopping:                 NOTIFY YOUR PHYSICIAN FOR ANY OF THE FOLLOWING:   Fever over 101 degrees for 24 hours. Chest pain, shortness of breath, fever, chills, nausea, vomiting, diarrhea, change in mentation, falling, weakness, bleeding. Severe pain or pain not relieved by medications. Or, any other signs or symptoms that you may have questions about.     DISPOSITION:    Home With:   OT  PT  HH  RN       Long term SNF/Inpatient Rehab   x Independent/assisted living    Hospice    Other:       PATIENT CONDITION AT DISCHARGE:     Functional status    Poor    x Deconditioned Independent      Cognition    x Lucid     Forgetful     Dementia      Catheters/lines (plus indication)    Trejo     PICC     PEG    x None      Code status    x Full code     DNR      PHYSICAL EXAMINATION AT DISCHARGE:   Refer to Progress Note  Visit Vitals    /63 (BP 1 Location: Left arm)    Pulse 74    Temp 98.2 °F (36.8 °C)    Resp 16    Ht 5' 3\" (1.6 m)    Wt 80.4 kg (177 lb 4 oz)    SpO2 (!) 89%    BMI 31.4 kg/m2          CHRONIC MEDICAL DIAGNOSES:  Problem List as of 5/20/2018  Date Reviewed: 5/20/2018          Codes Class Noted - Resolved    Acute DVT (deep venous thrombosis) ([facility] ICD-10-CM: I82.409  ICD-9-CM: 453.40  3/16/2018 - Present        Claudication of both lower extremities ([facility] ICD-10-CM: I73.9  ICD-9-CM: 443.9  2/12/2018 - Present        Bilateral deafness ICD-10-CM: H91.93  ICD-9-CM: 389.9  1/9/2018 - Present        Rectal bleeding ICD-10-CM: K62.5  ICD-9-CM: 569.3  11/21/2017 - Present        S/P cardiac cath ICD-10-CM: Z98.890  ICD-9-CM: V45.89  11/7/2017 - Present    Overview Signed 11/7/2017 10:36 AM by Brooke Siddiqi NP     11/6/17: PTCA Ramus Intermediate, MARY ANN Cx, MARY ANN  dRCA               SOBOE (shortness of breath on exertion) ICD-10-CM: R06.02  ICD-9-CM: 786.05  10/11/2017 - Present        Myocardial infarction ([facility] ICD-10-CM: I21.9  ICD-9-CM: 410.90  3/8/2016 - Present        Calculus of kidney ICD-10-CM: N20.0  ICD-9-CM: 592.0  3/8/2016 - Present        Osteoarthritis ICD-10-CM: M19.90  ICD-9-CM: 715.90  3/8/2016 - Present        Alkaline phosphatase elevation ICD-10-CM: R74.8  ICD-9-CM: 790.5  1/14/2016 - Present        Angina, class III (HCC) ICD-10-CM: I20.9  ICD-9-CM: 413.9  2/12/2015 - Present        Prostate cancer ([facility] ICD-10-CM: C61  ICD-9-CM: 185  5/23/2014 - Present        Lipoma of back ICD-10-CM: D17.1  ICD-9-CM: 214.8  2/5/2014 - Present        Nodule, subcutaneous ICD-10-CM: R22.9  ICD-9-CM: 782.2  11/4/2013 - Present        Vitamin D deficiency ICD-10-CM: E55.9  ICD-9-CM: 268.9  11/4/2013 - Present        Prediabetes ICD-10-CM: R73.03  ICD-9-CM: 790.29  6/19/2013 - Present        Tinea cruris ICD-10-CM: B35.6  ICD-9-CM: 110.3  6/19/2013 - Present        Cerebral embolism with cerebral infarction [facility]) ICD-10-CM: I63.40  ICD-9-CM: 434.11  6/13/2011 - Present    Overview Signed 6/13/2011  8:21 AM by Sonia Manuel. Left frontal cortical/subcortical infarcts             Carotid stenosis, left ICD-10-CM: G19.41  ICD-9-CM: 433.10  6/13/2011 - Present    Overview Signed 6/13/2011  8:21 AM by Sonia Manuel.      Symptomatic LICA stenosis with infarction             PVD (peripheral vascular disease) with claudication (HCC) ICD-10-CM: I73.9  ICD-9-CM: 443.9  5/2/2011 - Present        BPH (benign prostatic hypertrophy) ICD-10-CM: N40.0  ICD-9-CM: 600.00  10/17/2010 - Present        Dyslipidemia ICD-10-CM: E78.5  ICD-9-CM: 272.4  10/17/2010 - Present        Successful  PTCA (percutaneous transluminal coronary angioplasty)--90-95% instent restenosis RCA 10/16/10 ICD-10-CM: Z98.61  ICD-9-CM: V45.82  10/17/2010 - Present        Successful PTCA with MARY ANN Xience stent Ramus intermedius 10/16/10 ICD-10-CM: Z95.9  ICD-9-CM: V45.89  10/17/2010 - Present        Chronic back pain--DJD ICD-10-CM: G89.29  ICD-9-CM: 338.29  6/29/2010 - Present        ED (erectile dysfunction) ICD-10-CM: N52.9  ICD-9-CM: 607.84  6/21/2010 - Present        Essential hypertension ICD-10-CM: I10  ICD-9-CM: 401.9  3/19/2010 - Present        CAD (Coronary Artery Disease)--s/p PCI--MARY ANN [place] stents x4 5/09;MARY ANN [place] stents x3 8/09 ICD-10-CM: I25.10  ICD-9-CM: 414.00  3/19/2010 - Present        Hypothyroidism ICD-10-CM: E03.9  ICD-9-CM: 244.9  11/11/2009 - Present        * (Principal)RESOLVED: Subdural hematoma ([facility] ICD-10-CM: I62.00  ICD-9-CM: 432.1  5/12/2018 - 5/20/2018        RESOLVED: Right-sided sinonasal encephalocele ([facility] ICD-10-CM: Q01.8  ICD-9-CM: 742.0  1/9/2018 - 1/9/2018 RESOLVED: Epigastric abdominal pain ICD-10-CM: R10.13  ICD-9-CM: 789.06  2/13/2017 - 8/23/2017        RESOLVED: Community acquired bacterial pneumonia ICD-10-CM: J15.9  ICD-9-CM: 482.9  2/5/2017 - 8/23/2017        RESOLVED: Hypertension ICD-10-CM: I10  ICD-9-CM: 401.9  3/8/2016 - 1/16/2017        RESOLVED: Hyperlipidemia ICD-10-CM: E78.5  ICD-9-CM: 272.4  3/8/2016 - 1/16/2017        RESOLVED: Elevated BP ICD-10-CM: SKZ0754  ICD-9-CM: [place]  2/12/2015 - 5/15/2017        RESOLVED: Chest pain ICD-10-CM: R07.9  ICD-9-CM: 786.50  2/10/2015 - 8/23/2017        RESOLVED: Right sinonasal encephalocele ICD-10-CM: Q01.8  ICD-9-CM: 742.0  10/15/2012 - 1/9/2018        RESOLVED: ASHD (arteriosclerotic heart disease) ICD-10-CM: I25.10  ICD-9-CM: 414.00  6/11/2011 - 10/15/2012        RESOLVED: Hypertension ICD-10-CM: I10  ICD-9-CM: 401.9  6/11/2011 - 7/26/2011        RESOLVED: DM (diabetes mellitus), type 2 ([facility] ICD-10-CM: E11.9  ICD-9-CM: 250.00  10/17/2010 - 4/12/2012        RESOLVED: ACS (acute coronary syndrome) ([facility] ICD-10-CM: I24.9  ICD-9-CM: 411.1  10/17/2010 - 10/15/2012              Greater than  20  minutes were spent with the patient on counseling and coordination of care    Signed:   Bebe Guzman MD  5/20/2018  10:07 AM

## 2018-05-20 NOTE — PROGRESS NOTES
Problem: Discharge Planning  Goal: *Discharge to safe environment  Outcome: Resolved/Met Date Met: 05/20/18  Discharging to [address] [place] [address]

## 2018-05-20 NOTE — DISCHARGE INSTRUCTIONS
Discharge Instructions       PATIENT ID: Murphy Waite  MRN: [id number]   YOB: 1935    DATE OF ADMISSION: 5/12/2018 12:31 AM    DATE OF DISCHARGE: 5/20/2018    PRIMARY CARE PROVIDER: Kenneth Boss MD     ATTENDING PHYSICIAN: Telma Rod MD  DISCHARGING PROVIDER: Telma Rod MD    To contact this individual call [phone] and ask the  to page. If unavailable ask to be transferred the Adult Hospitalist Department. DISCHARGE DIAGNOSES see dc note     CONSULTATIONS: None    PROCEDURES/SURGERIES: Procedure(s):  CRANIOTOMY    PENDING TEST RESULTS:   At the time of discharge the following test results are still pending: na    FOLLOW UP APPOINTMENTS:   Follow-up Information     Follow up With Details Comments [place] [address] [facility] Boss MD In 1 week  [facility]  [address]  [phone]             ADDITIONAL CARE RECOMMENDATIONS: na    DIET: Cardiac Diet     ACTIVITY: Activity as tolerated    WOUND CARE: na    EQUIPMENT needed: na      DISCHARGE MEDICATIONS:   See Medication Reconciliation Form    · It is important that you take the medication exactly as they are prescribed. · Keep your medication in the bottles provided by the pharmacist and keep a list of the medication names, dosages, and times to be taken in your wallet. · Do not take other medications without consulting your doctor. NOTIFY YOUR PHYSICIAN FOR ANY OF THE FOLLOWING:   Fever over 101 degrees for 24 hours. Chest pain, shortness of breath, fever, chills, nausea, vomiting, diarrhea, change in mentation, falling, weakness, bleeding. Severe pain or pain not relieved by medications. Or, any other signs or symptoms that you may have questions about.       DISPOSITION:    Home With:   OT  PT  [facility]  RN       SNF/Inpatient Rehab/LTAC   x Independent/assisted living    Hospice    Other:          Signed:   Maritza Woods MD  5/20/2018  10:05 AM

## 2018-05-20 NOTE — PROGRESS NOTES
Bedside shift change report given to Milton Muhammad (oncoming nurse) by Ap Steiner (offgoing nurse). Report included the following information SBAR, Kardex, Intake/Output and Recent Results.

## 2018-05-20 NOTE — INTERDISCIPLINARY ROUNDS
IDR/SLIDR Summary          Patient: Sharon Melendez MRN: [id number]    Age: 81 y.o. YOB: 1935 Room/Bed: [facility]   Admit Diagnosis: SDH  Subdural hematoma (HCC)  subdurral hematoma, right frontal crani  Principal Diagnosis: Subdural hematoma (HCC)   Goals: Discharge Today to [address] [place] [address] at 1030  Readmission: NO  Quality Measure: SCIP, COPD and CIWA  VTE Prophylaxis: Mechanical  Influenza Vaccine screening completed? YES  Pneumococcal Vaccine screening completed? NO  Mobility needs: Yes   Nutrition plan:Yes  Consults:P.T, O.T. and Case Management    Financial concerns:No  Escalated to CM? YES  RRAT Score: 27   Interventions:Discharge Today to [address] [place] [address] at 1030  Testing due for pt today?  NO  LOS: 9 days Expected length of stay 8 days  Discharge plan: Discharge Today to [address] [place] [address] at 1030  PCP: Jeannie Thomas MD  Transportation needs: Yes    Days before discharge:ready for discharge and discharge pending  Discharge disposition: Discharge Today to [address] [place] [address] at [address] [facility]    Signed:     Jasmyne Dong  5/20/2018  7:30 AM

## 2018-05-21 ENCOUNTER — PATIENT OUTREACH (OUTPATIENT)
Dept: FAMILY MEDICINE CLINIC | Age: 83
End: 2018-05-21

## 2018-05-21 NOTE — PROGRESS NOTES
Hospital Discharge Follow-Up      Date/Time:  5/21/2018 11:09 AM    Patient was admitted to [facility] on 5/12/18 and discharged on 5/20/18 for Subdural Hematoma. The physician discharge summary was available at the time of outreach. [address] [place] [address] was contacted within 2 business days of discharge. Also spoke w/ pt's dtr-in-law Enrrique Mercado. Top Challenges reviewed with the provider   - Pt d/c'd to [facility] IRF  - NN spoke w/ Director of [address] [place] [address]. CM assigned to pt will be Zulay Body. IRF LOS , D/C date/plan TBD. NN contact information given          Method of communication with provider :chart routing    Inpatient RRAT score: 32  Was this a readmission? no   Patient stated reason for the readmission: n/a    Goals      Transitions of Care- collaboration & Care coordination to prevent complications post hospitalization. 5/21/18- pt d/c'd to [address] [place] [address] 5/201/18. NN attempted to speak w/ IRF nurse for Med Rec. Nurse declined to speak w/ NN \"HIPPA \". Call transferred to [facility] Dept. Spoke w/ Director Jeff Bio. Advised CM assigned to pt would be Zulay Body. NN discussed concern of pt returning safely to independent living apartment. Discussed previous Rehab adm to [place]. NN contact information provided. NN spoke w/ dtr-in-law Enrrique Mercado. NN encouraged son & dtr-in-law be proactive w/ IRF CM re pt's d/c plan. Dtr-in-law verbalized concern pt wanting to drive. Encouraged to contact Neurosurg office re driving recommendations post Craniotomy. Contact # for [facility] office given [phone]. Dtr-in-law reported pt's dtr wanted pt to move to [facility] w/ her. Pt declined. Plan- NN to collaborate w/ IRF CM & family re care coordination for safe discharge plan. Next NN f/u with IRF CM ~ 2 weeks for update-ID.

## 2018-05-25 RX ORDER — LOSARTAN POTASSIUM AND HYDROCHLOROTHIAZIDE 12.5; 1 MG/1; MG/1
TABLET ORAL
Qty: 30 TAB | Refills: 2 | Status: SHIPPED | OUTPATIENT
Start: 2018-05-25 | End: 2018-06-06 | Stop reason: SDUPTHER

## 2018-06-04 ENCOUNTER — TELEPHONE (OUTPATIENT)
Dept: INTERNAL MEDICINE CLINIC | Age: 83
End: 2018-06-04

## 2018-06-04 ENCOUNTER — PATIENT OUTREACH (OUTPATIENT)
Dept: FAMILY MEDICINE CLINIC | Age: 83
End: 2018-06-04

## 2018-06-04 ENCOUNTER — TELEPHONE (OUTPATIENT)
Dept: FAMILY MEDICINE CLINIC | Age: 83
End: 2018-06-04

## 2018-06-04 DIAGNOSIS — R06.2 WHEEZING: ICD-10-CM

## 2018-06-04 DIAGNOSIS — R06.02 SOBOE (SHORTNESS OF BREATH ON EXERTION): Primary | ICD-10-CM

## 2018-06-04 RX ORDER — NEBULIZER AND COMPRESSOR
1 EACH MISCELLANEOUS
Qty: 1 EACH | Refills: 0 | Status: SHIPPED | OUTPATIENT
Start: 2018-06-04

## 2018-06-04 NOTE — Clinical Note
Dr Cash Gave- is it possible you can see pt tomorrow instead of Adeline Ivey on Friday? Son can bring pt in. Has pt ever seen Neurology for cognition eval? Can this be considered. Please let me know about appt for tomorrow so I can inform son. Thanks.

## 2018-06-04 NOTE — TELEPHONE ENCOUNTER
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 The daughter in law Keith Jacoby) called and asked \"why the hospital ordered the neb treatments without a breathing treatment machine\". It is my knowledge that he is still in the rehab facility so I am thinking the daughter in law is trying to prepare for him coming home. Does he need the nebulizer or could he be safe with the albuterol inhaler? Or If you wanted to order a neb machine I can figure out which specialy pharmacy will cover this for them. Let me know what you would like to do. Thanks!

## 2018-06-04 NOTE — PROGRESS NOTES
Per Verbal order from Dr. Quang May, have sent over nebulizer order plus supplies needed with it sent to the pharmacy in hopes that pharmacy can stock the machine for the patient. Have notified daughter in law and will f/u in am to see if pharmacy able to supply.

## 2018-06-04 NOTE — TELEPHONE ENCOUNTER
1400: Spoke with dtr in law shortly after message was left and stated this writer will work on getting a neb machine supplied to them. Stated we would be in touch. She voiced understanding.
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
## 2018-06-04 NOTE — TELEPHONE ENCOUNTER
Called and left  for Mrs Marjorei Waters to return call at her [facility].   We spoke this morning and will be able to answer her concerns she had this am.

## 2018-06-05 ENCOUNTER — PATIENT OUTREACH (OUTPATIENT)
Dept: FAMILY MEDICINE CLINIC | Age: 83
End: 2018-06-05

## 2018-06-05 NOTE — Clinical Note
Pt scheduled for tomorrow @ 11 AM w/ Dr Tracy Woody for [facility] f/u. Cancelled appt w/ Brittney Lopez for Fri 6/8.

## 2018-06-05 NOTE — PROGRESS NOTES
NN F/U Progress Note    Goals Addressed      Safety- Prevention of injuries, falls                  6/5/18- spoke w/ dtr-in-law. Reported currently @ pt's apartment. Pt had not done Neb tx. Appears he may have taken meds from pill box. \"This is too much for him to manage alone\". Dtr-in-law reported someone from [facility] to visit @ 11:30 AM. Not sure if SN or PT. \"I don't think he's going to much PT\". Discussed importance of dtr-in-law & son being present during [facility] MSW visit. Plan- dtr-in-law & son to accompany pt to PCP f/u. Dtr-in-law to contact [facility] MSW re date of home visit. NN collaboration to assist with residential FRANCK as needed. Next NN f/u ~ 1 week-ID.     6/4/18- spoke w/ son & dtr-in-law. Concern for pt living alone & safety discussed. Son contacted [facility] MSW. [facility] MSW to visit pt this week. Son & dtr-in-law goal to have pt transition to [place]. Pt resistant. Plan-pt to use walker for mobility as per IRF PT/OT. [facility] MSW to visit pt. Recommend son & dtr-in-law be present during visit. NN to collaborate as needed to assist with residential FRANCK as needed. Next NN f/u ~ 1 week-ID       Transitions of Care- collaboration & Care coordination to prevent complications post hospitalization. 6/5/16- spoke w/ dtr-in-law Bryon James. PCP f/u appt changed to 6/6/18 @ 11 AM w/ Dr Alice Becerril. Dtr-in-law instructed to bring all pt meds & paperwork from [facility]. Dtr-in-law & son to accompany pt. Plan- pt to attend 6/6/18 PCP FRANCK f/u as scheduled. Next NN f/u ~ 1 week-ID.    6/4/18- pt d/c'd 6/3/18 from IRF to independent living apartment w/ [facility]. [facility] SN [facility] visit scheduled for 6/5/18. PCP f/u on 6/8/18 w/ NP Sriram. Neuro f/u on 6/29/18. Pulmonary f/u TBD. Plan- pt to attend f/u appts as scheduled. NN to collaborate w/ pt/family, PCP, [facility] staff & other Care Team Members as needed for care coordination. Next NN f/u ~ 1 week-ID     5/21/18- pt d/c'd to [facility] 5/201/18.  NN attempted to speak w/ IRF nurse for Med Rec. Nurse declined to speak w/ NN \"HIPPA \". Call transferred to [facility] Dept. Spoke w/ Director Miguel Alexander. Advised CM assigned to pt would be [employer]. NN discussed concern of pt returning safely to independent living apartment. Discussed previous Rehab adm to [place]. NN contact information provided. NN spoke w/ dtr-in-law Brittney Heath. NN encouraged son & dtr-in-law be proactive w/ IRF CM re pt's d/c plan. Dtr-in-law verbalized concern pt wanting to drive. Encouraged to contact Neurosurg office re driving recommendations post Craniotomy. Contact # for [facility] office given [phone]. Dtr-in-law reported pt's dtr wanted pt to move to [facility] w/ her. Pt declined. Plan- NN to collaborate w/ IRF CM & family re care coordination for safe discharge plan. Next NN f/u with IRF CM ~ 2 weeks for update-ID.

## 2018-06-05 NOTE — PROGRESS NOTES
NN F/U Progress Note    Goals Addressed      Safety- Prevention of injuries, falls                  6/5/18 - call from Darnell Ramsey. Reported currently in pt's apartment. Son & dtr-in-law present. HH reported pt's apartment in [address] Recommend 24 hr care due to pt's confusion. Pt not able to manage by himself. Family reported meeting w/ [employer] staff this afternoon to discuss personal care assistance. If costly may have to move pt w/ them short short term till transition to residential. [facility] MSW scheduled to visit 6/6/18 afternoon. Plan- [facility] nurse concerns forwarded to PCP. To discuss @ 6/6/18 ov-ID.    6/5/18- spoke w/ dtr-in-law. Reported currently @ pt's apartment. Pt had not done Neb tx. Appears he may have taken meds from pill box. \"This is too much for him to manage alone\". Dtr-in-law reported someone from [facility] to visit @ 11:30 AM. Not sure if SN or PT. \"I don't think he's going to much PT\". Discussed importance of dtr-in-law & son being present during [facility] MSW visit. Plan- dtr-in-law & son to accompany pt to PCP f/u. Dtr-in-law to contact [facility] MSW re date of home visit. NN collaboration to assist with residential FRANCK as needed. Next NN f/u ~ 1 week-ID.     6/4/18- spoke w/ son & dtr-in-law. Concern for pt living alone & safety discussed. Son contacted [facility] MSW. [facility] MSW to visit pt this week. Son & dtr-in-law goal to have pt transition to residential. Pt resistant. Plan-pt to use walker for mobility as per IRF PT/OT. [facility] MSW to visit pt. Recommend son & dtr-in-law be present during visit. NN to collaborate as needed to assist with residential FRANCK as needed. Next NN f/u ~ 1 week-ID       Transitions of Care- collaboration & Care coordination to prevent complications post hospitalization. 6/5/18- call from Franck Zhang 40. Currently @ pt's apartment. Pt does not have all meds on IRF d/c med list. Dtr-in-law & son present. Report no Rxs were given @ d/c from IRF.  Discussed if PCP would send Rxs to pharm today or wait till 6/6/18 visit. NN advised pt not seen by PCP since 2/6/18. Recent visits w/ NP during PCP absence. Family to bring all meds & papers from IRF to 6/6/18 appt. PCP to review meds. Send Rxs to pharm as needed. Next [facility] SN visit @ end of week. Plan- med rec to be done during 6/6/18 ov. Needed Rxs to be sent to pharm-ID.    6/5/16- spoke w/ dtr-in-law Jolanta Wayne. PCP f/u appt changed to 6/6/18 @ 11 AM w/ Dr Herbert Blake. Dtr-in-law instructed to bring all pt meds & paperwork from [facility]. Dtr-in-law & son to accompany pt. Plan- pt to attend 6/6/18 PCP FRNACK f/u as scheduled. Next NN f/u ~ 1 week-ID.    6/4/18- pt d/c'd 6/3/18 from [facility] to [facility] w/ [facility]. [facility] SN [facility] visit scheduled for 6/5/18. PCP f/u on 6/8/18 w/ NP Sriram. Neuro f/u on 6/29/18. Pulmonary f/u TBD. Plan- pt to attend f/u appts as scheduled. NN to collaborate w/ pt/family, PCP, [facility] staff & other Care Team Members as needed for care coordination. Next NN f/u ~ 1 week-ID     5/21/18- pt d/c'd to [facility] 5/201/18. NN attempted to speak w/ IRF nurse for Med Rec. Nurse declined to speak w/ NN \"HIPPA \". Call transferred to [place] Dept. Spoke w/ Director Naren Singh. Advised CM assigned to pt would be [employer]. NN discussed concern of pt returning safely to [facility]. Discussed previous Rehab adm to [place]. NN contact information provided. NN spoke w/ dtr-in-law Jannet El. NN encouraged son & dtr-in-law be proactive w/ IRF CM re pt's d/c plan. Dtr-in-law verbalized concern pt wanting to drive. Encouraged to contact Neurosurg office re driving recommendations post Craniotomy. Contact # for [facility] office given [phone]. Dtr-in-law reported pt's dtr wanted pt to move to [facility] w/ her. Pt declined. Plan- NN to collaborate w/ IRF CM & family re care coordination for safe discharge plan. Next NN f/u with IRF CM ~ 2 weeks for update-ID.

## 2018-06-05 NOTE — Clinical Note
Challengeing situation to say the least. I would strongly advise if 24/7 personal care assist not affordable pt should stay w/ son & dtr-in-law until he can transition to assisted.

## 2018-06-06 ENCOUNTER — OFFICE VISIT (OUTPATIENT)
Dept: FAMILY MEDICINE CLINIC | Age: 83
End: 2018-06-06

## 2018-06-06 ENCOUNTER — PATIENT OUTREACH (OUTPATIENT)
Dept: FAMILY MEDICINE CLINIC | Age: 83
End: 2018-06-06

## 2018-06-06 VITALS
BODY MASS INDEX: 31.24 KG/M2 | RESPIRATION RATE: 20 BRPM | SYSTOLIC BLOOD PRESSURE: 166 MMHG | HEIGHT: 64 IN | WEIGHT: 183 LBS | OXYGEN SATURATION: 95 % | TEMPERATURE: 98 F | HEART RATE: 89 BPM | DIASTOLIC BLOOD PRESSURE: 74 MMHG

## 2018-06-06 DIAGNOSIS — N40.0 BENIGN PROSTATIC HYPERPLASIA WITHOUT LOWER URINARY TRACT SYMPTOMS: ICD-10-CM

## 2018-06-06 DIAGNOSIS — E78.5 DYSLIPIDEMIA: ICD-10-CM

## 2018-06-06 DIAGNOSIS — I25.10 CORONARY ARTERY DISEASE DUE TO LIPID RICH PLAQUE: ICD-10-CM

## 2018-06-06 DIAGNOSIS — S06.5X0A SUBDURAL HEMATOMA DUE TO CONCUSSION, WITHOUT LOSS OF CONSCIOUSNESS, INITIAL ENCOUNTER (HCC): Primary | ICD-10-CM

## 2018-06-06 DIAGNOSIS — I25.83 CORONARY ARTERY DISEASE DUE TO LIPID RICH PLAQUE: ICD-10-CM

## 2018-06-06 DIAGNOSIS — R05.9 COUGH: ICD-10-CM

## 2018-06-06 DIAGNOSIS — I10 ESSENTIAL HYPERTENSION: ICD-10-CM

## 2018-06-06 RX ORDER — CODEINE PHOSPHATE AND GUAIFENESIN 10; 100 MG/5ML; MG/5ML
SOLUTION ORAL
Qty: 118 ML | Refills: 0 | Status: SHIPPED | OUTPATIENT
Start: 2018-06-06 | End: 2018-08-15 | Stop reason: ALTCHOICE

## 2018-06-06 RX ORDER — PANTOPRAZOLE SODIUM 40 MG/1
40 TABLET, DELAYED RELEASE ORAL DAILY
COMMUNITY
End: 2018-08-15 | Stop reason: ALTCHOICE

## 2018-06-06 RX ORDER — ATORVASTATIN CALCIUM 40 MG
TABLET ORAL
Qty: 90 TAB | Refills: 0 | Status: SHIPPED | OUTPATIENT
Start: 2018-06-06 | End: 2018-08-15

## 2018-06-06 RX ORDER — TAMSULOSIN HYDROCHLORIDE 0.4 MG/1
CAPSULE ORAL
Qty: 90 CAP | Refills: 0 | Status: SHIPPED | OUTPATIENT
Start: 2018-06-06 | End: 2018-11-13 | Stop reason: SDUPTHER

## 2018-06-06 RX ORDER — ALBUTEROL SULFATE 0.83 MG/ML
SOLUTION RESPIRATORY (INHALATION)
Refills: 0 | COMMUNITY
Start: 2018-05-31

## 2018-06-06 RX ORDER — LOSARTAN POTASSIUM AND HYDROCHLOROTHIAZIDE 12.5; 1 MG/1; MG/1
TABLET ORAL
Qty: 90 TAB | Refills: 0 | Status: SHIPPED | OUTPATIENT
Start: 2018-06-06 | End: 2018-10-31 | Stop reason: SDUPTHER

## 2018-06-06 RX ORDER — CODEINE PHOSPHATE AND GUAIFENESIN 10; 100 MG/5ML; MG/5ML
SOLUTION ORAL
Refills: 0 | COMMUNITY
Start: 2018-05-31 | End: 2018-06-06 | Stop reason: SDUPTHER

## 2018-06-06 NOTE — PROGRESS NOTES
NN F/U Progress Note    Goals Addressed      Safety- Prevention of injuries, falls                  6/6/18- met w/ pt, son & dtr-in-law in exam room after provider encounter. Son & dtr-in law reported pt currently staying w/ them in their home. \"He's not happy w/ it but has no choice. He's not able to manage alone anymore. He's not safe\". Meeting today w/ [facility] @ 2:30 PM in pt's apartment \"which is a mess\". NN having to speak loud for pt to hear. Son gave pt hearing aids. Afterwards pt able to hear w/o obvious difficulty. Son reported pt doesn't like to wear his hearing aids. Son dtr-in-law reported no alcohol or cigarettes in their home. Pt kept shaking his head & mumbling when discussing his inability to liver alone @ present. Advised possibly of that changing in the future. Pt was instructed to use walker @ all times for support. Dtr-in-law reported \"his balance is not steady\". Observed pt getting up from chair w/ a slight stumble. Reinforced why walker needs to be used. Pt hurriedly exited exam room, shaking head & mumbling. Handout of Senior Communities information given to dtr-in-law. Plan- meeting w/ [facility] today @ pt's apartment. Dtr-in-law & son to review handout for information on community ALFs. Next NN f/u ~ 1 week-ID.    6/5/18 - call from Darnell Ramsey. Reported currently in pt's apartment. Son & dtr-in-law present. HH reported pt's apartment in [address] Recommend 24 hr care due to pt's confusion. Pt not able to manage by himself. Family reported meeting w/ [facility] staff this afternoon to discuss personal care assistance. If costly may have to move pt w/ them short short term till transition to [facility]. [facility] scheduled to visit 6/6/18 afternoon. Plan- [facility] nurse concerns forwarded to PCP. To discuss @ 6/6/18 ov-ID.    6/5/18- spoke w/ dtr-in-law. Reported currently @ pt's apartment. Pt had not done Neb tx. Appears he may have taken meds from pill box. \"This is too much for him to manage alone\". Dtr-in-law reported someone from [facility] to visit @ 11:30 AM. Not sure if SN or PT. \"I don't think he's going to much PT\". Discussed importance of dtr-in-law & son being present during [facility] visit. Plan- dtr-in-law & son to accompany pt to PCP f/u. Dtr-in-law to contact [facility] MS[facility] re date of home visit. NN collaboration to assist with residential FRANCK as needed. Next NN f/u ~ 1 week-ID.     6/4/18- spoke w/ son & dtr-in-law. Concern for pt living alone & safety discussed. Son contacted [facility]. [facility] to visit pt this week. Son & dtr-in-law goal to have pt transition to [facility]. Pt resistant. Plan-pt to use walker for mobility as per IRF PT/OT. [facility] MSW to visit pt. Recommend son & dtr-in-law be present during visit. NN to collaborate as needed to assist with residential FRANCK as needed. Next NN f/u ~ 1 week-ID       Transitions of Care- collaboration & Care coordination to prevent complications post hospitalization. 6/6/18-pt attended PCP FRANCK f/u as scheduled. Dtr-in-law managing pt's med. Rxs for needed meds submitted to pharm by PCP. Next PCP f/u in 1 month. Neurosurgey f/u 6/29/18. Cardio f/u to be scheduled. Plan- pt to attend Neuro surg appt as scheduled. Family to schedule Cardio appt. Next PCP f/u 7/12/18. Next NN f/u ~ 1 week-ID.    6/5/18- call from Franck Zhang 39. Currently @ pt's apartment. Pt does not have all meds on IRF d/c med list. Dtr-in-law & son present. Report no Rxs were given @ d/c from IRF. Discussed if PCP would send Rxs to pharm today or wait till 6/6/18 visit. NN advised pt not seen by PCP since 2/6/18. Recent visits w/ NP during PCP absence. Family to bring all meds & papers from IRF to 6/6/18 appt. PCP to review meds. Send Rxs to pharm as needed. Next [facility] SN visit @ end of week. Plan- med rec to be done during 6/6/18 ov. Needed Rxs to be sent to pharm-ID.    6/5/16- spoke w/ dtr-in-law Kanu Bowser. PCP f/u appt changed to 6/6/18 @ 11 AM w/ Dr Fred Youngblood.  Dtr-in-law instructed to bring all pt meds & paperwork from [facility]. Dtr-in-law & son to accompany pt. Plan- pt to attend 6/6/18 PCP FRANCK f/u as scheduled. Next NN f/u ~ 1 week-ID.    6/4/18- pt d/c'd 6/3/18 from IRF to independent living apartment w/ [facility]. [facility] visit scheduled for 6/5/18. PCP f/u on 6/8/18 w/ NP Sriram. Neuro f/u on 6/29/18. Pulmonary f/u TBD. Plan- pt to attend f/u appts as scheduled. NN to collaborate w/ pt/family, PCP, [facility] staff & other Care Team Members as needed for care coordination. Next NN f/u ~ 1 week-ID     5/21/18- pt d/c'd to [facility] 5/201/18. NN attempted to speak w/ IRF nurse for Med Rec. Nurse declined to speak w/ NN \"HIPPA \". Call transferred to [facility] Dept. Spoke w/ Director Mario Ma. Advised CM assigned to pt would be [employer]. NN discussed concern of pt returning safely to independent living apartment. Discussed previous Rehab adm to [facility] NN contact information provided. NN spoke w/ dtr-in-law Alina Estrada. NN encouraged son & dtr-in-law be proactive w/ IRF CM re pt's d/c plan. Dtr-in-law verbalized concern pt wanting to drive. Encouraged to contact Neurosurg office re driving recommendations post Craniotomy. Contact # for [facility] office given [phone]. Dtr-in-law reported pt's dtr wanted pt to move to [facility] w/ her. Pt declined. Plan- NN to collaborate w/ IRF CM & family re care coordination for safe discharge plan. Next NN f/u with IRF CM ~ 2 weeks for update-ID.

## 2018-06-06 NOTE — MR AVS SNAPSHOT
[address] [place] [address] [place] 
[address] [place] [address] 
[phone] Patient: Aslhi Patino 
MRN:  [facility]:[id number] Visit Information Date & Time Provider Department Dept. Phone Encounter #  
 6/6/2018 11:00 AM Jayy Luz MD [facility] [phone] [id number] Upcoming Health Maintenance Date Due DTaP/Tdap/Td series (1 - Tdap) [birth year]/1/1956 ZOSTER VACCINE AGE 60> 1/1/1996 GLAUCOMA SCREENING Q2Y 3/2/2017 COLONOSCOPY 8/31/2018 Influenza Age 5 to Adult 8/1/2018 MEDICARE YEARLY EXAM 10/18/2018 Allergies as of 6/6/2018  Review Complete On: 6/6/2018 By: Donovan Martinez LPN Severity Noted Reaction Type Reactions Norco [Hydrocodone-acetaminophen]  01/18/2013    Other (comments) Too sedated and felt like he was in a daze Pcn [Penicillins]  10/09/2009    Rash Percocet [Oxycodone-acetaminophen]  05/02/2011   Side Effect Nausea and Vomiting Current Immunizations  Reviewed on 5/16/2018 Name Date Influenza High Dose Vaccine PF 10/17/2017, 10/5/2015 12:38 PM, 2/12/2015, 11/4/2013 Influenza Vaccine 10/1/2016 Influenza Vaccine Split 10/15/2012, 12/1/2010 Pneumococcal Conjugate (PCV-13) 3/15/2017 Pneumococcal Polysaccharide (PPSV-23) 1/12/2011 Not reviewed this visit You Were Diagnosed With   
  
 Codes Comments Subdural hematoma due to concussion, without loss of consciousness, initial encounter ([facility] [address]    -  Primary ICD-10-CM: P16.2S4A 
ICD-9-CM: 852.21 Cough     ICD-10-CM: R05 ICD-9-CM: 786.2 Essential hypertension     ICD-10-CM: I10 
ICD-9-CM: 401.9 Dyslipidemia     ICD-10-CM: E78.5 ICD-9-CM: 272.4 Coronary artery disease due to lipid rich plaque     ICD-10-CM: I25.10, I25.83 ICD-9-CM: 414.00, 414.3 Benign prostatic hyperplasia without lower urinary tract symptoms     ICD-10-CM: N40.0 ICD-9-CM: 600.00 Vitals BP Pulse Temp Resp Height(growth percentile) Weight(growth percentile) 166/74 89 98 °F (36.7 °C) (Oral) 20 5' 4\" (1.626 m) 183 lb (83 kg) SpO2 BMI Smoking Status 95% 31.41 kg/m2 Former Smoker Vitals History BMI and BSA Data Body Mass Index Body Surface Area  
 31.41 kg/m 2 1.94 m 2 Preferred Pharmacy Pharmacy Name Phone [facility]ACY [id number], [address] [place] [address] [address] [phone] Your Updated Medication List  
  
   
This list is accurate as of 6/6/18 12:14 PM.  Always use your most recent med list.  
  
  
  
  
 albuterol 2.5 mg /3 mL (0.083 %) nebulizer solution Commonly known as:  PROVENTIL VENTOLIN  
USE 1 VIAL BY NEBULIZER EVERY 6 HOURS AS NEEDED FOR WHEEZING  
  
 amLODIPine 10 mg tablet Commonly known as:  Jacmissya Couch Take 1 Tab by mouth daily. arformoterol 15 mcg/2 mL Nebu neb solution Commonly known as:  Tasley Exeter 2 mL by Nebulization route two (2) times a day. budesonide 0.5 mg/2 mL Nbsp Commonly known as:  PULMICORT  
2 mL by Nebulization route two (2) times a day. finasteride 5 mg tablet Commonly known as:  PROSCAR  
daily. guaiFENesin-codeine 100-10 mg/5 mL solution Commonly known as:  ROBITUSSIN AC  
TAKE 10 ML ORAL EVERY 4 HOURS AS NEEDED COUGH AND CONGESTION  
  
 isosorbide mononitrate ER 30 mg tablet Commonly known as:  IMDUR  
TAKE 1 TABLET BY MOUTH EVERY DAY  
  
 levETIRAcetam 500 mg tablet Commonly known as:  KEPPRA Take 1 Tab by mouth every twelve (12) hours. levothyroxine 88 mcg tablet Commonly known as:  SYNTHROID  
TAKE 1 TABLET BY MOUTH DAILY (BEFORE BREAKFAST). LIPITOR 40 mg tablet Generic drug:  atorvastatin TAKE 1 TABLET BY MOUTH EVERY DAY  
  
 losartan-hydroCHLOROthiazide 100-12.5 mg per tablet Commonly known as:  HYZAAR  
TAKE 1 TABLET BY MOUTH EVERY DAY  
  
 metoprolol succinate 25 mg XL tablet Commonly known as:  TOPROL-XL  
 TAKE 1 TABLET EVERY DAY  
  
 minoxidil 2.5 mg tablet Commonly known as:  Daren Loja Take 2 Tabs by mouth daily. * Nebulizer & Compressor machine 1 Each by Does Not Apply route as needed. * Nebulizer & Compressor machine 1 Each by Does Not Apply route every six (6) hours as needed. For wheezing or dyspnea on exertion. Indications: ICD R06.2 Nebulizer Accessories Kit Use neb every 6 hours as needed when wheezing or short of breath. ICD 10:R06.2  
  
 omeprazole 40 mg capsule Commonly known as:  PRILOSEC Take 1 Cap by mouth two (2) times a day. PROTONIX 40 mg tablet Generic drug:  pantoprazole Take 40 mg by mouth daily. Indications: gastroesophageal reflux disease  
  
 tamsulosin 0.4 mg capsule Commonly known as:  FLOMAX TAKE 1 CAPSULE EVERY DAY  
  
 VITAMIN D3 PO Take  by mouth. * Notice: This list has 2 medication(s) that are the same as other medications prescribed for you. Read the directions carefully, and ask your doctor or other care provider to review them with you. Prescriptions Printed Refills  
 guaiFENesin-codeine (ROBITUSSIN AC) 100-10 mg/5 mL solution 0 Sig: TAKE 10 ML ORAL EVERY 4 HOURS AS NEEDED COUGH AND CONGESTION Class: Print Prescriptions Sent to Pharmacy Refills  
 losartan-hydroCHLOROthiazide (HYZAAR) 100-12.5 mg per tablet 0 Sig: TAKE 1 TABLET BY MOUTH EVERY DAY Class: Normal  
 Pharmacy: [address] [place] [address] Dr [address] [place] [address] Ph #: [phone] LIPITOR 40 mg tablet 0 Sig: TAKE 1 TABLET BY MOUTH EVERY DAY Class: Normal  
 Pharmacy: [facility]/pharmacy #[id number] [place], [place] [address], [address] [place] [address] Ph #: [phone]  
 tamsulosin (FLOMAX) 0.4 mg capsule 0 Sig: TAKE 1 CAPSULE EVERY DAY Class: Normal  
 Pharmacy: [address] [place] [address] Dr [address] [place] [address] Ph #: [phone] We Performed the Following REFERRAL TO CARDIOLOGY [HEU01 Custom] Referral Information Referral ID Referred By Referred To  
  
 [id number] Gurpreet GIFFORD MD   
   [address], [address] [facility] Phone: [phone] Fax: [phone] Visits Status Start Date End Date 1 New Request 6/6/18 6/6/19 If your referral has a status of pending review or denied, additional information will be sent to support the outcome of this decision. Introducing [facility]! Raine Portillo introduces [employer] patient portal. Now you can access parts of your medical record, email your doctor's office, and request medication refills online. 1. In your internet browser, go to [email or web address] 2. Click on the First Time User? Click Here link in the Sign In box. You will see the New Member Sign Up page. 3. Enter your [employer] Access Code exactly as it appears below. You will not need to use this code after youve completed the sign-up process. If you do not sign up before the expiration date, you must request a new code. · [employer] Access Code: [email or web address] Expires: 9/1/2018  5:23 AM 
 
4. Enter the last four digits of your Social Security Number (xxxx) and Date of Birth (mm/dd/yyyy) as indicated and click Submit. You will be taken to the next sign-up page. 5. Create a [employer] ID. This will be your [employer] login ID and cannot be changed, so think of one that is secure and easy to remember. 6. Create a [employer] password. You can change your password at any time. 7. Enter your Password Reset Question and Answer. This can be used at a later time if you forget your password. 8. Enter your e-mail address. You will receive e-mail notification when new information is available in [address] 9. Click Sign Up. You can now view and download portions of your medical record. 10. Click the Download Summary menu link to download a portable copy of your medical information. If you have questions, please visit the Frequently Asked Questions section of the [employer] website. Remember, [employer] is NOT to be used for urgent needs. For medical emergencies, dial 911. Now available from your iPhone and Android! Please provide this summary of care documentation to your next provider. Your primary care clinician is listed as Cecelia Solorzano Dr. If you have any questions after today's visit, please call [phone].

## 2018-06-06 NOTE — PROGRESS NOTES
Chief Complaint   Patient presents with   [facility] Follow Up     see NN prior notes. pt Tribal and confused at this time. 1. Have you been to the ER, urgent care clinic since your last visit? Hospitalized since your last visit? Yes went to [facility]. then went to rehab as you can see in the notes of all the admissions. 2. Have you seen or consulted any other health care providers outside of the [address] [place] [address] since your last visit? Include any pap smears or colon screening.  No

## 2018-06-06 NOTE — PROGRESS NOTES
Using walker. Here with son and daughter-in-law. Not yet looked into NERY. Taking pills from mail order. Using neb txs albuterol q 6 hours and steroid twice daily. Sees N/S end of month for f/u. OK taking the omeprazole with other meds. Daughter-in-law states no cigarettes nor alcohol in her house. Visit Vitals    /74    Pulse 89    Temp 98 °F (36.7 °C) (Oral)    Resp 20    Ht 5' 4\" (1.626 m)    Wt 183 lb (83 kg)    SpO2 95%    BMI 31.41 kg/m2       Patient alert, mumbling to himself throughout the visit, able to communicate by shouting at him, which he acknowledges hearing. Lungs essentially clear. Heart regular. Lower extremities with elastic wear, still with obvious edema. Assessment:  1. Recent hospitalization and skilled nursing facility stay for fall related to alcohol use and subsequent subdural post evacuation. Plan:  1. Meds refilled. 2. Has follow up with neurosurgeon end of this month. 3. Strongly advised patient needs to be staying with his relatives at the present time for 24/7 supervision and care. 4. Advised needs six month follow up due now with cardiologist.  5. Return here in a month. Follow otherwise prn.

## 2018-06-29 DIAGNOSIS — I25.10 CORONARY ARTERY DISEASE DUE TO LIPID RICH PLAQUE: ICD-10-CM

## 2018-06-29 DIAGNOSIS — I73.9 PVD (PERIPHERAL VASCULAR DISEASE) WITH CLAUDICATION (HCC): ICD-10-CM

## 2018-06-29 DIAGNOSIS — I25.83 CORONARY ARTERY DISEASE DUE TO LIPID RICH PLAQUE: ICD-10-CM

## 2018-06-29 RX ORDER — ISOSORBIDE MONONITRATE 30 MG/1
TABLET, EXTENDED RELEASE ORAL
Qty: 30 TAB | Refills: 2 | Status: SHIPPED | OUTPATIENT
Start: 2018-06-29 | End: 2018-11-05 | Stop reason: SDUPTHER

## 2018-06-29 RX ORDER — AMLODIPINE BESYLATE 5 MG/1
TABLET ORAL
Qty: 30 TAB | Refills: 2 | Status: SHIPPED | OUTPATIENT
Start: 2018-06-29 | End: 2018-08-15

## 2018-07-05 ENCOUNTER — PATIENT OUTREACH (OUTPATIENT)
Dept: FAMILY MEDICINE CLINIC | Age: 83
End: 2018-07-05

## 2018-07-05 NOTE — PROGRESS NOTES
NN F/U Progress Note    Goals Addressed      COMPLETED: Safety- Prevention of injuries, falls                  7/5/18- spoke w/ dtr-in-law Adam Blanc. Pt currently @ [facility]. To be d/'d 7/7/18. Will return to live w/ son & dtr-in law. Pt no longer to able to live alone. [address] -ID.    6/6/18- met w/ pt, son & dtr-in-law in exam room after provider encounter. Son & dtr-in law reported pt currently staying w/ them in their home. \"He's not happy w/ it but has no choice. He's not able to manage alone anymore. He's not safe\". Meeting today / [facility] MSW @ 2:30 PM in pt's apartment \"which is a mess\". NN having to speak loud for pt to hear. Son gave pt hearing aids. Afterwards pt able to hear w/o obvious difficulty. Son reported pt doesn't like to wear his hearing aids. Son dtr-in-law reported no alcohol or cigarettes in their home. Pt kept shaking his head & mumbling when discussing his inability to liver alone @ present. Advised possibly of that changing in the future. Pt was instructed to use walker @ all times for support. Dtr-in-law reported \"his balance is not steady\". Observed pt getting up from chair w/ a slight stumble. Reinforced why walker needs to be used. Pt hurriedly exited exam room, shaking head & mumbling. Handout of Senior Communities information given to dtr-in-law. Plan- meeting / [facility] MSW today @ pt's apartment. Dtr-in-law & son to review handout for information on community ALFs. Next NN f/u ~ 1 week-ID.    6/5/18 - call from Darnell Ramsey. Reported currently in pt's apartment. Son & dtr-in-law present. HH reported pt's apartment in [address] Recommend 24 hr care due to pt's confusion. Pt not able to manage by himself. Family reported meeting w/ [employer] staff this afternoon to discuss personal care assistance. If costly may have to move pt w/ them short short term till transition to NERY. [facility] MSW scheduled to visit 6/6/18 afternoon.  Plan- [facility] nurse concerns forwarded to PCP. To discuss @ 6/6/18 ov-ID.    6/5/18- spoke w/ dtr-in-law. Reported currently @ pt's apartment. Pt had not done Neb tx. Appears he may have taken meds from pill box. \"This is too much for him to manage alone\". Dtr-in-law reported someone from [facility] to visit @ 11:30 AM. Not sure if SN or PT. \"I don't think he's going to much PT\". Discussed importance of dtr-in-law & son being present during [facility] visit. Plan- dtr-in-law & son to accompany pt to PCP f/u. Dtr-in-law to contact [facility] re date of home visit. NN collaboration to assist with residential FRANCK as needed. Next NN f/u ~ 1 week-ID.     6/4/18- spoke w/ son & dtr-in-law. Concern for pt living alone & safety discussed. [facility]. Son & dtr-in-law goal to have pt transition to N[facility]. Pt resistant. Plan-pt to use walker for mobility as per IRF PT/OT. [facility]. Recommend son & dtr-in-law be present during visit. NN to collaborate as needed to assist with residential FRANCK as needed. Next NN f/u ~ 1 week-ID       Transitions of Care- collaboration & Care coordination to prevent complications post hospitalization. 7/5/18- spoke w/ dtr-in-law Noy Ndiaye. Pt taken to [facility] ED on 6/12/18. \"he was lethargic & kept coughing. HH said O2 was low. Blood clots both legs. No blood thinner. Filter put in. Mass on lung. Stage 4 Cancer. Inoperable. D/c'd 6/15/18 to [facility]. Pet Scan on 7/6/18. D/C from [facility] 7/7/18. Disposition- to stay w/ son & dtr-in-law. Appt on 7/10/18 w/ Oncologist Dr Flaquito Martin @ [address] [facility]. Discuss low dose Chemo or Radiation\". Plan- PET Scan 7/6/18.  d/c 7/7/18. Oncology f/u 7/10/18. NN to request [facility] records. NN to request [facility] records. NN to f/u w/ dtr ~ 1 week for update-ID.    6/6/18-pt attended PCP FRANCK f/u as scheduled. Dtr-in-law managing pt's med. Rxs for needed meds submitted to pharm by PCP. Next PCP f/u in 1 month.  Neurosurgey f/u 6/29/18. Cardio f/u to be scheduled. Plan- pt to attend Neuro surg appt as scheduled. Family to schedule Cardio appt. Next PCP f/u 7/12/18. Next NN f/u ~ 1 week-ID.    6/5/18- call from Franck Zhang [address]. Currently @ pt's apartment. Pt does not have all meds on IRF d/c med list. Dtr-in-law & son present. Report no Rxs were given @ d/c from IRF. Discussed if PCP would send Rxs to pharm today or wait till 6/6/18 visit. NN advised pt not seen by PCP since 2/6/18. Recent visits w/ NP during PCP absence. Family to bring all meds & papers from IRF to 6/6/18 appt. PCP to review meds. Send Rxs to pharm as needed. Next [facility] SN visit @ end of week. Plan- med rec to be done during 6/6/18 ov. Needed Rxs to be sent to pharm-ID.    6/5/16- spoke w/ dtr-in-law Hanna Akbar. PCP f/u appt changed to 6/6/18 @ 11 AM w/ Dr Darnell Segovia. Dtr-in-law instructed to bring all pt meds & paperwork from [facility]. Dtr-in-law & son to accompany pt. Plan- pt to attend 6/6/18 PCP FRANCK f/u as scheduled. Next NN f/u ~ 1 week-ID.    6/4/18- pt d/c'd 6/3/18 from IRF to independent living apartment w/ [facility]. [facility] visit scheduled for 6/5/18. PCP f/u on 6/8/18 w/ MAKAYLA Bhatia. Neuro f/u on 6/29/18. Pulmonary f/u TBD. Plan- pt to attend f/u appts as scheduled. NN to collaborate w/ pt/family, PCP, [facility] staff & other Care Team Members as needed for care coordination. Next NN f/u ~ 1 week-ID     5/21/18- pt d/c'd to [facility] 5/201/18. NN attempted to speak w/ IRF nurse for Med Rec. Nurse declined to speak w/ NN \"HIPPA \". Call transferred to [place] Dept. Spoke w/ Director Mynor Laguna. Advised CM assigned to pt would be [employer]. NN discussed concern of pt returning safely to independent living apartment. Discussed previous Rehab adm to [place]. NN contact information provided. NN spoke w/ dtr-in-law Angeles Thomas. NN encouraged son & dtr-in-law be proactive w/ IRF CM re pt's d/c plan. Dtr-in-law verbalized concern pt wanting to drive. Encouraged to contact Neurosurg office re driving recommendations post Craniotomy. Contact # for [facility] office given [phone]. Dtr-in-law reported pt's dtr wanted pt to move to [facility] w/ her. Pt declined. Plan- NN to collaborate w/ IRF CM & family re care coordination for safe discharge plan. Next NN f/u with IRF CM ~ 2 weeks for update-ID.

## 2018-07-05 NOTE — Clinical Note
Were you aware of recent adm to [facility]? I will request records & also from [facility]. Not good prognosis.

## 2018-07-06 ENCOUNTER — PATIENT OUTREACH (OUTPATIENT)
Dept: FAMILY MEDICINE CLINIC | Age: 83
End: 2018-07-06

## 2018-07-12 ENCOUNTER — PATIENT OUTREACH (OUTPATIENT)
Dept: FAMILY MEDICINE CLINIC | Age: 83
End: 2018-07-12

## 2018-07-12 NOTE — PROGRESS NOTES
NN F/U Progress Note    Goals Addressed      Transitions of Care- collaboration & Care coordination to prevent complications post hospitalization. 7/6/18- [address]. Records for 6/12/18-6/19/18 hospitalization reviewed/printed & forwarded to PCP. Per documentation +Squamous Cell CA of Lung, Bilateral LEs DVT, +IVC Filter. Call to [facility]. Spoke w/ Mayela Gaspar. SNF d/c planned for 7/10/18. Pt to return home w/ son & dtr-in-law. HH to be ordered. SW inquired name of past [facility] agency. Advised pt previously w/ [facility]. SW to contact to order [facility] post SNF d/c. [facility] office contacted. Pt seen on 6/25 & 7/3. OV notes requested. Next f/u 7/`0/`8 @ 3:45 PM. Next NN f/u ~ 4 days. Will call SNF to confirm d/c & request D/C Summary & Med list be faxed to PCP office-ID.      7/5/18- spoke w/ dtr-in-law Wyatt Georges. Pt taken to [facility] ED on 6/12/18. \"he was lethargic & kept coughing. HH said O2 was low. Blood clots both legs. No blood thinner. Filter put in. Mass on lung. Stage 4 Cancer. Inoperable. D/c'd 6/15/18 to [facility]. Pet Scan on 7/6/18. D/C from [facility] 7/7/18. Disposition- to stay w/ son & dtr-in-law. Appt on 7/10/18 w/ Oncologist Dr Chuy Ramirez @ [address] [place] [address]. Discuss low dose Chemo or Radiation\". Plan- PET Scan 7/6/18. SNF d/c 7/7/18. Oncology f/u 7/10/18. NN to request [facility] records. NN to request [facility] records. NN to f/u w/ dtr ~ 1 week for update-ID.    6/6/18-pt attended PCP FRANCK f/u as scheduled. Dtr-in-law managing pt's med. Rxs for needed meds submitted to pharm by PCP. Next PCP f/u in 1 month. Neurosurgey f/u 6/29/18. Cardio f/u to be scheduled. Plan- pt to attend Neuro surg appt as scheduled. Family to schedule Cardio appt. Next PCP f/u 7/12/18. Next NN f/u ~ 1 week-ID.    6/5/18- call from Fran[facility] Zhang [address]. Currently @ pt's apartment. Pt does not have all meds on IRF d/c med list. Dtr-in-law & son present.  Report no Rxs were given @ d/c from IRF. Discussed if PCP would send Rxs to pharm today or wait till 6/6/18 visit. NN advised pt not seen by PCP since 2/6/18. Recent visits w/ NP during PCP absence. Family to bring all meds & papers from IRF to 6/6/18 appt. PCP to review meds. Send Rxs to pharm as needed. [facility]. Plan- med rec to be done during 6/6/18 ov. Needed Rxs to be sent to pharm-ID.    6/5/16- spoke w/ dtr-in-law Pj De La Rosa. PCP f/u appt changed to 6/6/18 @ 11 AM w/ Dr Sachi Chavarria. Dtr-in-law instructed to bring all pt meds & paperwork from [facility]. Dtr-in-law & son to accompany pt. Plan- pt to attend 6/6/18 PCP FRANCK f/u as scheduled. Next NN f/u ~ 1 week-ID.    6/4/18- pt d/c'd 6/3/18 from IRF to [facility] SN [facility] visit scheduled for 6/5/18. PCP f/u on 6/8/18 w/ NP Sriram. Neuro f/u on 6/29/18. Pulmonary f/u TBD. Plan- pt to attend f/u appts as scheduled. NN to collaborate w/ pt/family, PCP, [facility] & other Care Team Members as needed for care coordination. Next NN f/u ~ 1 week-ID     5/21/18- pt d/c'd to [facility] 5/201/18. NN attempted to speak w/ IRF nurse for Med Rec. Nurse declined to speak w/ NN \"HIPPA \". Call transferred to [facility] Dept. Spoke w/ Director Nikki Farley. Advised CM assigned to pt would be [employer]. [facility] Discussed previous Rehab adm to [place]. NN contact information provided. NN spoke w/ dtr-in-law Victor M Lindsey. NN encouraged son & dtr-in-law be proactive w/ IRF CM re pt's d/c plan. Dtr-in-law verbalized concern pt wanting to drive. Encouraged to contact Neurosurg office re driving recommendations post Craniotomy. Contact # for [facility] office given [phone]. Dtr-in-law reported pt's dtr wanted pt to move to [facility] w/ her. Pt declined. Plan- NN to collaborate w/ IRF CM & family re care coordination for safe discharge plan.  Next NN f/u with IRF CM ~ 2 weeks for update-ID.

## 2018-07-21 NOTE — PROGRESS NOTES
NN F/U Progress Note    Goals Addressed      Transitions of Care- collaboration & Care coordination to prevent complications post hospitalization. 7/12/18- attempted to contact dtr-in-law Eloina Nguyen. Message left. Spoke w/ son Anna Caicedo. Confirmed pt d/c'd from SNF on 7/10/18. Had PET Scan done 7/6/18. F/U w/ Dr Leslie Liu 7/10/18. Stage 4 CA. No Chemo or Radiation. F/U in 2 weeks. Pt with son & wife in their home. Son requested NN speak w/ his wife fore more details. NN advised NN lwft message. Son reported would make sure wife returned call. Plan- next NN f/u ~ 2 weeks-ID.     7/6/18- [address]. Records for 6/12/18-6/19/18 hospitalization reviewed/printed & forwarded to PCP. Per documentation +Squamous Cell CA of Lung, Bilateral LEs DVT, +IVC Filter. Call to [facility]. Spoke w/ Nasir Madrid. SNF d/c planned for 7/10/18. Pt to return home w/ son & dtr-in-law. HH to be ordered. [facility]. Advised pt previously w/ [facility]. SW to contact to order [facility] post SNF d/c. [facility] office contacted. Pt seen on 6/25 & 7/3. OV notes requested. Next f/u 7/`0/`8 @ 3:45 PM. Next NN f/u ~ 4 days. Will call SNF to confirm d/c & request D/C Summary & Med list be faxed to PCP office-ID.      7/5/18- spoke w/ dtr-in-law Eloina Nguyen. Pt taken to [facility] ED on 6/12/18. \"he was lethargic & kept coughing. HH said O2 was low. Blood clots both legs. No blood thinner. Filter put in. Mass on lung. Stage 4 Cancer. Inoperable. D/c'd 6/15/18 to [facility]. Pet Scan on 7/6/18. D/C from [facility] 7/7/18. Disposition- to stay w/ son & dtr-in-law. Appt on 7/10/18 w/ Oncologist Dr Valentina Camargo @ [address] [place] [address]. Discuss low dose Chemo or Radiation\". Plan- PET Scan 7/6/18. SNF d/c 7/7/18. Oncology f/u 7/10/18. NN to request [facility] records. NN to request [facility] records. NN to f/u w/ dtr ~ 1 week for update-ID.    6/6/18-pt attended PCP FRANCK f/u as scheduled.  Dtr-in-law managing pt's med. Rxs for needed meds submitted to pharm by PCP. Next PCP f/u in 1 month. Neurosurgey f/u 6/29/18. Cardio f/u to be scheduled. Plan- pt to attend Neuro surg appt as scheduled. Family to schedule Cardio appt. Next PCP f/u 7/12/18. Next NN f/u ~ 1 week-ID.    6/5/18- call from [facility] Zhang [address]. Currently @ pt's apartment. Pt does not have all meds on IRF d/c med list. Dtr-in-law & son present. Report no Rxs were given @ d/c from IRF. Discussed if PCP would send Rxs to pharm today or wait till 6/6/18 visit. NN advised pt not seen by PCP since 2/6/18. Recent visits w/ NP during PCP absence. Family to bring all meds & papers from IRF to 6/6/18 appt. PCP to review meds. Send Rxs to pharm as needed. Next [facility] SN visit @ end of week. Plan- med rec to be done during 6/6/18 ov. Needed Rxs to be sent to pharm-ID.    6/5/16- spoke w/ dtr-in-law Nicanor Joaquin. PCP f/u appt changed to 6/6/18 @ 11 AM w/ Dr Regan Stevens. Dtr-in-law instructed to bring all pt meds & paperwork from [facility]. Dtr-in-law & son to accompany pt. Plan- pt to attend 6/6/18 PCP FRANCK f/u as scheduled. Next NN f/u ~ 1 week-ID.    6/4/18- pt d/c'd 6/3/18 from IRF to independent living apartment w/ [facility]. [facility]  SN RAD[facility] visit scheduled for 6/5/18. PCP f/u on 6/8/18 w/ NP Sriram. Neuro f/u on 6/29/18. Pulmonary f/u TBD. Plan- pt to attend f/u appts as scheduled. NN to collaborate w/ pt/family, PCP, [facility] staff & other Care Team Members as needed for care coordination. Next NN f/u ~ 1 week-ID     5/21/18- pt d/c'd to [facility] 5/201/18. NN attempted to speak w/ IRF nurse for Med Rec. Nurse declined to speak w/ NN \"HIPPA \". Call transferred to [facility] Dept. Spoke w/ Director Curtis Still. Advised CM assigned to pt would be [employer]. NN discussed concern of pt returning safely to independent living apartment. Discussed previous Rehab adm to [place]. NN contact information provided. NN spoke w/ dtr-in-law Norberto Laureano.  NN encouraged son & dtr-in-law be proactive w/ IRF CM re pt's d/c plan. Dtr-in-law verbalized concern pt wanting to drive. Encouraged to contact Neurosurg office re driving recommendations post Craniotomy. Contact # for [facility] office given [phone]. Dtr-in-law reported pt's dtr wanted pt to move to [facility] w/ her. Pt declined. Plan- NN to collaborate w/ IRF CM & family re care coordination for safe discharge plan. Next NN f/u with IRF CM ~ 2 weeks for update-ID.

## 2018-08-03 ENCOUNTER — PATIENT OUTREACH (OUTPATIENT)
Dept: FAMILY MEDICINE CLINIC | Age: 83
End: 2018-08-03

## 2018-08-03 NOTE — Clinical Note
Dr Arin Gregory- I don't see any records from [facility] or from [place] in [address] [facility]. Have you received any? . I requested back on 7/6/18. Don't know who your nurse is now so if you can ask whatever nurse works w/ you to call both & asks for records in prep for 8/13 ov. Dr Isabel Richards office is [phone]. Jennifer is [phone]. Thanks.

## 2018-08-03 NOTE — PROGRESS NOTES
NN F/U Progress Note Goals Addressed  COMPLETED: Transitions of Care- collaboration & Care coordination to prevent complications post hospitalization. 8/3/18- spoke w/ dtr-in-law George Lala. Reported pt weaker. Using walker @ all times. Incontinent of urine @ night. Wears adult diapers. Continues w/ HH PT w/ [facility]. Pt now living w/ son & dtr-in-law permanently. Independent Living Apartment given up. Followed by Oncologist Dr Gael Cohen @ [facility] for \"Stage 4 Lung CA\". Not candidate for Chemo or Radiation. Next appt ~ 8/21/18. PCP f/u scheduled for 8/13/18 @ 2:40 PM. Plan- NN FRANCK Episode resolved. 7/12/18- attempted to contact dtr-in-law George Hobbsanjana. Message left. Spoke w/ kalpesh Garza. Confirmed pt d/c'd from SNF on 7/10/18. Had PET Scan done 7/6/18. F/U w/ Dr Gael Cohen 7/10/18. Stage 4 CA. No Chemo or Radiation. F/U in 2 weeks. Pt with son & wife in their home. Son requested NN speak w/ his wife fore more details. NN advised NN lwft message. Son reported would make sure wife returned call. Plan- next NN f/u ~ 2 weeks-ID. 7/6/18- [address]. Records for 6/12/18-6/19/18 hospitalization reviewed/printed & forwarded to PCP. Per documentation +Squamous Cell CA of Lung, Bilateral LEs DVT, +IVC Filter. Call to [facility]. Spoke w/ Mary Lou Machuca. SNF d/c planned for 7/10/18. Pt to return home w/ son & dtr-in-law. HH to be ordered. SW [facility]. Advised pt previously w/ [facility]. SW to contact to order [facility] post SNF d/c. [facility] office contacted. Pt seen on 6/25 & 7/3. OV notes requested. Next f/u 7/`0/`8 @ 3:45 PM. Next NN f/u ~ 4 days. Will call SNF to confirm d/c & request D/C Summary & Med list be faxed to PCP office-ID. 7/5/18- spoke w/ dtr-in-law George Reeves. Pt taken to [facility] ED on 6/12/18. \"he was lethargic & kept coughing. HH said O2 was low. Blood clots both legs. No blood thinner. Filter put in. Mass on lung. Stage 4 Cancer. Inoperable. D/c'd 6/15/18 to [facility]. Pet Scan on 7/6/18. D/C from [facility] 7/7/18. Disposition- to stay w/ son & dtr-in-law. Appt on 7/10/18 w/ Oncologist Dr Jj Elaine @ [address] [place] [address]. Discuss low dose Chemo or Radiation\". Plan- PET Scan 7/6/18. SNF d/c 7/7/18. Oncology f/u 7/10/18. NN to request [facility] records. NN to request [facility] records. NN to f/u w/ dtr ~ 1 week for update-ID. 6/6/18-pt attended PCP FRANCK f/u as scheduled. Dtr-in-law managing pt's med. Rxs for needed meds submitted to pharm by PCP. Next PCP f/u in 1 month. Neurosurgey f/u 6/29/18. Cardio f/u to be scheduled. Plan- pt to attend Neuro surg appt as scheduled. Family to schedule Cardio appt. Next PCP f/u 7/12/18. Next NN f/u ~ 1 week-ID. 6/5/18- call from Franck Zhang [address]. Currently @ pt's apartment. Pt does not have all meds on IRF d/c med list. Dtr-in-law & son present. Report no Rxs were given @ d/c from IRF. Discussed if PCP would send Rxs to pharm today or wait till 6/6/18 visit. NN advised pt not seen by PCP since 2/6/18. Recent visits w/ NP during PCP absence. Family to bring all meds & papers from IRF to 6/6/18 appt. PCP to review meds. Send Rxs to pharm as needed. Next [place] SN visit @ end of week. Plan- med rec to be done during 6/6/18 ov. Needed Rxs to be sent to pharm-ID. 6/5/16- spoke w/ dtr-in-law Nereyda Lugo. PCP f/u appt changed to 6/6/18 @ 11 AM w/ Dr Ame Matthews. Dtr-in-law instructed to bring all pt meds & paperwork from [facility]. Dtr-in-law & son to accompany pt. Plan- pt to attend 6/6/18 PCP FRANCK f/u as scheduled. Next NN f/u ~ 1 week-ID. 6/4/18- pt d/c'd 6/3/18 from [facility] to independent living apartment w/ [place]. [facility] SN [facility] visit scheduled for 6/5/18. PCP f/u on 6/8/18 w/ NP Sriram. Neuro f/u on 6/29/18. Pulmonary f/u TBD. Plan- pt to attend f/u appts as scheduled. NN to collaborate w/ pt/family, PCP, [place] staff & other Care Team Members as needed for care coordination.  Next NN f/u ~ 1 week-ID 5/21/18- pt d/c'd to [facility] 5/201/18. NN attempted to speak w/ IRF nurse for Med Rec. Nurse declined to speak w/ NN \"HIPPA \". Call transferred to [facility] Dept. Spoke w/ Director Saul Courtney. Advised CM assigned to pt would be [employer]. NN discussed concern of pt returning safely to independent living apartment. Discussed previous Rehab adm to [place]. NN contact information provided. NN spoke w/ dtr-in-law Jeffrey Late. NN encouraged son & dtr-in-law be proactive w/ IRF CM re pt's d/c plan. Dtr-in-law verbalized concern pt wanting to drive. Encouraged to contact Neurosurg office re driving recommendations post Craniotomy. Contact # for [facility] office given [phone]. Dtr-in-law reported pt's dtr wanted pt to move to [facility] w/ her. Pt declined. Plan- NN to collaborate w/ IRF CM & family re care coordination for safe discharge plan. Next NN f/u with IRF CM ~ 2 weeks for update-ID.

## 2018-08-13 ENCOUNTER — PATIENT OUTREACH (OUTPATIENT)
Dept: FAMILY MEDICINE CLINIC | Age: 83
End: 2018-08-13

## 2018-08-13 NOTE — PROGRESS NOTES
NN Note    - Call from dtr-in-law Juan J Rodriguez requesting address to PCP office.     -Currently in transport to office for pt's PCP appt. - Office address [address] provided as requested.     - Per EMR review appt scheduled for 2:40 PM.

## 2018-08-15 ENCOUNTER — OFFICE VISIT (OUTPATIENT)
Dept: FAMILY MEDICINE CLINIC | Age: 83
End: 2018-08-15

## 2018-08-15 ENCOUNTER — DOCUMENTATION ONLY (OUTPATIENT)
Dept: FAMILY MEDICINE CLINIC | Age: 83
End: 2018-08-15

## 2018-08-15 VITALS
RESPIRATION RATE: 16 BRPM | SYSTOLIC BLOOD PRESSURE: 95 MMHG | HEIGHT: 64 IN | DIASTOLIC BLOOD PRESSURE: 55 MMHG | WEIGHT: 170.6 LBS | BODY MASS INDEX: 29.12 KG/M2 | HEART RATE: 88 BPM | OXYGEN SATURATION: 96 % | TEMPERATURE: 96.7 F

## 2018-08-15 DIAGNOSIS — R60.9 EDEMA, UNSPECIFIED TYPE: ICD-10-CM

## 2018-08-15 DIAGNOSIS — C34.90 STAGE 4 MALIGNANT NEOPLASM OF LUNG, UNSPECIFIED LATERALITY (HCC): Primary | ICD-10-CM

## 2018-08-15 DIAGNOSIS — R05.9 COUGH: ICD-10-CM

## 2018-08-15 DIAGNOSIS — R45.1 AGITATION: ICD-10-CM

## 2018-08-15 RX ORDER — BUDESONIDE 0.5 MG/2ML
500 INHALANT ORAL 2 TIMES DAILY
Qty: 60 EACH | Refills: 0 | Status: SHIPPED | OUTPATIENT
Start: 2018-08-15 | End: 2018-08-22 | Stop reason: SDUPTHER

## 2018-08-15 RX ORDER — BENZONATATE 100 MG/1
CAPSULE ORAL
Qty: 100 CAP | Refills: 0 | Status: SHIPPED | OUTPATIENT
Start: 2018-08-15 | End: 2018-09-10 | Stop reason: SDUPTHER

## 2018-08-15 RX ORDER — BENZONATATE 100 MG/1
CAPSULE ORAL
Refills: 0 | COMMUNITY
Start: 2018-07-26 | End: 2018-08-15 | Stop reason: SDUPTHER

## 2018-08-15 RX ORDER — QUETIAPINE FUMARATE 25 MG/1
TABLET, FILM COATED ORAL
Refills: 0 | COMMUNITY
Start: 2018-07-26 | End: 2018-08-31 | Stop reason: SDUPTHER

## 2018-08-15 RX ORDER — FUROSEMIDE 20 MG/1
TABLET ORAL
Refills: 0 | COMMUNITY
Start: 2018-07-19 | End: 2018-08-31 | Stop reason: SDUPTHER

## 2018-08-15 NOTE — PROGRESS NOTES
[facility] Records department faxed for most recent notes for pt.

## 2018-08-22 DIAGNOSIS — C34.90 STAGE 4 MALIGNANT NEOPLASM OF LUNG, UNSPECIFIED LATERALITY (HCC): ICD-10-CM

## 2018-08-22 RX ORDER — BUDESONIDE 0.5 MG/2ML
500 INHALANT ORAL 2 TIMES DAILY
Qty: 60 EACH | Refills: 0 | Status: SHIPPED | OUTPATIENT
Start: 2018-08-22 | End: 2018-08-23 | Stop reason: SDUPTHER

## 2018-08-22 NOTE — TELEPHONE ENCOUNTER
Writer called pharmacy back regarding diagnosis code for patient's Pulmicort prescription. Writer spoke with CPhT. Patient's name and . Per CPhT, new prescription needs to be faxed in with diagnosis code attached to it. Writer verbalized understanding and appreciation.

## 2018-08-22 NOTE — TELEPHONE ENCOUNTER
Pharmacy called in stating that medicare part B doesn't cover the dx that was faxed over.    P: [phone]

## 2018-08-22 NOTE — TELEPHONE ENCOUNTER
Prescription printed off with diagnosis code attached. Dr. Crystal Alonzo signed off prescription. Writer faxed prescription to [facility] pharmacy.

## 2018-08-23 RX ORDER — BUDESONIDE 0.5 MG/2ML
500 INHALANT ORAL 2 TIMES DAILY
Qty: 60 EACH | Refills: 0 | Status: SHIPPED | OUTPATIENT
Start: 2018-08-23

## 2018-08-23 NOTE — TELEPHONE ENCOUNTER
Per pharmacy, prescription must be printed and faxed in with diagnosis codes on it. They cannot add on the computer on their end.

## 2018-08-30 DIAGNOSIS — R45.1 AGITATION: ICD-10-CM

## 2018-08-30 DIAGNOSIS — R60.9 EDEMA, UNSPECIFIED TYPE: ICD-10-CM

## 2018-08-31 RX ORDER — QUETIAPINE FUMARATE 25 MG/1
TABLET, FILM COATED ORAL
Qty: 30 TAB | Refills: 1 | Status: SHIPPED | OUTPATIENT
Start: 2018-08-31 | End: 2018-10-15 | Stop reason: SDUPTHER

## 2018-08-31 RX ORDER — FUROSEMIDE 20 MG/1
TABLET ORAL
Qty: 30 TAB | Refills: 0 | Status: SHIPPED | OUTPATIENT
Start: 2018-08-31 | End: 2018-10-20 | Stop reason: SDUPTHER

## 2018-09-07 ENCOUNTER — PATIENT OUTREACH (OUTPATIENT)
Dept: FAMILY MEDICINE CLINIC | Age: 83
End: 2018-09-07

## 2018-09-07 NOTE — Clinical Note
Dr Marquita Kirkpatrick- can you send new Rxs w/ refills for all current meds. Please address increasing quantity of Tessalon since pt taking 3x/day & not just prn. Also you will need to add Finasteride 5 mg to med list if he's to continue taking. Dtr-in-law reported you said it was ok for pt to take, but it's not on list. If any questions please message or call me. Thanks.

## 2018-09-07 NOTE — PROGRESS NOTES
NN Note 
 
- Call from dtr- in-law [facility]). Verbalized concern re pt's Rxs & refills - Pt now living w/ his son & dtr-in-law. Recently changed pharmacy to [facility] [address] [facility] - As per dtr-in-law no refills on pt's meds. Refill request submitted & denied \"not authorized by doctor\". NN advised no documentation in chart for recent refill requests. Last request on 8/30/18 & approved. - NN reviewed current med list w/ dtr-in-law. Confirmed list accurate w/ exception of \"Finasteride 5 mg daily\" which not on EMR list. Dtr-in-law reported pt has been taking & discussed w/ PCP @ last ov & was advised ok to continue. - Dtr-in-law reported pt taking Tessalon caps for cough 3 times/day. Quantity of 100 does not last 30 days. \"If he doesn't take it 3x/day every day the cough is terrible\". NN advised order is 3x/day as needed. Will inform PCP pt taking daily 3x/day - Dtr-in-law verbalized concern meds pt no longer taking being refilled, \"Amlodipine, Breo, Atorvastatin\" & not able to be returned. - NN call to [facility]. Spoke w/ Pharmacist Alexey Posada. Reviewed pharm profile of pt's meds. Confirmed as per list in EMR & in addition Finasteride on pharm profile. Pharmacist reported some refill request were sent to provider \"Alona Will\". NN advised provider possibly a Hospitalist, not PCP. Pharmacist reported Amlodipine, Atorvastatin, & Breo were still listed on pt's profile. NN advised not current meds. No further refills needed. - Pharmacist Alexey Posada stated all new Rxs should include following information required by [facility] \"Dx code(s), specific instructions, exact quantity ordered, & provider NPI\". - NN call back to dtr-in-law. Above information reviewed. NN advised message to be sent to PCP to inform of need for new Rxs. PCP out of office today. Will address on Monday 9/10/18. Dtr-in-law verbalized understanding. Verbalized appreciation to NN \"for getting it straightened out\". - Dtr-in-law reported pt was visited by [address] [place] [address] Dept nurse on 9/6/18 for evaluation (?UAI). If approved pt would be eligible for PCA 3 days per week & attendance to an [address].

## 2018-09-10 ENCOUNTER — PATIENT OUTREACH (OUTPATIENT)
Dept: FAMILY MEDICINE CLINIC | Age: 83
End: 2018-09-10

## 2018-09-10 DIAGNOSIS — R05.9 COUGH: ICD-10-CM

## 2018-09-10 DIAGNOSIS — C34.90 STAGE 4 MALIGNANT NEOPLASM OF LUNG, UNSPECIFIED LATERALITY (HCC): ICD-10-CM

## 2018-09-10 DIAGNOSIS — N40.0 BENIGN PROSTATIC HYPERPLASIA WITHOUT LOWER URINARY TRACT SYMPTOMS: Primary | ICD-10-CM

## 2018-09-10 DIAGNOSIS — E03.9 HYPOTHYROIDISM, UNSPECIFIED TYPE: Primary | ICD-10-CM

## 2018-09-10 RX ORDER — BENZONATATE 100 MG/1
CAPSULE ORAL
Qty: 100 CAP | Refills: 0 | Status: SHIPPED | OUTPATIENT
Start: 2018-09-10 | End: 2018-09-21 | Stop reason: SDUPTHER

## 2018-09-10 RX ORDER — LEVOTHYROXINE SODIUM 88 UG/1
TABLET ORAL
Qty: 90 TAB | Refills: 0 | Status: SHIPPED | OUTPATIENT
Start: 2018-09-10 | End: 2018-12-06 | Stop reason: SDUPTHER

## 2018-09-10 RX ORDER — AMLODIPINE BESYLATE 5 MG/1
TABLET ORAL
Refills: 2 | COMMUNITY
Start: 2018-08-18 | End: 2018-12-10 | Stop reason: SDUPTHER

## 2018-09-10 RX ORDER — FINASTERIDE 5 MG/1
5 TABLET, FILM COATED ORAL DAILY
Qty: 90 TAB | Refills: 1 | Status: SHIPPED | OUTPATIENT
Start: 2018-09-10 | End: 2019-04-11 | Stop reason: SDUPTHER

## 2018-09-10 NOTE — PROGRESS NOTES
NN Note 
 
- Call from dtr-in-law Sanju Molina. Reminder for [facility]'s [facility]/ [facility]. - Dtr-in-law pt currently out of cough & thyroid meds. - As per 9/7/18 NN Outreach note pt changed pharmacies. Reyna Matthews has old Rxs from hospital discharge & none had refills. 
 
- NN reminded dtr-in-law PCP was out of office on 9/7/18. NN message was sent on 9/7/18. PCP currently in office seeing pts. Will address as soon as he's able. Dtr-in-law verbalized understanding.

## 2018-09-12 ENCOUNTER — PATIENT OUTREACH (OUTPATIENT)
Dept: FAMILY MEDICINE CLINIC | Age: 83
End: 2018-09-12

## 2018-09-12 NOTE — PROGRESS NOTES
NN Note 
 
- Call from dtr-in-law April Forward. Stated Rxs for Tessalon & Levetiracetam needed. - NN advised per EMR review Rx for Tessalon sent to pharm on 9/10/18. 
 
- NN advised dtr-in-law contact pharm to inquire about Tessalon & request refill for Levetiracetam. 
 
- Dtr-in-law verbalized understanding. To contact NN if any difficulty w/ Pharm.

## 2018-10-09 RX ORDER — METOPROLOL SUCCINATE 25 MG/1
TABLET, EXTENDED RELEASE ORAL
Qty: 30 TAB | Refills: 0 | Status: SHIPPED | OUTPATIENT
Start: 2018-10-09 | End: 2018-11-06 | Stop reason: SDUPTHER

## 2018-10-15 ENCOUNTER — PATIENT OUTREACH (OUTPATIENT)
Dept: FAMILY MEDICINE CLINIC | Age: 83
End: 2018-10-15

## 2018-10-15 DIAGNOSIS — R05.9 COUGH: ICD-10-CM

## 2018-10-15 DIAGNOSIS — C34.90 STAGE 4 MALIGNANT NEOPLASM OF LUNG, UNSPECIFIED LATERALITY (HCC): ICD-10-CM

## 2018-10-15 DIAGNOSIS — R45.1 AGITATION: ICD-10-CM

## 2018-10-15 RX ORDER — QUETIAPINE FUMARATE 25 MG/1
TABLET, FILM COATED ORAL
Qty: 30 TAB | Refills: 1 | Status: SHIPPED | OUTPATIENT
Start: 2018-10-15 | End: 2018-12-07 | Stop reason: SDUPTHER

## 2018-10-15 RX ORDER — BENZONATATE 100 MG/1
CAPSULE ORAL
Qty: 100 CAP | Refills: 0 | Status: SHIPPED | OUTPATIENT
Start: 2018-10-15 | End: 2018-12-11 | Stop reason: SDUPTHER

## 2018-10-15 NOTE — PROGRESS NOTES
NN Note 
 
- Call from dtr-in-law Eunice Plaza. 
 
- Requesting Rxs for Tessalon Perles & Seroquel. 
 
- Reported \"CA progressing. Cough worse. 1 Tessalon cap doesn't help. Dr Ashkan Reyes said I could give him 2 caps 3x/day. Need Rx sent to [facility] pharm\". Next appt w/ Onc 10/24/18 or 10/25/18. 
 
- Reported if pt not given Seroquel @ night wakes up \"agitated. Cusses & removes clothing\". Gives Seroquel morning & night. Need Rx sent to [facility] pharm. 
 
- Has DMV forms to suspend pt's driving privileges. Resides in [place]. Request if can fax form to office vs bringing in person. - [facility] # [id number]. Dtr-in-law contact # [phone]. 
 
- Hospice previously discussed w/ dtr-in-law. NN inquired if family interested in scheduling Hospice information session @ this time. Dtr-n-law responded \"not quite gotten to point of Hospice yet\". Dtr-in-law reported considering enrolling pt in [facility] if he's agreeable. - NN advised message to be sent to PCP.

## 2018-10-15 NOTE — PROGRESS NOTES
- Received message from PCP meds refilled & ok to fax DMV form to office. 
 
- Dtr-in-law contacted & informed. - Given fax # [phone]. Advised to send a fax cover sheet w/ pt's name, , & request for completion of form. Dtr-in-law verbalized understanding.

## 2018-10-15 NOTE — TELEPHONE ENCOUNTER
PCP: Andreas Roe MD    Last appt: 8/15/2018  No future appointments.     Requested Prescriptions     Pending Prescriptions Disp Refills    benzonatate (TESSALON) 100 mg capsule [Pharmacy Med Name: BENZONATATE 100 MG CAPSULE] 100 Cap 0     Sig: TAKE ONE CAPSULE BY MOUTH 3 TIMES A DAY AS NEEDED FOR COUGH    QUEtiapine (SEROQUEL) 25 mg tablet [Pharmacy Med Name: QUETIAPINE FUMARATE 25 MG TAB] 30 Tab 1     Sig: TAKE 1/2 TABLET BY MOUTH TWICE DAILY AS NEEDED FOR AGITATION       Prior labs and Blood pressures:  BP Readings from Last 3 Encounters:   08/15/18 95/55   06/06/18 166/74   05/20/18 133/67     Lab Results   Component Value Date/Time    Sodium 140 05/18/2018 03:15 AM    Potassium 3.5 05/18/2018 03:15 AM    Chloride 107 05/18/2018 03:15 AM    CO2 24 05/18/2018 03:15 AM    Anion gap 9 05/18/2018 03:15 AM    Glucose 96 05/18/2018 03:15 AM    BUN 11 05/18/2018 03:15 AM    Creatinine 0.83 05/18/2018 03:15 AM    BUN/Creatinine ratio 13 05/18/2018 03:15 AM    GFR est AA >60 05/18/2018 03:15 AM    GFR est non-AA >60 05/18/2018 03:15 AM    Calcium 9.1 05/18/2018 03:15 AM     Lab Results   Component Value Date/Time    Hemoglobin A1c 5.7 05/12/2018 08:27 AM    Hemoglobin A1c (POC) 5.7 01/14/2016 01:52 PM     Lab Results   Component Value Date/Time    Cholesterol, total 144 05/12/2018 08:27 AM    HDL Cholesterol 78 05/12/2018 08:27 AM    LDL, calculated 53.6 05/12/2018 08:27 AM    VLDL, calculated 12.4 05/12/2018 08:27 AM    Triglyceride 62 05/12/2018 08:27 AM    CHOL/HDL Ratio 1.8 05/12/2018 08:27 AM     Lab Results   Component Value Date/Time    VITAMIN D, 25-HYDROXY 36.3 01/09/2018 10:10 AM       Lab Results   Component Value Date/Time    TSH 4.25 (H) 05/12/2018 08:27 AM

## 2018-10-20 DIAGNOSIS — R60.9 EDEMA, UNSPECIFIED TYPE: ICD-10-CM

## 2018-10-22 ENCOUNTER — PATIENT OUTREACH (OUTPATIENT)
Dept: FAMILY MEDICINE CLINIC | Age: 83
End: 2018-10-22

## 2018-10-22 RX ORDER — FUROSEMIDE 20 MG/1
TABLET ORAL
Qty: 30 TAB | Refills: 0 | Status: SHIPPED | OUTPATIENT
Start: 2018-10-22 | End: 2018-11-27 | Stop reason: SDUPTHER

## 2018-10-22 NOTE — PROGRESS NOTES
NN Note 
 
- Call from pt's dtr-in-law [facility] 
 
- Requesting refill rx for Furosemide - Per EMR review [facility] via [facility] J, Noy on 10/20/18 (Sat) @ 12:18 PM. Rx order pending approval 
 
- NN advised dtr-in-law of above information. Advised PCP currently seeing pts. Will address sometime today - Dtr-in-law stated has directed [facility] all Rx refills should be sent to PCP office Dr Floresita Ortega - Dtr-in-law reported pt seen by Oncologist on 10/18/18. At time of visit pt's BP was \"198/118\" - Dtr-in-law contact # [phone]

## 2018-11-02 ENCOUNTER — TELEPHONE (OUTPATIENT)
Dept: FAMILY MEDICINE CLINIC | Age: 83
End: 2018-11-02

## 2018-11-02 NOTE — TELEPHONE ENCOUNTER
Writer called patient's DIL, Dayna Cerrato, regarding patient's BP being 198/118 at Oncologist appointment. Received from [facility]. Writer spoke with [employer]. [employer] stated that she will be putting patient back on Amlodipine 5mg, to try and get him BP back down. Stated that since Dr. Perry Chang has taken him off of it, his BP has gone back up. Stated that it was 180/102 today. Writer verbalized understanding and appreciation. Stated that if it still does not come down over the week, then patient needs to be taken to the ER or an UC facility to be evaluated and to call Monday morning to make an appointment with Dr. Perry Chang as soon as possible. USHA verbalized understanding and appreciation. USHA verbalized frustrating about having to call in for refills all the time and that it takes 2 days to have them filled. Stated that that is too long. Writer verbalized understanding. Stated that we do allow up to 24-72 hours for refills, and Dr. Perry Chang is out of the office on Friday, so if they come in then, Dr. Perry Chang does not see them until Monday. USHA verbalized understanding.

## 2018-11-05 DIAGNOSIS — I73.9 PVD (PERIPHERAL VASCULAR DISEASE) WITH CLAUDICATION (HCC): ICD-10-CM

## 2018-11-05 DIAGNOSIS — I25.83 CORONARY ARTERY DISEASE DUE TO LIPID RICH PLAQUE: ICD-10-CM

## 2018-11-05 DIAGNOSIS — I25.10 CORONARY ARTERY DISEASE DUE TO LIPID RICH PLAQUE: ICD-10-CM

## 2018-11-05 RX ORDER — ISOSORBIDE MONONITRATE 30 MG/1
TABLET, EXTENDED RELEASE ORAL
Qty: 30 TAB | Refills: 2 | Status: SHIPPED | OUTPATIENT
Start: 2018-11-05 | End: 2018-11-27 | Stop reason: SDUPTHER

## 2018-11-27 DIAGNOSIS — R60.9 EDEMA, UNSPECIFIED TYPE: ICD-10-CM

## 2018-11-27 DIAGNOSIS — I25.10 CORONARY ARTERY DISEASE DUE TO LIPID RICH PLAQUE: ICD-10-CM

## 2018-11-27 DIAGNOSIS — I73.9 PVD (PERIPHERAL VASCULAR DISEASE) WITH CLAUDICATION (HCC): ICD-10-CM

## 2018-11-27 DIAGNOSIS — I25.83 CORONARY ARTERY DISEASE DUE TO LIPID RICH PLAQUE: ICD-10-CM

## 2018-11-27 NOTE — TELEPHONE ENCOUNTER
Requested Prescriptions     Pending Prescriptions Disp Refills    furosemide (LASIX) 20 mg tablet 30 Tab 0     Sig: TAKE 1 TABLET BY MOUTH EVERY DAY    isosorbide mononitrate ER (IMDUR) 30 mg tablet 30 Tab 2

## 2018-11-27 NOTE — TELEPHONE ENCOUNTER
----- Message from Lul Muhammad sent at 11/27/2018 12:17 PM EST -----  Regarding: Ximena Knott MD/ reflaurie Simpson pts daughter in law called and stated that the pt would need a refill furosemide and isosorbide. Pt uses [facility] (information is on file) Pts daughter in law contact [phone].

## 2018-11-28 RX ORDER — ISOSORBIDE MONONITRATE 30 MG/1
TABLET, EXTENDED RELEASE ORAL
Qty: 90 TAB | Refills: 2 | Status: SHIPPED | OUTPATIENT
Start: 2018-11-28 | End: 2019-08-28 | Stop reason: SDUPTHER

## 2018-11-28 RX ORDER — FUROSEMIDE 20 MG/1
TABLET ORAL
Qty: 90 TAB | Refills: 2 | Status: SHIPPED | OUTPATIENT
Start: 2018-11-28

## 2018-11-28 NOTE — TELEPHONE ENCOUNTER
PCP: Ricky Sesay MD    Last appt: 8/15/2018  No future appointments.     Requested Prescriptions     Pending Prescriptions Disp Refills    furosemide (LASIX) 20 mg tablet 30 Tab 0     Sig: TAKE 1 TABLET BY MOUTH EVERY DAY    isosorbide mononitrate ER (IMDUR) 30 mg tablet 30 Tab 2       Prior labs and Blood pressures:  BP Readings from Last 3 Encounters:   08/15/18 95/55   06/06/18 166/74   05/20/18 133/67     Lab Results   Component Value Date/Time    Sodium 140 05/18/2018 03:15 AM    Potassium 3.5 05/18/2018 03:15 AM    Chloride 107 05/18/2018 03:15 AM    CO2 24 05/18/2018 03:15 AM    Anion gap 9 05/18/2018 03:15 AM    Glucose 96 05/18/2018 03:15 AM    BUN 11 05/18/2018 03:15 AM    Creatinine 0.83 05/18/2018 03:15 AM    BUN/Creatinine ratio 13 05/18/2018 03:15 AM    GFR est AA >60 05/18/2018 03:15 AM    GFR est non-AA >60 05/18/2018 03:15 AM    Calcium 9.1 05/18/2018 03:15 AM     Lab Results   Component Value Date/Time    Hemoglobin A1c 5.7 05/12/2018 08:27 AM    Hemoglobin A1c (POC) 5.7 01/14/2016 01:52 PM     Lab Results   Component Value Date/Time    Cholesterol, total 144 05/12/2018 08:27 AM    HDL Cholesterol 78 05/12/2018 08:27 AM    LDL, calculated 53.6 05/12/2018 08:27 AM    VLDL, calculated 12.4 05/12/2018 08:27 AM    Triglyceride 62 05/12/2018 08:27 AM    CHOL/HDL Ratio 1.8 05/12/2018 08:27 AM     Lab Results   Component Value Date/Time    VITAMIN D, 25-HYDROXY 36.3 01/09/2018 10:10 AM       Lab Results   Component Value Date/Time    TSH 4.25 (H) 05/12/2018 08:27 AM

## 2018-12-11 DIAGNOSIS — R05.9 COUGH: ICD-10-CM

## 2018-12-11 DIAGNOSIS — C34.90 STAGE 4 MALIGNANT NEOPLASM OF LUNG, UNSPECIFIED LATERALITY (HCC): ICD-10-CM

## 2018-12-11 RX ORDER — BENZONATATE 100 MG/1
CAPSULE ORAL
Qty: 100 CAP | Refills: 0 | Status: SHIPPED | OUTPATIENT
Start: 2018-12-11 | End: 2019-02-04 | Stop reason: SDUPTHER

## 2018-12-11 RX ORDER — AMLODIPINE BESYLATE 5 MG/1
TABLET ORAL
Qty: 30 TAB | Refills: 1 | Status: SHIPPED | OUTPATIENT
Start: 2018-12-11 | End: 2019-02-09 | Stop reason: SDUPTHER

## 2018-12-11 NOTE — TELEPHONE ENCOUNTER
PCP: Annette Jimenez MD    Last appt: 8/15/2018  No future appointments.     Requested Prescriptions     Pending Prescriptions Disp Refills    benzonatate (TESSALON) 100 mg capsule 100 Cap 0       Prior labs and Blood pressures:  BP Readings from Last 3 Encounters:   08/15/18 95/55   06/06/18 166/74   05/20/18 133/67     Lab Results   Component Value Date/Time    Sodium 140 05/18/2018 03:15 AM    Potassium 3.5 05/18/2018 03:15 AM    Chloride 107 05/18/2018 03:15 AM    CO2 24 05/18/2018 03:15 AM    Anion gap 9 05/18/2018 03:15 AM    Glucose 96 05/18/2018 03:15 AM    BUN 11 05/18/2018 03:15 AM    Creatinine 0.83 05/18/2018 03:15 AM    BUN/Creatinine ratio 13 05/18/2018 03:15 AM    GFR est AA >60 05/18/2018 03:15 AM    GFR est non-AA >60 05/18/2018 03:15 AM    Calcium 9.1 05/18/2018 03:15 AM     Lab Results   Component Value Date/Time    Hemoglobin A1c 5.7 05/12/2018 08:27 AM    Hemoglobin A1c (POC) 5.7 01/14/2016 01:52 PM     Lab Results   Component Value Date/Time    Cholesterol, total 144 05/12/2018 08:27 AM    HDL Cholesterol 78 05/12/2018 08:27 AM    LDL, calculated 53.6 05/12/2018 08:27 AM    VLDL, calculated 12.4 05/12/2018 08:27 AM    Triglyceride 62 05/12/2018 08:27 AM    CHOL/HDL Ratio 1.8 05/12/2018 08:27 AM     Lab Results   Component Value Date/Time    VITAMIN D, 25-HYDROXY 36.3 01/09/2018 10:10 AM       Lab Results   Component Value Date/Time    TSH 4.25 (H) 05/12/2018 08:27 AM

## 2018-12-11 NOTE — TELEPHONE ENCOUNTER
Requested Prescriptions     Pending Prescriptions Disp Refills    benzonatate (TESSALON) 100 mg capsule 100 Cap 0

## 2018-12-18 NOTE — PERIOP NOTES
Patient presents today for follow up of medications and patient stated medication is helping. Steva Landau  1935  [id number]    Situation:  Verbal report received from: FELIPE Garcia rn  Procedure: Procedure(s) with comments:  ESOPHAGOGASTRODUODENOSCOPY (EGD) - call chung for time    Background:    Preoperative diagnosis: bloody stools and blood loss anemia on plavix  Postoperative diagnosis: Hiatal Hernia, erosion in stomach, duodenitis, schitzski\"s ring,    :  Dr. Krys Zamudio  Assistant(s): Endoscopy Technician-1: Billie Patterson  Endoscopy RN-1: Jimmy Mendoza RN    Specimens: * No specimens in log *  H. Pylori  no    Assessment:  Intra-procedure medications   Anesthesia gave intra-procedure sedation and medications, see anesthesia flow sheet yes    Intravenous fluids: NS@ KVO     Vital signs stable     Abdominal assessment: round and soft     Recommendation.   Return to floor  Family or Friend n/a  Permission to share finding with family or friend yes

## 2019-02-04 DIAGNOSIS — C34.90 STAGE 4 MALIGNANT NEOPLASM OF LUNG, UNSPECIFIED LATERALITY (HCC): ICD-10-CM

## 2019-02-04 DIAGNOSIS — R05.9 COUGH: ICD-10-CM

## 2019-02-04 NOTE — TELEPHONE ENCOUNTER
Requested Prescriptions     Pending Prescriptions Disp Refills    benzonatate (TESSALON) 100 mg capsule 100 Cap 0     Sig: TAKE ONE CAPSULE BY MOUTH 3 TIMES A DAY AS NEEDED FOR COUGH     Patient is taking two tablets three times a day, and he is running out every month. Please increase the amount prescribed.      [facility]

## 2019-02-05 RX ORDER — BENZONATATE 100 MG/1
CAPSULE ORAL
Qty: 100 CAP | Refills: 0 | Status: SHIPPED | OUTPATIENT
Start: 2019-02-05 | End: 2019-03-17 | Stop reason: SDUPTHER

## 2019-02-05 NOTE — TELEPHONE ENCOUNTER
PCP: Doretha Smith MD    Last appt: 8/15/2018  No future appointments.     Requested Prescriptions     Pending Prescriptions Disp Refills    benzonatate (TESSALON) 100 mg capsule 100 Cap 0     Sig: TAKE ONE CAPSULE BY MOUTH 3 TIMES A DAY AS NEEDED FOR COUGH       Prior labs and Blood pressures:  BP Readings from Last 3 Encounters:   08/15/18 95/55   06/06/18 166/74   05/20/18 133/67     Lab Results   Component Value Date/Time    Sodium 140 05/18/2018 03:15 AM    Potassium 3.5 05/18/2018 03:15 AM    Chloride 107 05/18/2018 03:15 AM    CO2 24 05/18/2018 03:15 AM    Anion gap 9 05/18/2018 03:15 AM    Glucose 96 05/18/2018 03:15 AM    BUN 11 05/18/2018 03:15 AM    Creatinine 0.83 05/18/2018 03:15 AM    BUN/Creatinine ratio 13 05/18/2018 03:15 AM    GFR est AA >60 05/18/2018 03:15 AM    GFR est non-AA >60 05/18/2018 03:15 AM    Calcium 9.1 05/18/2018 03:15 AM     Lab Results   Component Value Date/Time    Hemoglobin A1c 5.7 05/12/2018 08:27 AM    Hemoglobin A1c (POC) 5.7 01/14/2016 01:52 PM     Lab Results   Component Value Date/Time    Cholesterol, total 144 05/12/2018 08:27 AM    HDL Cholesterol 78 05/12/2018 08:27 AM    LDL, calculated 53.6 05/12/2018 08:27 AM    VLDL, calculated 12.4 05/12/2018 08:27 AM    Triglyceride 62 05/12/2018 08:27 AM    CHOL/HDL Ratio 1.8 05/12/2018 08:27 AM     Lab Results   Component Value Date/Time    VITAMIN D, 25-HYDROXY 36.3 01/09/2018 10:10 AM       Lab Results   Component Value Date/Time    TSH 4.25 (H) 05/12/2018 08:27 AM

## 2019-02-11 RX ORDER — AMLODIPINE BESYLATE 5 MG/1
TABLET ORAL
Qty: 30 TAB | Refills: 1 | Status: SHIPPED | OUTPATIENT
Start: 2019-02-11

## 2019-03-04 LAB
CREATININE, EXTERNAL: 1.23
INR, EXTERNAL: 1
PT, EXTERNAL: 12.1

## 2019-03-17 DIAGNOSIS — R05.9 COUGH: ICD-10-CM

## 2019-03-17 DIAGNOSIS — C34.90 STAGE 4 MALIGNANT NEOPLASM OF LUNG, UNSPECIFIED LATERALITY (HCC): ICD-10-CM

## 2019-03-18 RX ORDER — BENZONATATE 100 MG/1
CAPSULE ORAL
Qty: 100 CAP | Refills: 0 | Status: SHIPPED | OUTPATIENT
Start: 2019-03-18

## 2019-03-18 NOTE — TELEPHONE ENCOUNTER
PCP: Amrit Patel MD    Last appt: 8/15/2018  No future appointments.     Requested Prescriptions     Pending Prescriptions Disp Refills    benzonatate (TESSALON) 100 mg capsule [Pharmacy Med Name: BENZONATATE 100 MG CAPSULE] 100 Cap 0     Sig: TAKE ONE CAPSULE BY MOUTH 3 TIMES A DAY AS NEEDED FOR COUGH       Prior labs and Blood pressures:  BP Readings from Last 3 Encounters:   08/15/18 95/55   06/06/18 166/74   05/20/18 133/67     Lab Results   Component Value Date/Time    Sodium 140 05/18/2018 03:15 AM    Potassium 3.5 05/18/2018 03:15 AM    Chloride 107 05/18/2018 03:15 AM    CO2 24 05/18/2018 03:15 AM    Anion gap 9 05/18/2018 03:15 AM    Glucose 96 05/18/2018 03:15 AM    BUN 11 05/18/2018 03:15 AM    Creatinine 0.83 05/18/2018 03:15 AM    BUN/Creatinine ratio 13 05/18/2018 03:15 AM    GFR est AA >60 05/18/2018 03:15 AM    GFR est non-AA >60 05/18/2018 03:15 AM    Calcium 9.1 05/18/2018 03:15 AM     Lab Results   Component Value Date/Time    Hemoglobin A1c 5.7 05/12/2018 08:27 AM    Hemoglobin A1c (POC) 5.7 01/14/2016 01:52 PM     Lab Results   Component Value Date/Time    Cholesterol, total 144 05/12/2018 08:27 AM    HDL Cholesterol 78 05/12/2018 08:27 AM    LDL, calculated 53.6 05/12/2018 08:27 AM    VLDL, calculated 12.4 05/12/2018 08:27 AM    Triglyceride 62 05/12/2018 08:27 AM    CHOL/HDL Ratio 1.8 05/12/2018 08:27 AM     Lab Results   Component Value Date/Time    VITAMIN D, 25-HYDROXY 36.3 01/09/2018 10:10 AM       Lab Results   Component Value Date/Time    TSH 4.25 (H) 05/12/2018 08:27 AM

## 2019-04-01 RX ORDER — METOPROLOL SUCCINATE 25 MG/1
TABLET, EXTENDED RELEASE ORAL
Qty: 30 TAB | Refills: 3 | Status: SHIPPED | OUTPATIENT
Start: 2019-04-01 | End: 2019-05-22 | Stop reason: SDUPTHER

## 2019-04-05 LAB
CREATININE, EXTERNAL: 1.2
HBA1C MFR BLD HPLC: 8.5 %

## 2019-05-22 RX ORDER — METOPROLOL SUCCINATE 25 MG/1
TABLET, EXTENDED RELEASE ORAL
Qty: 30 TAB | Refills: 2 | Status: SHIPPED | OUTPATIENT
Start: 2019-05-22

## 2019-05-31 NOTE — TELEPHONE ENCOUNTER
PCP: Feliz Fuchs MD    Last appt: 8/15/2018  No future appointments.     Requested Prescriptions     Pending Prescriptions Disp Refills    levothyroxine (SYNTHROID) 88 mcg tablet [Pharmacy Med Name: LEVOTHYROXINE 88 MCG TABLET] 90 Tab 1     Sig: TAKE 1 TABLET BY MOUTH EVERY DAY BEFORE BREAKFAST    levETIRAcetam (KEPPRA) 500 mg tablet [Pharmacy Med Name: LEVETIRACETAM 500 MG TABLET] 180 Tab 1     Sig: TAKE 1 TABLET BY MOUTH TWICE A DAY FOR SEIZURES       Prior labs and Blood pressures:  BP Readings from Last 3 Encounters:   08/15/18 95/55   06/06/18 166/74   05/20/18 133/67     Lab Results   Component Value Date/Time    Sodium 140 05/18/2018 03:15 AM    Potassium 3.5 05/18/2018 03:15 AM    Chloride 107 05/18/2018 03:15 AM    CO2 24 05/18/2018 03:15 AM    Anion gap 9 05/18/2018 03:15 AM    Glucose 96 05/18/2018 03:15 AM    BUN 11 05/18/2018 03:15 AM    Creatinine 0.83 05/18/2018 03:15 AM    BUN/Creatinine ratio 13 05/18/2018 03:15 AM    GFR est AA >60 05/18/2018 03:15 AM    GFR est non-AA >60 05/18/2018 03:15 AM    Calcium 9.1 05/18/2018 03:15 AM     Lab Results   Component Value Date/Time    Hemoglobin A1c 5.7 05/12/2018 08:27 AM    Hemoglobin A1c (POC) 5.7 01/14/2016 01:52 PM    Hemoglobin A1c, External 8.5 04/05/2019     Lab Results   Component Value Date/Time    Cholesterol, total 144 05/12/2018 08:27 AM    HDL Cholesterol 78 05/12/2018 08:27 AM    LDL, calculated 53.6 05/12/2018 08:27 AM    VLDL, calculated 12.4 05/12/2018 08:27 AM    Triglyceride 62 05/12/2018 08:27 AM    CHOL/HDL Ratio 1.8 05/12/2018 08:27 AM     Lab Results   Component Value Date/Time    VITAMIN D, 25-HYDROXY 36.3 01/09/2018 10:10 AM       Lab Results   Component Value Date/Time    TSH 4.25 (H) 05/12/2018 08:27 AM

## 2019-06-04 RX ORDER — LEVETIRACETAM 500 MG/1
TABLET ORAL
Qty: 60 TAB | Refills: 0 | Status: SHIPPED | OUTPATIENT
Start: 2019-06-04 | End: 2019-07-19 | Stop reason: SDUPTHER

## 2019-06-04 RX ORDER — LEVOTHYROXINE SODIUM 88 UG/1
TABLET ORAL
Qty: 30 TAB | Refills: 0 | Status: SHIPPED | OUTPATIENT
Start: 2019-06-04 | End: 2019-07-05 | Stop reason: SDUPTHER

## 2019-06-05 NOTE — TELEPHONE ENCOUNTER
Tried to call patient to schedule overdue lab work. Both patient's phones are unavailable or disconnected. Unable to reach patient or leave a voicemail.

## 2019-06-14 DIAGNOSIS — N40.0 BENIGN PROSTATIC HYPERPLASIA WITHOUT LOWER URINARY TRACT SYMPTOMS: ICD-10-CM

## 2019-06-14 NOTE — TELEPHONE ENCOUNTER
PCP: Shira Hunter MD    Last appt: 8/15/2018  No future appointments.     Requested Prescriptions     Pending Prescriptions Disp Refills    tamsulosin (FLOMAX) 0.4 mg capsule [Pharmacy Med Name: TAMSULOSIN HCL 0.4 MG CAPSULE] 90 Cap 0     Sig: TAKE 1 CAPSULE BY MOUTH EVERY DAY       Prior labs and Blood pressures:  BP Readings from Last 3 Encounters:   08/15/18 95/55   06/06/18 166/74   05/20/18 133/67     Lab Results   Component Value Date/Time    Sodium 140 05/18/2018 03:15 AM    Potassium 3.5 05/18/2018 03:15 AM    Chloride 107 05/18/2018 03:15 AM    CO2 24 05/18/2018 03:15 AM    Anion gap 9 05/18/2018 03:15 AM    Glucose 96 05/18/2018 03:15 AM    BUN 11 05/18/2018 03:15 AM    Creatinine 0.83 05/18/2018 03:15 AM    BUN/Creatinine ratio 13 05/18/2018 03:15 AM    GFR est AA >60 05/18/2018 03:15 AM    GFR est non-AA >60 05/18/2018 03:15 AM    Calcium 9.1 05/18/2018 03:15 AM     Lab Results   Component Value Date/Time    Hemoglobin A1c 5.7 05/12/2018 08:27 AM    Hemoglobin A1c (POC) 5.7 01/14/2016 01:52 PM    Hemoglobin A1c, External 8.5 04/05/2019     Lab Results   Component Value Date/Time    Cholesterol, total 144 05/12/2018 08:27 AM    HDL Cholesterol 78 05/12/2018 08:27 AM    LDL, calculated 53.6 05/12/2018 08:27 AM    VLDL, calculated 12.4 05/12/2018 08:27 AM    Triglyceride 62 05/12/2018 08:27 AM    CHOL/HDL Ratio 1.8 05/12/2018 08:27 AM     Lab Results   Component Value Date/Time    VITAMIN D, 25-HYDROXY 36.3 01/09/2018 10:10 AM       Lab Results   Component Value Date/Time    TSH 4.25 (H) 05/12/2018 08:27 AM

## 2019-06-19 RX ORDER — TAMSULOSIN HYDROCHLORIDE 0.4 MG/1
CAPSULE ORAL
Qty: 90 CAP | Refills: 0 | Status: SHIPPED | OUTPATIENT
Start: 2019-06-19

## 2019-07-05 RX ORDER — LEVOTHYROXINE SODIUM 88 UG/1
TABLET ORAL
Qty: 20 TAB | Refills: 0 | Status: SHIPPED | OUTPATIENT
Start: 2019-07-05

## 2019-07-05 NOTE — TELEPHONE ENCOUNTER
PCP: Latha Moreira MD    Last appt: 8/15/2018  No future appointments.     Requested Prescriptions     Pending Prescriptions Disp Refills    levothyroxine (SYNTHROID) 88 mcg tablet [Pharmacy Med Name: LEVOTHYROXINE 88 MCG TABLET] 30 Tab 0     Sig: TAKE 1 TABLET BY MOUTH EVERY DAY BEFORE BREAKFAST       Prior labs and Blood pressures:  BP Readings from Last 3 Encounters:   08/15/18 95/55   06/06/18 166/74   05/20/18 133/67     Lab Results   Component Value Date/Time    Sodium 140 05/18/2018 03:15 AM    Potassium 3.5 05/18/2018 03:15 AM    Chloride 107 05/18/2018 03:15 AM    CO2 24 05/18/2018 03:15 AM    Anion gap 9 05/18/2018 03:15 AM    Glucose 96 05/18/2018 03:15 AM    BUN 11 05/18/2018 03:15 AM    Creatinine 0.83 05/18/2018 03:15 AM    BUN/Creatinine ratio 13 05/18/2018 03:15 AM    GFR est AA >60 05/18/2018 03:15 AM    GFR est non-AA >60 05/18/2018 03:15 AM    Calcium 9.1 05/18/2018 03:15 AM     Lab Results   Component Value Date/Time    Hemoglobin A1c 5.7 05/12/2018 08:27 AM    Hemoglobin A1c (POC) 5.7 01/14/2016 01:52 PM    Hemoglobin A1c, External 8.5 04/05/2019     Lab Results   Component Value Date/Time    Cholesterol, total 144 05/12/2018 08:27 AM    HDL Cholesterol 78 05/12/2018 08:27 AM    LDL, calculated 53.6 05/12/2018 08:27 AM    VLDL, calculated 12.4 05/12/2018 08:27 AM    Triglyceride 62 05/12/2018 08:27 AM    CHOL/HDL Ratio 1.8 05/12/2018 08:27 AM     Lab Results   Component Value Date/Time    VITAMIN D, 25-HYDROXY 36.3 01/09/2018 10:10 AM       Lab Results   Component Value Date/Time    TSH 4.25 (H) 05/12/2018 08:27 AM

## 2019-07-19 RX ORDER — LEVETIRACETAM 500 MG/1
TABLET ORAL
Qty: 180 TAB | Refills: 0 | Status: SHIPPED | OUTPATIENT
Start: 2019-07-19

## 2019-07-19 NOTE — TELEPHONE ENCOUNTER
PCP: Latha Moreira MD    Last appt: 8/15/2018  No future appointments.     Requested Prescriptions     Pending Prescriptions Disp Refills    levETIRAcetam (KEPPRA) 500 mg tablet 180 Tab 0     Sig: TAKE 1 TABLET BY MOUTH TWICE A DAY FOR SEIZURES       Prior labs and Blood pressures:  BP Readings from Last 3 Encounters:   08/15/18 95/55   06/06/18 166/74   05/20/18 133/67     Lab Results   Component Value Date/Time    Sodium 140 05/18/2018 03:15 AM    Potassium 3.5 05/18/2018 03:15 AM    Chloride 107 05/18/2018 03:15 AM    CO2 24 05/18/2018 03:15 AM    Anion gap 9 05/18/2018 03:15 AM    Glucose 96 05/18/2018 03:15 AM    BUN 11 05/18/2018 03:15 AM    Creatinine 0.83 05/18/2018 03:15 AM    BUN/Creatinine ratio 13 05/18/2018 03:15 AM    GFR est AA >60 05/18/2018 03:15 AM    GFR est non-AA >60 05/18/2018 03:15 AM    Calcium 9.1 05/18/2018 03:15 AM     Lab Results   Component Value Date/Time    Hemoglobin A1c 5.7 05/12/2018 08:27 AM    Hemoglobin A1c (POC) 5.7 01/14/2016 01:52 PM    Hemoglobin A1c, External 8.5 04/05/2019     Lab Results   Component Value Date/Time    Cholesterol, total 144 05/12/2018 08:27 AM    HDL Cholesterol 78 05/12/2018 08:27 AM    LDL, calculated 53.6 05/12/2018 08:27 AM    VLDL, calculated 12.4 05/12/2018 08:27 AM    Triglyceride 62 05/12/2018 08:27 AM    CHOL/HDL Ratio 1.8 05/12/2018 08:27 AM     Lab Results   Component Value Date/Time    VITAMIN D, 25-HYDROXY 36.3 01/09/2018 10:10 AM       Lab Results   Component Value Date/Time    TSH 4.25 (H) 05/12/2018 08:27 AM

## 2019-07-30 DIAGNOSIS — I10 ESSENTIAL HYPERTENSION: ICD-10-CM

## 2019-07-30 RX ORDER — LOSARTAN POTASSIUM AND HYDROCHLOROTHIAZIDE 12.5; 1 MG/1; MG/1
TABLET ORAL
Qty: 30 TAB | Refills: 0 | Status: SHIPPED | OUTPATIENT
Start: 2019-07-30

## 2019-07-30 NOTE — TELEPHONE ENCOUNTER
PCP: Lisha Rand MD    Last appt: 8/15/2018  No future appointments.     Requested Prescriptions     Pending Prescriptions Disp Refills    losartan-hydroCHLOROthiazide (HYZAAR) 100-12.5 mg per tablet [Pharmacy Med Name: Sanjiv Castellanos 100-12.5 MG TAB] 90 Tab 2     Sig: TAKE 1 TABLET BY MOUTH EVERY DAY       Prior labs and Blood pressures:  BP Readings from Last 3 Encounters:   08/15/18 95/55   06/06/18 166/74   05/20/18 133/67     Lab Results   Component Value Date/Time    Sodium 140 05/18/2018 03:15 AM    Potassium 3.5 05/18/2018 03:15 AM    Chloride 107 05/18/2018 03:15 AM    CO2 24 05/18/2018 03:15 AM    Anion gap 9 05/18/2018 03:15 AM    Glucose 96 05/18/2018 03:15 AM    BUN 11 05/18/2018 03:15 AM    Creatinine 0.83 05/18/2018 03:15 AM    BUN/Creatinine ratio 13 05/18/2018 03:15 AM    GFR est AA >60 05/18/2018 03:15 AM    GFR est non-AA >60 05/18/2018 03:15 AM    Calcium 9.1 05/18/2018 03:15 AM     Lab Results   Component Value Date/Time    Hemoglobin A1c 5.7 05/12/2018 08:27 AM    Hemoglobin A1c (POC) 5.7 01/14/2016 01:52 PM    Hemoglobin A1c, External 8.5 04/05/2019     Lab Results   Component Value Date/Time    Cholesterol, total 144 05/12/2018 08:27 AM    HDL Cholesterol 78 05/12/2018 08:27 AM    LDL, calculated 53.6 05/12/2018 08:27 AM    VLDL, calculated 12.4 05/12/2018 08:27 AM    Triglyceride 62 05/12/2018 08:27 AM    CHOL/HDL Ratio 1.8 05/12/2018 08:27 AM     Lab Results   Component Value Date/Time    VITAMIN D, 25-HYDROXY 36.3 01/09/2018 10:10 AM       Lab Results   Component Value Date/Time    TSH 4.25 (H) 05/12/2018 08:27 AM

## 2019-08-01 NOTE — TELEPHONE ENCOUNTER
Writer called both home and mobile numbers listed in chart. Writer received an automated message for both numbers stating that they were no longer in service.

## 2019-08-07 RX ORDER — LEVOTHYROXINE SODIUM 88 UG/1
TABLET ORAL
Qty: 10 TAB | Refills: 0 | OUTPATIENT
Start: 2019-08-07

## 2019-08-28 DIAGNOSIS — I73.9 PVD (PERIPHERAL VASCULAR DISEASE) WITH CLAUDICATION (HCC): ICD-10-CM

## 2019-08-28 DIAGNOSIS — I25.10 CORONARY ARTERY DISEASE DUE TO LIPID RICH PLAQUE: ICD-10-CM

## 2019-08-28 DIAGNOSIS — I25.83 CORONARY ARTERY DISEASE DUE TO LIPID RICH PLAQUE: ICD-10-CM

## 2019-08-28 NOTE — TELEPHONE ENCOUNTER
PCP: Cheko Berkowitz MD    Last appt: 8/15/2018  No future appointments.     Requested Prescriptions     Pending Prescriptions Disp Refills    isosorbide mononitrate ER (IMDUR) 30 mg tablet [Pharmacy Med Name: Jorge A Bravo ER 30 MG TB] 90 Tab 2     Sig: TAKE 1 TABLET BY MOUTH EVERY DAY       Prior labs and Blood pressures:  BP Readings from Last 3 Encounters:   08/15/18 95/55   06/06/18 166/74   05/20/18 133/67     Lab Results   Component Value Date/Time    Sodium 140 05/18/2018 03:15 AM    Potassium 3.5 05/18/2018 03:15 AM    Chloride 107 05/18/2018 03:15 AM    CO2 24 05/18/2018 03:15 AM    Anion gap 9 05/18/2018 03:15 AM    Glucose 96 05/18/2018 03:15 AM    BUN 11 05/18/2018 03:15 AM    Creatinine 0.83 05/18/2018 03:15 AM    BUN/Creatinine ratio 13 05/18/2018 03:15 AM    GFR est AA >60 05/18/2018 03:15 AM    GFR est non-AA >60 05/18/2018 03:15 AM    Calcium 9.1 05/18/2018 03:15 AM     Lab Results   Component Value Date/Time    Hemoglobin A1c 5.7 05/12/2018 08:27 AM    Hemoglobin A1c (POC) 5.7 01/14/2016 01:52 PM    Hemoglobin A1c, External 8.5 04/05/2019     Lab Results   Component Value Date/Time    Cholesterol, total 144 05/12/2018 08:27 AM    HDL Cholesterol 78 05/12/2018 08:27 AM    LDL, calculated 53.6 05/12/2018 08:27 AM    VLDL, calculated 12.4 05/12/2018 08:27 AM    Triglyceride 62 05/12/2018 08:27 AM    CHOL/HDL Ratio 1.8 05/12/2018 08:27 AM     Lab Results   Component Value Date/Time    VITAMIN D, 25-HYDROXY 36.3 01/09/2018 10:10 AM       Lab Results   Component Value Date/Time    TSH 4.25 (H) 05/12/2018 08:27 AM

## 2019-08-30 RX ORDER — ISOSORBIDE MONONITRATE 30 MG/1
TABLET, EXTENDED RELEASE ORAL
Qty: 30 TAB | Refills: 0 | Status: SHIPPED | OUTPATIENT
Start: 2019-08-30

## 2020-10-16 NOTE — ED NOTES
Pt states for the past few weeks after eating sugar readings have been in the 250-300 range. Pt states at 1:47pm today reading was 218.  Please call [phone] Report attempted to [facility] ICU at this time. Currently on hold for report.

## 2025-06-25 NOTE — PROGRESS NOTES
Plan of care reviewed with patient   Reason for Admission:   GLF resulting in a SDH               RRAT Score:  24                Resources/supports as identified by patient/family: Patient received [facility] for SN/PT/OT                  Top Challenges facing patient (as identified by patient/family and CM): Finances/Medication cost?    Insurance: Medicare AB                Transportation? Per EMR patient drives and also rides a bike, son April Mora ([phone]) and daughter in law transport patient as well              Support system or lack thereof? Patient has a son and DIL locally and also a daughter who lives out of state                     Living arrangements? Lives alone in an apartment             Self-care/ADLs/Cognition? Able to complete ADL's independently          Current Advanced Directive/Advance Care Plan:                            Plan for utilizing home health:   Yes, open to [facility] for SN/PT/OT                       Likelihood of readmission: High  Transition of Care Plan:   TBD    Care Management Interventions  PCP Verified by CM: Yes (Dr Maine Alonso)  Transition of Care Consult (CM Consult): Home Health (open to [facility] prior to admission)  [address]: No  Reason Outside [place]: Patient already serviced by other home care/hospice agency  [facility]  Discharge Durable Medical Equipment: No  Physical Therapy Consult: No  Occupational Therapy Consult: No  Speech Therapy Consult: No  Current Support Network: Lives Alone (Familia Oh [phone])     Patient admitted to ICU s/p right frontal craniotomy for evacuation of a SDH, currently on a precedex gtt. Per EMR patient was admitted with a PE/DVT for 3/16-3/23 and discharged to [facility]. Patient was discharged back to his apartment and is currently open to [facility] for home health. Per notes patient was discharged from [facility] on 4/17 and was ambulating with a  feet.  Per previous notes family is concerned that patient should not be living independently. Care management will follow for discharge planning needs.  Braxton Saldana RN,CRM

## (undated) DEVICE — MEDI-VAC YANK SUCT HNDL W/TPRD BULBOUS TIP: Brand: CARDINAL HEALTH

## (undated) DEVICE — DRAIN KT WND 10FR RND 400ML --

## (undated) DEVICE — SURGICAL PROCEDURE KIT CRANIOTOMY

## (undated) DEVICE — INFECTION CONTROL KIT SYS

## (undated) DEVICE — SYR 3ML LL TIP 1/10ML GRAD --

## (undated) DEVICE — 3000CC GUARDIAN II: Brand: GUARDIAN

## (undated) DEVICE — BLOCK BITE ENDOSCP AD 21 MM W/ DIL BLU LF DISP

## (undated) DEVICE — SOLUTION IV 1000ML 0.9% SOD CHL

## (undated) DEVICE — Device

## (undated) DEVICE — KIT CG8901 CLIP GUN 10 PK: Brand: CLIP GUN

## (undated) DEVICE — CODMAN® SURGICAL PATTIES 1/2" X 3" (1.27CM X 7.62CM): Brand: CODMAN®

## (undated) DEVICE — TOOL F2/8TA23 LEGEND 8CM 2.3MM TAPER: Brand: MIDAS REX™

## (undated) DEVICE — SYR 10ML LUER LOK 1/5ML GRAD --

## (undated) DEVICE — RUBBERBAND FASTENING W0.25XL3.5IN 5 PER PK

## (undated) DEVICE — SOLUTION IV 250ML 0.9% SOD CHL CLR INJ FLX BG CONT PRT CLSR

## (undated) DEVICE — DEVON™ KNEE AND BODY STRAP 60" X 3" (1.5 M X 7.6 CM): Brand: DEVON

## (undated) DEVICE — FLOSEAL MATRIX IS INDICATED IN SURGICAL PROCEDURES (OTHER THAN IN OPHTHALMIC) AS AN ADJUNCT TO HEMOSTASIS WHEN CONTROL OF BLEEDING BY LIGATURE OR CONVENTIONALPROCEDURES IS INEFFECTIVE OR IMPRACTICAL.: Brand: FLOSEAL HEMOSTATIC MATRIX

## (undated) DEVICE — 1200 GUARD II KIT W/5MM TUBE W/O VAC TUBE: Brand: GUARDIAN

## (undated) DEVICE — STERILE POLYISOPRENE POWDER-FREE SURGICAL GLOVES WITH EMOLLIENT COATING: Brand: PROTEXIS

## (undated) DEVICE — SUTURE NRLN SZ 4-0 L18IN NONABSORBABLE BLK L13MM TF 1/2 CIR C584D

## (undated) DEVICE — Z DISCONTINUED PER MEDLINE LINE GAS SAMPLING O2/CO2 LNG AD 13 FT NSL W/ TBNG FILTERLINE

## (undated) DEVICE — CODMAN® SURGICAL PATTIES 1/4" X 1/4" (0.64CM X 0.64CM): Brand: CODMAN®

## (undated) DEVICE — TOOL 8TA11 LEGEND 8CM 1.1MM TA: Brand: MIDAS REX ™

## (undated) DEVICE — SUTURE VCRL SZ 0 L18IN ABSRB VLT L36MM CT-1 1/2 CIR J740D

## (undated) DEVICE — GUN CG8900 RANEY CLIP KIT 1PK: Brand: CLIP GUN

## (undated) DEVICE — CUFF ADULT 1 PC 1 VINYL DISP --

## (undated) DEVICE — TOWEL 4 PLY TISS 19X30 SUE WHT

## (undated) DEVICE — KENDALL SCD EXPRESS SLEEVES, KNEE LENGTH, MEDIUM: Brand: KENDALL SCD

## (undated) DEVICE — KENDALL RADIOLUCENT FOAM MONITORING ELECTRODE RECTANGULAR SHAPE: Brand: KENDALL

## (undated) DEVICE — TOOL 9AC90 LEGEND 9CM 9MM AC: Brand: MIDAS REX

## (undated) DEVICE — NEONATAL-ADULT SPO2 SENSOR: Brand: NELLCOR

## (undated) DEVICE — Device: Brand: MEDEX

## (undated) DEVICE — STOCKINETTE TUBE BLN 2PLY 6X72 -- MEDICHOICE CONVERT TO 363488

## (undated) DEVICE — BASIN EMSIS 16OZ GRAPHITE PLAS KID SHP MOLD GRAD FOR ORAL

## (undated) DEVICE — FLOSEAL HEMOSTATIC MATRIX, 10 ML: Brand: FLOSEAL

## (undated) DEVICE — NEEDLE HYPO 18GA L1.5IN PNK S STL HUB POLYPR SHLD REG BVL

## (undated) DEVICE — SOLIDIFIER MEDC 1200ML -- CONVERT TO 356117

## (undated) DEVICE — DRAPE FLD WRM W44XL66IN C6L FOR INTRATEMP SYS THERMABASIN

## (undated) DEVICE — AGENT HEMSTAT W2XL14IN OXIDIZED REGENERATED CELOS ABSRB FOR

## (undated) DEVICE — CATH IV AUTOGRD BC PNK 20GA 25 -- INSYTE

## (undated) DEVICE — HANDLE LT SNAP ON ULT DURABLE LENS FOR TRUMPF ALC DISPOSABLE

## (undated) DEVICE — CLOSED WOUND DRAIN, FLAT, SILICONE, WITH CLOTSTOP®: Brand: AXIOM® CWV FACIAL ATRAUM™ WITH CLOTSTOP®

## (undated) DEVICE — SUTURE VCRL SZ 2-0 L18IN ABSRB UD L26MM CP-2 1/2 CIR REV J762D

## (undated) DEVICE — TOOL 8TD126 LEGEND 8CM 1.2MM 6MM DEPTH: Brand: MIDAS REX ™

## (undated) DEVICE — SET ADMIN 16ML TBNG L100IN 2 Y INJ SITE IV PIGGY BK DISP